# Patient Record
Sex: MALE | Race: WHITE | NOT HISPANIC OR LATINO
[De-identification: names, ages, dates, MRNs, and addresses within clinical notes are randomized per-mention and may not be internally consistent; named-entity substitution may affect disease eponyms.]

---

## 2017-01-26 ENCOUNTER — RX RENEWAL (OUTPATIENT)
Age: 47
End: 2017-01-26

## 2017-02-07 ENCOUNTER — APPOINTMENT (OUTPATIENT)
Dept: NEUROLOGY | Facility: CLINIC | Age: 47
End: 2017-02-07

## 2017-02-07 VITALS
HEART RATE: 85 BPM | WEIGHT: 150 LBS | SYSTOLIC BLOOD PRESSURE: 108 MMHG | BODY MASS INDEX: 26.58 KG/M2 | HEIGHT: 63 IN | DIASTOLIC BLOOD PRESSURE: 70 MMHG

## 2017-02-07 RX ORDER — LORATADINE 10 MG
17 TABLET,DISINTEGRATING ORAL
Refills: 0 | Status: ACTIVE | COMMUNITY

## 2017-02-27 ENCOUNTER — MEDICATION RENEWAL (OUTPATIENT)
Age: 47
End: 2017-02-27

## 2017-03-15 ENCOUNTER — RESULT CHARGE (OUTPATIENT)
Age: 47
End: 2017-03-15

## 2017-03-15 ENCOUNTER — RX RENEWAL (OUTPATIENT)
Age: 47
End: 2017-03-15

## 2017-03-20 ENCOUNTER — APPOINTMENT (OUTPATIENT)
Dept: NEUROLOGY | Facility: CLINIC | Age: 47
End: 2017-03-20

## 2017-03-27 ENCOUNTER — MEDICATION RENEWAL (OUTPATIENT)
Age: 47
End: 2017-03-27

## 2017-04-04 ENCOUNTER — OUTPATIENT (OUTPATIENT)
Dept: OUTPATIENT SERVICES | Facility: HOSPITAL | Age: 47
LOS: 1 days | End: 2017-04-04

## 2017-04-04 DIAGNOSIS — Z96.22 MYRINGOTOMY TUBE(S) STATUS: Chronic | ICD-10-CM

## 2017-04-04 DIAGNOSIS — T85.09XA OTHER MECHANICAL COMPLICATION OF VENTRICULAR INTRACRANIAL (COMMUNICATING) SHUNT, INITIAL ENCOUNTER: Chronic | ICD-10-CM

## 2017-04-17 ENCOUNTER — EMERGENCY (EMERGENCY)
Facility: HOSPITAL | Age: 47
LOS: 1 days | Discharge: HOME | End: 2017-04-17
Attending: EMERGENCY MEDICINE | Admitting: EMERGENCY MEDICINE
Payer: MEDICARE

## 2017-04-17 VITALS
SYSTOLIC BLOOD PRESSURE: 125 MMHG | RESPIRATION RATE: 17 BRPM | DIASTOLIC BLOOD PRESSURE: 73 MMHG | HEART RATE: 88 BPM | OXYGEN SATURATION: 98 %

## 2017-04-17 VITALS — HEART RATE: 85 BPM | TEMPERATURE: 97 F | RESPIRATION RATE: 14 BRPM | OXYGEN SATURATION: 98 %

## 2017-04-17 DIAGNOSIS — T85.09XA OTHER MECHANICAL COMPLICATION OF VENTRICULAR INTRACRANIAL (COMMUNICATING) SHUNT, INITIAL ENCOUNTER: Chronic | ICD-10-CM

## 2017-04-17 DIAGNOSIS — Z96.22 MYRINGOTOMY TUBE(S) STATUS: Chronic | ICD-10-CM

## 2017-04-17 LAB
ALBUMIN SERPL ELPH-MCNC: 4.3 G/DL — SIGNIFICANT CHANGE UP (ref 3.3–5)
ALP SERPL-CCNC: 145 U/L — HIGH (ref 40–120)
ALT FLD-CCNC: 17 U/L — SIGNIFICANT CHANGE UP (ref 4–41)
APPEARANCE UR: CLEAR — SIGNIFICANT CHANGE UP
AST SERPL-CCNC: 26 U/L — SIGNIFICANT CHANGE UP (ref 4–40)
BASE EXCESS BLDV CALC-SCNC: 4.9 MMOL/L — SIGNIFICANT CHANGE UP
BASOPHILS # BLD AUTO: 0.04 K/UL — SIGNIFICANT CHANGE UP (ref 0–0.2)
BASOPHILS NFR BLD AUTO: 0.7 % — SIGNIFICANT CHANGE UP (ref 0–2)
BILIRUB SERPL-MCNC: < 0.2 MG/DL — LOW (ref 0.2–1.2)
BILIRUB UR-MCNC: NEGATIVE — SIGNIFICANT CHANGE UP
BLOOD GAS VENOUS - CREATININE: 1 MG/DL — SIGNIFICANT CHANGE UP (ref 0.5–1.3)
BLOOD UR QL VISUAL: NEGATIVE — SIGNIFICANT CHANGE UP
BUN SERPL-MCNC: 10 MG/DL — SIGNIFICANT CHANGE UP (ref 7–23)
CALCIUM SERPL-MCNC: 8.7 MG/DL — SIGNIFICANT CHANGE UP (ref 8.4–10.5)
CHLORIDE BLDV-SCNC: 109 MMOL/L — HIGH (ref 96–108)
CHLORIDE SERPL-SCNC: 104 MMOL/L — SIGNIFICANT CHANGE UP (ref 98–107)
CO2 SERPL-SCNC: 27 MMOL/L — SIGNIFICANT CHANGE UP (ref 22–31)
COLOR SPEC: YELLOW — SIGNIFICANT CHANGE UP
CREAT SERPL-MCNC: 1.06 MG/DL — SIGNIFICANT CHANGE UP (ref 0.5–1.3)
EOSINOPHIL # BLD AUTO: 0.44 K/UL — SIGNIFICANT CHANGE UP (ref 0–0.5)
EOSINOPHIL NFR BLD AUTO: 7.6 % — HIGH (ref 0–6)
GAS PNL BLDV: 134 MMOL/L — LOW (ref 136–146)
GLUCOSE BLDV-MCNC: 94 — SIGNIFICANT CHANGE UP (ref 70–99)
GLUCOSE SERPL-MCNC: 100 MG/DL — HIGH (ref 70–99)
GLUCOSE UR-MCNC: NEGATIVE — SIGNIFICANT CHANGE UP
HCO3 BLDV-SCNC: 27 MMOL/L — SIGNIFICANT CHANGE UP (ref 20–27)
HCT VFR BLD CALC: 39 % — SIGNIFICANT CHANGE UP (ref 39–50)
HCT VFR BLDV CALC: 37.7 % — LOW (ref 39–51)
HGB BLD-MCNC: 12.1 G/DL — LOW (ref 13–17)
HGB BLDV-MCNC: 12.3 G/DL — LOW (ref 13–17)
IMM GRANULOCYTES NFR BLD AUTO: 0.3 % — SIGNIFICANT CHANGE UP (ref 0–1.5)
KETONES UR-MCNC: NEGATIVE — SIGNIFICANT CHANGE UP
LACTATE BLDV-MCNC: 1.9 MMOL/L — SIGNIFICANT CHANGE UP (ref 0.5–2)
LEUKOCYTE ESTERASE UR-ACNC: NEGATIVE — SIGNIFICANT CHANGE UP
LYMPHOCYTES # BLD AUTO: 1.88 K/UL — SIGNIFICANT CHANGE UP (ref 1–3.3)
LYMPHOCYTES # BLD AUTO: 32.6 % — SIGNIFICANT CHANGE UP (ref 13–44)
MCHC RBC-ENTMCNC: 28.2 PG — SIGNIFICANT CHANGE UP (ref 27–34)
MCHC RBC-ENTMCNC: 31 % — LOW (ref 32–36)
MCV RBC AUTO: 90.9 FL — SIGNIFICANT CHANGE UP (ref 80–100)
MONOCYTES # BLD AUTO: 0.68 K/UL — SIGNIFICANT CHANGE UP (ref 0–0.9)
MONOCYTES NFR BLD AUTO: 11.8 % — SIGNIFICANT CHANGE UP (ref 2–14)
MUCOUS THREADS # UR AUTO: SIGNIFICANT CHANGE UP
NEUTROPHILS # BLD AUTO: 2.71 K/UL — SIGNIFICANT CHANGE UP (ref 1.8–7.4)
NEUTROPHILS NFR BLD AUTO: 47 % — SIGNIFICANT CHANGE UP (ref 43–77)
NITRITE UR-MCNC: NEGATIVE — SIGNIFICANT CHANGE UP
NON-SQ EPI CELLS # UR AUTO: <1 — SIGNIFICANT CHANGE UP
PCO2 BLDV: 55 MMHG — HIGH (ref 41–51)
PH BLDV: 7.36 PH — SIGNIFICANT CHANGE UP (ref 7.32–7.43)
PH UR: 7 — SIGNIFICANT CHANGE UP (ref 4.6–8)
PLATELET # BLD AUTO: 241 K/UL — SIGNIFICANT CHANGE UP (ref 150–400)
PMV BLD: 9.4 FL — SIGNIFICANT CHANGE UP (ref 7–13)
PO2 BLDV: 33 MMHG — LOW (ref 35–40)
POTASSIUM BLDV-SCNC: 4.8 MMOL/L — HIGH (ref 3.4–4.5)
POTASSIUM SERPL-MCNC: 4.6 MMOL/L — SIGNIFICANT CHANGE UP (ref 3.5–5.3)
POTASSIUM SERPL-SCNC: 4.6 MMOL/L — SIGNIFICANT CHANGE UP (ref 3.5–5.3)
PROT SERPL-MCNC: 7.1 G/DL — SIGNIFICANT CHANGE UP (ref 6–8.3)
PROT UR-MCNC: NEGATIVE — SIGNIFICANT CHANGE UP
RBC # BLD: 4.29 M/UL — SIGNIFICANT CHANGE UP (ref 4.2–5.8)
RBC # FLD: 14.6 % — HIGH (ref 10.3–14.5)
RBC CASTS # UR COMP ASSIST: SIGNIFICANT CHANGE UP (ref 0–?)
SAO2 % BLDV: 60.5 % — SIGNIFICANT CHANGE UP (ref 60–85)
SODIUM SERPL-SCNC: 144 MMOL/L — SIGNIFICANT CHANGE UP (ref 135–145)
SP GR SPEC: 1.02 — SIGNIFICANT CHANGE UP (ref 1–1.03)
UROBILINOGEN FLD QL: NORMAL E.U. — SIGNIFICANT CHANGE UP (ref 0.1–0.2)
WBC # BLD: 5.77 K/UL — SIGNIFICANT CHANGE UP (ref 3.8–10.5)
WBC # FLD AUTO: 5.77 K/UL — SIGNIFICANT CHANGE UP (ref 3.8–10.5)
WBC UR QL: SIGNIFICANT CHANGE UP (ref 0–?)

## 2017-04-17 PROCEDURE — 75809 NONVASCULAR SHUNT X-RAY: CPT | Mod: 26

## 2017-04-17 PROCEDURE — 74000: CPT | Mod: 26

## 2017-04-17 PROCEDURE — 70450 CT HEAD/BRAIN W/O DYE: CPT | Mod: 26

## 2017-04-17 PROCEDURE — 99284 EMERGENCY DEPT VISIT MOD MDM: CPT | Mod: GC

## 2017-04-17 PROCEDURE — 49427 INJECTION ABDOMINAL SHUNT: CPT

## 2017-04-17 PROCEDURE — 71010: CPT | Mod: 26

## 2017-04-17 PROCEDURE — 70250 X-RAY EXAM OF SKULL: CPT | Mod: 26

## 2017-04-17 RX ORDER — ZONISAMIDE 100 MG
1 CAPSULE ORAL
Qty: 30 | Refills: 0 | OUTPATIENT
Start: 2017-04-17 | End: 2017-05-02

## 2017-04-17 RX ORDER — ZONISAMIDE 100 MG
100 CAPSULE ORAL ONCE
Qty: 0 | Refills: 0 | Status: COMPLETED | OUTPATIENT
Start: 2017-04-17 | End: 2017-04-17

## 2017-04-17 RX ORDER — ZONISAMIDE 100 MG
1 CAPSULE ORAL
Qty: 15 | Refills: 0 | OUTPATIENT
Start: 2017-04-17 | End: 2017-05-02

## 2017-04-17 RX ORDER — LAMOTRIGINE 25 MG/1
300 TABLET, ORALLY DISINTEGRATING ORAL ONCE
Qty: 0 | Refills: 0 | Status: COMPLETED | OUTPATIENT
Start: 2017-04-17 | End: 2017-04-17

## 2017-04-17 RX ORDER — PRIMIDONE 250 MG/1
375 TABLET ORAL ONCE
Qty: 0 | Refills: 0 | Status: COMPLETED | OUTPATIENT
Start: 2017-04-17 | End: 2017-04-17

## 2017-04-17 RX ADMIN — Medication 100 MILLIGRAM(S): at 21:28

## 2017-04-17 RX ADMIN — PRIMIDONE 375 MILLIGRAM(S): 250 TABLET ORAL at 21:28

## 2017-04-17 RX ADMIN — LAMOTRIGINE 300 MILLIGRAM(S): 25 TABLET, ORALLY DISINTEGRATING ORAL at 21:28

## 2017-04-17 NOTE — ED PROVIDER NOTE - PROGRESS NOTE DETAILS
Phyllis PGY3: Case d/w neurosurgery, recommended consulting neurology if ventricular size is unchanged. Seen by Neurology resident and case discussed with Dr. Jewell. Recommendation is for patient to have dosinamide increased from 50 BID to 100 mg BID and will have close follow up with Dr. Jewell. There have been no further events while in ED.

## 2017-04-17 NOTE — ED ADULT TRIAGE NOTE - CHIEF COMPLAINT QUOTE
pt c/o of having a headache and had a total of five seizures since yesterday pt has hx of  shunt MR and is blind.

## 2017-04-17 NOTE — ED PROVIDER NOTE - OBJECTIVE STATEMENT
45 yo man w/ h/o hydrocephalus (s/p  shunt), seizure d/o (s/p vns), MR, blindness p/w seizures. Aunt (legal guardian and hcp) states pt has had multiple seizures over past several days, including 3 yesterday, which have been severe and similar to those prior to vns placement. Describes as complex seizures followed by post ictal state. Otherwise pt has been at baseline, no fever, vomiting, ams, diarrhea. Takes lamotrigine, Onfi, primidone.  Neuro: Agusto Jewell  Neurosurgery: Beau

## 2017-04-17 NOTE — ED PROVIDER NOTE - ATTENDING CONTRIBUTION TO CARE
AJM: Pt seen with resident and agree with above note. 47 yo man w/ h/o hydrocephalus (s/p  shunt), seizure d/o (s/p vns), MR, blindness p/w seizures. Aunt (legal guardian and hcp) states pt has had multiple seizures over past several days, including 3 yesterday, which have been severe and similar to those prior to vns placement. Describes as complex seizures followed by post ictal state. Now at baseline per guardian. no fevers, sweats, chills, trauma. No signs of infection. Will obtain labs, ct head,  shunt series. Neurosurgery consultation for possible stimulator malfunction.

## 2017-04-17 NOTE — ED ADULT NURSE NOTE - PMH
Aspiration pneumonia    Elevated liver enzymes    Environmental Allergies    Epilepsy    Glaucoma    Hiatal hernia    Hydrocephalus    Impulse control disorder  worsened when pt on Depakote, pt now off  Legally blind    Mild mental retardation    Osteoporosis    Sleep apnea  not on CPAP  Viral pneumonia

## 2017-04-17 NOTE — ED ADULT NURSE NOTE - PSH
Epilepsy  s/p Vagal Nerve Stimulator Implant , 2007  H/O craniotomy  at age 18  Heel cord contracture  s/p surgical correction b/l  History of tonsillectomy    Obstructed  shunt  revision of  shunt 2015  S/p bilateral myringotomy with tube placement    S/P  Shunt  originally had shunt to lung, then revised to peritoneal shunt, has had multiple revisions

## 2017-04-17 NOTE — ED ADULT NURSE NOTE - OBJECTIVE STATEMENT
RN Break Coverage: received pt to room 29 brought in by aunt for evaluation of increased seizures x5 since yesterday with worsening headaches x couple months with history of  shunt and Mild MR. Aunt states pt last had 2 seizures in the middle of the night, as she found patient's bed linens soiled. pt presents awake, alert, appropriately answering questions. c/o intermittent richardson. pt montaño, follows commands. skin warm, dry, appropriate for race. respirations even, unlabored. denies cp or sob. abdomen soft nontender. aunt denies fever or chills. pt denies any further symptoms. ivl placed. bloods drawn and sent. vs as noted. family at bedside. awaiting md campoverde. safety maintained. awaiting md campoverde

## 2017-04-18 ENCOUNTER — RESULT REVIEW (OUTPATIENT)
Age: 47
End: 2017-04-18

## 2017-04-19 LAB — LAMOTRIGINE SERPL-MCNC: 8.1 MCG/ML — SIGNIFICANT CHANGE UP (ref 2.5–15)

## 2017-04-20 LAB
PHENOBARBITAL [MASS/VOLUME] IN BODY FLUID: 18.7 MG/L — SIGNIFICANT CHANGE UP (ref 15–40)
PRIMIDONE FLD-MCNC: 9.9 MG/L — SIGNIFICANT CHANGE UP (ref 5–12)

## 2017-04-26 ENCOUNTER — APPOINTMENT (OUTPATIENT)
Dept: NEUROLOGY | Facility: CLINIC | Age: 47
End: 2017-04-26

## 2017-04-28 ENCOUNTER — APPOINTMENT (OUTPATIENT)
Dept: NEUROLOGY | Facility: CLINIC | Age: 47
End: 2017-04-28

## 2017-04-28 VITALS
WEIGHT: 142 LBS | BODY MASS INDEX: 25.16 KG/M2 | SYSTOLIC BLOOD PRESSURE: 105 MMHG | DIASTOLIC BLOOD PRESSURE: 69 MMHG | HEART RATE: 71 BPM | HEIGHT: 63 IN

## 2017-04-29 ENCOUNTER — RX RENEWAL (OUTPATIENT)
Age: 47
End: 2017-04-29

## 2017-05-12 ENCOUNTER — MEDICATION RENEWAL (OUTPATIENT)
Age: 47
End: 2017-05-12

## 2017-06-06 ENCOUNTER — APPOINTMENT (OUTPATIENT)
Dept: NEUROLOGY | Facility: CLINIC | Age: 47
End: 2017-06-06

## 2017-06-06 VITALS
BODY MASS INDEX: 24.8 KG/M2 | HEART RATE: 70 BPM | WEIGHT: 140 LBS | HEIGHT: 63 IN | SYSTOLIC BLOOD PRESSURE: 103 MMHG | DIASTOLIC BLOOD PRESSURE: 66 MMHG

## 2017-06-12 ENCOUNTER — OUTPATIENT (OUTPATIENT)
Dept: OUTPATIENT SERVICES | Facility: HOSPITAL | Age: 47
LOS: 1 days | End: 2017-06-12

## 2017-06-12 DIAGNOSIS — T85.09XA OTHER MECHANICAL COMPLICATION OF VENTRICULAR INTRACRANIAL (COMMUNICATING) SHUNT, INITIAL ENCOUNTER: Chronic | ICD-10-CM

## 2017-06-12 DIAGNOSIS — Z96.22 MYRINGOTOMY TUBE(S) STATUS: Chronic | ICD-10-CM

## 2017-06-20 ENCOUNTER — MEDICATION RENEWAL (OUTPATIENT)
Age: 47
End: 2017-06-20

## 2017-06-20 ENCOUNTER — RX RENEWAL (OUTPATIENT)
Age: 47
End: 2017-06-20

## 2017-06-21 ENCOUNTER — OUTPATIENT (OUTPATIENT)
Dept: OUTPATIENT SERVICES | Facility: HOSPITAL | Age: 47
LOS: 1 days | End: 2017-06-21
Payer: MEDICARE

## 2017-06-21 ENCOUNTER — APPOINTMENT (OUTPATIENT)
Dept: CT IMAGING | Facility: IMAGING CENTER | Age: 47
End: 2017-06-21

## 2017-06-21 DIAGNOSIS — T85.09XA OTHER MECHANICAL COMPLICATION OF VENTRICULAR INTRACRANIAL (COMMUNICATING) SHUNT, INITIAL ENCOUNTER: Chronic | ICD-10-CM

## 2017-06-21 DIAGNOSIS — G91.9 HYDROCEPHALUS, UNSPECIFIED: ICD-10-CM

## 2017-06-21 DIAGNOSIS — Z96.22 MYRINGOTOMY TUBE(S) STATUS: Chronic | ICD-10-CM

## 2017-06-21 PROCEDURE — 70450 CT HEAD/BRAIN W/O DYE: CPT

## 2017-06-27 ENCOUNTER — APPOINTMENT (OUTPATIENT)
Dept: NEUROLOGY | Facility: CLINIC | Age: 47
End: 2017-06-27

## 2017-06-27 VITALS
SYSTOLIC BLOOD PRESSURE: 100 MMHG | HEIGHT: 63 IN | BODY MASS INDEX: 24.8 KG/M2 | HEART RATE: 69 BPM | DIASTOLIC BLOOD PRESSURE: 64 MMHG | WEIGHT: 140 LBS

## 2017-07-12 ENCOUNTER — EMERGENCY (EMERGENCY)
Facility: HOSPITAL | Age: 47
LOS: 1 days | End: 2017-07-12
Payer: MEDICARE

## 2017-07-12 ENCOUNTER — MOBILE ON CALL (OUTPATIENT)
Age: 47
End: 2017-07-12

## 2017-07-12 DIAGNOSIS — T85.09XA OTHER MECHANICAL COMPLICATION OF VENTRICULAR INTRACRANIAL (COMMUNICATING) SHUNT, INITIAL ENCOUNTER: Chronic | ICD-10-CM

## 2017-07-12 DIAGNOSIS — Z96.22 MYRINGOTOMY TUBE(S) STATUS: Chronic | ICD-10-CM

## 2017-07-12 PROCEDURE — 99284 EMERGENCY DEPT VISIT MOD MDM: CPT

## 2017-07-13 ENCOUNTER — APPOINTMENT (OUTPATIENT)
Dept: NEUROLOGY | Facility: CLINIC | Age: 47
End: 2017-07-13

## 2017-07-13 VITALS
HEART RATE: 66 BPM | WEIGHT: 140 LBS | BODY MASS INDEX: 24.8 KG/M2 | DIASTOLIC BLOOD PRESSURE: 62 MMHG | SYSTOLIC BLOOD PRESSURE: 99 MMHG | HEIGHT: 63 IN

## 2017-07-13 PROCEDURE — 71010: CPT | Mod: 26

## 2017-07-27 ENCOUNTER — OTHER (OUTPATIENT)
Age: 47
End: 2017-07-27

## 2017-07-28 ENCOUNTER — APPOINTMENT (OUTPATIENT)
Dept: NEUROLOGY | Facility: CLINIC | Age: 47
End: 2017-07-28

## 2017-08-03 ENCOUNTER — INPATIENT (INPATIENT)
Facility: HOSPITAL | Age: 47
LOS: 4 days | Discharge: HOME | DRG: 101 | End: 2017-08-08
Attending: PSYCHIATRY & NEUROLOGY | Admitting: PSYCHIATRY & NEUROLOGY
Payer: MEDICARE

## 2017-08-03 VITALS
OXYGEN SATURATION: 100 % | HEART RATE: 87 BPM | TEMPERATURE: 98 F | DIASTOLIC BLOOD PRESSURE: 77 MMHG | WEIGHT: 147.93 LBS | SYSTOLIC BLOOD PRESSURE: 114 MMHG | HEIGHT: 62 IN | RESPIRATION RATE: 18 BRPM

## 2017-08-03 DIAGNOSIS — Z96.22 MYRINGOTOMY TUBE(S) STATUS: Chronic | ICD-10-CM

## 2017-08-03 DIAGNOSIS — T85.09XA OTHER MECHANICAL COMPLICATION OF VENTRICULAR INTRACRANIAL (COMMUNICATING) SHUNT, INITIAL ENCOUNTER: Chronic | ICD-10-CM

## 2017-08-03 DIAGNOSIS — G40.909 EPILEPSY, UNSPECIFIED, NOT INTRACTABLE, WITHOUT STATUS EPILEPTICUS: ICD-10-CM

## 2017-08-03 DIAGNOSIS — R56.9 UNSPECIFIED CONVULSIONS: ICD-10-CM

## 2017-08-03 PROCEDURE — 95819 EEG AWAKE AND ASLEEP: CPT | Mod: 26

## 2017-08-03 PROCEDURE — 95957 EEG DIGITAL ANALYSIS: CPT | Mod: 26

## 2017-08-03 RX ORDER — LAMOTRIGINE 25 MG/1
300 TABLET, ORALLY DISINTEGRATING ORAL
Qty: 0 | Refills: 0 | Status: DISCONTINUED | OUTPATIENT
Start: 2017-08-03 | End: 2017-08-05

## 2017-08-03 RX ORDER — PSYLLIUM SEED (WITH DEXTROSE)
1 POWDER (GRAM) ORAL DAILY
Qty: 0 | Refills: 0 | Status: DISCONTINUED | OUTPATIENT
Start: 2017-08-03 | End: 2017-08-08

## 2017-08-03 RX ORDER — LORATADINE 10 MG/1
10 TABLET ORAL DAILY
Qty: 0 | Refills: 0 | Status: DISCONTINUED | OUTPATIENT
Start: 2017-08-03 | End: 2017-08-08

## 2017-08-03 RX ORDER — LATANOPROST 0.05 MG/ML
1 SOLUTION/ DROPS OPHTHALMIC; TOPICAL AT BEDTIME
Qty: 0 | Refills: 0 | Status: DISCONTINUED | OUTPATIENT
Start: 2017-08-03 | End: 2017-08-08

## 2017-08-03 RX ORDER — PRIMIDONE 250 MG/1
250 TABLET ORAL DAILY
Qty: 0 | Refills: 0 | Status: DISCONTINUED | OUTPATIENT
Start: 2017-08-03 | End: 2017-08-08

## 2017-08-03 RX ORDER — ESCITALOPRAM OXALATE 10 MG/1
10 TABLET, FILM COATED ORAL DAILY
Qty: 0 | Refills: 0 | Status: DISCONTINUED | OUTPATIENT
Start: 2017-08-03 | End: 2017-08-08

## 2017-08-03 RX ORDER — ENOXAPARIN SODIUM 100 MG/ML
40 INJECTION SUBCUTANEOUS EVERY 24 HOURS
Qty: 0 | Refills: 0 | Status: DISCONTINUED | OUTPATIENT
Start: 2017-08-03 | End: 2017-08-08

## 2017-08-03 RX ORDER — FLUTICASONE PROPIONATE 50 MCG
2 SPRAY, SUSPENSION NASAL DAILY
Qty: 0 | Refills: 0 | Status: DISCONTINUED | OUTPATIENT
Start: 2017-08-03 | End: 2017-08-08

## 2017-08-03 RX ORDER — PRIMIDONE 250 MG/1
250 TABLET ORAL AT BEDTIME
Qty: 0 | Refills: 0 | Status: DISCONTINUED | OUTPATIENT
Start: 2017-08-03 | End: 2017-08-08

## 2017-08-03 RX ADMIN — Medication 2 SPRAY(S): at 22:40

## 2017-08-03 RX ADMIN — LATANOPROST 1 DROP(S): 0.05 SOLUTION/ DROPS OPHTHALMIC; TOPICAL at 22:40

## 2017-08-03 RX ADMIN — PRIMIDONE 250 MILLIGRAM(S): 250 TABLET ORAL at 22:37

## 2017-08-03 RX ADMIN — Medication 1 APPLICATION(S): at 22:41

## 2017-08-03 RX ADMIN — LAMOTRIGINE 300 MILLIGRAM(S): 25 TABLET, ORALLY DISINTEGRATING ORAL at 22:37

## 2017-08-03 NOTE — H&P ADULT - ASSESSMENT
46 M PMH Seizure DO, blindness, glaucoma, hydrocephalus, rt parietal encephalomalacia s/p multiple  shunts, s/p VNS(May 2106), s/p craniotomy presents to EMU for seizure characterization. Patient follows outpatient with Dr. Welch. PE is unremarkable and unchanged. Patient admitted for seizure characterization.

## 2017-08-03 NOTE — H&P ADULT - NSHPPHYSICALEXAM_GEN_ALL_CORE
AAO x 3, naming repetition intact, fluent high pitched speech  PERRLA, Extropia, horizontal nystagmus left eye, medial limitation B/L left eye sight to light only. rt eye can read in peripheral vision, color only in central of right eye. V1-V3 intact, no facial asymmetry, tongue/uvula midline, traps intact  5/5 throughout escept rt shoulder abduction and rt hip flexion4+/5, mild atrophy of b/l shins/calves, otherwise nml bulk/tone  intact LT/PP throughout  ambulates with assistance  hyporeflexic throughout

## 2017-08-03 NOTE — H&P ADULT - HISTORY OF PRESENT ILLNESS
46 M PMH Seizure DO, blindness, glaucoma, hydrocephalus, rt parietal encephalomalacia s/p multiple  shunts, s/p VNS(May 2106), s/p craniotomy presents to EMU for seizure characterization.     Seizures began shortly after birth. He had hydrocephalus and now s/p shunt. 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 Seizures described as loss of balance, confusion and picking at his clothes with prior EEGs demonstrating spikes on B/L anterior temporal. Episodes come in clusters, guardian could not give frequency as pt is in a group home. The pt states he is aware of seizures at times. Since May, pt has been exhibiting progressive change in behavior and increased falls. At times acting confused with aggressive behavior, repeating statements. As per guardian behavior is atypical. Psychotropic drugs were added and as per pt's guardian there does not seem to be much of a response to psychotropic medication. 46 M PMH Seizure DO, blindness, glaucoma, hydrocephalus, rt parietal encephalomalacia s/p multiple  shunts, s/p VNS(May 2106), s/p craniotomy presents to EMU for seizure characterization.     Patient follows outpatient with     Seizures began shortly after birth. He had hydrocephalus and now s/p shunt. 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 Seizures described as loss of balance, confusion and picking at his clothes with prior EEGs demonstrating spikes on B/L anterior temporal. Episodes come in clusters, guardian could not give frequency as pt is in a group home. The seizures at a younger age are unclear in semiology. However, for the past several decades his typical seizures have been pulling at his clothes for seconds to minutes with confusion thereafter. He gets confused with these events with behavioral arrest.   the exact frequency is unknown (perhaps several times a month). He does not have convulsions.    In addition to the seizures above, he has been having behavioral outbursts at his home. He gets mad at times or very tired at times. It has been unclear if these are seizures. As per guardian behavior is atypical. Psychotropic drugs were added and as per pt's guardian there does not seem to be much of a response to psychotropic medication. 46 M PMH Seizure DO, blindness, glaucoma, hydrocephalus, rt parietal encephalomalacia s/p multiple  shunts, s/p VNS(May 2106), s/p craniotomy presents to EMU for seizure characterization. Patient follows outpatient with Dr. Welch. Seizures described as loss of balance, confusion and picking at his clothes with prior EEGs demonstrating spikes on B/L anterior temporal. Episodes come in clusters, guardian could not give frequency as pt is in a group home. The seizures at a younger age are unclear in semiology. However, for the past several decades his typical seizures have been pulling at his clothes for seconds to minutes with confusion thereafter. He gets confused with these events with behavioral arrest. the exact frequency is unknown (perhaps several times a month). He does not have convulsions.    In addition to the seizures above, he has been having behavioral outbursts at his home. He gets mad at times or very tired at times. It has been unclear if these are seizures. As per guardian behavior is atypical. Psychotropic drugs were added and as per pt's guardian there does not seem to be much of a response to psychotropic medication. . Seizures began shortly after birth. He had hydrocephalus and now s/p shunt. 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

## 2017-08-04 ENCOUNTER — TRANSCRIPTION ENCOUNTER (OUTPATIENT)
Age: 47
End: 2017-08-04

## 2017-08-04 LAB
ALBUMIN SERPL ELPH-MCNC: 4 G/DL — SIGNIFICANT CHANGE UP (ref 3.3–5)
ALP SERPL-CCNC: 161 U/L — HIGH (ref 40–120)
ALT FLD-CCNC: 19 U/L — SIGNIFICANT CHANGE UP (ref 10–45)
ANION GAP SERPL CALC-SCNC: 13 MMOL/L — SIGNIFICANT CHANGE UP (ref 5–17)
APTT BLD: 36.3 SEC — SIGNIFICANT CHANGE UP (ref 27.5–37.4)
AST SERPL-CCNC: 29 U/L — SIGNIFICANT CHANGE UP (ref 10–40)
BILIRUB DIRECT SERPL-MCNC: 0.1 MG/DL — SIGNIFICANT CHANGE UP (ref 0–0.2)
BILIRUB INDIRECT FLD-MCNC: 0.1 MG/DL — LOW (ref 0.2–1)
BILIRUB SERPL-MCNC: 0.2 MG/DL — SIGNIFICANT CHANGE UP (ref 0.2–1.2)
BUN SERPL-MCNC: 14 MG/DL — SIGNIFICANT CHANGE UP (ref 7–23)
CALCIUM SERPL-MCNC: 8.8 MG/DL — SIGNIFICANT CHANGE UP (ref 8.4–10.5)
CHLORIDE SERPL-SCNC: 103 MMOL/L — SIGNIFICANT CHANGE UP (ref 96–108)
CO2 SERPL-SCNC: 24 MMOL/L — SIGNIFICANT CHANGE UP (ref 22–31)
CREAT SERPL-MCNC: 0.84 MG/DL — SIGNIFICANT CHANGE UP (ref 0.5–1.3)
GLUCOSE SERPL-MCNC: 100 MG/DL — HIGH (ref 70–99)
HCT VFR BLD CALC: 38.7 % — LOW (ref 39–50)
HGB BLD-MCNC: 12.5 G/DL — LOW (ref 13–17)
INR BLD: 1.05 RATIO — SIGNIFICANT CHANGE UP (ref 0.88–1.16)
MCHC RBC-ENTMCNC: 28.7 PG — SIGNIFICANT CHANGE UP (ref 27–34)
MCHC RBC-ENTMCNC: 32.3 GM/DL — SIGNIFICANT CHANGE UP (ref 32–36)
MCV RBC AUTO: 88.8 FL — SIGNIFICANT CHANGE UP (ref 80–100)
PLATELET # BLD AUTO: 250 K/UL — SIGNIFICANT CHANGE UP (ref 150–400)
POTASSIUM SERPL-MCNC: 4.5 MMOL/L — SIGNIFICANT CHANGE UP (ref 3.5–5.3)
POTASSIUM SERPL-SCNC: 4.5 MMOL/L — SIGNIFICANT CHANGE UP (ref 3.5–5.3)
PROT SERPL-MCNC: 7.3 G/DL — SIGNIFICANT CHANGE UP (ref 6–8.3)
PROTHROM AB SERPL-ACNC: 11.4 SEC — SIGNIFICANT CHANGE UP (ref 9.8–12.7)
RBC # BLD: 4.36 M/UL — SIGNIFICANT CHANGE UP (ref 4.2–5.8)
RBC # FLD: 14.8 % — HIGH (ref 10.3–14.5)
SODIUM SERPL-SCNC: 140 MMOL/L — SIGNIFICANT CHANGE UP (ref 135–145)
WBC # BLD: 4.82 K/UL — SIGNIFICANT CHANGE UP (ref 3.8–10.5)
WBC # FLD AUTO: 4.82 K/UL — SIGNIFICANT CHANGE UP (ref 3.8–10.5)

## 2017-08-04 PROCEDURE — 95957 EEG DIGITAL ANALYSIS: CPT | Mod: 26

## 2017-08-04 PROCEDURE — 95951: CPT | Mod: 26

## 2017-08-04 PROCEDURE — 99222 1ST HOSP IP/OBS MODERATE 55: CPT | Mod: GC

## 2017-08-04 RX ADMIN — Medication 1 APPLICATION(S): at 22:04

## 2017-08-04 RX ADMIN — Medication 2 SPRAY(S): at 11:12

## 2017-08-04 RX ADMIN — LAMOTRIGINE 300 MILLIGRAM(S): 25 TABLET, ORALLY DISINTEGRATING ORAL at 17:43

## 2017-08-04 RX ADMIN — ESCITALOPRAM OXALATE 10 MILLIGRAM(S): 10 TABLET, FILM COATED ORAL at 11:12

## 2017-08-04 RX ADMIN — PRIMIDONE 250 MILLIGRAM(S): 250 TABLET ORAL at 09:17

## 2017-08-04 RX ADMIN — Medication 1 PACKET(S): at 11:12

## 2017-08-04 RX ADMIN — LAMOTRIGINE 300 MILLIGRAM(S): 25 TABLET, ORALLY DISINTEGRATING ORAL at 05:22

## 2017-08-04 RX ADMIN — ENOXAPARIN SODIUM 40 MILLIGRAM(S): 100 INJECTION SUBCUTANEOUS at 05:22

## 2017-08-04 RX ADMIN — LATANOPROST 1 DROP(S): 0.05 SOLUTION/ DROPS OPHTHALMIC; TOPICAL at 22:04

## 2017-08-04 RX ADMIN — LORATADINE 10 MILLIGRAM(S): 10 TABLET ORAL at 11:12

## 2017-08-04 RX ADMIN — PRIMIDONE 250 MILLIGRAM(S): 250 TABLET ORAL at 22:04

## 2017-08-04 NOTE — DISCHARGE NOTE ADULT - CARE PROVIDER_API CALL
Agusto Jewell (MD), Clinical Neurophysiology; EEGEpilepsy; Neurology  [address] [address]  Phone: [phone]  Fax: [phone]

## 2017-08-04 NOTE — DISCHARGE NOTE ADULT - MEDICATION SUMMARY - MEDICATIONS TO TAKE
I will START or STAY ON the medications listed below when I get home from the hospital:    Calcium 500+D oral tablet  -- 1 tab(s) by mouth 3 times a day  -- Indication: For Supplement    lanolin-mineral oil topical  --   apply to scalp at bed time  -- Indication: For minieral oil    petroleum jelly  --   apply on skin to right ear at bed time  -- Indication: For jelly    Tylenol Regular Strength 325 mg oral tablet  -- 2 tab(s) by mouth every 6 hours, As needed, Moderate Pain  -- Indication: For pain    Milk of Magnesia  -- 30 milliliter(s) by mouth , As Needed  -- Indication: For antacid    LaMICtal 200 mg oral tablet  -- 2 tab(s) by mouth 2 times a day  -- Indication: For Seizure    primidone 250 mg oral tablet  -- 1 tab(s) by mouth 2 times a day  -- Indication: For Seizure    Onfi 10 mg oral tablet  -- 5 milligram(s) by mouth 2 times a day MDD:10 mg  -- Indication: For Seizure    escitalopram 10 mg oral tablet  -- 1.5 tab(s) by mouth once a day am  -- Indication: For antidepressant    Imodium A-D  -- 4 milligram(s) by mouth , As Needed  -- Indication: For antidiarrhea    cetirizine 10 mg oral tablet  -- 1 tab(s) by mouth once a day in am  -- Indication: For antuhistamine    ziprasidone 80 mg oral capsule  -- 1 cap(s) by mouth 2 times a day  -- Indication: For antipsychotic    Rexulti 1 mg oral tablet  -- 2 tab(s) by mouth once a day  -- Indication: For antipsychotic    Keflex 500 mg oral capsule  -- 500 cap(s) by mouth 4 times a day MDD:4 caps to be taken before dental appts  -- Finish all this medication unless otherwise directed by prescriber.    -- Indication: For prn abx    Vaseline topical ointment  -- Apply on skin to affected area  right ankle twice a day and right ear at bedtime  -- Indication: For topical    ketoconazole 2% topical cream  -- Apply on skin to affected area 2 times a day right cheek and left forehead  -- Indication: For topical    ciclopirox 1% topical shampoo  -- Apply on skin to affected area  3 x weekly to scalp  -- Indication: For topical    Dulcolax Laxative 10 mg rectal suppository  -- 1 suppository(ies) rectally once a day, As Needed  -- Indication: For laxative    Fleet Enema 7 g-19 g rectal enema  -- 133 milliliter(s) rectally once, As Needed  -- Indication: For laxative    Reguloid  -- 1 tbsp in 8oz of fluid by mouth once a day in pm  -- Indication: For laxative    Alphagan P 0.1% ophthalmic solution  -- 1 drop(s) to each affected eye 2 times a day  -- Indication: For opthamologic    Refresh Optive ophthalmic solution  -- 1 drop(s) in each eye 3 times a day  -- Indication: For eye drops    Refresh PM ophthalmic ointment  -- 1 application to each affected eye once a day (at bedtime)  -- Indication: For eye drops    Travatan Z 0.004% ophthalmic solution  -- 1 drop(s) to each affected eye once a day (in the evening)  -- Indication: For eye drops    dorzolamide-timolol 2.23%-0.68% ophthalmic solution  -- 1 drop(s) to each affected eye 2 times a day  -- Indication: For eye drops    latanoprost 0.005% ophthalmic solution  -- 1 drop(s) to each affected eye once a day (at bedtime)  -- Indication: For eye drops    Ear Wax 6.5% otic solution  -- 10 drop(s) to each affected ear 1st week every month 7 days  -- Indication: For ear wax    NexIUM 40 mg oral delayed release capsule  -- 1 cap(s) by mouth 2 times a day  -- Indication: For GERd    Vitamin D3 1000 intl units oral capsule  -- 1 cap(s) by mouth once a day in am  -- Indication: For vitamin I will START or STAY ON the medications listed below when I get home from the hospital:    Calcium 500+D oral tablet  -- 1 tab(s) by mouth 3 times a day  -- Indication: For Supplement    lanolin-mineral oil topical  --   apply to scalp at bed time  -- Indication: For minieral oil    petroleum jelly  --   apply on skin to right ear at bed time  -- Indication: For jelly    Tylenol Regular Strength 325 mg oral tablet  -- 2 tab(s) by mouth every 6 hours, As needed, Moderate Pain  -- Indication: For pain    Milk of Magnesia  -- 30 milliliter(s) by mouth , As Needed  -- Indication: For antacid    LaMICtal 200 mg oral tablet  -- 2 tab(s) by mouth 2 times a day  -- Indication: For Seizure    primidone 250 mg oral tablet  -- 1 tab(s) by mouth 2 times a day  -- Indication: For Seizure    Onfi 10 mg oral tablet  -- 5 milligram(s) by mouth 2 times a day MDD:10 mg  -- Indication: For Seizure    zonisamide 100 mg oral capsule  -- 1 cap(s) by mouth 2 times a day  -- Indication: For Seizure    escitalopram 10 mg oral tablet  -- 1.5 tab(s) by mouth once a day am  -- Indication: For antidepressant    Imodium A-D  -- 4 milligram(s) by mouth , As Needed  -- Indication: For antidiarrhea    cetirizine 10 mg oral tablet  -- 1 tab(s) by mouth once a day in am  -- Indication: For antuhistamine    ziprasidone 80 mg oral capsule  -- 1 cap(s) by mouth 2 times a day  -- Indication: For antipsychotic    Rexulti 1 mg oral tablet  -- 2 tab(s) by mouth once a day  -- Indication: For antipsychotic    Keflex 500 mg oral capsule  -- 500 cap(s) by mouth 4 times a day MDD:4 caps to be taken before dental appts  -- Finish all this medication unless otherwise directed by prescriber.    -- Indication: For prn abx    Vaseline topical ointment  -- Apply on skin to affected area  right ankle twice a day and right ear at bedtime  -- Indication: For topical    ketoconazole 2% topical cream  -- Apply on skin to affected area 2 times a day right cheek and left forehead  -- Indication: For topical    ciclopirox 1% topical shampoo  -- Apply on skin to affected area  3 x weekly to scalp  -- Indication: For topical    Dulcolax Laxative 10 mg rectal suppository  -- 1 suppository(ies) rectally once a day, As Needed  -- Indication: For laxative    Fleet Enema 7 g-19 g rectal enema  -- 133 milliliter(s) rectally once, As Needed  -- Indication: For laxative    Reguloid  -- 1 tbsp in 8oz of fluid by mouth once a day in pm  -- Indication: For laxative    Alphagan P 0.1% ophthalmic solution  -- 1 drop(s) to each affected eye 2 times a day  -- Indication: For opthamologic    Refresh Optive ophthalmic solution  -- 1 drop(s) in each eye 3 times a day  -- Indication: For eye drops    Refresh PM ophthalmic ointment  -- 1 application to each affected eye once a day (at bedtime)  -- Indication: For eye drops    Travatan Z 0.004% ophthalmic solution  -- 1 drop(s) to each affected eye once a day (in the evening)  -- Indication: For eye drops    dorzolamide-timolol 2.23%-0.68% ophthalmic solution  -- 1 drop(s) to each affected eye 2 times a day  -- Indication: For eye drops    latanoprost 0.005% ophthalmic solution  -- 1 drop(s) to each affected eye once a day (at bedtime)  -- Indication: For eye drops    Ear Wax 6.5% otic solution  -- 10 drop(s) to each affected ear 1st week every month 7 days  -- Indication: For ear wax    NexIUM 40 mg oral delayed release capsule  -- 1 cap(s) by mouth 2 times a day  -- Indication: For GERd    Vitamin D3 1000 intl units oral capsule  -- 1 cap(s) by mouth once a day in am  -- Indication: For vitamin I will START or STAY ON the medications listed below when I get home from the hospital:    Calcium 500+D oral tablet  -- 1 tab(s) by mouth 3 times a day  -- Indication: For Supplement    lanolin-mineral oil topical  --   apply to scalp at bed time  -- Indication: For minieral oil    petroleum jelly  --   apply on skin to right ear at bed time  -- Indication: For jelly    Actamin 325 mg oral tablet  -- 2 tab(s) by mouth every 8 hours, As Needed, Mild Pain (1 - 3)  -- Indication: For pain    Milk of Magnesia  -- 30 milliliter(s) by mouth , As Needed  -- Indication: For antacid    LaMICtal 200 mg oral tablet  -- 2 tab(s) by mouth 2 times a day  -- Indication: For Seizure    primidone 250 mg oral tablet  -- 1 tab(s) by mouth 2 times a day  -- Indication: For Seizure    Onfi 10 mg oral tablet  -- 5 milligram(s) by mouth 2 times a day MDD:10 mg  -- Indication: For Seizure    escitalopram 10 mg oral tablet  -- 1.5 tab(s) by mouth once a day am  -- Indication: For antidepressant    Imodium A-D  -- 4 milligram(s) by mouth , As Needed  -- Indication: For antidiarrhea    cetirizine 10 mg oral tablet  -- 1 tab(s) by mouth once a day in am  -- Indication: For antuhistamine    ziprasidone 80 mg oral capsule  -- 1 cap(s) by mouth 2 times a day  -- Indication: For antipsychotic    Rexulti 1 mg oral tablet  -- 2 tab(s) by mouth once a day  -- Indication: For antipsychotic    Keflex 500 mg oral capsule  -- 500 cap(s) by mouth 4 times a day MDD:4 caps to be taken before dental appts  -- Finish all this medication unless otherwise directed by prescriber.    -- Indication: For prn abx    Vaseline topical ointment  -- Apply on skin to affected area  right ankle twice a day and right ear at bedtime  -- Indication: For topical    ketoconazole 2% topical cream  -- Apply on skin to affected area 2 times a day right cheek and left forehead  -- Indication: For topical    ciclopirox 1% topical shampoo  -- Apply on skin to affected area  3 x weekly to scalp  -- Indication: For topical    Dulcolax Laxative 10 mg rectal suppository  -- 1 suppository(ies) rectally once a day, As Needed  -- Indication: For laxative    Fleet Enema 7 g-19 g rectal enema  -- 133 milliliter(s) rectally once, As Needed  -- Indication: For laxative    Reguloid  -- 1 tbsp in 8oz of fluid by mouth once a day in pm  -- Indication: For laxative    Alphagan P 0.1% ophthalmic solution  -- 1 drop(s) to each affected eye 2 times a day  -- Indication: For opthamologic    Refresh Optive ophthalmic solution  -- 1 drop(s) in each eye 3 times a day  -- Indication: For eye drops    Refresh PM ophthalmic ointment  -- 1 application to each affected eye once a day (at bedtime)  -- Indication: For eye drops    Travatan Z 0.004% ophthalmic solution  -- 1 drop(s) to each affected eye once a day (in the evening)  -- Indication: For eye drops    dorzolamide-timolol 2.23%-0.68% ophthalmic solution  -- 1 drop(s) to each affected eye 2 times a day  -- Indication: For eye drops    latanoprost 0.005% ophthalmic solution  -- 1 drop(s) to each affected eye once a day (at bedtime)  -- Indication: For eye drops    Ear Wax 6.5% otic solution  -- 10 drop(s) to each affected ear 1st week every month 7 days  -- Indication: For ear wax    NexIUM 40 mg oral delayed release capsule  -- 1 cap(s) by mouth 2 times a day  -- Indication: For GERd    Vitamin D3 1000 intl units oral capsule  -- 1 cap(s) by mouth once a day in am  -- Indication: For vitamin

## 2017-08-04 NOTE — PROGRESS NOTE ADULT - ASSESSMENT
46 M PMH Seizure DO, blindness, glaucoma, hydrocephalus, rt parietal encephalomalacia s/p multiple  shunts, s/p VNS(May 2106), s/p craniotomy presents to EMU for seizure characterization. Homem meds Onfi BID, Lamictal 300 BID, primidone BID PE unchanged. VEEG showed bilaterally temporal spikes L >R

## 2017-08-04 NOTE — DISCHARGE NOTE ADULT - HOSPITAL COURSE
46 M PMH Seizure DO, blindness, glaucoma, hydrocephalus, rt parietal encephalomalacia s/p multiple  shunts, s/p VNS(May 2106), s/p craniotomy presents to EMU for seizure characterization. Patient follows outpatient with Dr. Welch. Seizures described as loss of balance, confusion and picking at his clothes with prior EEGs demonstrating spikes on B/L anterior temporal. Episodes come in clusters, guardian could not give frequency as pt is in a group home. The seizures at a younger age are unclear in semiology. However, for the past several decades his typical seizures have been pulling at his clothes for seconds to minutes with confusion thereafter. He gets confused with these events with behavioral arrest. the exact frequency is unknown (perhaps several times a month). He does not have convulsions.    In addition to the seizures above, he has been having behavioral outbursts at his home. He gets mad at times or very tired at times. It has been unclear if these are seizures. As per guardian behavior is atypical. Psychotropic drugs were added and as per pt's guardian there does not seem to be much of a response to psychotropic medication. . Seizures began shortly after birth. He had hydrocephalus and now s/p shunt. 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     Patient was monitored via VEEG. 46 M PMH Seizure DO, blindness, glaucoma, hydrocephalus, rt parietal encephalomalacia s/p multiple  shunts, s/p VNS(May 2106), s/p craniotomy presents to EMU for seizure characterization. Patient follows outpatient with Dr. Welch. Seizures described as loss of balance, confusion and picking at his clothes with prior EEGs demonstrating spikes on B/L anterior temporal. Episodes come in clusters, guardian could not give frequency as pt is in a group home. The seizures at a younger age are unclear in semiology. However, for the past several decades his typical seizures have been pulling at his clothes for seconds to minutes with confusion thereafter. He gets confused with these events with behavioral arrest. the exact frequency is unknown (perhaps several times a month). He does not have convulsions.    In addition to the seizures above, he has been having behavioral outbursts at his home. He gets mad at times or very tired at times. It has been unclear if these are seizures. As per guardian behavior is atypical. Psychotropic drugs were added and as per pt's guardian there does not seem to be much of a response to psychotropic medication. . Seizures began shortly after birth. He had hydrocephalus and now s/p shunt. 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     Patient was monitored via VEEG showed right sided slowing, and R >> L temporal spikes t, with arousal, automatisms of face/dystonic wrist posturing. Appear to occur with right sided subtle slowing, increased HR, then poorly lateralized bilateral asynchronous slowing prior to offset.  It was thought pt's behavior changes was 2/2 primidone. Primidone decreased from 250/374 to 250 BID, Lamictal was increased from 300 BID to 400 BID. Pt is stable for discharge and can return to his day care abd continue with his activities there    Please follow up with Dr. Jewell within 1 month. 46 M PMH Seizure DO, blindness, glaucoma, hydrocephalus, rt parietal encephalomalacia s/p multiple  shunts, s/p VNS(May 2106), s/p craniotomy presents to EMU for seizure characterization. Patient follows outpatient with Dr. Welch. Seizures described as loss of balance, confusion and picking at his clothes with prior EEGs demonstrating spikes on B/L anterior temporal. Episodes come in clusters, guardian could not give frequency as pt is in a group home. The seizures at a younger age are unclear in semiology. However, for the past several decades his typical seizures have been pulling at his clothes for seconds to minutes with confusion thereafter. He gets confused with these events with behavioral arrest. the exact frequency is unknown (perhaps several times a month). He does not have convulsions.    In addition to the seizures above, he has been having behavioral outbursts at his home. He gets mad at times or very tired at times. It has been unclear if these are seizures. As per guardian behavior is atypical. Psychotropic drugs were added and as per pt's guardian there does not seem to be much of a response to psychotropic medication. . Seizures began shortly after birth. He had hydrocephalus and now s/p shunt. 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     Patient was monitored via VEEG showed right sided slowing, and R >> L temporal spikes t, with arousal, automatisms of face/dystonic wrist posturing. Appear to occur with right sided subtle slowing, increased HR, then poorly lateralized bilateral asynchronous slowing prior to offset.  It was thought pt's behavior changes was 2/2 primidone. Primidone decreased from 250/374 to 250 BID, Lamictal was increased from 300 BID to 400 BID. Pt is stable for discharge and can return to his day care and resume his activities there.     Please follow up with Dr. Jewell within 1 month. ( August 14th is scheduled)

## 2017-08-04 NOTE — DISCHARGE NOTE ADULT - MEDICATION SUMMARY - MEDICATIONS TO CHANGE
I will SWITCH the dose or number of times a day I take the medications listed below when I get home from the hospital:    primidone 250 mg oral tablet  -- 1.5 tab(s) by mouth once a day (at bedtime)    primidone 250 mg oral tablet  -- 1 tab(s) by mouth once a day in am I will SWITCH the dose or number of times a day I take the medications listed below when I get home from the hospital:    LaMICtal 100 mg oral tablet  -- 3 tab(s) by mouth 2 times a day    primidone 250 mg oral tablet  -- 1.5 tab(s) by mouth once a day (at bedtime)    primidone 250 mg oral tablet  -- 1 tab(s) by mouth once a day in am

## 2017-08-04 NOTE — DISCHARGE NOTE ADULT - NS AS DC STROKE ED MATERIALS
Need for Followup After Discharge/Prescribed Medications/Risk Factors for Stroke/Stroke Warning Signs and Symptoms/Stroke Education Booklet/Call 911 for Stroke

## 2017-08-04 NOTE — DISCHARGE NOTE ADULT - PLAN OF CARE
management and prevention continue with meds as above  follow up with Neurology continue with meds as above  follow up with Neurology (  on August 14th)

## 2017-08-04 NOTE — PROGRESS NOTE ADULT - PROBLEM SELECTOR PLAN 1
VEEG  c/w AEDs  Ativan if seizure more than 3 mins  Seizure precautions VEEG  maintain lamictal 300 BID, primidone 250 BID  Decrease Onfi to 5 daily  Ativan if seizure more than 3 mins  Seizure precautions

## 2017-08-04 NOTE — DISCHARGE NOTE ADULT - CARE PLAN
Principal Discharge DX:	Seizure  Goal:	management and prevention  Instructions for follow-up, activity and diet:	continue with meds as above  follow up with Neurology Principal Discharge DX:	Seizure  Goal:	management and prevention  Instructions for follow-up, activity and diet:	continue with meds as above  follow up with Neurology (  on August 14th)

## 2017-08-04 NOTE — PROGRESS NOTE ADULT - SUBJECTIVE AND OBJECTIVE BOX
HPI: 46 M PMH Seizure DO, blindness, glaucoma, hydrocephalus, rt parietal encephalomalacia s/p multiple  shunts, s/p VNS(May 2106), s/p craniotomy presents to EMU for seizure characterization. Patient follows outpatient with Dr. Welch. Seizures described as loss of balance, confusion and picking at his clothes with prior EEGs demonstrating spikes on B/L anterior temporal. Episodes come in clusters, guardian could not give frequency as pt is in a group home. The seizures at a younger age are unclear in semiology. However, for the past several decades his typical seizures have been pulling at his clothes for seconds to minutes with confusion thereafter. He gets confused with these events with behavioral arrest. the exact frequency is unknown (perhaps several times a month). He does not have convulsions.    In addition to the seizures above, he has been having behavioral outbursts at his home. He gets mad at times or very tired at times. It has been unclear if these are seizures. As per guardian behavior is atypical. Psychotropic drugs were added and as per pt's guardian there does not seem to be much of a response to psychotropic medication. . Seizures began shortly after birth. He had hydrocephalus and now s/p shunt. 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     S: patient seens and examined no events    VEEG showed bilaterally temporal spikes R>L       I&O's Summary    03 Aug 2017 07:01  -  04 Aug 2017 07:00  --------------------------------------------------------  IN: 340 mL / OUT: 0 mL / NET: 340 mL    04 Aug 2017 07:01  -  04 Aug 2017 11:52  --------------------------------------------------------  IN: 420 mL / OUT: 0 mL / NET: 420 mL      Vital Signs Last 24 Hrs  T(C): 36.5 (04 Aug 2017 07:59), Max: 36.9 (04 Aug 2017 05:19)  T(F): 97.7 (04 Aug 2017 07:59), Max: 98.5 (04 Aug 2017 05:19)  HR: 90 (04 Aug 2017 07:59) (75 - 90)  BP: 98/61 (04 Aug 2017 07:59) (98/61 - 114/77)  BP(mean): --  RR: 20 (04 Aug 2017 07:59) (18 - 20)  SpO2: 98% (04 Aug 2017 07:59) (96% - 100%)    AAO x 3, naming repetition intact, fluent high pitched speech follows simple commands  PERRLA, Extropia, horizontal nystagmus left eye, medial limitation B/L left eye sight to light only. rt eye can read in peripheral vision, color only in central of right eye. V1-V3 intact, no facial asymmetry, tongue/uvula midline, traps intact  5/5 throughout escept rt shoulder abduction and rt hip flexion4+/5, mild atrophy of b/l shins/calves, otherwise nml bulk/tone  intact LT/PP throughout  ambulates with assistance  hyporeflexic throughout                        12.5   4.82  )-----------( 250      ( 04 Aug 2017 07:24 )             38.7     08-04    140  |  103  |  14  ----------------------------<  100<H>  4.5   |  24  |  0.84    Ca    8.8      04 Aug 2017 07:21    TPro  7.3  /  Alb  4.0  /  TBili  0.2  /  DBili  0.1  /  AST  29  /  ALT  19  /  AlkPhos  161<H>  08-04    CAPILLARY BLOOD GLUCOSE        PT/INR - ( 04 Aug 2017 05:58 )   PT: 11.4 sec;   INR: 1.05 ratio         PTT - ( 04 Aug 2017 05:58 )  PTT:36.3 sec

## 2017-08-04 NOTE — DISCHARGE NOTE ADULT - PATIENT PORTAL LINK FT
“You can access the [facility] Patient Portal, offered by [facility], by registering with the following website: [email or web address]”

## 2017-08-04 NOTE — DISCHARGE NOTE ADULT - FINDINGS/TREATMENT
right sided slowing, and R >> L temporal spikes t, with arousal, automatisms of face/dystonic wrist posturing

## 2017-08-05 PROCEDURE — 95951: CPT | Mod: 26

## 2017-08-05 PROCEDURE — 95957 EEG DIGITAL ANALYSIS: CPT | Mod: 26

## 2017-08-05 PROCEDURE — 99233 SBSQ HOSP IP/OBS HIGH 50: CPT

## 2017-08-05 RX ORDER — ACETAMINOPHEN 500 MG
650 TABLET ORAL ONCE
Qty: 0 | Refills: 0 | Status: COMPLETED | OUTPATIENT
Start: 2017-08-05 | End: 2017-08-06

## 2017-08-05 RX ORDER — LAMOTRIGINE 25 MG/1
350 TABLET, ORALLY DISINTEGRATING ORAL
Qty: 0 | Refills: 0 | Status: DISCONTINUED | OUTPATIENT
Start: 2017-08-05 | End: 2017-08-07

## 2017-08-05 RX ADMIN — LATANOPROST 1 DROP(S): 0.05 SOLUTION/ DROPS OPHTHALMIC; TOPICAL at 22:03

## 2017-08-05 RX ADMIN — LORATADINE 10 MILLIGRAM(S): 10 TABLET ORAL at 12:30

## 2017-08-05 RX ADMIN — ENOXAPARIN SODIUM 40 MILLIGRAM(S): 100 INJECTION SUBCUTANEOUS at 05:27

## 2017-08-05 RX ADMIN — Medication 1 APPLICATION(S): at 22:04

## 2017-08-05 RX ADMIN — ESCITALOPRAM OXALATE 10 MILLIGRAM(S): 10 TABLET, FILM COATED ORAL at 12:29

## 2017-08-05 RX ADMIN — PRIMIDONE 250 MILLIGRAM(S): 250 TABLET ORAL at 12:30

## 2017-08-05 RX ADMIN — PRIMIDONE 250 MILLIGRAM(S): 250 TABLET ORAL at 22:03

## 2017-08-05 RX ADMIN — Medication 2 SPRAY(S): at 12:33

## 2017-08-05 RX ADMIN — Medication 1 PACKET(S): at 12:29

## 2017-08-05 RX ADMIN — LAMOTRIGINE 350 MILLIGRAM(S): 25 TABLET, ORALLY DISINTEGRATING ORAL at 18:36

## 2017-08-05 RX ADMIN — LAMOTRIGINE 300 MILLIGRAM(S): 25 TABLET, ORALLY DISINTEGRATING ORAL at 05:27

## 2017-08-05 NOTE — PROGRESS NOTE ADULT - SUBJECTIVE AND OBJECTIVE BOX
Neurology Follow up note    Patient is a 46y old  Male who presents with a chief complaint of EMU monitoring (04 Aug 2017 17:11)      Subjective:Interval History - No events overnight    Objective:   Vital Signs Last 24 Hrs  T(C): 36.9 (05 Aug 2017 07:45), Max: 36.9 (05 Aug 2017 07:45)  T(F): 98.4 (05 Aug 2017 07:45), Max: 98.4 (05 Aug 2017 07:45)  HR: 85 (05 Aug 2017 07:45) (75 - 92)  BP: 109/60 (05 Aug 2017 07:45) (95/59 - 119/78)  BP(mean): --  RR: 18 (05 Aug 2017 07:45) (18 - 18)  SpO2: 99% (05 Aug 2017 07:45) (96% - 99%)    General Exam:   General appearance: No acute distress                 Cardiovascular: Pedal dorsalis pulses intact bilaterally    Neurological Exam:  Mental Status: Orientated to self, date and place.  Attention intact.  No dysarthria, aphasia or neglect.  Knowledge intact.  Registration intact.  Short and long term memory grossly intact.      Cranial Nerves:  PERRL, EOMI, VFF, no nystagmus or diplopia.  No APD.    CN V1-3 intact to light touch and pinprick.  No facial asymmetry.  Hearing intact to finger rub bilaterally.  Tongue, uvula and palate midline.  Sternocleidomastoid and Trapezius intact bilaterally.    Motor:   Tone: normal.                  Strength: intact throughout  Pronator drift: none                 Dysmeria: None to finger-nose-finger or heel-shin-heel  No truncal ataxia.    Tremor: No resting, postural or action tremor.  No myoclonus.    Sensation: intact to light touch, pinprick, vibration and proprioception    Deep Tendon Reflexes: 1+ bilateral biceps, triceps, brachioradialis, knee and ankle  Toes flexor bilaterally    Gait: normal and stable.      Other:    08-04    140  |  103  |  14  ----------------------------<  100<H>  4.5   |  24  |  0.84    Ca    8.8      04 Aug 2017 07:21    TPro  7.3  /  Alb  4.0  /  TBili  0.2  /  DBili  0.1  /  AST  29  /  ALT  19  /  AlkPhos  161<H>  08-04 08-04    140  |  103  |  14  ----------------------------<  100<H>  4.5   |  24  |  0.84    Ca    8.8      04 Aug 2017 07:21    TPro  7.3  /  Alb  4.0  /  TBili  0.2  /  DBili  0.1  /  AST  29  /  ALT  19  /  AlkPhos  161<H>  08-04    LIVER FUNCTIONS - ( 04 Aug 2017 07:21 )  Alb: 4.0 g/dL / Pro: 7.3 g/dL / ALK PHOS: 161 U/L / ALT: 19 U/L / AST: 29 U/L / GGT: x                                 12.5   4.82  )-----------( 250      ( 04 Aug 2017 07:24 )             38.7     Radiology      EEG:      MEDICATIONS  (STANDING):  enoxaparin Injectable 40 milliGRAM(s) SubCutaneous every 24 hours  lamoTRIgine 300 milliGRAM(s) Oral two times a day  primidone 250 milliGRAM(s) Oral at bedtime  primidone 250 milliGRAM(s) Oral daily  escitalopram 10 milliGRAM(s) Oral daily  loratadine 10 milliGRAM(s) Oral daily  fluticasone propionate 50 MICROgram(s)/spray Nasal Spray 2 Spray(s) Both Nostrils daily  psyllium Powder 1 Packet(s) Oral daily  petrolatum Ophthalmic Ointment 1 Application(s) Both EYES at bedtime  latanoprost 0.005% Ophthalmic Solution 1 Drop(s) Both EYES at bedtime  Clobazam 5 milliGRAM(s) Oral daily    MEDICATIONS  (PRN):  LORazepam   Injectable 2 milliGRAM(s) IV Push once PRN Seizure activity Neurology Follow up note    Patient is a 46y old  Male who presents with a chief complaint of EMU monitoring (04 Aug 2017 17:11)      Subjective :Interval History - No events overnight.     Objective:   Vital Signs Last 24 Hrs  T(C): 36.9 (05 Aug 2017 07:45), Max: 36.9 (05 Aug 2017 07:45)  T(F): 98.4 (05 Aug 2017 07:45), Max: 98.4 (05 Aug 2017 07:45)  HR: 85 (05 Aug 2017 07:45) (75 - 92)  BP: 109/60 (05 Aug 2017 07:45) (95/59 - 119/78)  BP(mean): --  RR: 18 (05 Aug 2017 07:45) (18 - 18)  SpO2: 99% (05 Aug 2017 07:45) (96% - 99%)    General Exam:   General appearance: No acute distress                     MS:AAO x 3, naming repetition intact, fluent high pitched speech follows simple commands  CN: PERRLA, Extropia, horizontal nystagmus left eye, medial limitation B/L left eye sight to light only. V1-V3 intact, no facial asymmetry, tongue/uvula midline, Shoulder shrug intact  Strength: 5/5 throughout mild atrophy of b/l shins/calves, otherwise nml bulk/tone   Sensory: intact LT/PP throughout  Gait: ambulates with assistance  Reflexes: hyporeflexic throughout    Other:    08-04    140  |  103  |  14  ----------------------------<  100<H>  4.5   |  24  |  0.84    Ca    8.8      04 Aug 2017 07:21    TPro  7.3  /  Alb  4.0  /  TBili  0.2  /  DBili  0.1  /  AST  29  /  ALT  19  /  AlkPhos  161<H>  08-04    08-04    140  |  103  |  14  ----------------------------<  100<H>  4.5   |  24  |  0.84    Ca    8.8      04 Aug 2017 07:21    TPro  7.3  /  Alb  4.0  /  TBili  0.2  /  DBili  0.1  /  AST  29  /  ALT  19  /  AlkPhos  161<H>  08-04    LIVER FUNCTIONS - ( 04 Aug 2017 07:21 )  Alb: 4.0 g/dL / Pro: 7.3 g/dL / ALK PHOS: 161 U/L / ALT: 19 U/L / AST: 29 U/L / GGT: x                                 12.5   4.82  )-----------( 250      ( 04 Aug 2017 07:24 )             38.7     Radiology      EEG:      MEDICATIONS  (STANDING):  enoxaparin Injectable 40 milliGRAM(s) SubCutaneous every 24 hours  lamoTRIgine 300 milliGRAM(s) Oral two times a day  primidone 250 milliGRAM(s) Oral at bedtime  primidone 250 milliGRAM(s) Oral daily  escitalopram 10 milliGRAM(s) Oral daily  loratadine 10 milliGRAM(s) Oral daily  fluticasone propionate 50 MICROgram(s)/spray Nasal Spray 2 Spray(s) Both Nostrils daily  psyllium Powder 1 Packet(s) Oral daily  petrolatum Ophthalmic Ointment 1 Application(s) Both EYES at bedtime  latanoprost 0.005% Ophthalmic Solution 1 Drop(s) Both EYES at bedtime  Clobazam 5 milliGRAM(s) Oral daily    MEDICATIONS  (PRN):  LORazepam   Injectable 2 milliGRAM(s) IV Push once PRN Seizure activity Neurology Follow up note    Patient is a 46y old  Male who presents with a chief complaint of EMU monitoring (04 Aug 2017 17:11)      Subjective :Interval History - No events overnight.     Objective:   Vital Signs Last 24 Hrs  T(C): 36.9 (05 Aug 2017 07:45), Max: 36.9 (05 Aug 2017 07:45)  T(F): 98.4 (05 Aug 2017 07:45), Max: 98.4 (05 Aug 2017 07:45)  HR: 85 (05 Aug 2017 07:45) (75 - 92)  BP: 109/60 (05 Aug 2017 07:45) (95/59 - 119/78)  BP(mean): --  RR: 18 (05 Aug 2017 07:45) (18 - 18)  SpO2: 99% (05 Aug 2017 07:45) (96% - 99%)    General Exam:   General appearance: No acute distress                     MS:AAO x 3, naming repetition intact, fluent high pitched speech follows simple commands  CN: PERRLA, Extropia, horizontal nystagmus left eye, medial limitation B/L left eye sight to light only. V1-V3 intact, no facial asymmetry, tongue/uvula midline, Shoulder shrug intact  Strength: 5/5 throughout mild atrophy of b/l shins/calves, otherwise nml bulk/tone   Sensory: intact LT/PP throughout  Gait: ambulates with assistance  Reflexes: hyporeflexic throughout    Other:    08-04    140  |  103  |  14  ----------------------------<  100<H>  4.5   |  24  |  0.84    Ca    8.8      04 Aug 2017 07:21    TPro  7.3  /  Alb  4.0  /  TBili  0.2  /  DBili  0.1  /  AST  29  /  ALT  19  /  AlkPhos  161<H>  08-04    08-04    140  |  103  |  14  ----------------------------<  100<H>  4.5   |  24  |  0.84    Ca    8.8      04 Aug 2017 07:21    TPro  7.3  /  Alb  4.0  /  TBili  0.2  /  DBili  0.1  /  AST  29  /  ALT  19  /  AlkPhos  161<H>  08-04    LIVER FUNCTIONS - ( 04 Aug 2017 07:21 )  Alb: 4.0 g/dL / Pro: 7.3 g/dL / ALK PHOS: 161 U/L / ALT: 19 U/L / AST: 29 U/L / GGT: x                                 12.5   4.82  )-----------( 250      ( 04 Aug 2017 07:24 )             38.7     Radiology      EEG: Right temporal sharp wave discharges,. right side slowing      MEDICATIONS  (STANDING):  enoxaparin Injectable 40 milliGRAM(s) SubCutaneous every 24 hours  lamoTRIgine 300 milliGRAM(s) Oral two times a day  primidone 250 milliGRAM(s) Oral at bedtime  primidone 250 milliGRAM(s) Oral daily  escitalopram 10 milliGRAM(s) Oral daily  loratadine 10 milliGRAM(s) Oral daily  fluticasone propionate 50 MICROgram(s)/spray Nasal Spray 2 Spray(s) Both Nostrils daily  psyllium Powder 1 Packet(s) Oral daily  petrolatum Ophthalmic Ointment 1 Application(s) Both EYES at bedtime  latanoprost 0.005% Ophthalmic Solution 1 Drop(s) Both EYES at bedtime  Clobazam 5 milliGRAM(s) Oral daily    MEDICATIONS  (PRN):  LORazepam   Injectable 2 milliGRAM(s) IV Push once PRN Seizure activity

## 2017-08-05 NOTE — PROVIDER CONTACT NOTE (OTHER) - ASSESSMENT
Appeared confused/disoriented. was swallowing. pupils dilated with sluggish constriction. Able to reorient post ictal. no medication needed. Provider notified post seizure episode. vital signs WDL. Oxygen applied during episode. suction available at bedside. BP = 108/70, T - 97.3, P=82, O2 = 98% on oxygen, R = 18

## 2017-08-05 NOTE — PROGRESS NOTE ADULT - PROBLEM SELECTOR PLAN 1
VEEG  maintain lamictal 300 BID, primidone 250 BID  Onfi decreased yesterday to 5mg daily.  neuro checks every 4hrs  Ativan if seizure more than 3 mins  Seizure precautions.

## 2017-08-05 NOTE — PROGRESS NOTE ADULT - ASSESSMENT
46 y.o M with PMH of seizure disorder, hydrocephalus, rt parietal encephalomalacia s/p multiple shunt placements and s/p craniotomy and VNS admitted for seizure capture.

## 2017-08-06 PROCEDURE — 95951: CPT | Mod: 26

## 2017-08-06 PROCEDURE — 99233 SBSQ HOSP IP/OBS HIGH 50: CPT

## 2017-08-06 PROCEDURE — 95957 EEG DIGITAL ANALYSIS: CPT | Mod: 26

## 2017-08-06 RX ORDER — ZONISAMIDE 100 MG/1
100 CAPSULE ORAL
Qty: 60 | Refills: 5 | Status: DISCONTINUED | COMMUNITY
Start: 2017-03-15 | End: 2017-08-06

## 2017-08-06 RX ADMIN — ESCITALOPRAM OXALATE 10 MILLIGRAM(S): 10 TABLET, FILM COATED ORAL at 12:26

## 2017-08-06 RX ADMIN — LAMOTRIGINE 350 MILLIGRAM(S): 25 TABLET, ORALLY DISINTEGRATING ORAL at 05:13

## 2017-08-06 RX ADMIN — ENOXAPARIN SODIUM 40 MILLIGRAM(S): 100 INJECTION SUBCUTANEOUS at 05:11

## 2017-08-06 RX ADMIN — Medication 650 MILLIGRAM(S): at 19:35

## 2017-08-06 RX ADMIN — LATANOPROST 1 DROP(S): 0.05 SOLUTION/ DROPS OPHTHALMIC; TOPICAL at 23:01

## 2017-08-06 RX ADMIN — PRIMIDONE 250 MILLIGRAM(S): 250 TABLET ORAL at 23:01

## 2017-08-06 RX ADMIN — PRIMIDONE 250 MILLIGRAM(S): 250 TABLET ORAL at 10:01

## 2017-08-06 RX ADMIN — Medication 1 PACKET(S): at 12:25

## 2017-08-06 RX ADMIN — Medication 1 APPLICATION(S): at 23:01

## 2017-08-06 RX ADMIN — LORATADINE 10 MILLIGRAM(S): 10 TABLET ORAL at 12:26

## 2017-08-06 RX ADMIN — LAMOTRIGINE 350 MILLIGRAM(S): 25 TABLET, ORALLY DISINTEGRATING ORAL at 17:17

## 2017-08-06 RX ADMIN — Medication 2 SPRAY(S): at 12:25

## 2017-08-06 NOTE — PROVIDER CONTACT NOTE (OTHER) - RECOMMENDATIONS
order cream to prevent infection.
Continue to monitor pt and Evaluate medication regiment.
Continue to monitor pt seizures.
continue to monitor

## 2017-08-06 NOTE — PROVIDER CONTACT NOTE (OTHER) - ACTION/TREATMENT ORDERED:
MD to order cream for pt.
Continue to monitor Pt VEEG and contact provider if more seizures occur.
Continue to monitor increased seizure activity. Assess EEG information and start pt back on regular seizure medications.
continue to monitor.

## 2017-08-06 NOTE — PROGRESS NOTE ADULT - SUBJECTIVE AND OBJECTIVE BOX
Neurology Progress Note:    S: Patient was seen and examined at bedside this morning.  Had 5 events overnight- yesterday at 5:14pm and 10:16pm and today at 3:05am, 4:30am, and 5:25am, button pushed for all. Caregiver at bedside describes these as preceded by R head pain and funny feeling in abdomen then yells and pulls at clothes with contortion of face.  Also intermittently c/o smells since he's been here, but he cannot describe what it is he smells. Yesterday night Lamictal increased to 350 BID.    O: Vitals reviewed  General: Lying in bed in NAD  Neuro: MS:AAO x 3, naming repetition intact, fluent high pitched speech follows simple commands  CN: PERRLA, Extropia, horizontal nystagmus left eye, medial limitation B/L left eye sight to light only. V1-V3 intact, no facial asymmetry, tongue/uvula midline, Shoulder shrug intact  Strength: 5/5 throughout mild atrophy of b/l shins/calves, otherwise nml bulk/tone   Sensory: intact LT/PP throughout  Reflexes: hyporeflexic throughout    Labs/Imaging/Medications: Reviewed    VEEG from overnight: right sided slowing, and R >> L temporal spikes . 5 focal seizures overnight, with arousal, automatisms of face/dystonic wrist posturing. Appear to occur with right sided subtle slowing, increased HR, then poorly lateralized bilateral asynchronous slowing prior to offset.

## 2017-08-06 NOTE — PROVIDER CONTACT NOTE (OTHER) - SITUATION
Pt presented with seizure at 2216 lasting 1minute 30 seconds.
PT has a red raised area on neck along jawline on L side
Pt guardian reported pt experiencing a seizure.
Pt presented with a seizure

## 2017-08-06 NOTE — PROGRESS NOTE ADULT - ASSESSMENT
46 y.o M with PMH of seizure disorder, hydrocephalus, rt parietal encephalomalacia s/p multiple shunt placements and s/p craniotomy and VNS admitted to EMU for characterization of seizures vs. behavioral events.    Plan:  Continue with VEEG  Continue with lamictal 350 BID, primidone 250 BID  Increase Onfi to 5mg BID.  neuro checks every 4hrs  Ativan if seizure more than 3 mins  Seizure precautions.   DVT PPx

## 2017-08-06 NOTE — PROVIDER CONTACT NOTE (OTHER) - ASSESSMENT
Pt seizure at 0306. Pt HR increased up to 135 during seizure. Given supplemental oxygen. Immediately post ictal pt confused. Two minutes later pt alert and oriented. No medication given. Able to follow commands. VS: bp = 111/68, P = 83, R = 18, T = 97.7, O2 = 99 on supplemental O2. Will continue to monitor.

## 2017-08-06 NOTE — PROVIDER CONTACT NOTE (OTHER) - ASSESSMENT
Guardian reported pt seizure at 0432. Seizure lasted 30 seconds. Pt alert and oriented throughout. Initial pt heart rate increased to 111. pupils dilated 4cm with sluggish reaction. post ictal pt alert and oriented X 3; vital signs WDL. HR = 70, BP = 96/61, R = 18, O2 = 97% on Room air.

## 2017-08-07 PROCEDURE — 95957 EEG DIGITAL ANALYSIS: CPT | Mod: 26

## 2017-08-07 PROCEDURE — 99232 SBSQ HOSP IP/OBS MODERATE 35: CPT | Mod: GC

## 2017-08-07 PROCEDURE — 95951: CPT | Mod: 26

## 2017-08-07 RX ORDER — LAMOTRIGINE 25 MG/1
400 TABLET, ORALLY DISINTEGRATING ORAL
Qty: 0 | Refills: 0 | Status: DISCONTINUED | OUTPATIENT
Start: 2017-08-07 | End: 2017-08-08

## 2017-08-07 RX ORDER — ACETAMINOPHEN 500 MG
650 TABLET ORAL EVERY 6 HOURS
Qty: 0 | Refills: 0 | Status: DISCONTINUED | OUTPATIENT
Start: 2017-08-07 | End: 2017-08-08

## 2017-08-07 RX ORDER — CLOBAZAM 10 MG/1
5 TABLET ORAL
Qty: 300 | Refills: 2 | OUTPATIENT
Start: 2017-08-07 | End: 2017-11-04

## 2017-08-07 RX ORDER — LATANOPROST 0.05 MG/ML
1 SOLUTION/ DROPS OPHTHALMIC; TOPICAL
Qty: 0 | Refills: 0 | COMMUNITY
Start: 2017-08-07

## 2017-08-07 RX ORDER — LAMOTRIGINE 25 MG/1
2 TABLET, ORALLY DISINTEGRATING ORAL
Qty: 120 | Refills: 0 | OUTPATIENT
Start: 2017-08-07 | End: 2017-09-06

## 2017-08-07 RX ORDER — ZONISAMIDE 100 MG
1 CAPSULE ORAL
Qty: 60 | Refills: 0 | OUTPATIENT
Start: 2017-08-07 | End: 2017-09-06

## 2017-08-07 RX ORDER — ZONISAMIDE 100 MG
100 CAPSULE ORAL
Qty: 0 | Refills: 0 | Status: DISCONTINUED | OUTPATIENT
Start: 2017-08-07 | End: 2017-08-08

## 2017-08-07 RX ORDER — PRIMIDONE 250 MG/1
1 TABLET ORAL
Qty: 60 | Refills: 0 | OUTPATIENT
Start: 2017-08-07 | End: 2017-09-06

## 2017-08-07 RX ADMIN — Medication 1 APPLICATION(S): at 22:15

## 2017-08-07 RX ADMIN — LAMOTRIGINE 350 MILLIGRAM(S): 25 TABLET, ORALLY DISINTEGRATING ORAL at 05:15

## 2017-08-07 RX ADMIN — PRIMIDONE 250 MILLIGRAM(S): 250 TABLET ORAL at 10:29

## 2017-08-07 RX ADMIN — LATANOPROST 1 DROP(S): 0.05 SOLUTION/ DROPS OPHTHALMIC; TOPICAL at 22:19

## 2017-08-07 RX ADMIN — LORATADINE 10 MILLIGRAM(S): 10 TABLET ORAL at 12:02

## 2017-08-07 RX ADMIN — Medication 650 MILLIGRAM(S): at 20:45

## 2017-08-07 RX ADMIN — ESCITALOPRAM OXALATE 10 MILLIGRAM(S): 10 TABLET, FILM COATED ORAL at 12:02

## 2017-08-07 RX ADMIN — ENOXAPARIN SODIUM 40 MILLIGRAM(S): 100 INJECTION SUBCUTANEOUS at 05:14

## 2017-08-07 RX ADMIN — LAMOTRIGINE 400 MILLIGRAM(S): 25 TABLET, ORALLY DISINTEGRATING ORAL at 18:44

## 2017-08-07 RX ADMIN — PRIMIDONE 250 MILLIGRAM(S): 250 TABLET ORAL at 22:19

## 2017-08-07 RX ADMIN — Medication 100 MILLIGRAM(S): at 16:36

## 2017-08-07 RX ADMIN — Medication 2 SPRAY(S): at 12:03

## 2017-08-07 RX ADMIN — Medication 100 MILLIGRAM(S): at 20:15

## 2017-08-07 RX ADMIN — Medication 650 MILLIGRAM(S): at 20:15

## 2017-08-07 NOTE — PROGRESS NOTE ADULT - ASSESSMENT
46 y.o M with PMH of seizure disorder, hydrocephalus, rt parietal encephalomalacia s/p multiple shunt placements and s/p craniotomy and VNS admitted to EMU for characterization of seizures vs. behavioral events.    Plan:  Continue with VEEG  increase lamictal to 400 BID,   maintain decreased primidone 250 BID (home med 250/375)  c/w Onfi 5mg BID.  neuro checks every 4hrs  Ativan if seizure more than 3 mins  Seizure precautions.   DVT PPx

## 2017-08-07 NOTE — PROGRESS NOTE ADULT - SUBJECTIVE AND OBJECTIVE BOX
Neurology Progress Note:    S: Patient was seen and examined at bedside this morning. Pt had multiple events automatisms    O:     Vital Signs Last 24 Hrs  T(C): 36.9 (07 Aug 2017 07:31), Max: 37.4 (06 Aug 2017 12:00)  T(F): 98.4 (07 Aug 2017 07:31), Max: 99.4 (06 Aug 2017 12:00)  HR: 88 (07 Aug 2017 07:31) (79 - 96)  BP: 97/60 (07 Aug 2017 07:31) (93/56 - 115/72)  BP(mean): --  RR: 18 (07 Aug 2017 07:31) (18 - 18)  SpO2: 97% (07 Aug 2017 07:31) (95% - 97%)    General: Lying in bed in NAD  Neuro: MS:AAO x 3, naming repetition intact, fluent high pitched speech follows simple commands  CN: PERRLA, decreased vision bilaterally left eye more than right eye Extropia, horizontal nystagmus left eye, medial limitation B/L left eye sight to light only. V1-V3 intact, no facial asymmetry, tongue/uvula midline, Shoulder shrug intact  Strength: 5/5 throughout mild atrophy of b/l shins/calves, otherwise nml bulk/tone   Sensory: intact LT/PP throughout  Reflexes: hyporeflexic throughout    Labs/Imaging/Medications: Reviewed    VEEG from overnight: right sided slowing, and R >> L temporal spikes . 5 focal seizures overnight, with arousal, automatisms of face/dystonic wrist posturing. Appear to occur with right sided subtle slowing, increased HR, then poorly lateralized bilateral asynchronous slowing prior to offset.

## 2017-08-07 NOTE — PROGRESS NOTE ADULT - ATTENDING COMMENTS
Patient was seen and examined with house staff. For additional details of history and examination, please see house staff note above. I agree with house staff assessment and recommendations except as noted below.    Pt with combination of poor seizure control and poor behavior control. Long term plan is to gradually eliminate sedating meds that impair behavior control - mysoline and onfi - while increasing other AEDs to better control seizures.     1. increase lamotrigine 400 q12  2. continue onfi 5 q12  3. continue reduced dose of mysoline 250 q12  4. antcipate dc Tuesday if stable overnight.
Patient was seen and examined with house staff. For additional details of history and examination, please see house staff note above. I agree with house staff assessment and recommendations except as noted below.    Pt did not have any overnight events.  He has right>left temporal spikes. Aunt is guardian and is frustrated by behvavior problems and lack of seizure control. Pt is on mysoline, onfi, and lamotrigine.     In past, pt had adverse reaction ot depakote - resulting in ED visit to [facility]. I wonder whether this was due to encephalopathy that can result from depakote and phenobarbital interactions.      Onfi and mysoline likely cause some disinhibition of behavior and might be worthwhile to replace with other medications. For now, reduce Onfi by 50%
the patient is sitting up in the chair. He is comfortable.

## 2017-08-08 VITALS
SYSTOLIC BLOOD PRESSURE: 100 MMHG | TEMPERATURE: 98 F | DIASTOLIC BLOOD PRESSURE: 64 MMHG | OXYGEN SATURATION: 97 % | HEART RATE: 96 BPM | RESPIRATION RATE: 18 BRPM

## 2017-08-08 PROCEDURE — 95951: CPT | Mod: 26

## 2017-08-08 PROCEDURE — 99238 HOSP IP/OBS DSCHRG MGMT 30/<: CPT

## 2017-08-08 PROCEDURE — 95957 EEG DIGITAL ANALYSIS: CPT | Mod: 26

## 2017-08-08 RX ORDER — ACETAMINOPHEN 500 MG
2 TABLET ORAL
Qty: 20 | Refills: 0 | OUTPATIENT
Start: 2017-08-08

## 2017-08-08 RX ADMIN — LAMOTRIGINE 400 MILLIGRAM(S): 25 TABLET, ORALLY DISINTEGRATING ORAL at 05:11

## 2017-08-08 RX ADMIN — LORATADINE 10 MILLIGRAM(S): 10 TABLET ORAL at 11:38

## 2017-08-08 RX ADMIN — Medication 100 MILLIGRAM(S): at 05:11

## 2017-08-08 RX ADMIN — PRIMIDONE 250 MILLIGRAM(S): 250 TABLET ORAL at 09:49

## 2017-08-08 RX ADMIN — Medication 2 SPRAY(S): at 09:49

## 2017-08-08 RX ADMIN — ENOXAPARIN SODIUM 40 MILLIGRAM(S): 100 INJECTION SUBCUTANEOUS at 05:11

## 2017-08-08 RX ADMIN — Medication 1 PACKET(S): at 11:38

## 2017-08-08 RX ADMIN — ESCITALOPRAM OXALATE 10 MILLIGRAM(S): 10 TABLET, FILM COATED ORAL at 11:38

## 2017-08-09 ENCOUNTER — OTHER (OUTPATIENT)
Age: 47
End: 2017-08-09

## 2017-08-14 ENCOUNTER — APPOINTMENT (OUTPATIENT)
Dept: NEUROLOGY | Facility: CLINIC | Age: 47
End: 2017-08-14

## 2017-08-14 ENCOUNTER — APPOINTMENT (OUTPATIENT)
Dept: NEUROLOGY | Facility: CLINIC | Age: 47
End: 2017-08-14
Payer: MEDICARE

## 2017-08-14 VITALS
WEIGHT: 140 LBS | HEIGHT: 63 IN | BODY MASS INDEX: 24.8 KG/M2 | HEART RATE: 106 BPM | DIASTOLIC BLOOD PRESSURE: 69 MMHG | SYSTOLIC BLOOD PRESSURE: 114 MMHG

## 2017-08-14 PROCEDURE — 99214 OFFICE O/P EST MOD 30 MIN: CPT | Mod: 25

## 2017-08-14 PROCEDURE — 95974: CPT

## 2017-08-15 ENCOUNTER — OUTPATIENT (OUTPATIENT)
Dept: OUTPATIENT SERVICES | Facility: HOSPITAL | Age: 47
LOS: 1 days | End: 2017-08-15

## 2017-08-15 DIAGNOSIS — Z96.22 MYRINGOTOMY TUBE(S) STATUS: Chronic | ICD-10-CM

## 2017-08-15 DIAGNOSIS — T85.09XA OTHER MECHANICAL COMPLICATION OF VENTRICULAR INTRACRANIAL (COMMUNICATING) SHUNT, INITIAL ENCOUNTER: Chronic | ICD-10-CM

## 2017-08-21 ENCOUNTER — INPATIENT (INPATIENT)
Facility: HOSPITAL | Age: 47
LOS: 2 days | Discharge: HOME | End: 2017-08-24
Payer: MEDICARE

## 2017-08-21 DIAGNOSIS — T85.09XA OTHER MECHANICAL COMPLICATION OF VENTRICULAR INTRACRANIAL (COMMUNICATING) SHUNT, INITIAL ENCOUNTER: Chronic | ICD-10-CM

## 2017-08-21 DIAGNOSIS — Z96.22 MYRINGOTOMY TUBE(S) STATUS: Chronic | ICD-10-CM

## 2017-08-21 PROCEDURE — 70450 CT HEAD/BRAIN W/O DYE: CPT | Mod: 26

## 2017-08-21 PROCEDURE — 74020: CPT | Mod: 26

## 2017-08-21 PROCEDURE — 99285 EMERGENCY DEPT VISIT HI MDM: CPT

## 2017-08-21 PROCEDURE — 70250 X-RAY EXAM OF SKULL: CPT | Mod: 26

## 2017-08-21 PROCEDURE — 71020: CPT | Mod: 26

## 2017-08-29 ENCOUNTER — OTHER (OUTPATIENT)
Age: 47
End: 2017-08-29

## 2017-08-29 ENCOUNTER — APPOINTMENT (OUTPATIENT)
Dept: NEUROLOGY | Facility: CLINIC | Age: 47
End: 2017-08-29
Payer: MEDICARE

## 2017-08-29 VITALS
DIASTOLIC BLOOD PRESSURE: 69 MMHG | BODY MASS INDEX: 24.8 KG/M2 | HEIGHT: 63 IN | WEIGHT: 140 LBS | HEART RATE: 73 BPM | SYSTOLIC BLOOD PRESSURE: 111 MMHG

## 2017-08-29 PROCEDURE — 95970 ALYS NPGT W/O PRGRMG: CPT

## 2017-08-29 PROCEDURE — 99215 OFFICE O/P EST HI 40 MIN: CPT

## 2017-09-22 ENCOUNTER — APPOINTMENT (OUTPATIENT)
Dept: NEUROLOGY | Facility: CLINIC | Age: 47
End: 2017-09-22
Payer: MEDICARE

## 2017-09-22 VITALS
DIASTOLIC BLOOD PRESSURE: 67 MMHG | WEIGHT: 143 LBS | SYSTOLIC BLOOD PRESSURE: 104 MMHG | BODY MASS INDEX: 25.34 KG/M2 | HEART RATE: 68 BPM | HEIGHT: 63 IN

## 2017-09-22 PROCEDURE — 99215 OFFICE O/P EST HI 40 MIN: CPT

## 2017-10-02 ENCOUNTER — MEDICATION RENEWAL (OUTPATIENT)
Age: 47
End: 2017-10-02

## 2017-10-19 PROCEDURE — 80048 BASIC METABOLIC PNL TOTAL CA: CPT

## 2017-10-19 PROCEDURE — 85027 COMPLETE CBC AUTOMATED: CPT

## 2017-10-19 PROCEDURE — 94640 AIRWAY INHALATION TREATMENT: CPT

## 2017-10-19 PROCEDURE — 95957 EEG DIGITAL ANALYSIS: CPT

## 2017-10-19 PROCEDURE — 80076 HEPATIC FUNCTION PANEL: CPT

## 2017-10-19 PROCEDURE — 95819 EEG AWAKE AND ASLEEP: CPT

## 2017-10-19 PROCEDURE — 85610 PROTHROMBIN TIME: CPT

## 2017-10-19 PROCEDURE — 85730 THROMBOPLASTIN TIME PARTIAL: CPT

## 2017-10-19 PROCEDURE — 95951: CPT

## 2017-10-26 ENCOUNTER — FORM ENCOUNTER (OUTPATIENT)
Age: 47
End: 2017-10-26

## 2017-10-27 ENCOUNTER — APPOINTMENT (OUTPATIENT)
Dept: NEUROLOGY | Facility: CLINIC | Age: 47
End: 2017-10-27
Payer: MEDICARE

## 2017-10-27 ENCOUNTER — OUTPATIENT (OUTPATIENT)
Dept: OUTPATIENT SERVICES | Facility: HOSPITAL | Age: 47
LOS: 1 days | End: 2017-10-27
Payer: MEDICARE

## 2017-10-27 ENCOUNTER — APPOINTMENT (OUTPATIENT)
Dept: CT IMAGING | Facility: IMAGING CENTER | Age: 47
End: 2017-10-27
Payer: MEDICARE

## 2017-10-27 VITALS
HEIGHT: 63 IN | WEIGHT: 142 LBS | SYSTOLIC BLOOD PRESSURE: 108 MMHG | BODY MASS INDEX: 25.16 KG/M2 | HEART RATE: 70 BPM | DIASTOLIC BLOOD PRESSURE: 73 MMHG

## 2017-10-27 DIAGNOSIS — Z96.22 MYRINGOTOMY TUBE(S) STATUS: Chronic | ICD-10-CM

## 2017-10-27 DIAGNOSIS — G40.909 EPILEPSY, UNSPECIFIED, NOT INTRACTABLE, WITHOUT STATUS EPILEPTICUS: ICD-10-CM

## 2017-10-27 DIAGNOSIS — T85.09XA OTHER MECHANICAL COMPLICATION OF VENTRICULAR INTRACRANIAL (COMMUNICATING) SHUNT, INITIAL ENCOUNTER: Chronic | ICD-10-CM

## 2017-10-27 PROCEDURE — 70450 CT HEAD/BRAIN W/O DYE: CPT

## 2017-10-27 PROCEDURE — 95970 ALYS NPGT W/O PRGRMG: CPT

## 2017-10-27 PROCEDURE — 99215 OFFICE O/P EST HI 40 MIN: CPT

## 2017-10-27 PROCEDURE — 70450 CT HEAD/BRAIN W/O DYE: CPT | Mod: 26

## 2017-10-31 ENCOUNTER — EMERGENCY (EMERGENCY)
Facility: HOSPITAL | Age: 47
LOS: 1 days | End: 2017-10-31
Payer: MEDICARE

## 2017-10-31 DIAGNOSIS — T85.09XA OTHER MECHANICAL COMPLICATION OF VENTRICULAR INTRACRANIAL (COMMUNICATING) SHUNT, INITIAL ENCOUNTER: Chronic | ICD-10-CM

## 2017-10-31 DIAGNOSIS — Z96.22 MYRINGOTOMY TUBE(S) STATUS: Chronic | ICD-10-CM

## 2017-10-31 PROCEDURE — 99283 EMERGENCY DEPT VISIT LOW MDM: CPT

## 2017-11-02 ENCOUNTER — RX RENEWAL (OUTPATIENT)
Age: 47
End: 2017-11-02

## 2017-11-07 ENCOUNTER — OUTPATIENT (OUTPATIENT)
Dept: OUTPATIENT SERVICES | Facility: HOSPITAL | Age: 47
LOS: 1 days | End: 2017-11-07

## 2017-11-07 DIAGNOSIS — T85.09XA OTHER MECHANICAL COMPLICATION OF VENTRICULAR INTRACRANIAL (COMMUNICATING) SHUNT, INITIAL ENCOUNTER: Chronic | ICD-10-CM

## 2017-11-07 DIAGNOSIS — Z96.22 MYRINGOTOMY TUBE(S) STATUS: Chronic | ICD-10-CM

## 2017-11-14 ENCOUNTER — APPOINTMENT (OUTPATIENT)
Dept: NEUROLOGY | Facility: CLINIC | Age: 47
End: 2017-11-14

## 2017-11-27 ENCOUNTER — MEDICATION RENEWAL (OUTPATIENT)
Age: 47
End: 2017-11-27

## 2017-12-01 ENCOUNTER — APPOINTMENT (OUTPATIENT)
Dept: NEUROLOGY | Facility: CLINIC | Age: 47
End: 2017-12-01
Payer: MEDICARE

## 2017-12-01 VITALS
HEART RATE: 78 BPM | HEIGHT: 63 IN | SYSTOLIC BLOOD PRESSURE: 129 MMHG | WEIGHT: 146.5 LBS | BODY MASS INDEX: 25.96 KG/M2 | DIASTOLIC BLOOD PRESSURE: 84 MMHG

## 2017-12-01 PROCEDURE — 99215 OFFICE O/P EST HI 40 MIN: CPT

## 2017-12-02 ENCOUNTER — INPATIENT (INPATIENT)
Facility: HOSPITAL | Age: 47
LOS: 2 days | Discharge: HOME | End: 2017-12-05
Payer: MEDICARE

## 2017-12-02 DIAGNOSIS — T85.09XA OTHER MECHANICAL COMPLICATION OF VENTRICULAR INTRACRANIAL (COMMUNICATING) SHUNT, INITIAL ENCOUNTER: Chronic | ICD-10-CM

## 2017-12-02 DIAGNOSIS — Z96.22 MYRINGOTOMY TUBE(S) STATUS: Chronic | ICD-10-CM

## 2017-12-02 PROCEDURE — 74177 CT ABD & PELVIS W/CONTRAST: CPT | Mod: 26

## 2017-12-02 PROCEDURE — 99285 EMERGENCY DEPT VISIT HI MDM: CPT

## 2017-12-02 PROCEDURE — 71010: CPT | Mod: 26

## 2017-12-03 ENCOUNTER — OUTPATIENT (OUTPATIENT)
Dept: OUTPATIENT SERVICES | Facility: HOSPITAL | Age: 47
LOS: 1 days | End: 2017-12-03

## 2017-12-03 DIAGNOSIS — T85.09XA OTHER MECHANICAL COMPLICATION OF VENTRICULAR INTRACRANIAL (COMMUNICATING) SHUNT, INITIAL ENCOUNTER: Chronic | ICD-10-CM

## 2017-12-03 DIAGNOSIS — Z96.22 MYRINGOTOMY TUBE(S) STATUS: Chronic | ICD-10-CM

## 2017-12-03 PROCEDURE — 74020: CPT | Mod: 26

## 2017-12-04 ENCOUNTER — OUTPATIENT (OUTPATIENT)
Dept: OUTPATIENT SERVICES | Facility: HOSPITAL | Age: 47
LOS: 1 days | End: 2017-12-04

## 2017-12-04 ENCOUNTER — APPOINTMENT (OUTPATIENT)
Dept: NEUROLOGY | Facility: CLINIC | Age: 47
End: 2017-12-04

## 2017-12-04 DIAGNOSIS — Z96.22 MYRINGOTOMY TUBE(S) STATUS: Chronic | ICD-10-CM

## 2017-12-04 DIAGNOSIS — T85.09XA OTHER MECHANICAL COMPLICATION OF VENTRICULAR INTRACRANIAL (COMMUNICATING) SHUNT, INITIAL ENCOUNTER: Chronic | ICD-10-CM

## 2017-12-29 ENCOUNTER — RX RENEWAL (OUTPATIENT)
Age: 47
End: 2017-12-29

## 2018-01-10 ENCOUNTER — OUTPATIENT (OUTPATIENT)
Dept: OUTPATIENT SERVICES | Facility: HOSPITAL | Age: 48
LOS: 1 days | End: 2018-01-10

## 2018-01-10 DIAGNOSIS — Z96.22 MYRINGOTOMY TUBE(S) STATUS: Chronic | ICD-10-CM

## 2018-01-10 DIAGNOSIS — T85.09XA OTHER MECHANICAL COMPLICATION OF VENTRICULAR INTRACRANIAL (COMMUNICATING) SHUNT, INITIAL ENCOUNTER: Chronic | ICD-10-CM

## 2018-01-12 ENCOUNTER — MEDICATION RENEWAL (OUTPATIENT)
Age: 48
End: 2018-01-12

## 2018-01-12 ENCOUNTER — APPOINTMENT (OUTPATIENT)
Dept: NEUROLOGY | Facility: CLINIC | Age: 48
End: 2018-01-12
Payer: MEDICARE

## 2018-01-12 VITALS
SYSTOLIC BLOOD PRESSURE: 110 MMHG | BODY MASS INDEX: 25.87 KG/M2 | HEIGHT: 63 IN | WEIGHT: 146 LBS | DIASTOLIC BLOOD PRESSURE: 73 MMHG | HEART RATE: 90 BPM

## 2018-01-12 PROCEDURE — 95974: CPT

## 2018-01-12 PROCEDURE — 99214 OFFICE O/P EST MOD 30 MIN: CPT | Mod: 25

## 2018-01-22 ENCOUNTER — OTHER (OUTPATIENT)
Age: 48
End: 2018-01-22

## 2018-02-05 ENCOUNTER — APPOINTMENT (OUTPATIENT)
Dept: NEUROLOGY | Facility: CLINIC | Age: 48
End: 2018-02-05
Payer: MEDICARE

## 2018-02-05 VITALS
DIASTOLIC BLOOD PRESSURE: 70 MMHG | HEART RATE: 83 BPM | WEIGHT: 145 LBS | HEIGHT: 63 IN | SYSTOLIC BLOOD PRESSURE: 105 MMHG | BODY MASS INDEX: 25.69 KG/M2

## 2018-02-05 PROCEDURE — 99215 OFFICE O/P EST HI 40 MIN: CPT | Mod: 25

## 2018-02-05 PROCEDURE — 95974: CPT

## 2018-03-08 ENCOUNTER — RX RENEWAL (OUTPATIENT)
Age: 48
End: 2018-03-08

## 2018-03-23 ENCOUNTER — OTHER (OUTPATIENT)
Age: 48
End: 2018-03-23

## 2018-03-23 ENCOUNTER — RESULT CHARGE (OUTPATIENT)
Age: 48
End: 2018-03-23

## 2018-04-09 ENCOUNTER — OUTPATIENT (OUTPATIENT)
Dept: OUTPATIENT SERVICES | Facility: HOSPITAL | Age: 48
LOS: 1 days | End: 2018-04-09
Payer: MEDICARE

## 2018-04-09 ENCOUNTER — APPOINTMENT (OUTPATIENT)
Dept: CT IMAGING | Facility: IMAGING CENTER | Age: 48
End: 2018-04-09
Payer: MEDICARE

## 2018-04-09 DIAGNOSIS — G91.9 HYDROCEPHALUS, UNSPECIFIED: ICD-10-CM

## 2018-04-09 DIAGNOSIS — Z96.22 MYRINGOTOMY TUBE(S) STATUS: Chronic | ICD-10-CM

## 2018-04-09 DIAGNOSIS — T85.09XA OTHER MECHANICAL COMPLICATION OF VENTRICULAR INTRACRANIAL (COMMUNICATING) SHUNT, INITIAL ENCOUNTER: Chronic | ICD-10-CM

## 2018-04-09 PROCEDURE — 70450 CT HEAD/BRAIN W/O DYE: CPT | Mod: 26

## 2018-04-09 PROCEDURE — 70450 CT HEAD/BRAIN W/O DYE: CPT

## 2018-04-17 ENCOUNTER — APPOINTMENT (OUTPATIENT)
Dept: NEUROLOGY | Facility: CLINIC | Age: 48
End: 2018-04-17
Payer: MEDICARE

## 2018-04-17 VITALS
SYSTOLIC BLOOD PRESSURE: 98 MMHG | WEIGHT: 149 LBS | DIASTOLIC BLOOD PRESSURE: 61 MMHG | BODY MASS INDEX: 26.4 KG/M2 | HEIGHT: 63 IN | HEART RATE: 74 BPM

## 2018-04-17 PROCEDURE — 99214 OFFICE O/P EST MOD 30 MIN: CPT

## 2018-05-15 ENCOUNTER — APPOINTMENT (OUTPATIENT)
Dept: NEUROLOGY | Facility: CLINIC | Age: 48
End: 2018-05-15
Payer: MEDICARE

## 2018-05-15 PROCEDURE — 95819 EEG AWAKE AND ASLEEP: CPT | Mod: 59

## 2018-05-15 PROCEDURE — 95951: CPT

## 2018-05-18 ENCOUNTER — OTHER (OUTPATIENT)
Age: 48
End: 2018-05-18

## 2018-05-21 ENCOUNTER — OTHER (OUTPATIENT)
Age: 48
End: 2018-05-21

## 2018-05-30 ENCOUNTER — OUTPATIENT (OUTPATIENT)
Dept: OUTPATIENT SERVICES | Facility: HOSPITAL | Age: 48
LOS: 1 days | End: 2018-05-30

## 2018-05-30 ENCOUNTER — MEDICATION RENEWAL (OUTPATIENT)
Age: 48
End: 2018-05-30

## 2018-05-30 DIAGNOSIS — T85.09XA OTHER MECHANICAL COMPLICATION OF VENTRICULAR INTRACRANIAL (COMMUNICATING) SHUNT, INITIAL ENCOUNTER: Chronic | ICD-10-CM

## 2018-05-30 DIAGNOSIS — Z96.22 MYRINGOTOMY TUBE(S) STATUS: Chronic | ICD-10-CM

## 2018-06-28 ENCOUNTER — MEDICATION RENEWAL (OUTPATIENT)
Age: 48
End: 2018-06-28

## 2018-07-24 ENCOUNTER — APPOINTMENT (OUTPATIENT)
Dept: NEUROLOGY | Facility: CLINIC | Age: 48
End: 2018-07-24
Payer: MEDICARE

## 2018-07-24 VITALS
SYSTOLIC BLOOD PRESSURE: 99 MMHG | HEART RATE: 85 BPM | WEIGHT: 151 LBS | DIASTOLIC BLOOD PRESSURE: 63 MMHG | HEIGHT: 63 IN | BODY MASS INDEX: 26.75 KG/M2

## 2018-07-24 PROCEDURE — 99215 OFFICE O/P EST HI 40 MIN: CPT

## 2018-07-31 ENCOUNTER — RX RENEWAL (OUTPATIENT)
Age: 48
End: 2018-07-31

## 2018-08-31 ENCOUNTER — APPOINTMENT (OUTPATIENT)
Dept: NEUROLOGY | Facility: CLINIC | Age: 48
End: 2018-08-31

## 2018-09-24 ENCOUNTER — RX RENEWAL (OUTPATIENT)
Age: 48
End: 2018-09-24

## 2018-10-11 ENCOUNTER — OTHER (OUTPATIENT)
Age: 48
End: 2018-10-11

## 2018-10-15 ENCOUNTER — OUTPATIENT (OUTPATIENT)
Dept: OUTPATIENT SERVICES | Facility: HOSPITAL | Age: 48
LOS: 1 days | End: 2018-10-15
Payer: MEDICARE

## 2018-10-15 VITALS
DIASTOLIC BLOOD PRESSURE: 76 MMHG | HEART RATE: 67 BPM | HEIGHT: 63 IN | OXYGEN SATURATION: 98 % | SYSTOLIC BLOOD PRESSURE: 124 MMHG | TEMPERATURE: 98 F | WEIGHT: 154.1 LBS | RESPIRATION RATE: 16 BRPM

## 2018-10-15 DIAGNOSIS — T85.09XA OTHER MECHANICAL COMPLICATION OF VENTRICULAR INTRACRANIAL (COMMUNICATING) SHUNT, INITIAL ENCOUNTER: Chronic | ICD-10-CM

## 2018-10-15 DIAGNOSIS — Z96.22 MYRINGOTOMY TUBE(S) STATUS: Chronic | ICD-10-CM

## 2018-10-15 DIAGNOSIS — R56.9 UNSPECIFIED CONVULSIONS: ICD-10-CM

## 2018-10-15 DIAGNOSIS — G47.30 SLEEP APNEA, UNSPECIFIED: ICD-10-CM

## 2018-10-15 LAB
ALBUMIN SERPL ELPH-MCNC: 4.5 G/DL — SIGNIFICANT CHANGE UP (ref 3.3–5)
ALP SERPL-CCNC: 154 U/L — HIGH (ref 40–120)
ALT FLD-CCNC: 22 U/L — SIGNIFICANT CHANGE UP (ref 4–41)
AST SERPL-CCNC: 18 U/L — SIGNIFICANT CHANGE UP (ref 4–40)
BILIRUB SERPL-MCNC: 0.2 MG/DL — SIGNIFICANT CHANGE UP (ref 0.2–1.2)
BLD GP AB SCN SERPL QL: POSITIVE — SIGNIFICANT CHANGE UP
BUN SERPL-MCNC: 15 MG/DL — SIGNIFICANT CHANGE UP (ref 7–23)
CALCIUM SERPL-MCNC: 9.6 MG/DL — SIGNIFICANT CHANGE UP (ref 8.4–10.5)
CHLORIDE SERPL-SCNC: 101 MMOL/L — SIGNIFICANT CHANGE UP (ref 98–107)
CO2 SERPL-SCNC: 30 MMOL/L — SIGNIFICANT CHANGE UP (ref 22–31)
CREAT SERPL-MCNC: 1.04 MG/DL — SIGNIFICANT CHANGE UP (ref 0.5–1.3)
GLUCOSE SERPL-MCNC: 90 MG/DL — SIGNIFICANT CHANGE UP (ref 70–99)
HCT VFR BLD CALC: 43.7 % — SIGNIFICANT CHANGE UP (ref 39–50)
HGB BLD-MCNC: 14 G/DL — SIGNIFICANT CHANGE UP (ref 13–17)
MCHC RBC-ENTMCNC: 31.5 PG — SIGNIFICANT CHANGE UP (ref 27–34)
MCHC RBC-ENTMCNC: 32 % — SIGNIFICANT CHANGE UP (ref 32–36)
MCV RBC AUTO: 98.2 FL — SIGNIFICANT CHANGE UP (ref 80–100)
NRBC # FLD: 0 — SIGNIFICANT CHANGE UP
PLATELET # BLD AUTO: 252 K/UL — SIGNIFICANT CHANGE UP (ref 150–400)
PMV BLD: 8.7 FL — SIGNIFICANT CHANGE UP (ref 7–13)
POTASSIUM SERPL-MCNC: 5.5 MMOL/L — HIGH (ref 3.5–5.3)
POTASSIUM SERPL-SCNC: 5.5 MMOL/L — HIGH (ref 3.5–5.3)
PROT SERPL-MCNC: 7.6 G/DL — SIGNIFICANT CHANGE UP (ref 6–8.3)
RBC # BLD: 4.45 M/UL — SIGNIFICANT CHANGE UP (ref 4.2–5.8)
RBC # FLD: 13.2 % — SIGNIFICANT CHANGE UP (ref 10.3–14.5)
RH IG SCN BLD-IMP: POSITIVE — SIGNIFICANT CHANGE UP
SODIUM SERPL-SCNC: 142 MMOL/L — SIGNIFICANT CHANGE UP (ref 135–145)
WBC # BLD: 5.47 K/UL — SIGNIFICANT CHANGE UP (ref 3.8–10.5)
WBC # FLD AUTO: 5.47 K/UL — SIGNIFICANT CHANGE UP (ref 3.8–10.5)

## 2018-10-15 PROCEDURE — 93010 ELECTROCARDIOGRAM REPORT: CPT

## 2018-10-15 NOTE — H&P PST ADULT - PROBLEM SELECTOR PLAN 1
Pt is scheduled for removal of vagal nerve stimulator insertion of intracranial pressure monitor for 10/29/18. Pre-op instructions provided to patient and legal guardian. Pt will take own nexium for GI prophylaxis. Chlorhexidine soap provided for skin prep. Instructed patient to take lamotrigine, onfi, and primidone on the morning of procedure. Last neurology note in chart.    Pending medical evaluation. Pt has appt on 10/18 with PMD    vagal nerve stimulator turned off. OR booking notified of vagal nerve stimulator.

## 2018-10-15 NOTE — H&P PST ADULT - PSH
Epilepsy  s/p Vagal Nerve Stimulator Implant , 2007, last one in 2016  H/O craniotomy  at age 18  Heel cord contracture  s/p surgical correction b/l  History of tonsillectomy    Obstructed  shunt  revision of  shunt 2015  S/p bilateral myringotomy with tube placement    S/P  Shunt  originally had shunt to lung, then revised to peritoneal shunt, has had multiple revisions

## 2018-10-15 NOTE — H&P PST ADULT - ABILITY TO HEAR (WITH HEARING AID OR HEARING APPLIANCE IF NORMALLY USED):
mild hearing loss to right ear/Mildly to Moderately Impaired: difficulty hearing in some environments or speaker may need to increase volume or speak distinctly

## 2018-10-15 NOTE — H&P PST ADULT - PMH
Aspiration pneumonia  at age 25  BPH without obstruction/lower urinary tract symptoms    Elevated liver enzymes    Environmental Allergies    Epilepsy    Glaucoma    Hiatal hernia    Hydrocephalus    Impulse control disorder  worsened when pt on Depakote, pt now off  Legally blind    Mild mental retardation    Osteoporosis    Sleep apnea  not on CPAP  Viral pneumonia  h/o

## 2018-10-15 NOTE — H&P PST ADULT - TEACHING/LEARNING FACTORS IMPACT ABILITY TO LEARN
comprehension/learning disabilities/cognitive limitations/hearing problems/physical limitations/visual problems

## 2018-10-15 NOTE — H&P PST ADULT - NS MD HP INPLANTS MED DEV
shunt/Vagal nerve stimulator/Ventriculoperitoneal shunt Vagal nerve stimulator/Ventriculoperitoneal shunt

## 2018-10-15 NOTE — H&P PST ADULT - RS GEN PE MLT RESP DETAILS PC
no rales/clear to auscultation bilaterally/no wheezes/good air movement/respirations non-labored/no rhonchi breath sounds equal/clear to auscultation bilaterally/airway patent/no rales/respirations non-labored/good air movement/no rhonchi/no wheezes

## 2018-10-15 NOTE — H&P PST ADULT - MUSCULOSKELETAL
details… detailed exam no calf tenderness/normal strength normal strength/no joint swelling/no joint erythema/no joint warmth/no calf tenderness/ROM intact

## 2018-10-15 NOTE — H&P PST ADULT - NEGATIVE GENERAL GENITOURINARY SYMPTOMS
normal urinary frequency/no bladder infections/no dysuria/no flank pain L/no flank pain R/no hematuria

## 2018-10-15 NOTE — H&P PST ADULT - HISTORY OF PRESENT ILLNESS
49 y/o male with h/o mild mental retardation, hydrocephalus s/p  shunt x 2  with several  shunt revisioins, most recently July 2015. Hx of epilepsy with Vagal Nerve Stimulator implanted x3, most recently in 2016. Pt.'s legal guardian states pt is complaining of headaches. Pt's vagal nerve stimulator has been turned off. She denies seizures. Last seizure as per neurology note, mild in July 2018    Pt from group home, accompanied by patient's aunt and legal guardian Christi Guy. Who will be present on day of surgery to sign consent. 47 y/o male with h/o mild mental retardation, hydrocephalus s/p  shunt x 2  with several  shunt revisioins, most recently July 2015. Hx of epilepsy with Vagal Nerve Stimulator implanted x3, most recently in 2016. Pt.'s legal guardian states pt is complaining of headaches. Pt's vagal nerve stimulator has been turned off. She denies seizures. Last seizure as per neurology note, mild in July 2018. Pt is scheduled for removal of vagal nerve stimulator insertion of intracranial pressure monitor for 10/29/18.    Pt from group home, accompanied by patient's aunt and legal guardian Christi Guy. Who will be present on day of surgery to sign consent.

## 2018-10-15 NOTE — H&P PST ADULT - MOTOR
equal strength bilateral upper  and lower extremities equal strength bilateral upper and lower extremities

## 2018-10-19 ENCOUNTER — MEDICATION RENEWAL (OUTPATIENT)
Age: 48
End: 2018-10-19

## 2018-11-20 ENCOUNTER — APPOINTMENT (OUTPATIENT)
Dept: NEUROLOGY | Facility: CLINIC | Age: 48
End: 2018-11-20

## 2018-12-03 ENCOUNTER — OUTPATIENT (OUTPATIENT)
Dept: OUTPATIENT SERVICES | Facility: HOSPITAL | Age: 48
LOS: 1 days | End: 2018-12-03

## 2018-12-03 DIAGNOSIS — Z96.22 MYRINGOTOMY TUBE(S) STATUS: Chronic | ICD-10-CM

## 2018-12-03 DIAGNOSIS — T85.09XA OTHER MECHANICAL COMPLICATION OF VENTRICULAR INTRACRANIAL (COMMUNICATING) SHUNT, INITIAL ENCOUNTER: Chronic | ICD-10-CM

## 2018-12-05 ENCOUNTER — MEDICATION RENEWAL (OUTPATIENT)
Age: 48
End: 2018-12-05

## 2018-12-10 ENCOUNTER — RX RENEWAL (OUTPATIENT)
Age: 48
End: 2018-12-10

## 2019-01-12 ENCOUNTER — RX RENEWAL (OUTPATIENT)
Age: 49
End: 2019-01-12

## 2019-02-11 ENCOUNTER — MEDICATION RENEWAL (OUTPATIENT)
Age: 49
End: 2019-02-11

## 2019-02-11 PROBLEM — N40.0 BENIGN PROSTATIC HYPERPLASIA WITHOUT LOWER URINARY TRACT SYMPTOMS: Chronic | Status: ACTIVE | Noted: 2018-10-15

## 2019-02-13 ENCOUNTER — OTHER (OUTPATIENT)
Age: 49
End: 2019-02-13

## 2019-02-19 ENCOUNTER — APPOINTMENT (OUTPATIENT)
Dept: CT IMAGING | Facility: IMAGING CENTER | Age: 49
End: 2019-02-19
Payer: MEDICARE

## 2019-02-19 ENCOUNTER — OUTPATIENT (OUTPATIENT)
Dept: OUTPATIENT SERVICES | Facility: HOSPITAL | Age: 49
LOS: 1 days | End: 2019-02-19
Payer: MEDICARE

## 2019-02-19 VITALS
RESPIRATION RATE: 14 BRPM | HEIGHT: 65 IN | WEIGHT: 164.02 LBS | OXYGEN SATURATION: 98 % | SYSTOLIC BLOOD PRESSURE: 124 MMHG | DIASTOLIC BLOOD PRESSURE: 72 MMHG | HEART RATE: 78 BPM | TEMPERATURE: 98 F

## 2019-02-19 DIAGNOSIS — T85.09XA OTHER MECHANICAL COMPLICATION OF VENTRICULAR INTRACRANIAL (COMMUNICATING) SHUNT, INITIAL ENCOUNTER: Chronic | ICD-10-CM

## 2019-02-19 DIAGNOSIS — R56.9 UNSPECIFIED CONVULSIONS: ICD-10-CM

## 2019-02-19 DIAGNOSIS — Z96.22 MYRINGOTOMY TUBE(S) STATUS: Chronic | ICD-10-CM

## 2019-02-19 DIAGNOSIS — G91.9 HYDROCEPHALUS, UNSPECIFIED: ICD-10-CM

## 2019-02-19 DIAGNOSIS — G47.30 SLEEP APNEA, UNSPECIFIED: ICD-10-CM

## 2019-02-19 LAB
ANION GAP SERPL CALC-SCNC: 11 MMO/L — SIGNIFICANT CHANGE UP (ref 7–14)
ANTIBODY ID 1_1: SIGNIFICANT CHANGE UP
BLD GP AB SCN SERPL QL: POSITIVE — SIGNIFICANT CHANGE UP
BUN SERPL-MCNC: 17 MG/DL — SIGNIFICANT CHANGE UP (ref 7–23)
CALCIUM SERPL-MCNC: 9.7 MG/DL — SIGNIFICANT CHANGE UP (ref 8.4–10.5)
CHLORIDE SERPL-SCNC: 99 MMOL/L — SIGNIFICANT CHANGE UP (ref 98–107)
CO2 SERPL-SCNC: 31 MMOL/L — SIGNIFICANT CHANGE UP (ref 22–31)
CREAT SERPL-MCNC: 1.05 MG/DL — SIGNIFICANT CHANGE UP (ref 0.5–1.3)
DAT POLY-SP REAG RBC QL: NEGATIVE — SIGNIFICANT CHANGE UP
GLUCOSE SERPL-MCNC: 71 MG/DL — SIGNIFICANT CHANGE UP (ref 70–99)
HCT VFR BLD CALC: 45.2 % — SIGNIFICANT CHANGE UP (ref 39–50)
HGB BLD-MCNC: 14.3 G/DL — SIGNIFICANT CHANGE UP (ref 13–17)
MCHC RBC-ENTMCNC: 31.4 PG — SIGNIFICANT CHANGE UP (ref 27–34)
MCHC RBC-ENTMCNC: 31.6 % — LOW (ref 32–36)
MCV RBC AUTO: 99.1 FL — SIGNIFICANT CHANGE UP (ref 80–100)
NRBC # FLD: 0 K/UL — LOW (ref 25–125)
PLATELET # BLD AUTO: 279 K/UL — SIGNIFICANT CHANGE UP (ref 150–400)
PMV BLD: 8.5 FL — SIGNIFICANT CHANGE UP (ref 7–13)
POTASSIUM SERPL-MCNC: 5.6 MMOL/L — HIGH (ref 3.5–5.3)
POTASSIUM SERPL-SCNC: 5.6 MMOL/L — HIGH (ref 3.5–5.3)
RBC # BLD: 4.56 M/UL — SIGNIFICANT CHANGE UP (ref 4.2–5.8)
RBC # FLD: 13.2 % — SIGNIFICANT CHANGE UP (ref 10.3–14.5)
RH IG SCN BLD-IMP: POSITIVE — SIGNIFICANT CHANGE UP
SODIUM SERPL-SCNC: 141 MMOL/L — SIGNIFICANT CHANGE UP (ref 135–145)
WBC # BLD: 5.69 K/UL — SIGNIFICANT CHANGE UP (ref 3.8–10.5)
WBC # FLD AUTO: 5.69 K/UL — SIGNIFICANT CHANGE UP (ref 3.8–10.5)

## 2019-02-19 PROCEDURE — 86077 PHYS BLOOD BANK SERV XMATCH: CPT

## 2019-02-19 PROCEDURE — 70450 CT HEAD/BRAIN W/O DYE: CPT | Mod: 26

## 2019-02-19 PROCEDURE — 93010 ELECTROCARDIOGRAM REPORT: CPT

## 2019-02-19 PROCEDURE — 70450 CT HEAD/BRAIN W/O DYE: CPT

## 2019-02-19 RX ORDER — KETOCONAZOLE 20 MG/G
1 AEROSOL, FOAM TOPICAL
Qty: 0 | Refills: 0 | COMMUNITY

## 2019-02-19 RX ORDER — SODIUM CHLORIDE 9 MG/ML
1000 INJECTION, SOLUTION INTRAVENOUS
Qty: 0 | Refills: 0 | Status: DISCONTINUED | OUTPATIENT
Start: 2019-03-05 | End: 2019-03-05

## 2019-02-19 RX ORDER — LOPERAMIDE HCL 2 MG
4 TABLET ORAL
Qty: 0 | Refills: 0 | COMMUNITY

## 2019-02-19 RX ORDER — CHOLECALCIFEROL (VITAMIN D3) 125 MCG
1 CAPSULE ORAL
Qty: 0 | Refills: 0 | COMMUNITY

## 2019-02-19 RX ORDER — LAMOTRIGINE 25 MG/1
1 TABLET, ORALLY DISINTEGRATING ORAL
Qty: 0 | Refills: 0 | COMMUNITY

## 2019-02-19 RX ORDER — PSYLLIUM SEED (WITH DEXTROSE)
0 POWDER (GRAM) ORAL
Qty: 0 | Refills: 0 | COMMUNITY

## 2019-02-19 RX ORDER — BREXPIPRAZOLE 0.25 MG/1
1 TABLET ORAL
Qty: 0 | Refills: 0 | COMMUNITY

## 2019-02-19 RX ORDER — FLUNISOLIDE 25 MCG
2 AEROSOL, SPRAY (ML) NASAL
Qty: 0 | Refills: 0 | COMMUNITY

## 2019-02-19 RX ORDER — LAMOTRIGINE 25 MG/1
3 TABLET, ORALLY DISINTEGRATING ORAL
Qty: 0 | Refills: 0 | COMMUNITY

## 2019-02-19 RX ORDER — IBUPROFEN 200 MG
1 TABLET ORAL
Qty: 0 | Refills: 0 | COMMUNITY

## 2019-02-19 RX ORDER — POLYETHYLENE GLYCOL 3350 17 G/17G
17 POWDER, FOR SOLUTION ORAL
Qty: 0 | Refills: 0 | COMMUNITY

## 2019-02-19 RX ORDER — ESCITALOPRAM OXALATE 10 MG/1
1 TABLET, FILM COATED ORAL
Qty: 0 | Refills: 0 | COMMUNITY

## 2019-02-19 RX ORDER — TRAVOPROST 0.04 MG/ML
1 SOLUTION/ DROPS OPHTHALMIC
Qty: 0 | Refills: 0 | COMMUNITY

## 2019-02-19 RX ORDER — DIPHENHYDRAMINE HCL 50 MG
2 CAPSULE ORAL
Qty: 0 | Refills: 0 | COMMUNITY

## 2019-02-19 RX ORDER — ACETAMINOPHEN 500 MG
2 TABLET ORAL
Qty: 0 | Refills: 0 | COMMUNITY

## 2019-02-19 NOTE — H&P PST ADULT - PROBLEM SELECTOR PLAN 1
Pt scheduled for stage 1 removal of vagal nerve stimulator insertion of ICP montior on 3/5/2019.  labs done results pending, ekg done.  Pt instructed to obtain medical clearance due to multiple comorbidities, [facility] surgical coordinator notified.  Preop teaching done, pt guardian able to verbalize understanding.

## 2019-02-19 NOTE — H&P PST ADULT - NEGATIVE GENERAL SYMPTOMS
no malaise/no anorexia/no weight loss/no chills/no weight gain/no polyphagia/no sweating/no polydipsia/no polyuria/no fatigue/no fever

## 2019-02-19 NOTE — H&P PST ADULT - HISTORY OF PRESENT ILLNESS
47y/o male scheduled for stage 1 removal of vagal nerve stimulator insertion of intracranial pressure monitor on 3/5/2019.  As per pt legal guardian, h/x mild mental retardation, hydrocephalus s/p  shunt x 2  with several  shunt revisions, most recently July 2015. Hx of epilepsy with Vagal Nerve Stimulator implanted x3, most recently in 2016. Pt.'s legal guardian states pt is complaining of headaches for the past 3 yrs. Pt's vagal nerve stimulator has been turned off.  . Last seizure  July 2018.  Surgery was previously scheduled for surgery 10/2018 due to pneumonia.      Pt from [facility], accompanied by patient's aunt and legal guardian Christi Guy. Who will be present on day of surgery to sign consent.

## 2019-02-19 NOTE — H&P PST ADULT - TEACHING/LEARNING FACTORS IMPACT ABILITY TO LEARN
comprehension/hearing problems/cognitive limitations/learning disabilities/physical limitations/visual problems

## 2019-02-19 NOTE — H&P PST ADULT - ABILITY TO HEAR (WITH HEARING AID OR HEARING APPLIANCE IF NORMALLY USED):
Mildly to Moderately Impaired: difficulty hearing in some environments or speaker may need to increase volume or speak distinctly/mild hearing loss to right ear

## 2019-02-19 NOTE — H&P PST ADULT - NEGATIVE GENERAL GENITOURINARY SYMPTOMS
no hematuria/no flank pain R/normal urinary frequency/no bladder infections/no dysuria/no flank pain L

## 2019-02-19 NOTE — H&P PST ADULT - NEGATIVE CARDIOVASCULAR SYMPTOMS
no paroxysmal nocturnal dyspnea/no chest pain/no palpitations/no dyspnea on exertion/no orthopnea/no claudication/no peripheral edema

## 2019-02-19 NOTE — H&P PST ADULT - GASTROINTESTINAL DETAILS
nontender/bowel sounds normal/no distention/soft nontender/no rigidity/no organomegaly/no distention/bowel sounds normal/no rebound tenderness/no masses palpable/soft/no guarding

## 2019-02-19 NOTE — H&P PST ADULT - MUSCULOSKELETAL
details… detailed exam no joint warmth/no calf tenderness/no joint swelling/no joint erythema/normal strength/ROM intact

## 2019-02-19 NOTE — H&P PST ADULT - RS GEN PE MLT RESP DETAILS PC
no rhonchi/respirations non-labored/breath sounds equal/no rales/airway patent/good air movement/clear to auscultation bilaterally/no wheezes no intercostal retractions/no rales/good air movement/breath sounds equal/no chest wall tenderness/no rhonchi/clear to auscultation bilaterally/no subcutaneous emphysema/no wheezes/respirations non-labored

## 2019-02-19 NOTE — H&P PST ADULT - OPHTHALMOLOGIC COMMENTS
legally blind.  sees mostly shadows, Slight peripheral vision in right eye, bilateral eyes with glaucoma, left stigmatism

## 2019-02-19 NOTE — H&P PST ADULT - MOTOR
equal strength bilateral upper and lower extremities strength in bilateral upper and lower extremities 5/5

## 2019-02-19 NOTE — H&P PST ADULT - PROBLEM SELECTOR PLAN 2
OR booking notified of sleep apnea    Guardianship papers in the chart, pt guardian will be present on the day of procedure

## 2019-02-22 ENCOUNTER — OUTPATIENT (OUTPATIENT)
Dept: OUTPATIENT SERVICES | Facility: HOSPITAL | Age: 49
LOS: 1 days | End: 2019-02-22

## 2019-02-22 DIAGNOSIS — Z96.22 MYRINGOTOMY TUBE(S) STATUS: Chronic | ICD-10-CM

## 2019-02-22 DIAGNOSIS — T85.09XA OTHER MECHANICAL COMPLICATION OF VENTRICULAR INTRACRANIAL (COMMUNICATING) SHUNT, INITIAL ENCOUNTER: Chronic | ICD-10-CM

## 2019-03-04 NOTE — ASU PATIENT PROFILE, ADULT - TEACHING/LEARNING FACTORS IMPACT ABILITY TO LEARN
cognitive limitations/comprehension/hearing problems/learning disabilities/visual problems/physical limitations

## 2019-03-05 ENCOUNTER — INPATIENT (INPATIENT)
Facility: HOSPITAL | Age: 49
LOS: 0 days | Discharge: HOME | End: 2019-03-05
Attending: NEUROLOGICAL SURGERY | Admitting: NEUROLOGICAL SURGERY

## 2019-03-05 VITALS
HEART RATE: 76 BPM | DIASTOLIC BLOOD PRESSURE: 72 MMHG | HEIGHT: 65 IN | TEMPERATURE: 98 F | WEIGHT: 164.02 LBS | RESPIRATION RATE: 15 BRPM | SYSTOLIC BLOOD PRESSURE: 111 MMHG | OXYGEN SATURATION: 96 %

## 2019-03-05 DIAGNOSIS — Z96.22 MYRINGOTOMY TUBE(S) STATUS: Chronic | ICD-10-CM

## 2019-03-05 DIAGNOSIS — T85.09XA OTHER MECHANICAL COMPLICATION OF VENTRICULAR INTRACRANIAL (COMMUNICATING) SHUNT, INITIAL ENCOUNTER: Chronic | ICD-10-CM

## 2019-03-05 DIAGNOSIS — R56.9 UNSPECIFIED CONVULSIONS: ICD-10-CM

## 2019-03-05 LAB — POTASSIUM BLDV-SCNC: 5.3 MMOL/L — HIGH (ref 3.4–4.5)

## 2019-03-05 NOTE — PROGRESS NOTE ADULT - SUBJECTIVE AND OBJECTIVE BOX
Pt in preop area to undergo neurosurgery. On preop labs, pt had hyperkalemia (K 5.6). Upon day of surgery repeat labs, potassium remained elevated at K 5.3 [3.4-4.5 mmol/L]. Pt clinically asymptomatic. Given case is elective, decision to cancel case to have pt potassium further medically optimized. Discussed with surgeon and pt's guardian.

## 2019-03-05 NOTE — ASU PREOP CHECKLIST - COMMENTS
took escitalopram, lamotrigime, nexium, onfi, primadone, and dorzolamitide this am with a sip of water

## 2019-03-06 ENCOUNTER — OTHER (OUTPATIENT)
Age: 49
End: 2019-03-06

## 2019-03-14 ENCOUNTER — RX RENEWAL (OUTPATIENT)
Age: 49
End: 2019-03-14

## 2019-04-08 ENCOUNTER — MEDICATION RENEWAL (OUTPATIENT)
Age: 49
End: 2019-04-08

## 2019-04-22 ENCOUNTER — APPOINTMENT (OUTPATIENT)
Dept: NEUROLOGY | Facility: CLINIC | Age: 49
End: 2019-04-22
Payer: MEDICARE

## 2019-04-22 VITALS
DIASTOLIC BLOOD PRESSURE: 61 MMHG | WEIGHT: 169 LBS | HEART RATE: 86 BPM | BODY MASS INDEX: 29.95 KG/M2 | HEIGHT: 63 IN | SYSTOLIC BLOOD PRESSURE: 94 MMHG

## 2019-04-22 PROCEDURE — 99215 OFFICE O/P EST HI 40 MIN: CPT

## 2019-04-22 NOTE — PHYSICAL EXAM
[General Appearance - Alert] : alert [General Appearance - In No Acute Distress] : in no acute distress [General Appearance - Well Nourished] : well nourished [Person] : oriented to person [Time] : oriented to time [Fluency] : fluency intact [Cranial Nerves Optic (II)] : visual acuity intact bilaterally,  visual fields full to confrontation, pupils equal round and reactive to light [Cranial Nerves Oculomotor (III)] : extraocular motion intact [Cranial Nerves Trigeminal (V)] : facial sensation intact symmetrically [Cranial Nerves Facial (VII)] : face symmetrical [Cranial Nerves Vestibulocochlear (VIII)] : hearing was intact bilaterally [Cranial Nerves Glossopharyngeal (IX)] : tongue and palate midline [Cranial Nerves Accessory (XI - Cranial And Spinal)] : head turning and shoulder shrug symmetric [Cranial Nerves Hypoglossal (XII)] : there was no tongue deviation with protrusion [Involuntary Movements] : no involuntary movements were seen [No Muscle Atrophy] : normal bulk in all four extremities [Sensation Tactile Decrease] : light touch was intact [Sensation Pain / Temperature Decrease] : pain and temperature was intact [Balance] : balance was intact [Past-pointing] : there was no past-pointing [Tremor] : no tremor present [2+] : Ankle jerk left 2+ [FreeTextEntry5] : Only vision to light in right eye, none in left eye [FreeTextEntry6] : Increased tone throughout, left greater than right.  5/5 strength. [Sclera] : the sclera and conjunctiva were normal [Extraocular Movements] : extraocular movements were intact [FreeTextEntry1] : see above [Full Pulse] : the pedal pulses are present [Edema] : there was no peripheral edema [Abnormal Walk] : normal gait [Nail Clubbing] : no clubbing  or cyanosis of the fingernails [Musculoskeletal - Swelling] : no joint swelling seen [Motor Tone] : muscle strength and tone were normal

## 2019-04-22 NOTE — LETTER BODY
[To Whom it May Concern:] : To Whom it May Concern: [FreeTextEntry1] : Patient can return to program today 8/29/2017. [FreeTextEntry3] : Agusto Jewell MD

## 2019-04-22 NOTE — PROCEDURE
[FreeTextEntry1] : VNS device interrogated today successfully without complications Patient handled it well.  We shut off the device at this time per aunt requent.\par \par Initial settings: \par \par  3/20/250/30/1.1\par \par Lowered to 0mA output, including autostim and magnet.\par \par

## 2019-04-22 NOTE — HISTORY OF PRESENT ILLNESS
[FreeTextEntry1] : Interim History: \par No seizures since last visit.  Could not get VNS out since K was 5. \par \par On LTG 300mg bid and Primidone bid.\par \par Mr. Andi Ferrara is a 47 year old man with a history of epilepsy since birth secondary to being born one month prematurely with hydrocephalus and right parietal encephalomalacia.  He comes accompanied by his aunt and uncle (legal guardians) and the .\par \par The seizures started shortly after birth with unclear details.  He was in the ICU for some time and found to have hydrocephalus.  At 16 months of age, his shunt was found to be malfunctioning and a new shunt was placed.  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  In 2013, the shut malfunctioned once again with further visual loss, now with only vision to light in the right eye and no vision in the left eye.\par \par The seizures at a younger age are unclear in semiology.  However, for the past several decades his typical seizures have been pulling at his clothes for seconds to minutes with confusion thereafter.  He gets confused with these events with behavioral arrest. \par the exact frequency is unknown (perhaps several times a month).  He does not have convulsions.\par \par In addition to the seizures above, he has been having behavioral outbursts at his home.  He gets mad at times or very tired at times.  It has been unclear if these are seizures.  Also episodes of head pain as well.  This has been followed by his psychiatrist.\par \par The patient has been tried on several AEDS (PHT, Vimpat, VPA, PHB, CBZ, LEV, Banzel, TPM) and has not been treated with these meds.  He is currently on Primidone 250 bidand Lamictal 400mg bid.  He was tried on Onfi 5mg bid in December 2015  and did well, but this was stopped because he ran out.  This was restarted in February 2016 at 5mg bid.\par \par The patient had a VNS placed several years ago at "maximum output" and has followed with Dr. Yuri Yanez in [facility].  It was stated that would be difficult to do any further surgery on him.\par \par CT scans in the past have shown right parietal encephalomalacia.  EEG studies have shown (AEEG and VEEG) bilateral temporal frontal discharges (R>L) with one seizure caught on the right in the past.\par \par He has been followed by Dr. Phillips and at [place] in the past.  VEEG testing July 2016 showed no seizures and no changes to meds.  Also showed episodes of headache and pulling at shirt with no changes.  Still with headaches at this time with intermittent abdominal pain with no known etiology.\par \par Several seizures with ZNS increased to 100 bid earlier in the year.  Worsening of behavior with ZNS and no improvement.  We stopped ZNS and still with behavioral problems.\par \par VNS was shut off last visit, with no seizures and decreased head pain.  Now more aggressive at his home.\par \par PMHx as above\par FHx No seizures\par Meds/All: see below\par

## 2019-04-22 NOTE — DISCUSSION/SUMMARY
[FreeTextEntry1] : 48 year old man with history developmental delay and epilepsy with shunt placement and VNS placement.\par \par Had long discussion about options.  \par \par The events in the hospital (shirt pulling, headache, etc) were not associated with seizure activity.  His behavioral episodes also unlikely 2/2 seizure activity.  As for headache, would check shunt if not improve. Also they will discuss new medication with psychiatry.\par \par Will continue Onfi at 5mg qhs.  -restarted in the hospital?.  behavior issues unchanged when patient off Onfi\par Shut VNS output in feb 2018 visit per aunt's request since she believes increasing headache and seizure.  Explained that this is not likely the case.  However, did turn it off per her request with full explanation that can increase seizure activity. They will f/u with Dr. Yanez for taking git out.\par -reviewed seizure triggers\par - annual labwork including AED levels (hand written script)\par Also talked about surgery options today (SEEG) if not improving.\par -? Nortriptyline if not improving for headache\par - psych (in [facility]) for behavior\par -Will recheck K, has been given ensure at home and told them not to as can increase K\par \par follow up in 2-3 months\par \par Patient seen 40 min face to face with >50% in counseling and discussion.\par \par  [Medically Refractory (seizure within the last year)] : Medically Refractory (seizure within the last year) [Evaluation for interictal epileptiform activity, subclinical seizures, and risk stratification (typically 2-3 days)] : Evaluation for interictal epileptiform activity, subclinical seizures, and risk stratification (typically 2-3 days)

## 2019-05-14 ENCOUNTER — MEDICATION RENEWAL (OUTPATIENT)
Age: 49
End: 2019-05-14

## 2019-06-10 ENCOUNTER — MEDICATION RENEWAL (OUTPATIENT)
Age: 49
End: 2019-06-10

## 2019-07-09 ENCOUNTER — MEDICATION RENEWAL (OUTPATIENT)
Age: 49
End: 2019-07-09

## 2019-08-06 ENCOUNTER — MEDICATION RENEWAL (OUTPATIENT)
Age: 49
End: 2019-08-06

## 2019-08-14 ENCOUNTER — APPOINTMENT (OUTPATIENT)
Dept: OPHTHALMOLOGY | Facility: CLINIC | Age: 49
End: 2019-08-14
Payer: MEDICARE

## 2019-08-14 ENCOUNTER — NON-APPOINTMENT (OUTPATIENT)
Age: 49
End: 2019-08-14

## 2019-08-14 PROCEDURE — 99203 OFFICE O/P NEW LOW 30 MIN: CPT

## 2019-09-03 ENCOUNTER — MEDICATION RENEWAL (OUTPATIENT)
Age: 49
End: 2019-09-03

## 2019-09-27 ENCOUNTER — MEDICATION RENEWAL (OUTPATIENT)
Age: 49
End: 2019-09-27

## 2019-10-17 ENCOUNTER — APPOINTMENT (OUTPATIENT)
Dept: CT IMAGING | Facility: CLINIC | Age: 49
End: 2019-10-17
Payer: MEDICARE

## 2019-10-17 PROCEDURE — 73700 CT LOWER EXTREMITY W/O DYE: CPT | Mod: RT

## 2019-10-23 ENCOUNTER — APPOINTMENT (OUTPATIENT)
Dept: CT IMAGING | Facility: IMAGING CENTER | Age: 49
End: 2019-10-23
Payer: MEDICARE

## 2019-10-23 ENCOUNTER — OUTPATIENT (OUTPATIENT)
Dept: OUTPATIENT SERVICES | Facility: HOSPITAL | Age: 49
LOS: 1 days | End: 2019-10-23
Payer: MEDICARE

## 2019-10-23 DIAGNOSIS — Z96.22 MYRINGOTOMY TUBE(S) STATUS: Chronic | ICD-10-CM

## 2019-10-23 DIAGNOSIS — Z00.8 ENCOUNTER FOR OTHER GENERAL EXAMINATION: ICD-10-CM

## 2019-10-23 DIAGNOSIS — T85.09XA OTHER MECHANICAL COMPLICATION OF VENTRICULAR INTRACRANIAL (COMMUNICATING) SHUNT, INITIAL ENCOUNTER: Chronic | ICD-10-CM

## 2019-10-23 PROCEDURE — 70450 CT HEAD/BRAIN W/O DYE: CPT | Mod: 26

## 2019-10-23 PROCEDURE — 70450 CT HEAD/BRAIN W/O DYE: CPT

## 2019-10-28 ENCOUNTER — APPOINTMENT (OUTPATIENT)
Dept: NEUROLOGY | Facility: CLINIC | Age: 49
End: 2019-10-28
Payer: MEDICARE

## 2019-10-28 PROCEDURE — 99215 OFFICE O/P EST HI 40 MIN: CPT

## 2019-10-28 NOTE — DISCUSSION/SUMMARY
[FreeTextEntry1] : 49 year old man with history developmental delay and epilepsy with shunt placement and VNS placement.\par \par Had long discussion about options.  \par \par The events in the hospital (shirt pulling, headache, etc) were not associated with seizure activity.  His behavioral episodes also unlikely 2/2 seizure activity.  As for headache, no change in CT and not severe.  Not complaining of pain at this time.\par \par Will continue Onfi at 5mg qhs.  -restarted in the hospital?.  behavior issues unchanged when patient off Onfi\par Shut VNS output in feb 2018 visit per aunt's request since she believes increasing headache and seizure.  Explained that this is not likely the case.  However, did turn it off per her request with full explanation that can increase seizure activity. They will f/u with Dr. Yanez for taking it out.\par -reviewed seizure triggers\par - annual labwork including AED levels (hand written script)\par Also talked about surgery options today (SEEG) if not improving.\par -? Nortriptyline if not improving for headache\par - psych (in [facility]) for behavior\par \par follow up in 6 months\par \par Patient seen 40 min face to face with >50% in counseling and discussion.\par \par  [Medically Refractory (seizure within the last year)] : Medically Refractory (seizure within the last year) [Evaluation for interictal epileptiform activity, subclinical seizures, and risk stratification (typically 2-3 days)] : Evaluation for interictal epileptiform activity, subclinical seizures, and risk stratification (typically 2-3 days)

## 2019-10-28 NOTE — HISTORY OF PRESENT ILLNESS
[FreeTextEntry1] : Interim History: \par Possible small [facility] few [facility]  [facility] head which showed no change.\par \par On LTG 300mg bid and Primidone bid. Onfi 5mg bid.\par \par Seeing psych who started Lexapro 10 daily and Zyprexa\par \par Mr. 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  He comes accompanied by his aunt and uncle (legal guardians) and the .\par \par The seizures started shortly after birth with unclear details.  He was in the ICU for some time and found to have hydrocephalus.  At 16 months of age, his shunt was found to be malfunctioning and a new shunt was placed.  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  In 2013, the shut malfunctioned once again with further visual loss, now with only vision to light in the right eye and no vision in the left eye.\par \par The seizures at a younger age are unclear in semiology.  However, for the past several decades his typical seizures have been pulling at his clothes for seconds to minutes with confusion thereafter.  He gets confused with these events with behavioral arrest. \par the exact frequency is unknown (perhaps several times a month).  He does not have convulsions.\par \par In addition to the seizures above, he has been having behavioral outbursts at his home.  He gets mad at times or very tired at times.  It has been unclear if these are seizures.  Also episodes of head pain as well.  This has been followed by his psychiatrist.\par \par The patient has been tried on several AEDS (PHT, Vimpat, VPA, PHB, CBZ, LEV, Banzel, TPM) and has not been treated with these meds.  He is currently on Primidone 250 bidand Lamictal 400mg bid.  He was tried on Onfi 5mg bid in December 2015  and did well, but this was stopped because he ran out.  This was restarted in February 2016 at 5mg bid.\par \par The patient had a VNS placed several years ago at "maximum output" and [place] [facility]  It was stated that would be difficult to do any further surgery on him.\par \par CT scans in the past have shown right parietal encephalomalacia.  EEG studies have shown (AEEG and VEEG) bilateral temporal frontal discharges (R>L) with one seizure caught on the right in the past.\par \par He has been followed by Dr. Phillips and at [place] in the past.  VEEG testing July 2016 showed no seizures and no changes to meds.  Also showed episodes of headache and pulling at shirt with no changes.  Still with headaches at this time with intermittent abdominal pain with no known etiology.\par \par Several seizures with ZNS increased to 100 bid earlier in the year.  Worsening of behavior with ZNS and no improvement.  We stopped ZNS and still with behavioral problems.\par \par VNS was shut off/\par \par PMHx as above\par FHx No seizures\par Meds/All: see below\par

## 2019-11-27 ENCOUNTER — APPOINTMENT (OUTPATIENT)
Dept: RADIOLOGY | Facility: CLINIC | Age: 49
End: 2019-11-27
Payer: MEDICARE

## 2019-11-27 PROCEDURE — 77080 DXA BONE DENSITY AXIAL: CPT

## 2019-12-06 ENCOUNTER — RESULT REVIEW (OUTPATIENT)
Age: 49
End: 2019-12-06

## 2020-01-09 ENCOUNTER — RX RENEWAL (OUTPATIENT)
Age: 50
End: 2020-01-09

## 2020-02-03 ENCOUNTER — APPOINTMENT (OUTPATIENT)
Dept: NEUROLOGY | Facility: CLINIC | Age: 50
End: 2020-02-03
Payer: MEDICARE

## 2020-02-03 ENCOUNTER — TRANSCRIPTION ENCOUNTER (OUTPATIENT)
Age: 50
End: 2020-02-03

## 2020-02-03 VITALS
BODY MASS INDEX: 29.95 KG/M2 | HEIGHT: 63 IN | WEIGHT: 169 LBS | SYSTOLIC BLOOD PRESSURE: 120 MMHG | HEART RATE: 78 BPM | DIASTOLIC BLOOD PRESSURE: 80 MMHG

## 2020-02-03 PROCEDURE — 99214 OFFICE O/P EST MOD 30 MIN: CPT

## 2020-02-04 NOTE — PHYSICAL EXAM
[General Appearance - Alert] : alert [General Appearance - In No Acute Distress] : in no acute distress [General Appearance - Well Nourished] : well nourished [Person] : oriented to person [Fluency] : fluency intact [Time] : oriented to time [Cranial Nerves Trigeminal (V)] : facial sensation intact symmetrically [Cranial Nerves Oculomotor (III)] : extraocular motion intact [Cranial Nerves Optic (II)] : visual acuity intact bilaterally,  visual fields full to confrontation, pupils equal round and reactive to light [Cranial Nerves Vestibulocochlear (VIII)] : hearing was intact bilaterally [Cranial Nerves Facial (VII)] : face symmetrical [Cranial Nerves Glossopharyngeal (IX)] : tongue and palate midline [Cranial Nerves Accessory (XI - Cranial And Spinal)] : head turning and shoulder shrug symmetric [Cranial Nerves Hypoglossal (XII)] : there was no tongue deviation with protrusion [No Muscle Atrophy] : normal bulk in all four extremities [Involuntary Movements] : no involuntary movements were seen [Sensation Pain / Temperature Decrease] : pain and temperature was intact [Sensation Tactile Decrease] : light touch was intact [Balance] : balance was intact [2+] : Ankle jerk right 2+ [Sclera] : the sclera and conjunctiva were normal [Extraocular Movements] : extraocular movements were intact [Full Pulse] : the pedal pulses are present [Abnormal Walk] : normal gait [Edema] : there was no peripheral edema [Musculoskeletal - Swelling] : no joint swelling seen [Nail Clubbing] : no clubbing  or cyanosis of the fingernails [Motor Tone] : muscle strength and tone were normal [Tremor] : no tremor present [FreeTextEntry5] : Only vision to light in right eye, none in left eye [Past-pointing] : there was no past-pointing [FreeTextEntry6] : Increased tone throughout, left greater than right.  5/5 strength. [FreeTextEntry1] : see above

## 2020-02-04 NOTE — HISTORY OF PRESENT ILLNESS
[FreeTextEntry1] : ***UPDATE:2/3/20***\par Mr Andi Ferrara is accompanied by his legal guardian Ms Melissa Guy\par He has no reported interval seizures . he had a fall on 1/25 but did not hit his head. It was noted that he had a bug bite from end of September on right ankle which is now swollen and red\par \par LTG 300mg BID\par Onfi 5mg BID\par Primidone 250mg BID\par \par Last office visit:\par  \par Possible small seizure a few weeks ago where was a little confused at bowling.  Had CT head which showed no change.\par \par On LTG 300mg bid and Primidone bid. Onfi 5mg bid.\par \par Seeing psych who started Lexapro 10 daily and Zyprexa\par \par Mr. 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  He comes accompanied by his aunt and uncle (legal guardians) and the .\par \par The seizures started shortly after birth with unclear details.  He was in the ICU for some time and found to have hydrocephalus.  At 16 months of age, his shunt was found to be malfunctioning and a new shunt was placed.  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  In 2013, the shut malfunctioned once again with further visual loss, now with only vision to light in the right eye and no vision in the left eye.\par \par The seizures at a younger age are unclear in semiology.  However, for the past several decades his typical seizures have been pulling at his clothes for seconds to minutes with confusion thereafter.  He gets confused with these events with behavioral arrest. \par the exact frequency is unknown (perhaps several times a month).  He does not have convulsions.\par \par In addition to the seizures above, he has been having behavioral outbursts at his home.  He gets mad at times or very tired at times.  It has been unclear if these are seizures.  Also episodes of head pain as well.  This has been followed by his psychiatrist.\par \par The patient has been tried on several AEDS (PHT, Vimpat, VPA, PHB, CBZ, LEV, Banzel, TPM) and has not been treated with these meds.  He is currently on Primidone 250 bidand Lamictal 400mg bid.  He was tried on Onfi 5mg bid in December 2015  and did well, but this was stopped because he ran out.  This was restarted in February 2016 at 5mg bid.\par \par The patient had a VNS placed several years ago at "maximum output" and has followed with Dr. Yuri Yanez in [facility].  It was stated that would be difficult to do any further surgery on him.\par \par CT scans in the past have shown right parietal encephalomalacia.  EEG studies have shown (AEEG and VEEG) bilateral temporal frontal discharges (R>L) with one seizure caught on the right in the past.\par \par He has been followed by Dr. Phillips and at [place] in the past.  VEEG testing July 2016 showed no seizures and no changes to meds.  Also showed episodes of headache and pulling at shirt with no changes.  Still with headaches at this time with intermittent abdominal pain with no known etiology.\par \par Several seizures with ZNS increased to 100 bid earlier in the year.  Worsening of behavior with ZNS and no improvement.  We stopped ZNS and still with behavioral problems.\par \par VNS was shut off/\par \par PMHx as above\par FHx No seizures\par Meds/All: see below\par

## 2020-02-04 NOTE — LETTER BODY
[To Whom it May Concern:] : To Whom it May Concern: [FreeTextEntry3] : Agusto Jewell MD [FreeTextEntry1] : Patient can return to program today 8/29/2017.

## 2020-02-04 NOTE — DISCUSSION/SUMMARY
[Evaluation for interictal epileptiform activity, subclinical seizures, and risk stratification (typically 2-3 days)] : Evaluation for interictal epileptiform activity, subclinical seizures, and risk stratification (typically 2-3 days) [Medically Refractory (seizure within the last year)] : Medically Refractory (seizure within the last year) [FreeTextEntry1] : 49 year old man with history developmental delay and epilepsy with shunt placement and VNS placement.\par \par Had long discussion about options.  \par \par The events in the hospital (shirt pulling, headache, etc) were not associated with seizure activity.  His behavioral episodes also unlikely 2/2 seizure activity.  As for headache, no change in CT and not severe.  Not complaining of pain at this time.\par \par Plan;\par Continue current AED regimen\par -reviewed seizure triggers\par - annual labwork including AED levels (hand written script)\par - advised to got to [facility]are regarding swollen ankle/bug bite to possibly drain and start abx if needed\par - psych (in [facility]) for behavior\par -follow up in 6 months with Dr Jewell\par \par Patient seen 40 min face to face with >50% in counseling and discussion.\par \par

## 2020-03-12 ENCOUNTER — EMERGENCY (EMERGENCY)
Facility: HOSPITAL | Age: 50
LOS: 1 days | End: 2020-03-12
Admitting: EMERGENCY MEDICINE
Payer: MEDICARE

## 2020-03-12 DIAGNOSIS — T85.09XA OTHER MECHANICAL COMPLICATION OF VENTRICULAR INTRACRANIAL (COMMUNICATING) SHUNT, INITIAL ENCOUNTER: Chronic | ICD-10-CM

## 2020-03-12 DIAGNOSIS — Z96.22 MYRINGOTOMY TUBE(S) STATUS: Chronic | ICD-10-CM

## 2020-03-12 PROCEDURE — G0168: CPT

## 2020-03-12 PROCEDURE — 99283 EMERGENCY DEPT VISIT LOW MDM: CPT | Mod: 25

## 2020-04-15 ENCOUNTER — APPOINTMENT (OUTPATIENT)
Dept: CT IMAGING | Facility: CLINIC | Age: 50
End: 2020-04-15

## 2020-04-15 ENCOUNTER — APPOINTMENT (OUTPATIENT)
Dept: ULTRASOUND IMAGING | Facility: CLINIC | Age: 50
End: 2020-04-15

## 2020-06-07 ENCOUNTER — RX RENEWAL (OUTPATIENT)
Age: 50
End: 2020-06-07

## 2020-07-10 ENCOUNTER — EMERGENCY (EMERGENCY)
Facility: HOSPITAL | Age: 50
LOS: 1 days | End: 2020-07-10
Admitting: EMERGENCY MEDICINE
Payer: MEDICARE

## 2020-07-10 DIAGNOSIS — Z96.22 MYRINGOTOMY TUBE(S) STATUS: Chronic | ICD-10-CM

## 2020-07-10 DIAGNOSIS — T85.09XA OTHER MECHANICAL COMPLICATION OF VENTRICULAR INTRACRANIAL (COMMUNICATING) SHUNT, INITIAL ENCOUNTER: Chronic | ICD-10-CM

## 2020-07-10 PROCEDURE — 74019 RADEX ABDOMEN 2 VIEWS: CPT | Mod: 26

## 2020-07-10 PROCEDURE — 70250 X-RAY EXAM OF SKULL: CPT | Mod: 26

## 2020-07-10 PROCEDURE — 99285 EMERGENCY DEPT VISIT HI MDM: CPT

## 2020-07-10 PROCEDURE — 70450 CT HEAD/BRAIN W/O DYE: CPT | Mod: 26

## 2020-07-10 PROCEDURE — 71046 X-RAY EXAM CHEST 2 VIEWS: CPT | Mod: 26

## 2020-07-20 ENCOUNTER — APPOINTMENT (OUTPATIENT)
Dept: NEUROLOGY | Facility: CLINIC | Age: 50
End: 2020-07-20

## 2020-07-20 ENCOUNTER — APPOINTMENT (OUTPATIENT)
Dept: ULTRASOUND IMAGING | Facility: CLINIC | Age: 50
End: 2020-07-20

## 2020-07-20 ENCOUNTER — APPOINTMENT (OUTPATIENT)
Dept: CT IMAGING | Facility: CLINIC | Age: 50
End: 2020-07-20

## 2020-07-31 ENCOUNTER — APPOINTMENT (OUTPATIENT)
Dept: NEUROLOGY | Facility: CLINIC | Age: 50
End: 2020-07-31
Payer: MEDICARE

## 2020-07-31 VITALS
BODY MASS INDEX: 26.58 KG/M2 | WEIGHT: 150 LBS | SYSTOLIC BLOOD PRESSURE: 96 MMHG | HEART RATE: 64 BPM | HEIGHT: 63 IN | DIASTOLIC BLOOD PRESSURE: 67 MMHG

## 2020-07-31 VITALS — TEMPERATURE: 97.5 F

## 2020-07-31 PROCEDURE — 99215 OFFICE O/P EST HI 40 MIN: CPT

## 2020-07-31 NOTE — PHYSICAL EXAM
[General Appearance - Alert] : alert [General Appearance - Well Nourished] : well nourished [General Appearance - In No Acute Distress] : in no acute distress [Person] : oriented to person [Time] : oriented to time [Fluency] : fluency intact [Cranial Nerves Optic (II)] : visual acuity intact bilaterally,  visual fields full to confrontation, pupils equal round and reactive to light [Cranial Nerves Oculomotor (III)] : extraocular motion intact [Cranial Nerves Trigeminal (V)] : facial sensation intact symmetrically [Cranial Nerves Facial (VII)] : face symmetrical [Cranial Nerves Vestibulocochlear (VIII)] : hearing was intact bilaterally [Cranial Nerves Glossopharyngeal (IX)] : tongue and palate midline [Cranial Nerves Accessory (XI - Cranial And Spinal)] : head turning and shoulder shrug symmetric [Cranial Nerves Hypoglossal (XII)] : there was no tongue deviation with protrusion [Involuntary Movements] : no involuntary movements were seen [No Muscle Atrophy] : normal bulk in all four extremities [Sensation Tactile Decrease] : light touch was intact [Sensation Pain / Temperature Decrease] : pain and temperature was intact [Balance] : balance was intact [2+] : Ankle jerk left 2+ [Sclera] : the sclera and conjunctiva were normal [Extraocular Movements] : extraocular movements were intact [Full Pulse] : the pedal pulses are present [Edema] : there was no peripheral edema [Abnormal Walk] : normal gait [Nail Clubbing] : no clubbing  or cyanosis of the fingernails [Musculoskeletal - Swelling] : no joint swelling seen [Motor Tone] : muscle strength and tone were normal [Past-pointing] : there was no past-pointing [Tremor] : no tremor present [FreeTextEntry5] : Only vision to light in right eye, none in left eye [FreeTextEntry6] : Increased tone throughout, left greater than right.  5/5 strength. [FreeTextEntry1] : see above

## 2020-07-31 NOTE — DISCUSSION/SUMMARY
[Medically Refractory (seizure within the last year)] : Medically Refractory (seizure within the last year) [Evaluation for interictal epileptiform activity, subclinical seizures, and risk stratification (typically 2-3 days)] : Evaluation for interictal epileptiform activity, subclinical seizures, and risk stratification (typically 2-3 days) [FreeTextEntry1] : 49 year old man with history developmental delay and epilepsy with shunt placement and VNS placement.\par \par Had long discussion about options.  \par \par The events in the hospital (shirt pulling, headache, etc) were not associated with seizure activity.  His behavioral episodes also unlikely 2/2 seizure activity.  As for headache, no change in CT and not severe.  Not complaining of pain at this time.\par \par Likely generalized decompensation from issue with ankle causing weakness.  However, difficult to get full history and would want to check MRI brain and C-spine for ? cerebellar issues and ? stenosis.  Needs PT at this time.\par \par Plan;\par Continue current AED regimen\par -reviewed seizure triggers\par - annual labwork including AED levels (hand written script)\par - advised to got to [facility]are regarding swollen ankle/bug bite to possibly drain and start abx if needed\par - psych (in [facility]) for behavior\par -follow up in 6 months\par \par Patient seen 40 min face to face with >50% in counseling and discussion.\par \par

## 2020-07-31 NOTE — HISTORY OF PRESENT ILLNESS
[FreeTextEntry1] : /***UPDATE:7/31/20***\par Mr Andi Ferrara is accompanied by his legal guardian Ms Melissa Guy\par He has no reported interval seizures . He is doing well overall.\par \par Patient now with intermittent weakness when walking.  However, had bug bite to right ankle in the fall and has been walking less.  No issues bowel/bladder but mild ataxia.\par \par ***UPDATE:2/3/20***\par Mr Andi Ferrara is accompanied by his legal guardian Ms Melissa Guy\par He has no reported interval seizures . he had a fall on 1/25 but did not hit his head. It was noted that he had a bug bite from end of September on right ankle which is now swollen and red\par \par LTG 300mg BID\par Onfi 5mg BID\par Primidone 250mg BID\par \par Last office visit:\par  \par Possible small seizure a few weeks ago where was a little confused at bowling.  Had CT head which showed no change.\par \par On LTG 300mg bid and Primidone bid. Onfi 5mg bid.\par \par Seeing psych who started Lexapro 10 daily and Zyprexa\par \par Mr. 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  He comes accompanied by his aunt and uncle (legal guardians) and the .\par \par The seizures started shortly after birth with unclear details.  He was in the ICU for some time and found to have hydrocephalus.  At 16 months of age, his shunt was found to be malfunctioning and a new shunt was placed.  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  In 2013, the shut malfunctioned once again with further visual loss, now with only vision to light in the right eye and no vision in the left eye.\par \par The seizures at a younger age are unclear in semiology.  However, for the past several decades his typical seizures have been pulling at his clothes for seconds to minutes with confusion thereafter.  He gets confused with these events with behavioral arrest. \par the exact frequency is unknown (perhaps several times a month).  He does not have convulsions.\par \par In addition to the seizures above, he has been having behavioral outbursts at his home.  He gets mad at times or very tired at times.  It has been unclear if these are seizures.  Also episodes of head pain as well.  This has been followed by his psychiatrist.\par \par The patient has been tried on several AEDS (PHT, Vimpat, VPA, PHB, CBZ, LEV, Banzel, TPM) and has not been treated with these meds.  He is currently on Primidone 250 bidand Lamictal 400mg bid.  He was tried on Onfi 5mg bid in December 2015  and did well, but this was stopped because he ran out.  This was restarted in February 2016 at 5mg bid.\par \par The patient had a VNS placed several years ago at "maximum output" and has followed with Dr. Yuri Yanez in [facility].  It was stated that would be difficult to do any further surgery on him.\par \par CT scans in the past have shown right parietal encephalomalacia.  EEG studies have shown (AEEG and VEEG) bilateral temporal frontal discharges (R>L) with one seizure caught on the right in the past.\par \par He has been followed by Dr. Phillips and at [place] in the past.  VEEG testing July 2016 showed no seizures and no changes to meds.  Also showed episodes of headache and pulling at shirt with no changes.  Still with headaches at this time with intermittent abdominal pain with no known etiology.\par \par Several seizures with ZNS increased to 100 bid earlier in the year.  Worsening of behavior with ZNS and no improvement.  We stopped ZNS and still with behavioral problems.\par \par VNS was shut off/\par \par PMHx as above\par FHx No seizures\par Meds/All: see below\par

## 2020-08-18 ENCOUNTER — OUTPATIENT (OUTPATIENT)
Dept: OUTPATIENT SERVICES | Facility: HOSPITAL | Age: 50
LOS: 1 days | End: 2020-08-18
Payer: MEDICARE

## 2020-08-18 ENCOUNTER — APPOINTMENT (OUTPATIENT)
Dept: MRI IMAGING | Facility: CLINIC | Age: 50
End: 2020-08-18
Payer: MEDICARE

## 2020-08-18 DIAGNOSIS — T85.09XA OTHER MECHANICAL COMPLICATION OF VENTRICULAR INTRACRANIAL (COMMUNICATING) SHUNT, INITIAL ENCOUNTER: Chronic | ICD-10-CM

## 2020-08-18 DIAGNOSIS — Z00.8 ENCOUNTER FOR OTHER GENERAL EXAMINATION: ICD-10-CM

## 2020-08-18 DIAGNOSIS — Z96.22 MYRINGOTOMY TUBE(S) STATUS: Chronic | ICD-10-CM

## 2020-08-18 PROCEDURE — 72156 MRI NECK SPINE W/O & W/DYE: CPT

## 2020-08-18 PROCEDURE — 70553 MRI BRAIN STEM W/O & W/DYE: CPT | Mod: 26

## 2020-08-18 PROCEDURE — A9585: CPT

## 2020-08-18 PROCEDURE — 72156 MRI NECK SPINE W/O & W/DYE: CPT | Mod: 26

## 2020-08-18 PROCEDURE — 70553 MRI BRAIN STEM W/O & W/DYE: CPT

## 2020-09-29 ENCOUNTER — APPOINTMENT (OUTPATIENT)
Dept: SPINE | Facility: CLINIC | Age: 50
End: 2020-09-29
Payer: MEDICARE

## 2020-09-29 VITALS
TEMPERATURE: 98.1 F | HEIGHT: 65 IN | HEART RATE: 66 BPM | DIASTOLIC BLOOD PRESSURE: 73 MMHG | OXYGEN SATURATION: 98 % | WEIGHT: 157 LBS | SYSTOLIC BLOOD PRESSURE: 104 MMHG | BODY MASS INDEX: 26.16 KG/M2

## 2020-09-29 DIAGNOSIS — R20.0 ANESTHESIA OF SKIN: ICD-10-CM

## 2020-09-29 DIAGNOSIS — H54.8 LEGAL BLINDNESS, AS DEFINED IN USA: ICD-10-CM

## 2020-09-29 DIAGNOSIS — Z86.69 PERSONAL HISTORY OF OTHER DISEASES OF THE NERVOUS SYSTEM AND SENSE ORGANS: ICD-10-CM

## 2020-09-29 PROCEDURE — 99203 OFFICE O/P NEW LOW 30 MIN: CPT

## 2020-09-29 RX ORDER — POLYVINYL ALCOHOL, POVIDONE 14; 6 MG/ML; MG/ML
SOLUTION/ DROPS OPHTHALMIC
Refills: 0 | Status: ACTIVE | COMMUNITY

## 2020-09-30 ENCOUNTER — APPOINTMENT (OUTPATIENT)
Dept: SPINE | Facility: CLINIC | Age: 50
End: 2020-09-30

## 2020-09-30 NOTE — REVIEW OF SYSTEMS
[As Noted in HPI] : as noted in HPI [Leg Weakness] : leg weakness [Poor Coordination] : poor coordination [Numbness] : numbness [Tingling] : tingling [Abnormal Sensation] : an abnormal sensation [Difficulty Walking] : difficulty walking [Negative] : Cardiovascular [FreeTextEntry2] : Cranial deformity due to multiple shunt revision surgeries and developmental disability

## 2020-09-30 NOTE — PHYSICAL EXAM
[General Appearance - Alert] : alert [General Appearance - In No Acute Distress] : in no acute distress [Oriented To Time, Place, And Person] : oriented to person, place, and time [Cranial Nerves Optic (II)] : visual acuity intact bilaterally,  pupils equal round and reactive to light [Cranial Nerves Oculomotor (III)] : extraocular motion intact [Cranial Nerves Trigeminal (V)] : facial sensation intact symmetrically [Cranial Nerves Facial (VII)] : face symmetrical [Cranial Nerves Vestibulocochlear (VIII)] : hearing was intact bilaterally [Cranial Nerves Glossopharyngeal (IX)] : tongue and palate midline [Cranial Nerves Accessory (XI - Cranial And Spinal)] : head turning and shoulder shrug symmetric [Cranial Nerves Hypoglossal (XII)] : there was no tongue deviation with protrusion [3+] : Patella left 3+ [No Visual Abnormalities] : no visible abnormalities [Sclera] : the sclera and conjunctiva were normal [Neck Appearance] : the appearance of the neck was normal [] : no respiratory distress [Abdomen Soft] : soft [FreeTextEntry1] : Slow steady gait with one assist [Skin Color & Pigmentation] : normal skin color and pigmentation

## 2020-09-30 NOTE — HISTORY OF PRESENT ILLNESS
[FreeTextEntry1] : gait difficulty and weakness Bilateral upper extremity [de-identified] : This is 50 year old man Lives in Group home for his developmental disability.  He comes to visit with his legal guardian/aunt.  He is coherent and is able to answer questions appropriately.\par He presents with a significant history of congenital hydrocephalus of which he has undergone multiple shunt surgeries and revision surgeries over the past several years\par He has been legally blind since the age of 16 months  from  complications of papilledema and his shunt surgeries..\par \par Today he is here for evaluation of his cervical spine.  His prominent symptoms include weakness in upper extremity and progressive gait difficulty.  He ambulates with 1 person assist .  He maintain his independence within the group home and is able to care for himself until recently.  Is having issues with fine and gross motor coordination and use of his hands.  This prompted comprehensive work-up which shows evidence of cervical spinal cord compression and myelomalacia\par At this point his quality of life is significantly impaired as well as his functional abilities\par \par \par Glaucoma in 30s\par Had Multiple Shunt Surgery but stable\par Scheduled for RIGHT eye Cataract surgery\par \par \par

## 2020-09-30 NOTE — ASSESSMENT
[FreeTextEntry1] : 50-year-old man with developmental disability lives in a group home status post multiple shunt surgeries presenting with cervical spinal cord compression\par \par CT cervical spine for preop planning recommended.\par This to assess bony configuration of spine\par \par Return to office when completed to surgical plan of care\par \par In the meantime patient will proceed with his cataract surgery on 10/7/20 and plans to follow-up after

## 2020-10-14 ENCOUNTER — APPOINTMENT (OUTPATIENT)
Dept: CT IMAGING | Facility: CLINIC | Age: 50
End: 2020-10-14
Payer: MEDICARE

## 2020-10-14 PROCEDURE — 73700 CT LOWER EXTREMITY W/O DYE: CPT | Mod: RT,MH

## 2020-10-20 ENCOUNTER — APPOINTMENT (OUTPATIENT)
Dept: CT IMAGING | Facility: IMAGING CENTER | Age: 50
End: 2020-10-20
Payer: MEDICARE

## 2020-10-20 ENCOUNTER — APPOINTMENT (OUTPATIENT)
Dept: SPINE | Facility: CLINIC | Age: 50
End: 2020-10-20
Payer: MEDICARE

## 2020-10-20 ENCOUNTER — OUTPATIENT (OUTPATIENT)
Dept: OUTPATIENT SERVICES | Facility: HOSPITAL | Age: 50
LOS: 1 days | End: 2020-10-20
Payer: MEDICARE

## 2020-10-20 ENCOUNTER — RESULT REVIEW (OUTPATIENT)
Age: 50
End: 2020-10-20

## 2020-10-20 VITALS
DIASTOLIC BLOOD PRESSURE: 68 MMHG | BODY MASS INDEX: 26.16 KG/M2 | SYSTOLIC BLOOD PRESSURE: 102 MMHG | OXYGEN SATURATION: 95 % | HEIGHT: 65 IN | TEMPERATURE: 97.8 F | WEIGHT: 157 LBS | HEART RATE: 69 BPM

## 2020-10-20 DIAGNOSIS — R26.81 UNSTEADINESS ON FEET: ICD-10-CM

## 2020-10-20 DIAGNOSIS — G95.20 UNSPECIFIED CORD COMPRESSION: ICD-10-CM

## 2020-10-20 DIAGNOSIS — R20.0 ANESTHESIA OF SKIN: ICD-10-CM

## 2020-10-20 DIAGNOSIS — T85.09XA OTHER MECHANICAL COMPLICATION OF VENTRICULAR INTRACRANIAL (COMMUNICATING) SHUNT, INITIAL ENCOUNTER: Chronic | ICD-10-CM

## 2020-10-20 DIAGNOSIS — Z96.22 MYRINGOTOMY TUBE(S) STATUS: Chronic | ICD-10-CM

## 2020-10-20 PROCEDURE — 99212 OFFICE O/P EST SF 10 MIN: CPT

## 2020-10-20 PROCEDURE — 72125 CT NECK SPINE W/O DYE: CPT

## 2020-10-20 PROCEDURE — 72125 CT NECK SPINE W/O DYE: CPT | Mod: 26

## 2020-10-22 NOTE — HISTORY OF PRESENT ILLNESS
[FreeTextEntry1] : gait difficulty and weakness Bilateral upper extremity [de-identified] : This is 50 year old man Lives in Group home for his developmental disability.  He comes to visit with his legal guardian/aunt.  He is coherent and is able to answer questions appropriately.\par He presents with a significant history of congenital hydrocephalus of which he has undergone multiple shunt surgeries and revision surgeries over the past several years\par He has been legally blind since the age of 16 months  from  complications of papilledema and his shunt surgeries..\par \par Today he is here for evaluation of his cervical spine.  His prominent symptoms include weakness in upper extremity and progressive gait difficulty.  He ambulates with 1 person assist .  He maintain his independence within the group home and is able to care for himself until recently.  Is having issues with fine and gross motor coordination and use of his hands.  This prompted comprehensive work-up which shows evidence of cervical spinal cord compression and myelomalacia\par At this point his quality of life is significantly impaired as well as his functional abilities\par \par \par Glaucoma in 30s\par Had Multiple Shunt Surgery but stable\par Scheduled for RIGHT eye Cataract surgery\par \par \par

## 2020-10-22 NOTE — REASON FOR VISIT
[Follow-Up: _____] : a [unfilled] follow-up visit [FreeTextEntry1] : Here today with new CAT scan cervical spine for cervical planning and review\par No change from his baseline neurological assessment

## 2020-10-22 NOTE — REVIEW OF SYSTEMS
[As Noted in HPI] : as noted in HPI [Poor Coordination] : poor coordination [Leg Weakness] : leg weakness [Numbness] : numbness [Tingling] : tingling [Difficulty Walking] : difficulty walking [Abnormal Sensation] : an abnormal sensation [Negative] : ENT [FreeTextEntry2] : Cranial deformity due to multiple shunt revision surgeries and developmental disability

## 2020-10-22 NOTE — PHYSICAL EXAM
[General Appearance - In No Acute Distress] : in no acute distress [General Appearance - Alert] : alert [Oriented To Time, Place, And Person] : oriented to person, place, and time [Cranial Nerves Optic (II)] : visual acuity intact bilaterally,  pupils equal round and reactive to light [Cranial Nerves Oculomotor (III)] : extraocular motion intact [Cranial Nerves Trigeminal (V)] : facial sensation intact symmetrically [Cranial Nerves Facial (VII)] : face symmetrical [Cranial Nerves Vestibulocochlear (VIII)] : hearing was intact bilaterally [Cranial Nerves Accessory (XI - Cranial And Spinal)] : head turning and shoulder shrug symmetric [Cranial Nerves Glossopharyngeal (IX)] : tongue and palate midline [Cranial Nerves Hypoglossal (XII)] : there was no tongue deviation with protrusion [3+] : Patella left 3+ [No Visual Abnormalities] : no visible abnormalities [Sclera] : the sclera and conjunctiva were normal [Neck Appearance] : the appearance of the neck was normal [] : no respiratory distress [Abdomen Soft] : soft [Skin Color & Pigmentation] : normal skin color and pigmentation [FreeTextEntry1] : Slow steady gait with one assist

## 2020-10-22 NOTE — ASSESSMENT
[FreeTextEntry1] : 50-year-old man with developmental disability lives in a group home status post multiple shunt surgeries presenting with cervical spinal cord compression\par \par See Dr. Randi Beard for surgical planning\par After detailed imaging review and patient examination surgical intervention was discussed with the patient to halt progression of symptoms. \par Potential surgical risks and benefits and alternative discussed with time allowed for questions and answers given. \par \par Dr Viramontes discussed in detail that loss of function preop is not guaranteed to return. Surgical intervention is to halt further progression although at times the process still continues despite intervention Risks could include but is not limited to infection, no resolution of symptoms/device failure; Paralysis, death\par .\par Pre-op requirements and postop recovery and expectations discussed regarding Follow up with  NP for wound care assessment and medication management.\par PREOP MEDICATIONS REVIEWED. PT will STOP Blood Thinners 7-10 Days prior to Surgery)\par Verbalized understanding of Pre-op Plan of care\par \par \par \par \par \par

## 2020-10-29 ENCOUNTER — NON-APPOINTMENT (OUTPATIENT)
Age: 50
End: 2020-10-29

## 2020-11-02 ENCOUNTER — OUTPATIENT (OUTPATIENT)
Dept: OUTPATIENT SERVICES | Facility: HOSPITAL | Age: 50
LOS: 1 days | End: 2020-11-02
Payer: MEDICARE

## 2020-11-02 ENCOUNTER — NON-APPOINTMENT (OUTPATIENT)
Age: 50
End: 2020-11-02

## 2020-11-02 VITALS
HEIGHT: 65 IN | WEIGHT: 158.95 LBS | TEMPERATURE: 98 F | OXYGEN SATURATION: 99 % | RESPIRATION RATE: 14 BRPM | HEART RATE: 66 BPM | SYSTOLIC BLOOD PRESSURE: 113 MMHG | DIASTOLIC BLOOD PRESSURE: 78 MMHG

## 2020-11-02 DIAGNOSIS — Z96.22 MYRINGOTOMY TUBE(S) STATUS: Chronic | ICD-10-CM

## 2020-11-02 DIAGNOSIS — T85.09XA OTHER MECHANICAL COMPLICATION OF VENTRICULAR INTRACRANIAL (COMMUNICATING) SHUNT, INITIAL ENCOUNTER: Chronic | ICD-10-CM

## 2020-11-02 DIAGNOSIS — Z01.818 ENCOUNTER FOR OTHER PREPROCEDURAL EXAMINATION: ICD-10-CM

## 2020-11-02 DIAGNOSIS — Z29.9 ENCOUNTER FOR PROPHYLACTIC MEASURES, UNSPECIFIED: ICD-10-CM

## 2020-11-02 DIAGNOSIS — G95.20 UNSPECIFIED CORD COMPRESSION: ICD-10-CM

## 2020-11-02 PROCEDURE — 87640 STAPH A DNA AMP PROBE: CPT

## 2020-11-02 PROCEDURE — G0463: CPT

## 2020-11-02 PROCEDURE — 87641 MR-STAPH DNA AMP PROBE: CPT

## 2020-11-02 RX ORDER — CLOBAZAM 10 MG/1
0.5 TABLET ORAL
Qty: 0 | Refills: 0 | DISCHARGE

## 2020-11-02 RX ORDER — CHLORHEXIDINE GLUCONATE 213 G/1000ML
1 SOLUTION TOPICAL ONCE
Refills: 0 | Status: DISCONTINUED | OUTPATIENT
Start: 2020-11-12 | End: 2020-11-17

## 2020-11-02 RX ORDER — LIDOCAINE HCL 20 MG/ML
0.2 VIAL (ML) INJECTION ONCE
Refills: 0 | Status: DISCONTINUED | OUTPATIENT
Start: 2020-11-12 | End: 2020-11-17

## 2020-11-02 RX ORDER — CEFAZOLIN SODIUM 1 G
2000 VIAL (EA) INJECTION ONCE
Refills: 0 | Status: DISCONTINUED | OUTPATIENT
Start: 2020-11-12 | End: 2020-11-17

## 2020-11-02 RX ORDER — PRIMIDONE 250 MG/1
1 TABLET ORAL
Qty: 0 | Refills: 0 | DISCHARGE

## 2020-11-02 RX ORDER — ESOMEPRAZOLE MAGNESIUM 40 MG/1
1 CAPSULE, DELAYED RELEASE ORAL
Qty: 0 | Refills: 0 | DISCHARGE

## 2020-11-02 NOTE — H&P PST ADULT - HISTORY OF PRESENT ILLNESS
Mr. Ferrara is a 50 year old man who lives in a group home with PMH hydrocephalus at age 2 months s/p  shunt with multiple revisions over the years, craniotomy many years later, mild mental retardation, glaucoma, legally blind, BPH, hiatal hernia, Mild PIERCE not on CPAP and epilepsy who has had c/o of weakness of upper extremities, difficulty with gross motor coordination of his hands and has been having progressive gait difficulty. He has been diagnosed with cervical spinal cord compression and myelomalacia now scheduled for C3-4-5 anterior cervical discectomy and fusion on 11/12/2020    COVID testing scheduled for 11/9/2020

## 2020-11-02 NOTE — H&P PST ADULT - NS PRO AD PATIENT TYPE

## 2020-11-02 NOTE — H&P PST ADULT - NSICDXPROBLEM_GEN_ALL_CORE_FT
PROBLEM DIAGNOSES  Problem: Unspecified cord compression  Assessment and Plan: Scheduled for C3-4-5 anterior cervical discectomy and fusion.  Chlorhexidine to affected area.  Preop instructions reviewed with Aunt/legal guardian, written down for group home, asked them to hydrate with more water day before surgery  MRSA/MSSA swabs pending.  Unable to draw blood at [facility], will order DOS stat, may need to use ultrasound for IV placement/phlebotomy.  Medical evaluation 11/10/2020  COVID is sched on 11/9/2020      Problem: Need for prophylactic measure  Assessment and Plan: The Caprini score indicates this patient is at risk for a VTE event (score 3-5).  Most surgical patients in this group would benefit from pharmacologic prophylaxis.  The surgical team will determine the balance between VTE risk and bleeding risk

## 2020-11-02 NOTE — H&P PST ADULT - NSICDXPASTMEDICALHX_GEN_ALL_CORE_FT
PAST MEDICAL HISTORY:  Aspiration pneumonia at age 25    BPH without obstruction/lower urinary tract symptoms     Elevated liver enzymes     Environmental Allergies     Epilepsy     Glaucoma on eye gtts    Glaucoma     Hiatal hernia     Hydrocephalus age 2 months    Impulse control disorder worsened when pt on Depakote, pt now off    Legally blind     Mild mental retardation     Osteoporosis     Sleep apnea not on CPAP    Viral pneumonia h/o

## 2020-11-02 NOTE — H&P PST ADULT - NSICDXPASTSURGICALHX_GEN_ALL_CORE_FT
PAST SURGICAL HISTORY:  Epilepsy s/p Vagal Nerve Stimulator Implant , 2007, last one in 2016    H/O craniotomy at age 18    Heel cord contracture s/p surgical correction b/l    History of tonsillectomy     Obstructed  shunt revision of  shunt 2015    S/p bilateral myringotomy with tube placement     S/P  Shunt originally had shunt to lung, then revised to peritoneal shunt, has had multiple revisions

## 2020-11-02 NOTE — H&P PST ADULT - ASSESSMENT
CAPRINI SCORE [CLOT]    AGE RELATED RISK FACTORS                                                       MOBILITY RELATED FACTORS  [x ] Age 41-60 years                                            (1 Point)                  [ ] Bed rest                                                        (1 Point)  [ ] Age: 61-74 years                                           (2 Points)                 [ ] Plaster cast                                                   (2 Points)  [ ] Age= 75 years                                              (3 Points)                 [ ] Bed bound for more than 72 hours                 (2 Points)    DISEASE RELATED RISK FACTORS                                               GENDER SPECIFIC FACTORS  [ ] Edema in the lower extremities                       (1 Point)                  [ ] Pregnancy                                                     (1 Point)  [ ] Varicose veins                                               (1 Point)                  [ ] Post-partum < 6 weeks                                   (1 Point)             [ x] BMI > 25 Kg/m2                                            (1 Point)                  [ ] Hormonal therapy  or oral contraception          (1 Point)                 [ ] Sepsis (in the previous month)                        (1 Point)                  [ ] History of pregnancy complications                 (1 point)  [ ] Pneumonia or serious lung disease                                               [ ] Unexplained or recurrent                     (1 Point)           (in the previous month)                               (1 Point)  [ ] Abnormal pulmonary function test                     (1 Point)                 SURGERY RELATED RISK FACTORS  [ ] Acute myocardial infarction                              (1 Point)                 [ ]  Section                                             (1 Point)  [ ] Congestive heart failure (in the previous month)  (1 Point)               [ ] Minor surgery                                                  (1 Point)   [ ] Inflammatory bowel disease                             (1 Point)                 [ ] Arthroscopic surgery                                        (2 Points)  [ ] Central venous access                                      (2 Points)                [x ] General surgery lasting more than 45 minutes   (2 Points)       [ ] Stroke (in the previous month)                          (5 Points)               [ ] Elective arthroplasty                                         (5 Points)                                                                                                                                               HEMATOLOGY RELATED FACTORS                                                 TRAUMA RELATED RISK FACTORS  [ ] Prior episodes of VTE                                     (3 Points)                 [ ] Fracture of the hip, pelvis, or leg                       (5 Points)  [ ] Positive family history for VTE                         (3 Points)                 [ ] Acute spinal cord injury (in the previous month)  (5 Points)  [ ] Prothrombin 06031 A                                     (3 Points)                 [ ] Paralysis  (less than 1 month)                             (5 Points)  [ ] Factor V Leiden                                             (3 Points)                  [ ] Multiple Trauma within 1 month                        (5 Points)  [ ] Lupus anticoagulants                                     (3 Points)                                                           [ ] Anticardiolipin antibodies                               (3 Points)                                                       [ ] High homocysteine in the blood                      (3 Points)                                             [ ] Other congenital or acquired thrombophilia      (3 Points)                                                [ ] Heparin induced thrombocytopenia                  (3 Points)                                          Total Score [   4       ]

## 2020-11-02 NOTE — H&P PST ADULT - NSICDXFAMILYHX_GEN_ALL_CORE_FT
FAMILY HISTORY:  Mother  Still living? Unknown  Family history of stroke, Age at diagnosis: Age Unknown

## 2020-11-02 NOTE — H&P PST ADULT - NEUROLOGICAL DETAILS
responds to pain/sensation intact/responds to verbal commands/alert and oriented x 3/strength decreased

## 2020-11-03 ENCOUNTER — NON-APPOINTMENT (OUTPATIENT)
Age: 50
End: 2020-11-03

## 2020-11-03 LAB
MRSA PCR RESULT.: SIGNIFICANT CHANGE UP
S AUREUS DNA NOSE QL NAA+PROBE: SIGNIFICANT CHANGE UP

## 2020-11-06 PROBLEM — H40.9 UNSPECIFIED GLAUCOMA: Chronic | Status: ACTIVE | Noted: 2020-11-02

## 2020-11-09 ENCOUNTER — OUTPATIENT (OUTPATIENT)
Dept: OUTPATIENT SERVICES | Facility: HOSPITAL | Age: 50
LOS: 1 days | End: 2020-11-09

## 2020-11-09 DIAGNOSIS — Z96.22 MYRINGOTOMY TUBE(S) STATUS: Chronic | ICD-10-CM

## 2020-11-09 DIAGNOSIS — T85.09XA OTHER MECHANICAL COMPLICATION OF VENTRICULAR INTRACRANIAL (COMMUNICATING) SHUNT, INITIAL ENCOUNTER: Chronic | ICD-10-CM

## 2020-11-09 DIAGNOSIS — Z11.59 ENCOUNTER FOR SCREENING FOR OTHER VIRAL DISEASES: ICD-10-CM

## 2020-11-09 LAB — SARS-COV-2 RNA SPEC QL NAA+PROBE: SIGNIFICANT CHANGE UP

## 2020-11-11 ENCOUNTER — APPOINTMENT (OUTPATIENT)
Dept: OTOLARYNGOLOGY | Facility: CLINIC | Age: 50
End: 2020-11-11
Payer: MEDICARE

## 2020-11-11 ENCOUNTER — TRANSCRIPTION ENCOUNTER (OUTPATIENT)
Age: 50
End: 2020-11-11

## 2020-11-11 VITALS
SYSTOLIC BLOOD PRESSURE: 104 MMHG | HEART RATE: 79 BPM | WEIGHT: 169 LBS | TEMPERATURE: 97.5 F | DIASTOLIC BLOOD PRESSURE: 65 MMHG | HEIGHT: 65 IN | BODY MASS INDEX: 28.16 KG/M2

## 2020-11-11 PROCEDURE — 99204 OFFICE O/P NEW MOD 45 MIN: CPT | Mod: 25,PD

## 2020-11-11 PROCEDURE — 31575 DIAGNOSTIC LARYNGOSCOPY: CPT | Mod: PD

## 2020-11-11 NOTE — CONSULT LETTER
[Dear  ___] : Dear  [unfilled], [Consult Letter:] : I had the pleasure of evaluating your patient, [unfilled]. [Please see my note below.] : Please see my note below. [Consult Closing:] : Thank you very much for allowing me to participate in the care of this patient.  If you have any questions, please do not hesitate to contact me. [Sincerely,] : Sincerely, [FreeTextEntry3] : Sherrie Beard MD\par Otolaryngology and Cranial Base Surgery\par Attending Physician - Department of Otolaryngology and Head & Neck Surgery\par [facility]\par  - [employer] [facility]\par Office: [phone]\par Fax: [phone]\par

## 2020-11-11 NOTE — PHYSICAL EXAM
[Midline] : trachea located in midline position [Normal] : no rashes [de-identified] : b/l palpable shunts.

## 2020-11-11 NOTE — PROCEDURE
[de-identified] : Flexible scope #29 used. Passed through nasal passage and nasopharynx/oropharynx/hypopharynx clear. Supraglottis normal. Glottis with fully mobile vocal cords without lesions or masses. Visualized subglottis clear. Postcricoid area without erythema or edema. No pooling of secretions.\par \par

## 2020-11-11 NOTE — ASSESSMENT
[FreeTextEntry1] : Going for ACDF with Dr. Viramontes 11/12/2020:\par - Vocal cords intact b/l \par - nonfunctional shunt on the left per his guardian and per imaging not continuous so will approach from this side\par

## 2020-11-11 NOTE — HISTORY OF PRESENT ILLNESS
ANTICOAGULATION FOLLOW-UP CLINIC VISIT    Patient Name:  Uli Champagne  Date:  11/16/2017  Contact Type:  Face to Face    SUBJECTIVE:     Patient Findings     Positives No Problem Findings           OBJECTIVE    INR Protime   Date Value Ref Range Status   11/16/2017 2.2 (A) 0.86 - 1.14 Final       ASSESSMENT / PLAN  INR assessment THER    Recheck INR In: 6 WEEKS    INR Location Clinic      Anticoagulation Summary as of 11/16/2017     INR goal 2.0-3.0   Today's INR 2.2   Maintenance plan 2.5 mg (5 mg x 0.5) on Tue, Sat; 5 mg (5 mg x 1) all other days   Full instructions 2.5 mg on Tue, Sat; 5 mg all other days   Weekly total 30 mg   No change documented Rui Landers RN   Plan last modified Rui Landers RN (3/31/2016)   Next INR check 12/28/2017   Target end date     Indications   Long-term (current) use of anticoagulants [Z79.01] [Z79.01]  Chronic a-fib (H) [I48.2]         Anticoagulation Episode Summary     INR check location     Preferred lab     Send INR reminders to [facility]    Comments       Anticoagulation Care Providers     Provider Role Specialty Phone number    Jakub Chester MD [facility] [phone]            See the Encounter Report to view Anticoagulation Flowsheet and Dosing Calendar (Go to Encounters tab in chart review, and find the Anticoagulation Therapy Visit)    Dosage adjustment made based on physician directed care plan.    Rui Landers RN                [de-identified] : 51 y/o M with Congenital hydrocephalis and S/p Multiple neck surgeries for shunt procedures.n  Notes has had Shunts b/l (first the left side which was then partially removed and most recently right sided shunt which is functional). Currently Shunt on the right is functional and left is nonfunctional.   Now going for ACDF with Dr. Viramontes.  \par He notes voice is good.  No trouble eating or drinking.

## 2020-11-12 ENCOUNTER — APPOINTMENT (OUTPATIENT)
Dept: SPINE | Facility: HOSPITAL | Age: 50
End: 2020-11-12
Payer: MEDICARE

## 2020-11-12 ENCOUNTER — APPOINTMENT (OUTPATIENT)
Dept: OTOLARYNGOLOGY | Facility: HOSPITAL | Age: 50
End: 2020-11-12

## 2020-11-12 ENCOUNTER — INPATIENT (INPATIENT)
Facility: HOSPITAL | Age: 50
LOS: 4 days | Discharge: HOME | DRG: 472 | End: 2020-11-17
Attending: NEUROLOGICAL SURGERY | Admitting: NEUROLOGICAL SURGERY
Payer: MEDICARE

## 2020-11-12 VITALS
HEART RATE: 85 BPM | RESPIRATION RATE: 18 BRPM | OXYGEN SATURATION: 96 % | DIASTOLIC BLOOD PRESSURE: 75 MMHG | HEIGHT: 65 IN | SYSTOLIC BLOOD PRESSURE: 114 MMHG | WEIGHT: 158.95 LBS | TEMPERATURE: 98 F

## 2020-11-12 DIAGNOSIS — Z96.22 MYRINGOTOMY TUBE(S) STATUS: Chronic | ICD-10-CM

## 2020-11-12 DIAGNOSIS — G95.20 UNSPECIFIED CORD COMPRESSION: ICD-10-CM

## 2020-11-12 DIAGNOSIS — T85.09XA OTHER MECHANICAL COMPLICATION OF VENTRICULAR INTRACRANIAL (COMMUNICATING) SHUNT, INITIAL ENCOUNTER: Chronic | ICD-10-CM

## 2020-11-12 LAB
ANION GAP SERPL CALC-SCNC: 10 MMOL/L — SIGNIFICANT CHANGE UP (ref 5–17)
ANION GAP SERPL CALC-SCNC: 8 MMOL/L — SIGNIFICANT CHANGE UP (ref 5–17)
BASOPHILS # BLD AUTO: 0.03 K/UL — SIGNIFICANT CHANGE UP (ref 0–0.2)
BASOPHILS NFR BLD AUTO: 0.5 % — SIGNIFICANT CHANGE UP (ref 0–2)
BLD GP AB SCN SERPL QL: POSITIVE — SIGNIFICANT CHANGE UP
BUN SERPL-MCNC: 11 MG/DL — SIGNIFICANT CHANGE UP (ref 7–23)
BUN SERPL-MCNC: 12 MG/DL — SIGNIFICANT CHANGE UP (ref 7–23)
CALCIUM SERPL-MCNC: 9.1 MG/DL — SIGNIFICANT CHANGE UP (ref 8.4–10.5)
CALCIUM SERPL-MCNC: 9.3 MG/DL — SIGNIFICANT CHANGE UP (ref 8.4–10.5)
CHLORIDE SERPL-SCNC: 105 MMOL/L — SIGNIFICANT CHANGE UP (ref 96–108)
CHLORIDE SERPL-SCNC: 108 MMOL/L — SIGNIFICANT CHANGE UP (ref 96–108)
CO2 SERPL-SCNC: 25 MMOL/L — SIGNIFICANT CHANGE UP (ref 22–31)
CO2 SERPL-SCNC: 29 MMOL/L — SIGNIFICANT CHANGE UP (ref 22–31)
CREAT SERPL-MCNC: 0.92 MG/DL — SIGNIFICANT CHANGE UP (ref 0.5–1.3)
CREAT SERPL-MCNC: 1.11 MG/DL — SIGNIFICANT CHANGE UP (ref 0.5–1.3)
EOSINOPHIL # BLD AUTO: 0.14 K/UL — SIGNIFICANT CHANGE UP (ref 0–0.5)
EOSINOPHIL NFR BLD AUTO: 2.3 % — SIGNIFICANT CHANGE UP (ref 0–6)
GLUCOSE SERPL-MCNC: 107 MG/DL — HIGH (ref 70–99)
GLUCOSE SERPL-MCNC: 86 MG/DL — SIGNIFICANT CHANGE UP (ref 70–99)
HCT VFR BLD CALC: 42.1 % — SIGNIFICANT CHANGE UP (ref 39–50)
HCT VFR BLD CALC: 44.7 % — SIGNIFICANT CHANGE UP (ref 39–50)
HGB BLD-MCNC: 13.7 G/DL — SIGNIFICANT CHANGE UP (ref 13–17)
HGB BLD-MCNC: 14.8 G/DL — SIGNIFICANT CHANGE UP (ref 13–17)
IMM GRANULOCYTES NFR BLD AUTO: 1 % — SIGNIFICANT CHANGE UP (ref 0–1.5)
LYMPHOCYTES # BLD AUTO: 0.82 K/UL — LOW (ref 1–3.3)
LYMPHOCYTES # BLD AUTO: 13.3 % — SIGNIFICANT CHANGE UP (ref 13–44)
MCHC RBC-ENTMCNC: 32 PG — SIGNIFICANT CHANGE UP (ref 27–34)
MCHC RBC-ENTMCNC: 32.2 PG — SIGNIFICANT CHANGE UP (ref 27–34)
MCHC RBC-ENTMCNC: 32.5 GM/DL — SIGNIFICANT CHANGE UP (ref 32–36)
MCHC RBC-ENTMCNC: 33.1 GM/DL — SIGNIFICANT CHANGE UP (ref 32–36)
MCV RBC AUTO: 96.5 FL — SIGNIFICANT CHANGE UP (ref 80–100)
MCV RBC AUTO: 99.1 FL — SIGNIFICANT CHANGE UP (ref 80–100)
MONOCYTES # BLD AUTO: 0.18 K/UL — SIGNIFICANT CHANGE UP (ref 0–0.9)
MONOCYTES NFR BLD AUTO: 2.9 % — SIGNIFICANT CHANGE UP (ref 2–14)
NEUTROPHILS # BLD AUTO: 4.94 K/UL — SIGNIFICANT CHANGE UP (ref 1.8–7.4)
NEUTROPHILS NFR BLD AUTO: 80 % — HIGH (ref 43–77)
NRBC # BLD: 0 /100 WBCS — SIGNIFICANT CHANGE UP (ref 0–0)
NRBC # BLD: 0 /100 WBCS — SIGNIFICANT CHANGE UP (ref 0–0)
PLATELET # BLD AUTO: 209 K/UL — SIGNIFICANT CHANGE UP (ref 150–400)
PLATELET # BLD AUTO: 226 K/UL — SIGNIFICANT CHANGE UP (ref 150–400)
POTASSIUM SERPL-MCNC: 4.3 MMOL/L — SIGNIFICANT CHANGE UP (ref 3.5–5.3)
POTASSIUM SERPL-MCNC: 4.5 MMOL/L — SIGNIFICANT CHANGE UP (ref 3.5–5.3)
POTASSIUM SERPL-SCNC: 4.3 MMOL/L — SIGNIFICANT CHANGE UP (ref 3.5–5.3)
POTASSIUM SERPL-SCNC: 4.5 MMOL/L — SIGNIFICANT CHANGE UP (ref 3.5–5.3)
RBC # BLD: 4.25 M/UL — SIGNIFICANT CHANGE UP (ref 4.2–5.8)
RBC # BLD: 4.63 M/UL — SIGNIFICANT CHANGE UP (ref 4.2–5.8)
RBC # FLD: 13.2 % — SIGNIFICANT CHANGE UP (ref 10.3–14.5)
RBC # FLD: 13.3 % — SIGNIFICANT CHANGE UP (ref 10.3–14.5)
RH IG SCN BLD-IMP: POSITIVE — SIGNIFICANT CHANGE UP
SODIUM SERPL-SCNC: 142 MMOL/L — SIGNIFICANT CHANGE UP (ref 135–145)
SODIUM SERPL-SCNC: 143 MMOL/L — SIGNIFICANT CHANGE UP (ref 135–145)
WBC # BLD: 5.68 K/UL — SIGNIFICANT CHANGE UP (ref 3.8–10.5)
WBC # BLD: 6.17 K/UL — SIGNIFICANT CHANGE UP (ref 3.8–10.5)
WBC # FLD AUTO: 5.68 K/UL — SIGNIFICANT CHANGE UP (ref 3.8–10.5)
WBC # FLD AUTO: 6.17 K/UL — SIGNIFICANT CHANGE UP (ref 3.8–10.5)

## 2020-11-12 PROCEDURE — 99231 SBSQ HOSP IP/OBS SF/LOW 25: CPT

## 2020-11-12 PROCEDURE — 22552 ARTHRD ANT NTRBD CERVICAL EA: CPT | Mod: 62

## 2020-11-12 PROCEDURE — 22853 INSJ BIOMECHANICAL DEVICE: CPT

## 2020-11-12 PROCEDURE — 86077 PHYS BLOOD BANK SERV XMATCH: CPT

## 2020-11-12 PROCEDURE — 22845 INSERT SPINE FIXATION DEVICE: CPT | Mod: 59

## 2020-11-12 PROCEDURE — 22551 ARTHRD ANT NTRBDY CERVICAL: CPT | Mod: 62

## 2020-11-12 RX ORDER — DEXAMETHASONE 0.5 MG/5ML
4 ELIXIR ORAL EVERY 6 HOURS
Refills: 0 | Status: DISCONTINUED | OUTPATIENT
Start: 2020-11-12 | End: 2020-11-16

## 2020-11-12 RX ORDER — ONDANSETRON 8 MG/1
4 TABLET, FILM COATED ORAL ONCE
Refills: 0 | Status: DISCONTINUED | OUTPATIENT
Start: 2020-11-12 | End: 2020-11-12

## 2020-11-12 RX ORDER — CEFAZOLIN SODIUM 1 G
2000 VIAL (EA) INJECTION EVERY 8 HOURS
Refills: 0 | Status: COMPLETED | OUTPATIENT
Start: 2020-11-12 | End: 2020-11-13

## 2020-11-12 RX ORDER — ACETAMINOPHEN 500 MG
650 TABLET ORAL EVERY 6 HOURS
Refills: 0 | Status: DISCONTINUED | OUTPATIENT
Start: 2020-11-12 | End: 2020-11-17

## 2020-11-12 RX ORDER — DORZOLAMIDE HYDROCHLORIDE TIMOLOL MALEATE 20; 5 MG/ML; MG/ML
1 SOLUTION/ DROPS OPHTHALMIC
Refills: 0 | Status: DISCONTINUED | OUTPATIENT
Start: 2020-11-12 | End: 2020-11-17

## 2020-11-12 RX ORDER — LORATADINE 10 MG/1
10 TABLET ORAL DAILY
Refills: 0 | Status: DISCONTINUED | OUTPATIENT
Start: 2020-11-12 | End: 2020-11-17

## 2020-11-12 RX ORDER — ESCITALOPRAM OXALATE 10 MG/1
10 TABLET, FILM COATED ORAL DAILY
Refills: 0 | Status: DISCONTINUED | OUTPATIENT
Start: 2020-11-12 | End: 2020-11-17

## 2020-11-12 RX ORDER — PSYLLIUM SEED (WITH DEXTROSE)
1 POWDER (GRAM) ORAL DAILY
Refills: 0 | Status: DISCONTINUED | OUTPATIENT
Start: 2020-11-12 | End: 2020-11-17

## 2020-11-12 RX ORDER — DIAZEPAM 5 MG
5 TABLET ORAL EVERY 8 HOURS
Refills: 0 | Status: DISCONTINUED | OUTPATIENT
Start: 2020-11-12 | End: 2020-11-17

## 2020-11-12 RX ORDER — CHOLECALCIFEROL (VITAMIN D3) 125 MCG
1000 CAPSULE ORAL DAILY
Refills: 0 | Status: DISCONTINUED | OUTPATIENT
Start: 2020-11-12 | End: 2020-11-17

## 2020-11-12 RX ORDER — LAMOTRIGINE 25 MG/1
300 TABLET, ORALLY DISINTEGRATING ORAL
Refills: 0 | Status: DISCONTINUED | OUTPATIENT
Start: 2020-11-12 | End: 2020-11-17

## 2020-11-12 RX ORDER — LATANOPROST 0.05 MG/ML
1 SOLUTION/ DROPS OPHTHALMIC; TOPICAL AT BEDTIME
Refills: 0 | Status: DISCONTINUED | OUTPATIENT
Start: 2020-11-12 | End: 2020-11-17

## 2020-11-12 RX ORDER — ENOXAPARIN SODIUM 100 MG/ML
40 INJECTION SUBCUTANEOUS DAILY
Refills: 0 | Status: DISCONTINUED | OUTPATIENT
Start: 2020-11-13 | End: 2020-11-17

## 2020-11-12 RX ORDER — ACETAMINOPHEN 500 MG
1000 TABLET ORAL ONCE
Refills: 0 | Status: DISCONTINUED | OUTPATIENT
Start: 2020-11-12 | End: 2020-11-17

## 2020-11-12 RX ORDER — DEXTROSE MONOHYDRATE, SODIUM CHLORIDE, AND POTASSIUM CHLORIDE 50; .745; 4.5 G/1000ML; G/1000ML; G/1000ML
1000 INJECTION, SOLUTION INTRAVENOUS
Refills: 0 | Status: DISCONTINUED | OUTPATIENT
Start: 2020-11-12 | End: 2020-11-13

## 2020-11-12 RX ORDER — SODIUM CHLORIDE 9 MG/ML
3 INJECTION INTRAMUSCULAR; INTRAVENOUS; SUBCUTANEOUS EVERY 8 HOURS
Refills: 0 | Status: DISCONTINUED | OUTPATIENT
Start: 2020-11-12 | End: 2020-11-12

## 2020-11-12 RX ORDER — HYDROMORPHONE HYDROCHLORIDE 2 MG/ML
0.25 INJECTION INTRAMUSCULAR; INTRAVENOUS; SUBCUTANEOUS
Refills: 0 | Status: DISCONTINUED | OUTPATIENT
Start: 2020-11-12 | End: 2020-11-12

## 2020-11-12 RX ORDER — PANTOPRAZOLE SODIUM 20 MG/1
40 TABLET, DELAYED RELEASE ORAL
Refills: 0 | Status: DISCONTINUED | OUTPATIENT
Start: 2020-11-12 | End: 2020-11-17

## 2020-11-12 RX ORDER — POLYETHYLENE GLYCOL 3350 17 G/17G
17 POWDER, FOR SOLUTION ORAL
Refills: 0 | Status: DISCONTINUED | OUTPATIENT
Start: 2020-11-12 | End: 2020-11-17

## 2020-11-12 RX ORDER — CLOBAZAM 10 MG/1
10 TABLET ORAL
Refills: 0 | Status: DISCONTINUED | OUTPATIENT
Start: 2020-11-12 | End: 2020-11-17

## 2020-11-12 RX ADMIN — Medication 4 MILLIGRAM(S): at 23:57

## 2020-11-12 RX ADMIN — DORZOLAMIDE HYDROCHLORIDE TIMOLOL MALEATE 1 DROP(S): 20; 5 SOLUTION/ DROPS OPHTHALMIC at 17:17

## 2020-11-12 RX ADMIN — POLYETHYLENE GLYCOL 3350 17 GRAM(S): 17 POWDER, FOR SOLUTION ORAL at 17:16

## 2020-11-12 RX ADMIN — LAMOTRIGINE 300 MILLIGRAM(S): 25 TABLET, ORALLY DISINTEGRATING ORAL at 17:16

## 2020-11-12 RX ADMIN — CLOBAZAM 10 MILLIGRAM(S): 10 TABLET ORAL at 17:16

## 2020-11-12 RX ADMIN — Medication 1 TABLET(S): at 17:15

## 2020-11-12 RX ADMIN — Medication 1000 UNIT(S): at 23:52

## 2020-11-12 RX ADMIN — Medication 1 DROP(S): at 23:57

## 2020-11-12 RX ADMIN — Medication 1 DROP(S): at 17:17

## 2020-11-12 RX ADMIN — Medication 650 MILLIGRAM(S): at 23:53

## 2020-11-12 RX ADMIN — Medication 100 MILLIGRAM(S): at 23:52

## 2020-11-12 RX ADMIN — Medication 4 MILLIGRAM(S): at 17:19

## 2020-11-12 RX ADMIN — LATANOPROST 1 DROP(S): 0.05 SOLUTION/ DROPS OPHTHALMIC; TOPICAL at 23:59

## 2020-11-12 RX ADMIN — Medication 1 TABLET(S): at 23:52

## 2020-11-12 NOTE — PROGRESS NOTE ADULT - ASSESSMENT
51 y/o male POD0 s/p C3-4-5 anterior cervical discectomy and fusion, seen in PACU, stable from ENT standpoint

## 2020-11-12 NOTE — PHYSICAL THERAPY INITIAL EVALUATION ADULT - LIVES WITH, PROFILE
Pt resides in group home, aunt/legal guardian at bedside reports patient was ambulating independently with no AD and occasional guidance provided by family/staff at group home. Pt able to perform ADLs independently however, pt with increasing need for assistance over the last 6 months secondary to pain from neck into the BUEs. Patients' Aunt reports there was no visitation at group home due to COVId-19 and she has not been able to be present to assist with organization of belongings.

## 2020-11-12 NOTE — BRIEF OPERATIVE NOTE - NSICDXBRIEFPOSTOP_GEN_ALL_CORE_FT
POST-OP DIAGNOSIS:  Stenosis of cervical spine with myelopathy 12-Nov-2020 08:00:43  Julio C Person

## 2020-11-12 NOTE — PHYSICAL THERAPY INITIAL EVALUATION ADULT - ACTIVE RANGE OF MOTION EXAMINATION, REHAB EVAL
bilateral lower extremity Active ROM was WNL (within normal limits)/B shoulders limited to 110 degrees flex

## 2020-11-12 NOTE — PHYSICAL THERAPY INITIAL EVALUATION ADULT - CRITERIA FOR SKILLED THERAPEUTIC INTERVENTIONS
rehab potential/risk reduction/prevention/predicted duration of therapy intervention/impairments found/functional limitations in following categories/therapy frequency/anticipated discharge recommendation

## 2020-11-12 NOTE — PHYSICAL THERAPY INITIAL EVALUATION ADULT - FOLLOWS COMMANDS/ANSWERS QUESTIONS, REHAB EVAL
75% of the time/able to follow multistep instructions able to follow single-step instructions/100% of the time

## 2020-11-12 NOTE — PROGRESS NOTE ADULT - SUBJECTIVE AND OBJECTIVE BOX
Pt examined at bedside in pacu postop    Exam: AOx2-3 w/ choices, blind bl, eyes dysconjugate, montaño ag strong, no clonus.

## 2020-11-12 NOTE — BRIEF OPERATIVE NOTE - NSICDXBRIEFPREOP_GEN_ALL_CORE_FT
PRE-OP DIAGNOSIS:  Stenosis of cervical spine with myelopathy 12-Nov-2020 08:00:35  Julio C Person

## 2020-11-12 NOTE — PHYSICAL THERAPY INITIAL EVALUATION ADULT - PLANNED THERAPY INTERVENTIONS, PT EVAL
I will SWITCH the dose or number of times a day I take the medications listed below when I get home from the hospital:  None balance training/bed mobility training/gait training/transfer training

## 2020-11-12 NOTE — PROGRESS NOTE ADULT - SUBJECTIVE AND OBJECTIVE BOX
ENT ISSUE/POD: POD0 s/p ACDF    HPI: 50 year old man with multiple medical problems including hydrocephalus, craniotomy many years later, mild mental retardation, glaucoma, legally blind  diagnosed with cervical spinal cord compression and myelomalacia. Patient is now POD0 s/p C3-4-5 anterior cervical discectomy and fusion. Seen in the pacu, tolerating PO intake         PAST MEDICAL & SURGICAL HISTORY:  Glaucoma  on eye gtts    BPH without obstruction/lower urinary tract symptoms    Viral pneumonia  h/o    Aspiration pneumonia  at age 25    Hiatal hernia    Mild mental retardation    Impulse control disorder  worsened when pt on Depakote, pt now off    Environmental Allergies    Elevated liver enzymes    Osteoporosis    Epilepsy    Sleep apnea  not on CPAP    Glaucoma    Legally blind    Hydrocephalus  age 2 months    Obstructed  shunt  revision of  shunt 2015    S/p bilateral myringotomy with tube placement    History of tonsillectomy    Heel cord contracture  s/p surgical correction b/l    H/O craniotomy  at age 18    Epilepsy  s/p Vagal Nerve Stimulator Implant , 2007, last one in 2016    S/P  Shunt  originally had shunt to lung, then revised to peritoneal shunt, has had multiple revisions      Allergies    codeine (Stomach Upset)  Depakote (Other)  seasonal allergies: nasal congestion (Other)    Intolerances      MEDICATIONS  (STANDING):  artificial  tears Solution 1 Drop(s) Both EYES four times a day  calcium carbonate 1250 mG  + Vitamin D (OsCal 500 + D) 1 Tablet(s) Oral three times a day  ceFAZolin   IVPB 2000 milliGRAM(s) IV Intermittent once  ceFAZolin   IVPB 2000 milliGRAM(s) IV Intermittent every 8 hours  chlorhexidine 2% Cloths 1 Application(s) Topical once  cholecalciferol 1000 Unit(s) Oral daily  cloBAZam 10 milliGRAM(s) Oral two times a day  dexAMETHasone  Injectable 4 milliGRAM(s) IV Push every 6 hours  dorzolamide 2%/timolol 0.5% Ophthalmic Solution 1 Drop(s) Both EYES two times a day  escitalopram 10 milliGRAM(s) Oral daily  lamoTRIgine 300 milliGRAM(s) Oral two times a day  latanoprost 0.005% Ophthalmic Solution 1 Drop(s) Both EYES at bedtime  lidocaine 1% Injectable 0.2 milliLiter(s) Local Injection once  loratadine 10 milliGRAM(s) Oral daily  pantoprazole    Tablet 40 milliGRAM(s) Oral before breakfast  polyethylene glycol 3350 17 Gram(s) Oral two times a day  psyllium Powder 1 Packet(s) Oral daily  sodium chloride 0.9% with potassium chloride 20 mEq/L 1000 milliLiter(s) (75 mL/Hr) IV Continuous <Continuous>    MEDICATIONS  (PRN):  acetaminophen   Tablet .. 650 milliGRAM(s) Oral every 6 hours PRN Temp greater or equal to 38C (100.4F), Mild Pain (1 - 3)  acetaminophen  IVPB .. 1000 milliGRAM(s) IV Intermittent once PRN Severe Pain (7 - 10)  diazepam    Tablet 5 milliGRAM(s) Oral every 8 hours PRN spasm  HYDROmorphone  Injectable 0.25 milliGRAM(s) IV Push every 10 minutes PRN Moderate Pain (4 - 6)  ondansetron Injectable 4 milliGRAM(s) IV Push once PRN Nausea and/or Vomiting      Vital Signs Last 24 Hrs  T(C): 36.2 (12 Nov 2020 12:10), Max: 36.4 (12 Nov 2020 05:44)  T(F): 97.2 (12 Nov 2020 12:10), Max: 97.5 (12 Nov 2020 05:44)  HR: 87 (12 Nov 2020 20:00) (85 - 123)  BP: 122/76 (12 Nov 2020 20:00) (114/75 - 153/86)  BP(mean): 94 (12 Nov 2020 20:00) (94 - 113)  RR: 16 (12 Nov 2020 20:00) (16 - 18)  SpO2: 95% (12 Nov 2020 20:00) (94% - 99%)                          14.8   6.17  )-----------( 226      ( 12 Nov 2020 12:54 )             44.7    11-12    143  |  108  |  11  ----------------------------<  107<H>  4.5   |  25  |  0.92    Ca    9.1      12 Nov 2020 12:54         PHYSICAL EXAM:  Gen: NAD  Skin: No rashes, bruises, or lesions  Head: Normocephalic, Atraumatic  Face: no edema, erythema, or fluctuance. Parotid glands soft without mass  Eyes: no scleral injection  Nose: Nares bilaterally patent, no discharge  Mouth: No Stridor / Drooling / Trismus.  Mucosa moist, tongue/uvula midline, oropharynx clear  Neck: Dressing C/D/I, drain in place. No signs of infection/erythema or oozing   Lymphatic: No lymphadenopathy  Resp: breathing easily, no stridor  Neuro: facial nerve intact, no facial droop

## 2020-11-12 NOTE — PROGRESS NOTE ADULT - ASSESSMENT
51YO M s/p C3-5 ACDF w/ ent assistance for approach (Dr. Beard), doing well postop. Exam: AOx2-3 w/ choices, blind bl, eyes dysconjugate, montaño ag strong, no clonus. Incision c/d/i, no e/o hematoma or local neck swelling    - PACU 6h then floor  - HMV x1  - castro  - CT C spine in AM  - q4h neuro checks  - pt/ot  - pain control   - jose to dc before floor

## 2020-11-12 NOTE — PHYSICAL THERAPY INITIAL EVALUATION ADULT - PERTINENT HX OF CURRENT PROBLEM, REHAB EVAL
Pt is a 50 year old man who lives in a group home with PMH hydrocephalus at age 2 months s/p  shunt with multiple revisions over the years, craniotomy many years later, mild mental retardation, glaucoma, legally blind, BPH, hiatal hernia, Mild PIERCE not on CPAP and epilepsy who has had c/o of weakness of upper extremities, difficulty with gross motor coordination of his hands and has been having progressive gait difficulty.

## 2020-11-12 NOTE — PHYSICAL THERAPY INITIAL EVALUATION ADULT - ADDITIONAL COMMENTS
As per pt, PTA pt lived in group home, independent with ADLs/mobility without AD, no stairs to negotiate.

## 2020-11-13 ENCOUNTER — TRANSCRIPTION ENCOUNTER (OUTPATIENT)
Age: 50
End: 2020-11-13

## 2020-11-13 LAB
ANION GAP SERPL CALC-SCNC: 12 MMOL/L — SIGNIFICANT CHANGE UP (ref 5–17)
BASOPHILS # BLD AUTO: 0.02 K/UL — SIGNIFICANT CHANGE UP (ref 0–0.2)
BASOPHILS NFR BLD AUTO: 0.2 % — SIGNIFICANT CHANGE UP (ref 0–2)
BUN SERPL-MCNC: 12 MG/DL — SIGNIFICANT CHANGE UP (ref 7–23)
CALCIUM SERPL-MCNC: 9.3 MG/DL — SIGNIFICANT CHANGE UP (ref 8.4–10.5)
CHLORIDE SERPL-SCNC: 102 MMOL/L — SIGNIFICANT CHANGE UP (ref 96–108)
CO2 SERPL-SCNC: 24 MMOL/L — SIGNIFICANT CHANGE UP (ref 22–31)
CREAT SERPL-MCNC: 0.85 MG/DL — SIGNIFICANT CHANGE UP (ref 0.5–1.3)
EOSINOPHIL # BLD AUTO: 0.01 K/UL — SIGNIFICANT CHANGE UP (ref 0–0.5)
EOSINOPHIL NFR BLD AUTO: 0.1 % — SIGNIFICANT CHANGE UP (ref 0–6)
GLUCOSE SERPL-MCNC: 131 MG/DL — HIGH (ref 70–99)
HCT VFR BLD CALC: 44.5 % — SIGNIFICANT CHANGE UP (ref 39–50)
HGB BLD-MCNC: 14.8 G/DL — SIGNIFICANT CHANGE UP (ref 13–17)
IMM GRANULOCYTES NFR BLD AUTO: 0.7 % — SIGNIFICANT CHANGE UP (ref 0–1.5)
LYMPHOCYTES # BLD AUTO: 1.18 K/UL — SIGNIFICANT CHANGE UP (ref 1–3.3)
LYMPHOCYTES # BLD AUTO: 11.3 % — LOW (ref 13–44)
MCHC RBC-ENTMCNC: 32 PG — SIGNIFICANT CHANGE UP (ref 27–34)
MCHC RBC-ENTMCNC: 33.3 GM/DL — SIGNIFICANT CHANGE UP (ref 32–36)
MCV RBC AUTO: 96.1 FL — SIGNIFICANT CHANGE UP (ref 80–100)
MONOCYTES # BLD AUTO: 0.92 K/UL — HIGH (ref 0–0.9)
MONOCYTES NFR BLD AUTO: 8.8 % — SIGNIFICANT CHANGE UP (ref 2–14)
NEUTROPHILS # BLD AUTO: 8.24 K/UL — HIGH (ref 1.8–7.4)
NEUTROPHILS NFR BLD AUTO: 78.9 % — HIGH (ref 43–77)
NRBC # BLD: 0 /100 WBCS — SIGNIFICANT CHANGE UP (ref 0–0)
PLATELET # BLD AUTO: 263 K/UL — SIGNIFICANT CHANGE UP (ref 150–400)
POTASSIUM SERPL-MCNC: 4.4 MMOL/L — SIGNIFICANT CHANGE UP (ref 3.5–5.3)
POTASSIUM SERPL-SCNC: 4.4 MMOL/L — SIGNIFICANT CHANGE UP (ref 3.5–5.3)
RBC # BLD: 4.63 M/UL — SIGNIFICANT CHANGE UP (ref 4.2–5.8)
RBC # FLD: 13.4 % — SIGNIFICANT CHANGE UP (ref 10.3–14.5)
SODIUM SERPL-SCNC: 138 MMOL/L — SIGNIFICANT CHANGE UP (ref 135–145)
WBC # BLD: 10.44 K/UL — SIGNIFICANT CHANGE UP (ref 3.8–10.5)
WBC # FLD AUTO: 10.44 K/UL — SIGNIFICANT CHANGE UP (ref 3.8–10.5)

## 2020-11-13 PROCEDURE — 93970 EXTREMITY STUDY: CPT | Mod: 26

## 2020-11-13 PROCEDURE — 72125 CT NECK SPINE W/O DYE: CPT | Mod: 26

## 2020-11-13 RX ORDER — OXYCODONE HYDROCHLORIDE 5 MG/1
5 TABLET ORAL ONCE
Refills: 0 | Status: DISCONTINUED | OUTPATIENT
Start: 2020-11-13 | End: 2020-11-14

## 2020-11-13 RX ORDER — ONDANSETRON 8 MG/1
4 TABLET, FILM COATED ORAL ONCE
Refills: 0 | Status: COMPLETED | OUTPATIENT
Start: 2020-11-13 | End: 2020-11-14

## 2020-11-13 RX ORDER — SENNA PLUS 8.6 MG/1
2 TABLET ORAL AT BEDTIME
Refills: 0 | Status: DISCONTINUED | OUTPATIENT
Start: 2020-11-13 | End: 2020-11-17

## 2020-11-13 RX ADMIN — Medication 4 MILLIGRAM(S): at 20:00

## 2020-11-13 RX ADMIN — DORZOLAMIDE HYDROCHLORIDE TIMOLOL MALEATE 1 DROP(S): 20; 5 SOLUTION/ DROPS OPHTHALMIC at 05:26

## 2020-11-13 RX ADMIN — Medication 1 DROP(S): at 14:24

## 2020-11-13 RX ADMIN — Medication 1 DROP(S): at 05:26

## 2020-11-13 RX ADMIN — Medication 1 TABLET(S): at 05:30

## 2020-11-13 RX ADMIN — LORATADINE 10 MILLIGRAM(S): 10 TABLET ORAL at 14:24

## 2020-11-13 RX ADMIN — LATANOPROST 1 DROP(S): 0.05 SOLUTION/ DROPS OPHTHALMIC; TOPICAL at 21:08

## 2020-11-13 RX ADMIN — POLYETHYLENE GLYCOL 3350 17 GRAM(S): 17 POWDER, FOR SOLUTION ORAL at 17:37

## 2020-11-13 RX ADMIN — Medication 1 TABLET(S): at 14:24

## 2020-11-13 RX ADMIN — Medication 4 MILLIGRAM(S): at 05:30

## 2020-11-13 RX ADMIN — Medication 1 DROP(S): at 17:37

## 2020-11-13 RX ADMIN — Medication 1 TABLET(S): at 21:08

## 2020-11-13 RX ADMIN — Medication 1000 UNIT(S): at 14:24

## 2020-11-13 RX ADMIN — SENNA PLUS 2 TABLET(S): 8.6 TABLET ORAL at 21:08

## 2020-11-13 RX ADMIN — Medication 650 MILLIGRAM(S): at 21:40

## 2020-11-13 RX ADMIN — POLYETHYLENE GLYCOL 3350 17 GRAM(S): 17 POWDER, FOR SOLUTION ORAL at 05:27

## 2020-11-13 RX ADMIN — Medication 100 MILLIGRAM(S): at 05:30

## 2020-11-13 RX ADMIN — PANTOPRAZOLE SODIUM 40 MILLIGRAM(S): 20 TABLET, DELAYED RELEASE ORAL at 05:28

## 2020-11-13 RX ADMIN — Medication 4 MILLIGRAM(S): at 14:23

## 2020-11-13 RX ADMIN — CLOBAZAM 10 MILLIGRAM(S): 10 TABLET ORAL at 05:26

## 2020-11-13 RX ADMIN — DORZOLAMIDE HYDROCHLORIDE TIMOLOL MALEATE 1 DROP(S): 20; 5 SOLUTION/ DROPS OPHTHALMIC at 17:37

## 2020-11-13 RX ADMIN — LAMOTRIGINE 300 MILLIGRAM(S): 25 TABLET, ORALLY DISINTEGRATING ORAL at 17:36

## 2020-11-13 RX ADMIN — ESCITALOPRAM OXALATE 10 MILLIGRAM(S): 10 TABLET, FILM COATED ORAL at 14:24

## 2020-11-13 RX ADMIN — ENOXAPARIN SODIUM 40 MILLIGRAM(S): 100 INJECTION SUBCUTANEOUS at 14:23

## 2020-11-13 RX ADMIN — LAMOTRIGINE 300 MILLIGRAM(S): 25 TABLET, ORALLY DISINTEGRATING ORAL at 05:27

## 2020-11-13 RX ADMIN — Medication 650 MILLIGRAM(S): at 00:23

## 2020-11-13 RX ADMIN — Medication 1 PACKET(S): at 14:24

## 2020-11-13 RX ADMIN — CLOBAZAM 10 MILLIGRAM(S): 10 TABLET ORAL at 17:36

## 2020-11-13 RX ADMIN — Medication 650 MILLIGRAM(S): at 21:10

## 2020-11-13 NOTE — PROGRESS NOTE ADULT - SUBJECTIVE AND OBJECTIVE BOX
ENT ISSUE/POD:  POD1 s/p ACDF    HPI: 50 year old man with multiple medical problems including hydrocephalus, craniotomy many years later, mild mental retardation, glaucoma, legally blind  diagnosed with cervical spinal cord compression and myelomalacia. Patient is now POD1 s/p C3-4-5 anterior cervical discectomy and fusion. Seen and examined, tolerating PO intake, no hoarseness.             PAST MEDICAL & SURGICAL HISTORY:  Glaucoma  on eye gtts    BPH without obstruction/lower urinary tract symptoms    Viral pneumonia  h/o    Aspiration pneumonia  at age 25    Hiatal hernia    Mild mental retardation    Impulse control disorder  worsened when pt on Depakote, pt now off    Environmental Allergies    Elevated liver enzymes    Osteoporosis    Epilepsy    Sleep apnea  not on CPAP    Glaucoma    Legally blind    Hydrocephalus  age 2 months    Obstructed  shunt  revision of  shunt 2015    S/p bilateral myringotomy with tube placement    History of tonsillectomy    Heel cord contracture  s/p surgical correction b/l    H/O craniotomy  at age 18    Epilepsy  s/p Vagal Nerve Stimulator Implant , 2007, last one in 2016    S/P  Shunt  originally had shunt to lung, then revised to peritoneal shunt, has had multiple revisions      Allergies    codeine (Stomach Upset)  Depakote (Other)  seasonal allergies: nasal congestion (Other)    Intolerances      MEDICATIONS  (STANDING):  artificial  tears Solution 1 Drop(s) Both EYES four times a day  calcium carbonate 1250 mG  + Vitamin D (OsCal 500 + D) 1 Tablet(s) Oral three times a day  ceFAZolin   IVPB 2000 milliGRAM(s) IV Intermittent once  chlorhexidine 2% Cloths 1 Application(s) Topical once  cholecalciferol 1000 Unit(s) Oral daily  cloBAZam 10 milliGRAM(s) Oral two times a day  dexAMETHasone  Injectable 4 milliGRAM(s) IV Push every 6 hours  dorzolamide 2%/timolol 0.5% Ophthalmic Solution 1 Drop(s) Both EYES two times a day  enoxaparin Injectable 40 milliGRAM(s) SubCutaneous daily  escitalopram 10 milliGRAM(s) Oral daily  lamoTRIgine 300 milliGRAM(s) Oral two times a day  latanoprost 0.005% Ophthalmic Solution 1 Drop(s) Both EYES at bedtime  lidocaine 1% Injectable 0.2 milliLiter(s) Local Injection once  loratadine 10 milliGRAM(s) Oral daily  pantoprazole    Tablet 40 milliGRAM(s) Oral before breakfast  polyethylene glycol 3350 17 Gram(s) Oral two times a day  psyllium Powder 1 Packet(s) Oral daily  sodium chloride 0.9% with potassium chloride 20 mEq/L 1000 milliLiter(s) (75 mL/Hr) IV Continuous <Continuous>    MEDICATIONS  (PRN):  acetaminophen   Tablet .. 650 milliGRAM(s) Oral every 6 hours PRN Temp greater or equal to 38C (100.4F), Mild Pain (1 - 3)  acetaminophen  IVPB .. 1000 milliGRAM(s) IV Intermittent once PRN Severe Pain (7 - 10)  diazepam    Tablet 5 milliGRAM(s) Oral every 8 hours PRN spasm      Social History: see consult    Family history: see consult    ROS:   ENT: all negative except as noted in HPI   Pulm: denies SOB, cough, hemoptysis  Neuro: denies numbness/tingling, loss of sensation  Endo: denies heat/cold intolerance, excessive sweating      Vital Signs Last 24 Hrs  T(C): 36.7 (13 Nov 2020 04:50), Max: 36.7 (13 Nov 2020 04:50)  T(F): 98 (13 Nov 2020 04:50), Max: 98 (13 Nov 2020 04:50)  HR: 90 (13 Nov 2020 04:50) (84 - 123)  BP: 138/81 (13 Nov 2020 04:50) (114/75 - 153/86)  BP(mean): 94 (12 Nov 2020 20:00) (94 - 113)  RR: 18 (13 Nov 2020 04:50) (16 - 19)  SpO2: 94% (13 Nov 2020 04:50) (94% - 99%)                          14.8   6.17  )-----------( 226      ( 12 Nov 2020 12:54 )             44.7    11-12    143  |  108  |  11  ----------------------------<  107<H>  4.5   |  25  |  0.92    Ca    9.1      12 Nov 2020 12:54         PHYSICAL EXAM:  Gen: NAD  Skin: No rashes, bruises, or lesions  Head: Normocephalic, Atraumatic  Face: no edema, erythema, or fluctuance. Parotid glands soft without mass  Eyes: no scleral injection  Nose: Nares bilaterally patent, no discharge  Mouth: No Stridor / Drooling / Trismus.  Mucosa moist, tongue/uvula midline, oropharynx clear  Neck: Dressing C/D/I, drain in place. No signs of infection/erythema or oozing, soft, no hematoma  Lymphatic: No lymphadenopathy  Resp: breathing easily, no stridor  Neuro: facial nerve intact, no facial droop

## 2020-11-13 NOTE — DISCHARGE NOTE PROVIDER - NSDCFUADDINST_GEN_ALL_CORE_FT
Keep Incision Clean and Dry. May shower post op day 5. pat dry after. no creams or lotions on incision. No immersion baths.   NO heavy lifting, strenous activity, twisting, bending, driving, or working until cleared by your physician.  Please return to the emergency department if you develop changes in mental status, seizures, fainting, dizziness, changes in vision, lethargy, nausea, vomiting, chest pain, shortness of breathe or severe pain.  please keep incision clean and dry, do not submerge wound in water for prolonged periods of time, pat dry after showering, and do not use any creams or ointments to incision.

## 2020-11-13 NOTE — DISCHARGE NOTE PROVIDER - NSDCCPCAREPLAN_GEN_ALL_CORE_FT
PRINCIPAL DISCHARGE DIAGNOSIS  Diagnosis: Unspecified cord compression  Assessment and Plan of Treatment: 11/12 s/p C3-5 anterior cervical discectomy and fusion   Please follow up Neurosurgeon in one week. Please call office to make appointment. Please follow up with Primary Care Physician. Please call office to make appointment.

## 2020-11-13 NOTE — CHART NOTE - NSCHARTNOTEFT_GEN_A_CORE
CAPRINI SCORE [CLOT] Score on Admission for     AGE RELATED RISK FACTORS                                                       MOBILITY RELATED FACTORS  [x ] Age 41-60 years                                            (1 Point)                  [ ] Bed rest                                                        (1 Point)  [ ] Age: 61-74 years                                           (2 Points)                 [ ] Plaster cast                                                   (2 Points)  [ ] Age= 75 years                                              (3 Points)                 [ ] Bed bound for more than 72 hours                 (2 Points)    DISEASE RELATED RISK FACTORS                                               GENDER SPECIFIC FACTORS  [ ] Edema in the lower extremities                       (1 Point)                  [ ] Pregnancy                                                     (1 Point)  [ ] Varicose veins                                               (1 Point)                  [ ] Post-partum < 6 weeks                                   (1 Point)             [ x] BMI > 25 Kg/m2                                            (1 Point)                  [ ] Hormonal therapy  or oral contraception          (1 Point)                 [ ] Sepsis (in the previous month)                        (1 Point)                  [ ] History of pregnancy complications                 (1 point)  [ ] Pneumonia or serious lung disease                                               [ ] Unexplained or recurrent                     (1 Point)           (in the previous month)                               (1 Point)  [ ] Abnormal pulmonary function test                     (1 Point)                 SURGERY RELATED RISK FACTORS (include planned surgeries)  [ ] Acute myocardial infarction                              (1 Point)                 [ ]  Section                                             (1 Point)  [ ] Congestive heart failure (in the previous month)  (1 Point)         [ ] Minor surgery                                                  (1 Point)   [ ] Inflammatory bowel disease                             (1 Point)                 [ ] Arthroscopic surgery                                        (2 Points)  [ ] Central venous access                                      (2 Points)                [x ] General surgery lasting more than 45 minutes   (2 Points)       [ ] Stroke (in the previous month)                          (5 Points)               [ ] Elective arthroplasty                                         (5 Points)            [ ] current or past malignancy                              (2 Points)                                                                                                       HEMATOLOGY RELATED FACTORS                                                 TRAUMA RELATED RISK FACTORS  [ ] Prior episodes of VTE                                     (3 Points)                [ ] Fracture of the hip, pelvis, or leg                       (5 Points)  [ ] Positive family history for VTE                         (3 Points)                 [ ] Acute spinal cord injury (in the previous month)  (5 Points)  [ ] Prothrombin 54692 A                                     (3 Points)                 [ ] Paralysis  (less than 1 month)                             (5 Points)  [ ] Factor V Leiden                                             (3 Points)                  [ ] Multiple Trauma within 1 month                        (5 Points)  [ ] Lupus anticoagulants                                     (3 Points)                                                           [ ] Anticardiolipin antibodies                               (3 Points)                                                       [ ] High homocysteine in the blood                      (3 Points)                                             [ ] Other congenital or acquired thrombophilia      (3 Points)                                                [ ] Heparin induced thrombocytopenia                  (3 Points)                                          Total Score [       4   ]    Risk:  Very low 0   Low 1 to 2   Moderate 3 to 4   High =5       VTE Prophylasix Recommednations:  [x ] mechanical pneumatic compression devices                                      [ ] contraindicated: _____________________  [x ] chemo prophylasix                                                                                   [ ] contraindicated _____________________    **** HIGH LIKELIHOOD DVT PRESENT ON ADMISSION  [ ] (please order LE dopplers within 24 hours of admission)

## 2020-11-13 NOTE — OCCUPATIONAL THERAPY INITIAL EVALUATION ADULT - TRANSFER TRAINING, PT EVAL
GOAL: Pt will perform all functional transfers during ADLs Independently, with supervision as needed, in 4 weeks

## 2020-11-13 NOTE — OCCUPATIONAL THERAPY INITIAL EVALUATION ADULT - PERTINENT HX OF CURRENT PROBLEM, REHAB EVAL
50 year old man with multiple medical problems including hydrocephalus, craniotomy many years later, mild mental retardation, glaucoma, legally blind  diagnosed with cervical spinal cord compression and myelomalacia. Patient is now POD1 s/p C3-4-5 anterior cervical discectomy and fusion.

## 2020-11-13 NOTE — PROGRESS NOTE ADULT - ASSESSMENT
HPI:  Mr. Ferrara is a 50 year old man who lives in a group home with PMH hydrocephalus at age 2 months s/p  shunt with multiple revisions over the years, craniotomy many years later, mild mental retardation, glaucoma, legally blind, BPH, hiatal hernia, Mild PIERCE not on CPAP and epilepsy who has had c/o of weakness of upper extremities, difficulty with gross motor coordination of his hands and has been having progressive gait difficulty. He has been diagnosed with cervical spinal cord compression and myelomalacia now scheduled for C3-4-5 anterior cervical discectomy and fusion on 11/12/2020    COVID testing scheduled for 11/9/2020   (02 Nov 2020 17:36)    PROCEDURE: S/p C3-5 Anterior cervical discectomy with fusion (ACDF)  POD# 1    PLAN:  - neuro and vital check Q4hrs  - post op CT cervical spine this am revealed hardware in good position  - pain control with tylenol PRN, valium prn for muscle spasm  - continue monitor drain output  - continue decadron 4mg Q6hrs  - h/o seizure disorder, continue home dose onfi and lamictal   - glaucoma, continue Cosopt and Xalatan eye drops  - tolerating regular diet, senna and miralax for bowel regimens  - activity increase as tolerated  - incentive spirometer  - DVT ppx: b/l SCDs and SQL  Discharge planning: PT/OT- home PT/OT    will discuss above with Dr. Wander jordan [id number] HPI:  Mr. Ferrara is a 50 year old man who lives in a group home with PMH hydrocephalus at age 2 months s/p  shunt with multiple revisions over the years, craniotomy many years later, mild mental retardation, glaucoma, legally blind, BPH, hiatal hernia, Mild PIERCE not on CPAP and epilepsy who has had c/o of weakness of upper extremities, difficulty with gross motor coordination of his hands and has been having progressive gait difficulty. He has been diagnosed with cervical spinal cord compression and myelomalacia now scheduled for C3-4-5 anterior cervical discectomy and fusion on 11/12/2020    COVID testing scheduled for 11/9/2020   (02 Nov 2020 17:36)    PROCEDURE: S/p C3-5 Anterior cervical discectomy with fusion (ACDF)  POD# 1    PLAN:  - neuro and vital check Q4hrs  - post op CT cervical spine this am revealed hardware in good position  - pain control with tylenol PRN, valium prn for muscle spasm  - continue monitor drain output  - continue decadron 4mg Q6hrs  - h/o seizure disorder, continue home dose onfi and lamictal   - glaucoma, continue Cosopt and Xalatan eye drops  - will d/c castro today, f/u trial of void  - tolerating regular diet, IVL today, senna and miralax for bowel regimens  - activity increase as tolerated  - incentive spirometer  - DVT ppx: b/l SCDs and SQL  Discharge planning: PT/OT- home PT/OT    will discuss above with Dr. Wander jordan [id number]

## 2020-11-13 NOTE — OCCUPATIONAL THERAPY INITIAL EVALUATION ADULT - LIVES WITH, PROFILE
· Lives With: Pt resides in group home, aunt/legal guardian at bedside reports patient was ambulating independently with no AD and occasional guidance provided by family/staff at group home. Pt able to perform ADLs independently however, pt with increasing need for assistance over the last 6 months secondary to pain from neck into the BUEs. Patients' Aunt reports there was no visitation at group home due to COVId-19 and she has not been able to be present to assist with organization of belongings. General supervision provided 2/2 blindness as per aunt.

## 2020-11-13 NOTE — DISCHARGE NOTE PROVIDER - NSDCACTIVITY_GEN_ALL_CORE
Walking - Outdoors allowed/Stairs allowed/Walking - Indoors allowed/Do not make important decisions/No heavy lifting/straining/Showering allowed

## 2020-11-13 NOTE — DISCHARGE NOTE PROVIDER - NSDCMRMEDTOKEN_GEN_ALL_CORE_FT
amoxicillin 500 mg oral tablet: 4 tab(s) orally 1 hour before dental procedures  cetirizine 10 mg oral tablet: 1 tab(s) orally once a day in the pm  cloBAZam 10 mg oral tablet: 1 tab(s) orally 2 times a day  Debrox 6.5% otic solution: 10 drop(s) to each ear for the first week of every month  dorzolamide-timolol 2.23%-0.68% ophthalmic solution: 1 drop(s) to both eyes 2 times a day  escitalopram 10 mg oral tablet: 1 tab(s) orally once a day in the am  escitalopram 10 mg oral tablet: 1 tab(s) orally once a day  fluticasone 0.5 mg/2 mL inhalation suspension: 1 spray(s) inhaled once a day  ibuprofen 600 mg oral tablet: 1 tab(s) orally every 6 hours, As Needed  ketoconazole 2% topical cream: Apply topically to affected area 2 times a day. ( scalp , forehead and eyebrows )  lamoTRIgine 100 mg oral tablet, extended release: 3 tab(s) orally 2 times a day  Metamucil 3.4 g/5.2 g oral powder for reconstitution: orally once a day  omeprazole 40 mg oral delayed release capsule: 1 cap(s) orally 2 times a day  Oyster Shell Calcium with Vitamin D 500 mg-200 intl units oral tablet: 1 tab(s) orally 3 times a day  polyethylene glycol 3350 oral powder for reconstitution: orally 2 times a day  Refresh Optive Advanced ophthalmic solution: 1 drop(s) to each eye 4 times a day  Travatan 0.004% ophthalmic solution: 1 drop(s) to each eye once a day (in the evening)  Vitamin D3 1000 intl units oral capsule: 1 cap(s) orally once a day in the am   acetaminophen 325 mg oral tablet: 2 tab(s) orally every 6 hours, As needed, Temp greater or equal to 38C (100.4F), Mild Pain (1 - 3)  cetirizine 10 mg oral tablet: 1 tab(s) orally once a day in the pm  cloBAZam 10 mg oral tablet: 1 tab(s) orally 2 times a day MDD:2  Debrox 6.5% otic solution: 10 drop(s) to each ear for the first week of every month  diazePAM 5 mg oral tablet: 1 tab(s) orally every 8 hours, As needed, spasm MDD:3  dorzolamide-timolol 2.23%-0.68% ophthalmic solution: 1 drop(s) to both eyes 2 times a day  escitalopram 10 mg oral tablet: 1 tab(s) orally once a day  fluticasone 0.5 mg/2 mL inhalation suspension: 1 spray(s) inhaled once a day  ketoconazole 2% topical cream: Apply topically to affected area 2 times a day. ( scalp , forehead and eyebrows )  lamoTRIgine 100 mg oral tablet, extended release: 3 tab(s) orally 2 times a day MDD:2  Metamucil 3.4 g/5.2 g oral powder for reconstitution: orally once a day  omeprazole 40 mg oral delayed release capsule: 1 cap(s) orally 2 times a day  Oyster Shell Calcium with Vitamin D 500 mg-200 intl units oral tablet: 1 tab(s) orally 3 times a day  polyethylene glycol 3350 oral powder for reconstitution: orally 2 times a day  Refresh Optive Advanced ophthalmic solution: 1 drop(s) to each eye 4 times a day  senna oral tablet: 2 tab(s) orally once a day (at bedtime)  Travatan 0.004% ophthalmic solution: 1 drop(s) to each eye once a day (in the evening)  Vitamin D3 1000 intl units oral capsule: 1 cap(s) orally once a day in the am

## 2020-11-13 NOTE — OCCUPATIONAL THERAPY INITIAL EVALUATION ADULT - ANTICIPATED DISCHARGE DISPOSITION, OT EVAL
Return to group home with supervision/assist as needed for ADLs and functional mobility. Home OT recommended for home safety evaluation, compensatory training, DME training, ADLs, balance, strength, ROM and endurance. Pt has shower bench./home w/ OT

## 2020-11-13 NOTE — DISCHARGE NOTE PROVIDER - NSDCFUADDAPPT_GEN_ALL_CORE_FT
Your follow up appointment with Dr. Viramontes on 11/25 at 9 am.  Please follow up with your neurologist after discharge.    Your follow up appointment with Dr. Viramontes on 11/25 at 9 am.  Please follow up with your neurologist after discharge.   Please make an appointment for follow up with your primary care physician after discharge.

## 2020-11-13 NOTE — PROGRESS NOTE ADULT - PROBLEM SELECTOR PLAN 1
Management per neurosurgery   PO as tolerated. Management per neurosurgery   PO as tolerated.  Please call ENT with questions

## 2020-11-13 NOTE — DISCHARGE NOTE PROVIDER - NSDCFUSCHEDAPPT_GEN_ALL_CORE_FT
LINO AREVALO ; 12/21/2020 ; [facility] LINO AREVALO ; 11/25/2020 ; [facility] [address] ; 12/21/2020 ; [facility]

## 2020-11-13 NOTE — DISCHARGE NOTE PROVIDER - HOSPITAL COURSE
Mr. Ferrara is a 50 year old man who lives in a group home with PMH hydrocephalus at age 2 months s/p  shunt with multiple revisions over the years, craniotomy many years later, mild mental retardation, glaucoma, legally blind, BPH, hiatal hernia, Mild PIERCE not on CPAP and epilepsy who has had c/o of weakness of upper extremities, difficulty with gross motor coordination of his hands and has been having progressive gait difficulty. He has been diagnosed with cervical spinal cord compression and myelomalacia now scheduled for C3-4-5 anterior cervical discectomy and fusion on 11/12/2020. He is s/p C3-5 ACDF on 11/12. His post op course was uncomplicated. PT/OT saw the patient and they recommended [facility] howerver patient's Aunt and Health Care Proxy Christi states she prefers to take Andi to her home where he can receive home care rather than going to an inpatient rehab facility for risk of COVID-19 exposure as well as limited visitation. She has concerns that an inpatient rehab facility may not be able to care for him well given his developmental delay and blindness. She would prefer to take him home and assumes full responsibility if he were to fall or injure himself at home since they don't want him to go to rehab.  Gabrielle [phone]) also to speak to Christi regarding possibly having an aid to help out for a safe discharge home. On the day of discharge patient is neurologically and medically cleared to be discharged home with home PT.

## 2020-11-13 NOTE — OCCUPATIONAL THERAPY INITIAL EVALUATION ADULT - ADDITIONAL COMMENTS
CT C-Spine (10/2) Moderate multilevel cervical spondylosis with multilevel facet arthrosis, disc bulging and osteophyte formation resulting in central canal and foraminal narrowing that is better evaluated on the prior MRI. The degree of central canal stenosis appears most advanced at C4-C5. Thoracolumbar scoliosis. There is no ossification of the posterior longitudinal ligament.

## 2020-11-13 NOTE — PROGRESS NOTE ADULT - SUBJECTIVE AND OBJECTIVE BOX
Patient was seen at bedside this am. Patient c/o pain in the incision site. Denied any numbness/tingling, dysphagia, cp, sob, n/v, or abd pain.    OVERNIGHT EVENTS:   - Patient was admitted yesterday after C3-5 ACDF    Vital Signs Last 24 Hrs  T(C): 36.4 (13 Nov 2020 07:55), Max: 36.7 (13 Nov 2020 04:50)  T(F): 97.5 (13 Nov 2020 07:55), Max: 98 (13 Nov 2020 04:50)  HR: 80 (13 Nov 2020 07:55) (80 - 123)  BP: 118/79 (13 Nov 2020 07:55) (118/79 - 153/86)  BP(mean): 94 (12 Nov 2020 20:00) (94 - 113)  RR: 18 (13 Nov 2020 07:55) (16 - 19)  SpO2: 92% (13 Nov 2020 07:55) (92% - 99%)    I&O's Detail    12 Nov 2020 07:01  -  13 Nov 2020 07:00  --------------------------------------------------------  IN:    sodium chloride 0.9% w/ Additives: 1275 mL  Total IN: 1275 mL    OUT:    Accordian (mL): 30 mL    Indwelling Catheter - Urethral (mL): 3045 mL  Total OUT: 3075 mL    Total NET: -1800 mL        I&O's Summary    12 Nov 2020 07:01  -  13 Nov 2020 07:00  --------------------------------------------------------  IN: 1275 mL / OUT: 3075 mL / NET: -1800 mL        PHYSICAL EXAM:  Neurological:  awake, alert, oriented to person, place, and date, legally blind bilaterally, disconjugated eyes, no facial asymmetry noted, following commands, moving all extremities 5/5, sensation intact to light touch  Cardiovascular: +s1, s2  Respiratory: clear to auscultation b/l  Gastrointestinal: soft, non-distended, non-tender  Extremities: warm, dry, no calf tenderness  Incision/Wound: incision site dressing, C/D/I, no palpable hematoma, HMV output for past 24 hours- 30ml    LABS:                        14.8   10.44 )-----------( 263      ( 13 Nov 2020 06:46 )             44.5     11-13    138  |  102  |  12  ----------------------------<  131<H>  4.4   |  24  |  0.85    Ca    9.3      13 Nov 2020 06:46              CAPILLARY BLOOD GLUCOSE          Drug Levels: [] N/A    CSF Analysis: [] N/A      Allergies    codeine (Stomach Upset)  Depakote (Other)  seasonal allergies: nasal congestion (Other)    Intolerances      MEDICATIONS:  Antibiotics:  ceFAZolin   IVPB 2000 milliGRAM(s) IV Intermittent once    Neuro:  acetaminophen   Tablet .. 650 milliGRAM(s) Oral every 6 hours PRN  acetaminophen  IVPB .. 1000 milliGRAM(s) IV Intermittent once PRN  cloBAZam 10 milliGRAM(s) Oral two times a day  diazepam    Tablet 5 milliGRAM(s) Oral every 8 hours PRN  escitalopram 10 milliGRAM(s) Oral daily  lamoTRIgine 300 milliGRAM(s) Oral two times a day    Anticoagulation:  enoxaparin Injectable 40 milliGRAM(s) SubCutaneous daily    OTHER:  artificial  tears Solution 1 Drop(s) Both EYES four times a day  chlorhexidine 2% Cloths 1 Application(s) Topical once  dexAMETHasone  Injectable 4 milliGRAM(s) IV Push every 6 hours  dorzolamide 2%/timolol 0.5% Ophthalmic Solution 1 Drop(s) Both EYES two times a day  latanoprost 0.005% Ophthalmic Solution 1 Drop(s) Both EYES at bedtime  lidocaine 1% Injectable 0.2 milliLiter(s) Local Injection once  loratadine 10 milliGRAM(s) Oral daily  pantoprazole    Tablet 40 milliGRAM(s) Oral before breakfast  polyethylene glycol 3350 17 Gram(s) Oral two times a day  psyllium Powder 1 Packet(s) Oral daily    IVF:  calcium carbonate 1250 mG  + Vitamin D (OsCal 500 + D) 1 Tablet(s) Oral three times a day  cholecalciferol 1000 Unit(s) Oral daily    CULTURES:    RADIOLOGY & ADDITIONAL TESTS:

## 2020-11-13 NOTE — PROGRESS NOTE ADULT - ASSESSMENT
49 y/o male POD1 s/p C3-4-5 anterior cervical discectomy and fusion, seen in PACU, stable from ENT standpoint, no hematoma, no hoarseness.

## 2020-11-14 LAB
ANION GAP SERPL CALC-SCNC: 11 MMOL/L — SIGNIFICANT CHANGE UP (ref 5–17)
BASOPHILS # BLD AUTO: 0.01 K/UL — SIGNIFICANT CHANGE UP (ref 0–0.2)
BASOPHILS NFR BLD AUTO: 0.1 % — SIGNIFICANT CHANGE UP (ref 0–2)
BUN SERPL-MCNC: 15 MG/DL — SIGNIFICANT CHANGE UP (ref 7–23)
CALCIUM SERPL-MCNC: 9.6 MG/DL — SIGNIFICANT CHANGE UP (ref 8.4–10.5)
CHLORIDE SERPL-SCNC: 100 MMOL/L — SIGNIFICANT CHANGE UP (ref 96–108)
CO2 SERPL-SCNC: 26 MMOL/L — SIGNIFICANT CHANGE UP (ref 22–31)
CREAT SERPL-MCNC: 0.83 MG/DL — SIGNIFICANT CHANGE UP (ref 0.5–1.3)
EOSINOPHIL # BLD AUTO: 0.01 K/UL — SIGNIFICANT CHANGE UP (ref 0–0.5)
EOSINOPHIL NFR BLD AUTO: 0.1 % — SIGNIFICANT CHANGE UP (ref 0–6)
GLUCOSE SERPL-MCNC: 127 MG/DL — HIGH (ref 70–99)
HCT VFR BLD CALC: 45.1 % — SIGNIFICANT CHANGE UP (ref 39–50)
HGB BLD-MCNC: 15 G/DL — SIGNIFICANT CHANGE UP (ref 13–17)
IMM GRANULOCYTES NFR BLD AUTO: 0.5 % — SIGNIFICANT CHANGE UP (ref 0–1.5)
LYMPHOCYTES # BLD AUTO: 1.11 K/UL — SIGNIFICANT CHANGE UP (ref 1–3.3)
LYMPHOCYTES # BLD AUTO: 10 % — LOW (ref 13–44)
MCHC RBC-ENTMCNC: 31.9 PG — SIGNIFICANT CHANGE UP (ref 27–34)
MCHC RBC-ENTMCNC: 33.3 GM/DL — SIGNIFICANT CHANGE UP (ref 32–36)
MCV RBC AUTO: 96 FL — SIGNIFICANT CHANGE UP (ref 80–100)
MONOCYTES # BLD AUTO: 1.07 K/UL — HIGH (ref 0–0.9)
MONOCYTES NFR BLD AUTO: 9.7 % — SIGNIFICANT CHANGE UP (ref 2–14)
NEUTROPHILS # BLD AUTO: 8.82 K/UL — HIGH (ref 1.8–7.4)
NEUTROPHILS NFR BLD AUTO: 79.6 % — HIGH (ref 43–77)
NRBC # BLD: 0 /100 WBCS — SIGNIFICANT CHANGE UP (ref 0–0)
PLATELET # BLD AUTO: 269 K/UL — SIGNIFICANT CHANGE UP (ref 150–400)
POTASSIUM SERPL-MCNC: 4.5 MMOL/L — SIGNIFICANT CHANGE UP (ref 3.5–5.3)
POTASSIUM SERPL-SCNC: 4.5 MMOL/L — SIGNIFICANT CHANGE UP (ref 3.5–5.3)
RBC # BLD: 4.7 M/UL — SIGNIFICANT CHANGE UP (ref 4.2–5.8)
RBC # FLD: 13.2 % — SIGNIFICANT CHANGE UP (ref 10.3–14.5)
SODIUM SERPL-SCNC: 137 MMOL/L — SIGNIFICANT CHANGE UP (ref 135–145)
WBC # BLD: 11.07 K/UL — HIGH (ref 3.8–10.5)
WBC # FLD AUTO: 11.07 K/UL — HIGH (ref 3.8–10.5)

## 2020-11-14 RX ADMIN — Medication 4 MILLIGRAM(S): at 01:22

## 2020-11-14 RX ADMIN — Medication 4 MILLIGRAM(S): at 11:54

## 2020-11-14 RX ADMIN — Medication 1 DROP(S): at 00:06

## 2020-11-14 RX ADMIN — Medication 1 PACKET(S): at 11:54

## 2020-11-14 RX ADMIN — Medication 4 MILLIGRAM(S): at 23:21

## 2020-11-14 RX ADMIN — Medication 1 DROP(S): at 17:13

## 2020-11-14 RX ADMIN — LORATADINE 10 MILLIGRAM(S): 10 TABLET ORAL at 11:53

## 2020-11-14 RX ADMIN — POLYETHYLENE GLYCOL 3350 17 GRAM(S): 17 POWDER, FOR SOLUTION ORAL at 05:14

## 2020-11-14 RX ADMIN — ONDANSETRON 4 MILLIGRAM(S): 8 TABLET, FILM COATED ORAL at 00:04

## 2020-11-14 RX ADMIN — LATANOPROST 1 DROP(S): 0.05 SOLUTION/ DROPS OPHTHALMIC; TOPICAL at 23:22

## 2020-11-14 RX ADMIN — ENOXAPARIN SODIUM 40 MILLIGRAM(S): 100 INJECTION SUBCUTANEOUS at 11:53

## 2020-11-14 RX ADMIN — OXYCODONE HYDROCHLORIDE 5 MILLIGRAM(S): 5 TABLET ORAL at 00:34

## 2020-11-14 RX ADMIN — DORZOLAMIDE HYDROCHLORIDE TIMOLOL MALEATE 1 DROP(S): 20; 5 SOLUTION/ DROPS OPHTHALMIC at 05:12

## 2020-11-14 RX ADMIN — POLYETHYLENE GLYCOL 3350 17 GRAM(S): 17 POWDER, FOR SOLUTION ORAL at 17:15

## 2020-11-14 RX ADMIN — PANTOPRAZOLE SODIUM 40 MILLIGRAM(S): 20 TABLET, DELAYED RELEASE ORAL at 05:12

## 2020-11-14 RX ADMIN — ESCITALOPRAM OXALATE 10 MILLIGRAM(S): 10 TABLET, FILM COATED ORAL at 11:53

## 2020-11-14 RX ADMIN — Medication 650 MILLIGRAM(S): at 23:51

## 2020-11-14 RX ADMIN — CLOBAZAM 10 MILLIGRAM(S): 10 TABLET ORAL at 17:13

## 2020-11-14 RX ADMIN — Medication 1 DROP(S): at 05:12

## 2020-11-14 RX ADMIN — Medication 1 TABLET(S): at 05:12

## 2020-11-14 RX ADMIN — DORZOLAMIDE HYDROCHLORIDE TIMOLOL MALEATE 1 DROP(S): 20; 5 SOLUTION/ DROPS OPHTHALMIC at 17:16

## 2020-11-14 RX ADMIN — Medication 1 DROP(S): at 11:53

## 2020-11-14 RX ADMIN — CLOBAZAM 10 MILLIGRAM(S): 10 TABLET ORAL at 05:12

## 2020-11-14 RX ADMIN — Medication 650 MILLIGRAM(S): at 23:21

## 2020-11-14 RX ADMIN — SENNA PLUS 2 TABLET(S): 8.6 TABLET ORAL at 23:21

## 2020-11-14 RX ADMIN — Medication 1 TABLET(S): at 17:15

## 2020-11-14 RX ADMIN — OXYCODONE HYDROCHLORIDE 5 MILLIGRAM(S): 5 TABLET ORAL at 00:04

## 2020-11-14 RX ADMIN — Medication 4 MILLIGRAM(S): at 17:15

## 2020-11-14 RX ADMIN — LAMOTRIGINE 300 MILLIGRAM(S): 25 TABLET, ORALLY DISINTEGRATING ORAL at 17:13

## 2020-11-14 RX ADMIN — Medication 1 DROP(S): at 23:22

## 2020-11-14 RX ADMIN — Medication 1 TABLET(S): at 23:21

## 2020-11-14 RX ADMIN — LAMOTRIGINE 300 MILLIGRAM(S): 25 TABLET, ORALLY DISINTEGRATING ORAL at 05:12

## 2020-11-14 RX ADMIN — Medication 4 MILLIGRAM(S): at 07:52

## 2020-11-14 RX ADMIN — Medication 1000 UNIT(S): at 11:53

## 2020-11-14 NOTE — PROGRESS NOTE ADULT - SUBJECTIVE AND OBJECTIVE BOX
BABY GIRL AMANDA [id number] FEMALE/ REPEAT CSECTION  Daily Height/Length in cm: 52.1 (18 Dec 2018 13:23)    Daily Weight Gm: 3195 (18 Dec 2018 20:30)    Vital Signs Last 24 Hrs  T(C): 36.9 (18 Dec 2018 20:30), Max: 37.2 (18 Dec 2018 14:15)  T(F): 98.4 (18 Dec 2018 20:30), Max: 98.9 (18 Dec 2018 14:15)  HR: 108 (18 Dec 2018 20:30) (108 - 160)  BP: 69/36 (18 Dec 2018 10:56) (69/36 - 73/39)  BP(mean): 42 (18 Dec 2018 10:56) (42 - 52)  RR: 48 (18 Dec 2018 20:30) (48 - 60)  SpO2: 100% (18 Dec 2018 11:55) (99% - 100%)    MEDICATIONS  (STANDING):    MEDICATIONS  (PRN):      AFOF/PFOF  B/L RR  Nare patent  O/P Palate intact  Lung Clear  RRR no murmur  Soft NT/ND no mass cord intact  No rash, No jaundice  Normal  anatomy   Sacrum without dimple   EXT-4 extremity symmetric, Symmetric Reagan  Neuro, strong suck, cry, good tone Patient seen and examined at bedside.    --Anticoagulation--  enoxaparin Injectable 40 milliGRAM(s) SubCutaneous daily    T(C): 36.4 (11-14-20 @ 08:11), Max: 36.6 (11-14-20 @ 00:06)  HR: 74 (11-14-20 @ 08:11) (67 - 92)  BP: 106/68 (11-14-20 @ 08:11) (106/68 - 122/72)  RR: 18 (11-14-20 @ 08:11) (16 - 18)  SpO2: 95% (11-14-20 @ 08:11) (94% - 95%)  Wt(kg): --    Exam:  AOx2-3 w/ choices, blind bl, eyes dysconjugate, montaño ag strong, no clonus, numbness b/l hg improved, Incision c/d/i, no e/o hematoma or local neck swelling    Imaging:   CTC spine 11/13: post recent anterior cervical discectomy with interposition grafts C3-4 and C4-5 with anterior metallic plating. Hardware in good position. Right soft tissue neck postoperative changes.  LED neg 11/13    Labs:                         15.0   11.07 )-----------( 269      ( 14 Nov 2020 06:38 )             45.1   11-14    137  |  100  |  15  ----------------------------<  127<H>  4.5   |  26  |  0.83    Ca    9.6      14 Nov 2020 06:38

## 2020-11-14 NOTE — PROGRESS NOTE ADULT - ASSESSMENT
50 year old man who lives in a group home with PMH hydrocephalus at age 2 months s/p  shunt with multiple revisions over the years, craniotomy many years later, mild mental retardation, glaucoma, legally blind, BPH, hiatal hernia, Mild PIERCE not on CPAP and epilepsy who has had c/o of weakness of upper extremities, difficulty with gross motor coordination of his hands and has been having progressive gait difficulty. Now POD2 from C3-5 ACDF, has HMV, doing well postop.     - HMV 25, will keep in until dispo  - LED neg 11/13  - CT Cspine 11/13 hardware well placed  - Passed TOV  - Pending FRANCIS placement, SW following  - sql , bowel regimen  - pain mgmt  - on home clobazam and lamotrigine for AEDs  - dex 4q6  - q4h neuro checks

## 2020-11-15 LAB
ANION GAP SERPL CALC-SCNC: 11 MMOL/L — SIGNIFICANT CHANGE UP (ref 5–17)
BASOPHILS # BLD AUTO: 0.02 K/UL — SIGNIFICANT CHANGE UP (ref 0–0.2)
BASOPHILS NFR BLD AUTO: 0.2 % — SIGNIFICANT CHANGE UP (ref 0–2)
BUN SERPL-MCNC: 19 MG/DL — SIGNIFICANT CHANGE UP (ref 7–23)
CALCIUM SERPL-MCNC: 9.9 MG/DL — SIGNIFICANT CHANGE UP (ref 8.4–10.5)
CHLORIDE SERPL-SCNC: 100 MMOL/L — SIGNIFICANT CHANGE UP (ref 96–108)
CO2 SERPL-SCNC: 28 MMOL/L — SIGNIFICANT CHANGE UP (ref 22–31)
CREAT SERPL-MCNC: 0.9 MG/DL — SIGNIFICANT CHANGE UP (ref 0.5–1.3)
EOSINOPHIL # BLD AUTO: 0.03 K/UL — SIGNIFICANT CHANGE UP (ref 0–0.5)
EOSINOPHIL NFR BLD AUTO: 0.3 % — SIGNIFICANT CHANGE UP (ref 0–6)
GLUCOSE SERPL-MCNC: 108 MG/DL — HIGH (ref 70–99)
HCT VFR BLD CALC: 44.7 % — SIGNIFICANT CHANGE UP (ref 39–50)
HGB BLD-MCNC: 14.7 G/DL — SIGNIFICANT CHANGE UP (ref 13–17)
IMM GRANULOCYTES NFR BLD AUTO: 0.9 % — SIGNIFICANT CHANGE UP (ref 0–1.5)
LYMPHOCYTES # BLD AUTO: 1.4 K/UL — SIGNIFICANT CHANGE UP (ref 1–3.3)
LYMPHOCYTES # BLD AUTO: 14.7 % — SIGNIFICANT CHANGE UP (ref 13–44)
MCHC RBC-ENTMCNC: 31.7 PG — SIGNIFICANT CHANGE UP (ref 27–34)
MCHC RBC-ENTMCNC: 32.9 GM/DL — SIGNIFICANT CHANGE UP (ref 32–36)
MCV RBC AUTO: 96.3 FL — SIGNIFICANT CHANGE UP (ref 80–100)
MONOCYTES # BLD AUTO: 1.14 K/UL — HIGH (ref 0–0.9)
MONOCYTES NFR BLD AUTO: 12 % — SIGNIFICANT CHANGE UP (ref 2–14)
NEUTROPHILS # BLD AUTO: 6.82 K/UL — SIGNIFICANT CHANGE UP (ref 1.8–7.4)
NEUTROPHILS NFR BLD AUTO: 71.9 % — SIGNIFICANT CHANGE UP (ref 43–77)
NRBC # BLD: 0 /100 WBCS — SIGNIFICANT CHANGE UP (ref 0–0)
PLATELET # BLD AUTO: 277 K/UL — SIGNIFICANT CHANGE UP (ref 150–400)
POTASSIUM SERPL-MCNC: 4.3 MMOL/L — SIGNIFICANT CHANGE UP (ref 3.5–5.3)
POTASSIUM SERPL-SCNC: 4.3 MMOL/L — SIGNIFICANT CHANGE UP (ref 3.5–5.3)
RBC # BLD: 4.64 M/UL — SIGNIFICANT CHANGE UP (ref 4.2–5.8)
RBC # FLD: 13.2 % — SIGNIFICANT CHANGE UP (ref 10.3–14.5)
SODIUM SERPL-SCNC: 139 MMOL/L — SIGNIFICANT CHANGE UP (ref 135–145)
WBC # BLD: 9.5 K/UL — SIGNIFICANT CHANGE UP (ref 3.8–10.5)
WBC # FLD AUTO: 9.5 K/UL — SIGNIFICANT CHANGE UP (ref 3.8–10.5)

## 2020-11-15 RX ADMIN — Medication 1 DROP(S): at 05:35

## 2020-11-15 RX ADMIN — POLYETHYLENE GLYCOL 3350 17 GRAM(S): 17 POWDER, FOR SOLUTION ORAL at 05:35

## 2020-11-15 RX ADMIN — Medication 1 TABLET(S): at 22:25

## 2020-11-15 RX ADMIN — ENOXAPARIN SODIUM 40 MILLIGRAM(S): 100 INJECTION SUBCUTANEOUS at 12:44

## 2020-11-15 RX ADMIN — Medication 4 MILLIGRAM(S): at 17:32

## 2020-11-15 RX ADMIN — LORATADINE 10 MILLIGRAM(S): 10 TABLET ORAL at 12:43

## 2020-11-15 RX ADMIN — DORZOLAMIDE HYDROCHLORIDE TIMOLOL MALEATE 1 DROP(S): 20; 5 SOLUTION/ DROPS OPHTHALMIC at 17:32

## 2020-11-15 RX ADMIN — ESCITALOPRAM OXALATE 10 MILLIGRAM(S): 10 TABLET, FILM COATED ORAL at 12:43

## 2020-11-15 RX ADMIN — Medication 4 MILLIGRAM(S): at 22:25

## 2020-11-15 RX ADMIN — CLOBAZAM 10 MILLIGRAM(S): 10 TABLET ORAL at 17:31

## 2020-11-15 RX ADMIN — Medication 1 TABLET(S): at 05:34

## 2020-11-15 RX ADMIN — PANTOPRAZOLE SODIUM 40 MILLIGRAM(S): 20 TABLET, DELAYED RELEASE ORAL at 05:34

## 2020-11-15 RX ADMIN — Medication 1 TABLET(S): at 12:45

## 2020-11-15 RX ADMIN — Medication 4 MILLIGRAM(S): at 05:35

## 2020-11-15 RX ADMIN — Medication 1000 UNIT(S): at 12:44

## 2020-11-15 RX ADMIN — DORZOLAMIDE HYDROCHLORIDE TIMOLOL MALEATE 1 DROP(S): 20; 5 SOLUTION/ DROPS OPHTHALMIC at 05:35

## 2020-11-15 RX ADMIN — Medication 650 MILLIGRAM(S): at 20:06

## 2020-11-15 RX ADMIN — Medication 1 DROP(S): at 12:44

## 2020-11-15 RX ADMIN — Medication 650 MILLIGRAM(S): at 20:36

## 2020-11-15 RX ADMIN — Medication 4 MILLIGRAM(S): at 12:44

## 2020-11-15 RX ADMIN — Medication 1 DROP(S): at 17:32

## 2020-11-15 RX ADMIN — LAMOTRIGINE 300 MILLIGRAM(S): 25 TABLET, ORALLY DISINTEGRATING ORAL at 05:34

## 2020-11-15 RX ADMIN — CLOBAZAM 10 MILLIGRAM(S): 10 TABLET ORAL at 05:34

## 2020-11-15 RX ADMIN — Medication 1 DROP(S): at 22:26

## 2020-11-15 RX ADMIN — Medication 1 PACKET(S): at 12:43

## 2020-11-15 RX ADMIN — LATANOPROST 1 DROP(S): 0.05 SOLUTION/ DROPS OPHTHALMIC; TOPICAL at 22:26

## 2020-11-15 RX ADMIN — LAMOTRIGINE 300 MILLIGRAM(S): 25 TABLET, ORALLY DISINTEGRATING ORAL at 17:31

## 2020-11-15 RX ADMIN — POLYETHYLENE GLYCOL 3350 17 GRAM(S): 17 POWDER, FOR SOLUTION ORAL at 17:32

## 2020-11-15 NOTE — PROGRESS NOTE ADULT - ASSESSMENT
50 year old man who lives in a group home with PMH hydrocephalus at age 2 months s/p  shunt with multiple revisions over the years, craniotomy many years later, mild mental retardation, glaucoma, legally blind, BPH, hiatal hernia, Mild PIERCE not on CPAP and epilepsy who has had c/o of weakness of upper extremities, difficulty with gross motor coordination of his hands and has been having progressive gait difficulty. Now POD3 from C3-5 ACDF, has HMV, doing well postop.     - HMV, will keep in until dispo  - LED neg 11/13  - CT Cspine 11/13 hardware well placed  - Passed TOV  - Pending FRANCIS placement, SW following  - sql , bowel regimen  - pain mgmt  - on home clobazam and lamotrigine for AEDs  - dex 4q6  - q4h neuro checks

## 2020-11-15 NOTE — PROGRESS NOTE ADULT - SUBJECTIVE AND OBJECTIVE BOX
SUBJECTIVE: no complaints      Vital Signs Last 24 Hrs  T(C): 36.5 (11-15-20 @ 12:00), Max: 36.7 (11-14-20 @ 23:43)  T(F): 97.7 (11-15-20 @ 12:00), Max: 98.1 (11-14-20 @ 23:43)  HR: 99 (11-15-20 @ 12:00) (84 - 101)  BP: 122/86 (11-15-20 @ 12:00) (101/66 - 125/71)  BP(mean): --  RR: 18 (11-15-20 @ 12:00) (16 - 18)  SpO2: 96% (11-15-20 @ 12:00) (94% - 96%)    PHYSICAL EXAM:    Constitutional: No Acute Distress     Exam:  AOx2-3 w/ choices, blind bl, eyes dysconjugate, montaño ag strong, no clonus, numbness b/l hg improved, Incision c/d/i, no e/o hematoma or local neck swelling    Incision: Dressing in place    Drains: HMVx1        Imaging:   [facility] spine 11/13: post recent anterior cervical discectomy with interposition grafts C3-4 and C4-5 with anterior metallic plating. Hardware in good position. Right soft tissue neck postoperative changes.  LED neg 11/13        LABS:                          14.7   9.50  )-----------( 277      ( 15 Nov 2020 06:37 )             44.7    11-15    139  |  100  |  19  ----------------------------<  108<H>  4.3   |  28  |  0.90    Ca    9.9      15 Nov 2020 06:37        11-14 @ 07:01  -  11-15 @ 07:00  --------------------------------------------------------  IN: 480 mL / OUT: 1203 mL / NET: -723 mL        IMAGING:     MEDICATIONS:  Antibiotics:  ceFAZolin   IVPB 2000 milliGRAM(s) IV Intermittent once    Neuro:  acetaminophen   Tablet .. 650 milliGRAM(s) Oral every 6 hours PRN Temp greater or equal to 38C (100.4F), Mild Pain (1 - 3)  acetaminophen  IVPB .. 1000 milliGRAM(s) IV Intermittent once PRN Severe Pain (7 - 10)  cloBAZam 10 milliGRAM(s) Oral two times a day  diazepam    Tablet 5 milliGRAM(s) Oral every 8 hours PRN spasm  escitalopram 10 milliGRAM(s) Oral daily  lamoTRIgine 300 milliGRAM(s) Oral two times a day    Cardiac:    Pulm:  loratadine 10 milliGRAM(s) Oral daily    GI/:  pantoprazole    Tablet 40 milliGRAM(s) Oral before breakfast  polyethylene glycol 3350 17 Gram(s) Oral two times a day  psyllium Powder 1 Packet(s) Oral daily  senna 2 Tablet(s) Oral at bedtime    Other:   artificial  tears Solution 1 Drop(s) Both EYES four times a day  calcium carbonate 1250 mG  + Vitamin D (OsCal 500 + D) 1 Tablet(s) Oral three times a day  chlorhexidine 2% Cloths 1 Application(s) Topical once  cholecalciferol 1000 Unit(s) Oral daily  dexAMETHasone  Injectable 4 milliGRAM(s) IV Push every 6 hours  dorzolamide 2%/timolol 0.5% Ophthalmic Solution 1 Drop(s) Both EYES two times a day  enoxaparin Injectable 40 milliGRAM(s) SubCutaneous daily  latanoprost 0.005% Ophthalmic Solution 1 Drop(s) Both EYES at bedtime  lidocaine 1% Injectable 0.2 milliLiter(s) Local Injection once        DIET:

## 2020-11-16 PROCEDURE — 70450 CT HEAD/BRAIN W/O DYE: CPT | Mod: 26

## 2020-11-16 PROCEDURE — 99222 1ST HOSP IP/OBS MODERATE 55: CPT

## 2020-11-16 RX ADMIN — Medication 1 PACKET(S): at 13:10

## 2020-11-16 RX ADMIN — Medication 1 TABLET(S): at 05:07

## 2020-11-16 RX ADMIN — Medication 1 TABLET(S): at 23:34

## 2020-11-16 RX ADMIN — LORATADINE 10 MILLIGRAM(S): 10 TABLET ORAL at 13:06

## 2020-11-16 RX ADMIN — Medication 650 MILLIGRAM(S): at 05:38

## 2020-11-16 RX ADMIN — SENNA PLUS 2 TABLET(S): 8.6 TABLET ORAL at 23:34

## 2020-11-16 RX ADMIN — Medication 1 TABLET(S): at 13:26

## 2020-11-16 RX ADMIN — DORZOLAMIDE HYDROCHLORIDE TIMOLOL MALEATE 1 DROP(S): 20; 5 SOLUTION/ DROPS OPHTHALMIC at 05:07

## 2020-11-16 RX ADMIN — LATANOPROST 1 DROP(S): 0.05 SOLUTION/ DROPS OPHTHALMIC; TOPICAL at 23:34

## 2020-11-16 RX ADMIN — ESCITALOPRAM OXALATE 10 MILLIGRAM(S): 10 TABLET, FILM COATED ORAL at 13:07

## 2020-11-16 RX ADMIN — Medication 1 DROP(S): at 11:22

## 2020-11-16 RX ADMIN — CLOBAZAM 10 MILLIGRAM(S): 10 TABLET ORAL at 05:07

## 2020-11-16 RX ADMIN — Medication 650 MILLIGRAM(S): at 05:08

## 2020-11-16 RX ADMIN — ENOXAPARIN SODIUM 40 MILLIGRAM(S): 100 INJECTION SUBCUTANEOUS at 13:06

## 2020-11-16 RX ADMIN — Medication 650 MILLIGRAM(S): at 23:35

## 2020-11-16 RX ADMIN — Medication 1 DROP(S): at 05:08

## 2020-11-16 RX ADMIN — DORZOLAMIDE HYDROCHLORIDE TIMOLOL MALEATE 1 DROP(S): 20; 5 SOLUTION/ DROPS OPHTHALMIC at 17:00

## 2020-11-16 RX ADMIN — LAMOTRIGINE 300 MILLIGRAM(S): 25 TABLET, ORALLY DISINTEGRATING ORAL at 17:01

## 2020-11-16 RX ADMIN — Medication 650 MILLIGRAM(S): at 17:01

## 2020-11-16 RX ADMIN — POLYETHYLENE GLYCOL 3350 17 GRAM(S): 17 POWDER, FOR SOLUTION ORAL at 17:01

## 2020-11-16 RX ADMIN — Medication 1 DROP(S): at 16:59

## 2020-11-16 RX ADMIN — Medication 1000 UNIT(S): at 11:23

## 2020-11-16 RX ADMIN — Medication 4 MILLIGRAM(S): at 05:08

## 2020-11-16 RX ADMIN — Medication 1 DROP(S): at 23:34

## 2020-11-16 RX ADMIN — Medication 650 MILLIGRAM(S): at 13:05

## 2020-11-16 RX ADMIN — PANTOPRAZOLE SODIUM 40 MILLIGRAM(S): 20 TABLET, DELAYED RELEASE ORAL at 05:07

## 2020-11-16 RX ADMIN — CLOBAZAM 10 MILLIGRAM(S): 10 TABLET ORAL at 17:01

## 2020-11-16 RX ADMIN — LAMOTRIGINE 300 MILLIGRAM(S): 25 TABLET, ORALLY DISINTEGRATING ORAL at 05:07

## 2020-11-16 RX ADMIN — Medication 4 MILLIGRAM(S): at 11:24

## 2020-11-16 NOTE — CHART NOTE - NSCHARTNOTEFT_GEN_A_CORE
HMV w/ 7 cc output over 24 hrs. HMV taken off suction and removed without resistance or difficulty. Exit site cleansed with chlorhexidine. Dressing placed at exit site. Patient tolerated well.

## 2020-11-16 NOTE — CONSULT NOTE ADULT - SUBJECTIVE AND OBJECTIVE BOX
Patient is a 50y old  Male who presents with a chief complaint of Admitted 11/12 s/p C3-5 ACDF (16 Nov 2020 11:42)    HPI:  Mr. Ferrara is a 50 year old man who lives in a group home with PMH hydrocephalus at age 2 months s/p  shunt with multiple revisions over the years, craniotomy many years later, mild mental retardation, glaucoma, legally blind, BPH, hiatal hernia, Mild PIERCE not on CPAP and epilepsy who has had c/o of weakness of upper extremities, difficulty with gross motor coordination of his hands and has been having progressive gait difficulty. He has been diagnosed with cervical spinal cord compression and myelomalacia now scheduled for C3-4-5 anterior cervical discectomy and fusion on 11/12/2020    Patient was admitted on 11/12, had C3-5 ACDF, no post op complaints or weakness. Postop CT revealed hardware in good position. Patient today with no complaints.     REVIEW OF SYSTEMS  Constitutional - No fever, No weight loss, No fatigue  HEENT - No eye pain, No visual disturbances, No difficulty hearing, No tinnitus, No vertigo, No neck pain  Respiratory - No cough, No wheezing, No shortness of breath  Cardiovascular - No chest pain, No palpitations  Gastrointestinal - No abdominal pain, No nausea, No vomiting, No diarrhea, No constipation  Genitourinary - No dysuria, No frequency, No hematuria, No incontinence  Neurological - No headaches, No memory loss, No loss of strength, No numbness, No tremors  Skin - No itching, No rashes, No lesions   Endocrine - No temperature intolerance  Musculoskeletal - No joint pain, No joint swelling, No muscle pain  Psychiatric - No depression, No anxiety    VITALS  T(C): 36.4 (11-16-20 @ 07:00), Max: 36.8 (11-15-20 @ 19:30)  HR: 87 (11-16-20 @ 07:00) (85 - 98)  BP: 110/70 (11-16-20 @ 07:00) (107/72 - 118/77)  RR: 18 (11-16-20 @ 07:00) (18 - 18)  SpO2: 94% (11-16-20 @ 07:00) (93% - 98%)  Wt(kg): --    PAST MEDICAL & SURGICAL HISTORY  Glaucoma    BPH without obstruction/lower urinary tract symptoms    Viral pneumonia    Aspiration pneumonia    Hiatal hernia    Mild mental retardation    Impulse control disorder    Environmental Allergies    Elevated liver enzymes    Osteoporosis    Epilepsy    Sleep apnea    Glaucoma    Legally blind    Hydrocephalus    Obstructed  shunt    S/p bilateral myringotomy with tube placement    History of tonsillectomy    Heel cord contracture    H/O craniotomy    Epilepsy    S/P  Shunt        SOCIAL HISTORY  Smoking - Denied  EtOH - Denied   Drugs - Denied    FUNCTIONAL HISTORY  Lives in group home   Independent AMB with no AD, needed some assist with ADLs     CURRENT FUNCTIONAL STATUS  11/15 PT  transfers contact guard   gait contact guard x 50 feet, HHA   cues for directions secondary to visual deficits     11/16 OT  bed mobility min assist  transfers min assist  ambulation min to mod assist      FAMILY HISTORY   Family history of stroke (Mother)        RECENT LABS/IMAGING  CBC Full  -  ( 15 Nov 2020 06:37 )  WBC Count : 9.50 K/uL  RBC Count : 4.64 M/uL  Hemoglobin : 14.7 g/dL  Hematocrit : 44.7 %  Platelet Count - Automated : 277 K/uL  Mean Cell Volume : 96.3 fl  Mean Cell Hemoglobin : 31.7 pg  Mean Cell Hemoglobin Concentration : 32.9 gm/dL  Auto Neutrophil # : 6.82 K/uL  Auto Lymphocyte # : 1.40 K/uL  Auto Monocyte # : 1.14 K/uL  Auto Eosinophil # : 0.03 K/uL  Auto Basophil # : 0.02 K/uL  Auto Neutrophil % : 71.9 %  Auto Lymphocyte % : 14.7 %  Auto Monocyte % : 12.0 %  Auto Eosinophil % : 0.3 %  Auto Basophil % : 0.2 %    11-15    139  |  100  |  19  ----------------------------<  108<H>  4.3   |  28  |  0.90    Ca    9.9      15 Nov 2020 06:37    < from: CT Cervical Spine No Cont (11.13.20 @ 09:46) >      IMPRESSION: Post recentanterior cervical discectomy with interposition grafts C3-4 and C4-5 with anterior metallic plating. Hardware in good position. Right soft tissue neck postoperative changes.        < end of copied text >        ALLERGIES  codeine (Stomach Upset)  Depakote (Other)  seasonal allergies: nasal congestion (Other)      MEDICATIONS   acetaminophen   Tablet .. 650 milliGRAM(s) Oral every 6 hours PRN  acetaminophen  IVPB .. 1000 milliGRAM(s) IV Intermittent once PRN  artificial  tears Solution 1 Drop(s) Both EYES four times a day  calcium carbonate 1250 mG  + Vitamin D (OsCal 500 + D) 1 Tablet(s) Oral three times a day  ceFAZolin   IVPB 2000 milliGRAM(s) IV Intermittent once  chlorhexidine 2% Cloths 1 Application(s) Topical once  cholecalciferol 1000 Unit(s) Oral daily  cloBAZam 10 milliGRAM(s) Oral two times a day  diazepam    Tablet 5 milliGRAM(s) Oral every 8 hours PRN  dorzolamide 2%/timolol 0.5% Ophthalmic Solution 1 Drop(s) Both EYES two times a day  enoxaparin Injectable 40 milliGRAM(s) SubCutaneous daily  escitalopram 10 milliGRAM(s) Oral daily  lamoTRIgine 300 milliGRAM(s) Oral two times a day  latanoprost 0.005% Ophthalmic Solution 1 Drop(s) Both EYES at bedtime  lidocaine 1% Injectable 0.2 milliLiter(s) Local Injection once  loratadine 10 milliGRAM(s) Oral daily  pantoprazole    Tablet 40 milliGRAM(s) Oral before breakfast  polyethylene glycol 3350 17 Gram(s) Oral two times a day  psyllium Powder 1 Packet(s) Oral daily  senna 2 Tablet(s) Oral at bedtime      ----------------------------------------------------------------------------------------  PHYSICAL EXAM  Constitutional - NAD, Comfortable, sitting in chair   Neck - decreased ROM, dressing in place   Chest - Breathing comfortably  Cardiovascular - well perfused   Abdomen - Soft   Extremities - No C/C/E, No calf tenderness   Neurologic Exam -              Cognitive - Awake, Alert, AAO to self, place, date, year, situation     Communication - Fluent, No dysarthria     Cranial Nerves - CN 2-12 intact     Motor - strength 5/5      Sensory - Intact to LT     Psychiatric - Mood stable, Affect WNL  ----------------------------------------------------------------------------------------  ASSESSMENT/PLAN  50 year old man with h/o hydrocephalus, s/p  shunt, epilepsy, glaucoma with functional deficits after C3-5 ACDF  onfi/vimpat for epilepsy  lexapro  Pain - Tylenol, valium prn  DVT PPX - SCDs, lovenox   Rehab -   patient is participating with bedside PT/OT  min assist/contact guard with transfers/gait   recommend [facility] for cognitive therapy, safety assessment, reconditioning, ADL and mobility restoration in a supervised setting.

## 2020-11-16 NOTE — PROGRESS NOTE ADULT - SUBJECTIVE AND OBJECTIVE BOX
SUBJECTIVE: Patient seen and examined at bedside No acute overnight events. HMV removed this morning. Denies chest pain, shortness of breath, nausea/vomiting.     Vital Signs Last 24 Hrs  T(C): 36.4 (11-16-20 @ 07:00), Max: 36.8 (11-15-20 @ 19:30)  T(F): 97.6 (11-16-20 @ 07:00), Max: 98.3 (11-15-20 @ 19:30)  HR: 87 (11-16-20 @ 07:00) (85 - 99)  BP: 110/70 (11-16-20 @ 07:00) (107/72 - 122/86)  RR: 18 (11-16-20 @ 07:00) (18 - 18)  SpO2: 94% (11-16-20 @ 07:00) (93% - 98%)    PHYSICAL EXAM:    Constitutional: No Acute Distress, resting comfortably in bed     Neurological: Awake, alert, oriented to person, place and time, developmental delay, speech clear and fluent, face equal, tongue midline, following commands, no drift, moves all extremities with 5/5 strength, sensation intact to light touch throughout, legally blind at baseline, dysconjugate gaze, RT pupil 4mm sluggish, LT pupil 4mm sluggish    Incision: +RT neck steri strips C/D/I, localized kendra-incisional swelling    Drains: HMV removed this morning    Pulmonary: Clear to Auscultation, No rales, No rhonchi, No wheezes     Cardiovascular: S1, S2, Regular rate and rhythm     Gastrointestinal: Soft, Non-tender, Non-distended, +bowel sounds x 4    Extremities: No calf tenderness bilaterally, no cyanosis, clubbing or edema      LABS:                          14.7   9.50  )-----------( 277      ( 15 Nov 2020 06:37 )             44.7    11-15    139  |  100  |  19  ----------------------------<  108<H>  4.3   |  28  |  0.90    Ca    9.9      15 Nov 2020 06:37        11-15 @ 07:01  -  11-16 @ 07:00  --------------------------------------------------------  IN: 720 mL / OUT: 557 mL / NET: 163 mL    11-16 @ 07:01  -  11-16 @ 11:43  --------------------------------------------------------  IN: 480 mL / OUT: 0 mL / NET: 480 mL      IMAGING:     CT Cervical Spine No Cont (11.13.20 @ 09:46)  IMPRESSION: Post recentanterior cervical discectomy with interposition grafts C3-4 and C4-5 with anterior metallic plating. Hardware in good position. Right soft tissue neck postoperative changes.    CELESTINA CARRERA MD, ATTENDING RADIOLOGIST  This document has beenelectronically signed. Nov 13 2020 10:05AM    VA Duplex Lower Ext Vein Scan, Bilat (11.13.20 @ 14:05)  IMPRESSION:  No evidence of deep venous thrombosis in either lower extremity.    ABBI KHAN MD; Resident Radiology  This document has been electronically signed.  LINO HESTER M.D., ATTENDING RADIOLOGIST  This document has been electronically signed. Nov 13 2020  2:52PM      MEDICATIONS:  Antibiotics:  ceFAZolin   IVPB 2000 milliGRAM(s) IV Intermittent once    Neuro:  acetaminophen   Tablet .. 650 milliGRAM(s) Oral every 6 hours PRN Temp greater or equal to 38C (100.4F), Mild Pain (1 - 3)  acetaminophen  IVPB .. 1000 milliGRAM(s) IV Intermittent once PRN Severe Pain (7 - 10)  cloBAZam 10 milliGRAM(s) Oral two times a day  diazepam    Tablet 5 milliGRAM(s) Oral every 8 hours PRN spasm  escitalopram 10 milliGRAM(s) Oral daily  lamoTRIgine 300 milliGRAM(s) Oral two times a day    Cardiac:    Pulm:  loratadine 10 milliGRAM(s) Oral daily    GI/:  pantoprazole    Tablet 40 milliGRAM(s) Oral before breakfast  polyethylene glycol 3350 17 Gram(s) Oral two times a day  psyllium Powder 1 Packet(s) Oral daily  senna 2 Tablet(s) Oral at bedtime    Other:   artificial  tears Solution 1 Drop(s) Both EYES four times a day  calcium carbonate 1250 mG  + Vitamin D (OsCal 500 + D) 1 Tablet(s) Oral three times a day  chlorhexidine 2% Cloths 1 Application(s) Topical once  cholecalciferol 1000 Unit(s) Oral daily  dexAMETHasone  Injectable 4 milliGRAM(s) IV Push every 6 hours  dorzolamide 2%/timolol 0.5% Ophthalmic Solution 1 Drop(s) Both EYES two times a day  enoxaparin Injectable 40 milliGRAM(s) SubCutaneous daily  latanoprost 0.005% Ophthalmic Solution 1 Drop(s) Both EYES at bedtime  lidocaine 1% Injectable 0.2 milliLiter(s) Local Injection once        DIET: regular

## 2020-11-16 NOTE — PROGRESS NOTE ADULT - ASSESSMENT
ASSESSMENT: 50 year old man who lives in a group home with PMH hydrocephalus at age 2 months s/p  shunt with multiple revisions over the years, craniotomy many years later, developmental delay, glaucoma, legally blind, BPH, hiatal hernia, Mild PIERCE not on CPAP and epilepsy who was found with cervical spinal cord compression and myelomalacia and was admitted 11/12 through same day admitting and underwent scheduled C3-4-5 anterior cervical discectomy and fusion. Post operative course uncomplicated. HMV removed on post op day # 4.    NEURO:  - HMV removed today  - Pain control with Tylenol PRN, Valium PRN for muscle spasm  - Continue Onfi 10mg BID and Vimpat 300mg BID for history of epilepsy  - Continue Lexapro 10mg daily   - Neuro checks every 4 hours  - Encouraged mobilization with assistance     PULM:  - Encouraged incentive spirometry  - Continue Claritin     CV:  - Keep normotensive    RENAL:  - IVL  - Voiding in urinal    GI:  - tolerating regular diet  - Continue Protonix for Hiatal hernia and GI prophylaxis while on steroids  - Continue senna, miralax for bowel regimen    ENDO:   - Goal euglycemia (-180)    HEME/ONC:  - VTE prophylaxis: [x] SCDs [x] chemoprophylaxis: SQL 40 daily  - LE dopplers on admission negative for DVT    ID:  - Afrebrile    DISPO:  - PT/OT: [facility]  - PMR pending. Patient is from a group home and social work following to assist with discharge process.   - [employer] # [id number]  - Will discuss with Dr. travis

## 2020-11-16 NOTE — CHART NOTE - NSCHARTNOTEFT_GEN_A_CORE
Patient's Aunt and Health Care Proxy Christi came to bedside this afternoon. She states she prefers to take Andi to her home where he can receive home care rather than going to an inpatient rehab facility for risk of COVID-19 exposure as well as limited visitation. She has concerns that an inpatient rehab facility may not be able to care for him well given his developmental delay and blindness. She would prefer to take him home - plan to be discussed with group home administration by social work team.

## 2020-11-17 ENCOUNTER — TRANSCRIPTION ENCOUNTER (OUTPATIENT)
Age: 50
End: 2020-11-17

## 2020-11-17 VITALS
DIASTOLIC BLOOD PRESSURE: 76 MMHG | RESPIRATION RATE: 18 BRPM | OXYGEN SATURATION: 96 % | SYSTOLIC BLOOD PRESSURE: 117 MMHG | HEART RATE: 88 BPM | TEMPERATURE: 99 F

## 2020-11-17 PROCEDURE — 85027 COMPLETE CBC AUTOMATED: CPT

## 2020-11-17 PROCEDURE — 86905 BLOOD TYPING RBC ANTIGENS: CPT

## 2020-11-17 PROCEDURE — U0003: CPT

## 2020-11-17 PROCEDURE — 97110 THERAPEUTIC EXERCISES: CPT

## 2020-11-17 PROCEDURE — 86880 COOMBS TEST DIRECT: CPT

## 2020-11-17 PROCEDURE — C1889: CPT

## 2020-11-17 PROCEDURE — 97166 OT EVAL MOD COMPLEX 45 MIN: CPT

## 2020-11-17 PROCEDURE — 86850 RBC ANTIBODY SCREEN: CPT

## 2020-11-17 PROCEDURE — 97161 PT EVAL LOW COMPLEX 20 MIN: CPT

## 2020-11-17 PROCEDURE — 86870 RBC ANTIBODY IDENTIFICATION: CPT

## 2020-11-17 PROCEDURE — 97535 SELF CARE MNGMENT TRAINING: CPT

## 2020-11-17 PROCEDURE — 85025 COMPLETE CBC W/AUTO DIFF WBC: CPT

## 2020-11-17 PROCEDURE — 97530 THERAPEUTIC ACTIVITIES: CPT

## 2020-11-17 PROCEDURE — 93970 EXTREMITY STUDY: CPT

## 2020-11-17 PROCEDURE — 86922 COMPATIBILITY TEST ANTIGLOB: CPT

## 2020-11-17 PROCEDURE — C1713: CPT

## 2020-11-17 PROCEDURE — 86901 BLOOD TYPING SEROLOGIC RH(D): CPT

## 2020-11-17 PROCEDURE — 70450 CT HEAD/BRAIN W/O DYE: CPT

## 2020-11-17 PROCEDURE — 97116 GAIT TRAINING THERAPY: CPT

## 2020-11-17 PROCEDURE — 72125 CT NECK SPINE W/O DYE: CPT

## 2020-11-17 PROCEDURE — 80048 BASIC METABOLIC PNL TOTAL CA: CPT

## 2020-11-17 PROCEDURE — 76000 FLUOROSCOPY <1 HR PHYS/QHP: CPT

## 2020-11-17 PROCEDURE — 86900 BLOOD TYPING SEROLOGIC ABO: CPT

## 2020-11-17 RX ORDER — ESCITALOPRAM OXALATE 10 MG/1
1 TABLET, FILM COATED ORAL
Qty: 0 | Refills: 0 | DISCHARGE

## 2020-11-17 RX ORDER — IBUPROFEN 200 MG
1 TABLET ORAL
Qty: 0 | Refills: 0 | DISCHARGE

## 2020-11-17 RX ORDER — ACETAMINOPHEN 500 MG
2 TABLET ORAL
Qty: 0 | Refills: 0 | DISCHARGE
Start: 2020-11-17

## 2020-11-17 RX ORDER — DIAZEPAM 5 MG
1 TABLET ORAL
Qty: 30 | Refills: 0
Start: 2020-11-17

## 2020-11-17 RX ORDER — CLOBAZAM 10 MG/1
1 TABLET ORAL
Qty: 60 | Refills: 0
Start: 2020-11-17

## 2020-11-17 RX ORDER — AMOXICILLIN 250 MG/5ML
4 SUSPENSION, RECONSTITUTED, ORAL (ML) ORAL
Qty: 0 | Refills: 0 | DISCHARGE

## 2020-11-17 RX ORDER — LAMOTRIGINE 25 MG/1
3 TABLET, ORALLY DISINTEGRATING ORAL
Qty: 30 | Refills: 0
Start: 2020-11-17

## 2020-11-17 RX ORDER — SENNA PLUS 8.6 MG/1
2 TABLET ORAL
Qty: 0 | Refills: 0 | DISCHARGE
Start: 2020-11-17

## 2020-11-17 RX ORDER — CLOBAZAM 10 MG/1
0 TABLET ORAL
Qty: 0 | Refills: 0 | DISCHARGE
Start: 2020-11-17

## 2020-11-17 RX ORDER — LAMOTRIGINE 25 MG/1
3 TABLET, ORALLY DISINTEGRATING ORAL
Qty: 0 | Refills: 0 | DISCHARGE

## 2020-11-17 RX ORDER — CLOBAZAM 10 MG/1
1 TABLET ORAL
Qty: 0 | Refills: 0 | DISCHARGE

## 2020-11-17 RX ADMIN — PANTOPRAZOLE SODIUM 40 MILLIGRAM(S): 20 TABLET, DELAYED RELEASE ORAL at 05:01

## 2020-11-17 RX ADMIN — DORZOLAMIDE HYDROCHLORIDE TIMOLOL MALEATE 1 DROP(S): 20; 5 SOLUTION/ DROPS OPHTHALMIC at 05:01

## 2020-11-17 RX ADMIN — Medication 1 TABLET(S): at 05:01

## 2020-11-17 RX ADMIN — Medication 1 DROP(S): at 05:01

## 2020-11-17 RX ADMIN — Medication 650 MILLIGRAM(S): at 00:05

## 2020-11-17 RX ADMIN — LORATADINE 10 MILLIGRAM(S): 10 TABLET ORAL at 11:59

## 2020-11-17 RX ADMIN — CLOBAZAM 10 MILLIGRAM(S): 10 TABLET ORAL at 05:01

## 2020-11-17 RX ADMIN — ESCITALOPRAM OXALATE 10 MILLIGRAM(S): 10 TABLET, FILM COATED ORAL at 11:58

## 2020-11-17 RX ADMIN — Medication 1 DROP(S): at 11:57

## 2020-11-17 RX ADMIN — ENOXAPARIN SODIUM 40 MILLIGRAM(S): 100 INJECTION SUBCUTANEOUS at 12:00

## 2020-11-17 RX ADMIN — Medication 1000 UNIT(S): at 11:59

## 2020-11-17 RX ADMIN — LAMOTRIGINE 300 MILLIGRAM(S): 25 TABLET, ORALLY DISINTEGRATING ORAL at 05:01

## 2020-11-17 NOTE — PROGRESS NOTE ADULT - ASSESSMENT
ASSESSMENT: 50 year old man who lives in a group home with PMH hydrocephalus at age 2 months s/p  shunt with multiple revisions over the years, craniotomy many years later, developmental delay, glaucoma, legally blind, BPH, hiatal hernia, Mild PIERCE not on CPAP and epilepsy who was found with cervical spinal cord compression and myelomalacia and was admitted 11/12 through same day admitting and underwent scheduled C3-4-5 anterior cervical discectomy and fusion. Post operative course uncomplicated. HMV removed on post op day # 4.    NEURO:  - HMV removed today  - Pain control with Tylenol PRN, Valium PRN for muscle spasm  - Continue Onfi 10mg BID and Vimpat 300mg BID for history of epilepsy  - Continue Lexapro 10mg daily   - Neuro checks every 4 hours  - Encouraged mobilization with assistance     PULM:  - Encouraged incentive spirometry  - Continue Claritin     CV:  - Keep normotensive    RENAL:  - IVL  - Voiding in urinal    GI:  - tolerating regular diet  - Continue Protonix for Hiatal hernia and GI prophylaxis while on steroids  - Continue senna, miralax for bowel regimen    ENDO:   - Goal euglycemia (-180)    HEME/ONC:  - VTE prophylaxis: [x] SCDs [x] chemoprophylaxis: SQL 40 daily  - LE dopplers on admission negative for DVT    ID:  - Afrebrile    DISPO:  - PT/OT: [facility]  - PMR pending. Patient is from a group home and social work following to assist with discharge process.   - [employer] # [id number]  - Will discuss with Dr. travis ASSESSMENT: 50 year old man who lives in a group home with PMH hydrocephalus at age 2 months s/p  shunt with multiple revisions over the years, craniotomy many years later, developmental delay, glaucoma, legally blind, BPH, hiatal hernia, Mild PIERCE not on CPAP and epilepsy who was found with cervical spinal cord compression and myelomalacia and was admitted 11/12 through same day admitting and underwent scheduled C3-4-5 anterior cervical discectomy and fusion. Post operative course uncomplicated. HMV removed on post op day # 4.    PLAN:  -Neuro stable   - Pain control with Tylenol PRN, Valium PRN for muscle spasm  - Continue Onfi 10mg BID and Vimpat 300mg BID for history of epilepsy  - Continue Lexapro for depression   - Neuro checks every 4 hours  - Encouraged incentive spirometry  - Continue Claritin   -Regular diet    Continue senna, miralax for bowel regimen  -DVT ppx: venodynes and sql   - PT/OT: [facility]; PT/OT saw the patient and they recommended FRANCIS howerver patient's Aunt and Health Care Proxy Christi states she prefers to take Andi to her home where he can receive home care rather than going to an inpatient rehab facility for risk of COVID-19 exposure as well as limited visitation. She has concerns that an inpatient rehab facility may not be able to care for him well given his developmental delay and blindness. She would prefer to take him home and assumes full responsibility if he were to fall or injure himself at home since they don't want him to go to rehab.  Gabrielle ([phone]) also to speak to Christi regarding possibly having an aid to help out for a safe discharge home.    Will discuss above with Dr. Viramontes  [employer] #[id number]

## 2020-11-17 NOTE — DISCHARGE NOTE NURSING/CASE MANAGEMENT/SOCIAL WORK - NSDCFUADDAPPT_GEN_ALL_CORE_FT
Your follow up appointment with Dr. Viramontes on 11/25 at 9 am.  Please follow up with your neurologist after discharge.   Please make an appointment for follow up with your primary care physician after discharge.     To explore private pay aide:  [facility] [phone]    Please follow up with Ashley Castro ([phone]) [facility] Care Manager regarding potential for move to [facility] in [facility]

## 2020-11-17 NOTE — PROGRESS NOTE ADULT - SUBJECTIVE AND OBJECTIVE BOX
SUBJECTIVE:     OVERNIGHT EVENTS:     Vital Signs Last 24 Hrs  T(C): 36.5 (17 Nov 2020 07:00), Max: 37.1 (16 Nov 2020 23:22)  T(F): 97.7 (17 Nov 2020 07:00), Max: 98.8 (16 Nov 2020 23:22)  HR: 94 (17 Nov 2020 07:00) (90 - 112)  BP: 100/66 (17 Nov 2020 07:00) (100/66 - 129/89)  BP(mean): --  RR: 18 (17 Nov 2020 07:00) (17 - 18)  SpO2: 94% (17 Nov 2020 07:00) (94% - 96%)    PHYSICAL EXAM:    General: No Acute Distress     Neurological: Awake, alert oriented to person, place and time, Following Commands, PERRL, EOMI, Face Symmetrical, Speech Fluent, Moving all extremities, Muscle Strength normal in all four extremities, No Drift, Sensation to Light Touch Intact    Pulmonary: Clear to Auscultation, No Rales, No Rhonchi, No Wheezes     Cardiovascular: S1, S2, Regular Rate and Rhythm     Gastrointestinal: Soft, Nontender, Nondistended     Incision:     LABS:             11-16 @ 07:01  -  11-17 @ 07:00  --------------------------------------------------------  IN: 1560 mL / OUT: 700 mL / NET: 860 mL      DRAINS:     MEDICATIONS:  Antibiotics:  ceFAZolin   IVPB 2000 milliGRAM(s) IV Intermittent once    Neuro:  acetaminophen   Tablet .. 650 milliGRAM(s) Oral every 6 hours PRN Temp greater or equal to 38C (100.4F), Mild Pain (1 - 3)  acetaminophen  IVPB .. 1000 milliGRAM(s) IV Intermittent once PRN Severe Pain (7 - 10)  cloBAZam 10 milliGRAM(s) Oral two times a day  diazepam    Tablet 5 milliGRAM(s) Oral every 8 hours PRN spasm  escitalopram 10 milliGRAM(s) Oral daily  lamoTRIgine 300 milliGRAM(s) Oral two times a day    Cardiac:    Pulm:  loratadine 10 milliGRAM(s) Oral daily    GI/:  pantoprazole    Tablet 40 milliGRAM(s) Oral before breakfast  polyethylene glycol 3350 17 Gram(s) Oral two times a day  psyllium Powder 1 Packet(s) Oral daily  senna 2 Tablet(s) Oral at bedtime    Other:   artificial  tears Solution 1 Drop(s) Both EYES four times a day  calcium carbonate 1250 mG  + Vitamin D (OsCal 500 + D) 1 Tablet(s) Oral three times a day  chlorhexidine 2% Cloths 1 Application(s) Topical once  cholecalciferol 1000 Unit(s) Oral daily  dorzolamide 2%/timolol 0.5% Ophthalmic Solution 1 Drop(s) Both EYES two times a day  enoxaparin Injectable 40 milliGRAM(s) SubCutaneous daily  latanoprost 0.005% Ophthalmic Solution 1 Drop(s) Both EYES at bedtime  lidocaine 1% Injectable 0.2 milliLiter(s) Local Injection once    DIET: [] Regular [] CCD [] Renal [] Puree [] Dysphagia [] Tube Feeds:     IMAGING:    SUBJECTIVE: Patient seen and examined at bedside. Denies any complaints at this time.     OVERNIGHT EVENTS: none     Vital Signs Last 24 Hrs  T(C): 36.5 (17 Nov 2020 07:00), Max: 37.1 (16 Nov 2020 23:22)  T(F): 97.7 (17 Nov 2020 07:00), Max: 98.8 (16 Nov 2020 23:22)  HR: 94 (17 Nov 2020 07:00) (90 - 112)  BP: 100/66 (17 Nov 2020 07:00) (100/66 - 129/89)  BP(mean): --  RR: 18 (17 Nov 2020 07:00) (17 - 18)  SpO2: 94% (17 Nov 2020 07:00) (94% - 96%)    PHYSICAL EXAM:    General: No Acute Distress     Neurological:  Awake, alert, oriented to person, place and time, developmental delay, following commands, no drift, moves all extremities with 5/5 strength, sensation intact to light touch throughout, legally blind at baseline    Pulmonary: Clear to Auscultation, No Rales, No Rhonchi, No Wheezes     Cardiovascular: S1, S2, Regular Rate and Rhythm     Gastrointestinal: Soft, Nontender, Nondistended     Incision: c/d/i     LABS: no new labs      MEDICATIONS  (STANDING):  artificial  tears Solution 1 Drop(s) Both EYES four times a day  calcium carbonate 1250 mG  + Vitamin D (OsCal 500 + D) 1 Tablet(s) Oral three times a day  ceFAZolin   IVPB 2000 milliGRAM(s) IV Intermittent once  chlorhexidine 2% Cloths 1 Application(s) Topical once  cholecalciferol 1000 Unit(s) Oral daily  cloBAZam 10 milliGRAM(s) Oral two times a day  dorzolamide 2%/timolol 0.5% Ophthalmic Solution 1 Drop(s) Both EYES two times a day  enoxaparin Injectable 40 milliGRAM(s) SubCutaneous daily  escitalopram 10 milliGRAM(s) Oral daily  lamoTRIgine 300 milliGRAM(s) Oral two times a day  latanoprost 0.005% Ophthalmic Solution 1 Drop(s) Both EYES at bedtime  lidocaine 1% Injectable 0.2 milliLiter(s) Local Injection once  loratadine 10 milliGRAM(s) Oral daily  pantoprazole    Tablet 40 milliGRAM(s) Oral before breakfast  polyethylene glycol 3350 17 Gram(s) Oral two times a day  psyllium Powder 1 Packet(s) Oral daily  senna 2 Tablet(s) Oral at bedtime    MEDICATIONS  (PRN):  acetaminophen   Tablet .. 650 milliGRAM(s) Oral every 6 hours PRN Temp greater or equal to 38C (100.4F), Mild Pain (1 - 3)  acetaminophen  IVPB .. 1000 milliGRAM(s) IV Intermittent once PRN Severe Pain (7 - 10)  diazepam    Tablet 5 milliGRAM(s) Oral every 8 hours PRN spasm    DIET: regular diet     IMAGING: < from: CT Head No Cont (11.16.20 @ 19:07) >    INTERPRETATION:  INDICATIONS:  check shunt, headaches  .    TECHNIQUE:  Serial axial images were obtained from the skull base to the vertex without intravenous contrast. Coronal and sagittal reformatted images were obtained.    COMPARISON EXAMINATION: 7/10/2020 CT    FINDINGS:  VENTRICLES AND SULCI:  Unchanged in appearance. Multiple intraventricular catheters are in place arising from a right frontal and right parietal approach. A retained catheter is seen within the left occipital horn.  INTRA-AXIAL:  Mild encephalomalacia/gliosis is seen within the right occipital lobe, stable. No mass effect or hemorrhage is seen.  EXTRA-AXIAL:  No mass or collection is seen.  VISUALIZED SINUSES:  Mild mucosal thickening  VISUALIZED MASTOIDS:  Clear.  CALVARIUM:  Unchanged. Multiple colin holes. Biparietal craniotomy/osteotomies  MISCELLANEOUS:  None.    IMPRESSION:  No significant change from the prior exam.    No masseffect or hemorrhage.    < end of copied text >    < from: [facility] Duplex Lower Ext Vein Scan, Bilat (11.13.20 @ 14:05) >  IMPRESSION:  No evidence of deep venous thrombosis in either lower extremity.    < end of copied text >

## 2020-11-17 NOTE — DISCHARGE NOTE NURSING/CASE MANAGEMENT/SOCIAL WORK - PATIENT PORTAL LINK FT
You can access the FollowMyHealth Patient Portal offered by [facility] by registering at the following website: [email or web address]. By joining [facility]’s FollowMyHealth portal, you will also be able to view your health information using other applications (apps) compatible with our system.

## 2020-11-17 NOTE — DISCHARGE NOTE NURSING/CASE MANAGEMENT/SOCIAL WORK - NSDCPEPTSTRK_GEN_ALL_CORE
Thank you very much for your kind referral of Chucky Evans for preconception consultation at [facility] on 2019  Yuriy Altamirano is a 56-year-old white female who presents for the indications of advanced maternal age and secondary infertility  Yuriy Altamirano had an initial gestation with a diagnosis of a partial molar pregnancy resulting in spontaneous pregnancy loss and management by a D & E procedure  In , she had an unremarkable pregnancy resulting in delivery by  section at term for the indication of a breech presentation  Yuriy Altamirano had a subsequent uncomplicated pregnancy in , delivered by repeat  section at term  Each of her babies had normal birth weights and each is currently healthy  Yuriy Altamirano had a spontaneous first-trimester pregnancy loss in   She had an elective first-trimester  in , and a spontaneous early first-trimester pregnancy loss earlier in   She and her  have had unprotected intercourse for the past 4 to 5 months with inability to conceive again  Yuriy Altamirano has a history of anxiety disorder, currently treated with Wellbutrin and Celexa  Her only other daily medication is a prenatal vitamin  Her past medical history is otherwise unremarkable  Her past surgical history is significant for a bunionectomy otherwise  She has a history of breast cysts, with dense breast tissue, though she has not required breast biopsy or other surgical management  She denies tobacco, alcohol, or illicit drug use  The family genetic history is negative with respect to genetic abnormalities, birth defects, or mental retardation  Her mom has diabetes and hypertension  There is no other first-degree family member with a diagnosis of diabetes, hypertension, venous thromboembolism, or preeclampsia  Her gyn history is unremarkable  She is currently not using contraception  She has a history of regular menses  She has no history of cervical or ovarian surgeries      I had a long discussion with Abeba Us and her  in my office  We discussed increased risks associated with advanced maternal age during pregnancy  At age 38, the risk for a live-born baby with Down syndrome is one in 27, with a risk for a live-born baby with any chromosomal abnormality of one in 21  We briefly discussed options for prenatal diagnosis, including chorionic villus sampling and genetic amniocentesis  Non invasive prenatal testing is not diagnostic, but has a sensitivity for identification of Down syndrome of 99%  Genetic counseling will be important in any subsequent pregnancy during the first trimester to further discuss age-related genetic risks  Genetic counseling is also recommended to discuss options for expanded carrier screening  Her age with subsequent pregnancy will be associated with an increased risk for medical complications, including preeclampsia  Daily low-dose aspirin therapy will significantly reduce her risk  Her age will be associated with an increased risk for fetal growth abnormalities  Serial interval growth scans will be recommended during the second half of any future pregnancy  Her age will also be associated with an increased risk for stillbirth  Nonstress testing will be recommended for late third trimester fetal surveillance  We also discussed potential complications related to her history of two prior  section, including abnormal placentation  I recommended  that Abeba Us continue taking her prenatal vitamin every day, as the folic acid component is important to reduce her risk to have a baby with birth defects, especially an open neural tube defect  Bupropion (Wellbutrin) has not been associated with an increase in congenital defects in experimental animal studies  Human studies have been inconsistent with respect to heart defects  Citalopram (Celexa) is a serotonin reuptake inhibitor used as an antidepressant   Based on experimental animal studies and human reports, therapeutic use of citalopram is not expected to increase the risk of congenital anomalies  Use of serotonin reuptake inhibitors late in pregnancy can be associated with a mild transient  syndrome of central nervous system, motor, respiratory, and gastrointestinal signs  Zara Orr is quite anxious regarding her inability to conceive another pregnancy, with particular concerns related to her age and possible decreased infertility  I recommended referral to Dr Alan Mata [facility] for further evaluation in this respect  It was a pleasure to meet with Zara Orr and her  in my office today  Please do not hesitate to notify me if I can help in any other way in her future care  The face-to-face time, all of which was spent during counseling and coordination of care, was 30 minutes  Need for follow up after discharge/Risk factors for stroke/Stroke warning signs and symptoms/Signs and symptoms of stroke/Call 911 for stroke/Prescribed medications/Stroke education booklet/Stroke support groups for patients, families, and friends

## 2020-11-24 ENCOUNTER — APPOINTMENT (OUTPATIENT)
Dept: NEUROLOGY | Facility: CLINIC | Age: 50
End: 2020-11-24
Payer: MEDICARE

## 2020-11-24 VITALS
HEART RATE: 111 BPM | SYSTOLIC BLOOD PRESSURE: 123 MMHG | BODY MASS INDEX: 28.32 KG/M2 | HEIGHT: 65 IN | DIASTOLIC BLOOD PRESSURE: 81 MMHG | WEIGHT: 170 LBS

## 2020-11-24 VITALS — TEMPERATURE: 97.3 F

## 2020-11-24 PROCEDURE — 99215 OFFICE O/P EST HI 40 MIN: CPT

## 2020-11-24 NOTE — HISTORY OF PRESENT ILLNESS
[FreeTextEntry1] : //***UPDATE:11/24/20***\par Mr Andi Ferrara is accompanied by his legal guardian Ms Melissa Guy\par He is doing well overall.\par Had surgery for his cervical stenosis and much improved.  Mild seizures shortly after surgery, but doing well.\par \par ***UPDATE:7/31/20***\par Mr Andi Ferrara is accompanied by his legal guardian Ms Melissa Guy\par He has no reported interval seizures . He is doing well overall.\par \par Patient now with intermittent weakness when walking.  However, had bug bite to right ankle in the fall and has been walking less.  No issues bowel/bladder but mild ataxia.\par \par ***UPDATE:2/3/20***\par Mr Andi Ferrara is accompanied by his legal guardian Ms Melissa Guy\par He has no reported interval seizures . he had a fall on 1/25 but did not hit his head. It was noted that he had a bug bite from end of September on right ankle which is now swollen and red\par \par LTG 300mg BID\par Onfi 5mg BID\par Primidone 250mg BID\par \par Last office visit:\par  \par Possible small seizure a few weeks ago where was a little confused at bowling.  Had CT head which showed no change.\par \par On LTG 300mg bid and Primidone bid. Onfi 5mg bid.\par \par Seeing psych who started Lexapro 10 daily and Zyprexa\par \par Mr. 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  He comes accompanied by his aunt and uncle (legal guardians) and the .\par \par The seizures started shortly after birth with unclear details.  He was in the ICU for some time and found to have hydrocephalus.  At 16 months of age, his shunt was found to be malfunctioning and a new shunt was placed.  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  In 2013, the shut malfunctioned once again with further visual loss, now with only vision to light in the right eye and no vision in the left eye.\par \par The seizures at a younger age are unclear in semiology.  However, for the past several decades his typical seizures have been pulling at his clothes for seconds to minutes with confusion thereafter.  He gets confused with these events with behavioral arrest. \par the exact frequency is unknown (perhaps several times a month).  He does not have convulsions.\par \par In addition to the seizures above, he has been having behavioral outbursts at his home.  He gets mad at times or very tired at times.  It has been unclear if these are seizures.  Also episodes of head pain as well.  This has been followed by his psychiatrist.\par \par The patient has been tried on several AEDS (PHT, Vimpat, VPA, PHB, CBZ, LEV, Banzel, TPM) and has not been treated with these meds.  He is currently on Primidone 250 bidand Lamictal 400mg bid.  He was tried on Onfi 5mg bid in December 2015  and did well, but this was stopped because he ran out.  This was restarted in February 2016 at 5mg bid.\par \par The patient had a VNS placed several years ago at "maximum output" and [place] [facility]  It was stated that would be difficult to do any further surgery on him.\par \par CT scans in the past have shown right parietal encephalomalacia.  EEG studies have shown (AEEG and VEEG) bilateral temporal frontal discharges (R>L) with one seizure caught on the right in the past.\par \par He has been followed by Dr. Phillips and at [facility] in the past.  VEEG testing July 2016 showed no seizures and no changes to meds.  Also showed episodes of headache and pulling at shirt with no changes.  Still with headaches at this time with intermittent abdominal pain with no known etiology.\par \par Several seizures with ZNS increased to 100 bid earlier in the year.  Worsening of behavior with ZNS and no improvement.  We stopped ZNS and still with behavioral problems.\par \par VNS was shut off/\par \par PMHx as above\par FHx No seizures\par Meds/All: see below\par

## 2020-11-24 NOTE — DISCUSSION/SUMMARY
[FreeTextEntry1] : 50 year old man with history developmental delay and epilepsy with shunt placement and VNS placement.\par \par Had long discussion about options.  \par \par The events in the hospital (shirt pulling, headache, etc) were not associated with seizure activity.  His behavioral episodes also unlikely 2/2 seizure activity.  As for headache, no change in CT and not severe.  Not complaining of pain at this time.\par \par Likely generalized decompensation from issue with ankle causing weakness.  However, difficult to get full history and would want to check MRI brain and C-spine for ? cerebellar issues and ? stenosis.  Needs PT at this time.\par \par Plan;\par Continue current AED regimen\par -reviewed seizure triggers\par - annual labwork including AED levels\par - psych (in [facility]) for behavior\par -follow up in 4 months\par -For seizures, likely secondary to physical stress after surgery.  Doing well now and would not make any changes at this time.\par \par Patient seen 40 min face to face with >50% in counseling and discussion.\par \par  [Medically Refractory (seizure within the last year)] : Medically Refractory (seizure within the last year) [Evaluation for interictal epileptiform activity, subclinical seizures, and risk stratification (typically 2-3 days)] : Evaluation for interictal epileptiform activity, subclinical seizures, and risk stratification (typically 2-3 days)

## 2020-11-25 ENCOUNTER — APPOINTMENT (OUTPATIENT)
Dept: SPINE | Facility: CLINIC | Age: 50
End: 2020-11-25

## 2020-11-27 ENCOUNTER — APPOINTMENT (OUTPATIENT)
Dept: SPINE | Facility: CLINIC | Age: 50
End: 2020-11-27
Payer: MEDICARE

## 2020-11-27 VITALS
WEIGHT: 170 LBS | TEMPERATURE: 97.6 F | DIASTOLIC BLOOD PRESSURE: 72 MMHG | BODY MASS INDEX: 28.32 KG/M2 | HEART RATE: 105 BPM | SYSTOLIC BLOOD PRESSURE: 104 MMHG | HEIGHT: 65 IN

## 2020-11-27 DIAGNOSIS — Z82.3 FAMILY HISTORY OF STROKE: ICD-10-CM

## 2020-11-27 PROCEDURE — 99024 POSTOP FOLLOW-UP VISIT: CPT

## 2020-11-27 NOTE — ASSESSMENT
[FreeTextEntry1] : 15 days post ACDF C 3 to C 5 and increased strength of UE.  Gait improved. Incision site is clean and dry with steri strips intact.  he will increase his walking.  Continue Home Care PT .  Follow up with Dr Beard.  In three weeks he will return with a cervical AP and lateral xray.  No lifting over 10 pounds.  keep incision clean and dry reporting any s/s of infection.

## 2020-11-27 NOTE — REASON FOR VISIT
[de-identified] : 11/12/2020 [de-identified] : 15 [de-identified] : 3 [de-identified] : C 3 C 4 , C 4 C 5 anterior cervical discectomy and insertion of intervertebral cage with anterior instrumentation and plating.   [Family Member] : family member

## 2020-11-27 NOTE — HISTORY OF PRESENT ILLNESS
[FreeTextEntry1] : This is a 50 year old male mentally challenged and legally blind who carries a past history of congenital hydrocephalus.   He had a VPS placed in an OSH and came to the office with recently with UE weakness and gait difficulties.   He is now 15 days post op following a ACDF of C 3  C 4,  C4  C 5.  His incision is clean and dry with some steri strips in place.  he denies any swallowing issues.  His gait has improved and his UE are stronger.  He is now able to walk through his neighborhood [place] and perform daily self care and dressing since his UE have become stronger.  He takes Tylenol for occasional pain in his neck.  Home care PT is in place.

## 2020-11-27 NOTE — PHYSICAL EXAM
[General Appearance - Alert] : alert [General Appearance - In No Acute Distress] : in no acute distress [General Appearance - Well Nourished] : well nourished [General Appearance - Well Developed] : well developed [Longitudinal] : longitudinal [Clean] : clean [Dry] : dry [Healing Well] : healing well [Intact] : intact [No Drainage] : without drainage [Normal Skin] : normal [Erythema] : not erythematous [Warm] : not warm [Normal Skin Turgor] : skin turgor was normal [Person] : oriented to person [Place] : oriented to place [Time] : oriented to time [Cranial Nerves Facial (VII)] : face symmetrical [Cranial Nerves Accessory (XI - Cranial And Spinal)] : head turning and shoulder shrug symmetric [Sensation Tactile Decrease] : light touch was intact [FreeTextEntry6] : general weakness of all four extremities [Sclera] : the sclera and conjunctiva were normal [FreeTextEntry1] : Strabismus bilaterally  [Outer Ear] : the ears and nose were normal in appearance [] : no respiratory distress [Skin Color & Pigmentation] : normal skin color and pigmentation

## 2020-12-02 ENCOUNTER — APPOINTMENT (OUTPATIENT)
Dept: OTOLARYNGOLOGY | Facility: CLINIC | Age: 50
End: 2020-12-02
Payer: MEDICARE

## 2020-12-02 VITALS
TEMPERATURE: 97.6 F | BODY MASS INDEX: 28.32 KG/M2 | SYSTOLIC BLOOD PRESSURE: 120 MMHG | WEIGHT: 170 LBS | HEART RATE: 112 BPM | DIASTOLIC BLOOD PRESSURE: 71 MMHG | HEIGHT: 65 IN

## 2020-12-02 PROCEDURE — 31575 DIAGNOSTIC LARYNGOSCOPY: CPT | Mod: 58

## 2020-12-02 PROCEDURE — 99024 POSTOP FOLLOW-UP VISIT: CPT

## 2020-12-02 NOTE — REASON FOR VISIT
[Post-Operative Visit] : a post-operative visit [FreeTextEntry2] : S/p  ACDF with Dr. Viramontes 11/12/20

## 2020-12-02 NOTE — PROCEDURE
[de-identified] : Flexible scope #27 used. Passed through nasal passage and nasopharynx/oropharynx/hypopharynx clear. Supraglottis normal. Glottis with fully mobile vocal cords without lesions or masses. Visualized subglottis clear. Postcricoid area without erythema or edema. No pooling of secretions.\par \par

## 2020-12-02 NOTE — CONSULT LETTER
[Dear  ___] : Dear  [unfilled], [Consult Letter:] : I had the pleasure of evaluating your patient, [unfilled]. [Please see my note below.] : Please see my note below. [Consult Closing:] : Thank you very much for allowing me to participate in the care of this patient.  If you have any questions, please do not hesitate to contact me. [Sincerely,] : Sincerely, [FreeTextEntry3] : Sherrie Beard MD\par Otolaryngology and Cranial Base Surgery\par Attending Physician - Department of Otolaryngology and Head & Neck Surgery\par [facility]\par  - [employer] [facility]\par Office: [phone]\par Fax: [phone]\par

## 2020-12-02 NOTE — HISTORY OF PRESENT ILLNESS
[de-identified] : 51 y/o M with Congenital hydrocephalis and S/p Multiple neck surgeries for shunt procedures.  Notes has had Shunts b/l (first the left side which was then partially removed and most recently right sided shunt which is functional). Currently Shunt on the right is functional and left is nonfunctional.   \par Now s/p  ACDF with Dr. Viramontes 11/12/20\par He notes voice is good.  No trouble eating or drinking.  No pain.

## 2020-12-02 NOTE — ASSESSMENT
[FreeTextEntry1] : S/p ACDF with Dr. Viramontes 11/12/2020:\par - Vocal cords intact b/l \par - Healing well. \par - F/U PRN

## 2020-12-02 NOTE — PHYSICAL EXAM
[Normal] : temporomandibular joint is normal [de-identified] : neck incision well healed, steri strips all removed

## 2020-12-03 ENCOUNTER — NON-APPOINTMENT (OUTPATIENT)
Age: 50
End: 2020-12-03

## 2020-12-05 NOTE — DISCHARGE NOTE NURSING/CASE MANAGEMENT/SOCIAL WORK - NSDPACMPNYOTHER_GEN_ALL_CORE
aunt 41F no PMH p/w several complaints. 10d ago pt developed a few small red spots on her b/l LE. 6d ago pt developed fevers which has since resolved. Also w/ R temporal HA, x1w, intermittent. Also w/ occasional NBNB. Also intermittent abd pain, none currently. Also very minimal sore throat, resolved. Minimal diarrhea.   NP Ma [phone]: 4d ago pt had p/w fevers and ulcer. Had labs drawn was started doxycycline. Labs resulted today and WBC 2.59, platelets 120. NP called pt and pt was still c/o ulcer and HA so referred to ED. Vitals wnl, exam as above. Very well appearing.  ddx: Possible mild cellulitis at area of rash). clinically benign HA. Unclear etiology of minimal macular rash. Possible viral.  Labs, IVF/symptom control, COVID, reassess.

## 2020-12-15 ENCOUNTER — APPOINTMENT (OUTPATIENT)
Dept: SPINE | Facility: CLINIC | Age: 50
End: 2020-12-15
Payer: MEDICARE

## 2020-12-15 VITALS
TEMPERATURE: 97.7 F | BODY MASS INDEX: 28.32 KG/M2 | SYSTOLIC BLOOD PRESSURE: 114 MMHG | HEIGHT: 65 IN | WEIGHT: 170 LBS | OXYGEN SATURATION: 93 % | DIASTOLIC BLOOD PRESSURE: 78 MMHG | HEART RATE: 97 BPM

## 2020-12-15 PROCEDURE — 99024 POSTOP FOLLOW-UP VISIT: CPT

## 2020-12-15 NOTE — PHYSICAL EXAM
[General Appearance - Alert] : alert [General Appearance - In No Acute Distress] : in no acute distress [Well-Healed] : well-healed [Normal Skin] : normal [Oriented To Time, Place, And Person] : oriented to person, place, and time [Impaired Insight] : insight and judgment were intact [Limited Balance] : the patient's balance was impaired [FreeTextEntry5] : Impaired Vision [Neck Appearance] : the appearance of the neck was normal [] : no respiratory distress [Abdomen Soft] : soft [FreeTextEntry1] : Ambulate with one person Assist Spoke with Urology ZULEYKA Markham will evaluate patient in ED. Urology ZULEYKA miller/debo PT and Pt would prefer to try to go home. Pt has appt with Dr. Mcdonald on friday at 10:30a.  Pt to be sent home with toradol, percocet, abx and strainer.

## 2020-12-15 NOTE — REASON FOR VISIT
[de-identified] : S/P  C3-4, C4-5 anterior cervical discectomy.\par  C3-4, C4-5 insertion of intervertebral cage for purpose of stabilization arthrodesis.\par  C3C5 anterior instrumentation with plating.\par  [de-identified] : 11/12/20 [de-identified] : Today he is doing well\par Anterior incision well-healed\par His guardian reports improved walking and balance\par Patient reports improved pain syndrome

## 2020-12-15 NOTE — REVIEW OF SYSTEMS
[Poor Coordination] : poor coordination [Difficulty Walking] : difficulty walking [Arthralgias] : arthralgias [Negative] : Respiratory

## 2020-12-15 NOTE — ASSESSMENT
[FreeTextEntry1] : 50-year-old man with congenital hydrocephalus and mental/visual  impairment\par Post anterior cervical fusion\par \par Doing well\par Cervical x-ray with hardware and construct intact\par \par Dr. Viramontes saw and evaluated pt during this visit.\par Diagnostic images reviewed and results, including plan of care discussed\par \par \par Follow-up in 3 months with new x-ray

## 2021-01-21 ENCOUNTER — APPOINTMENT (OUTPATIENT)
Dept: NEUROLOGY | Facility: CLINIC | Age: 51
End: 2021-01-21
Payer: MEDICARE

## 2021-01-21 VITALS
SYSTOLIC BLOOD PRESSURE: 114 MMHG | HEIGHT: 65 IN | HEART RATE: 97 BPM | BODY MASS INDEX: 28.32 KG/M2 | DIASTOLIC BLOOD PRESSURE: 77 MMHG | WEIGHT: 170 LBS

## 2021-01-21 VITALS — TEMPERATURE: 97.2 F

## 2021-01-21 DIAGNOSIS — G91.9 HYDROCEPHALUS, UNSPECIFIED: ICD-10-CM

## 2021-01-21 DIAGNOSIS — G47.33 OBSTRUCTIVE SLEEP APNEA (ADULT) (PEDIATRIC): ICD-10-CM

## 2021-01-21 DIAGNOSIS — Z87.39 PERSONAL HISTORY OF OTHER DISEASES OF THE MUSCULOSKELETAL SYSTEM AND CONNECTIVE TISSUE: ICD-10-CM

## 2021-01-21 DIAGNOSIS — R25.1 TREMOR, UNSPECIFIED: ICD-10-CM

## 2021-01-21 DIAGNOSIS — R41.3 OTHER AMNESIA: ICD-10-CM

## 2021-01-21 DIAGNOSIS — Q04.8 OTHER SPECIFIED CONGENITAL MALFORMATIONS OF BRAIN: ICD-10-CM

## 2021-01-21 DIAGNOSIS — R45.1 RESTLESSNESS AND AGITATION: ICD-10-CM

## 2021-01-21 DIAGNOSIS — F41.9 ANXIETY DISORDER, UNSPECIFIED: ICD-10-CM

## 2021-01-21 DIAGNOSIS — R48.2 APRAXIA: ICD-10-CM

## 2021-01-21 PROCEDURE — 96116 NUBHVL XM PHYS/QHP 1ST HR: CPT | Mod: 59

## 2021-01-21 PROCEDURE — 99215 OFFICE O/P EST HI 40 MIN: CPT | Mod: 25

## 2021-01-21 PROCEDURE — G2212 PROLONG OUTPT/OFFICE VIS: CPT

## 2021-01-21 RX ORDER — DIPHENHYDRAMINE HCL 25 MG/1
25 CAPSULE ORAL
Refills: 0 | Status: COMPLETED | COMMUNITY
End: 2021-01-21

## 2021-01-21 RX ORDER — ACETAMINOPHEN 325 MG/1
325 TABLET ORAL
Refills: 0 | Status: COMPLETED | COMMUNITY
End: 2021-01-21

## 2021-01-21 RX ORDER — LAMOTRIGINE 100 MG/1
100 TABLET ORAL
Qty: 180 | Refills: 5 | Status: COMPLETED | COMMUNITY
Start: 2018-01-22 | End: 2021-01-21

## 2021-01-27 NOTE — OCCUPATIONAL THERAPY INITIAL EVALUATION ADULT - MD ORDER
OT Evaluate and Treat  Activity- OOB to Chair We are not the pcp. We are seeing him acutely due to illness.  Will refer to pcp

## 2021-02-04 ENCOUNTER — NON-APPOINTMENT (OUTPATIENT)
Age: 51
End: 2021-02-04

## 2021-02-08 ENCOUNTER — APPOINTMENT (OUTPATIENT)
Dept: NEUROLOGY | Facility: CLINIC | Age: 51
End: 2021-02-08
Payer: MEDICARE

## 2021-02-08 VITALS
DIASTOLIC BLOOD PRESSURE: 77 MMHG | SYSTOLIC BLOOD PRESSURE: 119 MMHG | HEART RATE: 100 BPM | WEIGHT: 170 LBS | HEIGHT: 65 IN | BODY MASS INDEX: 28.32 KG/M2

## 2021-02-08 PROCEDURE — 99215 OFFICE O/P EST HI 40 MIN: CPT

## 2021-02-08 NOTE — PHYSICAL EXAM
[General Appearance - Alert] : alert [General Appearance - In No Acute Distress] : in no acute distress [General Appearance - Well Nourished] : well nourished [Person] : oriented to person [Time] : oriented to time [Fluency] : fluency intact [Cranial Nerves Optic (II)] : visual acuity intact bilaterally,  visual fields full to confrontation, pupils equal round and reactive to light [Cranial Nerves Oculomotor (III)] : extraocular motion intact [Cranial Nerves Facial (VII)] : face symmetrical [Cranial Nerves Trigeminal (V)] : facial sensation intact symmetrically [Cranial Nerves Vestibulocochlear (VIII)] : hearing was intact bilaterally [Cranial Nerves Glossopharyngeal (IX)] : tongue and palate midline [Cranial Nerves Accessory (XI - Cranial And Spinal)] : head turning and shoulder shrug symmetric [Cranial Nerves Hypoglossal (XII)] : there was no tongue deviation with protrusion [Involuntary Movements] : no involuntary movements were seen [No Muscle Atrophy] : normal bulk in all four extremities [Sensation Tactile Decrease] : light touch was intact [Sensation Pain / Temperature Decrease] : pain and temperature was intact [Balance] : balance was intact [Past-pointing] : there was no past-pointing [Tremor] : no tremor present [2+] : Ankle jerk left 2+ [FreeTextEntry5] : Only vision to light in right eye, none in left eye [FreeTextEntry6] : Increased tone throughout, left greater than right.  5/5 strength. [Sclera] : the sclera and conjunctiva were normal [Extraocular Movements] : extraocular movements were intact [FreeTextEntry1] : see above [Full Pulse] : the pedal pulses are present [Edema] : there was no peripheral edema [Abnormal Walk] : normal gait [Nail Clubbing] : no clubbing  or cyanosis of the fingernails [Musculoskeletal - Swelling] : no joint swelling seen [Motor Tone] : muscle strength and tone were normal

## 2021-02-08 NOTE — DISCUSSION/SUMMARY
[FreeTextEntry1] : 50 year old man with history developmental delay and epilepsy with shunt placement and VNS placement.\par \par Had long discussion about options.  \par \par The events in the hospital (shirt pulling, headache, etc) were not associated with seizure activity.  His behavioral episodes also unlikely 2/2 seizure activity.  As for headache, no change in CT and not severe.  Not complaining of pain at this time.\par \par Now with no seizures and improved s/p surgery.  Being worked up for dementia.\par \par Plan;\par Continue current AED regimen\par -reviewed seizure triggers\par - annual labwork including AED levels\par - psych (in [facility]) for behavior\par -follow up in 4 months\par -For seizures, likely secondary to physical stress after surgery.  Doing well now and would not make any changes at this time.\par \par Patient seen 40 min face to face with >50% in counseling and discussion.\par \par  [Medically Refractory (seizure within the last year)] : Medically Refractory (seizure within the last year) [Evaluation for interictal epileptiform activity, subclinical seizures, and risk stratification (typically 2-3 days)] : Evaluation for interictal epileptiform activity, subclinical seizures, and risk stratification (typically 2-3 days)

## 2021-02-08 NOTE — HISTORY OF PRESENT ILLNESS
[FreeTextEntry1] : ***UPDATE:2/8/21***\par Mr Andi Ferrara is accompanied by his legal guardian Ms Melissa Guy\par He is doing well overall.\par Had surgery for his cervical stenosis and much improved.  Mild seizures shortly after surgery, but doing well.\par Being evaluated for possible dementia with PET pending.\par \par //***UPDATE:11/24/20***\par Mr Andi Ferrara is accompanied by his legal guardian Ms Melissa Guy\par He is doing well overall.\par Had surgery for his cervical stenosis and much improved.  Mild seizures shortly after surgery, but doing well.\par \par ***UPDATE:7/31/20***\par Mr Andi Ferrara is accompanied by his legal guardian Ms Melissa Guy\par He has no reported interval seizures . He is doing well overall.\par \par Patient now with intermittent weakness when walking.  However, had bug bite to right ankle in the fall and has been walking less.  No issues bowel/bladder but mild ataxia.\par \par ***UPDATE:2/3/20***\par Mr Andi Ferrara is accompanied by his legal guardian Ms Melissa Guy\par He has no reported interval seizures . he had a fall on 1/25 but did not hit his head. It was noted that he had a bug bite from end of September on right ankle which is now swollen and red\par \par LTG 300mg BID\par Onfi 5mg BID\par Primidone 250mg BID\par \par Last office visit:\par  \par Possible small seizure a few weeks ago where was a little confused at bowling.  Had CT head which showed no change.\par \par On LTG 300mg bid and Primidone bid. Onfi 5mg bid.\par \par Seeing psych who started Lexapro 10 daily and Zyprexa\par \par Mr. 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  He comes accompanied by his aunt and uncle (legal guardians) and the .\par \par The seizures started shortly after birth with unclear details.  He was in the ICU for some time and found to have hydrocephalus.  At 16 months of age, his shunt was found to be malfunctioning and a new shunt was placed.  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  In 2013, the shut malfunctioned once again with further visual loss, now with only vision to light in the right eye and no vision in the left eye.\par \par The seizures at a younger age are unclear in semiology.  However, for the past several decades his typical seizures have been pulling at his clothes for seconds to minutes with confusion thereafter.  He gets confused with these events with behavioral arrest. \par the exact frequency is unknown (perhaps several times a month).  He does not have convulsions.\par \par In addition to the seizures above, he has been having behavioral outbursts at his home.  He gets mad at times or very tired at times.  It has been unclear if these are seizures.  Also episodes of head pain as well.  This has been followed by his psychiatrist.\par \par The patient has been tried on several AEDS (PHT, Vimpat, VPA, PHB, CBZ, LEV, Banzel, TPM) and has not been treated with these meds.  He is currently on Primidone 250 bidand Lamictal 400mg bid.  He was tried on Onfi 5mg bid in December 2015  and did well, but this was stopped because he ran out.  This was restarted in February 2016 at 5mg bid.\par \par The patient had a VNS placed several years ago at "maximum output" and [place] [facility] [place]  It was stated that would be difficult to do any further surgery on him.\par \par CT scans in the past have shown right parietal encephalomalacia.  EEG studies have shown (AEEG and VEEG) bilateral temporal frontal discharges (R>L) with one seizure caught on the right in the past.\par \par He has been followed by Dr. Phillips and at [place] in the past.  VEEG testing July 2016 showed no seizures and no changes to meds.  Also showed episodes of headache and pulling at shirt with no changes.  Still with headaches at this time with intermittent abdominal pain with no known etiology.\par \par Several seizures with ZNS increased to 100 bid earlier in the year.  Worsening of behavior with ZNS and no improvement.  We stopped ZNS and still with behavioral problems.\par \par VNS was shut off/\par \par PMHx as above\par FHx No seizures\par Meds/All: see below\par

## 2021-02-11 ENCOUNTER — OUTPATIENT (OUTPATIENT)
Dept: OUTPATIENT SERVICES | Facility: HOSPITAL | Age: 51
LOS: 1 days | End: 2021-02-11

## 2021-02-11 ENCOUNTER — RESULT REVIEW (OUTPATIENT)
Age: 51
End: 2021-02-11

## 2021-02-11 ENCOUNTER — APPOINTMENT (OUTPATIENT)
Dept: NUCLEAR MEDICINE | Facility: CLINIC | Age: 51
End: 2021-02-11
Payer: MEDICARE

## 2021-02-11 DIAGNOSIS — G40.909 EPILEPSY, UNSPECIFIED, NOT INTRACTABLE, WITHOUT STATUS EPILEPTICUS: ICD-10-CM

## 2021-02-11 DIAGNOSIS — Z96.22 MYRINGOTOMY TUBE(S) STATUS: Chronic | ICD-10-CM

## 2021-02-11 DIAGNOSIS — T85.09XA OTHER MECHANICAL COMPLICATION OF VENTRICULAR INTRACRANIAL (COMMUNICATING) SHUNT, INITIAL ENCOUNTER: Chronic | ICD-10-CM

## 2021-02-11 PROCEDURE — 78608 BRAIN IMAGING (PET): CPT | Mod: 26

## 2021-02-23 ENCOUNTER — NON-APPOINTMENT (OUTPATIENT)
Age: 51
End: 2021-02-23

## 2021-03-01 ENCOUNTER — NON-APPOINTMENT (OUTPATIENT)
Age: 51
End: 2021-03-01

## 2021-03-09 ENCOUNTER — APPOINTMENT (OUTPATIENT)
Dept: SPINE | Facility: CLINIC | Age: 51
End: 2021-03-09
Payer: MEDICARE

## 2021-03-09 VITALS
WEIGHT: 175 LBS | DIASTOLIC BLOOD PRESSURE: 84 MMHG | SYSTOLIC BLOOD PRESSURE: 120 MMHG | BODY MASS INDEX: 29.16 KG/M2 | HEIGHT: 65 IN | OXYGEN SATURATION: 94 % | HEART RATE: 99 BPM | TEMPERATURE: 98.1 F

## 2021-03-09 DIAGNOSIS — G95.20 UNSPECIFIED CORD COMPRESSION: ICD-10-CM

## 2021-03-09 DIAGNOSIS — Z98.1 ARTHRODESIS STATUS: ICD-10-CM

## 2021-03-09 DIAGNOSIS — M48.02 SPINAL STENOSIS, CERVICAL REGION: ICD-10-CM

## 2021-03-09 PROCEDURE — 99213 OFFICE O/P EST LOW 20 MIN: CPT

## 2021-03-09 RX ORDER — CETIRIZINE HYDROCHLORIDE 10 MG/1
10 TABLET, COATED ORAL
Refills: 0 | Status: DISCONTINUED | COMMUNITY
End: 2021-03-09

## 2021-03-09 NOTE — REASON FOR VISIT
[Follow-Up: _____] : a [unfilled] follow-up visit [FreeTextEntry1] : Mr Ferrara is 4 months  post ACDF\par He has left shoulder pain \par No neck pain and significantly improved since surgery\par No falling since surgery and walks independently with no assistance \par he does have trouble moving his head to the left \par Right foot dragging noticed recently

## 2021-03-09 NOTE — ASSESSMENT
[FreeTextEntry1] : 4 month ACDF C 3  to C 5 \par Significantly improved and  left shoulder pain\par Cervical xrays show good placement of hardware and  lordotic curve\par Difficulty moving neck to the left side \par Prescribed PT for six weeks \par Follow up in three months and updated cervical xray

## 2021-03-30 ENCOUNTER — OUTPATIENT (OUTPATIENT)
Dept: OUTPATIENT SERVICES | Facility: HOSPITAL | Age: 51
LOS: 1 days | End: 2021-03-30

## 2021-03-30 DIAGNOSIS — T85.09XA OTHER MECHANICAL COMPLICATION OF VENTRICULAR INTRACRANIAL (COMMUNICATING) SHUNT, INITIAL ENCOUNTER: Chronic | ICD-10-CM

## 2021-03-30 DIAGNOSIS — Z96.22 MYRINGOTOMY TUBE(S) STATUS: Chronic | ICD-10-CM

## 2021-04-02 ENCOUNTER — NON-APPOINTMENT (OUTPATIENT)
Age: 51
End: 2021-04-02

## 2021-04-28 ENCOUNTER — NON-APPOINTMENT (OUTPATIENT)
Age: 51
End: 2021-04-28

## 2021-05-12 ENCOUNTER — NON-APPOINTMENT (OUTPATIENT)
Age: 51
End: 2021-05-12

## 2021-05-14 ENCOUNTER — NON-APPOINTMENT (OUTPATIENT)
Age: 51
End: 2021-05-14

## 2021-06-08 ENCOUNTER — APPOINTMENT (OUTPATIENT)
Dept: SPINE | Facility: CLINIC | Age: 51
End: 2021-06-08
Payer: MEDICARE

## 2021-06-08 VITALS
HEART RATE: 98 BPM | WEIGHT: 10.94 LBS | DIASTOLIC BLOOD PRESSURE: 88 MMHG | OXYGEN SATURATION: 96 % | SYSTOLIC BLOOD PRESSURE: 120 MMHG | TEMPERATURE: 98.1 F | BODY MASS INDEX: 1.82 KG/M2 | HEIGHT: 65 IN

## 2021-06-08 PROCEDURE — 99211 OFF/OP EST MAY X REQ PHY/QHP: CPT

## 2021-06-08 RX ORDER — OLANZAPINE 5 MG/1
5 TABLET, FILM COATED ORAL TWICE DAILY
Refills: 0 | Status: DISCONTINUED | COMMUNITY
End: 2021-06-08

## 2021-06-08 NOTE — REASON FOR VISIT
[Follow-Up: _____] : a [unfilled] follow-up visit [FreeTextEntry1] : Today he is here for follow-up evaluation of his overall status and new x-rays for review\par Doing well

## 2021-06-08 NOTE — ASSESSMENT
[FreeTextEntry1] : 50-year-old man status post cervical fusion\par Developmental  disability status post multiple  shunt surgeries for treatment of congenital hydrocephalus

## 2021-06-08 NOTE — END OF VISIT
[FreeTextEntry3] : I, Dr. William Viramontes, evaluated the patient with the nurse practitioner Naeem Santana and established the plan of care. I personally discuss this patient with the nurse practitioner at the time of the visit. I agree with the assessment and plan as written, unless noted below.\par \par

## 2021-06-10 ENCOUNTER — NON-APPOINTMENT (OUTPATIENT)
Age: 51
End: 2021-06-10

## 2021-06-17 ENCOUNTER — NON-APPOINTMENT (OUTPATIENT)
Age: 51
End: 2021-06-17

## 2021-06-18 ENCOUNTER — NON-APPOINTMENT (OUTPATIENT)
Age: 51
End: 2021-06-18

## 2021-06-21 ENCOUNTER — APPOINTMENT (OUTPATIENT)
Dept: NEUROLOGY | Facility: CLINIC | Age: 51
End: 2021-06-21
Payer: MEDICARE

## 2021-06-21 PROCEDURE — 99215 OFFICE O/P EST HI 40 MIN: CPT

## 2021-06-21 NOTE — HISTORY OF PRESENT ILLNESS
[FreeTextEntry1] : ***UPDATE:6/21/21***\par Mr Andi Ferrara is accompanied by his legal guardian Ms Melissa Guy\par Patient has been having LUQ pain for past few months.  Awaiting GI consultation at this point.\par Aunt notes has been having episode a few times per week with staring upwards for several minutes - no other seizure activity noted.\par Also, worsening of behavior overall.\par Had surgery for his cervical stenosis and much improved.  Mild seizures shortly after surgery, but doing well.\par \par ***UPDATE:11/24/20***\par Mr Andi Ferrara is accompanied by his legal guardian Ms Melissa Guy\par He is doing well overall.\par Had surgery for his cervical stenosis and much improved.  Mild seizures shortly after surgery, but doing well.\par \par ***UPDATE:7/31/20***\par Mr Andi Ferrara is accompanied by his legal guardian Ms Melissa Guy\par He has no reported interval seizures . He is doing well overall.\par \par Patient now with intermittent weakness when walking.  However, had bug bite to right ankle in the fall and has been walking less.  No issues bowel/bladder but mild ataxia.\par \par ***UPDATE:2/3/20***\par Mr Andi Ferrara is accompanied by his legal guardian Ms Melissa Guy\par He has no reported interval seizures . he had a fall on 1/25 but did not hit his head. It was noted that he had a bug bite from end of September on right ankle which is now swollen and red\par \par LTG 300mg BID\par Onfi 5mg BID\par Primidone 250mg BID\par \par Last office visit:\par  \par Possible small seizure a few weeks ago where was a little confused at bowling.  Had CT head which showed no change.\par \par On LTG 300mg bid and Primidone bid. Onfi 5mg bid.\par \par Seeing psych who started Lexapro 10 daily and Zyprexa\par \par Mr. 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  He comes accompanied by his aunt and uncle (legal guardians) and the .\par \par The seizures started shortly after birth with unclear details.  He was in the ICU for some time and found to have hydrocephalus.  At 16 months of age, his shunt was found to be malfunctioning and a new shunt was placed.  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  In 2013, the shut malfunctioned once again with further visual loss, now with only vision to light in the right eye and no vision in the left eye.\par \par The seizures at a younger age are unclear in semiology.  However, for the past several decades his typical seizures have been pulling at his clothes for seconds to minutes with confusion thereafter.  He gets confused with these events with behavioral arrest. \par the exact frequency is unknown (perhaps several times a month).  He does not have convulsions.\par \par In addition to the seizures above, he has been having behavioral outbursts at his home.  He gets mad at times or very tired at times.  It has been unclear if these are seizures.  Also episodes of head pain as well.  This has been followed by his psychiatrist.\par \par The patient has been tried on several AEDS (PHT, Vimpat, VPA, PHB, CBZ, LEV, Banzel, TPM) and has not been treated with these meds.  He is currently on Primidone 250 bidand Lamictal 400mg bid.  He was tried on Onfi 5mg bid in December 2015  and did well, but this was stopped because he ran out.  This was restarted in February 2016 at 5mg bid.\par \par The patient had a VNS placed several years ago at "maximum output" and has followed with Dr. Yuri Yanez in [facility] [place].  It was stated that would be difficult to do any further surgery on him.\par \par CT scans in the past have shown right parietal encephalomalacia.  EEG studies have shown (AEEG and VEEG) bilateral temporal frontal discharges (R>L) with one seizure caught on the right in the past.\par \par He has been followed by Dr. Phillips and at [place] in the past.  VEEG testing July 2016 showed no seizures and no changes to meds.  Also showed episodes of headache and pulling at shirt with no changes.  Still with headaches at this time with intermittent abdominal pain with no known etiology.\par \par Several seizures with ZNS increased to 100 bid earlier in the year.  Worsening of behavior with ZNS and no improvement.  We stopped ZNS and still with behavioral problems.\par \par VNS was shut off/\par \par PMHx as above\par FHx No seizures\par Meds/All: see below\par

## 2021-06-21 NOTE — DISCUSSION/SUMMARY
[FreeTextEntry1] : 50 year old man with history developmental delay and epilepsy with shunt placement and VNS placement.\par \par Had long discussion about options.  \par \par The events in the hospital (shirt pulling, headache, etc) were not associated with seizure activity.  His behavioral episodes also unlikely 2/2 seizure activity.  As for headache, no change in CT and not severe.  Not complaining of pain at this time.\par \par Episodes of starting upward could be behavioral vs. seizure and need to be evaluated.  Also, needs to see GI for current GI issues.\par \par Plan;\par Continue current AED regimen\par -reviewed seizure triggers\par - annual labwork including AED levels\par - psych (in [facility]) for behavior\par -EMU admission for episode of staring upwards - DO NOT LOWER OFF AEDS DURING ADMISSION; DO NOT WANT TO CHANGE AEDS AND JUST WANT TO SEE HIS RECENT EVENTS OF STARING AND DETERMINE IF THEY ARE SEIZURES.  ALSO, IF POSSIBLE TO GET GI CONSULT DURING ADMISSION FOR HIS LUQ PAIN IF HAS NOT SEEN GI PRIOR TO ADMISSION.\par \par \par Total time 40 min.\par Patient seen 40 min face to face with >50% in counseling and discussion.\par \par  [Medically Refractory (seizure within the last year)] : Medically Refractory (seizure within the last year) [Inpatient video EEG study ordered with length of stay anticipated in days: _____] : Inpatient video EEG study ordered with length of stay anticipated in days: [unfilled] [Event capture (for localization, differential diagnosis) (typically 3-5 days)] : Event capture (for localization, differential diagnosis) (typically 3-5 days)

## 2021-06-29 ENCOUNTER — INPATIENT (INPATIENT)
Facility: HOSPITAL | Age: 51
LOS: 8 days | Discharge: NURSING, REHAB OR LONG-TERM CARE | DRG: 101 | End: 2021-07-08
Attending: PSYCHIATRY & NEUROLOGY | Admitting: PSYCHIATRY & NEUROLOGY
Payer: MEDICARE

## 2021-06-29 VITALS
RESPIRATION RATE: 18 BRPM | DIASTOLIC BLOOD PRESSURE: 84 MMHG | HEART RATE: 107 BPM | SYSTOLIC BLOOD PRESSURE: 119 MMHG | OXYGEN SATURATION: 96 % | TEMPERATURE: 98 F

## 2021-06-29 DIAGNOSIS — G40.909 EPILEPSY, UNSPECIFIED, NOT INTRACTABLE, WITHOUT STATUS EPILEPTICUS: ICD-10-CM

## 2021-06-29 DIAGNOSIS — Z96.22 MYRINGOTOMY TUBE(S) STATUS: Chronic | ICD-10-CM

## 2021-06-29 DIAGNOSIS — T85.09XA OTHER MECHANICAL COMPLICATION OF VENTRICULAR INTRACRANIAL (COMMUNICATING) SHUNT, INITIAL ENCOUNTER: Chronic | ICD-10-CM

## 2021-06-29 LAB
ALBUMIN SERPL ELPH-MCNC: 4.5 G/DL — SIGNIFICANT CHANGE UP (ref 3.3–5)
ALP SERPL-CCNC: 193 U/L — HIGH (ref 40–120)
ALT FLD-CCNC: 25 U/L — SIGNIFICANT CHANGE UP (ref 10–45)
ANION GAP SERPL CALC-SCNC: 15 MMOL/L — SIGNIFICANT CHANGE UP (ref 5–17)
AST SERPL-CCNC: 18 U/L — SIGNIFICANT CHANGE UP (ref 10–40)
BASOPHILS # BLD AUTO: 0.06 K/UL — SIGNIFICANT CHANGE UP (ref 0–0.2)
BASOPHILS NFR BLD AUTO: 1 % — SIGNIFICANT CHANGE UP (ref 0–2)
BILIRUB SERPL-MCNC: 0.2 MG/DL — SIGNIFICANT CHANGE UP (ref 0.2–1.2)
BUN SERPL-MCNC: 12 MG/DL — SIGNIFICANT CHANGE UP (ref 7–23)
CALCIUM SERPL-MCNC: 9.3 MG/DL — SIGNIFICANT CHANGE UP (ref 8.4–10.5)
CHLORIDE SERPL-SCNC: 102 MMOL/L — SIGNIFICANT CHANGE UP (ref 96–108)
CO2 SERPL-SCNC: 22 MMOL/L — SIGNIFICANT CHANGE UP (ref 22–31)
CREAT SERPL-MCNC: 1.04 MG/DL — SIGNIFICANT CHANGE UP (ref 0.5–1.3)
EOSINOPHIL # BLD AUTO: 0.58 K/UL — HIGH (ref 0–0.5)
EOSINOPHIL NFR BLD AUTO: 9.9 % — HIGH (ref 0–6)
GLUCOSE SERPL-MCNC: 104 MG/DL — HIGH (ref 70–99)
HCT VFR BLD CALC: 44.7 % — SIGNIFICANT CHANGE UP (ref 39–50)
HGB BLD-MCNC: 14.4 G/DL — SIGNIFICANT CHANGE UP (ref 13–17)
IMM GRANULOCYTES NFR BLD AUTO: 0.5 % — SIGNIFICANT CHANGE UP (ref 0–1.5)
LYMPHOCYTES # BLD AUTO: 1.33 K/UL — SIGNIFICANT CHANGE UP (ref 1–3.3)
LYMPHOCYTES # BLD AUTO: 22.7 % — SIGNIFICANT CHANGE UP (ref 13–44)
MCHC RBC-ENTMCNC: 30.5 PG — SIGNIFICANT CHANGE UP (ref 27–34)
MCHC RBC-ENTMCNC: 32.2 GM/DL — SIGNIFICANT CHANGE UP (ref 32–36)
MCV RBC AUTO: 94.7 FL — SIGNIFICANT CHANGE UP (ref 80–100)
MONOCYTES # BLD AUTO: 0.86 K/UL — SIGNIFICANT CHANGE UP (ref 0–0.9)
MONOCYTES NFR BLD AUTO: 14.7 % — HIGH (ref 2–14)
NEUTROPHILS # BLD AUTO: 2.99 K/UL — SIGNIFICANT CHANGE UP (ref 1.8–7.4)
NEUTROPHILS NFR BLD AUTO: 51.2 % — SIGNIFICANT CHANGE UP (ref 43–77)
NRBC # BLD: 0 /100 WBCS — SIGNIFICANT CHANGE UP (ref 0–0)
PLATELET # BLD AUTO: 324 K/UL — SIGNIFICANT CHANGE UP (ref 150–400)
POTASSIUM SERPL-MCNC: 4.1 MMOL/L — SIGNIFICANT CHANGE UP (ref 3.5–5.3)
POTASSIUM SERPL-SCNC: 4.1 MMOL/L — SIGNIFICANT CHANGE UP (ref 3.5–5.3)
PROT SERPL-MCNC: 7.6 G/DL — SIGNIFICANT CHANGE UP (ref 6–8.3)
RBC # BLD: 4.72 M/UL — SIGNIFICANT CHANGE UP (ref 4.2–5.8)
RBC # FLD: 13.5 % — SIGNIFICANT CHANGE UP (ref 10.3–14.5)
SODIUM SERPL-SCNC: 139 MMOL/L — SIGNIFICANT CHANGE UP (ref 135–145)
WBC # BLD: 5.85 K/UL — SIGNIFICANT CHANGE UP (ref 3.8–10.5)
WBC # FLD AUTO: 5.85 K/UL — SIGNIFICANT CHANGE UP (ref 3.8–10.5)

## 2021-06-29 PROCEDURE — 71045 X-RAY EXAM CHEST 1 VIEW: CPT | Mod: 26

## 2021-06-29 PROCEDURE — 99223 1ST HOSP IP/OBS HIGH 75: CPT

## 2021-06-29 PROCEDURE — 95720 EEG PHY/QHP EA INCR W/VEEG: CPT

## 2021-06-29 RX ORDER — ESCITALOPRAM OXALATE 10 MG/1
1 TABLET, FILM COATED ORAL
Qty: 0 | Refills: 0 | DISCHARGE

## 2021-06-29 RX ORDER — ESCITALOPRAM OXALATE 10 MG/1
10 TABLET, FILM COATED ORAL DAILY
Refills: 0 | Status: DISCONTINUED | OUTPATIENT
Start: 2021-06-29 | End: 2021-06-30

## 2021-06-29 RX ORDER — NYSTATIN CREAM 100000 [USP'U]/G
1 CREAM TOPICAL
Refills: 0 | Status: DISCONTINUED | OUTPATIENT
Start: 2021-06-29 | End: 2021-07-06

## 2021-06-29 RX ORDER — FLUTICASONE PROPIONATE 50 MCG
1 SPRAY, SUSPENSION NASAL
Refills: 0 | Status: DISCONTINUED | OUTPATIENT
Start: 2021-06-29 | End: 2021-07-08

## 2021-06-29 RX ORDER — CLOBAZAM 10 MG/1
10 TABLET ORAL
Refills: 0 | Status: DISCONTINUED | OUTPATIENT
Start: 2021-06-29 | End: 2021-07-08

## 2021-06-29 RX ORDER — LORATADINE 10 MG/1
10 TABLET ORAL DAILY
Refills: 0 | Status: DISCONTINUED | OUTPATIENT
Start: 2021-06-29 | End: 2021-07-08

## 2021-06-29 RX ORDER — LATANOPROST 0.05 MG/ML
1 SOLUTION/ DROPS OPHTHALMIC; TOPICAL AT BEDTIME
Refills: 0 | Status: DISCONTINUED | OUTPATIENT
Start: 2021-06-29 | End: 2021-07-08

## 2021-06-29 RX ORDER — LAMOTRIGINE 25 MG/1
100 TABLET, ORALLY DISINTEGRATING ORAL
Refills: 0 | Status: DISCONTINUED | OUTPATIENT
Start: 2021-06-29 | End: 2021-07-01

## 2021-06-29 RX ORDER — PRIMIDONE 250 MG/1
250 TABLET ORAL
Refills: 0 | Status: DISCONTINUED | OUTPATIENT
Start: 2021-06-29 | End: 2021-07-08

## 2021-06-29 RX ORDER — OLANZAPINE 15 MG/1
5 TABLET, FILM COATED ORAL DAILY
Refills: 0 | Status: DISCONTINUED | OUTPATIENT
Start: 2021-06-29 | End: 2021-06-30

## 2021-06-29 RX ORDER — ENOXAPARIN SODIUM 100 MG/ML
40 INJECTION SUBCUTANEOUS DAILY
Refills: 0 | Status: DISCONTINUED | OUTPATIENT
Start: 2021-06-29 | End: 2021-07-06

## 2021-06-29 RX ORDER — PSYLLIUM SEED (WITH DEXTROSE)
0 POWDER (GRAM) ORAL
Qty: 0 | Refills: 0 | DISCHARGE

## 2021-06-29 RX ORDER — PANTOPRAZOLE SODIUM 20 MG/1
40 TABLET, DELAYED RELEASE ORAL
Refills: 0 | Status: DISCONTINUED | OUTPATIENT
Start: 2021-06-29 | End: 2021-07-01

## 2021-06-29 RX ORDER — SENNA PLUS 8.6 MG/1
2 TABLET ORAL AT BEDTIME
Refills: 0 | Status: DISCONTINUED | OUTPATIENT
Start: 2021-06-29 | End: 2021-07-08

## 2021-06-29 RX ORDER — DIAZEPAM 5 MG
5 TABLET ORAL EVERY 8 HOURS
Refills: 0 | Status: DISCONTINUED | OUTPATIENT
Start: 2021-06-29 | End: 2021-07-06

## 2021-06-29 RX ORDER — DORZOLAMIDE HYDROCHLORIDE TIMOLOL MALEATE 20; 5 MG/ML; MG/ML
1 SOLUTION/ DROPS OPHTHALMIC
Refills: 0 | Status: DISCONTINUED | OUTPATIENT
Start: 2021-06-29 | End: 2021-07-08

## 2021-06-29 RX ORDER — POLYETHYLENE GLYCOL 3350 17 G/17G
0 POWDER, FOR SOLUTION ORAL
Qty: 0 | Refills: 0 | DISCHARGE

## 2021-06-29 RX ORDER — SERTRALINE 25 MG/1
25 TABLET, FILM COATED ORAL DAILY
Refills: 0 | Status: DISCONTINUED | OUTPATIENT
Start: 2021-06-29 | End: 2021-06-30

## 2021-06-29 RX ORDER — LACOSAMIDE 50 MG/1
200 TABLET ORAL ONCE
Refills: 0 | Status: DISCONTINUED | OUTPATIENT
Start: 2021-06-29 | End: 2021-07-08

## 2021-06-29 RX ORDER — TRAVOPROST 0.04 MG/ML
1 SOLUTION/ DROPS OPHTHALMIC
Qty: 0 | Refills: 0 | DISCHARGE

## 2021-06-29 RX ORDER — CHOLECALCIFEROL (VITAMIN D3) 125 MCG
1000 CAPSULE ORAL DAILY
Refills: 0 | Status: DISCONTINUED | OUTPATIENT
Start: 2021-06-29 | End: 2021-07-08

## 2021-06-29 RX ORDER — CARBAMIDE PEROXIDE 81.86 MG/ML
10 SOLUTION/ DROPS AURICULAR (OTIC) ONCE
Refills: 0 | Status: COMPLETED | OUTPATIENT
Start: 2021-07-01 | End: 2021-07-01

## 2021-06-29 RX ADMIN — Medication 1 TABLET(S): at 21:46

## 2021-06-29 RX ADMIN — ENOXAPARIN SODIUM 40 MILLIGRAM(S): 100 INJECTION SUBCUTANEOUS at 22:36

## 2021-06-29 RX ADMIN — SENNA PLUS 2 TABLET(S): 8.6 TABLET ORAL at 21:46

## 2021-06-29 RX ADMIN — LAMOTRIGINE 100 MILLIGRAM(S): 25 TABLET, ORALLY DISINTEGRATING ORAL at 22:37

## 2021-06-29 RX ADMIN — CLOBAZAM 10 MILLIGRAM(S): 10 TABLET ORAL at 22:37

## 2021-06-29 RX ADMIN — LATANOPROST 1 DROP(S): 0.05 SOLUTION/ DROPS OPHTHALMIC; TOPICAL at 21:45

## 2021-06-29 NOTE — H&P ADULT - ASSESSMENT
Semiology:   Past several decades his typical seizures have been pulling at his clothes for seconds to minutes with confusion thereafter. He gets confused with these events with behavioral arrest. The exact frequency is unknown (perhaps several times a month). He does not have convulsions.    In addition to the seizures above, he has been having behavioral outbursts at his home. He gets mad at times or very tired at times. It has been unclear if these are seizures. Also episodes of head pain as well. This has been followed by his psychiatrist.        Plan:   Admit to EMU   24hr vEEG   Continue with current home AED medications  Assessment: 51 yo handedness unknown male with a PMH of complex seizures since age 9, hydrocephalus status post  shunt on R side, chronic R sided headaches status post expansion craniotomy, mild Mental Retardation, b/l hearing loss and b/l blindness presenting with onset of staring spells and increased behavioral issues at home over the past 4 months. The patient currently lives with his aunt (legal guardian) who noticed 4 months ago that the patient began having "staring spells." She described these as occurring daily, where the patient would suddenly stare up at the ceiling while "smiling widely" or "licking his lips" and he would be completely unresponsive during these spells. She stated the staring spells would last several hours and afterwards he seemed to be dazed and confused.     Semiology:   Past several decades his typical seizures have been pulling at his clothes for seconds to minutes with confusion thereafter. He gets confused with these events with behavioral arrest. The exact frequency is unknown (perhaps several times a month). He does not have convulsions.  In addition to the seizures above, he has been having behavioral outbursts at his home. He gets mad at times or very tired at times. It has been unclear if these are seizures. Also episodes of head pain as well. This has been followed by his psychiatrist.    Plan:   Admit to EMU   24hr vEEG   Continue with current home AED medications  51 yo handedness unknown male with a PMH of complex seizures since age 9, hydrocephalus s/p  shunt on R side, chronic R sided headaches s/p expansion craniotomy, mild Mental Retardation, b/l hearing loss and b/l blindness presenting with onset of staring spells and increased behavioral issues at home over the past 4 months. Staring spells described as "smiling widely" or "licking his lips" and he would be completely unresponsive during these spells. Concern for possible absence seizures vs behavioral issues.     Plan:   [] Admit to EMU   [] 24hr vEEG   [] continue home Onfi 10mg BID, Lamotrigine 100mg BID, Primidone 250mg BID -> via NGT for tonight  [] continue Zyprexa, Zoloft and Lexapro  [] will need NGT as aspirating  [] formal speech and swallow eval in AM     RESCUE: Ativan 1mg, Vimpat 200mg IV

## 2021-06-29 NOTE — PATIENT PROFILE ADULT - DEAF OR HARD OF HEARING?
yes Xelelz Pregnancy And Lactation Text: This medication is Pregnancy Category D and is not considered safe during pregnancy.  The risk during breast feeding is also uncertain. no

## 2021-06-29 NOTE — H&P ADULT - NSHPREVIEWOFSYSTEMS_GEN_ALL_CORE
REVIEW OF SYSTEMS  General:	  Skin/Breast:	  Ophthalmologic:  ENMT:	  Respiratory and Thorax:	  Cardiovascular:	  Gastrointestinal:	  Genitourinary:	  Musculoskeletal:	  Neurological:	  Psychiatric:	  Hematology/Lymphatics:	  Endocrine:	  Allergic/Immunologic:	    ROS: Pertinent positives in HPI, all other ROS were reviewed and are negative. ROS: Pertinent positives in HPI, all other ROS were reviewed and are negative.

## 2021-06-29 NOTE — H&P ADULT - HISTORY OF PRESENT ILLNESS
MRN-[id number]    HPI:                  Semiology:   Past several decades his typical seizures have been pulling at his clothes for seconds to minutes with confusion thereafter. He gets confused with these events with behavioral arrest. The exact frequency is unknown (perhaps several times a month). He does not have convulsions.    In addition to the seizures above, he has been having behavioral outbursts at his home. He gets mad at times or very tired at times. It has been unclear if these are seizures. Also episodes of head pain as well. This has been followed by his psychiatrist.             MRN-[id number]    HPI: 49 yo handedness unknown male with a PMH of complex seizures since age 9, hydrocephalus status post  shunt on R side, chronic R sided headaches status post expansion craniotomy, mild Mental Retardation, b/l hearing loss and b/l blindness presenting with onset of staring spells and increased behavioral issues at home over the past 4 months. The patient currently lives with his aunt (legal guardian) who noticed 4 months ago that the patient began having "staring spells." She described these as occurring daily, where the patient would suddenly stare up at the ceiling while "smiling widely" or "licking his lips" and he would be completely unresponsive during these spells. She stated the staring spells would last several hours and afterwards he seemed to be dazed and confused. The patient was unable to clarify if he recalled these episodes occurring. The aunt is concerned that he "may be seeing hallucinations" when he is fixated on the ceiling but did not recall any psychiatric history in the patient or family aside from the patient's mild mental retardation. The patient was diagnosed with epilepsy as a child, however the aunt is unaware of his history before the age of 9 and did not recall if he had febrile seizures. The aunt stated that when she began caring for the patient at age 9, he would frequently have complex seizures involving picking at his clothes with a state of confusion afterwards. On one occasion he experienced an aura of "rising sensation in his stomach." He had no convulsive episodes. She stated he was originally treated with phenobarbital for his complex seizures but discontinued it because "it rotted his teeth" and was then treated with Lamictal which he is currently taking. The aunt has also noticed increased behavioral problems at home over the past 4 months, including aggressive outbursts due to frustration. The patient's primary concern is his episodic head pain occurring daily but he did not desire any pain medication for it.                   Semiology:   Past several decades his typical seizures have been pulling at his clothes for seconds to minutes with confusion thereafter. He gets confused with these events with behavioral arrest. The exact frequency is unknown (perhaps several times a month). He does not have convulsions.    In addition to the seizures above, he has been having behavioral outbursts at his home. He gets mad at times or very tired at times. It has been unclear if these are seizures. Also episodes of head pain as well. This has been followed by his psychiatrist.             [id number]    HPI: 51 yo handedness unknown male with a PMH of complex seizures since age 9, hydrocephalus status post  shunt on R side, chronic R sided headaches status post expansion craniotomy, mild Mental Retardation, b/l hearing loss and b/l blindness presenting with onset of staring spells and increased behavioral issues at home over the past 4 months. The patient currently lives with his aunt (legal guardian) who noticed 4 months ago that the patient began having "staring spells." She described these as occurring daily, where the patient would suddenly stare up at the ceiling while "smiling widely" or "licking his lips" and he would be completely unresponsive during these spells. She stated the staring spells would last several hours and afterwards he seemed to be dazed and confused. The patient was unable to clarify if he recalled these episodes occurring. The aunt is concerned that he "may be seeing hallucinations" when he is fixated on the ceiling but did not recall any psychiatric history in the patient or family aside from the patient's mild mental retardation. The patient was diagnosed with epilepsy as a child, however the aunt is unaware of his history before the age of 9 and did not recall if he had febrile seizures. The aunt stated that when she began caring for the patient at age 9, he would frequently have complex seizures involving picking at his clothes with a state of confusion afterwards. On one occasion he experienced an aura of "rising sensation in his stomach." He had no convulsive episodes. She stated he was originally treated with phenobarbital for his complex seizures but discontinued it because "it rotted his teeth" and was then treated with Lamictal which he is currently taking. The aunt has also noticed increased behavioral problems at home over the past 4 months, including aggressive outbursts due to frustration. The patient's primary concern is his episodic head pain occurring daily but he did not desire any pain medication for it.     Prior CT scans showed R parietal encephalomalacia, EEGs with b/l frontotemporal discharges (R>L) with one seizure caught on the R in the past    Semiology:   Past several decades his typical seizures have been pulling at his clothes for seconds to minutes with confusion thereafter. He gets confused with these events with behavioral arrest. The exact frequency is unknown (perhaps several times a month). He does not have convulsions.  Now with staring spells     In addition to the seizures above, he has been having behavioral outbursts at his home. He gets mad at times or very tired at times. It has been unclear if these are seizures. Also episodes of head pain as well. This has been followed by his psychiatrist.

## 2021-06-29 NOTE — H&P ADULT - NSHPPHYSICALEXAM_GEN_ALL_CORE
Vital Signs Last 24 Hrs  T(C): 36.4 (29 Jun 2021 16:20), Max: 36.4 (29 Jun 2021 16:20)  T(F): 97.5 (29 Jun 2021 16:20), Max: 97.5 (29 Jun 2021 16:20)  HR: 107 (29 Jun 2021 16:20) (107 - 107)  BP: 119/84 (29 Jun 2021 16:20) (119/84 - 119/84)  BP(mean): --  RR: 18 (29 Jun 2021 16:20) (18 - 18)  SpO2: 96% (29 Jun 2021 16:20) (96% - 96%)    GENERAL EXAM:  Constitutional: awake and alert. NAD  HEENT: PERRL, EOMI    NEUROLOGICAL EXAM:  MS: AAOX3, fluent, attends b/l; recent and remote memory intact; normal attention, language and fund of knowledge.   CN: VFF, EOMI, PERRL, no EDE, no APD,  V1-3 intact, no facial asymmetry, t/p midline, SCM/trap intact.  Eyes-Fundi: no papilledema.  Motor: Strength: 5/5 4x. Tone: normal. Bulk: normal. DTR 2+ symm.  Plantar flex b/l. Sensation: intact to LT/PP/Vibration/Position/Temperature 4x.   Coordination: intact 4x.   Gait:  Romberg negative, pull test negative; walks with narrow base, pivots in 2 steps. Vital Signs Last 24 Hrs  T(C): 36.4 (29 Jun 2021 16:20), Max: 36.4 (29 Jun 2021 16:20)  T(F): 97.5 (29 Jun 2021 16:20), Max: 97.5 (29 Jun 2021 16:20)  HR: 107 (29 Jun 2021 16:20) (107 - 107)  BP: 119/84 (29 Jun 2021 16:20) (119/84 - 119/84)  BP(mean): --  RR: 18 (29 Jun 2021 16:20) (18 - 18)  SpO2: 96% (29 Jun 2021 16:20) (96% - 96%)    GENERAL EXAM:  Constitutional: awake and alert. NAD  HEENT: R pupil is reactive, L pupil is dilated at baseline and not reactive. EOM unable to assess due to patient's b/l vision loss and difficulty understanding commands to look in any direction.     NEUROLOGICAL EXAM:  MS: AAOX3 to person, place, age, month. Speech is fluent but slightly dysarthric, attends b/l; recent memory impaired (1/3 object recall); normal attention, language and fund of knowledge.   CN: B/l vision loss at baseline worse in L eye, L eye APD, b/l horizontal nystagmus on primary gaze, unable to assess EOM, V1-3 intact, no facial asymmetry, t/p midline.   Motor: Strength: 4/5 in RUE and LUE. RLE and LLE were antigravity. Tone: normal. Bulk: normal. Plantar flex b/l.   Sensation: intact to LT 4x. No cortical extinction.  Coordination: unable to assess FNF due to b/l vision loss, unable to assess heel to shin due to difficult understanding command.   Gait:  unable to assess Vital Signs Last 24 Hrs  T(C): 36.4 (29 Jun 2021 16:20), Max: 36.4 (29 Jun 2021 16:20)  T(F): 97.5 (29 Jun 2021 16:20), Max: 97.5 (29 Jun 2021 16:20)  HR: 107 (29 Jun 2021 16:20) (107 - 107)  BP: 119/84 (29 Jun 2021 16:20) (119/84 - 119/84)  BP(mean): --  RR: 18 (29 Jun 2021 16:20) (18 - 18)  SpO2: 96% (29 Jun 2021 16:20) (96% - 96%)    GENERAL EXAM:  Constitutional: awake and alert. NAD  HEENT: R pupil is reactive, L pupil is dilated at baseline and not reactive. EOM unable to assess due to patient's b/l vision loss and difficulty understanding commands to look in any direction.     NEUROLOGICAL EXAM:  MS: AAOX3 to person, place, age, month. Speech is fluent but slightly dysarthric, follows crossed commands; recent memory impaired (1/3 object recall); normal attention, language and fund of knowledge.   CN: B/l vision loss at baseline worse in L eye, L eye APD, b/l horizontal nystagmus on primary gaze, unable to assess EOM, V1-3 intact, no facial asymmetry, t/p midline.   Motor: Strength: 4/5 in RUE and LUE. RLE and LLE were antigravity. Tone: normal. Bulk: normal. Plantar flex b/l.   Sensation: intact to LT 4x. No cortical extinction.  Coordination: unable to assess FNF due to b/l vision loss, unable to assess heel to shin due to difficult understanding command.   Gait:  unable to assess Vital Signs Last 24 Hrs  T(C): 36.4 (29 Jun 2021 16:20), Max: 36.4 (29 Jun 2021 16:20)  T(F): 97.5 (29 Jun 2021 16:20), Max: 97.5 (29 Jun 2021 16:20)  HR: 107 (29 Jun 2021 16:20) (107 - 107)  BP: 119/84 (29 Jun 2021 16:20) (119/84 - 119/84)  BP(mean): --  RR: 18 (29 Jun 2021 16:20) (18 - 18)  SpO2: 96% (29 Jun 2021 16:20) (96% - 96%)    GENERAL EXAM:  Constitutional: awake and alert. NAD    NEUROLOGICAL EXAM:  MS: AAOX3 to person, place, age, month. Speech is fluent but slightly dysarthric, follows crossed commands; recent memory impaired (1/3 object recall); normal attention, language and fund of knowledge.   CN: B/l vision loss at baseline worse in L eye, R pupil 3mm and reactive, L pupil dilated and nonreactive, sees some shadows out of his R eye, b/l horizontal nystagmus on primary gaze, unable to assess EOM, V1-3 intact, no facial asymmetry, t/p midline.   Motor: Strength: 4/5 in RUE and LUE. RLE and LLE were antigravity. Tone: normal. Bulk: normal. Plantar flex b/l.   Sensation: intact to LT 4x.   Coordination: unable to assess FNF due to b/l vision loss, unable to assess heel to shin due to difficult understanding command.   Gait:  unable to assess

## 2021-06-29 NOTE — H&P ADULT - NSHPSOCIALHISTORY_GEN_ALL_CORE
Patient lives on [address] with his aunt, who is his legal guardian and primary caretaker at this time. He was previously living in "group home facilities" but his aunt stated that the was unable to stay there due to his behavioral issues.   Patient does not smoke, drink, or report use of any recreational drugs.

## 2021-06-30 DIAGNOSIS — F79 UNSPECIFIED INTELLECTUAL DISABILITIES: ICD-10-CM

## 2021-06-30 LAB
ALBUMIN SERPL ELPH-MCNC: 4.1 G/DL — SIGNIFICANT CHANGE UP (ref 3.3–5)
ALP SERPL-CCNC: 179 U/L — HIGH (ref 40–120)
ALT FLD-CCNC: 23 U/L — SIGNIFICANT CHANGE UP (ref 10–45)
ANION GAP SERPL CALC-SCNC: 14 MMOL/L — SIGNIFICANT CHANGE UP (ref 5–17)
AST SERPL-CCNC: 16 U/L — SIGNIFICANT CHANGE UP (ref 10–40)
BILIRUB SERPL-MCNC: 0.3 MG/DL — SIGNIFICANT CHANGE UP (ref 0.2–1.2)
BUN SERPL-MCNC: 12 MG/DL — SIGNIFICANT CHANGE UP (ref 7–23)
CALCIUM SERPL-MCNC: 9.4 MG/DL — SIGNIFICANT CHANGE UP (ref 8.4–10.5)
CHLORIDE SERPL-SCNC: 101 MMOL/L — SIGNIFICANT CHANGE UP (ref 96–108)
CO2 SERPL-SCNC: 22 MMOL/L — SIGNIFICANT CHANGE UP (ref 22–31)
COVID-19 SPIKE DOMAIN AB INTERP: POSITIVE
COVID-19 SPIKE DOMAIN ANTIBODY RESULT: >250 U/ML — HIGH
CREAT SERPL-MCNC: 0.95 MG/DL — SIGNIFICANT CHANGE UP (ref 0.5–1.3)
GLUCOSE BLDC GLUCOMTR-MCNC: 92 MG/DL — SIGNIFICANT CHANGE UP (ref 70–99)
GLUCOSE BLDC GLUCOMTR-MCNC: 99 MG/DL — SIGNIFICANT CHANGE UP (ref 70–99)
GLUCOSE SERPL-MCNC: 90 MG/DL — SIGNIFICANT CHANGE UP (ref 70–99)
HCT VFR BLD CALC: 42.3 % — SIGNIFICANT CHANGE UP (ref 39–50)
HGB BLD-MCNC: 14.3 G/DL — SIGNIFICANT CHANGE UP (ref 13–17)
MCHC RBC-ENTMCNC: 32 PG — SIGNIFICANT CHANGE UP (ref 27–34)
MCHC RBC-ENTMCNC: 33.8 GM/DL — SIGNIFICANT CHANGE UP (ref 32–36)
MCV RBC AUTO: 94.6 FL — SIGNIFICANT CHANGE UP (ref 80–100)
NRBC # BLD: 0 /100 WBCS — SIGNIFICANT CHANGE UP (ref 0–0)
PLATELET # BLD AUTO: 291 K/UL — SIGNIFICANT CHANGE UP (ref 150–400)
POTASSIUM SERPL-MCNC: 4 MMOL/L — SIGNIFICANT CHANGE UP (ref 3.5–5.3)
POTASSIUM SERPL-SCNC: 4 MMOL/L — SIGNIFICANT CHANGE UP (ref 3.5–5.3)
PROT SERPL-MCNC: 7.2 G/DL — SIGNIFICANT CHANGE UP (ref 6–8.3)
RBC # BLD: 4.47 M/UL — SIGNIFICANT CHANGE UP (ref 4.2–5.8)
RBC # FLD: 13.5 % — SIGNIFICANT CHANGE UP (ref 10.3–14.5)
SARS-COV-2 IGG+IGM SERPL QL IA: >250 U/ML — HIGH
SARS-COV-2 IGG+IGM SERPL QL IA: POSITIVE
SODIUM SERPL-SCNC: 137 MMOL/L — SIGNIFICANT CHANGE UP (ref 135–145)
WBC # BLD: 5.71 K/UL — SIGNIFICANT CHANGE UP (ref 3.8–10.5)
WBC # FLD AUTO: 5.71 K/UL — SIGNIFICANT CHANGE UP (ref 3.8–10.5)

## 2021-06-30 PROCEDURE — 99222 1ST HOSP IP/OBS MODERATE 55: CPT

## 2021-06-30 PROCEDURE — 74177 CT ABD & PELVIS W/CONTRAST: CPT | Mod: 26

## 2021-06-30 PROCEDURE — 99238 HOSP IP/OBS DSCHRG MGMT 30/<: CPT

## 2021-06-30 PROCEDURE — 95720 EEG PHY/QHP EA INCR W/VEEG: CPT

## 2021-06-30 RX ADMIN — PRIMIDONE 250 MILLIGRAM(S): 250 TABLET ORAL at 17:55

## 2021-06-30 RX ADMIN — NYSTATIN CREAM 1 APPLICATION(S): 100000 CREAM TOPICAL at 17:55

## 2021-06-30 RX ADMIN — LORATADINE 10 MILLIGRAM(S): 10 TABLET ORAL at 12:25

## 2021-06-30 RX ADMIN — DORZOLAMIDE HYDROCHLORIDE TIMOLOL MALEATE 1 DROP(S): 20; 5 SOLUTION/ DROPS OPHTHALMIC at 18:09

## 2021-06-30 RX ADMIN — LAMOTRIGINE 100 MILLIGRAM(S): 25 TABLET, ORALLY DISINTEGRATING ORAL at 05:08

## 2021-06-30 RX ADMIN — Medication 1 SPRAY(S): at 12:25

## 2021-06-30 RX ADMIN — ENOXAPARIN SODIUM 40 MILLIGRAM(S): 100 INJECTION SUBCUTANEOUS at 12:24

## 2021-06-30 RX ADMIN — SENNA PLUS 2 TABLET(S): 8.6 TABLET ORAL at 21:33

## 2021-06-30 RX ADMIN — Medication 1 TABLET(S): at 16:24

## 2021-06-30 RX ADMIN — CLOBAZAM 10 MILLIGRAM(S): 10 TABLET ORAL at 05:08

## 2021-06-30 RX ADMIN — LAMOTRIGINE 100 MILLIGRAM(S): 25 TABLET, ORALLY DISINTEGRATING ORAL at 17:56

## 2021-06-30 RX ADMIN — LATANOPROST 1 DROP(S): 0.05 SOLUTION/ DROPS OPHTHALMIC; TOPICAL at 21:32

## 2021-06-30 RX ADMIN — Medication 1 TABLET(S): at 21:33

## 2021-06-30 RX ADMIN — PANTOPRAZOLE SODIUM 40 MILLIGRAM(S): 20 TABLET, DELAYED RELEASE ORAL at 05:07

## 2021-06-30 RX ADMIN — PRIMIDONE 250 MILLIGRAM(S): 250 TABLET ORAL at 05:08

## 2021-06-30 RX ADMIN — DORZOLAMIDE HYDROCHLORIDE TIMOLOL MALEATE 1 DROP(S): 20; 5 SOLUTION/ DROPS OPHTHALMIC at 05:08

## 2021-06-30 RX ADMIN — Medication 1000 UNIT(S): at 12:24

## 2021-06-30 RX ADMIN — CLOBAZAM 10 MILLIGRAM(S): 10 TABLET ORAL at 17:55

## 2021-06-30 RX ADMIN — NYSTATIN CREAM 1 APPLICATION(S): 100000 CREAM TOPICAL at 05:09

## 2021-06-30 RX ADMIN — Medication 1 TABLET(S): at 05:08

## 2021-06-30 NOTE — BH CONSULTATION LIAISON ASSESSMENT NOTE - NSBHCHARTREVIEWVS_PSY_A_CORE FT
Vital Signs Last 24 Hrs  T(C): 36.8 (30 Jun 2021 11:09), Max: 36.8 (30 Jun 2021 11:09)  T(F): 98.2 (30 Jun 2021 11:09), Max: 98.2 (30 Jun 2021 11:09)  HR: 93 (30 Jun 2021 11:09) (65 - 107)  BP: 129/90 (30 Jun 2021 11:09) (114/81 - 143/72)  BP(mean): --  RR: 18 (30 Jun 2021 11:09) (18 - 18)  SpO2: 94% (30 Jun 2021 11:09) (93% - 97%)

## 2021-06-30 NOTE — BH CONSULTATION LIAISON ASSESSMENT NOTE - CASE SUMMARY
49y/o single disabled male, lives with aunt and uncle in a private residence in [place], with PPHx of intellectual disability, h/o 1 inpt psychiatric hospitalization in 2008 for agitation, no h/o SA/SIB, no h/o substance or alcohol abuse, with a PMHx of complex seizures since age 9, hydrocephalus status post  shunt on R side, chronic R sided headaches status post expansion craniotomy, b/l hearing loss and b/l blindness presenting with onset of staring spells and increased behavioral issues at home over the past 4 months. The patient currently lives with his aunt (legal guardian) who noticed 4 months ago that the patient began having "staring spells." She described these as occurring daily, where the patient would suddenly stare up at the ceiling while "smiling widely" or "licking his lips" and he would be completely unresponsive during these spells. She stated the staring spells would last several hours and afterwards he seemed to be dazed and confused.  The aunt is concerned that he "may be seeing hallucinations" when he is fixated on the ceiling but did not recall any psychiatric history in the patient or family aside from the patient's mild mental retardation.  Psychiatry consulted to assess for management of behavioral issues, agitation.      Patient seen and evaluated, awake and alert, oriented to self, able to state the current year, poor historian, lethargic. collateral obtained from guardian. rec seroquel prn, zyprexa prn agitation.

## 2021-06-30 NOTE — BH CONSULTATION LIAISON ASSESSMENT NOTE - HPI (INCLUDE ILLNESS QUALITY, SEVERITY, DURATION, TIMING, CONTEXT, MODIFYING FACTORS, ASSOCIATED SIGNS AND SYMPTOMS)
51y/o single disabled male, lives with aunt and uncle in a private residence in [place], with PPHx of intellectual disability, h/o 1 inpt pscyahitric hospitalization in 2008 for agitation, no h/o SA/SIB, no h/o substance or alcohol abuse, with a PMHx of complex seizures since age 9, hydrocephalus status post  shunt on R side, chronic R sided headaches status post expansion craniotomy, b/l hearing loss and b/l blindness presenting with onset of staring spells and increased behavioral issues at home over the past 4 months. The patient currently lives with his aunt (legal guardian) who noticed 4 months ago that the patient began having "staring spells." She described these as occurring daily, where the patient would suddenly stare up at the ceiling while "smiling widely" or "licking his lips" and he would be completely unresponsive during these spells. She stated the staring spells would last several hours and afterwards he seemed to be dazed and confused.  The aunt is concerned that he "may be seeing hallucinations" when he is fixated on the ceiling but did not recall any psychiatric history in the patient or family aside from the patient's mild mental retardation.  Psychiatry consulted to assess for management of behavioral issues, agitation.      Patient seen and evaluated, awake and alert, oriented to self, able to state the current year, able to state where is currently is.  States had a fall at home and was brought to the hospital.  Patient answers some questions, at times does not respond.  He reports has pain on the right side of his head.  Talks about music that he enjoys listening to, movies - wizard of Oz.  Denies feeling anxious or agitated.      Spoke with patient's aunt/guardian who is at bedside.  Reports that patient has h/o seizure d/o, states as a child hydrocephalus, had shunts placed, complications occurred.  Reports patient's parents had patient institutionalized until he was 9 years old, states that patient then lived with her and his family until he was 25y/o and then went to live in a group home until Nov 2020, when he had to undergo cervical spine surgery.  She reports since then patient has been living with her.  She reports patient has been more confused since he has moved back, states doesn't remember where the rooms are in the house.  She reports that patient in the last several weeks has been having staring spells, looking up not responding.  She reports patient has been more agitated at home, easily frustrated, throwing things, pushing over furniture.  She reports patient had been on Zoloft to "help with anxiety" however family concerned and stopped it a month ago due to weight gain.  She also reports patient had been on 2 other psychotropics prior to that which she could not recall the names of it, g 49y/o single disabled male, lives with aunt and uncle in a private residence in [place], with PPHx of intellectual disability, h/o 1 inpt psychiatric hospitalization in 2008 for agitation, no h/o SA/SIB, no h/o substance or alcohol abuse, with a PMHx of complex seizures since age 9, hydrocephalus status post  shunt on R side, chronic R sided headaches status post expansion craniotomy, b/l hearing loss and b/l blindness presenting with onset of staring spells and increased behavioral issues at home over the past 4 months. The patient currently lives with his aunt (legal guardian) who noticed 4 months ago that the patient began having "staring spells." She described these as occurring daily, where the patient would suddenly stare up at the ceiling while "smiling widely" or "licking his lips" and he would be completely unresponsive during these spells. She stated the staring spells would last several hours and afterwards he seemed to be dazed and confused.  The aunt is concerned that he "may be seeing hallucinations" when he is fixated on the ceiling but did not recall any psychiatric history in the patient or family aside from the patient's mild mental retardation.  Psychiatry consulted to assess for management of behavioral issues, agitation.      Patient seen and evaluated, awake and alert, oriented to self, able to state the current year, able to state where is currently is.  States had a fall at home and was brought to the hospital.  Patient answers some questions, at times does not respond.  He reports has pain on the right side of his head.  Talks about music that he enjoys listening to, movies - wizard of Oz.  Denies feeling anxious or agitated.      Spoke with patient's aunt/guardian who is at bedside.  Reports that patient has h/o seizure d/o, states as a child hydrocephalus, had shunts placed, complications occurred.  Reports patient's parents had patient institutionalized until he was 9 years old, states that patient then lived with her and his family until he was 25y/o and then went to live in a group home until Nov 2020, when he had to undergo cervical spine surgery.  She reports since then patient has been living with her.  She reports patient has been more confused since he has moved back, states doesn't remember where the rooms are in the house.  States that she suspects patient has some level of dementia.  She reports that patient in the last several weeks has been having staring spells, looking up not responding.  She reports patient has been more agitated at home, easily frustrated, throwing things, pushing over furniture.  She reports patient had been on Zoloft to "help with anxiety" however family concerned and stopped it a month ago due to weight gain.  She also reports patient had been on 2 other psychotropics prior to that which she could not recall the names of it, but states also stopped it due to weight gain.  She denies patient has been suicidal or has any h/o SA, she denies h/o substance or alcohol use.

## 2021-06-30 NOTE — BH CONSULTATION LIAISON ASSESSMENT NOTE - CURRENT MEDICATION
MEDICATIONS  (STANDING):  calcium carbonate 1250 mG  + Vitamin D (OsCal 500 + D) 1 Tablet(s) Oral three times a day  cholecalciferol 1000 Unit(s) Oral daily  cloBAZam 10 milliGRAM(s) Oral two times a day  dorzolamide 2%/timolol 0.5% Ophthalmic Solution 1 Drop(s) Both EYES two times a day  enoxaparin Injectable 40 milliGRAM(s) SubCutaneous daily  fluticasone propionate 50 MICROgram(s)/spray Nasal Spray 1 Spray(s) Both Nostrils <User Schedule>  lamoTRIgine 100 milliGRAM(s) Oral two times a day  latanoprost 0.005% Ophthalmic Solution 1 Drop(s) Both EYES at bedtime  loratadine 10 milliGRAM(s) Oral daily  nystatin Ointment 1 Application(s) Topical two times a day  pantoprazole    Tablet 40 milliGRAM(s) Oral before breakfast  primidone 250 milliGRAM(s) Oral two times a day  senna 2 Tablet(s) Oral at bedtime    MEDICATIONS  (PRN):  diazepam    Tablet 5 milliGRAM(s) Oral every 8 hours PRN spasm  lacosamide Injectable 200 milliGRAM(s) IV Push once PRN seizure refractory to ativan  LORazepam   Injectable 1 milliGRAM(s) IV Push once PRN seizure

## 2021-06-30 NOTE — CONSULT NOTE ADULT - ASSESSMENT
50 year old man w MR presenting for r/o seizure, GI consulted for dyspepsia    1 Abdominal pain/dyspepsia: likely functional/related to eating habits however given family history of pancreatic Ca aunt is concerned to rule this out.  -trial of PPI  -eat slowly  -obtain CT abdomen    2. Elevated alp, likely due to AEDs    3. MR    4. R/o seizures per neurology.      Advanced care planning forms were discussed. Code status including forceful chest compressions, defibrillation and intubation were discussed. The risks benefits and alternatives to pertinent gastrointestinal procedures and interventions were discussed in detail and all questions were answered. Duration: 15 Minutes.      Norbert Burden M.D.   Gastroenterology and Hepatology  [address] [place], [place]  Office: [phone]  Cell: [phone]

## 2021-06-30 NOTE — CONSULT NOTE ADULT - SUBJECTIVE AND OBJECTIVE BOX
nonspecific abd pain  had recent EGD  ct ordered Chief Complaint:  Patient is a 50y old  Male who presents with a chief complaint of Staring spells and aggressive behavioral changes (30 Jun 2021 22:28)      Date of service: 07-01-21 @ 07:15    HPI:    The patient is a 49 yo handedness unknown male with a PMH of complex seizures since age 9, hydrocephalus status post  shunt on R side, chronic R sided headaches status post expansion craniotomy, mild Mental Retardation, b/l hearing loss and b/l blindness presenting with onset of staring spells and increased behavioral issues at home over the past 4 months. GI is consulted for several weeks of predominantly post prandial abdominal pain. The patient is a limited historian but indicates pain on the left and right upper quandrants. Per his aunt he does eat quickly and complain of fullness but he does not vomit. He had an EGD 6 months ago which was unremarkable he has never had a colonoscopy.     The patient denies dysphagia, nausea and vomiting, diarrhea, unintentional weight loss, change in bowel habits or NSAID use.      Allergies:  codeine (Stomach Upset)  Depakote (Other)  seasonal allergies: nasal congestion (Other)      Home Medications:    Hospital Medications:  calcium carbonate 1250 mG  + Vitamin D (OsCal 500 + D) 1 Tablet(s) Oral three times a day  carbamide peroxide Otic Solution 10 Drop(s) Both Ears once  cholecalciferol 1000 Unit(s) Oral daily  cloBAZam 10 milliGRAM(s) Oral two times a day  diazepam    Tablet 5 milliGRAM(s) Oral every 8 hours PRN  dorzolamide 2%/timolol 0.5% Ophthalmic Solution 1 Drop(s) Both EYES two times a day  enoxaparin Injectable 40 milliGRAM(s) SubCutaneous daily  fluticasone propionate 50 MICROgram(s)/spray Nasal Spray 1 Spray(s) Both Nostrils <User Schedule>  lacosamide Injectable 200 milliGRAM(s) IV Push once PRN  lamoTRIgine 100 milliGRAM(s) Oral two times a day  latanoprost 0.005% Ophthalmic Solution 1 Drop(s) Both EYES at bedtime  loratadine 10 milliGRAM(s) Oral daily  LORazepam   Injectable 1 milliGRAM(s) IV Push once PRN  nystatin Ointment 1 Application(s) Topical two times a day  pantoprazole  Injectable 40 milliGRAM(s) IV Push daily  primidone 250 milliGRAM(s) Oral two times a day  senna 2 Tablet(s) Oral at bedtime      PMHX/PSHX:  Hydrocephalus    Legally blind    Glaucoma    Sleep apnea    Epilepsy    Osteoporosis    Elevated liver enzymes    Environmental Allergies    Impulse control disorder    Mild mental retardation    Hiatal hernia    Aspiration pneumonia    Viral pneumonia    BPH without obstruction/lower urinary tract symptoms    Glaucoma    S/P  Shunt    Epilepsy    H/O craniotomy    Heel cord contracture    History of tonsillectomy    S/p bilateral myringotomy with tube placement    Obstructed  shunt        Family history:  Family history of stroke (Mother)        Social History:   Denies ethanol use.  Denies illicit drug use.    ROS:     General:  No wt loss, fevers, chills, night sweats, fatigue,   Eyes:  Good vision, no reported pain  ENT:  No sore throat, pain, runny nose, dysphagia  CV:  No pain, palpitations, hypo/hypertension  Resp:  No dyspnea, cough, tachypnea, wheezing  GI:  See HPI  :  No pain, bleeding, incontinence, nocturia  Muscle:  No pain, weakness  Neuro:  No weakness, tingling, memory problems  Psych:  No fatigue, insomnia, mood problems, depression  Endocrine:  No polyuria, polydipsia, cold/heat intolerance  Heme:  No petechiae, ecchymosis, easy bruisability  Integumentary:  No rash, edema      PHYSICAL EXAM:     GENERAL:  Appears stated age, well-groomed, well-nourished, no distress  HEENT:  NC/AT,  conjunctivae anicteric, clear and pink,   NECK: supple, trachea midline  CHEST:  Full & symmetric excursion, no increased effort, breath sounds clear  HEART:  Regular rhythm, no JVD  ABDOMEN:  Soft, non-tender, non-distended, normoactive bowel sounds,  no masses , no hepatosplenomegaly  EXTREMITIES:  no cyanosis,clubbing or edema  SKIN:  No rash, erythema, or, ecchymoses, no jaundice  NEURO:  Alert, non-focal, no asterixis  PSYCH: Appropriate affect, oriented to place and time  RECTAL: Deferred      Vital Signs:  Vital Signs Last 24 Hrs  T(C): 36.8 (01 Jul 2021 04:12), Max: 37.1 (30 Jun 2021 23:27)  T(F): 98.2 (01 Jul 2021 04:12), Max: 98.8 (30 Jun 2021 23:27)  HR: 99 (01 Jul 2021 04:12) (92 - 100)  BP: 111/79 (01 Jul 2021 04:12) (111/79 - 129/90)  BP(mean): --  RR: 18 (01 Jul 2021 04:12) (18 - 18)  SpO2: 97% (01 Jul 2021 04:12) (94% - 97%)  Daily     Daily     LABS: Labs personally reviewed by me:                        15.3   6.96  )-----------( 322      ( 01 Jul 2021 06:00 )             46.6     07-01    136  |  98  |  13  ----------------------------<  87  4.1   |  22  |  0.94    Ca    9.8      01 Jul 2021 06:00    TPro  7.8  /  Alb  4.5  /  TBili  0.5  /  DBili  x   /  AST  14  /  ALT  24  /  AlkPhos  199<H>  07-01    LIVER FUNCTIONS - ( 01 Jul 2021 06:00 )  Alb: 4.5 g/dL / Pro: 7.8 g/dL / ALK PHOS: 199 U/L / ALT: 24 U/L / AST: 14 U/L / GGT: x                   Imaging personally reviewed by me:

## 2021-06-30 NOTE — EEG REPORT - NS EEG TEXT BOX
Start Date: 6/29/2021 – Day 1                                Start Time – 16:42       Duration – 15hr 18m    Daily EEG Visual Analysis  Findings: The background was continuous, spontaneously variable and reactive. During wakefulness, the posterior dominant rhythm consisted of asymmetric (higher voltage in left posterior quadrant), well-modulated 8.5 Hz activity, with amplitude to 30 uV, that attenuated to eye opening.       Background Slowing:  No generalized background slowing was present.    Focal Slowing:   None were present.    Sleep Background:  Drowsiness was characterized by fragmentation, attenuation, and slowing of the background activity.    Sleep was characterized by the presence of vertex waves, symmetric sleep spindles and K-complexes.    Other Non-Epileptiform Findings:  -Asymmetry, attenuation of voltage over the right hemisphere    Interictal Epileptiform Activity:   None were present.    Events:  No events or seizures recorded.    Artifacts:  Intermittent myogenic and movement artifacts were noted.    ECG:  The heart rate on single channel ECG was predominantly between 60-80 BPM.    EEG Summary:  Abnormal EEG in the awake, drowsy and asleep states.  - Asymmetry, attenuation of voltage over the right hemisphere    Impression/Clinical Correlate:  There is an asymmetry with an attenuation of activity over the right hemisphere.  This may be due to either cortical dysfunction in the right hemisphere, or a skull defect on the left.  No epileptiform abnormalities or seizures were recorded.    ________________________________________    Mekhi Tipton MD  [facility], [facility]

## 2021-06-30 NOTE — BH CONSULTATION LIAISON ASSESSMENT NOTE - NSBHCHARTREVIEWLAB_PSY_A_CORE FT
14.3   5.71  )-----------( 291      ( 30 Jun 2021 06:37 )             42.3     06-30    137  |  101  |  12  ----------------------------<  90  4.0   |  22  |  0.95    Ca    9.4      30 Jun 2021 06:37    TPro  7.2  /  Alb  4.1  /  TBili  0.3  /  DBili  x   /  AST  16  /  ALT  23  /  AlkPhos  179<H>  06-30

## 2021-06-30 NOTE — BH CONSULTATION LIAISON ASSESSMENT NOTE - SUMMARY
49y/o single disabled male, lives with aunt and uncle in a private residence in [place], with PPHx of intellectual disability, h/o 1 inpt psychiatric hospitalization in 2008 for agitation, no h/o SA/SIB, no h/o substance or alcohol abuse, with a PMHx of complex seizures since age 9, hydrocephalus status post  shunt on R side, chronic R sided headaches status post expansion craniotomy, b/l hearing loss and b/l blindness presenting with onset of staring spells and increased behavioral issues at home over the past 4 months. The patient currently lives with his aunt (legal guardian) who noticed 4 months ago that the patient began having "staring spells." She described these as occurring daily, where the patient would suddenly stare up at the ceiling while "smiling widely" or "licking his lips" and he would be completely unresponsive during these spells. She stated the staring spells would last several hours and afterwards he seemed to be dazed and confused.  The aunt is concerned that he "may be seeing hallucinations" when he is fixated on the ceiling but did not recall any psychiatric history in the patient or family aside from the patient's mild mental retardation.  Psychiatry consulted to assess for management of behavioral issues, agitation.    Seen and evaluated, awake and alert, oriented, states fell at home.  Reports has right side temporal pains.  Per aunt, patient has bene more confused, having staring spells, possibly hallucinating, states has not been on psychotropic medications for the last month, states family become concerned about weight gain and discontinued his medications.  Aunt reports patient been more agitated at home with poor frustration tolerance.  At this time, presentation likely delirium 2/2 recent seizure events.  Would consider using Seroquel PRN for agitation and Zyprexa PRN for SEVERE agitation-- please ensure that qtc <500ms on ekg.

## 2021-06-30 NOTE — BH CONSULTATION LIAISON ASSESSMENT NOTE - RISK ASSESSMENT
Risk factors: intellectual disability, recent agitation, acute/chronic medical issues, acute/chronic cognitive impairments, h/o psych admissions    Protective factors: no h/o SA/SIB, no access to weapons, no active substance abuse, domiciled, social supports    Overall, pt is a low risk of harm to self/others and does not require psychiatric admission for safety and stabilization.

## 2021-07-01 LAB
ALBUMIN SERPL ELPH-MCNC: 4.5 G/DL — SIGNIFICANT CHANGE UP (ref 3.3–5)
ALP SERPL-CCNC: 199 U/L — HIGH (ref 40–120)
ALT FLD-CCNC: 24 U/L — SIGNIFICANT CHANGE UP (ref 10–45)
ANION GAP SERPL CALC-SCNC: 16 MMOL/L — SIGNIFICANT CHANGE UP (ref 5–17)
AST SERPL-CCNC: 14 U/L — SIGNIFICANT CHANGE UP (ref 10–40)
BILIRUB SERPL-MCNC: 0.5 MG/DL — SIGNIFICANT CHANGE UP (ref 0.2–1.2)
BUN SERPL-MCNC: 13 MG/DL — SIGNIFICANT CHANGE UP (ref 7–23)
CALCIUM SERPL-MCNC: 9.8 MG/DL — SIGNIFICANT CHANGE UP (ref 8.4–10.5)
CHLORIDE SERPL-SCNC: 98 MMOL/L — SIGNIFICANT CHANGE UP (ref 96–108)
CO2 SERPL-SCNC: 22 MMOL/L — SIGNIFICANT CHANGE UP (ref 22–31)
CREAT SERPL-MCNC: 0.94 MG/DL — SIGNIFICANT CHANGE UP (ref 0.5–1.3)
GLUCOSE BLDC GLUCOMTR-MCNC: 92 MG/DL — SIGNIFICANT CHANGE UP (ref 70–99)
GLUCOSE BLDC GLUCOMTR-MCNC: 95 MG/DL — SIGNIFICANT CHANGE UP (ref 70–99)
GLUCOSE SERPL-MCNC: 87 MG/DL — SIGNIFICANT CHANGE UP (ref 70–99)
HCT VFR BLD CALC: 46.6 % — SIGNIFICANT CHANGE UP (ref 39–50)
HGB BLD-MCNC: 15.3 G/DL — SIGNIFICANT CHANGE UP (ref 13–17)
MCHC RBC-ENTMCNC: 30.4 PG — SIGNIFICANT CHANGE UP (ref 27–34)
MCHC RBC-ENTMCNC: 32.8 GM/DL — SIGNIFICANT CHANGE UP (ref 32–36)
MCV RBC AUTO: 92.6 FL — SIGNIFICANT CHANGE UP (ref 80–100)
NRBC # BLD: 0 /100 WBCS — SIGNIFICANT CHANGE UP (ref 0–0)
PLATELET # BLD AUTO: 322 K/UL — SIGNIFICANT CHANGE UP (ref 150–400)
POTASSIUM SERPL-MCNC: 4.1 MMOL/L — SIGNIFICANT CHANGE UP (ref 3.5–5.3)
POTASSIUM SERPL-SCNC: 4.1 MMOL/L — SIGNIFICANT CHANGE UP (ref 3.5–5.3)
PROT SERPL-MCNC: 7.8 G/DL — SIGNIFICANT CHANGE UP (ref 6–8.3)
RBC # BLD: 5.03 M/UL — SIGNIFICANT CHANGE UP (ref 4.2–5.8)
RBC # FLD: 13.3 % — SIGNIFICANT CHANGE UP (ref 10.3–14.5)
SODIUM SERPL-SCNC: 136 MMOL/L — SIGNIFICANT CHANGE UP (ref 135–145)
WBC # BLD: 6.96 K/UL — SIGNIFICANT CHANGE UP (ref 3.8–10.5)
WBC # FLD AUTO: 6.96 K/UL — SIGNIFICANT CHANGE UP (ref 3.8–10.5)

## 2021-07-01 PROCEDURE — 99232 SBSQ HOSP IP/OBS MODERATE 35: CPT

## 2021-07-01 RX ORDER — OLANZAPINE 15 MG/1
2.5 TABLET, FILM COATED ORAL EVERY 6 HOURS
Refills: 0 | Status: DISCONTINUED | OUTPATIENT
Start: 2021-07-01 | End: 2021-07-02

## 2021-07-01 RX ORDER — PANTOPRAZOLE SODIUM 20 MG/1
40 TABLET, DELAYED RELEASE ORAL DAILY
Refills: 0 | Status: DISCONTINUED | OUTPATIENT
Start: 2021-07-01 | End: 2021-07-06

## 2021-07-01 RX ORDER — ACETAMINOPHEN 500 MG
650 TABLET ORAL EVERY 6 HOURS
Refills: 0 | Status: DISCONTINUED | OUTPATIENT
Start: 2021-07-01 | End: 2021-07-08

## 2021-07-01 RX ORDER — LAMOTRIGINE 25 MG/1
300 TABLET, ORALLY DISINTEGRATING ORAL
Refills: 0 | Status: DISCONTINUED | OUTPATIENT
Start: 2021-07-01 | End: 2021-07-08

## 2021-07-01 RX ORDER — QUETIAPINE FUMARATE 200 MG/1
25 TABLET, FILM COATED ORAL EVERY 6 HOURS
Refills: 0 | Status: DISCONTINUED | OUTPATIENT
Start: 2021-07-01 | End: 2021-07-02

## 2021-07-01 RX ADMIN — PANTOPRAZOLE SODIUM 40 MILLIGRAM(S): 20 TABLET, DELAYED RELEASE ORAL at 13:04

## 2021-07-01 RX ADMIN — Medication 1000 UNIT(S): at 11:30

## 2021-07-01 RX ADMIN — QUETIAPINE FUMARATE 25 MILLIGRAM(S): 200 TABLET, FILM COATED ORAL at 23:08

## 2021-07-01 RX ADMIN — NYSTATIN CREAM 1 APPLICATION(S): 100000 CREAM TOPICAL at 05:12

## 2021-07-01 RX ADMIN — OLANZAPINE 2.5 MILLIGRAM(S): 15 TABLET, FILM COATED ORAL at 23:08

## 2021-07-01 RX ADMIN — CLOBAZAM 10 MILLIGRAM(S): 10 TABLET ORAL at 16:55

## 2021-07-01 RX ADMIN — Medication 1 TABLET(S): at 13:28

## 2021-07-01 RX ADMIN — PRIMIDONE 250 MILLIGRAM(S): 250 TABLET ORAL at 17:01

## 2021-07-01 RX ADMIN — CARBAMIDE PEROXIDE 10 DROP(S): 81.86 SOLUTION/ DROPS AURICULAR (OTIC) at 13:29

## 2021-07-01 RX ADMIN — LATANOPROST 1 DROP(S): 0.05 SOLUTION/ DROPS OPHTHALMIC; TOPICAL at 23:09

## 2021-07-01 RX ADMIN — Medication 1 TABLET(S): at 05:12

## 2021-07-01 RX ADMIN — LORATADINE 10 MILLIGRAM(S): 10 TABLET ORAL at 11:29

## 2021-07-01 RX ADMIN — LAMOTRIGINE 100 MILLIGRAM(S): 25 TABLET, ORALLY DISINTEGRATING ORAL at 05:12

## 2021-07-01 RX ADMIN — QUETIAPINE FUMARATE 25 MILLIGRAM(S): 200 TABLET, FILM COATED ORAL at 17:27

## 2021-07-01 RX ADMIN — PRIMIDONE 250 MILLIGRAM(S): 250 TABLET ORAL at 05:12

## 2021-07-01 RX ADMIN — Medication 1 TABLET(S): at 23:09

## 2021-07-01 RX ADMIN — SENNA PLUS 2 TABLET(S): 8.6 TABLET ORAL at 23:09

## 2021-07-01 RX ADMIN — DORZOLAMIDE HYDROCHLORIDE TIMOLOL MALEATE 1 DROP(S): 20; 5 SOLUTION/ DROPS OPHTHALMIC at 05:11

## 2021-07-01 RX ADMIN — ENOXAPARIN SODIUM 40 MILLIGRAM(S): 100 INJECTION SUBCUTANEOUS at 11:30

## 2021-07-01 RX ADMIN — DORZOLAMIDE HYDROCHLORIDE TIMOLOL MALEATE 1 DROP(S): 20; 5 SOLUTION/ DROPS OPHTHALMIC at 16:59

## 2021-07-01 RX ADMIN — NYSTATIN CREAM 1 APPLICATION(S): 100000 CREAM TOPICAL at 16:59

## 2021-07-01 RX ADMIN — OLANZAPINE 2.5 MILLIGRAM(S): 15 TABLET, FILM COATED ORAL at 17:26

## 2021-07-01 RX ADMIN — CLOBAZAM 10 MILLIGRAM(S): 10 TABLET ORAL at 05:11

## 2021-07-01 RX ADMIN — LAMOTRIGINE 300 MILLIGRAM(S): 25 TABLET, ORALLY DISINTEGRATING ORAL at 16:56

## 2021-07-01 RX ADMIN — Medication 1 SPRAY(S): at 11:29

## 2021-07-01 NOTE — PROGRESS NOTE ADULT - ASSESSMENT
1 Abdominal pain/dyspepsia: likely functional/related to eating habits. CT negative. Recent EGD negative. Continue PPI.    2. Elevated alp, likely due to AEDs    3. MR    4. R/o seizures per neurology.

## 2021-07-01 NOTE — SWALLOW BEDSIDE ASSESSMENT ADULT - ADDITIONAL RECOMMENDATIONS
Monitor for s/s aspiration/laryngeal penetration. If noted:  D/C p.o. intake, provide non-oral nutrition/hydration/meds, and contact this service @ [phone]  Maintain good oral hygiene

## 2021-07-01 NOTE — SWALLOW BEDSIDE ASSESSMENT ADULT - SLP PRECAUTIONS/LIMITATIONS: VISION
Severely impaired: cannot locate objects without hearing or touching them or patient nonresponsive./impaired

## 2021-07-01 NOTE — PHYSICAL THERAPY INITIAL EVALUATION ADULT - GAIT DEVIATIONS NOTED, PT EVAL
decreased maria dolores/increased time in double stance/decreased step length/decreased stride length

## 2021-07-01 NOTE — SWALLOW BEDSIDE ASSESSMENT ADULT - SLP GENERAL OBSERVATIONS
Pt encountered bedside, +NGT. AA&Ox3. +Severe visual impairment as per chart, with pt opening/closing eyes throughout evaluation with poor ability to maintain eye contact. Pt able to follow simple 1-step directives for purpose of exam, however, inconsistent responses to simple biographical yes/no questions.  Pt responsive to tactile cues, such as opening oral cavity appropriately upon presentation of spoon upon labial surface. Pt reportedly drinks coffee at home and unable to provide further textures. Pt does endorse "occasional coughing on and off".

## 2021-07-01 NOTE — SWALLOW BEDSIDE ASSESSMENT ADULT - COMMENTS
Per H&P: NEUROLOGICAL EXAM:  MS: AAOX3 to person, place, age, month. Speech is fluent but slightly dysarthric, follows crossed commands; recent memory impaired (1/3 object recall); normal attention, language and fund of knowledge.   CN: B/l vision loss at baseline worse in L eye, R pupil 3mm and reactive, L pupil dilated and nonreactive, sees some shadows out of his R eye, b/l horizontal nystagmus on primary gaze, unable to assess EOM, V1-3 intact, no facial asymmetry, t/p midline.    GI following: "Abdominal pain/dyspepsia: likely functional/related to eating habits however given family history of pancreatic Ca aunt is concerned to rule this out. Trial of PPI. Eat slowly. Obtain CT abdomen." He had an EGD 6 months ago which was unremarkable he has never had a colonoscopy.    Passed Dysphagia screen on 6/29

## 2021-07-01 NOTE — SWALLOW BEDSIDE ASSESSMENT ADULT - PHARYNGEAL PHASE
+open mouth posture during swallow initiation Cough post oral intake/Multiple swallows +open mouth posture during swallow initiation/Delayed throat clear post oral intake

## 2021-07-01 NOTE — PHYSICAL THERAPY INITIAL EVALUATION ADULT - PERTINENT HX OF CURRENT PROBLEM, REHAB EVAL
51 yo male with a PMHx of complex seizures (since age 9), hydrocephalus (s/p  shunt on R side), chronic R sided headaches (s/p expansion craniotomy), mild intellectual disability, b/l hearing loss, and b/l blindness presenting with onset of staring spells and increased behavioral issues at home over the past 4 months.

## 2021-07-01 NOTE — SWALLOW BEDSIDE ASSESSMENT ADULT - ORAL PHASE
uncoordinated AP transport Uncoordinated AP transport +diffuse oral stasis/Decreased anterior-posterior movement of the bolus

## 2021-07-01 NOTE — PHYSICAL THERAPY INITIAL EVALUATION ADULT - ADDITIONAL COMMENTS
Per pt and aunt at bedside, pt has been living with aunt/uncle for the past few months since prior hospitalization (was in group home before last hospitalization). Has 2 steps to enter and has been staying on main floor. Amb without AD. Per aunt, pt was functionally independent with verbal cues/assist 2/2 visual impairments, however recently, pt has been requiring physical assist for mobility/ADLs. Per pt and aunt at bedside, pt has been living with aunt/uncle for the past few months, since prior hospitalization (was in group home before last hospitalization). Has 2 steps to enter and has been staying on main floor. Amb without AD. Per aunt, pt was functionally independent with verbal cues/assist 2/2 visual impairments, however recently, pt has been requiring physical assist for mobility/ADLs.

## 2021-07-01 NOTE — PHYSICAL THERAPY INITIAL EVALUATION ADULT - LIVES WITH, PROFILE
has been staying with aunt/uncle for past few months, however was in group home prior to last hospitalization.

## 2021-07-01 NOTE — PROGRESS NOTE ADULT - SUBJECTIVE AND OBJECTIVE BOX
Chief Complaint:  Patient is a 50y old  Male who presents with a chief complaint of Staring spells and aggressive behavioral changes (01 Jul 2021 09:23)      Date of service 07-01-21 @ 21:33      Interval Events: endorses dyspepsia    Hospital Medications:  acetaminophen   Tablet .. 650 milliGRAM(s) Oral every 6 hours PRN  calcium carbonate 1250 mG  + Vitamin D (OsCal 500 + D) 1 Tablet(s) Oral three times a day  cholecalciferol 1000 Unit(s) Oral daily  cloBAZam 10 milliGRAM(s) Oral two times a day  diazepam    Tablet 5 milliGRAM(s) Oral every 8 hours PRN  dorzolamide 2%/timolol 0.5% Ophthalmic Solution 1 Drop(s) Both EYES two times a day  enoxaparin Injectable 40 milliGRAM(s) SubCutaneous daily  fluticasone propionate 50 MICROgram(s)/spray Nasal Spray 1 Spray(s) Both Nostrils <User Schedule>  lacosamide Injectable 200 milliGRAM(s) IV Push once PRN  lamoTRIgine 300 milliGRAM(s) Oral two times a day  latanoprost 0.005% Ophthalmic Solution 1 Drop(s) Both EYES at bedtime  loratadine 10 milliGRAM(s) Oral daily  LORazepam   Injectable 1 milliGRAM(s) IV Push once PRN  nystatin Ointment 1 Application(s) Topical two times a day  OLANZapine 2.5 milliGRAM(s) Oral every 6 hours  pantoprazole  Injectable 40 milliGRAM(s) IV Push daily  primidone 250 milliGRAM(s) Oral two times a day  QUEtiapine 25 milliGRAM(s) Oral every 6 hours  senna 2 Tablet(s) Oral at bedtime        Review of Systems:  General:  No wt loss, fevers, chills, night sweats, fatigue,   Eyes:  Good vision, no reported pain  ENT:  No sore throat, pain, runny nose, dysphagia  CV:  No pain, palpitations, hypo/hypertension  Resp:  No dyspnea, cough, tachypnea, wheezing  GI:  See HPI  :  No pain, bleeding, incontinence, nocturia  Muscle:  No pain, weakness  Neuro:  No weakness, tingling, memory problems  Psych:  No fatigue, insomnia, mood problems, depression  Endocrine:  No polyuria, polydipsia, cold/heat intolerance  Heme:  No petechiae, ecchymosis, easy bruisability  Integumentary:  No rash, edema    PHYSICAL EXAM:   Vital Signs:  Vital Signs Last 24 Hrs  T(C): 36.9 (01 Jul 2021 19:17), Max: 37.1 (30 Jun 2021 23:27)  T(F): 98.4 (01 Jul 2021 19:17), Max: 98.8 (30 Jun 2021 23:27)  HR: 110 (01 Jul 2021 19:17) (98 - 111)  BP: 111/79 (01 Jul 2021 19:17) (105/77 - 127/86)  BP(mean): --  RR: 18 (01 Jul 2021 19:17) (18 - 18)  SpO2: 95% (01 Jul 2021 19:17) (94% - 97%)  Daily     Daily       PHYSICAL EXAM:     GENERAL:  Appears stated age, well-groomed, well-nourished, no distress  HEENT:  NC/AT,  conjunctivae anicteric, clear and pink,   NECK: supple, trachea midline  CHEST:  Full & symmetric excursion, no increased effort, breath sounds clear  HEART:  Regular rhythm, no JVD  ABDOMEN:  Soft, non-tender, non-distended, normoactive bowel sounds,  no masses , no hepatosplenomegaly  EXTREMITIES:  no cyanosis,clubbing or edema  SKIN:  No rash, erythema, or, ecchymoses, no jaundice  NEURO:  Alert, non-focal, no asterixis  PSYCH: Appropriate affect, oriented to place and time  RECTAL: Deferred      LABS Personally reviewed by me:                        15.3   6.96  )-----------( 322      ( 01 Jul 2021 06:00 )             46.6     Mean Cell Volume: 92.6 fl (07-01-21 @ 06:00)    07-01    136  |  98  |  13  ----------------------------<  87  4.1   |  22  |  0.94    Ca    9.8      01 Jul 2021 06:00    TPro  7.8  /  Alb  4.5  /  TBili  0.5  /  DBili  x   /  AST  14  /  ALT  24  /  AlkPhos  199<H>  07-01    LIVER FUNCTIONS - ( 01 Jul 2021 06:00 )  Alb: 4.5 g/dL / Pro: 7.8 g/dL / ALK PHOS: 199 U/L / ALT: 24 U/L / AST: 14 U/L / GGT: x                                       15.3   6.96  )-----------( 322      ( 01 Jul 2021 06:00 )             46.6                         14.3   5.71  )-----------( 291      ( 30 Jun 2021 06:37 )             42.3                         14.4   5.85  )-----------( 324      ( 29 Jun 2021 22:21 )             44.7       Imaging personally reviewed by me:

## 2021-07-01 NOTE — PHYSICAL THERAPY INITIAL EVALUATION ADULT - PRECAUTIONS/LIMITATIONS, REHAB EVAL
Staring spells described as "smiling widely" or "licking his lips" and he would be completely unresponsive during these spells. Concern for possible absence seizures vs behavioral issues. Staring spells described as "smiling widely" or "licking his lips" and he would be completely unresponsive during these spells. Concern for possible absence seizures vs behavioral issues./fall precautions/vision precautions

## 2021-07-01 NOTE — EEG REPORT - NS EEG TEXT BOX
Start Date: 6/30/2021 – Day 2                                Start Time – 08:00       Duration – 24hr 00m    Daily EEG Visual Analysis  Findings: The background was continuous, spontaneously variable and reactive. During wakefulness, the posterior dominant rhythm consisted of asymmetric (higher voltage in left posterior quadrant), well-modulated 8.5 Hz activity, with amplitude to 30 uV, that attenuated to eye opening.       Background Slowing:  No generalized background slowing was present.    Focal Slowing:   None were present.    Sleep Background:  Drowsiness was characterized by fragmentation, attenuation, and slowing of the background activity.    Sleep was characterized by the presence of vertex waves, symmetric sleep spindles and K-complexes.    Other Non-Epileptiform Findings:  -Asymmetry, attenuation of voltage over the right hemisphere    Interictal Epileptiform Activity:   None were present.    Events:  No events or seizures recorded.    Artifacts:  Intermittent myogenic and movement artifacts were noted.    ECG:  The heart rate on single channel ECG was predominantly between 60-80 BPM.    EEG Summary:  Abnormal EEG in the awake, drowsy and asleep states.  - Asymmetry, attenuation of voltage over the right hemisphere    Impression/Clinical Correlate:  There is an asymmetry with an attenuation of activity over the right hemisphere.  This may be due to either cortical dysfunction in the right hemisphere, or a skull defect on the left.  No epileptiform abnormalities or seizures were recorded.    ________________________________________    Mekhi Tipton MD  [facility], [facility]

## 2021-07-01 NOTE — SWALLOW BEDSIDE ASSESSMENT ADULT - SWALLOW EVAL: RECOMMENDED FEEDING/EATING TECHNIQUES
allow for swallow between intakes/check mouth frequently for oral residue/pocketing/crush medication (when feasible)/maintain upright posture during/after eating for 30 mins/no straws/oral hygiene/position upright (90 degrees)/small sips/bites

## 2021-07-01 NOTE — OCCUPATIONAL THERAPY INITIAL EVALUATION ADULT - PERTINENT HX OF CURRENT PROBLEM, REHAB EVAL
51 yo handedness unknown male with a PMH of complex seizures since age 9, hydrocephalus s/p  shunt on R side, chronic R sided headaches s/p expansion craniotomy, mild Mental Retardation, b/l hearing loss and b/l blindness presenting with onset of staring spells and increased behavioral issues at home over the past 4 months.

## 2021-07-01 NOTE — PROGRESS NOTE ADULT - SUBJECTIVE AND OBJECTIVE BOX
[id number]    Subjective:     PAST MEDICAL & SURGICAL HISTORY:  Hydrocephalus  age 2 months    Legally blind    Glaucoma    Sleep apnea  not on CPAP    Epilepsy    Osteoporosis    Elevated liver enzymes    Environmental Allergies    Impulse control disorder  worsened when pt on Depakote, pt now off    Mild mental retardation    Hiatal hernia    Aspiration pneumonia  at age 25    Viral pneumonia  h/o    BPH without obstruction/lower urinary tract symptoms    Glaucoma  on eye gtts    S/P  Shunt  originally had shunt to lung, then revised to peritoneal shunt, has had multiple revisions    Epilepsy  s/p Vagal Nerve Stimulator Implant , 2007, last one in 2016    H/O craniotomy  at age 18    Heel cord contracture  s/p surgical correction b/l    History of tonsillectomy    S/p bilateral myringotomy with tube placement    Obstructed  shunt  revision of  shunt 2015    FAMILY HISTORY:  Family history of stroke (Mother)    Social Hx:  Nonsmoker, no drug or alcohol use    Home Medications:  cetirizine 10 mg oral tablet: 1 tab(s) orally once a day in the pm (12 Nov 2020 05:57)  Debrox 6.5% otic solution: 10 drop(s) to each ear for the first week of every month (12 Nov 2020 05:57)  dorzolamide-timolol 2.23%-0.68% ophthalmic solution: 1 drop(s) to both eyes 2 times a day (12 Nov 2020 05:57)  escitalopram 5 mg oral tablet: 2 tab(s) orally once a day (29 Jun 2021 18:13)  fluticasone 0.5 mg/2 mL inhalation suspension: 1 spray(s) inhaled once a day (12 Nov 2020 05:57)  ketoconazole 2% topical cream: Apply topically to affected area 2 times a day. ( scalp , forehead and eyebrows ) (12 Nov 2020 05:57)  latanoprost 0.005% ophthalmic solution: 1 drop(s) to each affected eye once a day (in the evening) (29 Jun 2021 18:13)  OLANZapine 5 mg oral tablet: 1 tab(s) orally once a day (29 Jun 2021 18:13)  omeprazole 40 mg oral delayed release capsule: 1 cap(s) orally 2 times a day (12 Nov 2020 05:57)  Oyster Shell Calcium with Vitamin D 500 mg-200 intl units oral tablet: 1 tab(s) orally 3 times a day (12 Nov 2020 05:57)  primidone 250 mg oral tablet: 1 tab(s) orally 2 times a day (29 Jun 2021 18:50)  senna oral tablet: 2 tab(s) orally once a day (at bedtime) (17 Nov 2020 10:46)  sertraline 25 mg oral tablet: 1 tab(s) orally once a day (29 Jun 2021 18:12)  Vitamin D3 1000 intl units oral capsule: 1 cap(s) orally once a day in the am (12 Nov 2020 05:57)    MEDICATIONS  (STANDING):  calcium carbonate 1250 mG  + Vitamin D (OsCal 500 + D) 1 Tablet(s) Oral three times a day  carbamide peroxide Otic Solution 10 Drop(s) Both Ears once  cholecalciferol 1000 Unit(s) Oral daily  cloBAZam 10 milliGRAM(s) Oral two times a day  dorzolamide 2%/timolol 0.5% Ophthalmic Solution 1 Drop(s) Both EYES two times a day  enoxaparin Injectable 40 milliGRAM(s) SubCutaneous daily  fluticasone propionate 50 MICROgram(s)/spray Nasal Spray 1 Spray(s) Both Nostrils <User Schedule>  lamoTRIgine 300 milliGRAM(s) Oral two times a day  latanoprost 0.005% Ophthalmic Solution 1 Drop(s) Both EYES at bedtime  loratadine 10 milliGRAM(s) Oral daily  nystatin Ointment 1 Application(s) Topical two times a day  pantoprazole  Injectable 40 milliGRAM(s) IV Push daily  primidone 250 milliGRAM(s) Oral two times a day  senna 2 Tablet(s) Oral at bedtime    MEDICATIONS  (PRN):  acetaminophen   Tablet .. 650 milliGRAM(s) Oral every 6 hours PRN Temp greater or equal to 38C (100.4F), Mild Pain (1 - 3), Moderate Pain (4 - 6)  diazepam    Tablet 5 milliGRAM(s) Oral every 8 hours PRN spasm  lacosamide Injectable 200 milliGRAM(s) IV Push once PRN seizure refractory to ativan  LORazepam   Injectable 1 milliGRAM(s) IV Push once PRN seizure    Allergies  codeine (Stomach Upset)  Depakote (Other)  seasonal allergies: nasal congestion (Other)    ROS: Pertinent positives above, all other ROS were reviewed and are negative.      Vital Signs Last 24 Hrs  T(C): 36.7 (01 Jul 2021 08:42), Max: 37.1 (30 Jun 2021 23:27)  T(F): 98 (01 Jul 2021 08:42), Max: 98.8 (30 Jun 2021 23:27)  HR: 99 (01 Jul 2021 08:42) (93 - 100)  BP: 118/83 (01 Jul 2021 08:42) (111/79 - 129/90)  RR: 18 (01 Jul 2021 08:42) (18 - 18)  SpO2: 94% (01 Jul 2021 08:42) (94% - 97%)    GENERAL EXAM:  Constitutional:  HEENT:    NEUROLOGICAL EXAM:  MS:  CN:  Motor:  Sensation:   Coordination/Gait: Not assessed.    Labs:   cbc                      15.3   6.96  )-----------( 322      ( 01 Jul 2021 06:00 )             46.6     [id number]    136  |  98  |  13  ----------------------------<  87  4.1   |  22  |  0.94    Ca    9.8      01 Jul 2021 06:00    TPro  7.8  /  Alb  4.5  /  TBili  0.5  /  DBili  x   /  AST  14  /  ALT  24  /  AlkPhos  199<H>  07-01    LIVER FUNCTIONS - ( 01 Jul 2021 06:00 )  Alb: 4.5 g/dL / Pro: 7.8 g/dL / ALK PHOS: 199 U/L / ALT: 24 U/L / AST: 14 U/L / GGT: x           Radiology/EEG:  -06/30 EEG Summary:  Abnormal EEG in the awake, drowsy and asleep states.  - Asymmetry, attenuation of voltage over the right hemisphere  Impression/Clinical Correlate:  There is an asymmetry with an attenuation of activity over the right hemisphere.  This may be due to either cortical dysfunction in the right hemisphere, or a skull defect on the left.  No epileptiform abnormalities or seizures were recorded.    -07/01 EEG Summary:  Abnormal EEG in the awake, drowsy and asleep states.  - Asymmetry, attenuation of voltage over the right hemisphere  Impression/Clinical Correlate:  2.	There is an asymmetry with an attenuation of activity over the right hemisphere.  This may be due to either cortical dysfunction in the right hemisphere, or a skull defect on the left.  No epileptiform abnormalities or seizures were recorded. [id number]    Subjective: 51 yo male seen and examined at bedside. Passed S&S. No complaints.    PAST MEDICAL & SURGICAL HISTORY:  Hydrocephalus  age 2 months    Legally blind    Glaucoma    Sleep apnea  not on CPAP    Epilepsy    Osteoporosis    Elevated liver enzymes    Environmental Allergies    Impulse control disorder  worsened when pt on Depakote, pt now off    Mild mental retardation    Hiatal hernia    Aspiration pneumonia  at age 25    Viral pneumonia  h/o    BPH without obstruction/lower urinary tract symptoms    Glaucoma  on eye gtts    S/P  Shunt  originally had shunt to lung, then revised to peritoneal shunt, has had multiple revisions    Epilepsy  s/p Vagal Nerve Stimulator Implant , 2007, last one in 2016    H/O craniotomy  at age 18    Heel cord contracture  s/p surgical correction b/l    History of tonsillectomy    S/p bilateral myringotomy with tube placement    Obstructed  shunt  revision of  shunt 2015    FAMILY HISTORY:  Family history of stroke (Mother)    Social Hx:  Nonsmoker, no drug or alcohol use    Home Medications:  cetirizine 10 mg oral tablet: 1 tab(s) orally once a day in the pm (12 Nov 2020 05:57)  Debrox 6.5% otic solution: 10 drop(s) to each ear for the first week of every month (12 Nov 2020 05:57)  dorzolamide-timolol 2.23%-0.68% ophthalmic solution: 1 drop(s) to both eyes 2 times a day (12 Nov 2020 05:57)  escitalopram 5 mg oral tablet: 2 tab(s) orally once a day (29 Jun 2021 18:13)  fluticasone 0.5 mg/2 mL inhalation suspension: 1 spray(s) inhaled once a day (12 Nov 2020 05:57)  ketoconazole 2% topical cream: Apply topically to affected area 2 times a day. ( scalp , forehead and eyebrows ) (12 Nov 2020 05:57)  latanoprost 0.005% ophthalmic solution: 1 drop(s) to each affected eye once a day (in the evening) (29 Jun 2021 18:13)  OLANZapine 5 mg oral tablet: 1 tab(s) orally once a day (29 Jun 2021 18:13)  omeprazole 40 mg oral delayed release capsule: 1 cap(s) orally 2 times a day (12 Nov 2020 05:57)  Oyster Shell Calcium with Vitamin D 500 mg-200 intl units oral tablet: 1 tab(s) orally 3 times a day (12 Nov 2020 05:57)  primidone 250 mg oral tablet: 1 tab(s) orally 2 times a day (29 Jun 2021 18:50)  senna oral tablet: 2 tab(s) orally once a day (at bedtime) (17 Nov 2020 10:46)  sertraline 25 mg oral tablet: 1 tab(s) orally once a day (29 Jun 2021 18:12)  Vitamin D3 1000 intl units oral capsule: 1 cap(s) orally once a day in the am (12 Nov 2020 05:57)    MEDICATIONS  (STANDING):  calcium carbonate 1250 mG  + Vitamin D (OsCal 500 + D) 1 Tablet(s) Oral three times a day  carbamide peroxide Otic Solution 10 Drop(s) Both Ears once  cholecalciferol 1000 Unit(s) Oral daily  cloBAZam 10 milliGRAM(s) Oral two times a day  dorzolamide 2%/timolol 0.5% Ophthalmic Solution 1 Drop(s) Both EYES two times a day  enoxaparin Injectable 40 milliGRAM(s) SubCutaneous daily  fluticasone propionate 50 MICROgram(s)/spray Nasal Spray 1 Spray(s) Both Nostrils <User Schedule>  lamoTRIgine 300 milliGRAM(s) Oral two times a day  latanoprost 0.005% Ophthalmic Solution 1 Drop(s) Both EYES at bedtime  loratadine 10 milliGRAM(s) Oral daily  nystatin Ointment 1 Application(s) Topical two times a day  pantoprazole  Injectable 40 milliGRAM(s) IV Push daily  primidone 250 milliGRAM(s) Oral two times a day  senna 2 Tablet(s) Oral at bedtime    MEDICATIONS  (PRN):  acetaminophen   Tablet .. 650 milliGRAM(s) Oral every 6 hours PRN Temp greater or equal to 38C (100.4F), Mild Pain (1 - 3), Moderate Pain (4 - 6)  diazepam    Tablet 5 milliGRAM(s) Oral every 8 hours PRN spasm  lacosamide Injectable 200 milliGRAM(s) IV Push once PRN seizure refractory to ativan  LORazepam   Injectable 1 milliGRAM(s) IV Push once PRN seizure    Allergies  codeine (Stomach Upset)  Depakote (Other)  seasonal allergies: nasal congestion (Other)    ROS: Pertinent positives above, all other ROS were reviewed and are negative.      Vital Signs Last 24 Hrs  T(C): 36.7 (01 Jul 2021 08:42), Max: 37.1 (30 Jun 2021 23:27)  T(F): 98 (01 Jul 2021 08:42), Max: 98.8 (30 Jun 2021 23:27)  HR: 99 (01 Jul 2021 08:42) (93 - 100)  BP: 118/83 (01 Jul 2021 08:42) (111/79 - 129/90)  RR: 18 (01 Jul 2021 08:42) (18 - 18)  SpO2: 94% (01 Jul 2021 08:42) (94% - 97%)    GENERAL EXAM:  Constitutional: Awake and alert. NAD.    NEUROLOGICAL EXAM:  MS: Alert, oriented to self, location, situation. Speech is fluent but slightly dysarthric, follows commands.  CN: Bilateral vision loss at baseline worse in L eye, sees some shadows out of his R eye, b/l horizontal nystagmus on primary gaze, unable to assess EOM, no facial asymmetry.   Motor: SORIANO antigravity.  Coordination/Gait: Unable to assess.    Labs:   cbc                      15.3   6.96  )-----------( 322      ( 01 Jul 2021 06:00 )             46.6     [id number]    136  |  98  |  13  ----------------------------<  87  4.1   |  22  |  0.94    Ca    9.8      01 Jul 2021 06:00    TPro  7.8  /  Alb  4.5  /  TBili  0.5  /  DBili  x   /  AST  14  /  ALT  24  /  AlkPhos  199<H>  07-01    LIVER FUNCTIONS - ( 01 Jul 2021 06:00 )  Alb: 4.5 g/dL / Pro: 7.8 g/dL / ALK PHOS: 199 U/L / ALT: 24 U/L / AST: 14 U/L / GGT: x           Radiology/EEG:  -06/30 EEG Summary:  Abnormal EEG in the awake, drowsy and asleep states.  - Asymmetry, attenuation of voltage over the right hemisphere  Impression/Clinical Correlate:  There is an asymmetry with an attenuation of activity over the right hemisphere.  This may be due to either cortical dysfunction in the right hemisphere, or a skull defect on the left.  No epileptiform abnormalities or seizures were recorded.    -07/01 EEG Summary:  Abnormal EEG in the awake, drowsy and asleep states.  - Asymmetry, attenuation of voltage over the right hemisphere  Impression/Clinical Correlate:  2.	There is an asymmetry with an attenuation of activity over the right hemisphere.  This may be due to either cortical dysfunction in the right hemisphere, or a skull defect on the left.  No epileptiform abnormalities or seizures were recorded.

## 2021-07-01 NOTE — SWALLOW BEDSIDE ASSESSMENT ADULT - ORAL PREPARATORY PHASE
+reduced labial seal around cup Reduced oral grading/Decreased mastication ability Reduced oral grading Reduced oral grading/Anterior loss of bolus

## 2021-07-01 NOTE — SWALLOW BEDSIDE ASSESSMENT ADULT - SWALLOW EVAL: DIAGNOSIS
51 yo handedness unknown male with a PMH of complex seizures since age 9, hydrocephalus s/p  shunt on R side, chronic R sided headaches s/p expansion craniotomy, mild Mental Retardation, b/l hearing loss and b/l blindness presenting with onset of staring spells and increased behavioral issues at home over the past 4 months. Concern for possible absence seizures vs behavioral issues. Pt now presents with clinical signs of an oropharyngeal dysphagia. Swallow sequence marked by reduced stripping of bolus from spoon with irregular bolus manipulation and formation. Nearly absent mastication of mechanical soft solids prior to attempts to transport. Once swallow initiated, pt remains with open mouth posture. Diffuse oral residue post mechanical soft solids. Immediate cough post mechanical soft solids and delayed cough post thin liquids which may be suggestive of penetration/aspiration. No overt signs or symptoms of penetration or aspiration on purees and nectar or honey thickened liquids.

## 2021-07-01 NOTE — SWALLOW BEDSIDE ASSESSMENT ADULT - SLP PERTINENT HISTORY OF CURRENT PROBLEM
51 yo handedness unknown male with a PMH of complex seizures since age 9, hydrocephalus s/p  shunt on R side, chronic R sided headaches s/p expansion craniotomy, mild Mental Retardation, b/l hearing loss and b/l blindness presenting with onset of staring spells and increased behavioral issues at home over the past 4 months. Staring spells described as "smiling widely" or "licking his lips" and he would be completely unresponsive during these spells. Concern for possible absence seizures vs behavioral issues.

## 2021-07-01 NOTE — PROGRESS NOTE ADULT - ASSESSMENT
49 yo male with a PMHx of complex seizures (since age 9), hydrocephalus (s/p  shunt on R side), chronic R sided headaches (s/p expansion craniotomy), mild intellectual disability, b/l hearing loss, and b/l blindness presenting with onset of staring spells and increased behavioral issues at home over the past 4 months. Staring spells described as "smiling widely" or "licking his lips" and he would be completely unresponsive during these spells. Concern for possible absence seizures vs behavioral issues.     Plan:   [] c/w vEEG   [] c/w home Onfi 10MG PO BID, Lamotrigine 300MG PO BID, Primidone 250MG PO BID.  [] Zyprexa, Zoloft, and Lexapro discontinued as aunt states she has not given these meds for the past month due to weight gain.  [x] Psychiatry consulted, recommend Seroquel and Zyprexa PRN for agitation.  [x] Formal speech and swallow eval:  [] Seizure/Fall/Aspiration precautions.  [x] CT Abdomen/Pelvis unremarkable.  [] GI consult:  [] PT/OT.  [] Diet:  [] DVT PPX: Lovenox 40 SC.  [] Seizure Rescue: Ativan 1MG IVP x1 for convulsions/GTC lasting > 3 minutes, Vimpat 200MG IV x1 for convulsions/GTC refractory to Ativan.    Case discussed with epilepsy attending Dr. Lay. 49 yo male with a PMHx of complex seizures (since age 9), hydrocephalus (s/p  shunt on R side), chronic R sided headaches (s/p expansion craniotomy), mild intellectual disability, b/l hearing loss, and b/l blindness presenting with onset of staring spells and increased behavioral issues at home over the past 4 months. Staring spells described as "smiling widely" or "licking his lips" and he would be completely unresponsive during these spells. Concern for possible absence seizures vs behavioral issues.     Plan:   [] c/w vEEG   [] c/w home Onfi 10MG PO BID, Lamotrigine 300MG PO BID, Primidone 250MG PO BID.  [] Zyprexa, Zoloft, and Lexapro discontinued as aunt states she has not given these meds for the past month due to weight gain.  [x] Psychiatry consulted, recommend Seroquel and Zyprexa PRN for agitation.  [x] Formal speech and swallow eval:  [] Seizure/Fall/Aspiration precautions.  [] GI consult: CT Abdomen/Pelvis unremarkable. On Protonix 40MG IVP daily. Believe pain likely functional/related to eating habits.  [] PT/OT.  [] Diet:  [] DVT PPX: Lovenox 40 SC.  [] Seizure Rescue: Ativan 1MG IVP x1 for convulsions/GTC lasting > 3 minutes, Vimpat 200MG IV x1 for convulsions/GTC refractory to Ativan.    Case discussed with epilepsy attending Dr. Lay. 49 yo male with a PMHx of complex seizures (since age 9), hydrocephalus (s/p  shunt on R side), chronic R sided headaches (s/p expansion craniotomy), mild intellectual disability, b/l hearing loss, and b/l blindness presenting with onset of staring spells and increased behavioral issues at home over the past 4 months. Staring spells described as "smiling widely" or "licking his lips" and he would be completely unresponsive during these spells. Concern for possible absence seizures vs behavioral issues.     Plan:   [] c/w vEEG   [] c/w home Onfi 10MG PO BID, Lamotrigine 300MG PO BID, Primidone 250MG PO BID.  [] Zyprexa, Zoloft, and Lexapro discontinued as aunt states she has not given these meds for the past month due to weight gain.  [] Psychiatry consulted, recommend Seroquel and Zyprexa PRN for agitation. Will provide these medications as standing doses.  [x] Formal speech and swallow eval: Pureed with nectar thick liquids.  [] Seizure/Fall/Aspiration precautions.  [] GI consult: CT Abdomen/Pelvis unremarkable. On Protonix 40MG IVP daily. Believe pain likely functional/related to eating habits.  [] PT/OT.  [] Diet: Pureed with nectar thick.  [] DVT PPX: Lovenox 40 SC.  [] Seizure Rescue: Ativan 1MG IVP x1 for convulsions/GTC lasting > 3 minutes, Vimpat 200MG IV x1 for convulsions/GTC refractory to Ativan.    Case discussed with epilepsy attending Dr. Lay.

## 2021-07-01 NOTE — OCCUPATIONAL THERAPY INITIAL EVALUATION ADULT - LIVES WITH, PROFILE
History provided from aunt; pt was living in group home however has since last d/c 11/2020 has been living with her and her  in . Pt has been requiring more assistance to navigate around house as well as for ADLs. Pt has shower chair.

## 2021-07-01 NOTE — SWALLOW BEDSIDE ASSESSMENT ADULT - SWALLOW EVAL: RECOMMENDED DIET
1) Dysphagia 1 with nectar thickened liquids 2) FULL ASSIST FOR SMALL BOLUS SIZE/SLOW RATE 3) Crush medications and place in applesauce when feasible or provide via alternate route 4) Must be awake/alert

## 2021-07-01 NOTE — OCCUPATIONAL THERAPY INITIAL EVALUATION ADULT - BALANCE TRAINING, PT EVAL
GOAL: Pt will demonstrate improved static/dynamic standing balance to GOOD without AD as appropriate in order to increase safety and independence in ADLs within 4 weeks

## 2021-07-01 NOTE — OCCUPATIONAL THERAPY INITIAL EVALUATION ADULT - PRECAUTIONS/LIMITATIONS, REHAB EVAL
Staring spells described as "smiling widely" or "licking his lips" and he would be completely unresponsive during these spells. Concern for possible absence seizures vs behavioral issues. Staring spells described as "smiling widely" or "licking his lips" and he would be completely unresponsive during these spells. Concern for possible absence seizures vs behavioral issues./seizure precautions

## 2021-07-01 NOTE — PHYSICAL THERAPY INITIAL EVALUATION ADULT - ACTIVE RANGE OF MOTION EXAMINATION, REHAB EVAL
Nursing/Dr. Dunbar, ED RN and pts sister/Patient/Family/Physician
bilateral upper extremity Active ROM was WFL (within functional limits)/bilateral  lower extremity Active ROM was WFL (within functional limits)

## 2021-07-02 LAB
ANION GAP SERPL CALC-SCNC: 11 MMOL/L — SIGNIFICANT CHANGE UP (ref 5–17)
BUN SERPL-MCNC: 14 MG/DL — SIGNIFICANT CHANGE UP (ref 7–23)
CALCIUM SERPL-MCNC: 9.6 MG/DL — SIGNIFICANT CHANGE UP (ref 8.4–10.5)
CHLORIDE SERPL-SCNC: 100 MMOL/L — SIGNIFICANT CHANGE UP (ref 96–108)
CO2 SERPL-SCNC: 25 MMOL/L — SIGNIFICANT CHANGE UP (ref 22–31)
CREAT SERPL-MCNC: 0.94 MG/DL — SIGNIFICANT CHANGE UP (ref 0.5–1.3)
FOLATE SERPL-MCNC: 14.3 NG/ML — SIGNIFICANT CHANGE UP
GLUCOSE SERPL-MCNC: 96 MG/DL — SIGNIFICANT CHANGE UP (ref 70–99)
HCT VFR BLD CALC: 48.5 % — SIGNIFICANT CHANGE UP (ref 39–50)
HGB BLD-MCNC: 15.8 G/DL — SIGNIFICANT CHANGE UP (ref 13–17)
MCHC RBC-ENTMCNC: 30.7 PG — SIGNIFICANT CHANGE UP (ref 27–34)
MCHC RBC-ENTMCNC: 32.6 GM/DL — SIGNIFICANT CHANGE UP (ref 32–36)
MCV RBC AUTO: 94.2 FL — SIGNIFICANT CHANGE UP (ref 80–100)
NRBC # BLD: 0 /100 WBCS — SIGNIFICANT CHANGE UP (ref 0–0)
PLATELET # BLD AUTO: 298 K/UL — SIGNIFICANT CHANGE UP (ref 150–400)
POTASSIUM SERPL-MCNC: 4.1 MMOL/L — SIGNIFICANT CHANGE UP (ref 3.5–5.3)
POTASSIUM SERPL-SCNC: 4.1 MMOL/L — SIGNIFICANT CHANGE UP (ref 3.5–5.3)
RBC # BLD: 5.15 M/UL — SIGNIFICANT CHANGE UP (ref 4.2–5.8)
RBC # FLD: 13.2 % — SIGNIFICANT CHANGE UP (ref 10.3–14.5)
SODIUM SERPL-SCNC: 136 MMOL/L — SIGNIFICANT CHANGE UP (ref 135–145)
TSH SERPL-MCNC: 0.64 UIU/ML — SIGNIFICANT CHANGE UP (ref 0.27–4.2)
VIT B12 SERPL-MCNC: 966 PG/ML — SIGNIFICANT CHANGE UP (ref 232–1245)
WBC # BLD: 6.17 K/UL — SIGNIFICANT CHANGE UP (ref 3.8–10.5)
WBC # FLD AUTO: 6.17 K/UL — SIGNIFICANT CHANGE UP (ref 3.8–10.5)

## 2021-07-02 PROCEDURE — 99232 SBSQ HOSP IP/OBS MODERATE 35: CPT

## 2021-07-02 RX ORDER — OLANZAPINE 15 MG/1
5 TABLET, FILM COATED ORAL AT BEDTIME
Refills: 0 | Status: DISCONTINUED | OUTPATIENT
Start: 2021-07-02 | End: 2021-07-08

## 2021-07-02 RX ORDER — QUETIAPINE FUMARATE 200 MG/1
25 TABLET, FILM COATED ORAL AT BEDTIME
Refills: 0 | Status: DISCONTINUED | OUTPATIENT
Start: 2021-07-02 | End: 2021-07-08

## 2021-07-02 RX ORDER — SODIUM CHLORIDE 9 MG/ML
250 INJECTION INTRAMUSCULAR; INTRAVENOUS; SUBCUTANEOUS ONCE
Refills: 0 | Status: COMPLETED | OUTPATIENT
Start: 2021-07-02 | End: 2021-07-02

## 2021-07-02 RX ADMIN — LORATADINE 10 MILLIGRAM(S): 10 TABLET ORAL at 12:20

## 2021-07-02 RX ADMIN — NYSTATIN CREAM 1 APPLICATION(S): 100000 CREAM TOPICAL at 05:18

## 2021-07-02 RX ADMIN — Medication 1 SPRAY(S): at 12:19

## 2021-07-02 RX ADMIN — LAMOTRIGINE 300 MILLIGRAM(S): 25 TABLET, ORALLY DISINTEGRATING ORAL at 05:20

## 2021-07-02 RX ADMIN — DORZOLAMIDE HYDROCHLORIDE TIMOLOL MALEATE 1 DROP(S): 20; 5 SOLUTION/ DROPS OPHTHALMIC at 18:31

## 2021-07-02 RX ADMIN — CLOBAZAM 10 MILLIGRAM(S): 10 TABLET ORAL at 18:30

## 2021-07-02 RX ADMIN — ENOXAPARIN SODIUM 40 MILLIGRAM(S): 100 INJECTION SUBCUTANEOUS at 12:20

## 2021-07-02 RX ADMIN — SODIUM CHLORIDE 250 MILLILITER(S): 9 INJECTION INTRAMUSCULAR; INTRAVENOUS; SUBCUTANEOUS at 01:08

## 2021-07-02 RX ADMIN — DORZOLAMIDE HYDROCHLORIDE TIMOLOL MALEATE 1 DROP(S): 20; 5 SOLUTION/ DROPS OPHTHALMIC at 05:17

## 2021-07-02 RX ADMIN — LAMOTRIGINE 300 MILLIGRAM(S): 25 TABLET, ORALLY DISINTEGRATING ORAL at 18:30

## 2021-07-02 RX ADMIN — OLANZAPINE 5 MILLIGRAM(S): 15 TABLET, FILM COATED ORAL at 22:02

## 2021-07-02 RX ADMIN — PANTOPRAZOLE SODIUM 40 MILLIGRAM(S): 20 TABLET, DELAYED RELEASE ORAL at 12:20

## 2021-07-02 RX ADMIN — PRIMIDONE 250 MILLIGRAM(S): 250 TABLET ORAL at 18:30

## 2021-07-02 RX ADMIN — CLOBAZAM 10 MILLIGRAM(S): 10 TABLET ORAL at 05:18

## 2021-07-02 RX ADMIN — Medication 1000 UNIT(S): at 12:20

## 2021-07-02 RX ADMIN — Medication 1 TABLET(S): at 12:21

## 2021-07-02 RX ADMIN — Medication 1 TABLET(S): at 22:02

## 2021-07-02 RX ADMIN — QUETIAPINE FUMARATE 25 MILLIGRAM(S): 200 TABLET, FILM COATED ORAL at 22:02

## 2021-07-02 RX ADMIN — OLANZAPINE 2.5 MILLIGRAM(S): 15 TABLET, FILM COATED ORAL at 05:19

## 2021-07-02 RX ADMIN — PRIMIDONE 250 MILLIGRAM(S): 250 TABLET ORAL at 05:19

## 2021-07-02 RX ADMIN — LATANOPROST 1 DROP(S): 0.05 SOLUTION/ DROPS OPHTHALMIC; TOPICAL at 22:02

## 2021-07-02 RX ADMIN — QUETIAPINE FUMARATE 25 MILLIGRAM(S): 200 TABLET, FILM COATED ORAL at 05:19

## 2021-07-02 RX ADMIN — Medication 1 TABLET(S): at 05:18

## 2021-07-02 RX ADMIN — NYSTATIN CREAM 1 APPLICATION(S): 100000 CREAM TOPICAL at 18:29

## 2021-07-02 NOTE — PROGRESS NOTE ADULT - SUBJECTIVE AND OBJECTIVE BOX
[id number]    Subjective:    PAST MEDICAL & SURGICAL HISTORY:  Hydrocephalus  age 2 months    Legally blind    Glaucoma    Sleep apnea  not on CPAP    Epilepsy    Osteoporosis    Elevated liver enzymes    Environmental Allergies    Impulse control disorder  worsened when pt on Depakote, pt now off    Mild mental retardation    Hiatal hernia    Aspiration pneumonia  at age 25    Viral pneumonia  h/o    BPH without obstruction/lower urinary tract symptoms    Glaucoma  on eye gtts    S/P  Shunt  originally had shunt to lung, then revised to peritoneal shunt, has had multiple revisions    Epilepsy  s/p Vagal Nerve Stimulator Implant , 2007, last one in 2016    H/O craniotomy  at age 18    Heel cord contracture  s/p surgical correction b/l    History of tonsillectomy    S/p bilateral myringotomy with tube placement    Obstructed  shunt  revision of  shunt 2015    FAMILY HISTORY:  Family history of stroke (Mother)    Social Hx:  Nonsmoker, no drug or alcohol use    Home Medications:  cetirizine 10 mg oral tablet: 1 tab(s) orally once a day in the pm (12 Nov 2020 05:57)  Debrox 6.5% otic solution: 10 drop(s) to each ear for the first week of every month (12 Nov 2020 05:57)  dorzolamide-timolol 2.23%-0.68% ophthalmic solution: 1 drop(s) to both eyes 2 times a day (12 Nov 2020 05:57)  escitalopram 5 mg oral tablet: 2 tab(s) orally once a day (29 Jun 2021 18:13)  fluticasone 0.5 mg/2 mL inhalation suspension: 1 spray(s) inhaled once a day (12 Nov 2020 05:57)  ketoconazole 2% topical cream: Apply topically to affected area 2 times a day. ( scalp , forehead and eyebrows ) (12 Nov 2020 05:57)  latanoprost 0.005% ophthalmic solution: 1 drop(s) to each affected eye once a day (in the evening) (29 Jun 2021 18:13)  OLANZapine 5 mg oral tablet: 1 tab(s) orally once a day (29 Jun 2021 18:13)  omeprazole 40 mg oral delayed release capsule: 1 cap(s) orally 2 times a day (12 Nov 2020 05:57)  Oyster Shell Calcium with Vitamin D 500 mg-200 intl units oral tablet: 1 tab(s) orally 3 times a day (12 Nov 2020 05:57)  primidone 250 mg oral tablet: 1 tab(s) orally 2 times a day (29 Jun 2021 18:50)  senna oral tablet: 2 tab(s) orally once a day (at bedtime) (17 Nov 2020 10:46)  sertraline 25 mg oral tablet: 1 tab(s) orally once a day (29 Jun 2021 18:12)  Vitamin D3 1000 intl units oral capsule: 1 cap(s) orally once a day in the am (12 Nov 2020 05:57)    MEDICATIONS  (STANDING):  calcium carbonate 1250 mG  + Vitamin D (OsCal 500 + D) 1 Tablet(s) Oral three times a day  cholecalciferol 1000 Unit(s) Oral daily  cloBAZam 10 milliGRAM(s) Oral two times a day  dorzolamide 2%/timolol 0.5% Ophthalmic Solution 1 Drop(s) Both EYES two times a day  enoxaparin Injectable 40 milliGRAM(s) SubCutaneous daily  fluticasone propionate 50 MICROgram(s)/spray Nasal Spray 1 Spray(s) Both Nostrils <User Schedule>  lamoTRIgine 300 milliGRAM(s) Oral two times a day  latanoprost 0.005% Ophthalmic Solution 1 Drop(s) Both EYES at bedtime  loratadine 10 milliGRAM(s) Oral daily  nystatin Ointment 1 Application(s) Topical two times a day  OLANZapine 2.5 milliGRAM(s) Oral every 6 hours  pantoprazole  Injectable 40 milliGRAM(s) IV Push daily  primidone 250 milliGRAM(s) Oral two times a day  QUEtiapine 25 milliGRAM(s) Oral every 6 hours  senna 2 Tablet(s) Oral at bedtime    MEDICATIONS  (PRN):  acetaminophen   Tablet .. 650 milliGRAM(s) Oral every 6 hours PRN Temp greater or equal to 38C (100.4F), Mild Pain (1 - 3), Moderate Pain (4 - 6)  diazepam    Tablet 5 milliGRAM(s) Oral every 8 hours PRN spasm  lacosamide Injectable 200 milliGRAM(s) IV Push once PRN seizure refractory to ativan  LORazepam   Injectable 1 milliGRAM(s) IV Push once PRN seizure    Allergies  codeine (Stomach Upset)  Depakote (Other)  seasonal allergies: nasal congestion (Other)    ROS: Pertinent positives above, all other ROS were reviewed and are negative.      Vital Signs Last 24 Hrs  T(C): 36.8 (02 Jul 2021 08:37), Max: 36.9 (01 Jul 2021 19:17)  T(F): 98.2 (02 Jul 2021 08:37), Max: 98.4 (01 Jul 2021 19:17)  HR: 92 (02 Jul 2021 08:37) (89 - 111)  BP: 108/70 (02 Jul 2021 08:37) (103/73 - 120/87)  RR: 18 (02 Jul 2021 08:37) (18 - 18)  SpO2: 96% (02 Jul 2021 08:37) (94% - 97%)    GENERAL EXAM:  Constitutional: Awake and alert. NAD.    NEUROLOGICAL EXAM:  MS: Alert, oriented to self, location, situation. Speech is fluent but slightly dysarthric, follows commands.  CN: Bilateral vision loss at baseline worse in L eye, sees some shadows out of his R eye, b/l horizontal nystagmus on primary gaze, unable to assess EOM, no facial asymmetry.   Motor: SORIANO antigravity.  Coordination/Gait: Unable to assess.    Labs:   cbc                      15.8   6.17  )-----------( 298      ( 02 Jul 2021 05:41 )             48.5     [id number]    136  |  100  |  14  ----------------------------<  96  4.1   |  25  |  0.94    Ca    9.6      02 Jul 2021 05:41    TPro  7.8  /  Alb  4.5  /  TBili  0.5  /  DBili  x   /  AST  14  /  ALT  24  /  AlkPhos  199<H>  07-01    LIVER FUNCTIONS - ( 01 Jul 2021 06:00 )  Alb: 4.5 g/dL / Pro: 7.8 g/dL / ALK PHOS: 199 U/L / ALT: 24 U/L / AST: 14 U/L / GGT: x           Radiology/EEG:  -06/30 EEG Summary:  Abnormal EEG in the awake, drowsy and asleep states.  - Asymmetry, attenuation of voltage over the right hemisphere  Impression/Clinical Correlate:  There is an asymmetry with an attenuation of activity over the right hemisphere.  This may be due to either cortical dysfunction in the right hemisphere, or a skull defect on the left.  No epileptiform abnormalities or seizures were recorded.    -07/01 EEG Summary:  Abnormal EEG in the awake, drowsy and asleep states.  - Asymmetry, attenuation of voltage over the right hemisphere  Impression/Clinical Correlate:  2.	There is an asymmetry with an attenuation of activity over the right hemisphere.  This may be due to either cortical dysfunction in the right hemisphere, or a skull defect on the left.  No epileptiform abnormalities or seizures were recorded.   MRN-[id number]    Subjective: 51 yo male seen and examined at bedside.    PAST MEDICAL & SURGICAL HISTORY:  Hydrocephalus  age 2 months    Legally blind    Glaucoma    Sleep apnea  not on CPAP    Epilepsy    Osteoporosis    Elevated liver enzymes    Environmental Allergies    Impulse control disorder  worsened when pt on Depakote, pt now off    Mild mental retardation    Hiatal hernia    Aspiration pneumonia  at age 25    Viral pneumonia  h/o    BPH without obstruction/lower urinary tract symptoms    Glaucoma  on eye gtts    S/P  Shunt  originally had shunt to lung, then revised to peritoneal shunt, has had multiple revisions    Epilepsy  s/p Vagal Nerve Stimulator Implant , 2007, last one in 2016    H/O craniotomy  at age 18    Heel cord contracture  s/p surgical correction b/l    History of tonsillectomy    S/p bilateral myringotomy with tube placement    Obstructed  shunt  revision of  shunt 2015    FAMILY HISTORY:  Family history of stroke (Mother)    Social Hx:  Nonsmoker, no drug or alcohol use    Home Medications:  cetirizine 10 mg oral tablet: 1 tab(s) orally once a day in the pm (12 Nov 2020 05:57)  Debrox 6.5% otic solution: 10 drop(s) to each ear for the first week of every month (12 Nov 2020 05:57)  dorzolamide-timolol 2.23%-0.68% ophthalmic solution: 1 drop(s) to both eyes 2 times a day (12 Nov 2020 05:57)  escitalopram 5 mg oral tablet: 2 tab(s) orally once a day (29 Jun 2021 18:13)  fluticasone 0.5 mg/2 mL inhalation suspension: 1 spray(s) inhaled once a day (12 Nov 2020 05:57)  ketoconazole 2% topical cream: Apply topically to affected area 2 times a day. ( scalp , forehead and eyebrows ) (12 Nov 2020 05:57)  latanoprost 0.005% ophthalmic solution: 1 drop(s) to each affected eye once a day (in the evening) (29 Jun 2021 18:13)  OLANZapine 5 mg oral tablet: 1 tab(s) orally once a day (29 Jun 2021 18:13)  omeprazole 40 mg oral delayed release capsule: 1 cap(s) orally 2 times a day (12 Nov 2020 05:57)  Oyster Shell Calcium with Vitamin D 500 mg-200 intl units oral tablet: 1 tab(s) orally 3 times a day (12 Nov 2020 05:57)  primidone 250 mg oral tablet: 1 tab(s) orally 2 times a day (29 Jun 2021 18:50)  senna oral tablet: 2 tab(s) orally once a day (at bedtime) (17 Nov 2020 10:46)  sertraline 25 mg oral tablet: 1 tab(s) orally once a day (29 Jun 2021 18:12)  Vitamin D3 1000 intl units oral capsule: 1 cap(s) orally once a day in the am (12 Nov 2020 05:57)    MEDICATIONS  (STANDING):  calcium carbonate 1250 mG  + Vitamin D (OsCal 500 + D) 1 Tablet(s) Oral three times a day  cholecalciferol 1000 Unit(s) Oral daily  cloBAZam 10 milliGRAM(s) Oral two times a day  dorzolamide 2%/timolol 0.5% Ophthalmic Solution 1 Drop(s) Both EYES two times a day  enoxaparin Injectable 40 milliGRAM(s) SubCutaneous daily  fluticasone propionate 50 MICROgram(s)/spray Nasal Spray 1 Spray(s) Both Nostrils <User Schedule>  lamoTRIgine 300 milliGRAM(s) Oral two times a day  latanoprost 0.005% Ophthalmic Solution 1 Drop(s) Both EYES at bedtime  loratadine 10 milliGRAM(s) Oral daily  nystatin Ointment 1 Application(s) Topical two times a day  OLANZapine 2.5 milliGRAM(s) Oral every 6 hours  pantoprazole  Injectable 40 milliGRAM(s) IV Push daily  primidone 250 milliGRAM(s) Oral two times a day  QUEtiapine 25 milliGRAM(s) Oral every 6 hours  senna 2 Tablet(s) Oral at bedtime    MEDICATIONS  (PRN):  acetaminophen   Tablet .. 650 milliGRAM(s) Oral every 6 hours PRN Temp greater or equal to 38C (100.4F), Mild Pain (1 - 3), Moderate Pain (4 - 6)  diazepam    Tablet 5 milliGRAM(s) Oral every 8 hours PRN spasm  lacosamide Injectable 200 milliGRAM(s) IV Push once PRN seizure refractory to ativan  LORazepam   Injectable 1 milliGRAM(s) IV Push once PRN seizure    Allergies  codeine (Stomach Upset)  Depakote (Other)  seasonal allergies: nasal congestion (Other)    ROS: Pertinent positives above, all other ROS were reviewed and are negative.      Vital Signs Last 24 Hrs  T(C): 36.8 (02 Jul 2021 08:37), Max: 36.9 (01 Jul 2021 19:17)  T(F): 98.2 (02 Jul 2021 08:37), Max: 98.4 (01 Jul 2021 19:17)  HR: 92 (02 Jul 2021 08:37) (89 - 111)  BP: 108/70 (02 Jul 2021 08:37) (103/73 - 120/87)  RR: 18 (02 Jul 2021 08:37) (18 - 18)  SpO2: 96% (02 Jul 2021 08:37) (94% - 97%)    GENERAL EXAM:  Constitutional: Awake and alert. NAD.    NEUROLOGICAL EXAM:  MS: Alert, oriented to self, location, situation. Speech is fluent but slightly dysarthric, follows commands.  CN: Bilateral vision loss at baseline worse in L eye, sees some shadows out of his R eye, b/l horizontal nystagmus on primary gaze, unable to assess EOM, no facial asymmetry.   Motor: SORIANO antigravity.  Coordination/Gait: Unable to assess.    Labs:   cbc                      15.8   6.17  )-----------( 298      ( 02 Jul 2021 05:41 )             48.5     [id number]    136  |  100  |  14  ----------------------------<  96  4.1   |  25  |  0.94    Ca    9.6      02 Jul 2021 05:41    TPro  7.8  /  Alb  4.5  /  TBili  0.5  /  DBili  x   /  AST  14  /  ALT  24  /  AlkPhos  199<H>  07-01    LIVER FUNCTIONS - ( 01 Jul 2021 06:00 )  Alb: 4.5 g/dL / Pro: 7.8 g/dL / ALK PHOS: 199 U/L / ALT: 24 U/L / AST: 14 U/L / GGT: x           Radiology/EEG:  -06/30 EEG Summary:  Abnormal EEG in the awake, drowsy and asleep states.  - Asymmetry, attenuation of voltage over the right hemisphere  Impression/Clinical Correlate:  There is an asymmetry with an attenuation of activity over the right hemisphere.  This may be due to either cortical dysfunction in the right hemisphere, or a skull defect on the left.  No epileptiform abnormalities or seizures were recorded.    -07/01 EEG Summary:  Abnormal EEG in the awake, drowsy and asleep states.  - Asymmetry, attenuation of voltage over the right hemisphere  Impression/Clinical Correlate:  2.	There is an asymmetry with an attenuation of activity over the right hemisphere.  This may be due to either cortical dysfunction in the right hemisphere, or a skull defect on the left.  No epileptiform abnormalities or seizures were recorded.

## 2021-07-02 NOTE — CHART NOTE - NSCHARTNOTEFT_GEN_A_CORE
49 yo handedness unknown male with a PMH of complex seizures since age 9, hydrocephalus s/p  shunt on R side, chronic R sided headaches s/p expansion craniotomy, mild Mental Retardation, b/l hearing loss and b/l blindness presenting with onset of staring spells and increased behavioral issues at home over the past 4 months. Concern for possible absence seizures vs behavioral issues.    Pt seen for initial bedside swallow evaluation on 7/1. "Pt now presents with clinical signs of an oropharyngeal dysphagia. Swallow sequence marked by reduced stripping of bolus from spoon with irregular bolus manipulation and formation. Nearly absent mastication of mechanical soft solids prior to attempts to transport. Once swallow initiated, pt remains with open mouth posture. Diffuse oral residue post mechanical soft solids. Immediate cough post mechanical soft solids and delayed cough post thin liquids which may be suggestive of penetration/aspiration. No overt signs or symptoms of penetration or aspiration on purees and nectar or honey thickened liquids."    Pt seen today for re-evaluation of swallow sequence.    Pt encountered bedside, AA&Ox2. +NGT in place. Pt's legal guardian, Christi, present at bedside. Christi reported that at home, pt will cough during and after meals of regular solids and thin liquids. TERA Ordonez reported good tolerance of current diet, dysphagia 1 with nectar thickened liquids. Pt observed tolerating purees and nectar thickened liquids without overt signs or symptoms of penetration or aspiration. Pt noted with slightly wet vocal quality post cup sips of thin liquids which may be suggestive of penetration or aspiration. Purposeful proactive rounding reinforced and 5Ps addressed. Pt left NAD.    Impression: Pt continues with clinical signs of an oropharyngeal dysphagia. Pt is tolerating dysphagia 1 with nectar thickened liquids.    Recommendations:  1) Dysphagia 1 with nectar thickened liquids   2) FULL ASSIST FOR SMALL BOLUS SIZE/SLOW RATE  3) Crush medications and place in applesauce when feasible or provide via alternate route   4) Must be awake/alert    This service to f/u for diet monitoring  Discussed findings with TERA Ordonez and Legal guardian, Christi Amador M.A. CCC-SLP  Speech-Language Pathologist  Pgr # [phone] 51 yo handedness unknown male with a PMH of complex seizures since age 9, hydrocephalus s/p  shunt on R side, chronic R sided headaches s/p expansion craniotomy, mild Mental Retardation, b/l hearing loss and b/l blindness presenting with onset of staring spells and increased behavioral issues at home over the past 4 months. Concern for possible absence seizures vs behavioral issues.    Pt seen for initial bedside swallow evaluation on 7/1. "Pt now presents with clinical signs of an oropharyngeal dysphagia. Swallow sequence marked by reduced stripping of bolus from spoon with irregular bolus manipulation and formation. Nearly absent mastication of mechanical soft solids prior to attempts to transport. Once swallow initiated, pt remains with open mouth posture. Diffuse oral residue post mechanical soft solids. Immediate cough post mechanical soft solids and delayed cough post thin liquids which may be suggestive of penetration/aspiration. No overt signs or symptoms of penetration or aspiration on purees and nectar or honey thickened liquids."    Pt seen today for re-evaluation of swallow sequence.    Pt encountered bedside, AA&Ox2. +NGT in place. Pt's legal guardian, Christi, present at bedside. Christi reported that at home, pt will cough during and after meals of regular solids and thin liquids. TERA Ordonez reported good tolerance of current diet, dysphagia 1 with nectar thickened liquids. Pt observed tolerating purees and nectar thickened liquids without overt signs or symptoms of penetration or aspiration. Pt noted with slightly wet vocal quality post cup sips of thin liquids which may be suggestive of penetration or aspiration. Purposeful proactive rounding reinforced and 5Ps addressed. Pt left NAD.    Impression: Pt continues with clinical signs of an oropharyngeal dysphagia. Pt is tolerating dysphagia 1 with nectar thickened liquids.    Recommendations:  1) Dysphagia 1 with nectar thickened liquids   2) FULL ASSIST FOR SMALL BOLUS SIZE/SLOW RATE  3) Crush medications and place in applesauce when feasible or provide via alternate route   4) Must be awake/alert    This service to f/u for diet monitoring  Discussed findings with TERA Ordonez; Legal guardian, Christi; Dr. Igor Amador M.A. CCC-SLP  Speech-Language Pathologist  Pgr # [phone]

## 2021-07-02 NOTE — PROGRESS NOTE ADULT - ASSESSMENT
Assessment: 51 yo male with a PMHx of complex seizures (since age 9), hydrocephalus (s/p  shunt on R side), chronic R sided headaches (s/p expansion craniotomy), mild intellectual disability, b/l hearing loss, and b/l blindness presenting with onset of staring spells and increased behavioral issues at home over the past 4 months. Staring spells described as "smiling widely" or "licking his lips" and he would be completely unresponsive during these spells. Concern for possible absence seizures vs behavioral issues.     Plan:   [] c/w vEEG   [] c/w home Onfi 10MG PO BID, Lamotrigine 300MG PO BID, Primidone 250MG PO BID.  [] Zyprexa, Zoloft, and Lexapro discontinued as aunt states she has not given these meds for the past month due to weight gain.  [x] Psychiatry consulted, recommend Seroquel and Zyprexa PRN for agitation. Will provide these medications as standing doses.  [x] Formal speech and swallow eval: Pureed with nectar thick liquids.  [] Seizure/Fall/Aspiration precautions.  [x] GI consult: CT Abdomen/Pelvis unremarkable. On Protonix 40MG IVP daily. Believe pain likely functional/related to eating habits.  [x] PT/OT: FRANCIS.  [] Diet: Pureed with nectar thick.  [] DVT PPX: Lovenox 40 SC.  [] Seizure Rescue: Ativan 1MG IVP x1 for convulsions/GTC lasting > 3 minutes, Vimpat 200MG IV x1 for convulsions/GTC refractory to Ativan.    Case discussed with epilepsy attending Dr. Lay.

## 2021-07-03 LAB
ANION GAP SERPL CALC-SCNC: 16 MMOL/L — SIGNIFICANT CHANGE UP (ref 5–17)
ANION GAP SERPL CALC-SCNC: 16 MMOL/L — SIGNIFICANT CHANGE UP (ref 5–17)
APPEARANCE UR: CLEAR — SIGNIFICANT CHANGE UP
BACTERIA # UR AUTO: NEGATIVE — SIGNIFICANT CHANGE UP
BASOPHILS # BLD AUTO: 0.08 K/UL — SIGNIFICANT CHANGE UP (ref 0–0.2)
BASOPHILS NFR BLD AUTO: 0.7 % — SIGNIFICANT CHANGE UP (ref 0–2)
BILIRUB UR-MCNC: NEGATIVE — SIGNIFICANT CHANGE UP
BUN SERPL-MCNC: 12 MG/DL — SIGNIFICANT CHANGE UP (ref 7–23)
BUN SERPL-MCNC: 13 MG/DL — SIGNIFICANT CHANGE UP (ref 7–23)
CALCIUM SERPL-MCNC: 9.3 MG/DL — SIGNIFICANT CHANGE UP (ref 8.4–10.5)
CALCIUM SERPL-MCNC: 9.7 MG/DL — SIGNIFICANT CHANGE UP (ref 8.4–10.5)
CHLORIDE SERPL-SCNC: 100 MMOL/L — SIGNIFICANT CHANGE UP (ref 96–108)
CHLORIDE SERPL-SCNC: 97 MMOL/L — SIGNIFICANT CHANGE UP (ref 96–108)
CO2 SERPL-SCNC: 22 MMOL/L — SIGNIFICANT CHANGE UP (ref 22–31)
CO2 SERPL-SCNC: 22 MMOL/L — SIGNIFICANT CHANGE UP (ref 22–31)
COLOR SPEC: SIGNIFICANT CHANGE UP
CREAT SERPL-MCNC: 0.94 MG/DL — SIGNIFICANT CHANGE UP (ref 0.5–1.3)
CREAT SERPL-MCNC: 1.05 MG/DL — SIGNIFICANT CHANGE UP (ref 0.5–1.3)
DIFF PNL FLD: NEGATIVE — SIGNIFICANT CHANGE UP
EOSINOPHIL # BLD AUTO: 0.57 K/UL — HIGH (ref 0–0.5)
EOSINOPHIL NFR BLD AUTO: 5.3 % — SIGNIFICANT CHANGE UP (ref 0–6)
EPI CELLS # UR: 0 /HPF — SIGNIFICANT CHANGE UP
GLUCOSE SERPL-MCNC: 115 MG/DL — HIGH (ref 70–99)
GLUCOSE SERPL-MCNC: 117 MG/DL — HIGH (ref 70–99)
GLUCOSE UR QL: NEGATIVE — SIGNIFICANT CHANGE UP
HCT VFR BLD CALC: 45.5 % — SIGNIFICANT CHANGE UP (ref 39–50)
HCT VFR BLD CALC: 49.1 % — SIGNIFICANT CHANGE UP (ref 39–50)
HGB BLD-MCNC: 15 G/DL — SIGNIFICANT CHANGE UP (ref 13–17)
HGB BLD-MCNC: 16.2 G/DL — SIGNIFICANT CHANGE UP (ref 13–17)
HYALINE CASTS # UR AUTO: 0 /LPF — SIGNIFICANT CHANGE UP (ref 0–2)
IMM GRANULOCYTES NFR BLD AUTO: 0.5 % — SIGNIFICANT CHANGE UP (ref 0–1.5)
KETONES UR-MCNC: NEGATIVE — SIGNIFICANT CHANGE UP
LEUKOCYTE ESTERASE UR-ACNC: NEGATIVE — SIGNIFICANT CHANGE UP
LYMPHOCYTES # BLD AUTO: 1.66 K/UL — SIGNIFICANT CHANGE UP (ref 1–3.3)
LYMPHOCYTES # BLD AUTO: 15.5 % — SIGNIFICANT CHANGE UP (ref 13–44)
MCHC RBC-ENTMCNC: 31 PG — SIGNIFICANT CHANGE UP (ref 27–34)
MCHC RBC-ENTMCNC: 31.3 PG — SIGNIFICANT CHANGE UP (ref 27–34)
MCHC RBC-ENTMCNC: 33 GM/DL — SIGNIFICANT CHANGE UP (ref 32–36)
MCHC RBC-ENTMCNC: 33 GM/DL — SIGNIFICANT CHANGE UP (ref 32–36)
MCV RBC AUTO: 94 FL — SIGNIFICANT CHANGE UP (ref 80–100)
MCV RBC AUTO: 95 FL — SIGNIFICANT CHANGE UP (ref 80–100)
MONOCYTES # BLD AUTO: 1.43 K/UL — HIGH (ref 0–0.9)
MONOCYTES NFR BLD AUTO: 13.3 % — SIGNIFICANT CHANGE UP (ref 2–14)
NEUTROPHILS # BLD AUTO: 6.94 K/UL — SIGNIFICANT CHANGE UP (ref 1.8–7.4)
NEUTROPHILS NFR BLD AUTO: 64.7 % — SIGNIFICANT CHANGE UP (ref 43–77)
NITRITE UR-MCNC: NEGATIVE — SIGNIFICANT CHANGE UP
NRBC # BLD: 0 /100 WBCS — SIGNIFICANT CHANGE UP (ref 0–0)
NRBC # BLD: 0 /100 WBCS — SIGNIFICANT CHANGE UP (ref 0–0)
PH UR: 6.5 — SIGNIFICANT CHANGE UP (ref 5–8)
PLATELET # BLD AUTO: 302 K/UL — SIGNIFICANT CHANGE UP (ref 150–400)
PLATELET # BLD AUTO: 338 K/UL — SIGNIFICANT CHANGE UP (ref 150–400)
POTASSIUM SERPL-MCNC: 3.8 MMOL/L — SIGNIFICANT CHANGE UP (ref 3.5–5.3)
POTASSIUM SERPL-MCNC: 4.7 MMOL/L — SIGNIFICANT CHANGE UP (ref 3.5–5.3)
POTASSIUM SERPL-SCNC: 3.8 MMOL/L — SIGNIFICANT CHANGE UP (ref 3.5–5.3)
POTASSIUM SERPL-SCNC: 4.7 MMOL/L — SIGNIFICANT CHANGE UP (ref 3.5–5.3)
PROT UR-MCNC: SIGNIFICANT CHANGE UP
RBC # BLD: 4.84 M/UL — SIGNIFICANT CHANGE UP (ref 4.2–5.8)
RBC # BLD: 5.17 M/UL — SIGNIFICANT CHANGE UP (ref 4.2–5.8)
RBC # FLD: 13.1 % — SIGNIFICANT CHANGE UP (ref 10.3–14.5)
RBC # FLD: 13.1 % — SIGNIFICANT CHANGE UP (ref 10.3–14.5)
RBC CASTS # UR COMP ASSIST: 1 /HPF — SIGNIFICANT CHANGE UP (ref 0–4)
SODIUM SERPL-SCNC: 135 MMOL/L — SIGNIFICANT CHANGE UP (ref 135–145)
SODIUM SERPL-SCNC: 138 MMOL/L — SIGNIFICANT CHANGE UP (ref 135–145)
SP GR SPEC: 1.02 — SIGNIFICANT CHANGE UP (ref 1.01–1.02)
UROBILINOGEN FLD QL: NEGATIVE — SIGNIFICANT CHANGE UP
WBC # BLD: 10.73 K/UL — HIGH (ref 3.8–10.5)
WBC # BLD: 9.24 K/UL — SIGNIFICANT CHANGE UP (ref 3.8–10.5)
WBC # FLD AUTO: 10.73 K/UL — HIGH (ref 3.8–10.5)
WBC # FLD AUTO: 9.24 K/UL — SIGNIFICANT CHANGE UP (ref 3.8–10.5)
WBC UR QL: 0 /HPF — SIGNIFICANT CHANGE UP (ref 0–5)

## 2021-07-03 PROCEDURE — 71045 X-RAY EXAM CHEST 1 VIEW: CPT | Mod: 26

## 2021-07-03 PROCEDURE — 99232 SBSQ HOSP IP/OBS MODERATE 35: CPT

## 2021-07-03 RX ORDER — PIPERACILLIN AND TAZOBACTAM 4; .5 G/20ML; G/20ML
3.38 INJECTION, POWDER, LYOPHILIZED, FOR SOLUTION INTRAVENOUS ONCE
Refills: 0 | Status: COMPLETED | OUTPATIENT
Start: 2021-07-03 | End: 2021-07-03

## 2021-07-03 RX ORDER — ACETAMINOPHEN 500 MG
1000 TABLET ORAL ONCE
Refills: 0 | Status: COMPLETED | OUTPATIENT
Start: 2021-07-03 | End: 2021-07-03

## 2021-07-03 RX ORDER — IBUPROFEN 200 MG
400 TABLET ORAL ONCE
Refills: 0 | Status: COMPLETED | OUTPATIENT
Start: 2021-07-03 | End: 2021-07-03

## 2021-07-03 RX ORDER — PIPERACILLIN AND TAZOBACTAM 4; .5 G/20ML; G/20ML
3.38 INJECTION, POWDER, LYOPHILIZED, FOR SOLUTION INTRAVENOUS EVERY 8 HOURS
Refills: 0 | Status: DISCONTINUED | OUTPATIENT
Start: 2021-07-03 | End: 2021-07-08

## 2021-07-03 RX ORDER — SODIUM CHLORIDE 9 MG/ML
1000 INJECTION INTRAMUSCULAR; INTRAVENOUS; SUBCUTANEOUS
Refills: 0 | Status: DISCONTINUED | OUTPATIENT
Start: 2021-07-03 | End: 2021-07-08

## 2021-07-03 RX ADMIN — CLOBAZAM 10 MILLIGRAM(S): 10 TABLET ORAL at 05:15

## 2021-07-03 RX ADMIN — ENOXAPARIN SODIUM 40 MILLIGRAM(S): 100 INJECTION SUBCUTANEOUS at 12:04

## 2021-07-03 RX ADMIN — SODIUM CHLORIDE 75 MILLILITER(S): 9 INJECTION INTRAMUSCULAR; INTRAVENOUS; SUBCUTANEOUS at 01:55

## 2021-07-03 RX ADMIN — Medication 1000 MILLIGRAM(S): at 02:41

## 2021-07-03 RX ADMIN — LAMOTRIGINE 300 MILLIGRAM(S): 25 TABLET, ORALLY DISINTEGRATING ORAL at 05:16

## 2021-07-03 RX ADMIN — SENNA PLUS 2 TABLET(S): 8.6 TABLET ORAL at 22:40

## 2021-07-03 RX ADMIN — QUETIAPINE FUMARATE 25 MILLIGRAM(S): 200 TABLET, FILM COATED ORAL at 22:40

## 2021-07-03 RX ADMIN — Medication 650 MILLIGRAM(S): at 01:30

## 2021-07-03 RX ADMIN — Medication 1000 UNIT(S): at 12:04

## 2021-07-03 RX ADMIN — Medication 1 TABLET(S): at 22:40

## 2021-07-03 RX ADMIN — PIPERACILLIN AND TAZOBACTAM 25 GRAM(S): 4; .5 INJECTION, POWDER, LYOPHILIZED, FOR SOLUTION INTRAVENOUS at 18:39

## 2021-07-03 RX ADMIN — PRIMIDONE 250 MILLIGRAM(S): 250 TABLET ORAL at 17:41

## 2021-07-03 RX ADMIN — LATANOPROST 1 DROP(S): 0.05 SOLUTION/ DROPS OPHTHALMIC; TOPICAL at 22:40

## 2021-07-03 RX ADMIN — SODIUM CHLORIDE 75 MILLILITER(S): 9 INJECTION INTRAMUSCULAR; INTRAVENOUS; SUBCUTANEOUS at 17:43

## 2021-07-03 RX ADMIN — Medication 1 TABLET(S): at 17:41

## 2021-07-03 RX ADMIN — Medication 400 MILLIGRAM(S): at 03:19

## 2021-07-03 RX ADMIN — PIPERACILLIN AND TAZOBACTAM 200 GRAM(S): 4; .5 INJECTION, POWDER, LYOPHILIZED, FOR SOLUTION INTRAVENOUS at 06:38

## 2021-07-03 RX ADMIN — NYSTATIN CREAM 1 APPLICATION(S): 100000 CREAM TOPICAL at 05:15

## 2021-07-03 RX ADMIN — LORATADINE 10 MILLIGRAM(S): 10 TABLET ORAL at 12:04

## 2021-07-03 RX ADMIN — DORZOLAMIDE HYDROCHLORIDE TIMOLOL MALEATE 1 DROP(S): 20; 5 SOLUTION/ DROPS OPHTHALMIC at 05:15

## 2021-07-03 RX ADMIN — Medication 650 MILLIGRAM(S): at 01:19

## 2021-07-03 RX ADMIN — PRIMIDONE 250 MILLIGRAM(S): 250 TABLET ORAL at 05:16

## 2021-07-03 RX ADMIN — Medication 1 SPRAY(S): at 12:04

## 2021-07-03 RX ADMIN — Medication 400 MILLIGRAM(S): at 04:30

## 2021-07-03 RX ADMIN — CLOBAZAM 10 MILLIGRAM(S): 10 TABLET ORAL at 17:41

## 2021-07-03 RX ADMIN — OLANZAPINE 5 MILLIGRAM(S): 15 TABLET, FILM COATED ORAL at 22:40

## 2021-07-03 RX ADMIN — PIPERACILLIN AND TAZOBACTAM 25 GRAM(S): 4; .5 INJECTION, POWDER, LYOPHILIZED, FOR SOLUTION INTRAVENOUS at 12:14

## 2021-07-03 RX ADMIN — DORZOLAMIDE HYDROCHLORIDE TIMOLOL MALEATE 1 DROP(S): 20; 5 SOLUTION/ DROPS OPHTHALMIC at 17:42

## 2021-07-03 RX ADMIN — Medication 400 MILLIGRAM(S): at 01:55

## 2021-07-03 RX ADMIN — PANTOPRAZOLE SODIUM 40 MILLIGRAM(S): 20 TABLET, DELAYED RELEASE ORAL at 12:04

## 2021-07-03 RX ADMIN — LAMOTRIGINE 300 MILLIGRAM(S): 25 TABLET, ORALLY DISINTEGRATING ORAL at 17:42

## 2021-07-03 RX ADMIN — Medication 1 TABLET(S): at 05:15

## 2021-07-03 RX ADMIN — NYSTATIN CREAM 1 APPLICATION(S): 100000 CREAM TOPICAL at 17:42

## 2021-07-03 NOTE — PROGRESS NOTE ADULT - SUBJECTIVE AND OBJECTIVE BOX
MRN-[id number]    Subjective: Patient interviewed and examined at the bedside on the morning of 7/3/21. Patient febrile overnight to 102.2. Infectious work up pending    PAST MEDICAL & SURGICAL HISTORY:  Hydrocephalus  age 2 months    Legally blind    Glaucoma    Sleep apnea  not on CPAP    Epilepsy    Osteoporosis    Elevated liver enzymes    Environmental Allergies    Impulse control disorder  worsened when pt on Depakote, pt now off    Mild mental retardation    Hiatal hernia    Aspiration pneumonia  at age 25    Viral pneumonia  h/o    BPH without obstruction/lower urinary tract symptoms    Glaucoma  on eye gtts    S/P  Shunt  originally had shunt to lung, then revised to peritoneal shunt, has had multiple revisions    Epilepsy  s/p Vagal Nerve Stimulator Implant , 2007, last one in 2016    H/O craniotomy  at age 18    Heel cord contracture  s/p surgical correction b/l    History of tonsillectomy    S/p bilateral myringotomy with tube placement    Obstructed  shunt  revision of  shunt 2015    FAMILY HISTORY:  Family history of stroke (Mother)    Social Hx:  Nonsmoker, no drug or alcohol use    Home Medications:  cetirizine 10 mg oral tablet: 1 tab(s) orally once a day in the pm (12 Nov 2020 05:57)  Debrox 6.5% otic solution: 10 drop(s) to each ear for the first week of every month (12 Nov 2020 05:57)  dorzolamide-timolol 2.23%-0.68% ophthalmic solution: 1 drop(s) to both eyes 2 times a day (12 Nov 2020 05:57)  escitalopram 5 mg oral tablet: 2 tab(s) orally once a day (29 Jun 2021 18:13)  fluticasone 0.5 mg/2 mL inhalation suspension: 1 spray(s) inhaled once a day (12 Nov 2020 05:57)  ketoconazole 2% topical cream: Apply topically to affected area 2 times a day. ( scalp , forehead and eyebrows ) (12 Nov 2020 05:57)  latanoprost 0.005% ophthalmic solution: 1 drop(s) to each affected eye once a day (in the evening) (29 Jun 2021 18:13)  OLANZapine 5 mg oral tablet: 1 tab(s) orally once a day (29 Jun 2021 18:13)  omeprazole 40 mg oral delayed release capsule: 1 cap(s) orally 2 times a day (12 Nov 2020 05:57)  Oyster Shell Calcium with Vitamin D 500 mg-200 intl units oral tablet: 1 tab(s) orally 3 times a day (12 Nov 2020 05:57)  primidone 250 mg oral tablet: 1 tab(s) orally 2 times a day (29 Jun 2021 18:50)  senna oral tablet: 2 tab(s) orally once a day (at bedtime) (17 Nov 2020 10:46)  sertraline 25 mg oral tablet: 1 tab(s) orally once a day (29 Jun 2021 18:12)  Vitamin D3 1000 intl units oral capsule: 1 cap(s) orally once a day in the am (12 Nov 2020 05:57)    MEDICATIONS  (STANDING):  calcium carbonate 1250 mG  + Vitamin D (OsCal 500 + D) 1 Tablet(s) Oral three times a day  cholecalciferol 1000 Unit(s) Oral daily  cloBAZam 10 milliGRAM(s) Oral two times a day  dorzolamide 2%/timolol 0.5% Ophthalmic Solution 1 Drop(s) Both EYES two times a day  enoxaparin Injectable 40 milliGRAM(s) SubCutaneous daily  fluticasone propionate 50 MICROgram(s)/spray Nasal Spray 1 Spray(s) Both Nostrils <User Schedule>  lamoTRIgine 300 milliGRAM(s) Oral two times a day  latanoprost 0.005% Ophthalmic Solution 1 Drop(s) Both EYES at bedtime  loratadine 10 milliGRAM(s) Oral daily  nystatin Ointment 1 Application(s) Topical two times a day  OLANZapine 5 milliGRAM(s) Oral at bedtime  pantoprazole  Injectable 40 milliGRAM(s) IV Push daily  piperacillin/tazobactam IVPB.. 3.375 Gram(s) IV Intermittent every 8 hours  primidone 250 milliGRAM(s) Oral two times a day  QUEtiapine 25 milliGRAM(s) Oral at bedtime  senna 2 Tablet(s) Oral at bedtime  sodium chloride 0.9%. 1000 milliLiter(s) (75 mL/Hr) IV Continuous <Continuous>    MEDICATIONS  (PRN):  acetaminophen   Tablet .. 650 milliGRAM(s) Oral every 6 hours PRN Temp greater or equal to 38C (100.4F), Mild Pain (1 - 3), Moderate Pain (4 - 6)  diazepam    Tablet 5 milliGRAM(s) Oral every 8 hours PRN spasm  lacosamide Injectable 200 milliGRAM(s) IV Push once PRN seizure refractory to ativan  LORazepam   Injectable 1 milliGRAM(s) IV Push once PRN seizure      Allergies  codeine (Stomach Upset)  Depakote (Other)  seasonal allergies: nasal congestion (Other)    ROS: Pertinent positives above, all other ROS were reviewed and are negative.      Vital Signs Last 24 Hrs  T(C): 36.8 (03 Jul 2021 08:13), Max: 39 (03 Jul 2021 00:49)  T(F): 98.2 (03 Jul 2021 08:13), Max: 102.2 (03 Jul 2021 00:49)  HR: 99 (03 Jul 2021 08:13) (99 - 129)  BP: 102/70 (03 Jul 2021 08:13) (102/70 - 133/81)  BP(mean): --  RR: 18 (03 Jul 2021 08:13) (16 - 18)  SpO2: 94% (03 Jul 2021 08:13) (93% - 98%)    GENERAL EXAM:  Constitutional: Awake and alert. NAD.    NEUROLOGICAL EXAM:  MS: Alert, oriented to self, location, situation. Speech is fluent but slightly dysarthric, follows commands.  CN: Bilateral vision loss at baseline worse in L eye, sees some shadows out of his R eye, b/l horizontal nystagmus on primary gaze, unable to assess EOM, no facial asymmetry.   Motor: SORIANO antigravity.  Coordination/Gait: Unable to assess.    Labs:   cbc                      15.8   6.17  )-----------( 298      ( 02 Jul 2021 05:41 )             48.5     [id number]    136  |  100  |  14  ----------------------------<  96  4.1   |  25  |  0.94    Ca    9.6      02 Jul 2021 05:41    TPro  7.8  /  Alb  4.5  /  TBili  0.5  /  DBili  x   /  AST  14  /  ALT  24  /  AlkPhos  199<H>  07-01    LIVER FUNCTIONS - ( 01 Jul 2021 06:00 )  Alb: 4.5 g/dL / Pro: 7.8 g/dL / ALK PHOS: 199 U/L / ALT: 24 U/L / AST: 14 U/L / GGT: x           Radiology/EEG:  -06/30 EEG Summary:  Abnormal EEG in the awake, drowsy and asleep states.  - Asymmetry, attenuation of voltage over the right hemisphere  Impression/Clinical Correlate:  There is an asymmetry with an attenuation of activity over the right hemisphere.  This may be due to either cortical dysfunction in the right hemisphere, or a skull defect on the left.  No epileptiform abnormalities or seizures were recorded.    -07/01 EEG Summary:  Abnormal EEG in the awake, drowsy and asleep states.  - Asymmetry, attenuation of voltage over the right hemisphere  Impression/Clinical Correlate:  2.	There is an asymmetry with an attenuation of activity over the right hemisphere.  This may be due to either cortical dysfunction in the right hemisphere, or a skull defect on the left.  No epileptiform abnormalities or seizures were recorded.

## 2021-07-03 NOTE — PROVIDER CONTACT NOTE (OTHER) - BACKGROUND
Elective EMU admission, [facility] [place] b/l blindness, hydrocephalus
Pt here for Seizure capture and Med adjustment
Elective EMU admission

## 2021-07-03 NOTE — PROGRESS NOTE ADULT - ASSESSMENT
Assessment: 49 yo male with a PMHx of complex seizures (since age 9), hydrocephalus (s/p  shunt on R side), chronic R sided headaches (s/p expansion craniotomy), mild intellectual disability, b/l hearing loss, and b/l blindness presenting with onset of staring spells and increased behavioral issues at home over the past 4 months. Staring spells described as "smiling widely" or "licking his lips" and he would be completely unresponsive during these spells. Concern for possible absence seizures vs behavioral issues.     Plan:   [] c/w vEEG   [] c/w home Onfi 10MG PO BID, Lamotrigine 300MG PO BID, Primidone 250MG PO BID.  [] Zyprexa, Zoloft, and Lexapro discontinued as aunt states she has not given these meds for the past month due to weight gain.  [] Zosyn 3.375g IV q8h began empirically on 7/3/21 in light of fevers,  [x] Psychiatry consulted, recommend Seroquel and Zyprexa PRN for agitation. Will provide these medications as standing doses.  [x] Formal speech and swallow eval: Pureed with nectar thick liquids.  [] Seizure/Fall/Aspiration precautions.  [x] GI consult: CT Abdomen/Pelvis unremarkable. On Protonix 40MG IVP daily. Believe pain likely functional/related to eating habits.  [x] PT/OT: FRANCIS.  [] Diet: Pureed with nectar thick.  [] DVT PPX: Lovenox 40 SC.  [] Seizure Rescue: Ativan 1MG IVP x1 for convulsions/GTC lasting > 3 minutes, Vimpat 200MG IV x1 for convulsions/GTC refractory to Ativan.    Case discussed with epilepsy attending Dr. Lay.

## 2021-07-03 NOTE — PROVIDER CONTACT NOTE (OTHER) - ACTION/TREATMENT ORDERED:
Keep patient NPO until speech and swallow consult
provider notified, IVPB acetaminophen ordered, BCx2, UAM, BMP, CBC, chest x ray, will re check pt temperature
provider notified, no interventions at this time

## 2021-07-03 NOTE — PROVIDER CONTACT NOTE (OTHER) - ASSESSMENT
Pt neurologically unchanged, VS remain stable
Patient oxygen saturation remained stable at 95-98%. Patient instructed to cough and pt voice returned to normal after.
pt tachy 129, rectal temperature of 102.2, pt on 2L NC, pt remains neurologically unchanged

## 2021-07-03 NOTE — PROVIDER CONTACT NOTE (OTHER) - SITUATION
Patient passed speech and swallow eval at bedside. Patient finishing eating and after meal patient coughed and voice was hoarse. Pt instructed to cough and after voice returned to normal
pt tachy 117, pt asymptomatic, denies any pain, no temperature, BP is stable, pt neurologically unchanged
pt tachy 129, rectal temperature of 102.2, pt on 2L NC, pt remains neurologically unchanged

## 2021-07-04 PROCEDURE — 99232 SBSQ HOSP IP/OBS MODERATE 35: CPT

## 2021-07-04 RX ADMIN — Medication 1000 UNIT(S): at 11:26

## 2021-07-04 RX ADMIN — PIPERACILLIN AND TAZOBACTAM 25 GRAM(S): 4; .5 INJECTION, POWDER, LYOPHILIZED, FOR SOLUTION INTRAVENOUS at 10:52

## 2021-07-04 RX ADMIN — LATANOPROST 1 DROP(S): 0.05 SOLUTION/ DROPS OPHTHALMIC; TOPICAL at 22:36

## 2021-07-04 RX ADMIN — OLANZAPINE 5 MILLIGRAM(S): 15 TABLET, FILM COATED ORAL at 23:51

## 2021-07-04 RX ADMIN — LAMOTRIGINE 300 MILLIGRAM(S): 25 TABLET, ORALLY DISINTEGRATING ORAL at 05:22

## 2021-07-04 RX ADMIN — CLOBAZAM 10 MILLIGRAM(S): 10 TABLET ORAL at 05:22

## 2021-07-04 RX ADMIN — DORZOLAMIDE HYDROCHLORIDE TIMOLOL MALEATE 1 DROP(S): 20; 5 SOLUTION/ DROPS OPHTHALMIC at 05:23

## 2021-07-04 RX ADMIN — DORZOLAMIDE HYDROCHLORIDE TIMOLOL MALEATE 1 DROP(S): 20; 5 SOLUTION/ DROPS OPHTHALMIC at 17:16

## 2021-07-04 RX ADMIN — PRIMIDONE 250 MILLIGRAM(S): 250 TABLET ORAL at 17:15

## 2021-07-04 RX ADMIN — PRIMIDONE 250 MILLIGRAM(S): 250 TABLET ORAL at 05:22

## 2021-07-04 RX ADMIN — LORATADINE 10 MILLIGRAM(S): 10 TABLET ORAL at 11:24

## 2021-07-04 RX ADMIN — Medication 1 TABLET(S): at 13:36

## 2021-07-04 RX ADMIN — Medication 1 TABLET(S): at 05:22

## 2021-07-04 RX ADMIN — Medication 1 TABLET(S): at 22:35

## 2021-07-04 RX ADMIN — PANTOPRAZOLE SODIUM 40 MILLIGRAM(S): 20 TABLET, DELAYED RELEASE ORAL at 11:25

## 2021-07-04 RX ADMIN — NYSTATIN CREAM 1 APPLICATION(S): 100000 CREAM TOPICAL at 17:14

## 2021-07-04 RX ADMIN — SENNA PLUS 2 TABLET(S): 8.6 TABLET ORAL at 22:36

## 2021-07-04 RX ADMIN — PIPERACILLIN AND TAZOBACTAM 25 GRAM(S): 4; .5 INJECTION, POWDER, LYOPHILIZED, FOR SOLUTION INTRAVENOUS at 02:29

## 2021-07-04 RX ADMIN — PIPERACILLIN AND TAZOBACTAM 25 GRAM(S): 4; .5 INJECTION, POWDER, LYOPHILIZED, FOR SOLUTION INTRAVENOUS at 19:55

## 2021-07-04 RX ADMIN — SODIUM CHLORIDE 75 MILLILITER(S): 9 INJECTION INTRAMUSCULAR; INTRAVENOUS; SUBCUTANEOUS at 02:29

## 2021-07-04 RX ADMIN — ENOXAPARIN SODIUM 40 MILLIGRAM(S): 100 INJECTION SUBCUTANEOUS at 11:24

## 2021-07-04 RX ADMIN — CLOBAZAM 10 MILLIGRAM(S): 10 TABLET ORAL at 17:13

## 2021-07-04 RX ADMIN — NYSTATIN CREAM 1 APPLICATION(S): 100000 CREAM TOPICAL at 05:31

## 2021-07-04 RX ADMIN — Medication 1 SPRAY(S): at 10:52

## 2021-07-04 RX ADMIN — LAMOTRIGINE 300 MILLIGRAM(S): 25 TABLET, ORALLY DISINTEGRATING ORAL at 17:14

## 2021-07-04 NOTE — PROGRESS NOTE ADULT - SUBJECTIVE AND OBJECTIVE BOX
SUBJECTIVE: patient seen and examined at bedside.     MEDICATIONS (HOME):  Home Medications:  cetirizine 10 mg oral tablet: 1 tab(s) orally once a day in the pm (2020 05:57)  Debrox 6.5% otic solution: 10 drop(s) to each ear for the first week of every month (2020 05:57)  dorzolamide-timolol 2.23%-0.68% ophthalmic solution: 1 drop(s) to both eyes 2 times a day (2020 05:57)  escitalopram 5 mg oral tablet: 2 tab(s) orally once a day (2021 18:13)  fluticasone 0.5 mg/2 mL inhalation suspension: 1 spray(s) inhaled once a day (2020 05:57)  ketoconazole 2% topical cream: Apply topically to affected area 2 times a day. ( scalp , forehead and eyebrows ) (2020 05:57)  latanoprost 0.005% ophthalmic solution: 1 drop(s) to each affected eye once a day (in the evening) (2021 18:13)  OLANZapine 5 mg oral tablet: 1 tab(s) orally once a day (2021 18:13)  omeprazole 40 mg oral delayed release capsule: 1 cap(s) orally 2 times a day (2020 05:57)  Oyster Shell Calcium with Vitamin D 500 mg-200 intl units oral tablet: 1 tab(s) orally 3 times a day (2020 05:57)  primidone 250 mg oral tablet: 1 tab(s) orally 2 times a day (2021 18:50)  senna oral tablet: 2 tab(s) orally once a day (at bedtime) (2020 10:46)  sertraline 25 mg oral tablet: 1 tab(s) orally once a day (2021 18:12)  Vitamin D3 1000 intl units oral capsule: 1 cap(s) orally once a day in the am (2020 05:57)    MEDICATIONS  (STANDING):  calcium carbonate 1250 mG  + Vitamin D (OsCal 500 + D) 1 Tablet(s) Oral three times a day  cholecalciferol 1000 Unit(s) Oral daily  cloBAZam 10 milliGRAM(s) Oral two times a day  dorzolamide 2%/timolol 0.5% Ophthalmic Solution 1 Drop(s) Both EYES two times a day  enoxaparin Injectable 40 milliGRAM(s) SubCutaneous daily  fluticasone propionate 50 MICROgram(s)/spray Nasal Spray 1 Spray(s) Both Nostrils <User Schedule>  lamoTRIgine 300 milliGRAM(s) Oral two times a day  latanoprost 0.005% Ophthalmic Solution 1 Drop(s) Both EYES at bedtime  loratadine 10 milliGRAM(s) Oral daily  nystatin Ointment 1 Application(s) Topical two times a day  OLANZapine 5 milliGRAM(s) Oral at bedtime  pantoprazole  Injectable 40 milliGRAM(s) IV Push daily  piperacillin/tazobactam IVPB.. 3.375 Gram(s) IV Intermittent every 8 hours  primidone 250 milliGRAM(s) Oral two times a day  QUEtiapine 25 milliGRAM(s) Oral at bedtime  senna 2 Tablet(s) Oral at bedtime  sodium chloride 0.9%. 1000 milliLiter(s) (75 mL/Hr) IV Continuous <Continuous>    MEDICATIONS  (PRN):  acetaminophen   Tablet .. 650 milliGRAM(s) Oral every 6 hours PRN Temp greater or equal to 38C (100.4F), Mild Pain (1 - 3), Moderate Pain (4 - 6)  diazepam    Tablet 5 milliGRAM(s) Oral every 8 hours PRN spasm  lacosamide Injectable 200 milliGRAM(s) IV Push once PRN seizure refractory to ativan  LORazepam   Injectable 1 milliGRAM(s) IV Push once PRN seizure    ALLERGIES/INTOLERANCES:  Allergies  codeine (Stomach Upset)  Depakote (Other)  seasonal allergies: nasal congestion (Other)    VITALS & EXAMINATION:  Vital Signs Last 24 Hrs  T(C): 36.4 (2021 07:20), Max: 36.8 (2021 08:13)  T(F): 97.5 (2021 07:20), Max: 98.2 (2021 08:13)  HR: 98 (2021 07:20) (98 - 116)  BP: 90/65 (2021 07:20) (90/65 - 125/81)  BP(mean): --  RR: 20 (2021 07:20) (18 - 20)  SpO2: 93% (2021 07:20) (93% - 97%)    Neurological (>12):  MS: Alert, oriented to self, location, situation. Speech is fluent but slightly dysarthric, follows commands.  CN: Bilateral vision loss at baseline worse in L eye, sees some shadows out of his R eye, b/l horizontal nystagmus on primary gaze, unable to assess EOM, no facial asymmetry.   Motor: SORIANO antigravity.  Coordination/Gait: Unable to assess.    LABORATORY:  CBC                       15.0   9.24  )-----------( 302      ( 2021 06:32 )             45.5     Chem 07-03    138  |  100  |  13  ----------------------------<  115<H>  3.8   |  22  |  1.05    Ca    9.3      2021 06:32        U/A Urinalysis Basic - ( 2021 03:52 )    Color: Light Yellow / Appearance: Clear / S.020 / pH: x  Gluc: x / Ketone: Negative  / Bili: Negative / Urobili: Negative   Blood: x / Protein: Trace / Nitrite: Negative   Leuk Esterase: Negative / RBC: 1 /hpf / WBC 0 /HPF   Sq Epi: x / Non Sq Epi: 0 /hpf / Bacteria: Negative      STUDIES & IMAGING:  Studies (EKG, EEG, EMG, etc):     Radiology (XR, CT, MR, U/S, TTE/CLEMENT):   EEG  EEG Summary:  Abnormal EEG in the awake, drowsy and asleep states.  - Asymmetry, attenuation of voltage over the right hemisphere    Impression/Clinical Correlate:  There is an asymmetry with an attenuation of activity over the right hemisphere.  This may be due to either cortical dysfunction in the right hemisphere, or a skull defect on the left.  No epileptiform abnormalities or seizures were recorded. SUBJECTIVE: patient seen and examined at bedside. HCP guardian Melissa at bedside.     MEDICATIONS (HOME):  Home Medications:  cetirizine 10 mg oral tablet: 1 tab(s) orally once a day in the pm (2020 05:57)  Debrox 6.5% otic solution: 10 drop(s) to each ear for the first week of every month (2020 05:57)  dorzolamide-timolol 2.23%-0.68% ophthalmic solution: 1 drop(s) to both eyes 2 times a day (2020 05:57)  escitalopram 5 mg oral tablet: 2 tab(s) orally once a day (2021 18:13)  fluticasone 0.5 mg/2 mL inhalation suspension: 1 spray(s) inhaled once a day (2020 05:57)  ketoconazole 2% topical cream: Apply topically to affected area 2 times a day. ( scalp , forehead and eyebrows ) (2020 05:57)  latanoprost 0.005% ophthalmic solution: 1 drop(s) to each affected eye once a day (in the evening) (2021 18:13)  OLANZapine 5 mg oral tablet: 1 tab(s) orally once a day (2021 18:13)  omeprazole 40 mg oral delayed release capsule: 1 cap(s) orally 2 times a day (2020 05:57)  Oyster Shell Calcium with Vitamin D 500 mg-200 intl units oral tablet: 1 tab(s) orally 3 times a day (2020 05:57)  primidone 250 mg oral tablet: 1 tab(s) orally 2 times a day (2021 18:50)  senna oral tablet: 2 tab(s) orally once a day (at bedtime) (2020 10:46)  sertraline 25 mg oral tablet: 1 tab(s) orally once a day (2021 18:12)  Vitamin D3 1000 intl units oral capsule: 1 cap(s) orally once a day in the am (2020 05:57)    MEDICATIONS  (STANDING):  calcium carbonate 1250 mG  + Vitamin D (OsCal 500 + D) 1 Tablet(s) Oral three times a day  cholecalciferol 1000 Unit(s) Oral daily  cloBAZam 10 milliGRAM(s) Oral two times a day  dorzolamide 2%/timolol 0.5% Ophthalmic Solution 1 Drop(s) Both EYES two times a day  enoxaparin Injectable 40 milliGRAM(s) SubCutaneous daily  fluticasone propionate 50 MICROgram(s)/spray Nasal Spray 1 Spray(s) Both Nostrils <User Schedule>  lamoTRIgine 300 milliGRAM(s) Oral two times a day  latanoprost 0.005% Ophthalmic Solution 1 Drop(s) Both EYES at bedtime  loratadine 10 milliGRAM(s) Oral daily  nystatin Ointment 1 Application(s) Topical two times a day  OLANZapine 5 milliGRAM(s) Oral at bedtime  pantoprazole  Injectable 40 milliGRAM(s) IV Push daily  piperacillin/tazobactam IVPB.. 3.375 Gram(s) IV Intermittent every 8 hours  primidone 250 milliGRAM(s) Oral two times a day  QUEtiapine 25 milliGRAM(s) Oral at bedtime  senna 2 Tablet(s) Oral at bedtime  sodium chloride 0.9%. 1000 milliLiter(s) (75 mL/Hr) IV Continuous <Continuous>    MEDICATIONS  (PRN):  acetaminophen   Tablet .. 650 milliGRAM(s) Oral every 6 hours PRN Temp greater or equal to 38C (100.4F), Mild Pain (1 - 3), Moderate Pain (4 - 6)  diazepam    Tablet 5 milliGRAM(s) Oral every 8 hours PRN spasm  lacosamide Injectable 200 milliGRAM(s) IV Push once PRN seizure refractory to ativan  LORazepam   Injectable 1 milliGRAM(s) IV Push once PRN seizure    ALLERGIES/INTOLERANCES:  Allergies  codeine (Stomach Upset)  Depakote (Other)  seasonal allergies: nasal congestion (Other)    VITALS & EXAMINATION:  Vital Signs Last 24 Hrs  T(C): 36.4 (2021 07:20), Max: 36.8 (2021 08:13)  T(F): 97.5 (2021 07:20), Max: 98.2 (2021 08:13)  HR: 98 (2021 07:20) (98 - 116)  BP: 90/65 (2021 07:20) (90/65 - 125/81)  BP(mean): --  RR: 20 (2021 07:20) (18 - 20)  SpO2: 93% (2021 07:20) (93% - 97%)    Neurological (>12):  MS: Alert, oriented to self, location, situation. Speech is fluent but slightly dysarthric, follows commands.  CN: Bilateral vision loss at baseline worse in L eye, sees some shadows out of his R eye, b/l horizontal nystagmus on primary gaze, unable to assess EOM, no facial asymmetry.   Motor: SORIANO antigravity.  Coordination/Gait: Unable to assess.    LABORATORY:  CBC                       15.0   9.24  )-----------( 302      ( 2021 06:32 )             45.5     Chem 07-    138  |  100  |  13  ----------------------------<  115<H>  3.8   |  22  |  1.05    Ca    9.3      2021 06:32        U/A Urinalysis Basic - ( 2021 03:52 )    Color: Light Yellow / Appearance: Clear / S.020 / pH: x  Gluc: x / Ketone: Negative  / Bili: Negative / Urobili: Negative   Blood: x / Protein: Trace / Nitrite: Negative   Leuk Esterase: Negative / RBC: 1 /hpf / WBC 0 /HPF   Sq Epi: x / Non Sq Epi: 0 /hpf / Bacteria: Negative      STUDIES & IMAGING:  Studies (EKG, EEG, EMG, etc):     Radiology (XR, CT, MR, U/S, TTE/CLEMENT):   EEG  EEG Summary:  Abnormal EEG in the awake, drowsy and asleep states.  - Asymmetry, attenuation of voltage over the right hemisphere    Impression/Clinical Correlate:  There is an asymmetry with an attenuation of activity over the right hemisphere.  This may be due to either cortical dysfunction in the right hemisphere, or a skull defect on the left.  No epileptiform abnormalities or seizures were recorded.

## 2021-07-04 NOTE — PROGRESS NOTE ADULT - ASSESSMENT
Assessment: 49 yo male with a PMHx of complex seizures (since age 9), hydrocephalus (s/p  shunt on R side), chronic R sided headaches (s/p expansion craniotomy), mild intellectual disability, b/l hearing loss, and b/l blindness presenting with onset of staring spells and increased behavioral issues at home over the past 4 months. Staring spells described as "smiling widely" or "licking his lips" and he would be completely unresponsive during these spells. Concern for possible absence seizures vs behavioral issues.     Plan:   [] c/w vEEG   [] c/w home Onfi 10MG PO BID, Lamotrigine 300MG PO BID, Primidone 250MG PO BID.  [] Zyprexa, Zoloft, and Lexapro discontinued as aunt states she has not given these meds for the past month due to weight gain.  [] Zosyn 3.375g IV q8h began empirically on 7/3/21 in light of fevers,  [x] Psychiatry consulted, recommend Seroquel and Zyprexa PRN for agitation. Will provide these medications as standing doses.  [x] Formal speech and swallow eval: Pureed with nectar thick liquids.  [] Seizure/Fall/Aspiration precautions.  [x] GI consult: CT Abdomen/Pelvis unremarkable. On Protonix 40MG IVP daily. Believe pain likely functional/related to eating habits.  [x] PT/OT: FRANCIS.  [] Diet: Pureed with nectar thick.  [] DVT PPX: Lovenox 40 SC.  [] Seizure Rescue: Ativan 1MG IVP x1 for convulsions/GTC lasting > 3 minutes, Vimpat 200MG IV x1 for convulsions/GTC refractory to Ativan.    Case to be discussed with epilepsy attending Dr. Lay. Assessment: 49 yo male with a PMHx of complex seizures (since age 9), hydrocephalus (s/p  shunt on R side), chronic R sided headaches (s/p expansion craniotomy), mild intellectual disability, b/l hearing loss, and b/l blindness presenting with onset of staring spells and increased behavioral issues at home over the past 4 months. Staring spells described as "smiling widely" or "licking his lips" and he would be completely unresponsive during these spells. Concern for possible absence seizures vs behavioral issues.     Plan:   [] c/w vEEG   [] c/w home Onfi 10MG PO BID, Lamotrigine 300MG PO BID, Primidone 250MG PO BID.  [] Zyprexa, Zoloft, and Lexapro discontinued as aunt states she has not given these meds for the past month due to weight gain.  [] Zosyn 3.375g IV q8h began empirically on 7/3/21 in light of fevers - will continue for now  [x] Psychiatry consulted, recommend Seroquel and Zyprexa PRN for agitation. Will provide these medications as standing doses.  [x] Formal speech and swallow eval: Pureed with nectar thick liquids.  [] Seizure/Fall/Aspiration precautions.  [x] GI consult: CT Abdomen/Pelvis unremarkable. On Protonix 40MG IVP daily. Believe pain likely functional/related to eating habits.  [x] PT/OT: FRANCIS - pending callback from  for updates  [] Diet: Pureed with nectar thick.  [] DVT PPX: Lovenox 40 SC.  [] Seizure Rescue: Ativan 1MG IVP x1 for convulsions/GTC lasting > 3 minutes, Vimpat 200MG IV x1 for convulsions/GTC refractory to Ativan.    Case discussed with epilepsy attending Dr. Lay, HCP guardian at bedside

## 2021-07-05 LAB
ANION GAP SERPL CALC-SCNC: 13 MMOL/L — SIGNIFICANT CHANGE UP (ref 5–17)
BUN SERPL-MCNC: 10 MG/DL — SIGNIFICANT CHANGE UP (ref 7–23)
CALCIUM SERPL-MCNC: 8.7 MG/DL — SIGNIFICANT CHANGE UP (ref 8.4–10.5)
CHLORIDE SERPL-SCNC: 105 MMOL/L — SIGNIFICANT CHANGE UP (ref 96–108)
CO2 SERPL-SCNC: 23 MMOL/L — SIGNIFICANT CHANGE UP (ref 22–31)
CREAT SERPL-MCNC: 0.87 MG/DL — SIGNIFICANT CHANGE UP (ref 0.5–1.3)
GLUCOSE SERPL-MCNC: 90 MG/DL — SIGNIFICANT CHANGE UP (ref 70–99)
MAGNESIUM SERPL-MCNC: 1.9 MG/DL — SIGNIFICANT CHANGE UP (ref 1.6–2.6)
PHOSPHATE SERPL-MCNC: 3.1 MG/DL — SIGNIFICANT CHANGE UP (ref 2.5–4.5)
POTASSIUM SERPL-MCNC: 4.2 MMOL/L — SIGNIFICANT CHANGE UP (ref 3.5–5.3)
POTASSIUM SERPL-SCNC: 4.2 MMOL/L — SIGNIFICANT CHANGE UP (ref 3.5–5.3)
SODIUM SERPL-SCNC: 141 MMOL/L — SIGNIFICANT CHANGE UP (ref 135–145)

## 2021-07-05 PROCEDURE — 99232 SBSQ HOSP IP/OBS MODERATE 35: CPT

## 2021-07-05 RX ORDER — GUAIFENESIN/DEXTROMETHORPHAN 600MG-30MG
5 TABLET, EXTENDED RELEASE 12 HR ORAL EVERY 6 HOURS
Refills: 0 | Status: DISCONTINUED | OUTPATIENT
Start: 2021-07-05 | End: 2021-07-08

## 2021-07-05 RX ADMIN — SODIUM CHLORIDE 75 MILLILITER(S): 9 INJECTION INTRAMUSCULAR; INTRAVENOUS; SUBCUTANEOUS at 11:38

## 2021-07-05 RX ADMIN — Medication 1 SPRAY(S): at 10:27

## 2021-07-05 RX ADMIN — SODIUM CHLORIDE 75 MILLILITER(S): 9 INJECTION INTRAMUSCULAR; INTRAVENOUS; SUBCUTANEOUS at 03:17

## 2021-07-05 RX ADMIN — DORZOLAMIDE HYDROCHLORIDE TIMOLOL MALEATE 1 DROP(S): 20; 5 SOLUTION/ DROPS OPHTHALMIC at 17:20

## 2021-07-05 RX ADMIN — CLOBAZAM 10 MILLIGRAM(S): 10 TABLET ORAL at 17:18

## 2021-07-05 RX ADMIN — PRIMIDONE 250 MILLIGRAM(S): 250 TABLET ORAL at 05:40

## 2021-07-05 RX ADMIN — PIPERACILLIN AND TAZOBACTAM 25 GRAM(S): 4; .5 INJECTION, POWDER, LYOPHILIZED, FOR SOLUTION INTRAVENOUS at 03:17

## 2021-07-05 RX ADMIN — LAMOTRIGINE 300 MILLIGRAM(S): 25 TABLET, ORALLY DISINTEGRATING ORAL at 05:40

## 2021-07-05 RX ADMIN — CLOBAZAM 10 MILLIGRAM(S): 10 TABLET ORAL at 05:40

## 2021-07-05 RX ADMIN — LORATADINE 10 MILLIGRAM(S): 10 TABLET ORAL at 11:40

## 2021-07-05 RX ADMIN — PANTOPRAZOLE SODIUM 40 MILLIGRAM(S): 20 TABLET, DELAYED RELEASE ORAL at 11:39

## 2021-07-05 RX ADMIN — LAMOTRIGINE 300 MILLIGRAM(S): 25 TABLET, ORALLY DISINTEGRATING ORAL at 17:18

## 2021-07-05 RX ADMIN — QUETIAPINE FUMARATE 25 MILLIGRAM(S): 200 TABLET, FILM COATED ORAL at 21:40

## 2021-07-05 RX ADMIN — PRIMIDONE 250 MILLIGRAM(S): 250 TABLET ORAL at 17:18

## 2021-07-05 RX ADMIN — NYSTATIN CREAM 1 APPLICATION(S): 100000 CREAM TOPICAL at 05:41

## 2021-07-05 RX ADMIN — Medication 1 TABLET(S): at 21:39

## 2021-07-05 RX ADMIN — PIPERACILLIN AND TAZOBACTAM 25 GRAM(S): 4; .5 INJECTION, POWDER, LYOPHILIZED, FOR SOLUTION INTRAVENOUS at 18:52

## 2021-07-05 RX ADMIN — NYSTATIN CREAM 1 APPLICATION(S): 100000 CREAM TOPICAL at 17:19

## 2021-07-05 RX ADMIN — Medication 1 TABLET(S): at 14:22

## 2021-07-05 RX ADMIN — Medication 1000 UNIT(S): at 11:39

## 2021-07-05 RX ADMIN — PIPERACILLIN AND TAZOBACTAM 25 GRAM(S): 4; .5 INJECTION, POWDER, LYOPHILIZED, FOR SOLUTION INTRAVENOUS at 10:24

## 2021-07-05 RX ADMIN — SENNA PLUS 2 TABLET(S): 8.6 TABLET ORAL at 21:40

## 2021-07-05 RX ADMIN — Medication 1 TABLET(S): at 05:40

## 2021-07-05 RX ADMIN — ENOXAPARIN SODIUM 40 MILLIGRAM(S): 100 INJECTION SUBCUTANEOUS at 11:40

## 2021-07-05 RX ADMIN — LATANOPROST 1 DROP(S): 0.05 SOLUTION/ DROPS OPHTHALMIC; TOPICAL at 21:40

## 2021-07-05 RX ADMIN — DORZOLAMIDE HYDROCHLORIDE TIMOLOL MALEATE 1 DROP(S): 20; 5 SOLUTION/ DROPS OPHTHALMIC at 06:36

## 2021-07-05 RX ADMIN — OLANZAPINE 5 MILLIGRAM(S): 15 TABLET, FILM COATED ORAL at 21:39

## 2021-07-05 NOTE — PROGRESS NOTE ADULT - SUBJECTIVE AND OBJECTIVE BOX
SUBJECTIVE:     Vital Signs Last 24 Hrs  T(C): 36.5 (05 Jul 2021 04:29), Max: 37 (04 Jul 2021 15:23)  T(F): 97.7 (05 Jul 2021 04:29), Max: 98.6 (04 Jul 2021 15:23)  HR: 98 (05 Jul 2021 04:29) (95 - 111)  BP: 122/82 (05 Jul 2021 04:29) (105/73 - 129/86)  BP(mean): --  RR: 18 (05 Jul 2021 04:29) (18 - 18)  SpO2: 93% (05 Jul 2021 04:29) (93% - 95%)      Neurological (>12):  MS: Alert, oriented to self, location, situation. Speech is fluent but slightly dysarthric, follows commands.  CN: Bilateral vision loss at baseline worse in L eye, sees some shadows out of his R eye, b/l horizontal nystagmus on primary gaze, unable to assess EOM, no facial asymmetry.   Motor: SORIANO antigravity.  Coordination/Gait: Unable to assess.    LABORATORY:  CBC   Chem 07-05    141  |  105  |  10  ----------------------------<  90  4.2   |  23  |  0.87    Ca    8.7      05 Jul 2021 06:30  Phos  3.1     07-05  Mg     1.9     07-05        MEDICATIONS  (STANDING):  calcium carbonate 1250 mG  + Vitamin D (OsCal 500 + D) 1 Tablet(s) Oral three times a day  cholecalciferol 1000 Unit(s) Oral daily  cloBAZam 10 milliGRAM(s) Oral two times a day  dorzolamide 2%/timolol 0.5% Ophthalmic Solution 1 Drop(s) Both EYES two times a day  enoxaparin Injectable 40 milliGRAM(s) SubCutaneous daily  fluticasone propionate 50 MICROgram(s)/spray Nasal Spray 1 Spray(s) Both Nostrils <User Schedule>  lamoTRIgine 300 milliGRAM(s) Oral two times a day  latanoprost 0.005% Ophthalmic Solution 1 Drop(s) Both EYES at bedtime  loratadine 10 milliGRAM(s) Oral daily  nystatin Ointment 1 Application(s) Topical two times a day  OLANZapine 5 milliGRAM(s) Oral at bedtime  pantoprazole  Injectable 40 milliGRAM(s) IV Push daily  piperacillin/tazobactam IVPB.. 3.375 Gram(s) IV Intermittent every 8 hours  primidone 250 milliGRAM(s) Oral two times a day  QUEtiapine 25 milliGRAM(s) Oral at bedtime  senna 2 Tablet(s) Oral at bedtime  sodium chloride 0.9%. 1000 milliLiter(s) (75 mL/Hr) IV Continuous <Continuous>    MEDICATIONS  (PRN):  acetaminophen   Tablet .. 650 milliGRAM(s) Oral every 6 hours PRN Temp greater or equal to 38C (100.4F), Mild Pain (1 - 3), Moderate Pain (4 - 6)  diazepam    Tablet 5 milliGRAM(s) Oral every 8 hours PRN spasm  lacosamide Injectable 200 milliGRAM(s) IV Push once PRN seizure refractory to ativan  LORazepam   Injectable 1 milliGRAM(s) IV Push once PRN seizure       SUBJECTIVE: No overnight events    Vital Signs Last 24 Hrs  T(C): 36.5 (05 Jul 2021 04:29), Max: 37 (04 Jul 2021 15:23)  T(F): 97.7 (05 Jul 2021 04:29), Max: 98.6 (04 Jul 2021 15:23)  HR: 98 (05 Jul 2021 04:29) (95 - 111)  BP: 122/82 (05 Jul 2021 04:29) (105/73 - 129/86)  BP(mean): --  RR: 18 (05 Jul 2021 04:29) (18 - 18)  SpO2: 93% (05 Jul 2021 04:29) (93% - 95%)      Neurological (>12):  MS: Alert, oriented to self, location, situation. Speech is fluent but slightly dysarthric, follows commands.  CN: Bilateral vision loss at baseline worse in L eye, sees some shadows out of his R eye, b/l horizontal nystagmus on primary gaze, unable to assess EOM, no facial asymmetry.   Motor: SORIANO antigravity.  Coordination/Gait: Unable to assess.    LABORATORY:  CBC   Chem 07-05    141  |  105  |  10  ----------------------------<  90  4.2   |  23  |  0.87    Ca    8.7      05 Jul 2021 06:30  Phos  3.1     07-05  Mg     1.9     07-05        MEDICATIONS  (STANDING):  calcium carbonate 1250 mG  + Vitamin D (OsCal 500 + D) 1 Tablet(s) Oral three times a day  cholecalciferol 1000 Unit(s) Oral daily  cloBAZam 10 milliGRAM(s) Oral two times a day  dorzolamide 2%/timolol 0.5% Ophthalmic Solution 1 Drop(s) Both EYES two times a day  enoxaparin Injectable 40 milliGRAM(s) SubCutaneous daily  fluticasone propionate 50 MICROgram(s)/spray Nasal Spray 1 Spray(s) Both Nostrils <User Schedule>  lamoTRIgine 300 milliGRAM(s) Oral two times a day  latanoprost 0.005% Ophthalmic Solution 1 Drop(s) Both EYES at bedtime  loratadine 10 milliGRAM(s) Oral daily  nystatin Ointment 1 Application(s) Topical two times a day  OLANZapine 5 milliGRAM(s) Oral at bedtime  pantoprazole  Injectable 40 milliGRAM(s) IV Push daily  piperacillin/tazobactam IVPB.. 3.375 Gram(s) IV Intermittent every 8 hours  primidone 250 milliGRAM(s) Oral two times a day  QUEtiapine 25 milliGRAM(s) Oral at bedtime  senna 2 Tablet(s) Oral at bedtime  sodium chloride 0.9%. 1000 milliLiter(s) (75 mL/Hr) IV Continuous <Continuous>    MEDICATIONS  (PRN):  acetaminophen   Tablet .. 650 milliGRAM(s) Oral every 6 hours PRN Temp greater or equal to 38C (100.4F), Mild Pain (1 - 3), Moderate Pain (4 - 6)  diazepam    Tablet 5 milliGRAM(s) Oral every 8 hours PRN spasm  lacosamide Injectable 200 milliGRAM(s) IV Push once PRN seizure refractory to ativan  LORazepam   Injectable 1 milliGRAM(s) IV Push once PRN seizure

## 2021-07-05 NOTE — PROGRESS NOTE ADULT - ASSESSMENT
49 yo male with a PMHx of complex seizures (since age 9), hydrocephalus (s/p  shunt on R side), chronic R sided headaches (s/p expansion craniotomy), mild intellectual disability, b/l hearing loss, and b/l blindness presenting with onset of staring spells and increased behavioral issues at home over the past 4 months. Staring spells described as "smiling widely" or "licking his lips" and he would be completely unresponsive during these spells. Concern for possible absence seizures vs behavioral issues, no events on EEG. Started Zosyn 7/3 for concerns of aspiration pneumonia, remains afebrile.     Plan:   [] off EEG - no events  [] c/w home Onfi 10MG PO BID, Lamotrigine 300MG PO BID, Primidone 250MG PO BID.  [] Zyprexa, Zoloft, and Lexapro discontinued as aunt states she has not given these meds for the past month due to weight gain.  [] Zosyn 3.375g IV q8h began empirically on 7/3/21 in light of fevers - will continue for now  [x] Psychiatry consulted, recommend Seroquel and Zyprexa PRN for agitation. Will provide these medications as standing doses.  [x] Formal speech and swallow eval: Pureed with nectar thick liquids.  [] Seizure/Fall/Aspiration precautions.  [x] GI consult: CT Abdomen/Pelvis unremarkable. On Protonix 40MG IVP daily. Believe pain likely functional/related to eating habits.  [x] PT/OT: FRANCIS - pending callback from  for updates  [] Diet: Pureed with nectar thick.  [] DVT PPX: Lovenox 40 SC.  [] Seizure Rescue: Ativan 1MG IVP x1 for convulsions/GTC lasting > 3 minutes, Vimpat 200MG IV x1 for convulsions/GTC refractory to Ativan.

## 2021-07-06 PROCEDURE — 99232 SBSQ HOSP IP/OBS MODERATE 35: CPT

## 2021-07-06 RX ORDER — ENOXAPARIN SODIUM 100 MG/ML
40 INJECTION SUBCUTANEOUS DAILY
Refills: 0 | Status: DISCONTINUED | OUTPATIENT
Start: 2021-07-06 | End: 2021-07-08

## 2021-07-06 RX ORDER — PANTOPRAZOLE SODIUM 20 MG/1
40 TABLET, DELAYED RELEASE ORAL DAILY
Refills: 0 | Status: DISCONTINUED | OUTPATIENT
Start: 2021-07-06 | End: 2021-07-08

## 2021-07-06 RX ORDER — DIAZEPAM 5 MG
5 TABLET ORAL EVERY 8 HOURS
Refills: 0 | Status: DISCONTINUED | OUTPATIENT
Start: 2021-07-06 | End: 2021-07-08

## 2021-07-06 RX ADMIN — PIPERACILLIN AND TAZOBACTAM 25 GRAM(S): 4; .5 INJECTION, POWDER, LYOPHILIZED, FOR SOLUTION INTRAVENOUS at 02:21

## 2021-07-06 RX ADMIN — Medication 5 MILLILITER(S): at 01:16

## 2021-07-06 RX ADMIN — OLANZAPINE 5 MILLIGRAM(S): 15 TABLET, FILM COATED ORAL at 21:30

## 2021-07-06 RX ADMIN — SODIUM CHLORIDE 75 MILLILITER(S): 9 INJECTION INTRAMUSCULAR; INTRAVENOUS; SUBCUTANEOUS at 21:29

## 2021-07-06 RX ADMIN — SODIUM CHLORIDE 75 MILLILITER(S): 9 INJECTION INTRAMUSCULAR; INTRAVENOUS; SUBCUTANEOUS at 17:22

## 2021-07-06 RX ADMIN — NYSTATIN CREAM 1 APPLICATION(S): 100000 CREAM TOPICAL at 05:42

## 2021-07-06 RX ADMIN — SENNA PLUS 2 TABLET(S): 8.6 TABLET ORAL at 21:30

## 2021-07-06 RX ADMIN — SODIUM CHLORIDE 75 MILLILITER(S): 9 INJECTION INTRAMUSCULAR; INTRAVENOUS; SUBCUTANEOUS at 11:08

## 2021-07-06 RX ADMIN — Medication 1 TABLET(S): at 21:31

## 2021-07-06 RX ADMIN — LAMOTRIGINE 300 MILLIGRAM(S): 25 TABLET, ORALLY DISINTEGRATING ORAL at 17:22

## 2021-07-06 RX ADMIN — PRIMIDONE 250 MILLIGRAM(S): 250 TABLET ORAL at 17:21

## 2021-07-06 RX ADMIN — Medication 1 SPRAY(S): at 11:08

## 2021-07-06 RX ADMIN — CLOBAZAM 10 MILLIGRAM(S): 10 TABLET ORAL at 05:41

## 2021-07-06 RX ADMIN — LORATADINE 10 MILLIGRAM(S): 10 TABLET ORAL at 11:09

## 2021-07-06 RX ADMIN — DORZOLAMIDE HYDROCHLORIDE TIMOLOL MALEATE 1 DROP(S): 20; 5 SOLUTION/ DROPS OPHTHALMIC at 06:13

## 2021-07-06 RX ADMIN — ENOXAPARIN SODIUM 40 MILLIGRAM(S): 100 INJECTION SUBCUTANEOUS at 11:09

## 2021-07-06 RX ADMIN — Medication 1 TABLET(S): at 14:38

## 2021-07-06 RX ADMIN — PANTOPRAZOLE SODIUM 40 MILLIGRAM(S): 20 TABLET, DELAYED RELEASE ORAL at 11:13

## 2021-07-06 RX ADMIN — LATANOPROST 1 DROP(S): 0.05 SOLUTION/ DROPS OPHTHALMIC; TOPICAL at 21:30

## 2021-07-06 RX ADMIN — Medication 1000 UNIT(S): at 11:10

## 2021-07-06 RX ADMIN — PIPERACILLIN AND TAZOBACTAM 25 GRAM(S): 4; .5 INJECTION, POWDER, LYOPHILIZED, FOR SOLUTION INTRAVENOUS at 11:07

## 2021-07-06 RX ADMIN — LAMOTRIGINE 300 MILLIGRAM(S): 25 TABLET, ORALLY DISINTEGRATING ORAL at 05:41

## 2021-07-06 RX ADMIN — Medication 1 TABLET(S): at 05:41

## 2021-07-06 RX ADMIN — PRIMIDONE 250 MILLIGRAM(S): 250 TABLET ORAL at 05:40

## 2021-07-06 RX ADMIN — QUETIAPINE FUMARATE 25 MILLIGRAM(S): 200 TABLET, FILM COATED ORAL at 21:30

## 2021-07-06 RX ADMIN — PIPERACILLIN AND TAZOBACTAM 25 GRAM(S): 4; .5 INJECTION, POWDER, LYOPHILIZED, FOR SOLUTION INTRAVENOUS at 18:37

## 2021-07-06 RX ADMIN — CLOBAZAM 10 MILLIGRAM(S): 10 TABLET ORAL at 17:26

## 2021-07-06 RX ADMIN — DORZOLAMIDE HYDROCHLORIDE TIMOLOL MALEATE 1 DROP(S): 20; 5 SOLUTION/ DROPS OPHTHALMIC at 17:51

## 2021-07-06 NOTE — DIETITIAN INITIAL EVALUATION ADULT. - ORAL INTAKE PTA/DIET HISTORY
Unable to obtain much subjective information as pt difficult to re-direct at times. Pt states he eats well normally, likes "[employer] soda, french fries and ice cream." Per H&P pt on vitamin D3 PTA. NKFA per chart. Pt states he is a "good chewer and swallower" however pt s/p SLP evaluation recommended for dysphagia 1 with nectar consistency liquids.

## 2021-07-06 NOTE — DIETITIAN INITIAL EVALUATION ADULT. - OTHER INFO
Note no dosing height or weight in chart at this time, pt unable to provide height (states he knows he is "tall") and unable to provide weight or weight history - asked RN for scaled weight. No weight history noted per Janie PEPE, pt OOB to chair and RD unable to obtain scaled weight at this time.    Pt denies nausea, vomiting, diarrhea, or constipation. Last BM per chart today. Pt unable to detail PO intake, per RN pt with good appetite consuming >75% of meal trays. Note no dosing height or weight in chart at this time, pt unable to provide height (states he knows he is "tall," RD estimates height ~67-68 inches) and unable to provide weight or weight history - asked RN for scaled weight. No weight history noted per Janie PEPE, pt OOB to chair and RD unable to obtain scaled weight at this time.    Pt denies nausea, vomiting, diarrhea, or constipation. Last BM per chart today. Pt unable to detail PO intake, per RN pt with good appetite consuming >75% of meal trays.

## 2021-07-06 NOTE — PROGRESS NOTE ADULT - ASSESSMENT
Assessment: 49 yo male with a PMHx of complex seizures (since age 9), hydrocephalus (s/p  shunt on R side), chronic R sided headaches (s/p expansion craniotomy), mild intellectual disability, b/l hearing loss, and b/l blindness presenting with onset of staring spells and increased behavioral issues at home over the past 4 months. Staring spells described as "smiling widely" or "licking his lips" and he would be completely unresponsive during these spells. Concern for possible absence seizures vs behavioral issues.     Plan:   [x] off EEG - no events  [] c/w home Onfi 10MG PO BID, Lamotrigine 300MG PO BID, Primidone 250MG PO BID.  [] Zyprexa, Zoloft, and Lexapro discontinued as aunt states she has not given these meds for the past month due to weight gain.  [] Zosyn 3.375g IV q8h began empirically on 7/3/21 in light of fevers - will continue for now.  [x] Psychiatry consulted, recommend Seroquel and Zyprexa PRN for agitation. Will provide these medications as standing doses.  [x] Formal speech and swallow eval: Pureed with nectar thick liquids.  [] Seizure/Fall/Aspiration precautions.  [x] GI consult: CT Abdomen/Pelvis unremarkable. On Protonix 40MG PO daily. Believe pain likely functional/related to eating habits.  [x] PT/OT: FRANCIS - pending callback from  for updates.  [] Diet: Pureed with nectar thick.  [] DVT PPX: Lovenox 40 SC.  [] Seizure Rescue: Ativan 1MG IVP x1 for convulsions/GTC lasting > 3 minutes, Vimpat 200MG IV x1 for convulsions/GTC refractory to Ativan.    Case discussed with epilepsy attending Dr. Lay.

## 2021-07-06 NOTE — DIETITIAN INITIAL EVALUATION ADULT. - PHYSCIAL ASSESSMENT
No noted pressure injuries as per documentation.  Nutrition focused physical exam deferred at this time. well nourished

## 2021-07-06 NOTE — PROGRESS NOTE ADULT - SUBJECTIVE AND OBJECTIVE BOX
[id number]    Subjective:    PAST MEDICAL & SURGICAL HISTORY:  Hydrocephalus  age 2 months    Legally blind    Glaucoma    Sleep apnea  not on CPAP    Epilepsy    Osteoporosis    Elevated liver enzymes    Environmental Allergies    Impulse control disorder  worsened when pt on Depakote, pt now off    Mild mental retardation    Hiatal hernia    Aspiration pneumonia  at age 25    Viral pneumonia  h/o    BPH without obstruction/lower urinary tract symptoms    Glaucoma  on eye gtts    S/P  Shunt  originally had shunt to lung, then revised to peritoneal shunt, has had multiple revisions    Epilepsy  s/p Vagal Nerve Stimulator Implant , 2007, last one in 2016    H/O craniotomy  at age 18    Heel cord contracture  s/p surgical correction b/l    History of tonsillectomy    S/p bilateral myringotomy with tube placement    Obstructed  shunt  revision of  shunt 2015    FAMILY HISTORY:  Family history of stroke (Mother)    Social Hx:  Nonsmoker, no drug or alcohol use    Home Medications:  cetirizine 10 mg oral tablet: 1 tab(s) orally once a day in the pm (12 Nov 2020 05:57)  Debrox 6.5% otic solution: 10 drop(s) to each ear for the first week of every month (12 Nov 2020 05:57)  dorzolamide-timolol 2.23%-0.68% ophthalmic solution: 1 drop(s) to both eyes 2 times a day (12 Nov 2020 05:57)  escitalopram 5 mg oral tablet: 2 tab(s) orally once a day (29 Jun 2021 18:13)  fluticasone 0.5 mg/2 mL inhalation suspension: 1 spray(s) inhaled once a day (12 Nov 2020 05:57)  ketoconazole 2% topical cream: Apply topically to affected area 2 times a day. ( scalp , forehead and eyebrows ) (12 Nov 2020 05:57)  latanoprost 0.005% ophthalmic solution: 1 drop(s) to each affected eye once a day (in the evening) (29 Jun 2021 18:13)  OLANZapine 5 mg oral tablet: 1 tab(s) orally once a day (29 Jun 2021 18:13)  omeprazole 40 mg oral delayed release capsule: 1 cap(s) orally 2 times a day (12 Nov 2020 05:57)  Oyster Shell Calcium with Vitamin D 500 mg-200 intl units oral tablet: 1 tab(s) orally 3 times a day (12 Nov 2020 05:57)  primidone 250 mg oral tablet: 1 tab(s) orally 2 times a day (29 Jun 2021 18:50)  senna oral tablet: 2 tab(s) orally once a day (at bedtime) (17 Nov 2020 10:46)  sertraline 25 mg oral tablet: 1 tab(s) orally once a day (29 Jun 2021 18:12)  Vitamin D3 1000 intl units oral capsule: 1 cap(s) orally once a day in the am (12 Nov 2020 05:57)    MEDICATIONS  (STANDING):  calcium carbonate 1250 mG  + Vitamin D (OsCal 500 + D) 1 Tablet(s) Oral three times a day  cholecalciferol 1000 Unit(s) Oral daily  cloBAZam 10 milliGRAM(s) Oral two times a day  dorzolamide 2%/timolol 0.5% Ophthalmic Solution 1 Drop(s) Both EYES two times a day  enoxaparin Injectable 40 milliGRAM(s) SubCutaneous daily  fluticasone propionate 50 MICROgram(s)/spray Nasal Spray 1 Spray(s) Both Nostrils <User Schedule>  lamoTRIgine 300 milliGRAM(s) Oral two times a day  latanoprost 0.005% Ophthalmic Solution 1 Drop(s) Both EYES at bedtime  loratadine 10 milliGRAM(s) Oral daily  nystatin Ointment 1 Application(s) Topical two times a day  OLANZapine 5 milliGRAM(s) Oral at bedtime  pantoprazole  Injectable 40 milliGRAM(s) IV Push daily  piperacillin/tazobactam IVPB.. 3.375 Gram(s) IV Intermittent every 8 hours  primidone 250 milliGRAM(s) Oral two times a day  QUEtiapine 25 milliGRAM(s) Oral at bedtime  senna 2 Tablet(s) Oral at bedtime  sodium chloride 0.9%. 1000 milliLiter(s) (75 mL/Hr) IV Continuous <Continuous>    MEDICATIONS  (PRN):  acetaminophen   Tablet .. 650 milliGRAM(s) Oral every 6 hours PRN Temp greater or equal to 38C (100.4F), Mild Pain (1 - 3), Moderate Pain (4 - 6)  diazepam    Tablet 5 milliGRAM(s) Oral every 8 hours PRN spasm  guaifenesin/dextromethorphan Oral Liquid 5 milliLiter(s) Oral every 6 hours PRN Cough  lacosamide Injectable 200 milliGRAM(s) IV Push once PRN seizure refractory to ativan  LORazepam   Injectable 1 milliGRAM(s) IV Push once PRN seizure    Allergies  codeine (Stomach Upset)  Depakote (Other)  seasonal allergies: nasal congestion (Other)	    ROS: Pertinent positives above, all other ROS were reviewed and are negative.      Vital Signs Last 24 Hrs  T(C): 36.4 (06 Jul 2021 08:12), Max: 37.1 (05 Jul 2021 23:24)  T(F): 97.5 (06 Jul 2021 08:12), Max: 98.8 (05 Jul 2021 23:24)  HR: 98 (06 Jul 2021 08:12) (98 - 106)  BP: 111/75 (06 Jul 2021 08:12) (110/79 - 139/99)  RR: 18 (06 Jul 2021 08:12) (18 - 20)  SpO2: 96% (06 Jul 2021 08:12) (95% - 97%)    GENERAL EXAM:  Constitutional:  HEENT:    NEUROLOGICAL EXAM:  MS:   CN:  Motor:  Sensation:  Coordination/Gait: Unable to assess.    Labs:   cbc  [id number]    141  |  105  |  10  ----------------------------<  90  4.2   |  23  |  0.87    Ca    8.7      05 Jul 2021 06:30  Phos  3.1     07-05  Mg     1.9     07-05      Coags  Lipids  A1C  Cardiac Markers      UA  CSF  Immunological Labs    Radiology: [id number]    Subjective:    PAST MEDICAL & SURGICAL HISTORY:  Hydrocephalus  age 2 months    Legally blind    Glaucoma    Sleep apnea  not on CPAP    Epilepsy    Osteoporosis    Elevated liver enzymes    Environmental Allergies    Impulse control disorder  worsened when pt on Depakote, pt now off    Mild mental retardation    Hiatal hernia    Aspiration pneumonia  at age 25    Viral pneumonia  h/o    BPH without obstruction/lower urinary tract symptoms    Glaucoma  on eye gtts    S/P  Shunt  originally had shunt to lung, then revised to peritoneal shunt, has had multiple revisions    Epilepsy  s/p Vagal Nerve Stimulator Implant , 2007, last one in 2016    H/O craniotomy  at age 18    Heel cord contracture  s/p surgical correction b/l    History of tonsillectomy    S/p bilateral myringotomy with tube placement    Obstructed  shunt  revision of  shunt 2015    FAMILY HISTORY:  Family history of stroke (Mother)    Social Hx:  Nonsmoker, no drug or alcohol use    Home Medications:  cetirizine 10 mg oral tablet: 1 tab(s) orally once a day in the pm (12 Nov 2020 05:57)  Debrox 6.5% otic solution: 10 drop(s) to each ear for the first week of every month (12 Nov 2020 05:57)  dorzolamide-timolol 2.23%-0.68% ophthalmic solution: 1 drop(s) to both eyes 2 times a day (12 Nov 2020 05:57)  escitalopram 5 mg oral tablet: 2 tab(s) orally once a day (29 Jun 2021 18:13)  fluticasone 0.5 mg/2 mL inhalation suspension: 1 spray(s) inhaled once a day (12 Nov 2020 05:57)  ketoconazole 2% topical cream: Apply topically to affected area 2 times a day. ( scalp , forehead and eyebrows ) (12 Nov 2020 05:57)  latanoprost 0.005% ophthalmic solution: 1 drop(s) to each affected eye once a day (in the evening) (29 Jun 2021 18:13)  OLANZapine 5 mg oral tablet: 1 tab(s) orally once a day (29 Jun 2021 18:13)  omeprazole 40 mg oral delayed release capsule: 1 cap(s) orally 2 times a day (12 Nov 2020 05:57)  Oyster Shell Calcium with Vitamin D 500 mg-200 intl units oral tablet: 1 tab(s) orally 3 times a day (12 Nov 2020 05:57)  primidone 250 mg oral tablet: 1 tab(s) orally 2 times a day (29 Jun 2021 18:50)  senna oral tablet: 2 tab(s) orally once a day (at bedtime) (17 Nov 2020 10:46)  sertraline 25 mg oral tablet: 1 tab(s) orally once a day (29 Jun 2021 18:12)  Vitamin D3 1000 intl units oral capsule: 1 cap(s) orally once a day in the am (12 Nov 2020 05:57)    MEDICATIONS  (STANDING):  calcium carbonate 1250 mG  + Vitamin D (OsCal 500 + D) 1 Tablet(s) Oral three times a day  cholecalciferol 1000 Unit(s) Oral daily  cloBAZam 10 milliGRAM(s) Oral two times a day  dorzolamide 2%/timolol 0.5% Ophthalmic Solution 1 Drop(s) Both EYES two times a day  enoxaparin Injectable 40 milliGRAM(s) SubCutaneous daily  fluticasone propionate 50 MICROgram(s)/spray Nasal Spray 1 Spray(s) Both Nostrils <User Schedule>  lamoTRIgine 300 milliGRAM(s) Oral two times a day  latanoprost 0.005% Ophthalmic Solution 1 Drop(s) Both EYES at bedtime  loratadine 10 milliGRAM(s) Oral daily  nystatin Ointment 1 Application(s) Topical two times a day  OLANZapine 5 milliGRAM(s) Oral at bedtime  pantoprazole  Injectable 40 milliGRAM(s) IV Push daily  piperacillin/tazobactam IVPB.. 3.375 Gram(s) IV Intermittent every 8 hours  primidone 250 milliGRAM(s) Oral two times a day  QUEtiapine 25 milliGRAM(s) Oral at bedtime  senna 2 Tablet(s) Oral at bedtime  sodium chloride 0.9%. 1000 milliLiter(s) (75 mL/Hr) IV Continuous <Continuous>    MEDICATIONS  (PRN):  acetaminophen   Tablet .. 650 milliGRAM(s) Oral every 6 hours PRN Temp greater or equal to 38C (100.4F), Mild Pain (1 - 3), Moderate Pain (4 - 6)  diazepam    Tablet 5 milliGRAM(s) Oral every 8 hours PRN spasm  guaifenesin/dextromethorphan Oral Liquid 5 milliLiter(s) Oral every 6 hours PRN Cough  lacosamide Injectable 200 milliGRAM(s) IV Push once PRN seizure refractory to ativan  LORazepam   Injectable 1 milliGRAM(s) IV Push once PRN seizure    Allergies  codeine (Stomach Upset)  Depakote (Other)  seasonal allergies: nasal congestion (Other)	    ROS: Pertinent positives above, all other ROS were reviewed and are negative.      Vital Signs Last 24 Hrs  T(C): 36.4 (06 Jul 2021 08:12), Max: 37.1 (05 Jul 2021 23:24)  T(F): 97.5 (06 Jul 2021 08:12), Max: 98.8 (05 Jul 2021 23:24)  HR: 98 (06 Jul 2021 08:12) (98 - 106)  BP: 111/75 (06 Jul 2021 08:12) (110/79 - 139/99)  RR: 18 (06 Jul 2021 08:12) (18 - 20)  SpO2: 96% (06 Jul 2021 08:12) (95% - 97%)    GENERAL EXAM:  Constitutional:  HEENT:    NEUROLOGICAL EXAM:  MS:   CN:  Motor:  Sensation:  Coordination/Gait: Unable to assess.    Labs:   cbc  [id number]    141  |  105  |  10  ----------------------------<  90  4.2   |  23  |  0.87    Ca    8.7      05 Jul 2021 06:30  Phos  3.1     07-05  Mg     1.9     07-05    Radiology/EEGs:  -06/30 EEG Summary:  Abnormal EEG in the awake, drowsy and asleep states.  - Asymmetry, attenuation of voltage over the right hemisphere  Impression/Clinical Correlate:  There is an asymmetry with an attenuation of activity over the right hemisphere.  This may be due to either cortical dysfunction in the right hemisphere, or a skull defect on the left.  No epileptiform abnormalities or seizures were recorded.    -07/01 EEG Summary:  Abnormal EEG in the awake, drowsy and asleep states.  - Asymmetry, attenuation of voltage over the right hemisphere  Impression/Clinical Correlate:  2.	There is an asymmetry with an attenuation of activity over the right hemisphere.  This may be due to either cortical dysfunction in the right hemisphere, or a skull defect on the left.  No epileptiform abnormalities or seizures were recorded. [id number]    Subjective: 51 yo male seen and examined at bedside. No events overnight, no complaints.    PAST MEDICAL & SURGICAL HISTORY:  Hydrocephalus  age 2 months    Legally blind    Glaucoma    Sleep apnea  not on CPAP    Epilepsy    Osteoporosis    Elevated liver enzymes    Environmental Allergies    Impulse control disorder  worsened when pt on Depakote, pt now off    Mild mental retardation    Hiatal hernia    Aspiration pneumonia  at age 25    Viral pneumonia  h/o    BPH without obstruction/lower urinary tract symptoms    Glaucoma  on eye gtts    S/P  Shunt  originally had shunt to lung, then revised to peritoneal shunt, has had multiple revisions    Epilepsy  s/p Vagal Nerve Stimulator Implant , 2007, last one in 2016    H/O craniotomy  at age 18    Heel cord contracture  s/p surgical correction b/l    History of tonsillectomy    S/p bilateral myringotomy with tube placement    Obstructed  shunt  revision of  shunt 2015    FAMILY HISTORY:  Family history of stroke (Mother)    Social Hx:  Nonsmoker, no drug or alcohol use    Home Medications:  cetirizine 10 mg oral tablet: 1 tab(s) orally once a day in the pm (12 Nov 2020 05:57)  Debrox 6.5% otic solution: 10 drop(s) to each ear for the first week of every month (12 Nov 2020 05:57)  dorzolamide-timolol 2.23%-0.68% ophthalmic solution: 1 drop(s) to both eyes 2 times a day (12 Nov 2020 05:57)  escitalopram 5 mg oral tablet: 2 tab(s) orally once a day (29 Jun 2021 18:13)  fluticasone 0.5 mg/2 mL inhalation suspension: 1 spray(s) inhaled once a day (12 Nov 2020 05:57)  ketoconazole 2% topical cream: Apply topically to affected area 2 times a day. ( scalp , forehead and eyebrows ) (12 Nov 2020 05:57)  latanoprost 0.005% ophthalmic solution: 1 drop(s) to each affected eye once a day (in the evening) (29 Jun 2021 18:13)  OLANZapine 5 mg oral tablet: 1 tab(s) orally once a day (29 Jun 2021 18:13)  omeprazole 40 mg oral delayed release capsule: 1 cap(s) orally 2 times a day (12 Nov 2020 05:57)  Oyster Shell Calcium with Vitamin D 500 mg-200 intl units oral tablet: 1 tab(s) orally 3 times a day (12 Nov 2020 05:57)  primidone 250 mg oral tablet: 1 tab(s) orally 2 times a day (29 Jun 2021 18:50)  senna oral tablet: 2 tab(s) orally once a day (at bedtime) (17 Nov 2020 10:46)  sertraline 25 mg oral tablet: 1 tab(s) orally once a day (29 Jun 2021 18:12)  Vitamin D3 1000 intl units oral capsule: 1 cap(s) orally once a day in the am (12 Nov 2020 05:57)    MEDICATIONS  (STANDING):  calcium carbonate 1250 mG  + Vitamin D (OsCal 500 + D) 1 Tablet(s) Oral three times a day  cholecalciferol 1000 Unit(s) Oral daily  cloBAZam 10 milliGRAM(s) Oral two times a day  dorzolamide 2%/timolol 0.5% Ophthalmic Solution 1 Drop(s) Both EYES two times a day  enoxaparin Injectable 40 milliGRAM(s) SubCutaneous daily  fluticasone propionate 50 MICROgram(s)/spray Nasal Spray 1 Spray(s) Both Nostrils <User Schedule>  lamoTRIgine 300 milliGRAM(s) Oral two times a day  latanoprost 0.005% Ophthalmic Solution 1 Drop(s) Both EYES at bedtime  loratadine 10 milliGRAM(s) Oral daily  nystatin Ointment 1 Application(s) Topical two times a day  OLANZapine 5 milliGRAM(s) Oral at bedtime  pantoprazole  Injectable 40 milliGRAM(s) IV Push daily  piperacillin/tazobactam IVPB.. 3.375 Gram(s) IV Intermittent every 8 hours  primidone 250 milliGRAM(s) Oral two times a day  QUEtiapine 25 milliGRAM(s) Oral at bedtime  senna 2 Tablet(s) Oral at bedtime  sodium chloride 0.9%. 1000 milliLiter(s) (75 mL/Hr) IV Continuous <Continuous>    MEDICATIONS  (PRN):  acetaminophen   Tablet .. 650 milliGRAM(s) Oral every 6 hours PRN Temp greater or equal to 38C (100.4F), Mild Pain (1 - 3), Moderate Pain (4 - 6)  diazepam    Tablet 5 milliGRAM(s) Oral every 8 hours PRN spasm  guaifenesin/dextromethorphan Oral Liquid 5 milliLiter(s) Oral every 6 hours PRN Cough  lacosamide Injectable 200 milliGRAM(s) IV Push once PRN seizure refractory to ativan  LORazepam   Injectable 1 milliGRAM(s) IV Push once PRN seizure    Allergies  codeine (Stomach Upset)  Depakote (Other)  seasonal allergies: nasal congestion (Other)	    ROS: Pertinent positives above, all other ROS were reviewed and are negative.      Vital Signs Last 24 Hrs  T(C): 36.4 (06 Jul 2021 08:12), Max: 37.1 (05 Jul 2021 23:24)  T(F): 97.5 (06 Jul 2021 08:12), Max: 98.8 (05 Jul 2021 23:24)  HR: 98 (06 Jul 2021 08:12) (98 - 106)  BP: 111/75 (06 Jul 2021 08:12) (110/79 - 139/99)  RR: 18 (06 Jul 2021 08:12) (18 - 20)  SpO2: 96% (06 Jul 2021 08:12) (95% - 97%)    GENERAL EXAM:  Constitutional: Sitting in chair at bedside. NAD.  HEENT: Atraumatic.    NEUROLOGICAL EXAM:  MS: Alert, oriented to self and location. Speech is fluent, not slurred. Follows simple commands.  CN: Bilateral vision loss at baseline worse in L eye, sees some shadows out of his R eye. Eyes disconjugate at primary gaze. B/l horizontal nystagmus on primary gaze, unable to assess EOM. No facial asymmetry.   Motor: SORIANO antigravity.  Coordination/Gait: Unable to assess.    Labs:   cbc  [id number]    141  |  105  |  10  ----------------------------<  90  4.2   |  23  |  0.87    Ca    8.7      05 Jul 2021 06:30  Phos  3.1     07-05  Mg     1.9     07-05    Radiology/EEGs:  -06/30 EEG Summary:  Abnormal EEG in the awake, drowsy and asleep states.  - Asymmetry, attenuation of voltage over the right hemisphere  Impression/Clinical Correlate:  There is an asymmetry with an attenuation of activity over the right hemisphere.  This may be due to either cortical dysfunction in the right hemisphere, or a skull defect on the left.  No epileptiform abnormalities or seizures were recorded.    -07/01 EEG Summary:  Abnormal EEG in the awake, drowsy and asleep states.  - Asymmetry, attenuation of voltage over the right hemisphere  Impression/Clinical Correlate:  2.	There is an asymmetry with an attenuation of activity over the right hemisphere.  This may be due to either cortical dysfunction in the right hemisphere, or a skull defect on the left.  No epileptiform abnormalities or seizures were recorded.

## 2021-07-06 NOTE — DIETITIAN INITIAL EVALUATION ADULT. - ADD RECOMMEND
Continue diet free of therapeutic restrictions, defer textures/consistencies to SLP/team. RD to obtain further subjective/objective information as feasible. RD to add Magic Cup x 2 to promote intake. Provide encouragement with PO intake, menu selections, and assistance with meals as needed.

## 2021-07-06 NOTE — DIETITIAN INITIAL EVALUATION ADULT. - CHIEF COMPLAINT
Pt is a 51 yo handedness unknown male with a PMH of complex seizures since age 9, hydrocephalus status post  shunt on R side, chronic R sided headaches status post expansion craniotomy, mild Mental Retardation, b/l hearing loss and b/l blindness presenting with onset of staring spells and increased behavioral issues at home over the past 4 months.

## 2021-07-07 ENCOUNTER — TRANSCRIPTION ENCOUNTER (OUTPATIENT)
Age: 51
End: 2021-07-07

## 2021-07-07 LAB
ALBUMIN SERPL ELPH-MCNC: 3.8 G/DL — SIGNIFICANT CHANGE UP (ref 3.3–5)
ALP SERPL-CCNC: 132 U/L — HIGH (ref 40–120)
ALT FLD-CCNC: 26 U/L — SIGNIFICANT CHANGE UP (ref 10–45)
ANION GAP SERPL CALC-SCNC: 13 MMOL/L — SIGNIFICANT CHANGE UP (ref 5–17)
AST SERPL-CCNC: 17 U/L — SIGNIFICANT CHANGE UP (ref 10–40)
BILIRUB SERPL-MCNC: 0.2 MG/DL — SIGNIFICANT CHANGE UP (ref 0.2–1.2)
BUN SERPL-MCNC: 11 MG/DL — SIGNIFICANT CHANGE UP (ref 7–23)
CALCIUM SERPL-MCNC: 9.2 MG/DL — SIGNIFICANT CHANGE UP (ref 8.4–10.5)
CHLORIDE SERPL-SCNC: 102 MMOL/L — SIGNIFICANT CHANGE UP (ref 96–108)
CO2 SERPL-SCNC: 23 MMOL/L — SIGNIFICANT CHANGE UP (ref 22–31)
CREAT SERPL-MCNC: 0.99 MG/DL — SIGNIFICANT CHANGE UP (ref 0.5–1.3)
GLUCOSE SERPL-MCNC: 87 MG/DL — SIGNIFICANT CHANGE UP (ref 70–99)
HCT VFR BLD CALC: 39.9 % — SIGNIFICANT CHANGE UP (ref 39–50)
HGB BLD-MCNC: 13 G/DL — SIGNIFICANT CHANGE UP (ref 13–17)
MAGNESIUM SERPL-MCNC: 2 MG/DL — SIGNIFICANT CHANGE UP (ref 1.6–2.6)
MCHC RBC-ENTMCNC: 31 PG — SIGNIFICANT CHANGE UP (ref 27–34)
MCHC RBC-ENTMCNC: 32.6 GM/DL — SIGNIFICANT CHANGE UP (ref 32–36)
MCV RBC AUTO: 95.2 FL — SIGNIFICANT CHANGE UP (ref 80–100)
NRBC # BLD: 0 /100 WBCS — SIGNIFICANT CHANGE UP (ref 0–0)
PHOSPHATE SERPL-MCNC: 3.9 MG/DL — SIGNIFICANT CHANGE UP (ref 2.5–4.5)
PLATELET # BLD AUTO: 314 K/UL — SIGNIFICANT CHANGE UP (ref 150–400)
POTASSIUM SERPL-MCNC: 4.2 MMOL/L — SIGNIFICANT CHANGE UP (ref 3.5–5.3)
POTASSIUM SERPL-SCNC: 4.2 MMOL/L — SIGNIFICANT CHANGE UP (ref 3.5–5.3)
PROT SERPL-MCNC: 7.1 G/DL — SIGNIFICANT CHANGE UP (ref 6–8.3)
RBC # BLD: 4.19 M/UL — LOW (ref 4.2–5.8)
RBC # FLD: 13.3 % — SIGNIFICANT CHANGE UP (ref 10.3–14.5)
SODIUM SERPL-SCNC: 138 MMOL/L — SIGNIFICANT CHANGE UP (ref 135–145)
WBC # BLD: 5.17 K/UL — SIGNIFICANT CHANGE UP (ref 3.8–10.5)
WBC # FLD AUTO: 5.17 K/UL — SIGNIFICANT CHANGE UP (ref 3.8–10.5)

## 2021-07-07 PROCEDURE — 99232 SBSQ HOSP IP/OBS MODERATE 35: CPT

## 2021-07-07 RX ADMIN — Medication 1000 UNIT(S): at 11:32

## 2021-07-07 RX ADMIN — PANTOPRAZOLE SODIUM 40 MILLIGRAM(S): 20 TABLET, DELAYED RELEASE ORAL at 11:32

## 2021-07-07 RX ADMIN — LAMOTRIGINE 300 MILLIGRAM(S): 25 TABLET, ORALLY DISINTEGRATING ORAL at 05:27

## 2021-07-07 RX ADMIN — OLANZAPINE 5 MILLIGRAM(S): 15 TABLET, FILM COATED ORAL at 21:36

## 2021-07-07 RX ADMIN — PIPERACILLIN AND TAZOBACTAM 25 GRAM(S): 4; .5 INJECTION, POWDER, LYOPHILIZED, FOR SOLUTION INTRAVENOUS at 11:27

## 2021-07-07 RX ADMIN — PIPERACILLIN AND TAZOBACTAM 25 GRAM(S): 4; .5 INJECTION, POWDER, LYOPHILIZED, FOR SOLUTION INTRAVENOUS at 18:43

## 2021-07-07 RX ADMIN — QUETIAPINE FUMARATE 25 MILLIGRAM(S): 200 TABLET, FILM COATED ORAL at 21:37

## 2021-07-07 RX ADMIN — Medication 1 TABLET(S): at 21:36

## 2021-07-07 RX ADMIN — ENOXAPARIN SODIUM 40 MILLIGRAM(S): 100 INJECTION SUBCUTANEOUS at 11:31

## 2021-07-07 RX ADMIN — LATANOPROST 1 DROP(S): 0.05 SOLUTION/ DROPS OPHTHALMIC; TOPICAL at 21:38

## 2021-07-07 RX ADMIN — PIPERACILLIN AND TAZOBACTAM 25 GRAM(S): 4; .5 INJECTION, POWDER, LYOPHILIZED, FOR SOLUTION INTRAVENOUS at 02:00

## 2021-07-07 RX ADMIN — PRIMIDONE 250 MILLIGRAM(S): 250 TABLET ORAL at 17:28

## 2021-07-07 RX ADMIN — Medication 1 SPRAY(S): at 11:31

## 2021-07-07 RX ADMIN — LORATADINE 10 MILLIGRAM(S): 10 TABLET ORAL at 11:32

## 2021-07-07 RX ADMIN — PRIMIDONE 250 MILLIGRAM(S): 250 TABLET ORAL at 05:27

## 2021-07-07 RX ADMIN — DORZOLAMIDE HYDROCHLORIDE TIMOLOL MALEATE 1 DROP(S): 20; 5 SOLUTION/ DROPS OPHTHALMIC at 05:26

## 2021-07-07 RX ADMIN — DORZOLAMIDE HYDROCHLORIDE TIMOLOL MALEATE 1 DROP(S): 20; 5 SOLUTION/ DROPS OPHTHALMIC at 17:27

## 2021-07-07 RX ADMIN — LAMOTRIGINE 300 MILLIGRAM(S): 25 TABLET, ORALLY DISINTEGRATING ORAL at 17:27

## 2021-07-07 RX ADMIN — CLOBAZAM 10 MILLIGRAM(S): 10 TABLET ORAL at 17:27

## 2021-07-07 RX ADMIN — SENNA PLUS 2 TABLET(S): 8.6 TABLET ORAL at 21:36

## 2021-07-07 RX ADMIN — Medication 1 TABLET(S): at 15:06

## 2021-07-07 RX ADMIN — CLOBAZAM 10 MILLIGRAM(S): 10 TABLET ORAL at 05:30

## 2021-07-07 RX ADMIN — Medication 1 TABLET(S): at 05:26

## 2021-07-07 NOTE — PROGRESS NOTE ADULT - SUBJECTIVE AND OBJECTIVE BOX
[id number]    Subjective:    PAST MEDICAL & SURGICAL HISTORY:  Hydrocephalus  age 2 months    Legally blind    Glaucoma    Sleep apnea  not on CPAP    Epilepsy    Osteoporosis    Elevated liver enzymes    Environmental Allergies    Impulse control disorder  worsened when pt on Depakote, pt now off    Mild mental retardation    Hiatal hernia    Aspiration pneumonia  at age 25    Viral pneumonia  h/o    BPH without obstruction/lower urinary tract symptoms    Glaucoma  on eye gtts    S/P  Shunt  originally had shunt to lung, then revised to peritoneal shunt, has had multiple revisions    Epilepsy  s/p Vagal Nerve Stimulator Implant , 2007, last one in 2016    H/O craniotomy  at age 18    Heel cord contracture  s/p surgical correction b/l    History of tonsillectomy    S/p bilateral myringotomy with tube placement    Obstructed  shunt  revision of  shunt 2015    FAMILY HISTORY:  Family history of stroke (Mother)    Social Hx:  Nonsmoker, no drug or alcohol use    Home Medications:  cetirizine 10 mg oral tablet: 1 tab(s) orally once a day in the pm (12 Nov 2020 05:57)  Debrox 6.5% otic solution: 10 drop(s) to each ear for the first week of every month (12 Nov 2020 05:57)  dorzolamide-timolol 2.23%-0.68% ophthalmic solution: 1 drop(s) to both eyes 2 times a day (12 Nov 2020 05:57)  escitalopram 5 mg oral tablet: 2 tab(s) orally once a day (29 Jun 2021 18:13)  fluticasone 0.5 mg/2 mL inhalation suspension: 1 spray(s) inhaled once a day (12 Nov 2020 05:57)  ketoconazole 2% topical cream: Apply topically to affected area 2 times a day. ( scalp , forehead and eyebrows ) (12 Nov 2020 05:57)  latanoprost 0.005% ophthalmic solution: 1 drop(s) to each affected eye once a day (in the evening) (29 Jun 2021 18:13)  OLANZapine 5 mg oral tablet: 1 tab(s) orally once a day (29 Jun 2021 18:13)  omeprazole 40 mg oral delayed release capsule: 1 cap(s) orally 2 times a day (12 Nov 2020 05:57)  Oyster Shell Calcium with Vitamin D 500 mg-200 intl units oral tablet: 1 tab(s) orally 3 times a day (12 Nov 2020 05:57)  primidone 250 mg oral tablet: 1 tab(s) orally 2 times a day (29 Jun 2021 18:50)  senna oral tablet: 2 tab(s) orally once a day (at bedtime) (17 Nov 2020 10:46)  sertraline 25 mg oral tablet: 1 tab(s) orally once a day (29 Jun 2021 18:12)  Vitamin D3 1000 intl units oral capsule: 1 cap(s) orally once a day in the am (12 Nov 2020 05:57)    MEDICATIONS  (STANDING):  calcium carbonate 1250 mG  + Vitamin D (OsCal 500 + D) 1 Tablet(s) Oral three times a day  cholecalciferol 1000 Unit(s) Oral daily  cloBAZam 10 milliGRAM(s) Oral two times a day  dorzolamide 2%/timolol 0.5% Ophthalmic Solution 1 Drop(s) Both EYES two times a day  enoxaparin Injectable 40 milliGRAM(s) SubCutaneous daily  fluticasone propionate 50 MICROgram(s)/spray Nasal Spray 1 Spray(s) Both Nostrils <User Schedule>  lamoTRIgine 300 milliGRAM(s) Oral two times a day  latanoprost 0.005% Ophthalmic Solution 1 Drop(s) Both EYES at bedtime  loratadine 10 milliGRAM(s) Oral daily  OLANZapine 5 milliGRAM(s) Oral at bedtime  pantoprazole   Suspension 40 milliGRAM(s) Oral daily  piperacillin/tazobactam IVPB.. 3.375 Gram(s) IV Intermittent every 8 hours  primidone 250 milliGRAM(s) Oral two times a day  QUEtiapine 25 milliGRAM(s) Oral at bedtime  senna 2 Tablet(s) Oral at bedtime  sodium chloride 0.9%. 1000 milliLiter(s) (75 mL/Hr) IV Continuous <Continuous>    MEDICATIONS  (PRN):  acetaminophen   Tablet .. 650 milliGRAM(s) Oral every 6 hours PRN Temp greater or equal to 38C (100.4F), Mild Pain (1 - 3), Moderate Pain (4 - 6)  diazepam    Tablet 5 milliGRAM(s) Oral every 8 hours PRN spasm  guaifenesin/dextromethorphan Oral Liquid 5 milliLiter(s) Oral every 6 hours PRN Cough  lacosamide Injectable 200 milliGRAM(s) IV Push once PRN seizure refractory to ativan  LORazepam   Injectable 1 milliGRAM(s) IV Push once PRN seizure    Allergies  codeine (Stomach Upset)  Depakote (Other)  seasonal allergies: nasal congestion (Other)    ROS: Pertinent positives above, all other ROS were reviewed and are negative.      Vital Signs Last 24 Hrs  T(C): 36.4 (07 Jul 2021 04:21), Max: 37.1 (06 Jul 2021 12:23)  T(F): 97.5 (07 Jul 2021 04:21), Max: 98.7 (06 Jul 2021 12:23)  HR: 101 (07 Jul 2021 04:21) (98 - 109)  BP: 106/69 (07 Jul 2021 04:21) (106/69 - 132/86)  RR: 18 (07 Jul 2021 04:21) (18 - 18)  SpO2: 94% (07 Jul 2021 04:21) (93% - 96%)    GENERAL EXAM:  Constitutional:  HEENT:    NEUROLOGICAL EXAM:  MS:   CN:  Motor:  Sensation:  Coordination/Gait: Unable to assess.    Labs:   cbc                      13.0   5.17  )-----------( 314      ( 07 Jul 2021 06:21 )             39.9     [id number]    138  |  102  |  11  ----------------------------<  87  4.2   |  23  |  0.99    Ca    9.2      07 Jul 2021 06:21  Phos  3.9     07-07  Mg     2.0     07-07    TPro  7.1  /  Alb  3.8  /  TBili  0.2  /  DBili  x   /  AST  17  /  ALT  26  /  AlkPhos  132<H>  07-07    LIVER FUNCTIONS - ( 07 Jul 2021 06:21 )  Alb: 3.8 g/dL / Pro: 7.1 g/dL / ALK PHOS: 132 U/L / ALT: 26 U/L / AST: 17 U/L / GGT: x           Radiology/EEGs:  -06/30 EEG Summary:  Abnormal EEG in the awake, drowsy and asleep states.  - Asymmetry, attenuation of voltage over the right hemisphere  Impression/Clinical Correlate:  There is an asymmetry with an attenuation of activity over the right hemisphere.  This may be due to either cortical dysfunction in the right hemisphere, or a skull defect on the left.  No epileptiform abnormalities or seizures were recorded.    -07/01 EEG Summary:  Abnormal EEG in the awake, drowsy and asleep states.  - Asymmetry, attenuation of voltage over the right hemisphere  Impression/Clinical Correlate:  2.	There is an asymmetry with an attenuation of activity over the right hemisphere.  This may be due to either cortical dysfunction in the right hemisphere, or a skull defect on the left.  No epileptiform abnormalities or seizures were recorded. [id number]    Subjective: 49 yo male seen and examined at bedside.    PAST MEDICAL & SURGICAL HISTORY:  Hydrocephalus  age 2 months    Legally blind    Glaucoma    Sleep apnea  not on CPAP    Epilepsy    Osteoporosis    Elevated liver enzymes    Environmental Allergies    Impulse control disorder  worsened when pt on Depakote, pt now off    Mild mental retardation    Hiatal hernia    Aspiration pneumonia  at age 25    Viral pneumonia  h/o    BPH without obstruction/lower urinary tract symptoms    Glaucoma  on eye gtts    S/P  Shunt  originally had shunt to lung, then revised to peritoneal shunt, has had multiple revisions    Epilepsy  s/p Vagal Nerve Stimulator Implant , 2007, last one in 2016    H/O craniotomy  at age 18    Heel cord contracture  s/p surgical correction b/l    History of tonsillectomy    S/p bilateral myringotomy with tube placement    Obstructed  shunt  revision of  shunt 2015    FAMILY HISTORY:  Family history of stroke (Mother)    Social Hx:  Nonsmoker, no drug or alcohol use    Home Medications:  cetirizine 10 mg oral tablet: 1 tab(s) orally once a day in the pm (12 Nov 2020 05:57)  Debrox 6.5% otic solution: 10 drop(s) to each ear for the first week of every month (12 Nov 2020 05:57)  dorzolamide-timolol 2.23%-0.68% ophthalmic solution: 1 drop(s) to both eyes 2 times a day (12 Nov 2020 05:57)  escitalopram 5 mg oral tablet: 2 tab(s) orally once a day (29 Jun 2021 18:13)  fluticasone 0.5 mg/2 mL inhalation suspension: 1 spray(s) inhaled once a day (12 Nov 2020 05:57)  ketoconazole 2% topical cream: Apply topically to affected area 2 times a day. ( scalp , forehead and eyebrows ) (12 Nov 2020 05:57)  latanoprost 0.005% ophthalmic solution: 1 drop(s) to each affected eye once a day (in the evening) (29 Jun 2021 18:13)  OLANZapine 5 mg oral tablet: 1 tab(s) orally once a day (29 Jun 2021 18:13)  omeprazole 40 mg oral delayed release capsule: 1 cap(s) orally 2 times a day (12 Nov 2020 05:57)  Oyster Shell Calcium with Vitamin D 500 mg-200 intl units oral tablet: 1 tab(s) orally 3 times a day (12 Nov 2020 05:57)  primidone 250 mg oral tablet: 1 tab(s) orally 2 times a day (29 Jun 2021 18:50)  senna oral tablet: 2 tab(s) orally once a day (at bedtime) (17 Nov 2020 10:46)  sertraline 25 mg oral tablet: 1 tab(s) orally once a day (29 Jun 2021 18:12)  Vitamin D3 1000 intl units oral capsule: 1 cap(s) orally once a day in the am (12 Nov 2020 05:57)    MEDICATIONS  (STANDING):  calcium carbonate 1250 mG  + Vitamin D (OsCal 500 + D) 1 Tablet(s) Oral three times a day  cholecalciferol 1000 Unit(s) Oral daily  cloBAZam 10 milliGRAM(s) Oral two times a day  dorzolamide 2%/timolol 0.5% Ophthalmic Solution 1 Drop(s) Both EYES two times a day  enoxaparin Injectable 40 milliGRAM(s) SubCutaneous daily  fluticasone propionate 50 MICROgram(s)/spray Nasal Spray 1 Spray(s) Both Nostrils <User Schedule>  lamoTRIgine 300 milliGRAM(s) Oral two times a day  latanoprost 0.005% Ophthalmic Solution 1 Drop(s) Both EYES at bedtime  loratadine 10 milliGRAM(s) Oral daily  OLANZapine 5 milliGRAM(s) Oral at bedtime  pantoprazole   Suspension 40 milliGRAM(s) Oral daily  piperacillin/tazobactam IVPB.. 3.375 Gram(s) IV Intermittent every 8 hours  primidone 250 milliGRAM(s) Oral two times a day  QUEtiapine 25 milliGRAM(s) Oral at bedtime  senna 2 Tablet(s) Oral at bedtime  sodium chloride 0.9%. 1000 milliLiter(s) (75 mL/Hr) IV Continuous <Continuous>    MEDICATIONS  (PRN):  acetaminophen   Tablet .. 650 milliGRAM(s) Oral every 6 hours PRN Temp greater or equal to 38C (100.4F), Mild Pain (1 - 3), Moderate Pain (4 - 6)  diazepam    Tablet 5 milliGRAM(s) Oral every 8 hours PRN spasm  guaifenesin/dextromethorphan Oral Liquid 5 milliLiter(s) Oral every 6 hours PRN Cough  lacosamide Injectable 200 milliGRAM(s) IV Push once PRN seizure refractory to ativan  LORazepam   Injectable 1 milliGRAM(s) IV Push once PRN seizure    Allergies  codeine (Stomach Upset)  Depakote (Other)  seasonal allergies: nasal congestion (Other)    ROS: Pertinent positives above, all other ROS were reviewed and are negative.      Vital Signs Last 24 Hrs  T(C): 36.4 (07 Jul 2021 04:21), Max: 37.1 (06 Jul 2021 12:23)  T(F): 97.5 (07 Jul 2021 04:21), Max: 98.7 (06 Jul 2021 12:23)  HR: 101 (07 Jul 2021 04:21) (98 - 109)  BP: 106/69 (07 Jul 2021 04:21) (106/69 - 132/86)  RR: 18 (07 Jul 2021 04:21) (18 - 18)  SpO2: 94% (07 Jul 2021 04:21) (93% - 96%)    GENERAL EXAM:  Constitutional: Sitting in chair at bedside. NAD.  HEENT: Atraumatic.    NEUROLOGICAL EXAM:  MS: Alert, oriented to self and location. Speech is fluent, not slurred. Follows simple commands.  CN: Bilateral vision loss at baseline worse in L eye, sees some shadows out of his R eye. Eyes disconjugate at primary gaze. B/l horizontal nystagmus on primary gaze, unable to assess EOM. No facial asymmetry.   Motor: SORIANO antigravity.  Coordination/Gait: Unable to assess.    Labs:   cbc                      13.0   5.17  )-----------( 314      ( 07 Jul 2021 06:21 )             39.9     [id number]    138  |  102  |  11  ----------------------------<  87  4.2   |  23  |  0.99    Ca    9.2      07 Jul 2021 06:21  Phos  3.9     07-07  Mg     2.0     07-07    TPro  7.1  /  Alb  3.8  /  TBili  0.2  /  DBili  x   /  AST  17  /  ALT  26  /  AlkPhos  132<H>  07-07    LIVER FUNCTIONS - ( 07 Jul 2021 06:21 )  Alb: 3.8 g/dL / Pro: 7.1 g/dL / ALK PHOS: 132 U/L / ALT: 26 U/L / AST: 17 U/L / GGT: x           Radiology/EEGs:  -06/30 EEG Summary:  Abnormal EEG in the awake, drowsy and asleep states.  - Asymmetry, attenuation of voltage over the right hemisphere  Impression/Clinical Correlate:  There is an asymmetry with an attenuation of activity over the right hemisphere.  This may be due to either cortical dysfunction in the right hemisphere, or a skull defect on the left.  No epileptiform abnormalities or seizures were recorded.    -07/01 EEG Summary:  Abnormal EEG in the awake, drowsy and asleep states.  - Asymmetry, attenuation of voltage over the right hemisphere  Impression/Clinical Correlate:  2.	There is an asymmetry with an attenuation of activity over the right hemisphere.  This may be due to either cortical dysfunction in the right hemisphere, or a skull defect on the left.  No epileptiform abnormalities or seizures were recorded.

## 2021-07-07 NOTE — DISCHARGE NOTE PROVIDER - HOSPITAL COURSE
49 yo handedness unknown male with a PMH of complex seizures since age 9, hydrocephalus status post  shunt on R side, chronic R sided headaches status post expansion craniotomy, mild Mental Retardation, b/l hearing loss and b/l blindness presenting with onset of staring spells and increased behavioral issues at home over the past 4 months. The patient currently lives with his aunt (legal guardian) who noticed 4 months ago that the patient began having "staring spells." She described these as occurring daily, where the patient would suddenly stare up at the ceiling while "smiling widely" or "licking his lips" and he would be completely unresponsive during these spells. She stated the staring spells would last several hours and afterwards he seemed to be dazed and confused. The patient was unable to clarify if he recalled these episodes occurring. The aunt is concerned that he "may be seeing hallucinations" when he is fixated on the ceiling but did not recall any psychiatric history in the patient or family aside from the patient's mild mental retardation. The patient was diagnosed with epilepsy as a child, however the aunt is unaware of his history before the age of 9 and did not recall if he had febrile seizures. The aunt stated that when she began caring for the patient at age 9, he would frequently have complex seizures involving picking at his clothes with a state of confusion afterwards. On one occasion he experienced an aura of "rising sensation in his stomach." He had no convulsive episodes. She stated he was originally treated with phenobarbital for his complex seizures but discontinued it because "it rotted his teeth" and was then treated with Lamictal which he is currently taking. The aunt has also noticed increased behavioral problems at home over the past 4 months, including aggressive outbursts due to frustration. The patient's primary concern is his episodic head pain occurring daily but he did not desire any pain medication for it.     Prior CT scans showed R parietal encephalomalacia, EEGs with b/l frontotemporal discharges (R>L) with one seizure caught on the R in the past      Radiology/EEG:  -06/30 EEG Summary:  Abnormal EEG in the awake, drowsy and asleep states.  - Asymmetry, attenuation of voltage over the right hemisphere  Impression/Clinical Correlate:  There is an asymmetry with an attenuation of activity over the right hemisphere.  This may be due to either cortical dysfunction in the right hemisphere, or a skull defect on the left.  No epileptiform abnormalities or seizures were recorded.    -07/01 EEG Summary:  Abnormal EEG in the awake, drowsy and asleep states.  - Asymmetry, attenuation of voltage over the right hemisphere  Impression/Clinical Correlate:  There is an asymmetry with an attenuation of activity over the right hemisphere.  This may be due to either cortical dysfunction in the right hemisphere, or a skull defect on the left.  No epileptiform abnormalities or seizures were recorded.    GI consulted for dyspepsia and recommended Protonix 40mg daily and CT of A/P demonstrated no acute pathology. Dyspepsia likely due to functional/related to eating habits. Psychology consulted and recommended Seroquel and Zyprexa for agitation. IV Zosyn was started empirically on 7/3 in light of fevers. Fevers now__.      Pt seen and examined by Neurology team and deemed stable for discharge. Please continue to take all medications as directed.       51 yo handedness unknown male with a PMH of complex seizures since age 9, hydrocephalus status post  shunt on R side, chronic R sided headaches status post expansion craniotomy, mild Mental Retardation, b/l hearing loss and b/l blindness presenting with onset of staring spells and increased behavioral issues at home over the past 4 months. The patient currently lives with his aunt (legal guardian) who noticed 4 months ago that the patient began having "staring spells." She described these as occurring daily, where the patient would suddenly stare up at the ceiling while "smiling widely" or "licking his lips" and he would be completely unresponsive during these spells. She stated the staring spells would last several hours and afterwards he seemed to be dazed and confused. The patient was unable to clarify if he recalled these episodes occurring. The aunt is concerned that he "may be seeing hallucinations" when he is fixated on the ceiling but did not recall any psychiatric history in the patient or family aside from the patient's mild mental retardation. The patient was diagnosed with epilepsy as a child, however the aunt is unaware of his history before the age of 9 and did not recall if he had febrile seizures. The aunt stated that when she began caring for the patient at age 9, he would frequently have complex seizures involving picking at his clothes with a state of confusion afterwards. On one occasion he experienced an aura of "rising sensation in his stomach." He had no convulsive episodes. She stated he was originally treated with phenobarbital for his complex seizures but discontinued it because "it rotted his teeth" and was then treated with Lamictal which he is currently taking. The aunt has also noticed increased behavioral problems at home over the past 4 months, including aggressive outbursts due to frustration. The patient's primary concern is his episodic head pain occurring daily but he did not desire any pain medication for it.     Prior CT scans showed R parietal encephalomalacia, EEGs with b/l frontotemporal discharges (R>L) with one seizure caught on the R in the past.    Radiology/EEG:  -06/30 EEG Summary:  Abnormal EEG in the awake, drowsy and asleep states.  - Asymmetry, attenuation of voltage over the right hemisphere  Impression/Clinical Correlate:  There is an asymmetry with an attenuation of activity over the right hemisphere.  This may be due to either cortical dysfunction in the right hemisphere, or a skull defect on the left.  No epileptiform abnormalities or seizures were recorded.    -07/01 EEG Summary:  Abnormal EEG in the awake, drowsy and asleep states.  - Asymmetry, attenuation of voltage over the right hemisphere  Impression/Clinical Correlate:  There is an asymmetry with an attenuation of activity over the right hemisphere.  This may be due to either cortical dysfunction in the right hemisphere, or a skull defect on the left.  No epileptiform abnormalities or seizures were recorded.    GI consulted for dyspepsia and recommended Protonix 40mg daily and CT of A/P demonstrated no acute pathology. Dyspepsia likely due to functional/related to eating habits. Psychology consulted and recommended Seroquel and Zyprexa for agitation. Course of IV Zosyn was started empirically on 7/3 in light of fevers of unknown origin. Fevers now resolved.    Pt seen and examined by Neurology team and deemed stable for discharge to subacute rehab.

## 2021-07-07 NOTE — DISCHARGE NOTE PROVIDER - CARE PROVIDER_API CALL
Pilar Lay)  EEGEpilepsy; Neurology  [address], [address]  [place] [address]  Phone: [phone]  Fax: ()-  Follow Up Time: 1 week

## 2021-07-07 NOTE — PROGRESS NOTE ADULT - ASSESSMENT
Assessment: 51 yo male with a PMHx of complex seizures (since age 9), hydrocephalus (s/p  shunt on R side), chronic R sided headaches (s/p expansion craniotomy), mild intellectual disability, b/l hearing loss, and b/l blindness presenting with onset of staring spells and increased behavioral issues at home over the past 4 months. Staring spells described as "smiling widely" or "licking his lips" and he would be completely unresponsive during these spells. Concern for possible absence seizures vs behavioral issues.     Plan:   [x] off EEG - no events  [] c/w home Onfi 10MG PO BID, Lamotrigine 300MG PO BID, Primidone 250MG PO BID.  [] Zyprexa, Zoloft, and Lexapro discontinued as aunt states she has not given these meds for the past month due to weight gain.  [] Zosyn 3.375g IV q8h began empirically on 7/3/21 in light of fevers - will continue for now.  [x] Psychiatry consulted, recommend Seroquel and Zyprexa PRN for agitation. Will provide these medications as standing doses.  [x] Formal speech and swallow eval: Pureed with nectar thick liquids.  [] Seizure/Fall/Aspiration precautions.  [x] GI consult: CT Abdomen/Pelvis unremarkable. On Protonix 40MG PO daily. Believe pain likely functional/related to eating habits.  [x] PT/OT: FRANCIS - pending callback from  for updates.  [] Diet: Pureed with nectar thick.  [] DVT PPX: Lovenox 40 SC.  [] Seizure Rescue: Ativan 1MG IVP x1 for convulsions/GTC lasting > 3 minutes, Vimpat 200MG IV x1 for convulsions/GTC refractory to Ativan.    Case discussed with epilepsy attending Dr. Lay.

## 2021-07-07 NOTE — DISCHARGE NOTE PROVIDER - NSDCCPCAREPLAN_GEN_ALL_CORE_FT
PRINCIPAL DISCHARGE DIAGNOSIS  Diagnosis: Seizure  Assessment and Plan of Treatment: Follow up with your Primary Care Physician within the next 2-3 days   Follow up with your Neurologist within the next 2 weeks  Bring a copy of your test results with you to your appointment  Continue your current medication regimen  Contact your Neurologist, Primary Care Provider or report to the Emergency Room if you experience new or worsening symptoms       PRINCIPAL DISCHARGE DIAGNOSIS  Diagnosis: At risk for seizures  Assessment and Plan of Treatment: Follow up with your Primary Care Physician within the next 1-2 weeks.   Follow up with your Neurologist within the next 1-2 weeks.  Bring a copy of your test results/discharge documents with you to your appointments.  Continue your medications as prescribed.  Monitor your blood pressure. Reduce fat, cholesterol, and salt in your diet. Increase intake of fruits and vegetables. Limit alcohol to a minimum and do not smoke. You may be at risk for falling, make changes to your home to help you walk easier. Keep up to date on vaccinations.  Contact your Neurologist, Primary Care Provider, or report to the Emergency Room if you experience new or worsening symptoms including sudden severe headache, new numbness/tingling, new weakness, and difficulty speaking.

## 2021-07-07 NOTE — DISCHARGE NOTE PROVIDER - NSDCMRMEDTOKEN_GEN_ALL_CORE_FT
cetirizine 10 mg oral tablet: 1 tab(s) orally once a day in the pm  cloBAZam 10 mg oral tablet: 1 tab(s) orally 2 times a day MDD:2  Debrox 6.5% otic solution: 10 drop(s) to each ear for the first week of every month  diazePAM 5 mg oral tablet: 1 tab(s) orally every 8 hours, As needed, spasm MDD:3  dorzolamide-timolol 2.23%-0.68% ophthalmic solution: 1 drop(s) to both eyes 2 times a day  escitalopram 5 mg oral tablet: 2 tab(s) orally once a day  fluticasone 0.5 mg/2 mL inhalation suspension: 1 spray(s) inhaled once a day  ketoconazole 2% topical cream: Apply topically to affected area 2 times a day. ( scalp , forehead and eyebrows )  lamoTRIgine 100 mg oral tablet, extended release: 3 tab(s) orally 2 times a day MDD:2  latanoprost 0.005% ophthalmic solution: 1 drop(s) to each affected eye once a day (in the evening)  OLANZapine 5 mg oral tablet: 1 tab(s) orally once a day  omeprazole 40 mg oral delayed release capsule: 1 cap(s) orally 2 times a day  Oyster Shell Calcium with Vitamin D 500 mg-200 intl units oral tablet: 1 tab(s) orally 3 times a day  primidone 250 mg oral tablet: 1 tab(s) orally 2 times a day  senna oral tablet: 2 tab(s) orally once a day (at bedtime)  sertraline 25 mg oral tablet: 1 tab(s) orally once a day  Vitamin D3 1000 intl units oral capsule: 1 cap(s) orally once a day in the am   cetirizine 10 mg oral tablet: 1 tab(s) orally once a day in the pm  cloBAZam 10 mg oral tablet: 1 tab(s) orally 2 times a day MDD:2  Debrox 6.5% otic solution: 10 drop(s) to each ear for the first week of every month  diazePAM 5 mg oral tablet: 1 tab(s) orally every 8 hours, As needed, spasm MDD:3  dorzolamide-timolol 2.23%-0.68% ophthalmic solution: 1 drop(s) to both eyes 2 times a day  Flonase 50 mcg/inh nasal spray: 1 spray(s) nasal   fluticasone 0.5 mg/2 mL inhalation suspension: 1 spray(s) inhaled once a day  ketoconazole 2% topical cream: Apply topically to affected area 2 times a day. ( scalp , forehead and eyebrows )  lamoTRIgine 100 mg oral tablet, extended release: 3 tab(s) orally 2 times a day MDD:2  latanoprost 0.005% ophthalmic solution: 1 drop(s) to each affected eye once a day (in the evening)  OLANZapine 5 mg oral tablet: 1 tab(s) orally once a day (at bedtime)  omeprazole 40 mg oral delayed release capsule: 1 cap(s) orally 2 times a day  Oyster Shell Calcium with Vitamin D 500 mg-200 intl units oral tablet: 1 tab(s) orally 3 times a day  primidone 250 mg oral tablet: 1 tab(s) orally 2 times a day  senna oral tablet: 2 tab(s) orally once a day (at bedtime)  SEROquel 25 mg oral tablet: 1 tab(s) orally once a day (at bedtime)  Vitamin D3 1000 intl units oral capsule: 1 cap(s) orally once a day in the am

## 2021-07-08 ENCOUNTER — TRANSCRIPTION ENCOUNTER (OUTPATIENT)
Age: 51
End: 2021-07-08

## 2021-07-08 VITALS
OXYGEN SATURATION: 95 % | TEMPERATURE: 97 F | DIASTOLIC BLOOD PRESSURE: 68 MMHG | RESPIRATION RATE: 19 BRPM | HEART RATE: 114 BPM | SYSTOLIC BLOOD PRESSURE: 112 MMHG

## 2021-07-08 LAB
CULTURE RESULTS: SIGNIFICANT CHANGE UP
CULTURE RESULTS: SIGNIFICANT CHANGE UP
SPECIMEN SOURCE: SIGNIFICANT CHANGE UP
SPECIMEN SOURCE: SIGNIFICANT CHANGE UP

## 2021-07-08 PROCEDURE — 83735 ASSAY OF MAGNESIUM: CPT

## 2021-07-08 PROCEDURE — 81001 URINALYSIS AUTO W/SCOPE: CPT

## 2021-07-08 PROCEDURE — 82607 VITAMIN B-12: CPT

## 2021-07-08 PROCEDURE — 82746 ASSAY OF FOLIC ACID SERUM: CPT

## 2021-07-08 PROCEDURE — 85027 COMPLETE CBC AUTOMATED: CPT

## 2021-07-08 PROCEDURE — 80053 COMPREHEN METABOLIC PANEL: CPT

## 2021-07-08 PROCEDURE — 82962 GLUCOSE BLOOD TEST: CPT

## 2021-07-08 PROCEDURE — 84100 ASSAY OF PHOSPHORUS: CPT

## 2021-07-08 PROCEDURE — 86769 SARS-COV-2 COVID-19 ANTIBODY: CPT

## 2021-07-08 PROCEDURE — 80048 BASIC METABOLIC PNL TOTAL CA: CPT

## 2021-07-08 PROCEDURE — 92526 ORAL FUNCTION THERAPY: CPT

## 2021-07-08 PROCEDURE — 97535 SELF CARE MNGMENT TRAINING: CPT

## 2021-07-08 PROCEDURE — 95715 VEEG EA 12-26HR INTMT MNTR: CPT

## 2021-07-08 PROCEDURE — 87040 BLOOD CULTURE FOR BACTERIA: CPT

## 2021-07-08 PROCEDURE — 95700 EEG CONT REC W/VID EEG TECH: CPT

## 2021-07-08 PROCEDURE — 97116 GAIT TRAINING THERAPY: CPT

## 2021-07-08 PROCEDURE — 74177 CT ABD & PELVIS W/CONTRAST: CPT

## 2021-07-08 PROCEDURE — 84443 ASSAY THYROID STIM HORMONE: CPT

## 2021-07-08 PROCEDURE — 97110 THERAPEUTIC EXERCISES: CPT

## 2021-07-08 PROCEDURE — 97165 OT EVAL LOW COMPLEX 30 MIN: CPT

## 2021-07-08 PROCEDURE — 71045 X-RAY EXAM CHEST 1 VIEW: CPT

## 2021-07-08 PROCEDURE — 94640 AIRWAY INHALATION TREATMENT: CPT

## 2021-07-08 PROCEDURE — 97162 PT EVAL MOD COMPLEX 30 MIN: CPT

## 2021-07-08 PROCEDURE — 85025 COMPLETE CBC W/AUTO DIFF WBC: CPT

## 2021-07-08 PROCEDURE — 92610 EVALUATE SWALLOWING FUNCTION: CPT

## 2021-07-08 RX ORDER — ESCITALOPRAM OXALATE 10 MG/1
2 TABLET, FILM COATED ORAL
Qty: 0 | Refills: 0 | DISCHARGE

## 2021-07-08 RX ORDER — QUETIAPINE FUMARATE 200 MG/1
1 TABLET, FILM COATED ORAL
Qty: 0 | Refills: 0 | DISCHARGE
Start: 2021-07-08

## 2021-07-08 RX ORDER — FLUTICASONE PROPIONATE 50 MCG
1 SPRAY, SUSPENSION NASAL
Qty: 0 | Refills: 0 | DISCHARGE
Start: 2021-07-08

## 2021-07-08 RX ORDER — OLANZAPINE 15 MG/1
1 TABLET, FILM COATED ORAL
Qty: 0 | Refills: 0 | DISCHARGE

## 2021-07-08 RX ORDER — OLANZAPINE 15 MG/1
1 TABLET, FILM COATED ORAL
Qty: 0 | Refills: 0 | DISCHARGE
Start: 2021-07-08

## 2021-07-08 RX ORDER — SERTRALINE 25 MG/1
1 TABLET, FILM COATED ORAL
Qty: 0 | Refills: 0 | DISCHARGE

## 2021-07-08 RX ADMIN — Medication 1 TABLET(S): at 12:38

## 2021-07-08 RX ADMIN — LAMOTRIGINE 300 MILLIGRAM(S): 25 TABLET, ORALLY DISINTEGRATING ORAL at 05:02

## 2021-07-08 RX ADMIN — PIPERACILLIN AND TAZOBACTAM 25 GRAM(S): 4; .5 INJECTION, POWDER, LYOPHILIZED, FOR SOLUTION INTRAVENOUS at 12:37

## 2021-07-08 RX ADMIN — Medication 1 SPRAY(S): at 12:37

## 2021-07-08 RX ADMIN — ENOXAPARIN SODIUM 40 MILLIGRAM(S): 100 INJECTION SUBCUTANEOUS at 12:38

## 2021-07-08 RX ADMIN — Medication 1000 UNIT(S): at 12:38

## 2021-07-08 RX ADMIN — PRIMIDONE 250 MILLIGRAM(S): 250 TABLET ORAL at 05:03

## 2021-07-08 RX ADMIN — Medication 1 TABLET(S): at 05:03

## 2021-07-08 RX ADMIN — LORATADINE 10 MILLIGRAM(S): 10 TABLET ORAL at 12:38

## 2021-07-08 RX ADMIN — CLOBAZAM 10 MILLIGRAM(S): 10 TABLET ORAL at 05:03

## 2021-07-08 RX ADMIN — PIPERACILLIN AND TAZOBACTAM 25 GRAM(S): 4; .5 INJECTION, POWDER, LYOPHILIZED, FOR SOLUTION INTRAVENOUS at 02:37

## 2021-07-08 RX ADMIN — PANTOPRAZOLE SODIUM 40 MILLIGRAM(S): 20 TABLET, DELAYED RELEASE ORAL at 12:39

## 2021-07-08 RX ADMIN — DORZOLAMIDE HYDROCHLORIDE TIMOLOL MALEATE 1 DROP(S): 20; 5 SOLUTION/ DROPS OPHTHALMIC at 05:03

## 2021-07-08 NOTE — PROGRESS NOTE ADULT - ASSESSMENT
Assessment: 49 yo male with a PMHx of complex seizures (since age 9), hydrocephalus (s/p  shunt on R side), chronic R sided headaches (s/p expansion craniotomy), mild intellectual disability, b/l hearing loss, and b/l blindness presenting with onset of staring spells and increased behavioral issues at home over the past 4 months. Staring spells described as "smiling widely" or "licking his lips" and he would be completely unresponsive during these spells. Concern for possible absence seizures vs behavioral issues.     Plan:   [x] off EEG - no events  [] c/w home Onfi 10MG PO BID, Lamotrigine 300MG PO BID, Primidone 250MG PO BID.  [] Zyprexa, Zoloft, and Lexapro discontinued as aunt states she has not given these meds for the past month due to weight gain.  [] Zosyn 3.375g IV q8h began empirically on 7/3/21 in light of fevers - will continue for now.  [x] Psychiatry consulted, recommend Seroquel and Zyprexa PRN for agitation. Will provide these medications as standing doses.  [x] Formal speech and swallow eval: Pureed with nectar thick liquids.  [] Seizure/Fall/Aspiration precautions.  [x] GI consult: CT Abdomen/Pelvis unremarkable. On Protonix 40MG PO daily. Believe pain likely functional/related to eating habits.  [x] PT/OT: [facility]  [] Diet: Pureed with nectar thick.  [] DVT PPX: Lovenox 40 SC.  [] Dispo: to [facility] on Friday 07/09.  [] Seizure Rescue: Ativan 1MG IVP x1 for convulsions/GTC lasting > 3 minutes, Vimpat 200MG IV x1 for convulsions/GTC refractory to Ativan.    Case discussed with epilepsy attending Dr. Lay. Assessment: 51 yo male with a PMHx of complex seizures (since age 9), hydrocephalus (s/p  shunt on R side), chronic R sided headaches (s/p expansion craniotomy), mild intellectual disability, b/l hearing loss, and b/l blindness presenting with onset of staring spells and increased behavioral issues at home over the past 4 months. Staring spells described as "smiling widely" or "licking his lips" and he would be completely unresponsive during these spells. Concern for possible absence seizures vs behavioral issues.     Plan:   [x] off EEG - no events  [] c/w home Onfi 10MG PO BID, Lamotrigine 300MG PO BID, Primidone 250MG PO BID.  [] Home Zyprexa, Zoloft, and Lexapro initially discontinued as aunt states she has not given these meds for the past month due to weight gain.  [] Zosyn 3.375g IV q8h began empirically on 7/3/21 in light of fevers - will continue for now.  [x] Psychiatry consulted, recommend Seroquel and Zyprexa PRN for agitation. Will provide these medications as standing doses.  [x] Formal speech and swallow eval: Pureed with nectar thick liquids.  [] Seizure/Fall/Aspiration precautions.  [x] GI consult: CT Abdomen/Pelvis unremarkable. On Protonix 40MG PO daily. Believe pain likely functional/related to eating habits.  [x] PT/OT: [facility]  [] Diet: Pureed with nectar thick.  [] DVT PPX: Lovenox 40 SC.  [] Dispo: to [facility] today.  [] Seizure Rescue: Ativan 1MG IVP x1 for convulsions/GTC lasting > 3 minutes, Vimpat 200MG IV x1 for convulsions/GTC refractory to Ativan.    Case discussed with epilepsy attending Dr. Lay.

## 2021-07-08 NOTE — PROGRESS NOTE ADULT - NSICDXPILOT_GEN_ALL_CORE
[place]

## 2021-07-08 NOTE — PROGRESS NOTE ADULT - SUBJECTIVE AND OBJECTIVE BOX
[id number]    Subjective:    PAST MEDICAL & SURGICAL HISTORY:  Hydrocephalus  age 2 months    Legally blind    Glaucoma    Sleep apnea  not on CPAP    Epilepsy    Osteoporosis    Elevated liver enzymes    Environmental Allergies    Impulse control disorder  worsened when pt on Depakote, pt now off    Mild mental retardation    Hiatal hernia    Aspiration pneumonia  at age 25    Viral pneumonia  h/o    BPH without obstruction/lower urinary tract symptoms    Glaucoma  on eye gtts    S/P  Shunt  originally had shunt to lung, then revised to peritoneal shunt, has had multiple revisions    Epilepsy  s/p Vagal Nerve Stimulator Implant , 2007, last one in 2016    H/O craniotomy  at age 18    Heel cord contracture  s/p surgical correction b/l    History of tonsillectomy    S/p bilateral myringotomy with tube placement    Obstructed  shunt  revision of  shunt 2015    FAMILY HISTORY:  Family history of stroke (Mother)    Social Hx:  Nonsmoker, no drug or alcohol use    Home Medications:  cetirizine 10 mg oral tablet: 1 tab(s) orally once a day in the pm (12 Nov 2020 05:57)  Debrox 6.5% otic solution: 10 drop(s) to each ear for the first week of every month (12 Nov 2020 05:57)  dorzolamide-timolol 2.23%-0.68% ophthalmic solution: 1 drop(s) to both eyes 2 times a day (12 Nov 2020 05:57)  escitalopram 5 mg oral tablet: 2 tab(s) orally once a day (29 Jun 2021 18:13)  fluticasone 0.5 mg/2 mL inhalation suspension: 1 spray(s) inhaled once a day (12 Nov 2020 05:57)  ketoconazole 2% topical cream: Apply topically to affected area 2 times a day. ( scalp , forehead and eyebrows ) (12 Nov 2020 05:57)  latanoprost 0.005% ophthalmic solution: 1 drop(s) to each affected eye once a day (in the evening) (29 Jun 2021 18:13)  OLANZapine 5 mg oral tablet: 1 tab(s) orally once a day (29 Jun 2021 18:13)  omeprazole 40 mg oral delayed release capsule: 1 cap(s) orally 2 times a day (12 Nov 2020 05:57)  Oyster Shell Calcium with Vitamin D 500 mg-200 intl units oral tablet: 1 tab(s) orally 3 times a day (12 Nov 2020 05:57)  primidone 250 mg oral tablet: 1 tab(s) orally 2 times a day (29 Jun 2021 18:50)  senna oral tablet: 2 tab(s) orally once a day (at bedtime) (17 Nov 2020 10:46)  sertraline 25 mg oral tablet: 1 tab(s) orally once a day (29 Jun 2021 18:12)  Vitamin D3 1000 intl units oral capsule: 1 cap(s) orally once a day in the am (12 Nov 2020 05:57)    MEDICATIONS  (STANDING):  calcium carbonate 1250 mG  + Vitamin D (OsCal 500 + D) 1 Tablet(s) Oral three times a day  cholecalciferol 1000 Unit(s) Oral daily  cloBAZam 10 milliGRAM(s) Oral two times a day  dorzolamide 2%/timolol 0.5% Ophthalmic Solution 1 Drop(s) Both EYES two times a day  enoxaparin Injectable 40 milliGRAM(s) SubCutaneous daily  fluticasone propionate 50 MICROgram(s)/spray Nasal Spray 1 Spray(s) Both Nostrils <User Schedule>  lamoTRIgine 300 milliGRAM(s) Oral two times a day  latanoprost 0.005% Ophthalmic Solution 1 Drop(s) Both EYES at bedtime  loratadine 10 milliGRAM(s) Oral daily  OLANZapine 5 milliGRAM(s) Oral at bedtime  pantoprazole   Suspension 40 milliGRAM(s) Oral daily  piperacillin/tazobactam IVPB.. 3.375 Gram(s) IV Intermittent every 8 hours  primidone 250 milliGRAM(s) Oral two times a day  QUEtiapine 25 milliGRAM(s) Oral at bedtime  senna 2 Tablet(s) Oral at bedtime  sodium chloride 0.9%. 1000 milliLiter(s) (75 mL/Hr) IV Continuous <Continuous>    MEDICATIONS  (PRN):  acetaminophen   Tablet .. 650 milliGRAM(s) Oral every 6 hours PRN Temp greater or equal to 38C (100.4F), Mild Pain (1 - 3), Moderate Pain (4 - 6)  diazepam    Tablet 5 milliGRAM(s) Oral every 8 hours PRN spasm  guaifenesin/dextromethorphan Oral Liquid 5 milliLiter(s) Oral every 6 hours PRN Cough  lacosamide Injectable 200 milliGRAM(s) IV Push once PRN seizure refractory to ativan  LORazepam   Injectable 1 milliGRAM(s) IV Push once PRN seizure    Allergies  codeine (Stomach Upset)  Depakote (Other)  seasonal allergies: nasal congestion (Other)	    ROS: Pertinent positives above, all other ROS were reviewed and are negative.      Vital Signs Last 24 Hrs  T(C): 36.9 (08 Jul 2021 08:45), Max: 37 (07 Jul 2021 23:21)  T(F): 98.4 (08 Jul 2021 08:45), Max: 98.6 (07 Jul 2021 23:21)  HR: 103 (08 Jul 2021 08:45) (103 - 115)  BP: 115/77 (08 Jul 2021 08:45) (108/75 - 128/85)  BP(mean): 20 (08 Jul 2021 08:45) (20 - 20)  RR: 20 (08 Jul 2021 08:45) (18 - 20)  SpO2: 93% (08 Jul 2021 08:45) (93% - 95%)    GENERAL EXAM:  Constitutional: Sitting in chair at bedside. NAD.  HEENT: Atraumatic.    NEUROLOGICAL EXAM:  MS: Alert, oriented to self and location. Speech is fluent, not slurred. Follows simple commands.  CN: Bilateral vision loss at baseline worse in L eye, sees some shadows out of his R eye. Eyes disconjugate at primary gaze. B/l horizontal nystagmus on primary gaze, unable to assess EOM. No facial asymmetry.   Motor: SORIANO antigravity.  Coordination/Gait: Unable to assess.    Labs:   cbc                      13.0   5.17  )-----------( 314      ( 07 Jul 2021 06:21 )             39.9     [id number]    138  |  102  |  11  ----------------------------<  87  4.2   |  23  |  0.99    Ca    9.2      07 Jul 2021 06:21  Phos  3.9     07-07  Mg     2.0     07-07    TPro  7.1  /  Alb  3.8  /  TBili  0.2  /  DBili  x   /  AST  17  /  ALT  26  /  AlkPhos  132<H>  07-07    LIVER FUNCTIONS - ( 07 Jul 2021 06:21 )  Alb: 3.8 g/dL / Pro: 7.1 g/dL / ALK PHOS: 132 U/L / ALT: 26 U/L / AST: 17 U/L / GGT: x           Radiology/EEGs:  -06/30 EEG Summary:  Abnormal EEG in the awake, drowsy and asleep states.  - Asymmetry, attenuation of voltage over the right hemisphere  Impression/Clinical Correlate:  There is an asymmetry with an attenuation of activity over the right hemisphere.  This may be due to either cortical dysfunction in the right hemisphere, or a skull defect on the left.  No epileptiform abnormalities or seizures were recorded.    -07/01 EEG Summary:  Abnormal EEG in the awake, drowsy and asleep states.  - Asymmetry, attenuation of voltage over the right hemisphere  Impression/Clinical Correlate:  2.	There is an asymmetry with an attenuation of activity over the right hemisphere.  This may be due to either cortical dysfunction in the right hemisphere, or a skull defect on the left.  No epileptiform abnormalities or seizures were recorded. [id number]    Subjective: 49 yo male seen and examined at bedside. No events overnight, no complaints. To be discharged to subacute rehab today.    PAST MEDICAL & SURGICAL HISTORY:  Hydrocephalus  age 2 months    Legally blind    Glaucoma    Sleep apnea  not on CPAP    Epilepsy    Osteoporosis    Elevated liver enzymes    Environmental Allergies    Impulse control disorder  worsened when pt on Depakote, pt now off    Mild mental retardation    Hiatal hernia    Aspiration pneumonia  at age 25    Viral pneumonia  h/o    BPH without obstruction/lower urinary tract symptoms    Glaucoma  on eye gtts    S/P  Shunt  originally had shunt to lung, then revised to peritoneal shunt, has had multiple revisions    Epilepsy  s/p Vagal Nerve Stimulator Implant , 2007, last one in 2016    H/O craniotomy  at age 18    Heel cord contracture  s/p surgical correction b/l    History of tonsillectomy    S/p bilateral myringotomy with tube placement    Obstructed  shunt  revision of  shunt 2015    FAMILY HISTORY:  Family history of stroke (Mother)    Social Hx:  Nonsmoker, no drug or alcohol use    Home Medications:  cetirizine 10 mg oral tablet: 1 tab(s) orally once a day in the pm (12 Nov 2020 05:57)  Debrox 6.5% otic solution: 10 drop(s) to each ear for the first week of every month (12 Nov 2020 05:57)  dorzolamide-timolol 2.23%-0.68% ophthalmic solution: 1 drop(s) to both eyes 2 times a day (12 Nov 2020 05:57)  escitalopram 5 mg oral tablet: 2 tab(s) orally once a day (29 Jun 2021 18:13)  fluticasone 0.5 mg/2 mL inhalation suspension: 1 spray(s) inhaled once a day (12 Nov 2020 05:57)  ketoconazole 2% topical cream: Apply topically to affected area 2 times a day. ( scalp , forehead and eyebrows ) (12 Nov 2020 05:57)  latanoprost 0.005% ophthalmic solution: 1 drop(s) to each affected eye once a day (in the evening) (29 Jun 2021 18:13)  OLANZapine 5 mg oral tablet: 1 tab(s) orally once a day (29 Jun 2021 18:13)  omeprazole 40 mg oral delayed release capsule: 1 cap(s) orally 2 times a day (12 Nov 2020 05:57)  Oyster Shell Calcium with Vitamin D 500 mg-200 intl units oral tablet: 1 tab(s) orally 3 times a day (12 Nov 2020 05:57)  primidone 250 mg oral tablet: 1 tab(s) orally 2 times a day (29 Jun 2021 18:50)  senna oral tablet: 2 tab(s) orally once a day (at bedtime) (17 Nov 2020 10:46)  sertraline 25 mg oral tablet: 1 tab(s) orally once a day (29 Jun 2021 18:12)  Vitamin D3 1000 intl units oral capsule: 1 cap(s) orally once a day in the am (12 Nov 2020 05:57)    MEDICATIONS  (STANDING):  calcium carbonate 1250 mG  + Vitamin D (OsCal 500 + D) 1 Tablet(s) Oral three times a day  cholecalciferol 1000 Unit(s) Oral daily  cloBAZam 10 milliGRAM(s) Oral two times a day  dorzolamide 2%/timolol 0.5% Ophthalmic Solution 1 Drop(s) Both EYES two times a day  enoxaparin Injectable 40 milliGRAM(s) SubCutaneous daily  fluticasone propionate 50 MICROgram(s)/spray Nasal Spray 1 Spray(s) Both Nostrils <User Schedule>  lamoTRIgine 300 milliGRAM(s) Oral two times a day  latanoprost 0.005% Ophthalmic Solution 1 Drop(s) Both EYES at bedtime  loratadine 10 milliGRAM(s) Oral daily  OLANZapine 5 milliGRAM(s) Oral at bedtime  pantoprazole   Suspension 40 milliGRAM(s) Oral daily  piperacillin/tazobactam IVPB.. 3.375 Gram(s) IV Intermittent every 8 hours  primidone 250 milliGRAM(s) Oral two times a day  QUEtiapine 25 milliGRAM(s) Oral at bedtime  senna 2 Tablet(s) Oral at bedtime  sodium chloride 0.9%. 1000 milliLiter(s) (75 mL/Hr) IV Continuous <Continuous>    MEDICATIONS  (PRN):  acetaminophen   Tablet .. 650 milliGRAM(s) Oral every 6 hours PRN Temp greater or equal to 38C (100.4F), Mild Pain (1 - 3), Moderate Pain (4 - 6)  diazepam    Tablet 5 milliGRAM(s) Oral every 8 hours PRN spasm  guaifenesin/dextromethorphan Oral Liquid 5 milliLiter(s) Oral every 6 hours PRN Cough  lacosamide Injectable 200 milliGRAM(s) IV Push once PRN seizure refractory to ativan  LORazepam   Injectable 1 milliGRAM(s) IV Push once PRN seizure    Allergies  codeine (Stomach Upset)  Depakote (Other)  seasonal allergies: nasal congestion (Other)	    ROS: Pertinent positives above, all other ROS were reviewed and are negative.      Vital Signs Last 24 Hrs  T(C): 36.9 (08 Jul 2021 08:45), Max: 37 (07 Jul 2021 23:21)  T(F): 98.4 (08 Jul 2021 08:45), Max: 98.6 (07 Jul 2021 23:21)  HR: 103 (08 Jul 2021 08:45) (103 - 115)  BP: 115/77 (08 Jul 2021 08:45) (108/75 - 128/85)  BP(mean): 20 (08 Jul 2021 08:45) (20 - 20)  RR: 20 (08 Jul 2021 08:45) (18 - 20)  SpO2: 93% (08 Jul 2021 08:45) (93% - 95%)    GENERAL EXAM:  Constitutional: Lying in bed. NAD.  HEENT: Atraumatic.    NEUROLOGICAL EXAM:  MS: Alert, oriented to self and location. Speech is fluent, not slurred. Follows simple commands.  CN: Bilateral vision loss at baseline worse in L eye, sees some shadows out of his R eye. Eyes disconjugate at primary gaze. B/l horizontal nystagmus on primary gaze, unable to assess EOM. No facial asymmetry.   Motor: SORIANO antigravity.  Coordination/Gait: Not assessed.    Labs:   cbc                      13.0   5.17  )-----------( 314      ( 07 Jul 2021 06:21 )             39.9     [id number]    138  |  102  |  11  ----------------------------<  87  4.2   |  23  |  0.99    Ca    9.2      07 Jul 2021 06:21  Phos  3.9     07-07  Mg     2.0     07-07    TPro  7.1  /  Alb  3.8  /  TBili  0.2  /  DBili  x   /  AST  17  /  ALT  26  /  AlkPhos  132<H>  07-07    LIVER FUNCTIONS - ( 07 Jul 2021 06:21 )  Alb: 3.8 g/dL / Pro: 7.1 g/dL / ALK PHOS: 132 U/L / ALT: 26 U/L / AST: 17 U/L / GGT: x           Radiology/EEGs:  -06/30 EEG Summary:  Abnormal EEG in the awake, drowsy and asleep states.  - Asymmetry, attenuation of voltage over the right hemisphere  Impression/Clinical Correlate:  There is an asymmetry with an attenuation of activity over the right hemisphere.  This may be due to either cortical dysfunction in the right hemisphere, or a skull defect on the left.  No epileptiform abnormalities or seizures were recorded.    -07/01 EEG Summary:  Abnormal EEG in the awake, drowsy and asleep states.  - Asymmetry, attenuation of voltage over the right hemisphere  Impression/Clinical Correlate:  2.	There is an asymmetry with an attenuation of activity over the right hemisphere.  This may be due to either cortical dysfunction in the right hemisphere, or a skull defect on the left.  No epileptiform abnormalities or seizures were recorded.

## 2021-07-08 NOTE — DISCHARGE NOTE NURSING/CASE MANAGEMENT/SOCIAL WORK - PATIENT PORTAL LINK FT
You can access the FollowMyHealth Patient Portal offered by [facility] by registering at the following website: [email or web address]. By joining [facility]’s FollowMyHealth portal, you will also be able to view your health information using other applications (apps) compatible with our system.

## 2021-07-08 NOTE — PROGRESS NOTE ADULT - REASON FOR ADMISSION
Staring spells and aggressive behavioral changes

## 2021-07-08 NOTE — PROGRESS NOTE ADULT - PROVIDER SPECIALTY LIST ADULT
Neurology
Gastroenterology
Gastroenterology
Neurology
Gastroenterology
Neurology

## 2021-07-08 NOTE — PROGRESS NOTE ADULT - SUBJECTIVE AND OBJECTIVE BOX
Chief Complaint:  Patient is a 50y old  Male who presents with a chief complaint of Staring spells and aggressive behavioral changes (01 Jul 2021 09:23)      Date of service 07-08-21 @ 21:33      Interval Events: no abd pain    Hospital Medications:  acetaminophen   Tablet .. 650 milliGRAM(s) Oral every 6 hours PRN  calcium carbonate 1250 mG  + Vitamin D (OsCal 500 + D) 1 Tablet(s) Oral three times a day  cholecalciferol 1000 Unit(s) Oral daily  cloBAZam 10 milliGRAM(s) Oral two times a day  diazepam    Tablet 5 milliGRAM(s) Oral every 8 hours PRN  dorzolamide 2%/timolol 0.5% Ophthalmic Solution 1 Drop(s) Both EYES two times a day  enoxaparin Injectable 40 milliGRAM(s) SubCutaneous daily  fluticasone propionate 50 MICROgram(s)/spray Nasal Spray 1 Spray(s) Both Nostrils <User Schedule>  lacosamide Injectable 200 milliGRAM(s) IV Push once PRN  lamoTRIgine 300 milliGRAM(s) Oral two times a day  latanoprost 0.005% Ophthalmic Solution 1 Drop(s) Both EYES at bedtime  loratadine 10 milliGRAM(s) Oral daily  LORazepam   Injectable 1 milliGRAM(s) IV Push once PRN  nystatin Ointment 1 Application(s) Topical two times a day  OLANZapine 2.5 milliGRAM(s) Oral every 6 hours  pantoprazole  Injectable 40 milliGRAM(s) IV Push daily  primidone 250 milliGRAM(s) Oral two times a day  QUEtiapine 25 milliGRAM(s) Oral every 6 hours  senna 2 Tablet(s) Oral at bedtime        Review of Systems:  General:  No wt loss, fevers, chills, night sweats, fatigue,   Eyes:  Good vision, no reported pain  ENT:  No sore throat, pain, runny nose, dysphagia  CV:  No pain, palpitations, hypo/hypertension  Resp:  No dyspnea, cough, tachypnea, wheezing  GI:  See HPI  :  No pain, bleeding, incontinence, nocturia  Muscle:  No pain, weakness  Neuro:  No weakness, tingling, memory problems  Psych:  No fatigue, insomnia, mood problems, depression  Endocrine:  No polyuria, polydipsia, cold/heat intolerance  Heme:  No petechiae, ecchymosis, easy bruisability  Integumentary:  No rash, edema    PHYSICAL EXAM:   Vital Signs:  Vital Signs Last 24 Hrs  T(C): 36.9 (01 Jul 2021 19:17), Max: 37.1 (30 Jun 2021 23:27)  T(F): 98.4 (01 Jul 2021 19:17), Max: 98.8 (30 Jun 2021 23:27)  HR: 110 (01 Jul 2021 19:17) (98 - 111)  BP: 111/79 (01 Jul 2021 19:17) (105/77 - 127/86)  BP(mean): --  RR: 18 (01 Jul 2021 19:17) (18 - 18)  SpO2: 95% (01 Jul 2021 19:17) (94% - 97%)  Daily     Daily       PHYSICAL EXAM:     GENERAL:  Appears stated age, well-groomed, well-nourished, no distress  HEENT:  NC/AT,  conjunctivae anicteric, clear and pink,   NECK: supple, trachea midline  CHEST:  Full & symmetric excursion, no increased effort, breath sounds clear  HEART:  Regular rhythm, no JVD  ABDOMEN:  Soft, non-tender, non-distended, normoactive bowel sounds,  no masses , no hepatosplenomegaly  EXTREMITIES:  no cyanosis,clubbing or edema  SKIN:  No rash, erythema, or, ecchymoses, no jaundice  NEURO:  Alert, non-focal, no asterixis  PSYCH: Appropriate affect, oriented to place and time  RECTAL: Deferred      LABS Personally reviewed by me:                        15.3   6.96  )-----------( 322      ( 01 Jul 2021 06:00 )             46.6     Mean Cell Volume: 92.6 fl (07-01-21 @ 06:00)    07-01    136  |  98  |  13  ----------------------------<  87  4.1   |  22  |  0.94    Ca    9.8      01 Jul 2021 06:00    TPro  7.8  /  Alb  4.5  /  TBili  0.5  /  DBili  x   /  AST  14  /  ALT  24  /  AlkPhos  199<H>  07-01    LIVER FUNCTIONS - ( 01 Jul 2021 06:00 )  Alb: 4.5 g/dL / Pro: 7.8 g/dL / ALK PHOS: 199 U/L / ALT: 24 U/L / AST: 14 U/L / GGT: x                                       15.3   6.96  )-----------( 322      ( 01 Jul 2021 06:00 )             46.6                         14.3   5.71  )-----------( 291      ( 30 Jun 2021 06:37 )             42.3                         14.4   5.85  )-----------( 324      ( 29 Jun 2021 22:21 )             44.7       Imaging personally reviewed by me:

## 2021-07-16 ENCOUNTER — NON-APPOINTMENT (OUTPATIENT)
Age: 51
End: 2021-07-16

## 2021-08-03 ENCOUNTER — APPOINTMENT (OUTPATIENT)
Dept: NEUROLOGY | Facility: CLINIC | Age: 51
End: 2021-08-03

## 2021-08-04 ENCOUNTER — OUTPATIENT (OUTPATIENT)
Dept: OUTPATIENT SERVICES | Facility: HOSPITAL | Age: 51
LOS: 1 days | End: 2021-08-04
Payer: MEDICARE

## 2021-08-04 ENCOUNTER — APPOINTMENT (OUTPATIENT)
Dept: CT IMAGING | Facility: CLINIC | Age: 51
End: 2021-08-04
Payer: MEDICARE

## 2021-08-04 DIAGNOSIS — T85.09XA OTHER MECHANICAL COMPLICATION OF VENTRICULAR INTRACRANIAL (COMMUNICATING) SHUNT, INITIAL ENCOUNTER: Chronic | ICD-10-CM

## 2021-08-04 DIAGNOSIS — Z96.22 MYRINGOTOMY TUBE(S) STATUS: Chronic | ICD-10-CM

## 2021-08-04 DIAGNOSIS — Z00.8 ENCOUNTER FOR OTHER GENERAL EXAMINATION: ICD-10-CM

## 2021-08-04 PROCEDURE — G1004: CPT

## 2021-08-04 PROCEDURE — 70470 CT HEAD/BRAIN W/O & W/DYE: CPT | Mod: 26,ME

## 2021-08-04 PROCEDURE — 70470 CT HEAD/BRAIN W/O & W/DYE: CPT | Mod: ME

## 2021-08-20 ENCOUNTER — APPOINTMENT (OUTPATIENT)
Dept: NEUROLOGY | Facility: CLINIC | Age: 51
End: 2021-08-20

## 2021-09-24 ENCOUNTER — APPOINTMENT (OUTPATIENT)
Dept: NEUROLOGY | Facility: CLINIC | Age: 51
End: 2021-09-24
Payer: MEDICARE

## 2021-09-24 VITALS — HEART RATE: 118 BPM | DIASTOLIC BLOOD PRESSURE: 86 MMHG | HEIGHT: 65 IN | SYSTOLIC BLOOD PRESSURE: 121 MMHG

## 2021-09-24 PROCEDURE — 99215 OFFICE O/P EST HI 40 MIN: CPT

## 2021-09-24 NOTE — HISTORY OF PRESENT ILLNESS
[FreeTextEntry1] : ***UPDATE:9/24/21***\par Mr Andi Ferrara is accompanied by his home care worker - Aunt not here today.\par EMU with no seizures - GI was consulted.\par Had surgery for his cervical stenosis and much improved.  Mild seizures shortly after surgery, but doing well.\par \par \par ***UPDATE:6/21/21***\par Mr Andi Ferrara is accompanied by his legal guardian Ms Melissa Morleyabdirashid\par Patient has been having LUQ pain for past few months.  Awaiting GI consultation at this point.\par Aunt notes has been having episode a few times per week with staring upwards for several minutes - no other seizure activity noted.\par Also, worsening of behavior overall.\par Had surgery for his cervical stenosis and much improved.  Mild seizures shortly after surgery, but doing well.\par \par ***UPDATE:11/24/20***\par Mr Andi Ferrara is accompanied by his legal guardian Ms Melissa Morleyabdirashid\par He is doing well overall.\par Had surgery for his cervical stenosis and much improved.  Mild seizures shortly after surgery, but doing well.\par \par ***UPDATE:7/31/20***\par Mr Andi Ferrara is accompanied by his legal guardian Ms Melissa Morleyabdirashid\par He has no reported interval seizures . He is doing well overall.\par \par Patient now with intermittent weakness when walking.  However, had bug bite to right ankle in the fall and has been walking less.  No issues bowel/bladder but mild ataxia.\par \par ***UPDATE:2/3/20***\par Mr Andi Ferrara is accompanied by his legal guardian Ms Melissa Morleyabdirashid\par He has no reported interval seizures . he had a fall on 1/25 but did not hit his head. It was noted that he had a bug bite from end of September on right ankle which is now swollen and red\par \par LTG 300mg BID\par Onfi 5mg BID\par Primidone 250mg BID\par \par Last office visit:\par  \par Possible small seizure a few weeks ago where was a little confused at bowling.  Had CT head which showed no change.\par \par On LTG 300mg bid and Primidone bid. Onfi 5mg bid.\par \par Seeing psych who started Lexapro 10 daily and Zyprexa\par \par Mr. 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  He comes accompanied by his aunt and uncle (legal guardians) and the .\par \par The seizures started shortly after birth with unclear details.  He was in the ICU for some time and found to have hydrocephalus.  At 16 months of age, his shunt was found to be malfunctioning and a new shunt was placed.  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  In 2013, the shut malfunctioned once again with further visual loss, now with only vision to light in the right eye and no vision in the left eye.\par \par The seizures at a younger age are unclear in semiology.  However, for the past several decades his typical seizures have been pulling at his clothes for seconds to minutes with confusion thereafter.  He gets confused with these events with behavioral arrest. \par the exact frequency is unknown (perhaps several times a month).  He does not have convulsions.\par \par In addition to the seizures above, he has been having behavioral outbursts at his home.  He gets mad at times or very tired at times.  It has been unclear if these are seizures.  Also episodes of head pain as well.  This has been followed by his psychiatrist.\par \par The patient has been tried on several AEDS (PHT, Vimpat, VPA, PHB, CBZ, LEV, Banzel, TPM) and has not been treated with these meds.  He is currently on Primidone 250 bidand Lamictal 400mg bid.  He was tried on Onfi 5mg bid in December 2015  and did well, but this was stopped because he ran out.  This was restarted in February 2016 at 5mg bid.\par \par The patient had a VNS placed several years ago at "maximum output" and [place] [facility] [place]  It was stated that would be difficult to do any further surgery on him.\par \par CT scans in the past have shown right parietal encephalomalacia.  EEG studies have shown (AEEG and VEEG) bilateral temporal frontal discharges (R>L) with one seizure caught on the right in the past.\par \par He has been followed by Dr. Phillips and at [place] in the past.  VEEG testing July 2016 showed no seizures and no changes to meds.  Also showed episodes of headache and pulling at shirt with no changes.  Still with headaches at this time with intermittent abdominal pain with no known etiology.\par \par Several seizures with ZNS increased to 100 bid earlier in the year.  Worsening of behavior with ZNS and no improvement.  We stopped ZNS and still with behavioral problems.\par \par VNS was shut off/\par \par PMHx as above\par FHx No seizures\par Meds/All: see below\par

## 2021-09-24 NOTE — DISCUSSION/SUMMARY
[FreeTextEntry1] : 51 year old man with history developmental delay and epilepsy with shunt placement and VNS placement.\par \par Had long discussion about options.  \par \par The events in the hospital (shirt pulling, headache, etc) were not associated with seizure activity.  His behavioral episodes also unlikely 2/2 seizure activity.  As for headache, no change in CT and not severe.  Not complaining of pain at this time.\par \par Episodes of starting upward could be behavioral with negative EMU stay. \par \par Plan;\par Continue current AED regimen\par -reviewed seizure triggers\par - annual labwork including AED levels next visit.\par - psych (in [facility]) for behavior\par \par \par Total time 40 min.\par Patient seen 40 min face to face with >50% in counseling and discussion.\par \par  [Medically Refractory (seizure within the last year)] : Medically Refractory (seizure within the last year) [Inpatient video EEG study ordered with length of stay anticipated in days: _____] : Inpatient video EEG study ordered with length of stay anticipated in days: [unfilled] [Event capture (for localization, differential diagnosis) (typically 3-5 days)] : Event capture (for localization, differential diagnosis) (typically 3-5 days)

## 2021-09-24 NOTE — PHYSICAL EXAM
[General Appearance - Alert] : alert [General Appearance - In No Acute Distress] : in no acute distress [General Appearance - Well Nourished] : well nourished [Person] : oriented to person [Time] : oriented to time [Fluency] : fluency intact [Cranial Nerves Optic (II)] : visual acuity intact bilaterally,  visual fields full to confrontation, pupils equal round and reactive to light [Cranial Nerves Oculomotor (III)] : extraocular motion intact [Cranial Nerves Trigeminal (V)] : facial sensation intact symmetrically [Cranial Nerves Facial (VII)] : face symmetrical [Cranial Nerves Glossopharyngeal (IX)] : tongue and palate midline [Cranial Nerves Vestibulocochlear (VIII)] : hearing was intact bilaterally [Cranial Nerves Accessory (XI - Cranial And Spinal)] : head turning and shoulder shrug symmetric [Cranial Nerves Hypoglossal (XII)] : there was no tongue deviation with protrusion [Involuntary Movements] : no involuntary movements were seen [No Muscle Atrophy] : normal bulk in all four extremities [Sensation Tactile Decrease] : light touch was intact [Sensation Pain / Temperature Decrease] : pain and temperature was intact [Balance] : balance was intact [Past-pointing] : there was no past-pointing [Tremor] : no tremor present [2+] : Ankle jerk left 2+ [FreeTextEntry5] : Only vision to light in right eye, none in left eye [FreeTextEntry6] : Increased tone throughout, left greater than right.  5/5 strength. [Sclera] : the sclera and conjunctiva were normal [Extraocular Movements] : extraocular movements were intact [FreeTextEntry1] : see above [Full Pulse] : the pedal pulses are present [Edema] : there was no peripheral edema [Abnormal Walk] : normal gait [Nail Clubbing] : no clubbing  or cyanosis of the fingernails [Musculoskeletal - Swelling] : no joint swelling seen [Motor Tone] : muscle strength and tone were normal

## 2021-09-27 ENCOUNTER — APPOINTMENT (OUTPATIENT)
Dept: CT IMAGING | Facility: IMAGING CENTER | Age: 51
End: 2021-09-27
Payer: MEDICARE

## 2021-09-27 ENCOUNTER — OUTPATIENT (OUTPATIENT)
Dept: OUTPATIENT SERVICES | Facility: HOSPITAL | Age: 51
LOS: 1 days | End: 2021-09-27
Payer: MEDICARE

## 2021-09-27 DIAGNOSIS — Z96.22 MYRINGOTOMY TUBE(S) STATUS: Chronic | ICD-10-CM

## 2021-09-27 DIAGNOSIS — Z00.8 ENCOUNTER FOR OTHER GENERAL EXAMINATION: ICD-10-CM

## 2021-09-27 DIAGNOSIS — T85.09XA OTHER MECHANICAL COMPLICATION OF VENTRICULAR INTRACRANIAL (COMMUNICATING) SHUNT, INITIAL ENCOUNTER: Chronic | ICD-10-CM

## 2021-09-27 PROCEDURE — 72125 CT NECK SPINE W/O DYE: CPT | Mod: MH

## 2021-09-27 PROCEDURE — 72125 CT NECK SPINE W/O DYE: CPT | Mod: 26,MH

## 2021-10-05 ENCOUNTER — APPOINTMENT (OUTPATIENT)
Dept: OTOLARYNGOLOGY | Facility: CLINIC | Age: 51
End: 2021-10-05
Payer: MEDICARE

## 2021-10-05 VITALS
WEIGHT: 185 LBS | DIASTOLIC BLOOD PRESSURE: 83 MMHG | HEIGHT: 66 IN | TEMPERATURE: 97.8 F | SYSTOLIC BLOOD PRESSURE: 117 MMHG | HEART RATE: 111 BPM | BODY MASS INDEX: 29.73 KG/M2

## 2021-10-05 DIAGNOSIS — H61.23 IMPACTED CERUMEN, BILATERAL: ICD-10-CM

## 2021-10-05 DIAGNOSIS — H90.3 SENSORINEURAL HEARING LOSS, BILATERAL: ICD-10-CM

## 2021-10-05 PROCEDURE — 99214 OFFICE O/P EST MOD 30 MIN: CPT | Mod: 25

## 2021-10-05 PROCEDURE — 69210 REMOVE IMPACTED EAR WAX UNI: CPT

## 2021-10-05 NOTE — HISTORY OF PRESENT ILLNESS
[de-identified] : Patient is here today with some ear clogging bilaterally and feels the need for an ear cleaning. he does not have any issues with ringing in the eras or dizziness. He does not complain about out pain in the ears

## 2021-10-05 NOTE — ASSESSMENT
[FreeTextEntry1] : Significant amount of cerumen impaction curetted out bilaterally revealing normal tympanic membranes no further acute interventions indicated at this time ENT wise.

## 2021-12-03 ENCOUNTER — NON-APPOINTMENT (OUTPATIENT)
Age: 51
End: 2021-12-03

## 2021-12-27 ENCOUNTER — APPOINTMENT (OUTPATIENT)
Dept: RADIOLOGY | Facility: CLINIC | Age: 51
End: 2021-12-27
Payer: MEDICARE

## 2021-12-27 PROCEDURE — 77080 DXA BONE DENSITY AXIAL: CPT

## 2022-01-01 ENCOUNTER — APPOINTMENT (OUTPATIENT)
Dept: ENDOCRINOLOGY | Facility: CLINIC | Age: 52
End: 2022-01-01

## 2022-01-01 ENCOUNTER — RX RENEWAL (OUTPATIENT)
Age: 52
End: 2022-01-01

## 2022-01-01 ENCOUNTER — APPOINTMENT (OUTPATIENT)
Dept: THORACIC SURGERY | Facility: CLINIC | Age: 52
End: 2022-01-01

## 2022-01-01 ENCOUNTER — APPOINTMENT (OUTPATIENT)
Dept: NEUROLOGY | Facility: CLINIC | Age: 52
End: 2022-01-01

## 2022-01-01 ENCOUNTER — NON-APPOINTMENT (OUTPATIENT)
Age: 52
End: 2022-01-01

## 2022-01-01 ENCOUNTER — APPOINTMENT (OUTPATIENT)
Dept: ORTHOPEDIC SURGERY | Facility: CLINIC | Age: 52
End: 2022-01-01

## 2022-01-01 VITALS
TEMPERATURE: 97.6 F | WEIGHT: 195.4 LBS | DIASTOLIC BLOOD PRESSURE: 62 MMHG | HEIGHT: 66 IN | SYSTOLIC BLOOD PRESSURE: 100 MMHG | HEART RATE: 86 BPM | OXYGEN SATURATION: 93 % | BODY MASS INDEX: 31.4 KG/M2

## 2022-01-01 VITALS
RESPIRATION RATE: 16 BRPM | DIASTOLIC BLOOD PRESSURE: 74 MMHG | HEART RATE: 78 BPM | SYSTOLIC BLOOD PRESSURE: 122 MMHG | OXYGEN SATURATION: 98 %

## 2022-01-01 VITALS — BODY MASS INDEX: 28.93 KG/M2 | HEIGHT: 66 IN | WEIGHT: 180 LBS

## 2022-01-01 DIAGNOSIS — M85.80 OTHER SPECIFIED DISORDERS OF BONE DENSITY AND STRUCTURE, UNSPECIFIED SITE: ICD-10-CM

## 2022-01-01 DIAGNOSIS — M54.9 DORSALGIA, UNSPECIFIED: ICD-10-CM

## 2022-01-01 DIAGNOSIS — G40.909 EPILEPSY, UNSPECIFIED, NOT INTRACTABLE, W/OUT STATUS EPILEPTICUS: ICD-10-CM

## 2022-01-01 DIAGNOSIS — R62.50 UNSPECIFIED LACK OF EXPECTED NORMAL PHYSIOLOGICAL DEVELOPMENT IN CHILDHOOD: ICD-10-CM

## 2022-01-01 DIAGNOSIS — M41.9 SCOLIOSIS, UNSPECIFIED: ICD-10-CM

## 2022-01-01 DIAGNOSIS — K21.9 GASTRO-ESOPHAGEAL REFLUX DISEASE W/OUT ESOPHAGITIS: ICD-10-CM

## 2022-01-01 PROCEDURE — 96372 THER/PROPH/DIAG INJ SC/IM: CPT

## 2022-01-01 PROCEDURE — 99215 OFFICE O/P EST HI 40 MIN: CPT

## 2022-01-01 PROCEDURE — 99203 OFFICE O/P NEW LOW 30 MIN: CPT

## 2022-01-01 PROCEDURE — 99204 OFFICE O/P NEW MOD 45 MIN: CPT

## 2022-01-01 PROCEDURE — 72080 X-RAY EXAM THORACOLMB 2/> VW: CPT

## 2022-01-01 PROCEDURE — 99213 OFFICE O/P EST LOW 20 MIN: CPT | Mod: 25

## 2022-01-01 RX ORDER — DILTIAZEM HYDROCHLORIDE 120 MG/1
120 CAPSULE, EXTENDED RELEASE ORAL
Qty: 90 | Refills: 2 | Status: DISCONTINUED | COMMUNITY
Start: 2022-01-01 | End: 2022-01-01

## 2022-01-17 ENCOUNTER — NON-APPOINTMENT (OUTPATIENT)
Age: 52
End: 2022-01-17

## 2022-01-17 ENCOUNTER — APPOINTMENT (OUTPATIENT)
Dept: CARDIOLOGY | Facility: CLINIC | Age: 52
End: 2022-01-17
Payer: MEDICARE

## 2022-01-17 VITALS
WEIGHT: 185 LBS | OXYGEN SATURATION: 97 % | DIASTOLIC BLOOD PRESSURE: 80 MMHG | BODY MASS INDEX: 29.73 KG/M2 | HEIGHT: 66 IN | SYSTOLIC BLOOD PRESSURE: 110 MMHG | HEART RATE: 109 BPM

## 2022-01-17 PROCEDURE — 93000 ELECTROCARDIOGRAM COMPLETE: CPT

## 2022-01-17 PROCEDURE — 99204 OFFICE O/P NEW MOD 45 MIN: CPT

## 2022-01-17 RX ORDER — CHROMIUM 200 MCG
25 MCG TABLET ORAL DAILY
Refills: 0 | Status: ACTIVE | COMMUNITY

## 2022-01-17 RX ORDER — TRAVOPROST (BENZALKONIUM) 0.004 %
0 DROPS OPHTHALMIC (EYE)
Refills: 0 | Status: DISCONTINUED | COMMUNITY
End: 2022-01-17

## 2022-01-17 RX ORDER — DILTIAZEM HYDROCHLORIDE 60 MG/1
60 TABLET ORAL
Refills: 0 | Status: DISCONTINUED | COMMUNITY
End: 2022-01-17

## 2022-01-17 RX ORDER — DOCUSATE SODIUM 100 MG/1
100 CAPSULE ORAL TWICE DAILY
Refills: 0 | Status: ACTIVE | COMMUNITY

## 2022-01-17 RX ORDER — PANTOPRAZOLE 40 MG/1
40 TABLET, DELAYED RELEASE ORAL
Qty: 60 | Refills: 5 | Status: ACTIVE | COMMUNITY

## 2022-01-17 RX ORDER — CALCIUM CARBONATE 500(1250)
500 TABLET ORAL DAILY
Refills: 0 | Status: ACTIVE | COMMUNITY

## 2022-01-17 RX ORDER — PSYLLIUM SEED
PACKET (EA) ORAL
Refills: 0 | Status: DISCONTINUED | COMMUNITY
End: 2022-01-17

## 2022-01-17 RX ORDER — THIAMINE HCL 50 MG
500 TABLET ORAL DAILY
Refills: 0 | Status: ACTIVE | COMMUNITY

## 2022-01-17 NOTE — PHYSICAL EXAM
[Normal] : soft, non-tender, no masses/organomegaly, normal bowel sounds [Moves all extremities] : moves all extremities [Alert and Oriented] : alert and oriented [Normal S1, S2] : normal S1, S2 [de-identified] : Tachy with regular rhythm, no m/r/g, no JVD/S3 [de-identified] : trace edema

## 2022-01-17 NOTE — HISTORY OF PRESENT ILLNESS
[FreeTextEntry1] : Patient is a 50yo M with developmental delay (lives in group home), legally blind, HTN, PIERCE, spinal stenosis s/p surgery,   shunt,  family history CAD,  seizure disorder here for cardiac evaluation. Here with his aide and aunt (legal guardian). Was in hospital few times last year, most recently 11/2021 for fall. Had episodes SVT when in hospital aborted by adenosine. Put on cardizem. Walks with walker now, here in wheelchair today. Problems with walking started this past year. No CP/SOB. No PND/orthopnea/palps/syncope. AT times he is seen feeling his heart and palpating his chest. \par \par ROS: GI and  negative

## 2022-01-17 NOTE — DISCUSSION/SUMMARY
[FreeTextEntry1] : Patient is a 50yo M with developmental delay,  HTN, PIERCE, seizure disorder here for cardiac evaluation. 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 Change cardizem to long acting 120mg daily,continue metoprolol\par 2. Increase physical activity as tolerated \par 3. Recommend aggressive diet and lifestyle modifications \par 4. Follow up 6 months\par 5. Contact me if any change in symptoms\par 6. REgular PMD follow up

## 2022-01-20 RX ORDER — DILTIAZEM HYDROCHLORIDE 120 MG/1
120 TABLET, EXTENDED RELEASE ORAL DAILY
Qty: 30 | Refills: 5 | Status: DISCONTINUED | COMMUNITY
Start: 2022-01-17 | End: 2022-01-20

## 2022-01-24 ENCOUNTER — APPOINTMENT (OUTPATIENT)
Dept: NEUROLOGY | Facility: CLINIC | Age: 52
End: 2022-01-24
Payer: MEDICARE

## 2022-01-24 VITALS
TEMPERATURE: 97.9 F | BODY MASS INDEX: 29.73 KG/M2 | SYSTOLIC BLOOD PRESSURE: 117 MMHG | HEART RATE: 102 BPM | OXYGEN SATURATION: 98 % | HEIGHT: 66 IN | WEIGHT: 185 LBS | DIASTOLIC BLOOD PRESSURE: 82 MMHG

## 2022-01-24 PROCEDURE — 99215 OFFICE O/P EST HI 40 MIN: CPT

## 2022-01-24 NOTE — DISCUSSION/SUMMARY
[FreeTextEntry1] : 51 year old man with history developmental delay and epilepsy with shunt placement and VNS placement.\par \par Had long discussion about options.  \par \par The events in the hospital (shirt pulling, headache, etc) were not associated with seizure activity.  His behavioral episodes also unlikely 2/2 seizure activity.  As for headache, no change in CT and not severe.  Not complaining of pain at this time.\par \par Episodes of starting upward could be behavioral with negative EMU stay. \par \par Plan;\par Continue current AED regimen\par -reviewed seizure triggers\par - annual labwork including AED levels.\par - psych (in [facility]) for behavior\par - DEXA scan next visit (holding off because of COVID)\par - following with cardiology\par - memory evaluated by Dr. Allen with no change per caregiver\par \par \par Total time 40 min.\par Patient seen 40 min face to face with >50% in counseling and discussion.\par \par  [Medically Refractory (seizure within the last year)] : Medically Refractory (seizure within the last year) [Inpatient video EEG study ordered with length of stay anticipated in days: _____] : Inpatient video EEG study ordered with length of stay anticipated in days: [unfilled] [Event capture (for localization, differential diagnosis) (typically 3-5 days)] : Event capture (for localization, differential diagnosis) (typically 3-5 days)

## 2022-01-24 NOTE — HISTORY OF PRESENT ILLNESS
[FreeTextEntry1] : ***UPDATE:9/24/21***\par Mr Andi Ferrara is accompanied by his home care worker - Aunt not here today. Tried to reach out to her with no answer (? wrong number).\par EMU with no seizures - GI was consulted.\par Had surgery for his cervical stenosis and much improved.  Mild seizures shortly after surgery, but doing well.\par Per message from Aunt memory worse with weight gain.\par \par ***UPDATE:9/24/21***\par Mr Andi Ferrara is accompanied by his home care worker - Aunt not here today.\par EMU with no seizures - GI was consulted.\par Had surgery for his cervical stenosis and much improved.  Mild seizures shortly after surgery, but doing well.\par \par \par ***UPDATE:6/21/21***\par Mr Andi Ferrara is accompanied by his legal guardian Ms Melissa Guy\par Patient has been having LUQ pain for past few months.  Awaiting GI consultation at this point.\par Aunt notes has been having episode a few times per week with staring upwards for several minutes - no other seizure activity noted.\par Also, worsening of behavior overall.\par Had surgery for his cervical stenosis and much improved.  Mild seizures shortly after surgery, but doing well.\par \par ***UPDATE:11/24/20***\par Mr Andi Ferrara is accompanied by his legal guardian Ms Melissa Guy\par He is doing well overall.\par Had surgery for his cervical stenosis and much improved.  Mild seizures shortly after surgery, but doing well.\par \par ***UPDATE:7/31/20***\par Mr Andi Ferrara is accompanied by his legal guardian Ms Melissa Guy\par He has no reported interval seizures . He is doing well overall.\par \par Patient now with intermittent weakness when walking.  However, had bug bite to right ankle in the fall and has been walking less.  No issues bowel/bladder but mild ataxia.\par \par ***UPDATE:2/3/20***\par Mr Andi Ferrara is accompanied by his legal guardian Ms Melissa Guy\par He has no reported interval seizures . he had a fall on 1/25 but did not hit his head. It was noted that he had a bug bite from end of September on right ankle which is now swollen and red\par \par LTG 300mg BID\par Onfi 5mg BID\par Primidone 250mg BID\par \par Last office visit:\par  \par Possible small seizure a few weeks ago where was a little confused at bowling.  Had CT head which showed no change.\par \par On LTG 300mg bid and Primidone bid. Onfi 5mg bid.\par \par Seeing psych who started Lexapro 10 daily and Zyprexa\par \par Mr. 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  He comes accompanied by his aunt and uncle (legal guardians) and the .\par \par The seizures started shortly after birth with unclear details.  He was in the ICU for some time and found to have hydrocephalus.  At 16 months of age, his shunt was found to be malfunctioning and a new shunt was placed.  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  In 2013, the shut malfunctioned once again with further visual loss, now with only vision to light in the right eye and no vision in the left eye.\par \par The seizures at a younger age are unclear in semiology.  However, for the past several decades his typical seizures have been pulling at his clothes for seconds to minutes with confusion thereafter.  He gets confused with these events with behavioral arrest. \par the exact frequency is unknown (perhaps several times a month).  He does not have convulsions.\par \par In addition to the seizures above, he has been having behavioral outbursts at his home.  He gets mad at times or very tired at times.  It has been unclear if these are seizures.  Also episodes of head pain as well.  This has been followed by his psychiatrist.\par \par The patient has been tried on several AEDS (PHT, Vimpat, VPA, PHB, CBZ, LEV, Banzel, TPM) and has not been treated with these meds.  He is currently on Primidone 250 bidand Lamictal 400mg bid.  He was tried on Onfi 5mg bid in December 2015  and did well, but this was stopped because he ran out.  This was restarted in February 2016 at 5mg bid.\par \par The patient had a VNS placed several years ago at "maximum output" and has followed with [place]. Yuri Yanez in [facility] [place].  It was stated that would be difficult to do any further surgery on him.\par \par CT scans in the past have shown right parietal encephalomalacia.  EEG studies have shown (AEEG and VEEG) bilateral temporal frontal discharges (R>L) with one seizure caught on the right in the past.\par \par He has been followed by Dr. Phillips and at [place] in the past.  VEEG testing July 2016 showed no seizures and no changes to meds.  Also showed episodes of headache and pulling at shirt with no changes.  Still with headaches at this time with intermittent abdominal pain with no known etiology.\par \par Several seizures with ZNS increased to 100 bid earlier in the year.  Worsening of behavior with ZNS and no improvement.  We stopped ZNS and still with behavioral problems.\par \par VNS was shut off/\par \par PMHx as above\par FHx No seizures\par Meds/All: see below\par

## 2022-02-02 NOTE — PROGRESS NOTE ADULT - SUBJECTIVE AND OBJECTIVE BOX
Chief Complaint:  Patient is a 50y old  Male who presents with a chief complaint of Staring spells and aggressive behavioral changes (01 Jul 2021 09:23)      Date of service 07-06-21 @ 21:33      Interval Events: no abd pain    Hospital Medications:  acetaminophen   Tablet .. 650 milliGRAM(s) Oral every 6 hours PRN  calcium carbonate 1250 mG  + Vitamin D (OsCal 500 + D) 1 Tablet(s) Oral three times a day  cholecalciferol 1000 Unit(s) Oral daily  cloBAZam 10 milliGRAM(s) Oral two times a day  diazepam    Tablet 5 milliGRAM(s) Oral every 8 hours PRN  dorzolamide 2%/timolol 0.5% Ophthalmic Solution 1 Drop(s) Both EYES two times a day  enoxaparin Injectable 40 milliGRAM(s) SubCutaneous daily  fluticasone propionate 50 MICROgram(s)/spray Nasal Spray 1 Spray(s) Both Nostrils <User Schedule>  lacosamide Injectable 200 milliGRAM(s) IV Push once PRN  lamoTRIgine 300 milliGRAM(s) Oral two times a day  latanoprost 0.005% Ophthalmic Solution 1 Drop(s) Both EYES at bedtime  loratadine 10 milliGRAM(s) Oral daily  LORazepam   Injectable 1 milliGRAM(s) IV Push once PRN  nystatin Ointment 1 Application(s) Topical two times a day  OLANZapine 2.5 milliGRAM(s) Oral every 6 hours  pantoprazole  Injectable 40 milliGRAM(s) IV Push daily  primidone 250 milliGRAM(s) Oral two times a day  QUEtiapine 25 milliGRAM(s) Oral every 6 hours  senna 2 Tablet(s) Oral at bedtime        Review of Systems:  General:  No wt loss, fevers, chills, night sweats, fatigue,   Eyes:  Good vision, no reported pain  ENT:  No sore throat, pain, runny nose, dysphagia  CV:  No pain, palpitations, hypo/hypertension  Resp:  No dyspnea, cough, tachypnea, wheezing  GI:  See HPI  :  No pain, bleeding, incontinence, nocturia  Muscle:  No pain, weakness  Neuro:  No weakness, tingling, memory problems  Psych:  No fatigue, insomnia, mood problems, depression  Endocrine:  No polyuria, polydipsia, cold/heat intolerance  Heme:  No petechiae, ecchymosis, easy bruisability  Integumentary:  No rash, edema    PHYSICAL EXAM:   Vital Signs:  Vital Signs Last 24 Hrs  T(C): 36.9 (01 Jul 2021 19:17), Max: 37.1 (30 Jun 2021 23:27)  T(F): 98.4 (01 Jul 2021 19:17), Max: 98.8 (30 Jun 2021 23:27)  HR: 110 (01 Jul 2021 19:17) (98 - 111)  BP: 111/79 (01 Jul 2021 19:17) (105/77 - 127/86)  BP(mean): --  RR: 18 (01 Jul 2021 19:17) (18 - 18)  SpO2: 95% (01 Jul 2021 19:17) (94% - 97%)  Daily     Daily       PHYSICAL EXAM:     GENERAL:  Appears stated age, well-groomed, well-nourished, no distress  HEENT:  NC/AT,  conjunctivae anicteric, clear and pink,   NECK: supple, trachea midline  CHEST:  Full & symmetric excursion, no increased effort, breath sounds clear  HEART:  Regular rhythm, no JVD  ABDOMEN:  Soft, non-tender, non-distended, normoactive bowel sounds,  no masses , no hepatosplenomegaly  EXTREMITIES:  no cyanosis,clubbing or edema  SKIN:  No rash, erythema, or, ecchymoses, no jaundice  NEURO:  Alert, non-focal, no asterixis  PSYCH: Appropriate affect, oriented to place and time  RECTAL: Deferred      LABS Personally reviewed by me:                        15.3   6.96  )-----------( 322      ( 01 Jul 2021 06:00 )             46.6     Mean Cell Volume: 92.6 fl (07-01-21 @ 06:00)    07-01    136  |  98  |  13  ----------------------------<  87  4.1   |  22  |  0.94    Ca    9.8      01 Jul 2021 06:00    TPro  7.8  /  Alb  4.5  /  TBili  0.5  /  DBili  x   /  AST  14  /  ALT  24  /  AlkPhos  199<H>  07-01    LIVER FUNCTIONS - ( 01 Jul 2021 06:00 )  Alb: 4.5 g/dL / Pro: 7.8 g/dL / ALK PHOS: 199 U/L / ALT: 24 U/L / AST: 14 U/L / GGT: x                                       15.3   6.96  )-----------( 322      ( 01 Jul 2021 06:00 )             46.6                         14.3   5.71  )-----------( 291      ( 30 Jun 2021 06:37 )             42.3                         14.4   5.85  )-----------( 324      ( 29 Jun 2021 22:21 )             44.7       Imaging personally reviewed by me:           Encounter Date: 2/2/2022       History     Chief Complaint   Patient presents with    Knee Pain     PT reports left knee pain and stiffness     70 year old female with complaint of left knee pain X 1 week.  NO fall. No trauma. Moderate pain. Worse with movement and walking.  No fever or chills.          Review of patient's allergies indicates:  No Known Allergies  Past Medical History:   Diagnosis Date    Anticoagulant long-term use     Plavix    Arthritis     Bloody discharge from left nipple 6/1/2017    Cerebral artery occlusion with cerebral infarction 9/5/2013 2:35:44 PM    [facility] Historical - Cardiovascular: CVA (Cerebrovascular accident)-No Additional Notes    Complications affecting other specified body systems, hypertension 9/5/2013 2:32:20 PM    [facility] Historical - Cardiovascular: Hypertension-No Additional Notes    CVA (cerebral vascular accident) 2008    R hemiparesis, R hand contracture    HTN (hypertension)     Hyperlipidemia     Other and unspecified hyperlipidemia 9/5/2013 2:32:23 PM    [facility] Historical - Endocrine/Metabolic/Immune: Hyperlipidemia-No Additional Notes    Severe obesity (BMI 35.0-39.9) with comorbidity     Special screening for malignant neoplasms, colon 4/16/2019     Past Surgical History:   Procedure Laterality Date    BREAST BIOPSY Left 2017    COLONOSCOPY N/A 4/16/2019    Procedure: COLONOSCOPY;  Surgeon: Ulisses Jacobsen MD;  Location: [facility];  Service: Endoscopy;  Laterality: N/A;    HYSTERECTOMY      partial     TONSILLECTOMY       Family History   Problem Relation Age of Onset    Hypertension Mother     Hypertension Sister     Cancer Neg Hx     Diabetes Neg Hx     Heart disease Neg Hx     Kidney disease Neg Hx     Hyperlipidemia Neg Hx      Social History     Tobacco Use    Smoking status: Never Smoker    Smokeless tobacco: Never Used   Substance Use Topics    Alcohol use: Yes     Alcohol/week: 0.0 standard drinks     Comment: once  every 3-4 months    Drug use: No     Review of Systems   Constitutional: Negative for fever.   HENT: Negative for sore throat.    Respiratory: Negative for shortness of breath.    Cardiovascular: Negative for chest pain.   Gastrointestinal: Negative for nausea.   Genitourinary: Negative for dysuria.   Musculoskeletal: Negative for back pain.        Left knee pain    Skin: Negative for rash.   Neurological: Negative for weakness.   Hematological: Does not bruise/bleed easily.       Physical Exam     Initial Vitals [02/02/22 1022]   BP Pulse Resp Temp SpO2   (!) 182/80 94 20 98.6 °F (37 °C) 97 %      MAP       --         Physical Exam    Nursing note and vitals reviewed.  Constitutional: She appears well-developed and well-nourished.   HENT:   Head: Normocephalic and atraumatic.   Eyes: Conjunctivae and EOM are normal. Pupils are equal, round, and reactive to light.   Neck: Neck supple.   Normal range of motion.  Cardiovascular: Normal rate, regular rhythm, normal heart sounds and intact distal pulses.   Pulmonary/Chest: Breath sounds normal.   Abdominal: Abdomen is soft. There is no abdominal tenderness. There is no rebound and no guarding.   Musculoskeletal:         General: Normal range of motion.      Cervical back: Normal range of motion and neck supple.      Comments: No knee tenderness, pain with ROM of left knee, no knee laxity     Neurological: She is alert and oriented to person, place, and time. She has normal strength and normal reflexes.   Skin: Skin is warm and dry.   Psychiatric: She has a normal mood and affect. Her behavior is normal. Thought content normal.         ED Course   Procedures  Labs Reviewed - No data to display       Imaging Results    None          Medications - No data to display                       Clinical Impression:   Final diagnoses:  [M25.562] Acute pain of left knee (Primary)          ED Disposition Condition    Discharge Stable        ED Prescriptions     Medication Sig  Dispense Start Date End Date Auth. Provider    traMADoL (ULTRAM) 50 mg tablet Take 1 tablet (50 mg total) by mouth every 6 (six) hours as needed for Pain. 12 tablet 2/2/2022  Karan Appiah NP    diclofenac (VOLTAREN) 50 MG EC tablet Take 1 tablet (50 mg total) by mouth 2 (two) times daily as needed (pain). 14 tablet 2/2/2022  Karan Appiah NP        Follow-up Information     Follow up With Specialties Details Why Contact Info    [facility]  Schedule an appointment as soon as possible for a visit in 2 days  [phone]           Karan Appiah NP  02/02/22 [id number]

## 2022-02-10 ENCOUNTER — EMERGENCY (EMERGENCY)
Facility: HOSPITAL | Age: 52
LOS: 1 days | Discharge: HOME | End: 2022-02-10
Attending: EMERGENCY MEDICINE | Admitting: EMERGENCY MEDICINE
Payer: MEDICARE

## 2022-02-10 VITALS
SYSTOLIC BLOOD PRESSURE: 113 MMHG | DIASTOLIC BLOOD PRESSURE: 78 MMHG | HEIGHT: 68 IN | OXYGEN SATURATION: 95 % | WEIGHT: 164.91 LBS | RESPIRATION RATE: 15 BRPM | TEMPERATURE: 97 F | HEART RATE: 83 BPM

## 2022-02-10 DIAGNOSIS — Z96.22 MYRINGOTOMY TUBE(S) STATUS: Chronic | ICD-10-CM

## 2022-02-10 DIAGNOSIS — T85.09XA OTHER MECHANICAL COMPLICATION OF VENTRICULAR INTRACRANIAL (COMMUNICATING) SHUNT, INITIAL ENCOUNTER: Chronic | ICD-10-CM

## 2022-02-10 LAB
ALBUMIN SERPL ELPH-MCNC: 3.2 G/DL — LOW (ref 3.3–5)
ALP SERPL-CCNC: 186 U/L — HIGH (ref 40–120)
ALT FLD-CCNC: 47 U/L — SIGNIFICANT CHANGE UP (ref 12–78)
ANION GAP SERPL CALC-SCNC: 6 MMOL/L — SIGNIFICANT CHANGE UP (ref 5–17)
AST SERPL-CCNC: 22 U/L — SIGNIFICANT CHANGE UP (ref 15–37)
BASOPHILS # BLD AUTO: 0.06 K/UL — SIGNIFICANT CHANGE UP (ref 0–0.2)
BASOPHILS NFR BLD AUTO: 0.5 % — SIGNIFICANT CHANGE UP (ref 0–2)
BILIRUB SERPL-MCNC: 0.2 MG/DL — SIGNIFICANT CHANGE UP (ref 0.2–1.2)
BUN SERPL-MCNC: 11 MG/DL — SIGNIFICANT CHANGE UP (ref 7–23)
CALCIUM SERPL-MCNC: 8.7 MG/DL — SIGNIFICANT CHANGE UP (ref 8.5–10.1)
CHLORIDE SERPL-SCNC: 107 MMOL/L — SIGNIFICANT CHANGE UP (ref 96–108)
CO2 SERPL-SCNC: 27 MMOL/L — SIGNIFICANT CHANGE UP (ref 22–31)
CREAT SERPL-MCNC: 0.91 MG/DL — SIGNIFICANT CHANGE UP (ref 0.5–1.3)
EOSINOPHIL # BLD AUTO: 0.36 K/UL — SIGNIFICANT CHANGE UP (ref 0–0.5)
EOSINOPHIL NFR BLD AUTO: 3 % — SIGNIFICANT CHANGE UP (ref 0–6)
GLUCOSE SERPL-MCNC: 104 MG/DL — HIGH (ref 70–99)
HCT VFR BLD CALC: 41.4 % — SIGNIFICANT CHANGE UP (ref 39–50)
HGB BLD-MCNC: 13.5 G/DL — SIGNIFICANT CHANGE UP (ref 13–17)
IMM GRANULOCYTES NFR BLD AUTO: 0.7 % — SIGNIFICANT CHANGE UP (ref 0–1.5)
LACTATE SERPL-SCNC: 0.9 MMOL/L — SIGNIFICANT CHANGE UP (ref 0.7–2)
LIDOCAIN IGE QN: 89 U/L — SIGNIFICANT CHANGE UP (ref 73–393)
LYMPHOCYTES # BLD AUTO: 1.1 K/UL — SIGNIFICANT CHANGE UP (ref 1–3.3)
LYMPHOCYTES # BLD AUTO: 9.3 % — LOW (ref 13–44)
MCHC RBC-ENTMCNC: 28.2 PG — SIGNIFICANT CHANGE UP (ref 27–34)
MCHC RBC-ENTMCNC: 32.6 GM/DL — SIGNIFICANT CHANGE UP (ref 32–36)
MCV RBC AUTO: 86.4 FL — SIGNIFICANT CHANGE UP (ref 80–100)
MONOCYTES # BLD AUTO: 0.9 K/UL — SIGNIFICANT CHANGE UP (ref 0–0.9)
MONOCYTES NFR BLD AUTO: 7.6 % — SIGNIFICANT CHANGE UP (ref 2–14)
NEUTROPHILS # BLD AUTO: 9.32 K/UL — HIGH (ref 1.8–7.4)
NEUTROPHILS NFR BLD AUTO: 78.9 % — HIGH (ref 43–77)
NRBC # BLD: 0 /100 WBCS — SIGNIFICANT CHANGE UP (ref 0–0)
PLATELET # BLD AUTO: 423 K/UL — HIGH (ref 150–400)
POTASSIUM SERPL-MCNC: 4.3 MMOL/L — SIGNIFICANT CHANGE UP (ref 3.5–5.3)
POTASSIUM SERPL-SCNC: 4.3 MMOL/L — SIGNIFICANT CHANGE UP (ref 3.5–5.3)
PROT SERPL-MCNC: 7.2 G/DL — SIGNIFICANT CHANGE UP (ref 6–8.3)
RBC # BLD: 4.79 M/UL — SIGNIFICANT CHANGE UP (ref 4.2–5.8)
RBC # FLD: 16.7 % — HIGH (ref 10.3–14.5)
SODIUM SERPL-SCNC: 140 MMOL/L — SIGNIFICANT CHANGE UP (ref 135–145)
WBC # BLD: 11.82 K/UL — HIGH (ref 3.8–10.5)
WBC # FLD AUTO: 11.82 K/UL — HIGH (ref 3.8–10.5)

## 2022-02-10 PROCEDURE — 99285 EMERGENCY DEPT VISIT HI MDM: CPT

## 2022-02-10 PROCEDURE — 71045 X-RAY EXAM CHEST 1 VIEW: CPT | Mod: 26

## 2022-02-10 RX ORDER — SODIUM CHLORIDE 9 MG/ML
1000 INJECTION INTRAMUSCULAR; INTRAVENOUS; SUBCUTANEOUS ONCE
Refills: 0 | Status: COMPLETED | OUTPATIENT
Start: 2022-02-10 | End: 2022-02-10

## 2022-02-10 RX ADMIN — SODIUM CHLORIDE 1000 MILLILITER(S): 9 INJECTION INTRAMUSCULAR; INTRAVENOUS; SUBCUTANEOUS at 21:10

## 2022-02-10 RX ADMIN — SODIUM CHLORIDE 1000 MILLILITER(S): 9 INJECTION INTRAMUSCULAR; INTRAVENOUS; SUBCUTANEOUS at 22:10

## 2022-02-10 NOTE — PHYSICAL THERAPY INITIAL EVALUATION ADULT - PRECAUTIONS/LIMITATIONS, REHAB EVAL
He has been diagnosed with cervical spinal cord compression and myelomalacia now s/p sx as noted above./seizure precautions/spinal precautions/fall precautions/vision precautions Wartpeel Counseling:  I discussed with the patient the risks of Wartpeel including but not limited to erythema, scaling, itching, weeping, crusting, and pain.

## 2022-02-10 NOTE — ED ADULT NURSE NOTE - BRAND OF COVID-19 VACCINATION
Continuity of Care Form    Patient Name: Jona Lau   :  1966  MRN:  [id number]    Shawanda Long MD  PCP: Felicia Hunter MD    Discharging Nurse: [facility] [place] Unit/Room#: [id number]/1092-62  Discharging Unit Phone Number: [phone]    Emergency Contact:   Extended Emergency Contact Information  Primary Emergency Contact: [address] [place] [address] Phone: [phone]  Relation: Child    Past Surgical History:  Past Surgical History:   Procedure Laterality Date    ABLATION OF DYSRHYTHMIC FOCUS     [address] Right 09/15/2016, 10/19/16    BRONCHOSCOPY  2016    CARDIAC CATHETERIZATION      CATARACT REMOVAL WITH IMPLANT Bilateral     [place]     SECTION      CHOLECYSTECTOMY, LAPAROSCOPIC      COLONOSCOPY  3/4/2015    polypectomyx5; repeat 3 years    EYE SURGERY      cataracts removed both eyes    EYE SURGERY Right 2017    LITHOTRIPSY      TRABECULECTOMY Bilateral     [place]    UPPER GASTROINTESTINAL ENDOSCOPY  3/4/2015       Immunization History:   Immunization History   Administered Date(s) Administered    Influenza Virus Vaccine 2010, 10/16/2012, 2014, 2015    Influenza, Intradermal, Preservative free 10/23/2013    Influenza, Intradermal, Quadrivalent, Preservative Free 10/27/2016    Influenza, MDCK Quadv, IM, PF (Flucelvax 4 yrs and older) 10/24/2017    Influenza, Bethena Doris, Recombinant, IM PF (Flublok 18 yrs and older) 10/12/2018    Moderna dose 1, 2, and 3 Review Planned Expiration Resolved Resolved By    None active    Resolved    INFLUENZA 20 Rapid influenza A/B antigens   20     MRSA  17 Jacky Sacks, RN   01/10/20 Jacky Sacks, RN    3/22/[address] needs for personal care    - signature: {Esignature:[id number]}    

## 2022-02-10 NOTE — ED PROVIDER NOTE - NSFOLLOWUPINSTRUCTIONS_ED_ALL_ED_FT
Esophagitis       Esophagitis is inflammation of the esophagus. The esophagus is the tube that carries food from the mouth to the stomach. Esophagitis can cause soreness or pain in the esophagus. This condition can make it difficult and painful to swallow.      What are the causes?    Most causes of esophagitis are not serious. Common causes of this condition include:  •Gastroesophageal reflux disease (GERD). This is when stomach contents move back up into the esophagus (reflux).      •Repeated vomiting.      •An allergic reaction, especially caused by food allergies (eosinophilic esophagitis).      •Injury to the esophagus by swallowing large pills with or without water, or swallowing certain types of medicines.      •Swallowing harmful chemicals, such as household cleaning products.      •Drinking a lot of alcohol.      •An infection of the esophagus. This most often occurs in people who have a weakened immune system.      •Radiation or chemotherapy treatment for cancer.      •Certain diseases such as sarcoidosis, Crohn's disease, and scleroderma.        What are the signs or symptoms?    Symptoms of this condition include:  •Difficult or painful swallowing.      •Pain with swallowing acidic liquids, such as citrus juices. You may also have pain when you burp.      •Chest pain and difficulty breathing.      •Nausea and vomiting.      •Pain in the abdomen.      •Weight loss.      •Ulcers in the mouth and white patches in the mouth (candidiasis).      •Fever.      •Coughing up blood or vomiting blood.      •Stool that is black, tarry, or bright red.        How is this diagnosed?    This condition may be diagnosed based on your medical history and a physical exam. You may also have other tests, including:  •A test to examine your esophagus and stomach with a small flexible tube with a camera (endoscopy).      •A test that measures the acidity level in your esophagus.      •A test that measures how much pressure is on your esophagus.      •A barium swallow or modified barium swallow to show the shape, size, and functioning of your esophagus.      •Allergy tests.        How is this treated?    Treatment for this condition depends on the cause of your esophagitis. In some cases, steroids or other medicines may be given to help relieve your symptoms or to treat the underlying cause of your condition. You may have to make some lifestyle changes, such as:  •Avoiding alcohol.      •Quitting any products that contain nicotine or tobacco. These products include cigarettes, chewing tobacco, and vaping devices, such as e-cigarettes. If you need help quitting, ask your health care provider.      •Changing your diet.      •Exercising.      •Changing your sleep habits and your sleep environment.        Follow these instructions at home:    Medicines     •Take over-the-counter and prescription medicines only as told by your health care provider.       • Do not take aspirin, ibuprofen, or other NSAIDs unless your health care provider told you to do so.    •If you have trouble taking pills:  •Use a pill splitter to decrease the size of the pill. This will decrease the chance of the pill getting stuck or injuring your esophagus.       •Drink water after you take a pill.          Eating and drinking      •Avoid foods and drinks that seem to make your symptoms worse.    •Follow a diet as recommended by your health care provider. This may involve avoiding foods and drinks such as:  •Coffee and tea, with or without caffeine.      •Drinks that contain alcohol.      •Energy drinks and sports drinks.      •Carbonated drinks or sodas.      •Chocolate and cocoa.      •Peppermint and mint flavorings.      •Garlic and onions.      •Horseradish.      •Spicy and acidic foods, including peppers, chili powder, davis powder, vinegar, hot sauces, and barbecue sauce.      •Citrus fruit juices and citrus fruits, such as oranges, lemuel, and limes.      •Tomato-based foods, such as red sauce, chili, salsa, and pizza with red sauce.      •Fried and fatty foods, such as donuts, french fries, potato chips, and high-fat dressings.      •High-fat meats, such as hot dogs and fatty cuts of red and white meats, such as rib eye steak, sausage, ham, and funes.      •High-fat dairy items, such as whole milk, butter, and cream cheese.        Lifestyle     •Eat small, frequent meals instead of large meals.      •Avoid drinking large amounts of liquid with your meals.      •Avoid eating meals during the 2–3 hours before bedtime.      •Avoid lying down right after you eat.      • Do not exercise right after you eat.      • Do not use any products that contain nicotine or tobacco. These products include cigarettes, chewing tobacco, and vaping devices, such as e-cigarettes. If you need help quitting, ask your health care provider.        General instructions      •Pay attention to any changes in your symptoms. Let your health care provider know about them.      •Wear loose-fitting clothing. Do not wear anything tight around your waist that causes pressure on your abdomen.      •Raise (elevate) the head of your bed about 6 inches (15 cm). You may need to use a wedge to do this.      •Try relaxation strategies such as yoga, deep breathing, or meditation to manage stress. If you need help reducing stress, ask your health care provider.      •If you are overweight, reduce your weight to an amount that is healthy for you. Ask your health care provider for guidance about a safe weight loss goal.      •Keep all follow-up visits. This is important.        Contact a health care provider if:    •You have new symptoms.      •You have unexplained weight loss.      •You have difficulty swallowing, or it hurts to swallow.      •You have wheezing or a cough that does not go away.      •Your symptoms do not improve with treatment.      •You have frequent heartburn for more than two weeks.        Get help right away if:    •You have sudden severe pain in your arms, neck, jaw, teeth, or back.      •You suddenly feel sweaty, dizzy, or light-headed.      •You have chest pain or shortness of breath.      •You vomit and the vomit is green, yellow, or black, or it looks like blood or coffee grounds.      •Your stool is red, bloody, or black.      •You have a fever.      •You cannot swallow, drink, or eat.      These symptoms may represent a serious problem that is an emergency. Do not wait to see if the symptoms will go away. Get medical help right away. Call your local emergency services (911 in the [place] Do not drive yourself to the hospital.       Summary    •Esophagitis is inflammation of the esophagus.      •Most causes of esophagitis are not serious.      •Follow your health care provider's instructions about eating and drinking.      •Contact a health care provider if you have new symptoms, have weight loss, or coughing that does not stop.      •Get help right away if you have severe pain in the arms, neck, jaw, teeth, or back, or if you have chest pain, shortness of breath, or fever.      This information is not intended to replace advice given to you by your health care provider. Make sure you discuss any questions you have with your health care provider.      Document Revised: 06/28/2021 Document Reviewed: 06/28/2021

## 2022-02-10 NOTE — ED PROVIDER NOTE - PATIENT PORTAL LINK FT
You can access the FollowMyHealth Patient Portal offered by [facility] by registering at the following website: [email or web address]. By joining [employer]s FollowMyHealth portal, you will also be able to view your health information using other applications (apps) compatible with our system.

## 2022-02-10 NOTE — ED ADULT NURSE NOTE - OBJECTIVE STATEMENT
Pt BIBA from group home c/o abd pain and vomiting that started today. As per pt he vomited 4 times. Denies diarrhea. Aid at bedside.

## 2022-02-10 NOTE — ED ADULT NURSE NOTE - NSIMPLEMENTINTERV_GEN_ALL_ED
Implemented All Fall Risk Interventions:  [place] to call system. Call bell, personal items and telephone within reach. Instruct patient to call for assistance. Room bathroom lighting operational. Non-slip footwear when patient is off stretcher. Physically safe environment: no spills, clutter or unnecessary equipment. Stretcher in lowest position, wheels locked, appropriate side rails in place. Provide visual cue, wrist band, yellow gown, etc. Monitor gait and stability. Monitor for mental status changes and reorient to person, place, and time. Review medications for side effects contributing to fall risk. Reinforce activity limits and safety measures with patient and family.

## 2022-02-10 NOTE — ED PROVIDER NOTE - OBJECTIVE STATEMENT
50 yo M w/ hx MR p/w from group home with vomiting today, pt co some abd pain. no fever. no diarrhea. no known trauma. pt vaccinated for covid, no known exposures. no other acute co or changes.

## 2022-02-11 VITALS
SYSTOLIC BLOOD PRESSURE: 107 MMHG | RESPIRATION RATE: 16 BRPM | HEART RATE: 96 BPM | OXYGEN SATURATION: 96 % | TEMPERATURE: 99 F | DIASTOLIC BLOOD PRESSURE: 76 MMHG

## 2022-02-11 LAB
APPEARANCE UR: ABNORMAL
BILIRUB UR-MCNC: NEGATIVE — SIGNIFICANT CHANGE UP
COLOR SPEC: YELLOW — SIGNIFICANT CHANGE UP
COMMENT - URINE: SIGNIFICANT CHANGE UP
DIFF PNL FLD: NEGATIVE — SIGNIFICANT CHANGE UP
EPI CELLS # UR: SIGNIFICANT CHANGE UP
FLUAV AG NPH QL: SIGNIFICANT CHANGE UP
FLUBV AG NPH QL: SIGNIFICANT CHANGE UP
GLUCOSE UR QL: NEGATIVE — SIGNIFICANT CHANGE UP
KETONES UR-MCNC: NEGATIVE — SIGNIFICANT CHANGE UP
LEUKOCYTE ESTERASE UR-ACNC: NEGATIVE — SIGNIFICANT CHANGE UP
NITRITE UR-MCNC: NEGATIVE — SIGNIFICANT CHANGE UP
PH UR: 7 — SIGNIFICANT CHANGE UP (ref 5–8)
PROT UR-MCNC: 15
RBC CASTS # UR COMP ASSIST: SIGNIFICANT CHANGE UP /HPF (ref 0–4)
RSV RNA NPH QL NAA+NON-PROBE: SIGNIFICANT CHANGE UP
SARS-COV-2 RNA SPEC QL NAA+PROBE: SIGNIFICANT CHANGE UP
SP GR SPEC: 1.01 — SIGNIFICANT CHANGE UP (ref 1.01–1.02)
UROBILINOGEN FLD QL: NEGATIVE — SIGNIFICANT CHANGE UP
WBC UR QL: SIGNIFICANT CHANGE UP

## 2022-02-11 PROCEDURE — 70450 CT HEAD/BRAIN W/O DYE: CPT | Mod: 26,MA

## 2022-02-11 PROCEDURE — 96361 HYDRATE IV INFUSION ADD-ON: CPT

## 2022-02-11 PROCEDURE — 80053 COMPREHEN METABOLIC PANEL: CPT

## 2022-02-11 PROCEDURE — 74177 CT ABD & PELVIS W/CONTRAST: CPT | Mod: MA

## 2022-02-11 PROCEDURE — 87637 SARSCOV2&INF A&B&RSV AMP PRB: CPT

## 2022-02-11 PROCEDURE — 81001 URINALYSIS AUTO W/SCOPE: CPT

## 2022-02-11 PROCEDURE — 36415 COLL VENOUS BLD VENIPUNCTURE: CPT

## 2022-02-11 PROCEDURE — 87040 BLOOD CULTURE FOR BACTERIA: CPT

## 2022-02-11 PROCEDURE — 71045 X-RAY EXAM CHEST 1 VIEW: CPT

## 2022-02-11 PROCEDURE — 93005 ELECTROCARDIOGRAM TRACING: CPT

## 2022-02-11 PROCEDURE — 83690 ASSAY OF LIPASE: CPT

## 2022-02-11 PROCEDURE — 70450 CT HEAD/BRAIN W/O DYE: CPT | Mod: MA

## 2022-02-11 PROCEDURE — 85025 COMPLETE CBC W/AUTO DIFF WBC: CPT

## 2022-02-11 PROCEDURE — 83605 ASSAY OF LACTIC ACID: CPT

## 2022-02-11 PROCEDURE — 87186 SC STD MICRODIL/AGAR DIL: CPT

## 2022-02-11 PROCEDURE — 96374 THER/PROPH/DIAG INJ IV PUSH: CPT | Mod: XU

## 2022-02-11 PROCEDURE — 99285 EMERGENCY DEPT VISIT HI MDM: CPT | Mod: 25

## 2022-02-11 PROCEDURE — 87086 URINE CULTURE/COLONY COUNT: CPT

## 2022-02-11 PROCEDURE — 93010 ELECTROCARDIOGRAM REPORT: CPT

## 2022-02-11 PROCEDURE — 74177 CT ABD & PELVIS W/CONTRAST: CPT | Mod: 26,MA

## 2022-02-11 RX ORDER — FAMOTIDINE 10 MG/ML
20 INJECTION INTRAVENOUS ONCE
Refills: 0 | Status: COMPLETED | OUTPATIENT
Start: 2022-02-11 | End: 2022-02-11

## 2022-02-11 RX ADMIN — FAMOTIDINE 20 MILLIGRAM(S): 10 INJECTION INTRAVENOUS at 02:55

## 2022-02-11 NOTE — ED ADULT NURSE REASSESSMENT NOTE - NS ED NURSE REASSESS COMMENT FT1
0725- Received pt from previous RN. Pt resting comfortably in stretcher breathing equal and unlabored. Chest rise symmetrical. Pt awaiting transport back to group home. Safety maintained. Call bell within reach. Will continue to monitor. CS, RN

## 2022-02-12 NOTE — CHART NOTE - NSCHARTNOTEFT_GEN_A_CORE
I spoke to head nurse Radha,from patient house  I sent augmentin for 10 days  ct chest ordered  pulmonary consult ordered  repeat speech swallow  patient at home,,vss stable  ER called me,,,fu cxr abnormal,per radiologist,,post ER visit 2/10

## 2022-02-14 LAB
-  AMPICILLIN/SULBACTAM: SIGNIFICANT CHANGE UP
-  CEFAZOLIN: SIGNIFICANT CHANGE UP
-  DAPTOMYCIN: SIGNIFICANT CHANGE UP
-  GENTAMICIN: SIGNIFICANT CHANGE UP
-  LINEZOLID: SIGNIFICANT CHANGE UP
-  OXACILLIN: SIGNIFICANT CHANGE UP
-  PENICILLIN: SIGNIFICANT CHANGE UP
-  RIFAMPIN: SIGNIFICANT CHANGE UP
-  TETRACYCLINE: SIGNIFICANT CHANGE UP
-  TRIMETHOPRIM/SULFAMETHOXAZOLE: SIGNIFICANT CHANGE UP
-  VANCOMYCIN: SIGNIFICANT CHANGE UP
CULTURE RESULTS: SIGNIFICANT CHANGE UP
METHOD TYPE: SIGNIFICANT CHANGE UP
ORGANISM # SPEC MICROSCOPIC CNT: SIGNIFICANT CHANGE UP
ORGANISM # SPEC MICROSCOPIC CNT: SIGNIFICANT CHANGE UP
SPECIMEN SOURCE: SIGNIFICANT CHANGE UP

## 2022-02-14 NOTE — PROGRESS NOTE ADULT - ASSESSMENT
Patient Education     When Your Child Has a Cold or Flu  Updated for the 1571-4970 flu season   Colds and influenza (flu) infect the upper respiratory tract. This includes the mouth, nose, nasal passages, and throat. Both illnesses are caused by germs called viruses, and both share some of the same symptoms. But colds and flu differ in a few key ways. Knowing more about these infections may make it easier to prevent them. And if your child does get sick, you can help keep symptoms from becoming worse.     What is a cold?  · Symptoms include runny nose, cough, sneezing, and sore throat. Cold symptoms tend to be milder than flu symptoms.  · Cold symptoms come on slowly.  · Children with a cold can still do most of their usual activities.    What is the flu?  · Influenza is a respiratory infection. (It’s not the same as the stomach flu.)  · Symptoms include fever, headache, tiredness, cough, sore throat, runny nose, and muscle aches. Children may also have an upset stomach and vomiting.  · Flu symptoms tend to come on quickly.  · Children with the flu may feel too worn out to do their normal activities.    How do colds and flu spread?  The viruses that cause colds and flu spread in droplets when someone who is sick coughs or sneezes. Children can breathe in the germs directly. But they can also  the virus by touching a surface where droplets have landed. Germs then enter a child’s body when she touches her eyes, nose, or mouth.   Why do children get colds and flu?  Children get more colds and flu than adults do. Here are some reasons why:  · Less resistance. A child’s immune system is not as strong as an adult’s when it comes to fighting cold and flu germs.  · Winter season. Most respiratory illnesses occur in fall and winter when children are indoors and exposed to more germs.  · School or . Colds and flu spread easily when children are in close contact.  · Hand-to-mouth contact. Children are likely to  touch their eyes, nose, or mouth without washing their hands. This is the most common way germs spread.  How are colds and flu diagnosed?  Most often, healthcare providers diagnose a cold or the flu based on the child’s symptoms and a physical exam. Children may also have throat or nasal swabs to check for bacteria and viruses. Your child’s provider may do other tests, depending on your child’s symptoms and overall health. These tests may include:   · Complete blood count (CBC). This blood test looks for signs of infection.  · Chest X-ray. This is done to make sure your child does not have pneumonia.  How are colds and flu treated?  Most children recover from colds and flu on their own. Antibiotics don't work against cold viruses, so they are not prescribed for them. Antiviral medicines can help shorten the symptoms of the flu. This is particularly important if the child is at risk for or is developing complications of the flu. These medicines work best if they are started soon after your child shows symptoms.   For both illnesses, other treatments can help ease your child’s symptoms until your child recovers. To help your child feel better:   · Give your child lots of fluids, such as water, electrolyte solutions, apple juice, and warm soup, to prevent fluid loss (dehydration).  · Make sure your child gets plenty of rest.  · Have older children gargle with warm saltwater if they have a sore throat.  · To ease nasal congestion, try saline nasal sprays. You can buy them without a prescription, and they’re safe for children. These are not the same as nasal decongestant sprays. Those sprays may make symptoms worse if overused.  · Use children’s-strength medicine for symptoms. Discuss all over-the-counter (OTC) products with your child’s provider before using them. Note: Don’t give OTC cough and cold medicines to a child younger than 6 years old unless the provider tells you to do so.  · Never give aspirin to a child  1 Abdominal pain/dyspepsia: likely functional/related to eating habits. CT negative. Recent EGD negative. Continue PPI.    2. Elevated alp, likely due to AEDs    3. MR    4. R/o seizures per neurology.   under age 18 who has a cold or flu. It could cause a rare but serious condition called Reye syndrome.  · Never give ibuprofen to an infant age 6 months or younger.  · Keep your child home until he or she has been fever-free for 24 hours.  Preventing colds and flu  To help children stay healthy:   · Get a flu vaccine for your child. A flu vaccine is recommended for all children ages 6 months and older with few exceptions. The vaccine is often given as a shot. A nasal spray made of live but weakened flu virus may also be given. This is for healthy children 2 years and older who don't get the flu shot. Because of the COVID-19 pandemic, experts strongly advise getting a flu vaccine during 8965-6935 to protect yourself, your family, and others. Flu viruses and the COVID-19 virus are both likely to spread during flu season. A flu vaccine will help save medical resources to care for people with COVID-19. People at high risk for complications from the flu are also likely to be at high risk for serious problems from COVID-19, so it's important to get a flu vaccine.    · Teach children to wash their hands often--before eating and after using the bathroom, playing with animals, or coughing or sneezing. Carry an alcohol-based hand gel (containing at least 60% alcohol) for times when soap and water aren’t available.  · Remind children not to touch their eyes, nose, or mouth.  Tips for proper handwashing  Use clean, running water and plenty of soap. Work up a good lather.   · Clean the whole hand, under the nails, between the fingers, and up the wrists.  · Wash for at least 15 to 20 seconds (as long as it takes to say the alphabet or sing the Happy Birthday song). Don’t just wipe--scrub well.  · Rinse well. Let the water run down the fingers, not up the wrists.  · In a public restroom, use a paper towel to turn off the faucet and open the door.  When to call your child’s healthcare provider  Call your child’s provider if your  child doesn’t get better or has:  · Shortness of breath or fast breathing  · Thick yellow or green mucus that comes up with coughing  · Worsening symptoms, especially after a period of improvement  · Fever (see Fever and children, below)  · Severe or continued vomiting  · Signs of dehydration. These include a dry mouth, dark or strong-smelling urine or no urine output in 6 to 8 hours, and refusal to drink fluids.  · Trouble waking up  · Ear pain (in toddlers or teens)  · Sinus pain or pressure  Fever and children  Use a digital thermometer to check your child’s temperature. Don’t use a mercury thermometer. There are different kinds and uses of digital thermometers. They include:   · Rectal. For children younger than 3 years, a rectal temperature is the most accurate.  · Forehead (temporal). This works for children age 3 months and older. If a child under 3 months old has signs of illness, this can be used for a first pass. The provider may want to confirm with a rectal temperature.  · Ear (tympanic). Ear temperatures are accurate after 6 months of age, but not before.  · Armpit (axillary). This is the least reliable but may be used for a first pass to check a child of any age with signs of illness. The provider may want to confirm with a rectal temperature.  · Mouth (oral). Don’t use a thermometer in your child’s mouth until he or she is at least 4 years old.  Use the rectal thermometer with care. Follow the product maker’s directions for correct use. Insert it gently. Label it and make sure it’s not used in the mouth. It may pass on germs from the stool. If you don’t feel OK using a rectal thermometer, ask the healthcare provider what type to use instead. When you talk with any healthcare provider about your child’s fever, tell him or her which type you used.   Below are guidelines to know if your young child has a fever. Your child’s healthcare provider may give you different numbers for your child. Follow your  provider’s specific instructions.   Fever readings for a baby under 3 months old:   · First, ask your child’s healthcare provider how you should take the temperature.  · Rectal or forehead: 100.4°F (38°C) or higher  · Armpit: 99°F (37.2°C) or higher  Fever readings for a child age 3 months to 36 months (3 years):   · Rectal, forehead, or ear: 102°F (38.9°C) or higher  · Armpit: 101°F (38.3°C) or higher  Call the healthcare provider in these cases:   · Repeated temperature of 104°F (40°C) or higher in a child of any age  · Fever of 100.4° (38°C) or higher in baby younger than 3 months  · Fever that lasts more than 24 hours in a child under age 2  · Fever that lasts for 3 days in a child age 2 or older  [place] last reviewed this educational content on 5/1/2020  © 7778-6067 [employer]. All rights reserved. This information is not intended as a substitute for professional medical care. Always follow your healthcare professional's instructions.

## 2022-02-14 NOTE — PROVIDER CONTACT NOTE (CRITICAL VALUE NOTIFICATION) - TEST AND RESULT REPORTED:
urine culture collected on 2/11/2022- final result 10,000 -49,000 MRSA, less than 10,000 normal urogenital nestor present

## 2022-02-15 ENCOUNTER — EMERGENCY (EMERGENCY)
Facility: HOSPITAL | Age: 52
LOS: 1 days | Discharge: HOME | End: 2022-02-15
Attending: EMERGENCY MEDICINE | Admitting: EMERGENCY MEDICINE
Payer: MEDICARE

## 2022-02-15 VITALS
WEIGHT: 169.98 LBS | OXYGEN SATURATION: 98 % | SYSTOLIC BLOOD PRESSURE: 115 MMHG | HEIGHT: 68 IN | RESPIRATION RATE: 16 BRPM | DIASTOLIC BLOOD PRESSURE: 73 MMHG | TEMPERATURE: 99 F | HEART RATE: 105 BPM

## 2022-02-15 DIAGNOSIS — T85.09XA OTHER MECHANICAL COMPLICATION OF VENTRICULAR INTRACRANIAL (COMMUNICATING) SHUNT, INITIAL ENCOUNTER: Chronic | ICD-10-CM

## 2022-02-15 DIAGNOSIS — Z98.2 PRESENCE OF CEREBROSPINAL FLUID DRAINAGE DEVICE: Chronic | ICD-10-CM

## 2022-02-15 DIAGNOSIS — Z96.22 MYRINGOTOMY TUBE(S) STATUS: Chronic | ICD-10-CM

## 2022-02-15 LAB
ALBUMIN SERPL ELPH-MCNC: 3.3 G/DL — SIGNIFICANT CHANGE UP (ref 3.3–5)
ALP SERPL-CCNC: 180 U/L — HIGH (ref 40–120)
ALT FLD-CCNC: 75 U/L — SIGNIFICANT CHANGE UP (ref 12–78)
ANION GAP SERPL CALC-SCNC: 10 MMOL/L — SIGNIFICANT CHANGE UP (ref 5–17)
APPEARANCE UR: ABNORMAL
APTT BLD: 30.8 SEC — SIGNIFICANT CHANGE UP (ref 27.5–35.5)
AST SERPL-CCNC: 30 U/L — SIGNIFICANT CHANGE UP (ref 15–37)
BASOPHILS # BLD AUTO: 0.04 K/UL — SIGNIFICANT CHANGE UP (ref 0–0.2)
BASOPHILS NFR BLD AUTO: 0.3 % — SIGNIFICANT CHANGE UP (ref 0–2)
BILIRUB SERPL-MCNC: 0.4 MG/DL — SIGNIFICANT CHANGE UP (ref 0.2–1.2)
BILIRUB UR-MCNC: NEGATIVE — SIGNIFICANT CHANGE UP
BUN SERPL-MCNC: 10 MG/DL — SIGNIFICANT CHANGE UP (ref 7–23)
CALCIUM SERPL-MCNC: 8.4 MG/DL — LOW (ref 8.5–10.1)
CHLORIDE SERPL-SCNC: 104 MMOL/L — SIGNIFICANT CHANGE UP (ref 96–108)
CO2 SERPL-SCNC: 22 MMOL/L — SIGNIFICANT CHANGE UP (ref 22–31)
COLOR SPEC: YELLOW — SIGNIFICANT CHANGE UP
CREAT SERPL-MCNC: 0.75 MG/DL — SIGNIFICANT CHANGE UP (ref 0.5–1.3)
DIFF PNL FLD: ABNORMAL
EOSINOPHIL # BLD AUTO: 0.12 K/UL — SIGNIFICANT CHANGE UP (ref 0–0.5)
EOSINOPHIL NFR BLD AUTO: 1 % — SIGNIFICANT CHANGE UP (ref 0–6)
GLUCOSE SERPL-MCNC: 113 MG/DL — HIGH (ref 70–99)
GLUCOSE UR QL: NEGATIVE — SIGNIFICANT CHANGE UP
HCT VFR BLD CALC: 40.1 % — SIGNIFICANT CHANGE UP (ref 39–50)
HGB BLD-MCNC: 13.1 G/DL — SIGNIFICANT CHANGE UP (ref 13–17)
IMM GRANULOCYTES NFR BLD AUTO: 0.8 % — SIGNIFICANT CHANGE UP (ref 0–1.5)
INR BLD: 1.26 RATIO — HIGH (ref 0.88–1.16)
KETONES UR-MCNC: ABNORMAL
LACTATE SERPL-SCNC: 0.4 MMOL/L — LOW (ref 0.7–2)
LEUKOCYTE ESTERASE UR-ACNC: NEGATIVE — SIGNIFICANT CHANGE UP
LYMPHOCYTES # BLD AUTO: 0.85 K/UL — LOW (ref 1–3.3)
LYMPHOCYTES # BLD AUTO: 7.3 % — LOW (ref 13–44)
MCHC RBC-ENTMCNC: 28.7 PG — SIGNIFICANT CHANGE UP (ref 27–34)
MCHC RBC-ENTMCNC: 32.7 GM/DL — SIGNIFICANT CHANGE UP (ref 32–36)
MCV RBC AUTO: 87.9 FL — SIGNIFICANT CHANGE UP (ref 80–100)
MONOCYTES # BLD AUTO: 1.28 K/UL — HIGH (ref 0–0.9)
MONOCYTES NFR BLD AUTO: 10.9 % — SIGNIFICANT CHANGE UP (ref 2–14)
NEUTROPHILS # BLD AUTO: 9.32 K/UL — HIGH (ref 1.8–7.4)
NEUTROPHILS NFR BLD AUTO: 79.7 % — HIGH (ref 43–77)
NITRITE UR-MCNC: NEGATIVE — SIGNIFICANT CHANGE UP
NRBC # BLD: 0 /100 WBCS — SIGNIFICANT CHANGE UP (ref 0–0)
PH UR: 6 — SIGNIFICANT CHANGE UP (ref 5–8)
PLATELET # BLD AUTO: 353 K/UL — SIGNIFICANT CHANGE UP (ref 150–400)
POTASSIUM SERPL-MCNC: 3.8 MMOL/L — SIGNIFICANT CHANGE UP (ref 3.5–5.3)
POTASSIUM SERPL-SCNC: 3.8 MMOL/L — SIGNIFICANT CHANGE UP (ref 3.5–5.3)
PROT SERPL-MCNC: 7.7 G/DL — SIGNIFICANT CHANGE UP (ref 6–8.3)
PROT UR-MCNC: 30 MG/DL
PROTHROM AB SERPL-ACNC: 14.6 SEC — HIGH (ref 10.6–13.6)
RAPID RVP RESULT: SIGNIFICANT CHANGE UP
RBC # BLD: 4.56 M/UL — SIGNIFICANT CHANGE UP (ref 4.2–5.8)
RBC # FLD: 16.5 % — HIGH (ref 10.3–14.5)
SARS-COV-2 RNA SPEC QL NAA+PROBE: SIGNIFICANT CHANGE UP
SODIUM SERPL-SCNC: 136 MMOL/L — SIGNIFICANT CHANGE UP (ref 135–145)
SP GR SPEC: 1.02 — SIGNIFICANT CHANGE UP (ref 1.01–1.02)
UROBILINOGEN FLD QL: 1
WBC # BLD: 11.7 K/UL — HIGH (ref 3.8–10.5)
WBC # FLD AUTO: 11.7 K/UL — HIGH (ref 3.8–10.5)

## 2022-02-15 PROCEDURE — 74176 CT ABD & PELVIS W/O CONTRAST: CPT | Mod: MA

## 2022-02-15 PROCEDURE — 93005 ELECTROCARDIOGRAM TRACING: CPT

## 2022-02-15 PROCEDURE — 71250 CT THORAX DX C-: CPT | Mod: MA

## 2022-02-15 PROCEDURE — 87086 URINE CULTURE/COLONY COUNT: CPT

## 2022-02-15 PROCEDURE — 99285 EMERGENCY DEPT VISIT HI MDM: CPT | Mod: 25

## 2022-02-15 PROCEDURE — 85025 COMPLETE CBC W/AUTO DIFF WBC: CPT

## 2022-02-15 PROCEDURE — 87040 BLOOD CULTURE FOR BACTERIA: CPT

## 2022-02-15 PROCEDURE — 0225U NFCT DS DNA&RNA 21 SARSCOV2: CPT

## 2022-02-15 PROCEDURE — 71045 X-RAY EXAM CHEST 1 VIEW: CPT

## 2022-02-15 PROCEDURE — 81001 URINALYSIS AUTO W/SCOPE: CPT

## 2022-02-15 PROCEDURE — 71250 CT THORAX DX C-: CPT | Mod: 26,MA

## 2022-02-15 PROCEDURE — 96374 THER/PROPH/DIAG INJ IV PUSH: CPT

## 2022-02-15 PROCEDURE — 99285 EMERGENCY DEPT VISIT HI MDM: CPT

## 2022-02-15 PROCEDURE — 71045 X-RAY EXAM CHEST 1 VIEW: CPT | Mod: 26

## 2022-02-15 PROCEDURE — 80053 COMPREHEN METABOLIC PANEL: CPT

## 2022-02-15 PROCEDURE — 93010 ELECTROCARDIOGRAM REPORT: CPT

## 2022-02-15 PROCEDURE — 36415 COLL VENOUS BLD VENIPUNCTURE: CPT

## 2022-02-15 PROCEDURE — 93010 ELECTROCARDIOGRAM REPORT: CPT | Mod: 77

## 2022-02-15 PROCEDURE — 85610 PROTHROMBIN TIME: CPT

## 2022-02-15 PROCEDURE — 96375 TX/PRO/DX INJ NEW DRUG ADDON: CPT

## 2022-02-15 PROCEDURE — 83605 ASSAY OF LACTIC ACID: CPT

## 2022-02-15 PROCEDURE — 85730 THROMBOPLASTIN TIME PARTIAL: CPT

## 2022-02-15 PROCEDURE — 74176 CT ABD & PELVIS W/O CONTRAST: CPT | Mod: 26,MA

## 2022-02-15 RX ORDER — SODIUM CHLORIDE 9 MG/ML
1000 INJECTION INTRAMUSCULAR; INTRAVENOUS; SUBCUTANEOUS ONCE
Refills: 0 | Status: COMPLETED | OUTPATIENT
Start: 2022-02-15 | End: 2022-02-15

## 2022-02-15 RX ORDER — VANCOMYCIN HCL 1 G
1000 VIAL (EA) INTRAVENOUS ONCE
Refills: 0 | Status: COMPLETED | OUTPATIENT
Start: 2022-02-15 | End: 2022-02-15

## 2022-02-15 RX ORDER — PIPERACILLIN AND TAZOBACTAM 4; .5 G/20ML; G/20ML
3.38 INJECTION, POWDER, LYOPHILIZED, FOR SOLUTION INTRAVENOUS ONCE
Refills: 0 | Status: COMPLETED | OUTPATIENT
Start: 2022-02-15 | End: 2022-02-15

## 2022-02-15 RX ADMIN — SODIUM CHLORIDE 1000 MILLILITER(S): 9 INJECTION INTRAMUSCULAR; INTRAVENOUS; SUBCUTANEOUS at 22:05

## 2022-02-15 RX ADMIN — PIPERACILLIN AND TAZOBACTAM 200 GRAM(S): 4; .5 INJECTION, POWDER, LYOPHILIZED, FOR SOLUTION INTRAVENOUS at 23:01

## 2022-02-15 RX ADMIN — Medication 250 MILLIGRAM(S): at 23:03

## 2022-02-15 NOTE — ED PROVIDER NOTE - NSFOLLOWUPINSTRUCTIONS_ED_ALL_ED_FT
continue bactrim.  return for difficulty breathing, vomiting, fever for more than 48 hours, drink lots of fluids.  call dr clara rubio in am for follow up.

## 2022-02-15 NOTE — ED PROVIDER NOTE - OBJECTIVE STATEMENT
50 y/o M with hx of MR, cerebral shunt, seizures, mood d/o, GERD BIB staff from [facility] for evaluation of vomiting and lethargy today, sent to r/o aspiration pneumonia. As per staff member at bedside, pt had 2 episodes of vomiting today and has been lethargic since. Was told that pt also with +MRSA in urine and started on bactrim today. No fever, diarrhea or difficulty breathing as per staff.  pcp: Dr. Goldman

## 2022-02-15 NOTE — ED PROVIDER NOTE - CLINICAL SUMMARY MEDICAL DECISION MAKING FREE TEXT BOX
52 y/o M with hx of MR, cerebral shunt, seizures BIB staff from [facility] for evaluation of vomiting and lethargy today, sent to r/o aspiration pneumonia. As per staff member at bedside, pt had 2 episodes of vomiting today and has been lethargic since. Was told that pt also with +MRSA in urine and started on bactrim today. No fever, diarrhea or difficulty breathing as per staff. PE; as above A/P: temp 100F rectal, will r/o uti r/o pna will do sepsis workup, IVF, abx, tylenol if temp>100.4F, ct chest/abdomen/pelvis, possible admission

## 2022-02-15 NOTE — ED PROVIDER NOTE - ATTENDING CONTRIBUTION TO CARE
pt is a 50 yo mrcp, gerd,  shunt on abx for mrsa uti.  As per staff member at bedside, pt had 2 episodes of vomiting today and has been lethargic since. Was told that pt also with +MRSA in urine and started on bactrim today. pt in ed lethargic , non verbal, mm merline, lungs cta, abd soft, nt, cor: rrr no mgr, labs unremarkable, ct chest and abd no obvious  infecction, d/w dr clara rubio, ivf, iv abx, d/c home on oarl abx and she will see pt tomorrow

## 2022-02-15 NOTE — ED PROVIDER NOTE - CARE PROVIDER_API CALL
Yolanda Pavon (DO)  Medicine  [address]  [place] [address]  Phone: [phone]  Fax: [phone]  Follow Up Time: 1-3 Days

## 2022-02-15 NOTE — ED PROVIDER NOTE - PATIENT PORTAL LINK FT
You can access the FollowMyHealth Patient Portal offered by [facility] by registering at the following website: [email or web address]. By joining [employer]’s FollowMyHealth portal, you will also be able to view your health information using other applications (apps) compatible with our system.

## 2022-02-16 VITALS
OXYGEN SATURATION: 95 % | DIASTOLIC BLOOD PRESSURE: 76 MMHG | TEMPERATURE: 98 F | SYSTOLIC BLOOD PRESSURE: 117 MMHG | RESPIRATION RATE: 17 BRPM | HEART RATE: 95 BPM

## 2022-02-16 RX ORDER — VANCOMYCIN HCL 1 G
1000 VIAL (EA) INTRAVENOUS ONCE
Refills: 0 | Status: DISCONTINUED | OUTPATIENT
Start: 2022-02-16 | End: 2022-02-16

## 2022-02-16 RX ORDER — SODIUM CHLORIDE 9 MG/ML
1000 INJECTION INTRAMUSCULAR; INTRAVENOUS; SUBCUTANEOUS ONCE
Refills: 0 | Status: DISCONTINUED | OUTPATIENT
Start: 2022-02-16 | End: 2022-02-19

## 2022-02-17 ENCOUNTER — APPOINTMENT (OUTPATIENT)
Dept: NEUROLOGY | Facility: CLINIC | Age: 52
End: 2022-02-17
Payer: MEDICARE

## 2022-02-17 ENCOUNTER — EMERGENCY (EMERGENCY)
Facility: HOSPITAL | Age: 52
LOS: 1 days | Discharge: HOME | End: 2022-02-17
Attending: EMERGENCY MEDICINE | Admitting: EMERGENCY MEDICINE
Payer: MEDICARE

## 2022-02-17 VITALS
TEMPERATURE: 98 F | RESPIRATION RATE: 19 BRPM | HEIGHT: 65 IN | SYSTOLIC BLOOD PRESSURE: 117 MMHG | OXYGEN SATURATION: 94 % | HEART RATE: 90 BPM | DIASTOLIC BLOOD PRESSURE: 78 MMHG | WEIGHT: 179.9 LBS

## 2022-02-17 VITALS
HEIGHT: 66 IN | WEIGHT: 180 LBS | SYSTOLIC BLOOD PRESSURE: 118 MMHG | BODY MASS INDEX: 28.93 KG/M2 | HEART RATE: 90 BPM | DIASTOLIC BLOOD PRESSURE: 76 MMHG

## 2022-02-17 DIAGNOSIS — Z98.2 PRESENCE OF CEREBROSPINAL FLUID DRAINAGE DEVICE: Chronic | ICD-10-CM

## 2022-02-17 DIAGNOSIS — T85.09XA OTHER MECHANICAL COMPLICATION OF VENTRICULAR INTRACRANIAL (COMMUNICATING) SHUNT, INITIAL ENCOUNTER: Chronic | ICD-10-CM

## 2022-02-17 DIAGNOSIS — Z96.22 MYRINGOTOMY TUBE(S) STATUS: Chronic | ICD-10-CM

## 2022-02-17 LAB
ALBUMIN SERPL ELPH-MCNC: 4.1 G/DL — SIGNIFICANT CHANGE UP (ref 3.3–5)
ALP SERPL-CCNC: 184 U/L — HIGH (ref 40–120)
ALT FLD-CCNC: 43 U/L — SIGNIFICANT CHANGE UP (ref 10–45)
ANION GAP SERPL CALC-SCNC: 18 MMOL/L — HIGH (ref 5–17)
APPEARANCE UR: ABNORMAL
APTT BLD: 28.6 SEC — SIGNIFICANT CHANGE UP (ref 27.5–35.5)
AST SERPL-CCNC: 51 U/L — HIGH (ref 10–40)
BACTERIA # UR AUTO: NEGATIVE — SIGNIFICANT CHANGE UP
BASOPHILS # BLD AUTO: 0.05 K/UL — SIGNIFICANT CHANGE UP (ref 0–0.2)
BASOPHILS NFR BLD AUTO: 0.6 % — SIGNIFICANT CHANGE UP (ref 0–2)
BILIRUB SERPL-MCNC: 0.2 MG/DL — SIGNIFICANT CHANGE UP (ref 0.2–1.2)
BILIRUB UR-MCNC: NEGATIVE — SIGNIFICANT CHANGE UP
BUN SERPL-MCNC: 10 MG/DL — SIGNIFICANT CHANGE UP (ref 7–23)
CALCIUM SERPL-MCNC: 9.7 MG/DL — SIGNIFICANT CHANGE UP (ref 8.4–10.5)
CHLORIDE SERPL-SCNC: 100 MMOL/L — SIGNIFICANT CHANGE UP (ref 96–108)
CO2 SERPL-SCNC: 18 MMOL/L — LOW (ref 22–31)
COLOR SPEC: YELLOW — SIGNIFICANT CHANGE UP
CREAT SERPL-MCNC: 0.66 MG/DL — SIGNIFICANT CHANGE UP (ref 0.5–1.3)
CULTURE RESULTS: NO GROWTH — SIGNIFICANT CHANGE UP
DIFF PNL FLD: NEGATIVE — SIGNIFICANT CHANGE UP
EOSINOPHIL # BLD AUTO: 0.34 K/UL — SIGNIFICANT CHANGE UP (ref 0–0.5)
EOSINOPHIL NFR BLD AUTO: 4 % — SIGNIFICANT CHANGE UP (ref 0–6)
EPI CELLS # UR: 1 /HPF — SIGNIFICANT CHANGE UP
GLUCOSE SERPL-MCNC: 93 MG/DL — SIGNIFICANT CHANGE UP (ref 70–99)
GLUCOSE UR QL: NEGATIVE — SIGNIFICANT CHANGE UP
HCT VFR BLD CALC: 41.1 % — SIGNIFICANT CHANGE UP (ref 39–50)
HGB BLD-MCNC: 13 G/DL — SIGNIFICANT CHANGE UP (ref 13–17)
HYALINE CASTS # UR AUTO: 1 /LPF — SIGNIFICANT CHANGE UP (ref 0–2)
IMM GRANULOCYTES NFR BLD AUTO: 0.6 % — SIGNIFICANT CHANGE UP (ref 0–1.5)
INR BLD: 1.2 RATIO — HIGH (ref 0.88–1.16)
KETONES UR-MCNC: ABNORMAL
LEUKOCYTE ESTERASE UR-ACNC: NEGATIVE — SIGNIFICANT CHANGE UP
LYMPHOCYTES # BLD AUTO: 0.95 K/UL — LOW (ref 1–3.3)
LYMPHOCYTES # BLD AUTO: 11.2 % — LOW (ref 13–44)
MCHC RBC-ENTMCNC: 28.1 PG — SIGNIFICANT CHANGE UP (ref 27–34)
MCHC RBC-ENTMCNC: 31.6 GM/DL — LOW (ref 32–36)
MCV RBC AUTO: 88.8 FL — SIGNIFICANT CHANGE UP (ref 80–100)
MONOCYTES # BLD AUTO: 0.86 K/UL — SIGNIFICANT CHANGE UP (ref 0–0.9)
MONOCYTES NFR BLD AUTO: 10.1 % — SIGNIFICANT CHANGE UP (ref 2–14)
NEUTROPHILS # BLD AUTO: 6.27 K/UL — SIGNIFICANT CHANGE UP (ref 1.8–7.4)
NEUTROPHILS NFR BLD AUTO: 73.5 % — SIGNIFICANT CHANGE UP (ref 43–77)
NITRITE UR-MCNC: NEGATIVE — SIGNIFICANT CHANGE UP
NRBC # BLD: 0 /100 WBCS — SIGNIFICANT CHANGE UP (ref 0–0)
PH UR: 7 — SIGNIFICANT CHANGE UP (ref 5–8)
PLATELET # BLD AUTO: 396 K/UL — SIGNIFICANT CHANGE UP (ref 150–400)
POTASSIUM SERPL-MCNC: 6.6 MMOL/L — CRITICAL HIGH (ref 3.5–5.3)
POTASSIUM SERPL-SCNC: 6.6 MMOL/L — CRITICAL HIGH (ref 3.5–5.3)
PROT SERPL-MCNC: 8.3 G/DL — SIGNIFICANT CHANGE UP (ref 6–8.3)
PROT UR-MCNC: ABNORMAL
PROTHROM AB SERPL-ACNC: 14.3 SEC — HIGH (ref 10.6–13.6)
RBC # BLD: 4.63 M/UL — SIGNIFICANT CHANGE UP (ref 4.2–5.8)
RBC # FLD: 16.4 % — HIGH (ref 10.3–14.5)
RBC CASTS # UR COMP ASSIST: 2 /HPF — SIGNIFICANT CHANGE UP (ref 0–4)
SARS-COV-2 RNA SPEC QL NAA+PROBE: SIGNIFICANT CHANGE UP
SODIUM SERPL-SCNC: 136 MMOL/L — SIGNIFICANT CHANGE UP (ref 135–145)
SP GR SPEC: 1.03 — HIGH (ref 1.01–1.02)
SPECIMEN SOURCE: SIGNIFICANT CHANGE UP
TROPONIN T, HIGH SENSITIVITY RESULT: 10 NG/L — SIGNIFICANT CHANGE UP (ref 0–51)
UROBILINOGEN FLD QL: ABNORMAL
WBC # BLD: 8.52 K/UL — SIGNIFICANT CHANGE UP (ref 3.8–10.5)
WBC # FLD AUTO: 8.52 K/UL — SIGNIFICANT CHANGE UP (ref 3.8–10.5)
WBC UR QL: 1 /HPF — SIGNIFICANT CHANGE UP (ref 0–5)

## 2022-02-17 PROCEDURE — 99284 EMERGENCY DEPT VISIT MOD MDM: CPT

## 2022-02-17 PROCEDURE — 74018 RADEX ABDOMEN 1 VIEW: CPT | Mod: 26

## 2022-02-17 PROCEDURE — 71045 X-RAY EXAM CHEST 1 VIEW: CPT | Mod: 26

## 2022-02-17 PROCEDURE — 70250 X-RAY EXAM OF SKULL: CPT | Mod: 26

## 2022-02-17 PROCEDURE — 70450 CT HEAD/BRAIN W/O DYE: CPT | Mod: 26,MA

## 2022-02-17 PROCEDURE — 74177 CT ABD & PELVIS W/CONTRAST: CPT | Mod: 26,MA

## 2022-02-17 PROCEDURE — 99213 OFFICE O/P EST LOW 20 MIN: CPT

## 2022-02-17 RX ORDER — ONDANSETRON 8 MG/1
4 TABLET, FILM COATED ORAL ONCE
Refills: 0 | Status: COMPLETED | OUTPATIENT
Start: 2022-02-17 | End: 2022-02-17

## 2022-02-17 RX ORDER — SODIUM CHLORIDE 9 MG/ML
1000 INJECTION INTRAMUSCULAR; INTRAVENOUS; SUBCUTANEOUS ONCE
Refills: 0 | Status: COMPLETED | OUTPATIENT
Start: 2022-02-17 | End: 2022-02-17

## 2022-02-17 RX ADMIN — SODIUM CHLORIDE 1000 MILLILITER(S): 9 INJECTION INTRAMUSCULAR; INTRAVENOUS; SUBCUTANEOUS at 19:38

## 2022-02-17 RX ADMIN — ONDANSETRON 4 MILLIGRAM(S): 8 TABLET, FILM COATED ORAL at 19:38

## 2022-02-17 NOTE — ED PROVIDER NOTE - NS ED ROS FT
REVIEW OF SYSTEMS:    CONSTITUTIONAL: No weakness, fevers or chills  EYES/ENT: No visual changes;  No vertigo or throat pain   NECK: No pain or stiffness  RESPIRATORY: No cough, wheezing, hemoptysis; No shortness of breath  CARDIOVASCULAR: No chest pain or palpitations  GASTROINTESTINAL: +vomiting, +abd pain.   GENITOURINARY: +recently diagnosed UTI. No dysuria, frequency or hematuria  NEUROLOGICAL: +headaches. No numbness or weakness  SKIN: No itching, rashes

## 2022-02-17 NOTE — PHYSICAL EXAM
[General Appearance - Alert] : alert [General Appearance - In No Acute Distress] : in no acute distress [General Appearance - Well Nourished] : well nourished [Person] : oriented to person [Time] : oriented to time [Fluency] : fluency intact [Cranial Nerves Optic (II)] : visual acuity intact bilaterally,  visual fields full to confrontation, pupils equal round and reactive to light [Cranial Nerves Oculomotor (III)] : extraocular motion intact [Cranial Nerves Trigeminal (V)] : facial sensation intact symmetrically [Cranial Nerves Facial (VII)] : face symmetrical [Cranial Nerves Vestibulocochlear (VIII)] : hearing was intact bilaterally [Cranial Nerves Glossopharyngeal (IX)] : tongue and palate midline [Cranial Nerves Accessory (XI - Cranial And Spinal)] : head turning and shoulder shrug symmetric [Cranial Nerves Hypoglossal (XII)] : there was no tongue deviation with protrusion [Involuntary Movements] : no involuntary movements were seen [No Muscle Atrophy] : normal bulk in all four extremities [Sensation Tactile Decrease] : light touch was intact [Sensation Pain / Temperature Decrease] : pain and temperature was intact [Balance] : balance was intact [2+] : Ankle jerk left 2+ [Sclera] : the sclera and conjunctiva were normal [Extraocular Movements] : extraocular movements were intact [Full Pulse] : the pedal pulses are present [Edema] : there was no peripheral edema [Abnormal Walk] : normal gait [Nail Clubbing] : no clubbing  or cyanosis of the fingernails [Musculoskeletal - Swelling] : no joint swelling seen [Motor Tone] : muscle strength and tone were normal [Past-pointing] : there was no past-pointing [Tremor] : no tremor present [FreeTextEntry5] : Only vision to light in right eye, none in left eye [FreeTextEntry6] : Increased tone throughout, left greater than right.  5/5 strength. [FreeTextEntry1] : see above

## 2022-02-17 NOTE — ED ADULT NURSE REASSESSMENT NOTE - NS ED NURSE REASSESS COMMENT FT1
Report received from TERA Pritchett. Pt a & o x 4 , able to follow commands. Breathing spontaneous & nonlabored. IV site patent, no signs of phlebitis, flushing without difficulty. Call bell within reach, bed in lowest position.

## 2022-02-17 NOTE — ED PROVIDER NOTE - NSFOLLOWUPINSTRUCTIONS_ED_ALL_ED_FT
Please follow up with your primary care physician within the next 1-3 days.    Acute Nausea and Vomiting    WHAT YOU NEED TO KNOW:    Acute nausea and vomiting start suddenly, worsen quickly, and last a short time.    DISCHARGE INSTRUCTIONS:    Return to the emergency department if:  You see blood in your vomit or your bowel movements.  You have sudden, severe pain in your chest and upper abdomen after hard vomiting or retching.  You have swelling in your neck and chest.  You are dizzy, cold, and thirsty and your eyes and mouth are dry.  You are urinating very little or not at all.  You have muscle weakness, leg cramps, and trouble breathing.  Your heart is beating much faster than normal.  You continue to vomit for more than 48 hours.  Contact your healthcare provider if:  You have frequent dry heaves (vomiting but nothing comes out).  Your nausea and vomiting does not get better or go away after you use medicine.  You have questions or concerns about your condition or treatment.  Medicines: You may need any of the following:  Medicines may be given to calm your stomach and stop your vomiting. You may also need medicines to help you feel more relaxed or to stop nausea and vomiting caused by motion sickness.  Gastrointestinal stimulants are used to help empty your stomach and bowels. This may help decrease nausea and vomiting.  Take your medicine as directed. Contact your healthcare provider if you think your medicine is not helping or if you have side effects. Tell him or her if you are allergic to any medicine. Keep a list of the medicines, vitamins, and herbs you take. Include the amounts, and when and why you take them. Bring the list or the pill bottles to follow-up visits. Carry your medicine list with you in case of an emergency.  Prevent or manage acute nausea and vomiting:  Do not drink alcohol. Alcohol may upset or irritate your stomach. Too much alcohol can also cause acute nausea and vomiting.  Control stress. Headaches due to stress may cause nausea and vomiting. Find ways to relax and manage your stress. Get more rest and sleep.  Drink more liquids as directed. Vomiting can lead to dehydration. It is important to drink more liquids to help replace lost body fluids. Ask your healthcare provider how much liquid to drink each day and which liquids are best for you. Your provider may recommend that you drink an oral rehydration solution (ORS). ORS contains water, salts, and sugar that are needed to replace the lost body fluids. Ask what kind of ORS to use, how much to drink, and where to get it.  Eat smaller meals, more often. Eat small amounts of food every 2 to 3 hours, even if you are not hungry. Food in your stomach may decrease your nausea.  Talk to your healthcare provider before you take over-the-counter (OTC) medicines. These medicines can cause serious problems if you use certain other medicines, or you have a medical condition. You may have problems if you use too much or use them for longer than the label says. Follow directions on the label carefully.  Follow up with your healthcare provider as directed: Write down your questions so you remember to ask them during your follow-up visits.

## 2022-02-17 NOTE — ED PROVIDER NOTE - ATTENDING CONTRIBUTION TO CARE
I performed a history and physical exam of the patient and discussed their management with the resident and /or advanced care provider. I reviewed the resident and /or ACP's note and agree with the documented findings and plan of care. My medical decision making and observations are found above.  Lungs clear abd soft, non tender to palpation. I performed a history and physical exam of the patient and discussed their management with the resident and /or advanced care provider. I reviewed the resident and /or ACP's note and agree with the documented findings and plan of care. My medical decision making and observations are found above.  Lungs clear abd soft, non tender to palpation.    Swetha: MÓNICA Posada MD have reviewed and discussed the medical student's documentation and findings with the student. After personally examining the patient and getting an independent history, my findings have been added to this documentation.

## 2022-02-17 NOTE — ED ADULT NURSE REASSESSMENT NOTE - NS ED NURSE REASSESS COMMENT FT1
Pt unable to provide urine specimen. Pt bladder scanned and seen to be retaining > 500 cc of urine. Straight cath ordered verbally by MD. Procedure explained to pt. Straight cath done with RN Nelda to ensure sterile technique. Pt tolerated procedure well, 600 cc of urine drained.

## 2022-02-17 NOTE — ED ADULT NURSE NOTE - NSIMPLEMENTINTERV_GEN_ALL_ED
Implemented All Fall Risk Interventions:  [place] to call system. Call bell, personal items and telephone within reach. Instruct patient to call for assistance. Room bathroom lighting operational. Non-slip footwear when patient is off stretcher. Physically safe environment: no spills, clutter or unnecessary equipment. Stretcher in lowest position, wheels locked, appropriate side rails in place. Provide visual cue, wrist band, yellow gown, etc. Monitor gait and stability. Monitor for mental status changes and reorient to person, place, and time. Review medications for side effects contributing to fall risk. Reinforce activity limits and safety measures with patient and family.

## 2022-02-17 NOTE — ED PROVIDER NOTE - PROGRESS NOTE DETAILS
Patient feels better and can now tolerate PO. Spoke with Carol, nurse at [facility], about CT findings. She understands patient needs to follow up with PCP within 1-3 days. DO [place] (PGY-1)

## 2022-02-17 NOTE — ED PROVIDER NOTE - PHYSICAL EXAMINATION
T(C): 36.7 (02-17-22 @ 19:09), Max: 36.8 (02-17-22 @ 18:34)  HR: 84 (02-17-22 @ 19:09) (84 - 90)  BP: 127/84 (02-17-22 @ 19:09) (117/78 - 127/84)  RR: 16 (02-17-22 @ 19:09) (16 - 19)  SpO2: 94% (02-17-22 @ 19:09) (94% - 94%)    CONSTITUTIONAL: +patient on ride side, speech difficult to understand. No apparent distress    EYES: PERRLA and symmetric, EOMI, No conjunctival or scleral injection, non-icteric    ENMT: Oral mucosa with moist membranes. No external nasal lesions; nasal mucosa not inflamed; poor dentition.    RESPIRATORY: No respiratory distress, no use of accessory muscles; CTA b/l, no wheezes, rales or rhonchi,     CARDIOVASCULAR: RRRR, +S1S2, no murmurs, no rubs, no gallops; no JVD; no peripheral edema    GASTROINTESTINAL: +diffuse tenderness. Soft, No rebound, no guarding.    MUSCULOSKELETAL: noncooperative with MSK exam.    SKIN: No rashes or ulcers noted; no subcutaneous nodules or induration palpable    NEUROLOGIC: +gaze deviation, +R sided preference.    PSYCHIATRIC: Minimally responsive. AO3

## 2022-02-17 NOTE — ED ADULT NURSE NOTE - OBJECTIVE STATEMENT
52 y/o male presents to ED via EMS from group home facility c/o vomiting. Per EMS, pt was vomiting only today but pt endorses several days of vomiting. PMH of hydrocephalus with  shunt. Denies fever, chest pain, chills, urinary symptoms. A&Ox4, R gaze palsy and R leaning w/ body which is baseline per pt and EMS. Abd soft but diffusely tender per pt. Skin warm dry and intact.

## 2022-02-17 NOTE — ED ADULT NURSE REASSESSMENT NOTE - NS ED NURSE REASSESS COMMENT FT1
Pt had episode of emesis followed by dry heaving. Pt repositioned & suctioned. MD aware & zofran ordered. Awaiting CTr

## 2022-02-17 NOTE — ED PROVIDER NOTE - CHIEF COMPLAINT
The patient is a 51y Male complaining of  The patient is a 51y Male complaining of vomitting The patient is a 51y Male complaining of vomiting

## 2022-02-17 NOTE — HISTORY OF PRESENT ILLNESS
[FreeTextEntry1] : **UPDATE:2/17/2022***\par Mr Andi Ferrara is here today for a scheduled follow up office visit and is accompanied by his mother and caregiver\par Esophagalitis\par \par ***UPDATE:9/24/21***\par Mr Andi Ferrara is accompanied by his home care worker - Aunt not here today. Tried to reach out to her with no answer (? wrong number).\par EMU with no seizures - GI was consulted.\par Had surgery for his cervical stenosis and much improved.  Mild seizures shortly after surgery, but doing well.\par Per message from Aunt memory worse with weight gain.\par \par ***UPDATE:9/24/21***\par Mr Andi Ferrara is accompanied by his home care worker - Aunt not here today.\par EMU with no seizures - GI was consulted.\par Had surgery for his cervical stenosis and much improved.  Mild seizures shortly after surgery, but doing well.\par \par \par ***UPDATE:6/21/21***\par Mr Andi Ferrara is accompanied by his legal guardian Ms Melissa Guy\par Patient has been having LUQ pain for past few months.  Awaiting GI consultation at this point.\par Aunt notes has been having episode a few times per week with staring upwards for several minutes - no other seizure activity noted.\par Also, worsening of behavior overall.\par Had surgery for his cervical stenosis and much improved.  Mild seizures shortly after surgery, but doing well.\par \par ***UPDATE:11/24/20***\par Mr Andi Ferrara is accompanied by his legal guardian Ms Melissa Morleyin\par He is doing well overall.\par Had surgery for his cervical stenosis and much improved.  Mild seizures shortly after surgery, but doing well.\par \par ***UPDATE:7/31/20***\par Mr Andi Ferrara is accompanied by his legal guardian Ms Melissa Morleyin\par He has no reported interval seizures . He is doing well overall.\par \par Patient now with intermittent weakness when walking.  However, had bug bite to right ankle in the fall and has been walking less.  No issues bowel/bladder but mild ataxia.\par \par ***UPDATE:2/3/20***\par Mr Andi Ferrara is accompanied by his legal guardian Ms Melissa Guy\par He has no reported interval seizures . he had a fall on 1/25 but did not hit his head. It was noted that he had a bug bite from end of September on right ankle which is now swollen and red\par \par LTG 300mg BID\par Onfi 5mg BID\par Primidone 250mg BID\par \par Last office visit:\par  \par Possible small seizure a few weeks ago where was a little confused at bowling.  Had CT head which showed no change.\par \par On LTG 300mg bid and Primidone bid. Onfi 5mg bid.\par \par Seeing psych who started Lexapro 10 daily and Zyprexa\par \par Mr. 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  He comes accompanied by his aunt and uncle (legal guardians) and the .\par \par The seizures started shortly after birth with unclear details.  He was in the ICU for some time and found to have hydrocephalus.  At 16 months of age, his shunt was found to be malfunctioning and a new shunt was placed.  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  In 2013, the shut malfunctioned once again with further visual loss, now with only vision to light in the right eye and no vision in the left eye.\par \par The seizures at a younger age are unclear in semiology.  However, for the past several decades his typical seizures have been pulling at his clothes for seconds to minutes with confusion thereafter.  He gets confused with these events with behavioral arrest. \par the exact frequency is unknown (perhaps several times a month).  He does not have convulsions.\par \par In addition to the seizures above, he has been having behavioral outbursts at his home.  He gets mad at times or very tired at times.  It has been unclear if these are seizures.  Also episodes of head pain as well.  This has been followed by his psychiatrist.\par \par The patient has been tried on several AEDS (PHT, Vimpat, VPA, PHB, CBZ, LEV, Banzel, TPM) and has not been treated with these meds.  He is currently on Primidone 250 bidand Lamictal 400mg bid.  He was tried on Onfi 5mg bid in December 2015  and did well, but this was stopped because he ran out.  This was restarted in February 2016 at 5mg bid.\par \par The patient had a VNS placed several years ago at "maximum output" and has followed with Dr. Yuri Yanez in [facility].  It was stated that would be difficult to do any further surgery on him.\par \par CT scans in the past have shown right parietal encephalomalacia.  EEG studies have shown (AEEG and VEEG) bilateral temporal frontal discharges (R>L) with one seizure caught on the right in the past.\par \par He has been followed by Dr. Phillips and at [facility] in the past.  VEEG testing July 2016 showed no seizures and no changes to meds.  Also showed episodes of headache and pulling at shirt with no changes.  Still with headaches at this time with intermittent abdominal pain with no known etiology.\par \par Several seizures with ZNS increased to 100 bid earlier in the year.  Worsening of behavior with ZNS and no improvement.  We stopped ZNS and still with behavioral problems.\par \par VNS was shut off/\par \par PMHx as above\par FHx No seizures\par Meds/All: see below\par

## 2022-02-17 NOTE — DISCUSSION/SUMMARY
[Medically Refractory (seizure within the last year)] : Medically Refractory (seizure within the last year) [Inpatient video EEG study ordered with length of stay anticipated in days: _____] : Inpatient video EEG study ordered with length of stay anticipated in days: [unfilled] [Event capture (for localization, differential diagnosis) (typically 3-5 days)] : Event capture (for localization, differential diagnosis) (typically 3-5 days)  [FreeTextEntry1] : 51 year old man with history developmental delay and epilepsy with shunt placement and VNS placement.\par \par Had long discussion about options.  \par \par The events in the hospital (shirt pulling, headache, etc) were not associated with seizure activity.  His behavioral episodes also unlikely 2/2 seizure activity.  As for headache, no change in CT and not severe.  Not complaining of pain at this time.\par \par Episodes of starting upward could be behavioral with negative EMU stay. \par \par Plan;\par Continue current AED regimen\par -reviewed seizure triggers\par - annual labwork including AED levels.\par - psych (in [facility]) for behavior\par - DEXA scan next visit (holding off because of COVID)\par - following with cardiology\par - memory evaluated by Dr. Allen with no change per caregiver\par \par \par Total time 40 min.\par Patient seen 40 min face to face with >50% in counseling and discussion.\par \par

## 2022-02-17 NOTE — ED PROVIDER NOTE - PATIENT PORTAL LINK FT
You can access the FollowMyHealth Patient Portal offered by [facility] by registering at the following website: [email or web address]. By joining [facility]’s FollowMyHealth portal, you will also be able to view your health information using other applications (apps) compatible with our system.

## 2022-02-17 NOTE — ED PROVIDER NOTE - CLINICAL SUMMARY MEDICAL DECISION MAKING FREE TEXT BOX
Swetha: 50yo with vomiting, headache and eye pain as well as adb pain. Known to have hydrocephaly and shunt. will eval shunt and treat sx. no fever

## 2022-02-17 NOTE — ED ADULT NURSE REASSESSMENT NOTE - NS ED NURSE REASSESS COMMENT FT1
pt O2 90%on room air. Nasal cannula placed on 2L. Sat is at 95%. Pt provided blankets. Aide is at the bedside.

## 2022-02-17 NOTE — ED PROVIDER NOTE - OBJECTIVE STATEMENT
51M PMH hydrocephalus s/p  shunt p/w vomitting. Per patient, the vomitting is associated with abdominal pain but not diarrhea. Patient denies chest pain, difficulty breathing. Patient provides limited history.    Collateral provided by Christi Guy ([phone]) and [facility] nurse ([phone]):  Patient has been vomitting 2-4 times/day for the past week. During this time he has been aggrevated and complaining of headaches. Early in the week there was some blood in the vomit but that has resolved. He has been sent to OSH ED 2x for the same condition. Per AK, his  shunt was checked and he was sent home in the absence of known cause. 51M PMH hydrocephalus s/p  shunt p/w vomitting. Per patient, the vomitting is associated with abdominal pain but not diarrhea. Patient denies chest pain, difficulty breathing. Patient provides limited history.    Collateral provided by Christi Guy ([phone]) and detention nurse ([phone]):  Patient has been vomitting 2-4 times/day for the past week. During this time he has been aggrevated and complaining of headaches. Early in the week there was some blood in the vomit but that has resolved. Nobody else at the group home has similar symptoms. At the home his vomiting was treated with a bland diet and his headaches were treated with tylenol and ice packs. He has been sent to OSH ED 2x for the same condition. Per AK, his  shunt was checked and he was sent home. The patient has an appointment with his neurologist Dr. Jewell at [facility] for an MRI March 1 to further evaluate. Additionally, the patient had a MRSA UTI diagnosed Monday. 51M PMH hydrocephalus s/p  shunt p/w vomiting. Per patient, the vomiting is associated with abdominal pain but not diarrhea. Patient denies chest pain, difficulty breathing. Patient provides limited history.    Collateral provided by Christi Guy ([phone]) and correction nurse ([phone]):  Patient has been vomitting 2-4 times/day for the past week. During this time he has been aggrevated and complaining of headaches. Early in the week there was some blood in the vomit but that has resolved. Nobody else at the group home has similar symptoms. At the home his vomiting was treated with a bland diet and his headaches were treated with tylenol and ice packs. He has been sent to OSH ED 2x for the same condition. Per AK, his  shunt was checked and he was sent home. The patient has an appointment with his neurologist Dr. Jewell at [facility] for an MRI March 1 to further evaluate. Additionally, the patient had a MRSA UTI diagnosed Monday.

## 2022-02-18 VITALS
RESPIRATION RATE: 18 BRPM | OXYGEN SATURATION: 95 % | DIASTOLIC BLOOD PRESSURE: 87 MMHG | HEART RATE: 100 BPM | SYSTOLIC BLOOD PRESSURE: 114 MMHG | TEMPERATURE: 98 F

## 2022-02-18 LAB
ALBUMIN SERPL ELPH-MCNC: 3.8 G/DL — SIGNIFICANT CHANGE UP (ref 3.3–5)
ALP SERPL-CCNC: 175 U/L — HIGH (ref 40–120)
ALT FLD-CCNC: 36 U/L — SIGNIFICANT CHANGE UP (ref 10–45)
ANION GAP SERPL CALC-SCNC: 15 MMOL/L — SIGNIFICANT CHANGE UP (ref 5–17)
AST SERPL-CCNC: 15 U/L — SIGNIFICANT CHANGE UP (ref 10–40)
BILIRUB SERPL-MCNC: 0.2 MG/DL — SIGNIFICANT CHANGE UP (ref 0.2–1.2)
BUN SERPL-MCNC: 8 MG/DL — SIGNIFICANT CHANGE UP (ref 7–23)
CALCIUM SERPL-MCNC: 9.2 MG/DL — SIGNIFICANT CHANGE UP (ref 8.4–10.5)
CHLORIDE SERPL-SCNC: 103 MMOL/L — SIGNIFICANT CHANGE UP (ref 96–108)
CO2 SERPL-SCNC: 20 MMOL/L — LOW (ref 22–31)
CREAT SERPL-MCNC: 0.72 MG/DL — SIGNIFICANT CHANGE UP (ref 0.5–1.3)
GLUCOSE SERPL-MCNC: 104 MG/DL — HIGH (ref 70–99)
POTASSIUM SERPL-MCNC: 4 MMOL/L — SIGNIFICANT CHANGE UP (ref 3.5–5.3)
POTASSIUM SERPL-SCNC: 4 MMOL/L — SIGNIFICANT CHANGE UP (ref 3.5–5.3)
PROT SERPL-MCNC: 7.4 G/DL — SIGNIFICANT CHANGE UP (ref 6–8.3)
SODIUM SERPL-SCNC: 138 MMOL/L — SIGNIFICANT CHANGE UP (ref 135–145)

## 2022-02-18 PROCEDURE — 70450 CT HEAD/BRAIN W/O DYE: CPT | Mod: MA

## 2022-02-18 PROCEDURE — 87086 URINE CULTURE/COLONY COUNT: CPT

## 2022-02-18 PROCEDURE — 82947 ASSAY GLUCOSE BLOOD QUANT: CPT

## 2022-02-18 PROCEDURE — 85018 HEMOGLOBIN: CPT

## 2022-02-18 PROCEDURE — 74177 CT ABD & PELVIS W/CONTRAST: CPT | Mod: MA

## 2022-02-18 PROCEDURE — 84295 ASSAY OF SERUM SODIUM: CPT

## 2022-02-18 PROCEDURE — 85730 THROMBOPLASTIN TIME PARTIAL: CPT

## 2022-02-18 PROCEDURE — 74018 RADEX ABDOMEN 1 VIEW: CPT

## 2022-02-18 PROCEDURE — 96376 TX/PRO/DX INJ SAME DRUG ADON: CPT

## 2022-02-18 PROCEDURE — 82435 ASSAY OF BLOOD CHLORIDE: CPT

## 2022-02-18 PROCEDURE — 85610 PROTHROMBIN TIME: CPT

## 2022-02-18 PROCEDURE — U0003: CPT

## 2022-02-18 PROCEDURE — U0005: CPT

## 2022-02-18 PROCEDURE — 80053 COMPREHEN METABOLIC PANEL: CPT

## 2022-02-18 PROCEDURE — 93005 ELECTROCARDIOGRAM TRACING: CPT

## 2022-02-18 PROCEDURE — 85025 COMPLETE CBC W/AUTO DIFF WBC: CPT

## 2022-02-18 PROCEDURE — 84132 ASSAY OF SERUM POTASSIUM: CPT

## 2022-02-18 PROCEDURE — 71045 X-RAY EXAM CHEST 1 VIEW: CPT

## 2022-02-18 PROCEDURE — 82803 BLOOD GASES ANY COMBINATION: CPT

## 2022-02-18 PROCEDURE — 82330 ASSAY OF CALCIUM: CPT

## 2022-02-18 PROCEDURE — 84484 ASSAY OF TROPONIN QUANT: CPT

## 2022-02-18 PROCEDURE — 70250 X-RAY EXAM OF SKULL: CPT

## 2022-02-18 PROCEDURE — 99285 EMERGENCY DEPT VISIT HI MDM: CPT | Mod: 25

## 2022-02-18 PROCEDURE — 85014 HEMATOCRIT: CPT

## 2022-02-18 PROCEDURE — 83605 ASSAY OF LACTIC ACID: CPT

## 2022-02-18 PROCEDURE — 96374 THER/PROPH/DIAG INJ IV PUSH: CPT | Mod: XU

## 2022-02-18 PROCEDURE — 36415 COLL VENOUS BLD VENIPUNCTURE: CPT

## 2022-02-18 PROCEDURE — 81001 URINALYSIS AUTO W/SCOPE: CPT

## 2022-02-18 RX ADMIN — ONDANSETRON 4 MILLIGRAM(S): 8 TABLET, FILM COATED ORAL at 00:03

## 2022-02-18 NOTE — ED ADULT NURSE REASSESSMENT NOTE - GENERAL PATIENT STATE
comfortable appearance/cooperative
comfortable appearance/improvement verbalized/smiling/interactive

## 2022-02-18 NOTE — ED ADULT NURSE REASSESSMENT NOTE - NS ED NURSE REASSESS COMMENT FT1
Report received from Eliza HALEY. Pt A&Ox3 with hx of MR and hydrocephalus presenting to ED c/o vomiting. ED workup unremarkable. Pt discharged back to group home. Eliza HALEY provide report to group home. Discharge paper work and copy of results provided and reviewed with [facility] and group home staff. DUYENS

## 2022-02-19 LAB
CULTURE RESULTS: NO GROWTH — SIGNIFICANT CHANGE UP
SPECIMEN SOURCE: SIGNIFICANT CHANGE UP

## 2022-03-01 ENCOUNTER — APPOINTMENT (OUTPATIENT)
Dept: NEUROLOGY | Facility: CLINIC | Age: 52
End: 2022-03-01

## 2022-03-03 ENCOUNTER — INPATIENT (INPATIENT)
Facility: HOSPITAL | Age: 52
LOS: 6 days | Discharge: NURSING, REHAB OR LONG-TERM CARE | DRG: 392 | End: 2022-03-10
Attending: INTERNAL MEDICINE | Admitting: STUDENT IN AN ORGANIZED HEALTH CARE EDUCATION/TRAINING PROGRAM
Payer: MEDICARE

## 2022-03-03 ENCOUNTER — OUTPATIENT (OUTPATIENT)
Dept: OUTPATIENT SERVICES | Facility: HOSPITAL | Age: 52
LOS: 1 days | End: 2022-03-03
Payer: MEDICARE

## 2022-03-03 VITALS
HEART RATE: 106 BPM | TEMPERATURE: 98 F | DIASTOLIC BLOOD PRESSURE: 75 MMHG | SYSTOLIC BLOOD PRESSURE: 129 MMHG | RESPIRATION RATE: 20 BRPM | OXYGEN SATURATION: 97 % | HEIGHT: 65 IN | WEIGHT: 179.9 LBS

## 2022-03-03 DIAGNOSIS — T85.09XA OTHER MECHANICAL COMPLICATION OF VENTRICULAR INTRACRANIAL (COMMUNICATING) SHUNT, INITIAL ENCOUNTER: Chronic | ICD-10-CM

## 2022-03-03 DIAGNOSIS — Z91.89 OTHER SPECIFIED PERSONAL RISK FACTORS, NOT ELSEWHERE CLASSIFIED: ICD-10-CM

## 2022-03-03 DIAGNOSIS — Z98.2 PRESENCE OF CEREBROSPINAL FLUID DRAINAGE DEVICE: Chronic | ICD-10-CM

## 2022-03-03 DIAGNOSIS — Z96.22 MYRINGOTOMY TUBE(S) STATUS: Chronic | ICD-10-CM

## 2022-03-03 LAB
BASOPHILS # BLD AUTO: 0.07 K/UL — SIGNIFICANT CHANGE UP (ref 0–0.2)
BASOPHILS NFR BLD AUTO: 0.7 % — SIGNIFICANT CHANGE UP (ref 0–2)
EOSINOPHIL # BLD AUTO: 0.35 K/UL — SIGNIFICANT CHANGE UP (ref 0–0.5)
EOSINOPHIL NFR BLD AUTO: 3.5 % — SIGNIFICANT CHANGE UP (ref 0–6)
HCT VFR BLD CALC: 41 % — SIGNIFICANT CHANGE UP (ref 39–50)
HGB BLD-MCNC: 13 G/DL — SIGNIFICANT CHANGE UP (ref 13–17)
IMM GRANULOCYTES NFR BLD AUTO: 0.9 % — SIGNIFICANT CHANGE UP (ref 0–1.5)
LYMPHOCYTES # BLD AUTO: 0.82 K/UL — LOW (ref 1–3.3)
LYMPHOCYTES # BLD AUTO: 8.1 % — LOW (ref 13–44)
MCHC RBC-ENTMCNC: 28.4 PG — SIGNIFICANT CHANGE UP (ref 27–34)
MCHC RBC-ENTMCNC: 31.7 GM/DL — LOW (ref 32–36)
MCV RBC AUTO: 89.5 FL — SIGNIFICANT CHANGE UP (ref 80–100)
MONOCYTES # BLD AUTO: 0.85 K/UL — SIGNIFICANT CHANGE UP (ref 0–0.9)
MONOCYTES NFR BLD AUTO: 8.4 % — SIGNIFICANT CHANGE UP (ref 2–14)
NEUTROPHILS # BLD AUTO: 7.91 K/UL — HIGH (ref 1.8–7.4)
NEUTROPHILS NFR BLD AUTO: 78.4 % — HIGH (ref 43–77)
NRBC # BLD: 0 /100 WBCS — SIGNIFICANT CHANGE UP (ref 0–0)
PLATELET # BLD AUTO: 447 K/UL — HIGH (ref 150–400)
RBC # BLD: 4.58 M/UL — SIGNIFICANT CHANGE UP (ref 4.2–5.8)
RBC # FLD: 15.9 % — HIGH (ref 10.3–14.5)
WBC # BLD: 10.09 K/UL — SIGNIFICANT CHANGE UP (ref 3.8–10.5)
WBC # FLD AUTO: 10.09 K/UL — SIGNIFICANT CHANGE UP (ref 3.8–10.5)

## 2022-03-03 PROCEDURE — 73610 X-RAY EXAM OF ANKLE: CPT | Mod: 26,RT

## 2022-03-03 PROCEDURE — 74018 RADEX ABDOMEN 1 VIEW: CPT | Mod: 26

## 2022-03-03 PROCEDURE — 74246 X-RAY XM UPR GI TRC 2CNTRST: CPT

## 2022-03-03 PROCEDURE — 93010 ELECTROCARDIOGRAM REPORT: CPT

## 2022-03-03 PROCEDURE — 99285 EMERGENCY DEPT VISIT HI MDM: CPT | Mod: GC

## 2022-03-03 PROCEDURE — 70450 CT HEAD/BRAIN W/O DYE: CPT | Mod: 26,MA

## 2022-03-03 PROCEDURE — 70250 X-RAY EXAM OF SKULL: CPT | Mod: 26

## 2022-03-03 PROCEDURE — 71045 X-RAY EXAM CHEST 1 VIEW: CPT | Mod: 26

## 2022-03-03 PROCEDURE — 73620 X-RAY EXAM OF FOOT: CPT | Mod: 26,RT

## 2022-03-03 PROCEDURE — 74246 X-RAY XM UPR GI TRC 2CNTRST: CPT | Mod: 26

## 2022-03-03 RX ORDER — SODIUM CHLORIDE 9 MG/ML
1000 INJECTION, SOLUTION INTRAVENOUS ONCE
Refills: 0 | Status: COMPLETED | OUTPATIENT
Start: 2022-03-03 | End: 2022-03-03

## 2022-03-03 RX ADMIN — SODIUM CHLORIDE 1000 MILLILITER(S): 9 INJECTION, SOLUTION INTRAVENOUS at 22:55

## 2022-03-03 NOTE — ED ADULT NURSE REASSESSMENT NOTE - NS ED NURSE REASSESS COMMENT FT1
unable to achieve IV access, MD aware, MD at bedside with ultrasound to achieve access. Will continue to monitor and assess while offering support and reassurance.

## 2022-03-03 NOTE — ED PROVIDER NOTE - OBJECTIVE STATEMENT
52 yo M with hx of hydrocephalus s/p  shunt presenting with vomiting. Patient denies nausea at this time. Complaining now of dorsal foot pain. Aide at bedside states that he was noted to be vomiting earlier this evening. No reported history of fevers, chills, diarrhea, abdominal pain.     Collateral information per nurse at group home, patient had several episodes of vomiting of undigested food this evening with associated headache. Patient has been on a bland diet for the last few weeks following a similar episode for which he was evaluated for in the ER had shunt evaluation which was normal. Today he had a meal of fried foods for the first time. Also noted darkish penile discharge today, currently on Metronidazole and Levaquin (started 2 days ago for UTI). Patient had neurology appointment with Dr. Agusto Jewell which was rescheduled for March 9. 52 yo M with hx of hydrocephalus s/p  shunt presenting with vomiting. Patient denies nausea at this time. Complaining now of dorsal foot pain. Aide at bedside states that he was noted to be vomiting earlier this evening. No reported history of fevers, chills, diarrhea, abdominal pain.     Collateral information per nurse at [facility] ([phone]), patient had several episodes of vomiting of undigested food this evening with associated headache. Patient has been on a bland diet for the last few weeks following a similar episode for which he was evaluated for in the ER had shunt evaluation which was normal. Today he had a meal of fried foods for the first time. Also noted darkish penile discharge today, currently on Metronidazole and Levaquin (started 2 days ago for UTI). Patient had neurology appointment with Dr. Agusto Jewell which was rescheduled for March 9.

## 2022-03-03 NOTE — ED PROVIDER NOTE - CLINICAL SUMMARY MEDICAL DECISION MAKING FREE TEXT BOX
50 yo M with hx of hydrocephalus s/p  shunt presenting with vomiting. Recent presentation for similar, unremarkable shunt workup, diet advanced from bland foods today. Denies nausea currently, complaining only of R foot pain. Recent diagnosis of UTI. Well appearing, abdomen distended, nontender, R foot appears normal, nontender. Will eval shunt function, urine, labs, and reassess.

## 2022-03-03 NOTE — ED ADULT NURSE NOTE - OBJECTIVE STATEMENT
51 year old male presented to ED via [facility] from [facility] with c/o of nausea/vomiting starting today. hx MR, obstructed  shunt, craniotomy, BPH, aspiration pneumonia. pt shows no sign of CP, SOB, no current nausea/vomiting, numbness/tingling, fever, cough, chills, dizziness, headache, blurred vision, neuro intact. pt a&ox2 baseline, lung sounds clear, heart rate regular, abdomen soft nontender nondistended to palp. skin intact. pt currently resting in bed comfortably with RN and MD at bedside. Will continue to monitor and assess while offering support and reassurance.

## 2022-03-03 NOTE — ED PROVIDER NOTE - ATTENDING CONTRIBUTION TO CARE
Attending MD Lopez:  I personally have seen and examined this patient. I have performed a substantive portion of the visit including all aspects of the medical decision making.  Resident note reviewed and agree on plan of care and except where noted.  See HPI, PE, and MDM for details.      55M with ho  shunt, intellectual delay presenting with vomiting after dinner tonight, patient also endorses right foot pain. Abdomen distended but no focal ttp. right foot without focal bony ttp or skin changes. Appears at neurologic baseline. Plan for screening labs, CT head and shuntogram to r/o  shunt malfunction, CT a/p, XR R foot.               *The above represents an initial assessment/impression. Please refer to progress notes for potential changes in patient clinical course*

## 2022-03-03 NOTE — ED PROVIDER NOTE - PROGRESS NOTE DETAILS
Sharad, PGY2: spoke with radiology re ctap - states that due to recent barium contrast study (noted on , ctap would be unreliable. spoke with nurse again, states that he had an upper gi series yesterday. they have not gotten results yet. per rads, would need repeat xr in 8 hours to assess for progression of barium contrast prior to CT. will admit for further workup.

## 2022-03-03 NOTE — ED ADULT NURSE NOTE - NSIMPLEMENTINTERV_GEN_ALL_ED
Implemented All Fall with Harm Risk Interventions:  [place] to call system. Call bell, personal items and telephone within reach. Instruct patient to call for assistance. Room bathroom lighting operational. Non-slip footwear when patient is off stretcher. Physically safe environment: no spills, clutter or unnecessary equipment. Stretcher in lowest position, wheels locked, appropriate side rails in place. Provide visual cue, wrist band, yellow gown, etc. Monitor gait and stability. Monitor for mental status changes and reorient to person, place, and time. Review medications for side effects contributing to fall risk. Reinforce activity limits and safety measures with patient and family. Provide visual clues: red socks.

## 2022-03-03 NOTE — ED PROVIDER NOTE - PHYSICAL EXAMINATION
Attending MD Lopez:    Gen: awake and alert, follows commands   Neck: supple, no meningimus  CV: heart with reg rhythm, no obvious murmur appreciated. radial pulses 2+ bilaterally, upper and lower extremities warm to the touch   Resp: clear to auscultation anteriorly bilaterally, breathing comfortably  Abd: soft, NT, ND  Extremities: ankles warm to the touch, no appreciable ankle edema bilaterally  Pysch: appropriate affect    Neuro: moves all extremities spontaneously

## 2022-03-04 DIAGNOSIS — J69.0 PNEUMONITIS DUE TO INHALATION OF FOOD AND VOMIT: ICD-10-CM

## 2022-03-04 LAB
ALBUMIN SERPL ELPH-MCNC: 4.5 G/DL — SIGNIFICANT CHANGE UP (ref 3.3–5)
ALP SERPL-CCNC: 176 U/L — HIGH (ref 40–120)
ALT FLD-CCNC: 73 U/L — HIGH (ref 10–45)
ANION GAP SERPL CALC-SCNC: 14 MMOL/L — SIGNIFICANT CHANGE UP (ref 5–17)
APPEARANCE UR: ABNORMAL
AST SERPL-CCNC: 30 U/L — SIGNIFICANT CHANGE UP (ref 10–40)
BACTERIA # UR AUTO: NEGATIVE — SIGNIFICANT CHANGE UP
BILIRUB SERPL-MCNC: 0.3 MG/DL — SIGNIFICANT CHANGE UP (ref 0.2–1.2)
BILIRUB UR-MCNC: NEGATIVE — SIGNIFICANT CHANGE UP
BUN SERPL-MCNC: 10 MG/DL — SIGNIFICANT CHANGE UP (ref 7–23)
CALCIUM SERPL-MCNC: 10.1 MG/DL — SIGNIFICANT CHANGE UP (ref 8.4–10.5)
CHLORIDE SERPL-SCNC: 98 MMOL/L — SIGNIFICANT CHANGE UP (ref 96–108)
CO2 SERPL-SCNC: 28 MMOL/L — SIGNIFICANT CHANGE UP (ref 22–31)
COLOR SPEC: YELLOW — SIGNIFICANT CHANGE UP
CREAT SERPL-MCNC: 0.87 MG/DL — SIGNIFICANT CHANGE UP (ref 0.5–1.3)
DIFF PNL FLD: NEGATIVE — SIGNIFICANT CHANGE UP
EGFR: 104 ML/MIN/1.73M2 — SIGNIFICANT CHANGE UP
EPI CELLS # UR: 0 /HPF — SIGNIFICANT CHANGE UP
GLUCOSE SERPL-MCNC: 118 MG/DL — HIGH (ref 70–99)
GLUCOSE UR QL: NEGATIVE — SIGNIFICANT CHANGE UP
HYALINE CASTS # UR AUTO: 0 /LPF — SIGNIFICANT CHANGE UP (ref 0–2)
KETONES UR-MCNC: NEGATIVE — SIGNIFICANT CHANGE UP
LEUKOCYTE ESTERASE UR-ACNC: NEGATIVE — SIGNIFICANT CHANGE UP
LIDOCAIN IGE QN: 22 U/L — SIGNIFICANT CHANGE UP (ref 7–60)
MAGNESIUM SERPL-MCNC: 2.1 MG/DL — SIGNIFICANT CHANGE UP (ref 1.6–2.6)
NITRITE UR-MCNC: NEGATIVE — SIGNIFICANT CHANGE UP
PH UR: 7.5 — SIGNIFICANT CHANGE UP (ref 5–8)
PHOSPHATE SERPL-MCNC: 3.5 MG/DL — SIGNIFICANT CHANGE UP (ref 2.5–4.5)
POTASSIUM SERPL-MCNC: 4 MMOL/L — SIGNIFICANT CHANGE UP (ref 3.5–5.3)
POTASSIUM SERPL-SCNC: 4 MMOL/L — SIGNIFICANT CHANGE UP (ref 3.5–5.3)
PROT SERPL-MCNC: 8.2 G/DL — SIGNIFICANT CHANGE UP (ref 6–8.3)
PROT UR-MCNC: ABNORMAL
RBC CASTS # UR COMP ASSIST: 0 /HPF — SIGNIFICANT CHANGE UP (ref 0–4)
SARS-COV-2 RNA SPEC QL NAA+PROBE: SIGNIFICANT CHANGE UP
SODIUM SERPL-SCNC: 140 MMOL/L — SIGNIFICANT CHANGE UP (ref 135–145)
SP GR SPEC: 1.02 — SIGNIFICANT CHANGE UP (ref 1.01–1.02)
UROBILINOGEN FLD QL: NEGATIVE — SIGNIFICANT CHANGE UP
WBC UR QL: 0 /HPF — SIGNIFICANT CHANGE UP (ref 0–5)

## 2022-03-04 PROCEDURE — 99222 1ST HOSP IP/OBS MODERATE 55: CPT

## 2022-03-04 PROCEDURE — 71045 X-RAY EXAM CHEST 1 VIEW: CPT | Mod: 26

## 2022-03-04 PROCEDURE — 74177 CT ABD & PELVIS W/CONTRAST: CPT | Mod: 26,MA

## 2022-03-04 PROCEDURE — 71250 CT THORAX DX C-: CPT | Mod: 26

## 2022-03-04 RX ORDER — AZITHROMYCIN 500 MG/1
500 TABLET, FILM COATED ORAL ONCE
Refills: 0 | Status: COMPLETED | OUTPATIENT
Start: 2022-03-04 | End: 2022-03-04

## 2022-03-04 RX ORDER — LAMOTRIGINE 25 MG/1
300 TABLET, ORALLY DISINTEGRATING ORAL EVERY 12 HOURS
Refills: 0 | Status: DISCONTINUED | OUTPATIENT
Start: 2022-03-04 | End: 2022-03-10

## 2022-03-04 RX ORDER — PANTOPRAZOLE SODIUM 20 MG/1
40 TABLET, DELAYED RELEASE ORAL
Refills: 0 | Status: DISCONTINUED | OUTPATIENT
Start: 2022-03-04 | End: 2022-03-10

## 2022-03-04 RX ORDER — FLUTICASONE PROPIONATE 50 MCG
1 SPRAY, SUSPENSION NASAL ONCE
Refills: 0 | Status: COMPLETED | OUTPATIENT
Start: 2022-03-04 | End: 2022-03-06

## 2022-03-04 RX ORDER — LATANOPROST 0.05 MG/ML
1 SOLUTION/ DROPS OPHTHALMIC; TOPICAL AT BEDTIME
Refills: 0 | Status: DISCONTINUED | OUTPATIENT
Start: 2022-03-04 | End: 2022-03-10

## 2022-03-04 RX ORDER — QUETIAPINE FUMARATE 200 MG/1
25 TABLET, FILM COATED ORAL AT BEDTIME
Refills: 0 | Status: DISCONTINUED | OUTPATIENT
Start: 2022-03-04 | End: 2022-03-07

## 2022-03-04 RX ORDER — OLANZAPINE 15 MG/1
5 TABLET, FILM COATED ORAL AT BEDTIME
Refills: 0 | Status: DISCONTINUED | OUTPATIENT
Start: 2022-03-04 | End: 2022-03-07

## 2022-03-04 RX ORDER — CEFTRIAXONE 500 MG/1
1000 INJECTION, POWDER, FOR SOLUTION INTRAMUSCULAR; INTRAVENOUS ONCE
Refills: 0 | Status: COMPLETED | OUTPATIENT
Start: 2022-03-04 | End: 2022-03-04

## 2022-03-04 RX ORDER — DIAZEPAM 5 MG
5 TABLET ORAL EVERY 8 HOURS
Refills: 0 | Status: DISCONTINUED | OUTPATIENT
Start: 2022-03-04 | End: 2022-03-10

## 2022-03-04 RX ORDER — PRIMIDONE 250 MG/1
250 TABLET ORAL
Refills: 0 | Status: DISCONTINUED | OUTPATIENT
Start: 2022-03-04 | End: 2022-03-10

## 2022-03-04 RX ORDER — DORZOLAMIDE HYDROCHLORIDE TIMOLOL MALEATE 20; 5 MG/ML; MG/ML
1 SOLUTION/ DROPS OPHTHALMIC
Refills: 0 | Status: DISCONTINUED | OUTPATIENT
Start: 2022-03-04 | End: 2022-03-10

## 2022-03-04 RX ORDER — CLOBAZAM 10 MG/1
10 TABLET ORAL
Refills: 0 | Status: DISCONTINUED | OUTPATIENT
Start: 2022-03-04 | End: 2022-03-07

## 2022-03-04 RX ADMIN — PRIMIDONE 250 MILLIGRAM(S): 250 TABLET ORAL at 21:38

## 2022-03-04 RX ADMIN — OLANZAPINE 5 MILLIGRAM(S): 15 TABLET, FILM COATED ORAL at 21:38

## 2022-03-04 RX ADMIN — LAMOTRIGINE 300 MILLIGRAM(S): 25 TABLET, ORALLY DISINTEGRATING ORAL at 19:42

## 2022-03-04 RX ADMIN — AZITHROMYCIN 255 MILLIGRAM(S): 500 TABLET, FILM COATED ORAL at 05:19

## 2022-03-04 RX ADMIN — CLOBAZAM 10 MILLIGRAM(S): 10 TABLET ORAL at 19:39

## 2022-03-04 RX ADMIN — QUETIAPINE FUMARATE 25 MILLIGRAM(S): 200 TABLET, FILM COATED ORAL at 21:38

## 2022-03-04 RX ADMIN — CEFTRIAXONE 100 MILLIGRAM(S): 500 INJECTION, POWDER, FOR SOLUTION INTRAMUSCULAR; INTRAVENOUS at 04:26

## 2022-03-04 RX ADMIN — DORZOLAMIDE HYDROCHLORIDE TIMOLOL MALEATE 1 DROP(S): 20; 5 SOLUTION/ DROPS OPHTHALMIC at 21:38

## 2022-03-04 NOTE — PATIENT PROFILE ADULT - FALL HARM RISK - HARM RISK INTERVENTIONS
Assistance with ambulation/Assistance OOB with selected safe patient handling equipment/Communicate Risk of Fall with Harm to all staff/Discuss with provider need for PT consult/Monitor gait and stability/Reinforce activity limits and safety measures with patient and family/Tailored Fall Risk Interventions/Visual Cue: Yellow wristband and red socks/Bed in lowest position, wheels locked, appropriate side rails in place/Call bell, personal items and telephone in reach/Instruct patient to call for assistance before getting out of bed or chair/Non-slip footwear when patient is out of bed/[place] to call system/Physically safe environment - no spills, clutter or unnecessary equipment/Purposeful Proactive Rounding/Room/bathroom lighting operational, light cord in reach

## 2022-03-04 NOTE — H&P ADULT - HISTORY OF PRESENT ILLNESS
50 yo M with hx of hydrocephalus s/p  shunt presenting with vomiting. Patient denies nausea at this time. Complaining now of dorsal foot pain. Aide at bedside states that he was noted to be vomiting earlier this evening. No reported history of fevers, chills, diarrhea, abdominal pain.     	Collateral information per nurse at [facility] ([phone]), patient had several episodes of vomiting of undigested food this evening with associated headache. Patient has been on a bland diet for the last few weeks following a similar episode for which he was evaluated for in the ER had shunt evaluation which was normal. Today he had a meal of fried foods for the first time. Also noted darkish penile discharge today, currently on Metronidazole and Levaquin (started 2 days ago for UTI). Patient had neurology appointment with Dr. Agusto Jewell which was rescheduled for March 9.    pt at present denies nausea, vomiting,abd pain , constipation/ dysuria  polyuria, says he ambulates at group [place]

## 2022-03-04 NOTE — H&P ADULT - ASSESSMENT
pt with above history p/w nausea / vomiting  < from: CT Head No Cont (03.03.22 @ 23:22) >  No significant interval change in the right transparietal ventriculostomy   catheter terminating in the atrium of the right lateral ventricle.   Multiple abandoned left-sided catheter again noted. No ventricular size   without hydrocephalus. No acute intracranial bleeding.      < end of copied text >    pt had ct abd on feb 18, 2022 with esophagitis- will start regular diet / ppi    - will obtain Neuro/ GI eval    Penile discharge - UA with no uti , ID eval     Seizure disorder - cont home seizure meds       Transaminitis- f/u [facility] given anticonvulsants    thrombocytosis - monitor closely      cont psych meds - will confirm from group home staff

## 2022-03-04 NOTE — CONSULT NOTE ADULT - SUBJECTIVE AND OBJECTIVE BOX
Neurology Consult    Reason for Consult: Patient is a 51y old  Male who presents with a chief complaint of     HPI:  50 yo M with hx of hydrocephalus s/p  shunt presenting with vomiting. Patient denies nausea at this time. Complaining now of dorsal foot pain. Aide at bedside states that he was noted to be vomiting earlier this evening. No reported history of fevers, chills, diarrhea, abdominal pain.     	Coll[facility] ([phone]), patient had several episodes of vomiting of undigested food this evening with associated headache. Patient has been on a bland diet for the last few weeks following a similar episode for which he was evaluated for in the ER had shunt evaluation which was normal. Today he had a meal of fried foods for the first time. Also noted darkish penile discharge today, currently on Metronidazole and Levaquin (started 2 days ago for UTI). Patient had neurology appointment with Dr. Agusto Jewell which was rescheduled for .    pt at present denies nausea, vomiting,abd pain , constipation/ dysuria  polyuria, says he ambulates at group home (04 Mar 2022 12:22)       PAST MEDICAL & SURGICAL HISTORY:  Hydrocephalus  age 2 months    Legally blind    Glaucoma    Sleep apnea  not on CPAP    Epilepsy    Osteoporosis    Elevated liver enzymes    Environmental Allergies    Impulse control disorder  worsened when pt on Depakote, pt now off    Mild mental retardation    Hiatal hernia    Aspiration pneumonia  at age 25    Viral pneumonia  h/o    BPH without obstruction/lower urinary tract symptoms    Glaucoma  on eye gtts    S/P  Shunt  originally had shunt to lung, then revised to peritoneal shunt, has had multiple revisions    Epilepsy  s/p Vagal Nerve Stimulator Implant , , last one in 2016    H/O craniotomy  at age 18    Heel cord contracture  s/p surgical correction b/l    History of tonsillectomy    S/p bilateral myringotomy with tube placement    Obstructed  shunt  revision of  shunt         Allergies: Allergies    codeine (Stomach Upset)  Depakote (Other)  seasonal allergies: nasal congestion (Other)    Intolerances        Social History: Denies toxic habits including tobacco, ETOH or illicit drugs.    Family History: FAMILY HISTORY:  Family history of stroke (Mother)    . No family history of strokes    Medications: MEDICATIONS  (STANDING):  cloBAZam 10 milliGRAM(s) Oral two times a day  dorzolamide 2%/timolol 0.5% Ophthalmic Solution 1 Drop(s) Both EYES two times a day  fluticasone propionate 50 MICROgram(s)/spray Nasal Spray 1 Spray(s) Both Nostrils once  lamoTRIgine 300 milliGRAM(s) Oral every 12 hours  latanoprost 0.005% Ophthalmic Solution 1 Drop(s) Both EYES at bedtime  OLANZapine 5 milliGRAM(s) Oral at bedtime  pantoprazole    Tablet 40 milliGRAM(s) Oral before breakfast  primidone 250 milliGRAM(s) Oral two times a day  QUEtiapine 25 milliGRAM(s) Oral at bedtime    MEDICATIONS  (PRN):  diazepam    Tablet 5 milliGRAM(s) Oral every 8 hours PRN spasm      Review of Systems:  CONSTITUTIONAL:  No weight loss, fever, chills, weakness or fatigue.  HEENT:  Eyes:  No visual loss, blurred vision, double vision or yellow sclera. Ears, Nose, Throat:  No hearing loss, sneezing, congestion, runny nose or sore throat.  SKIN:  No rash or itching.  CARDIOVASCULAR:  No chest pain, chest pressure or chest discomfort. No palpitations or edema.  RESPIRATORY:  No shortness of breath, cough or sputum.  GASTROINTESTINAL:  No anorexia, nausea, vomiting or diarrhea. No abdominal pain or blood.  GENITOURINARY:  No burning on urination or incontinence   NEUROLOGICAL:  No headache, dizziness, syncope, paralysis, ataxia, numbness or tingling in the extremities. No change in bowel or bladder control. no limb weakness. no vision changes.   MUSCULOSKELETAL:  No muscle, back pain, joint pain or stiffness.  HEMATOLOGIC:  No anemia, bleeding or bruising.  LYMPHATICS:  No enlarged nodes. No history of splenectomy.  PSYCHIATRIC:  No history of depression or anxiety.  ENDOCRINOLOGIC:  No reports of sweating, cold or heat intolerance. No polyuria or polydipsia.      Vitals:  Vital Signs Last 24 Hrs  T(C): 36.7 (04 Mar 2022 08:50), Max: 36.9 (04 Mar 2022 00:21)  T(F): 98.1 (04 Mar 2022 08:50), Max: 98.4 (04 Mar 2022 00:21)  HR: 102 (04 Mar 2022 08:50) (94 - 114)  BP: 120/80 (04 Mar 2022 08:50) (106/72 - 129/75)  BP(mean): 93 (04 Mar 2022 08:50) (91 - 93)  RR: 18 (04 Mar 2022 08:50) (16 - 20)  SpO2: 95% (04 Mar 2022 08:50) (95% - 99%)    General Exam:   General Appearance: Appropriately dressed and in no acute distress       Head: Normocephalic, atraumatic and no dysmorphic features  Ear, Nose, and Throat: Moist mucous membranes  CVS: S1S2+  Resp: No SOB, no wheeze or rhonchi  GI: soft NT/ND  Extremities: No edema or cyanosis  Skin: No bruises or rashes     Neurological Exam:  Mental Status: Awake, alert and oriented x 3.  Able to follow simple and complex verbal commands. Able to name and repeat. fluent speech. No obvious aphasia or dysarthria noted.   Cranial Nerves: PERRL, EOMI, VFFC, sensation V1-V3 intact,  no obvious facial asymmetry, equal elevation of palate, scm/trap 5/5, tongue is midline on protrusion. no obvious papilledema on fundoscopic exam. hearing is grossly intact.   Motor: Normal bulk, tone and strength throughout. Fine finger movements were intact and symmetric. no tremors or drift noted.    Sensation: Intact to light touch and pinprick throughout. no right/left confusion. no extinction to tactile on DSS. Romberg was negative.   Reflexes: 1+ throughout at biceps, brachioradialis, triceps, patellars and ankles bilaterally and equal. No clonus. R toe and L toe were both downgoing.  Coordination: No dysmetria on FNF or HKS  Gait: Narrow based and steady. Able to tandem. no limitations in gait.     Data/Labs/Imaging which I personally reviewed.     Labs:     CBC Full  -  ( 03 Mar 2022 23:27 )  WBC Count : 10.09 K/uL  RBC Count : 4.58 M/uL  Hemoglobin : 13.0 g/dL  Hematocrit : 41.0 %  Platelet Count - Automated : 447 K/uL  Mean Cell Volume : 89.5 fl  Mean Cell Hemoglobin : 28.4 pg  Mean Cell Hemoglobin Concentration : 31.7 gm/dL  Auto Neutrophil # : 7.91 K/uL  Auto Lymphocyte # : 0.82 K/uL  Auto Monocyte # : 0.85 K/uL  Auto Eosinophil # : 0.35 K/uL  Auto Basophil # : 0.07 K/uL  Auto Neutrophil % : 78.4 %  Auto Lymphocyte % : 8.1 %  Auto Monocyte % : 8.4 %  Auto Eosinophil % : 3.5 %  Auto Basophil % : 0.7 %    -    140  |  98  |  10  ----------------------------<  118<H>  4.0   |  28  |  0.87    Ca    10.1      03 Mar 2022 23:27  Phos  3.5     -  Mg     2.1     -    TPro  8.2  /  Alb  4.5  /  TBili  0.3  /  DBili  x   /  AST  30  /  ALT  73<H>  /  AlkPhos  176<H>  -    LIVER FUNCTIONS - ( 03 Mar 2022 23:27 )  Alb: 4.5 g/dL / Pro: 8.2 g/dL / ALK PHOS: 176 U/L / ALT: 73 U/L / AST: 30 U/L / GGT: x             Urinalysis Basic - ( 04 Mar 2022 06:25 )    Color: Yellow / Appearance: Slightly Turbid / S.020 / pH: x  Gluc: x / Ketone: Negative  / Bili: Negative / Urobili: Negative   Blood: x / Protein: Trace / Nitrite: Negative   Leuk Esterase: Negative / RBC: 0 /hpf / WBC 0 /HPF   Sq Epi: x / Non Sq Epi: 0 /hpf / Bacteria: Negative           Neurology Consult    Reason for Consult: Patient is a 51y old  Male who presents with a chief complaint of N/V h/o Hydro     HPI:  52 yo M with hx of hydrocephalus s/p  shunt presenting with vomiting. Patient denies nausea at this time. Complaining now of dorsal foot pain. Aide at bedside states that he was noted to be vomiting earlier this evening. No reported history of fevers, chills, diarrhea, abdominal pain.     	Collateral information per nurse at assisted ([phone]), patient had several episodes of vomiting of undigested food this evening with associated headache. Patient has been on a bland diet for the last few weeks following a similar episode for which he was evaluated for in the ER had shunt evaluation which was normal. Today he had a meal of fried foods for the first time. Also noted darkish penile discharge today, currently on Metronidazole and Levaquin (started 2 days ago for UTI). Patient had neurology appointment with Dr. Agusto Jewell which was rescheduled for .    pt at present denies nausea, vomiting,abd pain , constipation/ dysuria  polyuria, says he ambulates at group home (04 Mar 2022 12:22)       PAST MEDICAL & SURGICAL HISTORY:  Hydrocephalus  age 2 months    Legally blind    Glaucoma    Sleep apnea  not on CPAP    Epilepsy    Osteoporosis    Elevated liver enzymes    Environmental Allergies    Impulse control disorder  worsened when pt on Depakote, pt now off    Mild mental retardation    Hiatal hernia    Aspiration pneumonia  at age 25    Viral pneumonia  h/o    BPH without obstruction/lower urinary tract symptoms    Glaucoma  on eye gtts    S/P  Shunt  originally had shunt to lung, then revised to peritoneal shunt, has had multiple revisions    Epilepsy  s/p Vagal Nerve Stimulator Implant , , last one in 2016    H/O craniotomy  at age 18    Heel cord contracture  s/p surgical correction b/l    History of tonsillectomy    S/p bilateral myringotomy with tube placement    Obstructed  shunt  revision of  shunt         Allergies: Allergies    codeine (Stomach Upset)  Depakote (Other)  seasonal allergies: nasal congestion (Other)    Intolerances        Social History: Denies toxic habits including tobacco, ETOH or illicit drugs.    Family History: FAMILY HISTORY:  Family history of stroke (Mother)    . No family history of strokes    Medications: MEDICATIONS  (STANDING):  cloBAZam 10 milliGRAM(s) Oral two times a day  dorzolamide 2%/timolol 0.5% Ophthalmic Solution 1 Drop(s) Both EYES two times a day  fluticasone propionate 50 MICROgram(s)/spray Nasal Spray 1 Spray(s) Both Nostrils once  lamoTRIgine 300 milliGRAM(s) Oral every 12 hours  latanoprost 0.005% Ophthalmic Solution 1 Drop(s) Both EYES at bedtime  OLANZapine 5 milliGRAM(s) Oral at bedtime  pantoprazole    Tablet 40 milliGRAM(s) Oral before breakfast  primidone 250 milliGRAM(s) Oral two times a day  QUEtiapine 25 milliGRAM(s) Oral at bedtime    MEDICATIONS  (PRN):  diazepam    Tablet 5 milliGRAM(s) Oral every 8 hours PRN spasm      Review of Systems:  CONSTITUTIONAL:  No weight loss, fever, chills, weakness or fatigue.  HEENT:  Eyes:  No visual loss, blurred vision, double vision or yellow sclera. Ears, Nose, Throat:  No hearing loss, sneezing, congestion, runny nose or sore throat.  SKIN:  No rash or itching.  CARDIOVASCULAR:  No chest pain, chest pressure or chest discomfort. No palpitations or edema.  RESPIRATORY:  No shortness of breath, cough or sputum.  GASTROINTESTINAL:  No anorexia, nausea, +vomiting or diarrhea. No abdominal pain or blood.  GENITOURINARY:  No burning on urination or incontinence   NEUROLOGICAL:  No headache, dizziness, syncope, paralysis, ataxia, numbness or tingling in the extremities. No change in bowel or bladder control. no limb weakness. no vision changes.   MUSCULOSKELETAL:  No muscle, back pain, joint pain or stiffness.  HEMATOLOGIC:  No anemia, bleeding or bruising.  LYMPHATICS:  No enlarged nodes. No history of splenectomy.  PSYCHIATRIC:  No history of depression or anxiety.  ENDOCRINOLOGIC:  No reports of sweating, cold or heat intolerance. No polyuria or polydipsia.      Vitals:  Vital Signs Last 24 Hrs  T(C): 36.7 (04 Mar 2022 08:50), Max: 36.9 (04 Mar 2022 00:21)  T(F): 98.1 (04 Mar 2022 08:50), Max: 98.4 (04 Mar 2022 00:21)  HR: 102 (04 Mar 2022 08:50) (94 - 114)  BP: 120/80 (04 Mar 2022 08:50) (106/72 - 129/75)  BP(mean): 93 (04 Mar 2022 08:50) (91 - 93)  RR: 18 (04 Mar 2022 08:50) (16 - 20)  SpO2: 95% (04 Mar 2022 08:50) (95% - 99%)    General Exam:   General Appearance: Appropriately dressed and in no acute distress       Head: Normocephalic, atraumatic and no dysmorphic features  Ear, Nose, and Throat: Moist mucous membranes  CVS: S1S2+  Resp: No SOB, no wheeze or rhonchi  GI: soft NT/ND  Extremities: No edema or cyanosis  Skin: No bruises or rashes     Neurological Exam:  Mental Status: Awake, alert and oriented x 2-3.  Able to follow simple  verbal commands. Able to name and repeat. fluent speech. No obvious aphasia mild dysarthria noted.   Cranial Nerves: PERRL, EOMI, VFFC, sensation V1-V3 intact,  no obvious facial asymmetry, equal elevation of palate, scm/trap 5/5, tongue is midline on protrusion. no obvious papilledema on fundoscopic exam. hearing is grossly intact.   Motor: SORIANO at least 4+/5   Sensation: Intact to light touch and pinprick throughout. no right/left confusion. no extinction to tactile on DSS.    Reflexes: 1+ throughout at biceps, brachioradialis, triceps, patellars and ankles bilaterally and equal. No clonus. R toe and L toe were both downgoing.  Coordination: No dysmetria on FNF   Gait:deferred     Data/Labs/Imaging which I personally reviewed.     Labs:     CBC Full  -  ( 03 Mar 2022 23:27 )  WBC Count : 10.09 K/uL  RBC Count : 4.58 M/uL  Hemoglobin : 13.0 g/dL  Hematocrit : 41.0 %  Platelet Count - Automated : 447 K/uL  Mean Cell Volume : 89.5 fl  Mean Cell Hemoglobin : 28.4 pg  Mean Cell Hemoglobin Concentration : 31.7 gm/dL  Auto Neutrophil # : 7.91 K/uL  Auto Lymphocyte # : 0.82 K/uL  Auto Monocyte # : 0.85 K/uL  Auto Eosinophil # : 0.35 K/uL  Auto Basophil # : 0.07 K/uL  Auto Neutrophil % : 78.4 %  Auto Lymphocyte % : 8.1 %  Auto Monocyte % : 8.4 %  Auto Eosinophil % : 3.5 %  Auto Basophil % : 0.7 %        140  |  98  |  10  ----------------------------<  118<H>  4.0   |  28  |  0.87    Ca    10.1      03 Mar 2022 23:27  Phos  3.5       Mg     2.1         TPro  8.2  /  Alb  4.5  /  TBili  0.3  /  DBili  x   /  AST  30  /  ALT  73<H>  /  AlkPhos  176<H>      LIVER FUNCTIONS - ( 03 Mar 2022 23:27 )  Alb: 4.5 g/dL / Pro: 8.2 g/dL / ALK PHOS: 176 U/L / ALT: 73 U/L / AST: 30 U/L / GGT: x             Urinalysis Basic - ( 04 Mar 2022 06:25 )    Color: Yellow / Appearance: Slightly Turbid / S.020 / pH: x  Gluc: x / Ketone: Negative  / Bili: Negative / Urobili: Negative   Blood: x / Protein: Trace / Nitrite: Negative   Leuk Esterase: Negative / RBC: 0 /hpf / WBC 0 /HPF   Sq Epi: x / Non Sq Epi: 0 /hpf / Bacteria: Negative    < from: CT Head No Cont (22 @ 23:22) >    ACC: [id number] EXAM:  CT BRAIN                          PROCEDURE DATE:  2022          INTERPRETATION:  CLINICAL INFORMATION: Headache and vomiting.    TECHNIQUE: Axial noncontrast CT images from the skull base to the vertex   were obtained and submitted for interpretation. Coronal and sagittal   reformatted images were performed. Bone and soft tissue windows were   evaluated.    COMPARISON: CT head 2022.    FINDINGS:    Again noted is a right parietal approach shunt catheter with tipin the   posterior horn of the right lateral ventricle. Multiple additional   retained shunt catheters are unchanged in position since the prior   examination. No interval change in the size of the ventricular system.    Encephalomalacia and gliosis of the right frontotemporal regions along   prior ventriculostomy catheter tract and right occipital region,   unchanged.    There is no acute intracranial mass-effect, hemorrhage, midline shift, or   abnormal extra-axial fluid collection. Basal cisterns are patent.    Mucosal thickening of the paranasal sinuses. Mastoid air cells are clear.   Biparietal craniotomy noted.    IMPRESSION:    No significant interval change in the right transparietal ventriculostomy   catheter terminating in the atrium of the right lateral ventricle.   Multiple abandoned left-sided catheter again noted. No ventricular size   without hydrocephalus. No acute intracranial bleeding.    --- End of Report ---          CLIFF ANTHONY MD; Resident Radiologist  This document has been electronically signed.  RAVEN ROSAS MD; Attending Radiologist  This document has been electronically signed. Mar  4 2022  1:33AM    < end of copied text >

## 2022-03-04 NOTE — ED ADULT NURSE REASSESSMENT NOTE - NS ED NURSE REASSESS COMMENT FT1
received report from edy rn, pt resting comfortably, denies complaints, awaiting bed placement, VSS. change in bowel habits

## 2022-03-04 NOTE — PATIENT PROFILE ADULT - NSPROGENSOURCEINFO_GEN_A_NUR
Patient's wife called and said that he had an appointment on 9/16/21 for a Be Well visit. She said he had the blood work done and the information for his Be Well was supposed to be submitted electronically but it is not in [address] family

## 2022-03-04 NOTE — CONSULT NOTE ADULT - SUBJECTIVE AND OBJECTIVE BOX
HPI:  52 yo M with hx of hydrocephalus s/p  shunt presenting with vomiting. Patient denies nausea at this time. Complaining now of dorsal foot pain. Aide at bedside states that he was noted to be vomiting earlier this evening. No reported history of fevers, chills, diarrhea, abdominal pain. Collateral information per nurse at [facility] ([phone]), patient had several episodes of vomiting of undigested food this evening with associated headache. Patient has been on a bland diet for the last few weeks following a similar episode for which he was evaluated for in the ER had shunt evaluation which was normal. Today he had a meal of fried foods for the first time. Also noted darkish penile discharge today, currently on Metronidazole and Levaquin (started 2 days ago for UTI). Patient had neurology appointment with Dr. Agusto Jewell which was rescheduled for .    pt at present denies nausea, vomiting,abd pain , constipation/ dysuria  polyuria, says he ambulates at group home.    CT A/P performed awaiting official read.  CXR with patchy opacity in the right mid- lower lung.  CT head with normal ventricular size, without hydrocephlaus.  Right ankle xr unremarkable.  Shunt XR unremarkable.  UA with WBC 0 in urine.  WBC WNL.  No fevers.    PAST MEDICAL & SURGICAL HISTORY:  Hydrocephalus  age 2 months    Legally blind    Glaucoma    Sleep apnea  not on CPAP    Epilepsy    Osteoporosis    Elevated liver enzymes    Environmental Allergies    Impulse control disorder  worsened when pt on Depakote, pt now off    Mild mental retardation    Hiatal hernia    Aspiration pneumonia  at age 25    Viral pneumonia  h/o    BPH without obstruction/lower urinary tract symptoms    Glaucoma  on eye gtts    S/P  Shunt  originally had shunt to lung, then revised to peritoneal shunt, has had multiple revisions    Epilepsy  s/p Vagal Nerve Stimulator Implant , , last one in 2016    H/O craniotomy  at age 18    Heel cord contracture  s/p surgical correction b/l    History of tonsillectomy    S/p bilateral myringotomy with tube placement    Obstructed  shunt  revision of  shunt     Allergies    codeine (Stomach Upset)  Depakote (Other)  seasonal allergies: nasal congestion (Other)    Intolerances    ANTIMICROBIALS:      OTHER MEDS:  cloBAZam 10 milliGRAM(s) Oral two times a day  diazepam    Tablet 5 milliGRAM(s) Oral every 8 hours PRN  dorzolamide 2%/timolol 0.5% Ophthalmic Solution 1 Drop(s) Both EYES two times a day  fluticasone propionate 50 MICROgram(s)/spray Nasal Spray 1 Spray(s) Both Nostrils once  lamoTRIgine 300 milliGRAM(s) Oral every 12 hours  latanoprost 0.005% Ophthalmic Solution 1 Drop(s) Both EYES at bedtime  OLANZapine 5 milliGRAM(s) Oral at bedtime  pantoprazole    Tablet 40 milliGRAM(s) Oral before breakfast  primidone 250 milliGRAM(s) Oral two times a day  QUEtiapine 25 milliGRAM(s) Oral at bedtime    SOCIAL HISTORY: Denies smoking, alcohol, drug use.    FAMILY HISTORY:  Family history of stroke (Mother)    Drug Dosing Weight  Height (cm): 165.1 (03 Mar 2022 21:47)  Weight (kg): 81.6 (03 Mar 2022 21:47)  BMI (kg/m2): 29.9 (03 Mar 2022 21:47)  BSA (m2): 1.89 (03 Mar 2022 21:47)    PE:    Vital Signs Last 24 Hrs  T(C): 36.7 (04 Mar 2022 08:50), Max: 36.9 (04 Mar 2022 00:21)  T(F): 98.1 (04 Mar 2022 08:50), Max: 98.4 (04 Mar 2022 00:21)  HR: 102 (04 Mar 2022 08:50) (94 - 114)  BP: 120/80 (04 Mar 2022 08:50) (106/72 - 129/75)  BP(mean): 93 (04 Mar 2022 08:50) (91 - 93)  RR: 18 (04 Mar 2022 08:50) (16 - 20)  SpO2: 95% (04 Mar 2022 08:50) (95% - 99%)    Gen: AOx3, NAD  CV: S1+S2 normal, no murmurs  Resp: Clear bilat, no resp distress  Abd: Soft, nontender, +BS  Ext: No LE edema, no wounds  : No Mullins, uncircumcised, no purulent discharge  IV/Skin: No thrombophlebitis  Msk: No low back pain, no arthralgias, no joint swelling  Neuro: No sensory deficits, no motor deficits    LABS:                          13.0   10.09 )-----------( 447      ( 03 Mar 2022 23:27 )             41.0       03-03    140  |  98  |  10  ----------------------------<  118<H>  4.0   |  28  |  0.87    Ca    10.1      03 Mar 2022 23:27  Phos  3.5     -  Mg     2.1         TPro  8.2  /  Alb  4.5  /  TBili  0.3  /  DBili  x   /  AST  30  /  ALT  73<H>  /  AlkPhos  176<H>        Urinalysis Basic - ( 04 Mar 2022 06:25 )    Color: Yellow / Appearance: Slightly Turbid / S.020 / pH: x  Gluc: x / Ketone: Negative  / Bili: Negative / Urobili: Negative   Blood: x / Protein: Trace / Nitrite: Negative   Leuk Esterase: Negative / RBC: 0 /hpf / WBC 0 /HPF   Sq Epi: x / Non Sq Epi: 0 /hpf / Bacteria: Negative    MICROBIOLOGY:  v  Clean Catch Clean Catch (Midstream)  22   No growth  --  --    RADIOLOGY:    < from: Xray Chest 1 View- PORTABLE-Urgent (Xray Chest 1 View- PORTABLE-Urgent .) (22 @ 01:58) >  IMPRESSION:    Patchy opacity in the right mid/lower lung.    Unchanged findings of right pleural calcification with associated right   lung volume loss.    < end of copied text >    < from: CT Head No Cont (22 @ 23:22) >  IMPRESSION:    No significant interval change in the right transparietal ventriculostomy   catheter terminating in the atrium of the right lateral ventricle.   Multiple abandoned left-sided catheter again noted. No ventricular size   without hydrocephalus. No acute intracranial bleeding.    < end of copied text >    < from: Xray Ankle Complete 3 Views, Right (22 @ 23:11) >  IMPRESSION:    No acute fracture or dislocation. Degenerative changes, as above.    < end of copied text >    < from: Xray Foot AP + Lateral, Right (22 @ 23:11) >  IMPRESSION:    No acute fracture or dislocation. Degenerative changes, as above.      < end of copied text >  < from: Xray Shunt Series (22 @ 23:11) >  IMPRESSION:     shunt identified originating from the right lateral ventricle and   ending in the right hemipelvis without kinks or discontinuity.    < end of copied text >

## 2022-03-04 NOTE — ED ADULT NURSE REASSESSMENT NOTE - NS ED NURSE REASSESS COMMENT FT1
Pt straight cathed under sterile technique with 2 RNs at bedside. 200ccs of dark, yellow urine drained. UA and UC collected and sent to lab.

## 2022-03-05 LAB
CULTURE RESULTS: NO GROWTH — SIGNIFICANT CHANGE UP
SPECIMEN SOURCE: SIGNIFICANT CHANGE UP

## 2022-03-05 PROCEDURE — 99232 SBSQ HOSP IP/OBS MODERATE 35: CPT

## 2022-03-05 RX ADMIN — LATANOPROST 1 DROP(S): 0.05 SOLUTION/ DROPS OPHTHALMIC; TOPICAL at 22:28

## 2022-03-05 RX ADMIN — DORZOLAMIDE HYDROCHLORIDE TIMOLOL MALEATE 1 DROP(S): 20; 5 SOLUTION/ DROPS OPHTHALMIC at 18:00

## 2022-03-05 RX ADMIN — DORZOLAMIDE HYDROCHLORIDE TIMOLOL MALEATE 1 DROP(S): 20; 5 SOLUTION/ DROPS OPHTHALMIC at 06:04

## 2022-03-05 RX ADMIN — LAMOTRIGINE 300 MILLIGRAM(S): 25 TABLET, ORALLY DISINTEGRATING ORAL at 06:03

## 2022-03-05 RX ADMIN — QUETIAPINE FUMARATE 25 MILLIGRAM(S): 200 TABLET, FILM COATED ORAL at 22:31

## 2022-03-05 RX ADMIN — PRIMIDONE 250 MILLIGRAM(S): 250 TABLET ORAL at 18:00

## 2022-03-05 RX ADMIN — CLOBAZAM 10 MILLIGRAM(S): 10 TABLET ORAL at 06:03

## 2022-03-05 RX ADMIN — LAMOTRIGINE 300 MILLIGRAM(S): 25 TABLET, ORALLY DISINTEGRATING ORAL at 17:59

## 2022-03-05 RX ADMIN — OLANZAPINE 5 MILLIGRAM(S): 15 TABLET, FILM COATED ORAL at 22:31

## 2022-03-05 RX ADMIN — PANTOPRAZOLE SODIUM 40 MILLIGRAM(S): 20 TABLET, DELAYED RELEASE ORAL at 06:03

## 2022-03-05 RX ADMIN — PRIMIDONE 250 MILLIGRAM(S): 250 TABLET ORAL at 06:03

## 2022-03-05 RX ADMIN — CLOBAZAM 10 MILLIGRAM(S): 10 TABLET ORAL at 17:59

## 2022-03-05 NOTE — PROGRESS NOTE ADULT - ASSESSMENT
pt with above history p/w nausea / vomiting  < from: CT Head No Cont (03.03.22 @ 23:22) >  No significant interval change in the right transparietal ventriculostomy   catheter terminating in the atrium of the right lateral ventricle.   Multiple abandoned left-sided catheter again noted. No ventricular size   without hydrocephalus. No acute intracranial bleeding.      < end of copied text >    pt had ct abd on feb 18, 2022 with esophagitis- will start regular diet / ppi    - will obtain Neuro/ GI eval    Penile discharge - UA with no uti ,  -as per ID, CT chest with opacity - likely aspiration from vomiting - no fever or wbc, hold off abx for now   -Monitor off abx given no fevers, normal WBC.     Seizure disorder - cont home seizure meds       Transaminitis- f/u [facility] given anticonvulsants    thrombocytosis - monitor closely      cont psych meds - will confirm from group home staff

## 2022-03-05 NOTE — PROGRESS NOTE ADULT - ASSESSMENT
50 yo M with epilepsy, OP, mood d/o, mild MR BPH, glaucoma,  hx of hydrocephalus s/p  shunt, also has VNS, had VPS revisions presenting with vomiting after having fried feed. also + penile disharge has UTI from 2 days ago.  follows with epilepsy clinic with Dr. Jewell.   CTH no significant interval change in R ventric cath. no change in ventric size no hydro.  Xray shunt series: unremarkable      Impression : Seizure d/o. vomitting unlikely related to h/o hydro     - c/w home onfi 10mg BID and lamotrigine 300mg BID; also gets diazepam PRN spasm  - olanzapine and seroquel for mood stabilization  - GI eval vomiting  - monitor LFTs and platelets   - r/o infection; UA unremarkable; CT chest possible PNA; f/u ID recs   - no need for EEG at this time  - remaining per primary team  - telemetry  - PT/OT/SS/SLP, OOBC  - check FS, glucose control <180  - GI/DVT ppx  - Thank you for allowing me to participate in the care of this patient. Call with questions.   - outpatient f/u wtih Dr maria alejandra jewell upon d/c   Samuel Gauthier MD  Vascular Neurology  Office: [phone]

## 2022-03-05 NOTE — PROGRESS NOTE ADULT - SUBJECTIVE AND OBJECTIVE BOX
SUBJECTIVE / OVERNIGHT EVENTS:  22 @ 21:25    MEDICATIONS  (STANDING):  cloBAZam 10 milliGRAM(s) Oral two times a day  dorzolamide 2%/timolol 0.5% Ophthalmic Solution 1 Drop(s) Both EYES two times a day  fluticasone propionate 50 MICROgram(s)/spray Nasal Spray 1 Spray(s) Both Nostrils once  lamoTRIgine 300 milliGRAM(s) Oral every 12 hours  latanoprost 0.005% Ophthalmic Solution 1 Drop(s) Both EYES at bedtime  OLANZapine 5 milliGRAM(s) Oral at bedtime  pantoprazole    Tablet 40 milliGRAM(s) Oral before breakfast  primidone 250 milliGRAM(s) Oral two times a day  QUEtiapine 25 milliGRAM(s) Oral at bedtime    MEDICATIONS  (PRN):  diazepam    Tablet 5 milliGRAM(s) Oral every 8 hours PRN spasm    T(C): 36.4 (22 @ 12:23), Max: 37.3 (22 @ 04:03)  HR: 99 (22 @ 12:23) (99 - 99)  BP: 105/64 (22 @ 12:23) (102/69 - 105/64)  RR: 18 (22 @ 12:23) (18 - 18)  SpO2: 94% (22 @ 12:23) (94% - 94%)    CAPILLARY BLOOD GLUCOSE        I&O's Summary    04 Mar 2022 07:01  -  05 Mar 2022 07:00  --------------------------------------------------------  IN: 0 mL / OUT: 550 mL / NET: -550 mL    05 Mar 2022 07:  -  05 Mar 2022 21:25  --------------------------------------------------------  IN: 480 mL / OUT: 0 mL / NET: 480 mL        Constitutional: No fever, fatigue  Skin: No rash.  Eyes: No recent vision problems or eye pain.  ENT: No congestion, ear pain, or sore throat.  Cardiovascular: No chest pain or palpation.  Respiratory: No cough, shortness of breath, congestion, or wheezing.  Gastrointestinal: No abdominal pain, nausea, vomiting, or diarrhea.  Genitourinary: No dysuria.  Musculoskeletal: No joint swelling.  Neurologic: No headache.    PHYSICAL EXAM:  GENERAL: NAD  EYES: EOMI, PERRLA  NECK: Supple, No JVD  CHEST/LUNG: dec breath sounds at bases  HEART:  S1 , S2 +  ABDOMEN: soft , bs+  EXTREMITIES:  edema+  NEUROLOGY:alert awake      LABS:                        13.0   10.09 )-----------( 447      ( 03 Mar 2022 23:27 )             41.0     -    140  |  98  |  10  ----------------------------<  118<H>  4.0   |  28  |  0.87    Ca    10.1      03 Mar 2022 23:27  Phos  3.5     03-03  Mg     2.1     -03    TPro  8.2  /  Alb  4.5  /  TBili  0.3  /  DBili  x   /  AST  30  /  ALT  73<H>  /  AlkPhos  176<H>  03-03          Urinalysis Basic - ( 04 Mar 2022 06:25 )    Color: Yellow / Appearance: Slightly Turbid / S.020 / pH: x  Gluc: x / Ketone: Negative  / Bili: Negative / Urobili: Negative   Blood: x / Protein: Trace / Nitrite: Negative   Leuk Esterase: Negative / RBC: 0 /hpf / WBC 0 /HPF   Sq Epi: x / Non Sq Epi: 0 /hpf / Bacteria: Negative        RADIOLOGY & ADDITIONAL TESTS:    Imaging Personally Reviewed:    Consultant(s) Notes Reviewed:      Care Discussed with Consultants/Other Providers:

## 2022-03-05 NOTE — PROGRESS NOTE ADULT - SUBJECTIVE AND OBJECTIVE BOX
Neurology Progress Note    S: Patient seen and examined. No new events overnight. patient denied CP, SOB, HA or pain. no neuro change     Medication:  cloBAZam 10 milliGRAM(s) Oral two times a day  diazepam    Tablet 5 milliGRAM(s) Oral every 8 hours PRN  dorzolamide 2%/timolol 0.5% Ophthalmic Solution 1 Drop(s) Both EYES two times a day  fluticasone propionate 50 MICROgram(s)/spray Nasal Spray 1 Spray(s) Both Nostrils once  lamoTRIgine 300 milliGRAM(s) Oral every 12 hours  latanoprost 0.005% Ophthalmic Solution 1 Drop(s) Both EYES at bedtime  OLANZapine 5 milliGRAM(s) Oral at bedtime  pantoprazole    Tablet 40 milliGRAM(s) Oral before breakfast  primidone 250 milliGRAM(s) Oral two times a day  QUEtiapine 25 milliGRAM(s) Oral at bedtime      Vitals:  Vital Signs Last 24 Hrs  T(C): 36.4 (05 Mar 2022 12:23), Max: 37.3 (05 Mar 2022 04:03)  T(F): 97.5 (05 Mar 2022 12:23), Max: 99.2 (05 Mar 2022 04:03)  HR: 99 (05 Mar 2022 12:23) (98 - 100)  BP: 105/64 (05 Mar 2022 12:23) (102/69 - 106/82)  BP(mean): 81 (04 Mar 2022 17:53) (81 - 81)  RR: 18 (05 Mar 2022 12:23) (18 - 18)  SpO2: 94% (05 Mar 2022 12:23) (94% - 97%)      General Exam:   General Appearance: Appropriately dressed and in no acute distress       Head: Normocephalic, atraumatic and no dysmorphic features  Ear, Nose, and Throat: Moist mucous membranes  CVS: S1S2+  Resp: No SOB, no wheeze or rhonchi  GI: soft NT/ND  Extremities: No edema or cyanosis  Skin: No bruises or rashes     Neurological Exam:  Mental Status: Awake, alert and oriented x 2-3.  Able to follow simple  verbal commands. Able to name and repeat. fluent speech. No obvious aphasia mild dysarthria noted.   Cranial Nerves: PERRL, EOMI, VFFC, sensation V1-V3 intact,  no obvious facial asymmetry, equal elevation of palate, scm/trap 5/5, tongue is midline on protrusion. no obvious papilledema on fundoscopic exam. hearing is grossly intact.   Motor: SORIANO at least 4+/5   Sensation: Intact to light touch and pinprick throughout. no right/left confusion. no extinction to tactile on DSS.    Reflexes: 1+ throughout at biceps, brachioradialis, triceps, patellars and ankles bilaterally and equal. No clonus. R toe and L toe were both downgoing.  Coordination: No dysmetria on FNF   Gait:deferred       I personally reviewed the below data/images/labs:      CBC Full  -  ( 03 Mar 2022 23:27 )  WBC Count : 10.09 K/uL  RBC Count : 4.58 M/uL  Hemoglobin : 13.0 g/dL  Hematocrit : 41.0 %  Platelet Count - Automated : 447 K/uL  Mean Cell Volume : 89.5 fl  Mean Cell Hemoglobin : 28.4 pg  Mean Cell Hemoglobin Concentration : 31.7 gm/dL  Auto Neutrophil # : 7.91 K/uL  Auto Lymphocyte # : 0.82 K/uL  Auto Monocyte # : 0.85 K/uL  Auto Eosinophil # : 0.35 K/uL  Auto Basophil # : 0.07 K/uL  Auto Neutrophil % : 78.4 %  Auto Lymphocyte % : 8.1 %  Auto Monocyte % : 8.4 %  Auto Eosinophil % : 3.5 %  Auto Basophil % : 0.7 %        140  |  98  |  10  ----------------------------<  118<H>  4.0   |  28  |  0.87    Ca    10.1      03 Mar 2022 23:27  Phos  3.5     -03  Mg     2.1     -03    TPro  8.2  /  Alb  4.5  /  TBili  0.3  /  DBili  x   /  AST  30  /  ALT  73<H>  /  AlkPhos  176<H>  -03    LIVER FUNCTIONS - ( 03 Mar 2022 23:27 )  Alb: 4.5 g/dL / Pro: 8.2 g/dL / ALK PHOS: 176 U/L / ALT: 73 U/L / AST: 30 U/L / GGT: x             Urinalysis Basic - ( 04 Mar 2022 06:25 )    Color: Yellow / Appearance: Slightly Turbid / S.020 / pH: x  Gluc: x / Ketone: Negative  / Bili: Negative / Urobili: Negative   Blood: x / Protein: Trace / Nitrite: Negative   Leuk Esterase: Negative / RBC: 0 /hpf / WBC 0 /HPF   Sq Epi: x / Non Sq Epi: 0 /hpf / Bacteria: Negative        ACC: [id number] EXAM:  CT BRAIN                          PROCEDURE DATE:  2022          INTERPRETATION:  CLINICAL INFORMATION: Headache and vomiting.    TECHNIQUE: Axial noncontrast CT images from the skull base to the vertex   were obtained and submitted for interpretation. Coronal and sagittal   reformatted images were performed. Bone and soft tissue windows were   evaluated.    COMPARISON: CT head 2022.    FINDINGS:    Again noted is a right parietal approach shunt catheter with tipin the   posterior horn of the right lateral ventricle. Multiple additional   retained shunt catheters are unchanged in position since the prior   examination. No interval change in the size of the ventricular system.    Encephalomalacia and gliosis of the right frontotemporal regions along   prior ventriculostomy catheter tract and right occipital region,   unchanged.    There is no acute intracranial mass-effect, hemorrhage, midline shift, or   abnormal extra-axial fluid collection. Basal cisterns are patent.    Mucosal thickening of the paranasal sinuses. Mastoid air cells are clear.   Biparietal craniotomy noted.    IMPRESSION:    No significant interval change in the right transparietal ventriculostomy   catheter terminating in the atrium of the right lateral ventricle.   Multiple abandoned left-sided catheter again noted. No ventricular size   without hydrocephalus. No acute intracranial bleeding.    --- End of Report ---          CLIFF ANTHONY MD; Resident Radiologist  This document has been electronically signed.  RAVEN ROSAS MD; Attending Radiologist  This document has been electronically signed. Mar  4 2022  1:33AM    < end of copied text >

## 2022-03-05 NOTE — PROGRESS NOTE ADULT - ASSESSMENT
51 year old male with hydrocephalus, VPS presenting with vomiting.     Patient no longer with nausea and vomiting. No diarrhea.  CT A/P performed.  No fevers, normal WBC.  UA with WBC 0 low suspicion for UTI.  VPS shunt study unremarkable.  CT head with normal ventricular size without hydrocephalus.  CXR with right patchy opacity - no cough.    Recommend:  #Nausea and vomiting  -?Viral gastroenteritis  -F/U CT A/P read  -Continue supportive care  -No fevers, normal WBC - monitor off abx  -UA not suggestive of UTI - continue to monitor for worsening urinary symptoms  -CT head with normal ventricular study - Shunt XR unremarkable    #CXR with R chest opacity  -Check CT chest to further characterize  -Monitor off abx given no fevers, normal WBC.    Chapito Reeder MD  Available through MS Teams  If no response, or after 5pm/weekends, call [phone]    Discussed plan with primary team.   51 year old male with hydrocephalus, VPS presenting with vomiting.     Patient no longer with nausea and vomiting. No diarrhea.  CT A/P performed.  No fevers, normal WBC.  UA with WBC 0 low suspicion for UTI.  VPS shunt study unremarkable.  CT head with normal ventricular size without hydrocephalus.  CXR with right patchy opacity - no cough.    Recommend:  #Nausea and vomiting  -?Viral gastroenteritis  -F/U CT A/P read  -Continue supportive care  -No fevers, normal WBC - monitor off abx  -UA not suggestive of UTI - continue to monitor for worsening urinary symptoms  -CT head with normal ventricular study - Shunt XR unremarkable    #CXR with R chest opacity  -CT chest with opacity - likely aspiration from vomiting - no fever or wbc, hold off abx for now   -Monitor off abx given no fevers, normal WBC.    Hakeem Oliva  Attending Physician   Division of Infectious Disease  Office #[phone]  Available on Microsoft Teams also  After 5pm/weekend or no response, call #[phone]

## 2022-03-05 NOTE — PROGRESS NOTE ADULT - SUBJECTIVE AND OBJECTIVE BOX
LINO AREVALO 51y [id number]    Patient is a 51y old  Male who presents with a chief complaint of     Follow Up/CC:  ID following for    Interval History/ROS:    Allergies    codeine (Stomach Upset)  Depakote (Other)  seasonal allergies: nasal congestion (Other)    Intolerances        ANTIMICROBIALS:      MEDICATIONS  (STANDING):  cloBAZam 10 milliGRAM(s) Oral two times a day  dorzolamide 2%/timolol 0.5% Ophthalmic Solution 1 Drop(s) Both EYES two times a day  fluticasone propionate 50 MICROgram(s)/spray Nasal Spray 1 Spray(s) Both Nostrils once  lamoTRIgine 300 milliGRAM(s) Oral every 12 hours  latanoprost 0.005% Ophthalmic Solution 1 Drop(s) Both EYES at bedtime  OLANZapine 5 milliGRAM(s) Oral at bedtime  pantoprazole    Tablet 40 milliGRAM(s) Oral before breakfast  primidone 250 milliGRAM(s) Oral two times a day  QUEtiapine 25 milliGRAM(s) Oral at bedtime    MEDICATIONS  (PRN):  diazepam    Tablet 5 milliGRAM(s) Oral every 8 hours PRN spasm        Vital Signs Last 24 Hrs  T(C): 37.3 (05 Mar 2022 04:03), Max: 37.3 (05 Mar 2022 04:03)  T(F): 99.2 (05 Mar 2022 04:03), Max: 99.2 (05 Mar 2022 04:03)  HR: 99 (05 Mar 2022 04:03) (98 - 100)  BP: 102/69 (05 Mar 2022 04:03) (102/69 - 106/82)  BP(mean): 81 (04 Mar 2022 17:53) (81 - 81)  RR: 18 (05 Mar 2022 04:03) (18 - 18)  SpO2: 94% (05 Mar 2022 04:03) (94% - 97%)    CBC Full  -  ( 03 Mar 2022 23:27 )  WBC Count : 10.09 K/uL  RBC Count : 4.58 M/uL  Hemoglobin : 13.0 g/dL  Hematocrit : 41.0 %  Platelet Count - Automated : 447 K/uL  Mean Cell Volume : 89.5 fl  Mean Cell Hemoglobin : 28.4 pg  Mean Cell Hemoglobin Concentration : 31.7 gm/dL  Auto Neutrophil # : 7.91 K/uL  Auto Lymphocyte # : 0.82 K/uL  Auto Monocyte # : 0.85 K/uL  Auto Eosinophil # : 0.35 K/uL  Auto Basophil # : 0.07 K/uL  Auto Neutrophil % : 78.4 %  Auto Lymphocyte % : 8.1 %  Auto Monocyte % : 8.4 %  Auto Eosinophil % : 3.5 %  Auto Basophil % : 0.7 %        140  |  98  |  10  ----------------------------<  118<H>  4.0   |  28  |  0.87    Ca    10.1      03 Mar 2022 23:27  Phos  3.5     -  Mg     2.1     -    TPro  8.2  /  Alb  4.5  /  TBili  0.3  /  DBili  x   /  AST  30  /  ALT  73<H>  /  AlkPhos  176<H>      LIVER FUNCTIONS - ( 03 Mar 2022 23:27 )  Alb: 4.5 g/dL / Pro: 8.2 g/dL / ALK PHOS: 176 U/L / ALT: 73 U/L / AST: 30 U/L / GGT: x           Urinalysis Basic - ( 04 Mar 2022 06:25 )    Color: Yellow / Appearance: Slightly Turbid / S.020 / pH: x  Gluc: x / Ketone: Negative  / Bili: Negative / Urobili: Negative   Blood: x / Protein: Trace / Nitrite: Negative   Leuk Esterase: Negative / RBC: 0 /hpf / WBC 0 /HPF   Sq Epi: x / Non Sq Epi: 0 /hpf / Bacteria: Negative        MICROBIOLOGY:  Clean Catch Clean Catch (Midstream)  22   No growth  --  --              v            RADIOLOGY     LINO AREVALO 51y [id number]    Patient is a 51y old  Male who presents with a chief complaint of     Follow Up/CC:  ID following for     Interval History/ROS:    Allergies    codeine (Stomach Upset)  Depakote (Other)  seasonal allergies: nasal congestion (Other)    Intolerances        ANTIMICROBIALS:      MEDICATIONS  (STANDING):  cloBAZam 10 milliGRAM(s) Oral two times a day  dorzolamide 2%/timolol 0.5% Ophthalmic Solution 1 Drop(s) Both EYES two times a day  fluticasone propionate 50 MICROgram(s)/spray Nasal Spray 1 Spray(s) Both Nostrils once  lamoTRIgine 300 milliGRAM(s) Oral every 12 hours  latanoprost 0.005% Ophthalmic Solution 1 Drop(s) Both EYES at bedtime  OLANZapine 5 milliGRAM(s) Oral at bedtime  pantoprazole    Tablet 40 milliGRAM(s) Oral before breakfast  primidone 250 milliGRAM(s) Oral two times a day  QUEtiapine 25 milliGRAM(s) Oral at bedtime    MEDICATIONS  (PRN):  diazepam    Tablet 5 milliGRAM(s) Oral every 8 hours PRN spasm        Vital Signs Last 24 Hrs  T(C): 37.3 (05 Mar 2022 04:03), Max: 37.3 (05 Mar 2022 04:03)  T(F): 99.2 (05 Mar 2022 04:03), Max: 99.2 (05 Mar 2022 04:03)  HR: 99 (05 Mar 2022 04:03) (98 - 100)  BP: 102/69 (05 Mar 2022 04:03) (102/69 - 106/82)  BP(mean): 81 (04 Mar 2022 17:53) (81 - 81)  RR: 18 (05 Mar 2022 04:03) (18 - 18)  SpO2: 94% (05 Mar 2022 04:03) (94% - 97%)    CBC Full  -  ( 03 Mar 2022 23:27 )  WBC Count : 10.09 K/uL  RBC Count : 4.58 M/uL  Hemoglobin : 13.0 g/dL  Hematocrit : 41.0 %  Platelet Count - Automated : 447 K/uL  Mean Cell Volume : 89.5 fl  Mean Cell Hemoglobin : 28.4 pg  Mean Cell Hemoglobin Concentration : 31.7 gm/dL  Auto Neutrophil # : 7.91 K/uL  Auto Lymphocyte # : 0.82 K/uL  Auto Monocyte # : 0.85 K/uL  Auto Eosinophil # : 0.35 K/uL  Auto Basophil # : 0.07 K/uL  Auto Neutrophil % : 78.4 %  Auto Lymphocyte % : 8.1 %  Auto Monocyte % : 8.4 %  Auto Eosinophil % : 3.5 %  Auto Basophil % : 0.7 %    -    140  |  98  |  10  ----------------------------<  118<H>  4.0   |  28  |  0.87    Ca    10.1      03 Mar 2022 23:27  Phos  3.5     -  Mg     2.1     -    TPro  8.2  /  Alb  4.5  /  TBili  0.3  /  DBili  x   /  AST  30  /  ALT  73<H>  /  AlkPhos  176<H>      LIVER FUNCTIONS - ( 03 Mar 2022 23:27 )  Alb: 4.5 g/dL / Pro: 8.2 g/dL / ALK PHOS: 176 U/L / ALT: 73 U/L / AST: 30 U/L / GGT: x           Urinalysis Basic - ( 04 Mar 2022 06:25 )    Color: Yellow / Appearance: Slightly Turbid / S.020 / pH: x  Gluc: x / Ketone: Negative  / Bili: Negative / Urobili: Negative   Blood: x / Protein: Trace / Nitrite: Negative   Leuk Esterase: Negative / RBC: 0 /hpf / WBC 0 /HPF   Sq Epi: x / Non Sq Epi: 0 /hpf / Bacteria: Negative        MICROBIOLOGY:  Clean Catch Clean Catch (Midstream)  22   No growth  --  --        RADIOLOGY    < from: CT Chest No Cont (22 @ 17:40) >  INTERPRETATION:  New patchy ground glass opacitiy medially within the   right upper lobe (2,27).  Additional stable findings. Evidence of volume   loss on the right side. Extensive pleural calcification involving the   right lower lobe and right lower lobe consolidation. Stable residual   prior right pleural shunt.  Ventriculoperitoneal shunt is partially imaged. f/u official report in     < end of copied text >

## 2022-03-06 LAB
ALBUMIN SERPL ELPH-MCNC: 3.9 G/DL — SIGNIFICANT CHANGE UP (ref 3.3–5)
ALP SERPL-CCNC: 152 U/L — HIGH (ref 40–120)
ALT FLD-CCNC: 80 U/L — HIGH (ref 10–45)
ANION GAP SERPL CALC-SCNC: 10 MMOL/L — SIGNIFICANT CHANGE UP (ref 5–17)
AST SERPL-CCNC: 40 U/L — SIGNIFICANT CHANGE UP (ref 10–40)
BILIRUB SERPL-MCNC: 0.2 MG/DL — SIGNIFICANT CHANGE UP (ref 0.2–1.2)
BUN SERPL-MCNC: 11 MG/DL — SIGNIFICANT CHANGE UP (ref 7–23)
CALCIUM SERPL-MCNC: 9.2 MG/DL — SIGNIFICANT CHANGE UP (ref 8.4–10.5)
CHLORIDE SERPL-SCNC: 100 MMOL/L — SIGNIFICANT CHANGE UP (ref 96–108)
CO2 SERPL-SCNC: 30 MMOL/L — SIGNIFICANT CHANGE UP (ref 22–31)
CREAT SERPL-MCNC: 0.96 MG/DL — SIGNIFICANT CHANGE UP (ref 0.5–1.3)
EGFR: 95 ML/MIN/1.73M2 — SIGNIFICANT CHANGE UP
GLUCOSE SERPL-MCNC: 86 MG/DL — SIGNIFICANT CHANGE UP (ref 70–99)
POTASSIUM SERPL-MCNC: 4.3 MMOL/L — SIGNIFICANT CHANGE UP (ref 3.5–5.3)
POTASSIUM SERPL-SCNC: 4.3 MMOL/L — SIGNIFICANT CHANGE UP (ref 3.5–5.3)
PROT SERPL-MCNC: 7.1 G/DL — SIGNIFICANT CHANGE UP (ref 6–8.3)
SODIUM SERPL-SCNC: 140 MMOL/L — SIGNIFICANT CHANGE UP (ref 135–145)

## 2022-03-06 PROCEDURE — 93970 EXTREMITY STUDY: CPT | Mod: 26

## 2022-03-06 RX ADMIN — Medication 1 SPRAY(S): at 18:18

## 2022-03-06 RX ADMIN — LAMOTRIGINE 300 MILLIGRAM(S): 25 TABLET, ORALLY DISINTEGRATING ORAL at 05:27

## 2022-03-06 RX ADMIN — LAMOTRIGINE 300 MILLIGRAM(S): 25 TABLET, ORALLY DISINTEGRATING ORAL at 18:19

## 2022-03-06 RX ADMIN — PANTOPRAZOLE SODIUM 40 MILLIGRAM(S): 20 TABLET, DELAYED RELEASE ORAL at 05:28

## 2022-03-06 RX ADMIN — CLOBAZAM 10 MILLIGRAM(S): 10 TABLET ORAL at 18:18

## 2022-03-06 RX ADMIN — PRIMIDONE 250 MILLIGRAM(S): 250 TABLET ORAL at 18:19

## 2022-03-06 RX ADMIN — QUETIAPINE FUMARATE 25 MILLIGRAM(S): 200 TABLET, FILM COATED ORAL at 21:29

## 2022-03-06 RX ADMIN — PRIMIDONE 250 MILLIGRAM(S): 250 TABLET ORAL at 05:27

## 2022-03-06 RX ADMIN — OLANZAPINE 5 MILLIGRAM(S): 15 TABLET, FILM COATED ORAL at 21:29

## 2022-03-06 RX ADMIN — DORZOLAMIDE HYDROCHLORIDE TIMOLOL MALEATE 1 DROP(S): 20; 5 SOLUTION/ DROPS OPHTHALMIC at 05:29

## 2022-03-06 RX ADMIN — LATANOPROST 1 DROP(S): 0.05 SOLUTION/ DROPS OPHTHALMIC; TOPICAL at 21:28

## 2022-03-06 RX ADMIN — DORZOLAMIDE HYDROCHLORIDE TIMOLOL MALEATE 1 DROP(S): 20; 5 SOLUTION/ DROPS OPHTHALMIC at 18:17

## 2022-03-06 RX ADMIN — CLOBAZAM 10 MILLIGRAM(S): 10 TABLET ORAL at 05:27

## 2022-03-06 NOTE — PROGRESS NOTE ADULT - SUBJECTIVE AND OBJECTIVE BOX
Neurology Progress Note    S: Patient seen and examined. No new events overnight. patient denied CP, SOB, HA or pain. no neuro change , c/o mild "head pain"     Medication:    MEDICATIONS  (STANDING):  cloBAZam 10 milliGRAM(s) Oral two times a day  dorzolamide 2%/timolol 0.5% Ophthalmic Solution 1 Drop(s) Both EYES two times a day  fluticasone propionate 50 MICROgram(s)/spray Nasal Spray 1 Spray(s) Both Nostrils once  lamoTRIgine 300 milliGRAM(s) Oral every 12 hours  latanoprost 0.005% Ophthalmic Solution 1 Drop(s) Both EYES at bedtime  OLANZapine 5 milliGRAM(s) Oral at bedtime  pantoprazole    Tablet 40 milliGRAM(s) Oral before breakfast  primidone 250 milliGRAM(s) Oral two times a day  QUEtiapine 25 milliGRAM(s) Oral at bedtime    MEDICATIONS  (PRN):  diazepam    Tablet 5 milliGRAM(s) Oral every 8 hours PRN spasm      Vitals:    Vital Signs Last 24 Hrs  T(C): 37.2 (22 @ 11:03), Max: 37.2 (22 @ 11:03)  T(F): 98.9 (22 @ 11:03), Max: 98.9 (22 @ 11:03)  HR: 93 (22 @ 11:03) (89 - 99)  BP: 123/82 (22 @ 11:03) (97/63 - 123/82)  BP(mean): --  RR: 18 (22 @ 11:03) (16 - 18)  SpO2: 96% (22 @ 11:03) (94% - 96%)        General Exam:   General Appearance: Appropriately dressed and in no acute distress       Head: Normocephalic, atraumatic and no dysmorphic features  Ear, Nose, and Throat: Moist mucous membranes  CVS: S1S2+  Resp: No SOB, no wheeze or rhonchi  GI: soft NT/ND  Extremities: No edema or cyanosis  Skin: No bruises or rashes     Neurological Exam:  Mental Status: Awake, alert and oriented x 2-3.  Able to follow simple  verbal commands. Able to name and repeat. fluent speech. No obvious aphasia mild dysarthria noted.   Cranial Nerves: PERRL, EOMI, VFFC, sensation V1-V3 intact,  no obvious facial asymmetry, equal elevation of palate, scm/trap 5/5, tongue is midline on protrusion. no obvious papilledema on fundoscopic exam. hearing is grossly intact.   Motor: SORIANO at least 4+/5   Sensation: Intact to light touch and pinprick throughout. no right/left confusion. no extinction to tactile on DSS.    Reflexes: 1+ throughout at biceps, brachioradialis, triceps, patellars and ankles bilaterally and equal. No clonus. R toe and L toe were both downgoing.  Coordination: No dysmetria on FNF   Gait:deferred       I personally reviewed the below data/images/labs:      140  |  100  |  11  ----------------------------<  86  4.3   |  30  |  0.96    Ca    9.2      06 Mar 2022 07:15    TPro  7.1  /  Alb  3.9  /  TBili  0.2  /  DBili  x   /  AST  40  /  ALT  80<H>  /  AlkPhos  152<H>          Urinalysis Basic - ( 04 Mar 2022 06:25 )    Color: Yellow / Appearance: Slightly Turbid / S.020 / pH: x  Gluc: x / Ketone: Negative  / Bili: Negative / Urobili: Negative   Blood: x / Protein: Trace / Nitrite: Negative   Leuk Esterase: Negative / RBC: 0 /hpf / WBC 0 /HPF   Sq Epi: x / Non Sq Epi: 0 /hpf / Bacteria: Negative        ACC: [id number] EXAM:  CT BRAIN                          PROCEDURE DATE:  2022          INTERPRETATION:  CLINICAL INFORMATION: Headache and vomiting.    TECHNIQUE: Axial noncontrast CT images from the skull base to the vertex   were obtained and submitted for interpretation. Coronal and sagittal   reformatted images were performed. Bone and soft tissue windows were   evaluated.    COMPARISON: CT head 2022.    FINDINGS:    Again noted is a right parietal approach shunt catheter with tipin the   posterior horn of the right lateral ventricle. Multiple additional   retained shunt catheters are unchanged in position since the prior   examination. No interval change in the size of the ventricular system.    Encephalomalacia and gliosis of the right frontotemporal regions along   prior ventriculostomy catheter tract and right occipital region,   unchanged.    There is no acute intracranial mass-effect, hemorrhage, midline shift, or   abnormal extra-axial fluid collection. Basal cisterns are patent.    Mucosal thickening of the paranasal sinuses. Mastoid air cells are clear.   Biparietal craniotomy noted.    IMPRESSION:    No significant interval change in the right transparietal ventriculostomy   catheter terminating in the atrium of the right lateral ventricle.   Multiple abandoned left-sided catheter again noted. No ventricular size   without hydrocephalus. No acute intracranial bleeding.    --- End of Report ---          CLIFF ANTHONY MD; Resident Radiologist  This document has been electronically signed.  RAVEN ROSAS MD; Attending Radiologist  This document has been electronically signed. Mar  4 2022  1:33AM    < end of copied text >

## 2022-03-06 NOTE — PROGRESS NOTE ADULT - SUBJECTIVE AND OBJECTIVE BOX
SUBJECTIVE / OVERNIGHT EVENTS:  22    MEDICATIONS  (STANDING):  cloBAZam 10 milliGRAM(s) Oral two times a day  dorzolamide 2%/timolol 0.5% Ophthalmic Solution 1 Drop(s) Both EYES two times a day  fluticasone propionate 50 MICROgram(s)/spray Nasal Spray 1 Spray(s) Both Nostrils once  lamoTRIgine 300 milliGRAM(s) Oral every 12 hours  latanoprost 0.005% Ophthalmic Solution 1 Drop(s) Both EYES at bedtime  OLANZapine 5 milliGRAM(s) Oral at bedtime  pantoprazole    Tablet 40 milliGRAM(s) Oral before breakfast  primidone 250 milliGRAM(s) Oral two times a day  QUEtiapine 25 milliGRAM(s) Oral at bedtime    MEDICATIONS  (PRN):  diazepam    Tablet 5 milliGRAM(s) Oral every 8 hours PRN spasm    Vital Signs Last 24 Hrs  T(C): 36.6 (22 @ 05:22), Max: 36.6 (22 @ 21:34)  T(F): 97.9 (22 @ 05:22), Max: 97.9 (22 @ 21:34)  HR: 89 (22 @ 05:22) (89 - 99)  BP: 97/63 (22 @ 05:22) (97/63 - 105/64)  BP(mean): --  RR: 16 (22 @ 05:22) (16 - 18)  SpO2: 94% (22 @ 05:22) (94% - 96%)          Constitutional: No fever, fatigue  Skin: No rash.  Eyes: No recent vision problems or eye pain.  ENT: No congestion, ear pain, or sore throat.  Cardiovascular: No chest pain or palpation.  Respiratory: No cough, shortness of breath, congestion, or wheezing.  Gastrointestinal: No abdominal pain, nausea, vomiting, or diarrhea.  Genitourinary: No dysuria.  Musculoskeletal: No joint swelling.  Neurologic: No headache.    PHYSICAL EXAM:  GENERAL: NAD  EYES: EOMI, PERRLA  NECK: Supple, No JVD  CHEST/LUNG: dec breath sounds at bases  HEART:  S1 , S2 +  ABDOMEN: soft , bs+  EXTREMITIES:  edema+  NEUROLOGY:alert awake      LABS:      140  |  100  |  11  ----------------------------<  86  4.3   |  30  |  0.96    Ca    9.2      06 Mar 2022 07:15    TPro  7.1  /  Alb  3.9  /  TBili  0.2  /  DBili      /  AST  40  /  ALT  80<H>  /  AlkPhos  152<H>      Creatinine Trend: 0.96 <--, 0.87 <--    Urine Studies:  Urinalysis Basic - ( 04 Mar 2022 06:25 )    Color: Yellow / Appearance: Slightly Turbid / S.020 / pH:   Gluc:  / Ketone: Negative  / Bili: Negative / Urobili: Negative   Blood:  / Protein: Trace / Nitrite: Negative   Leuk Esterase: Negative / RBC: 0 /hpf / WBC 0 /HPF   Sq Epi:  / Non Sq Epi: 0 /hpf / Bacteria: Negative              LIVER FUNCTIONS - ( 06 Mar 2022 07:15 )  Alb: 3.9 g/dL / Pro: 7.1 g/dL / ALK PHOS: 152 U/L / ALT: 80 U/L / AST: 40 U/L / GGT: x

## 2022-03-06 NOTE — PROGRESS NOTE ADULT - ASSESSMENT
pt with above history p/w nausea / vomiting  < from: CT Head No Cont (03.03.22 @ 23:22) >  No significant interval change in the right transparietal ventriculostomy   catheter terminating in the atrium of the right lateral ventricle.   Multiple abandoned left-sided catheter again noted. No ventricular size   without hydrocephalus. No acute intracranial bleeding.      < end of copied text >    pt had ct abd on feb 18, 2022 with esophagitis- will start regular diet / ppi    - neuro f/u   - GI F/U    Penile discharge - UA with no uti ,  -as per ID, CT chest with opacity - likely aspiration from vomiting - no fever or wbc, hold off abx for now   -Monitor off abx given no fevers, normal WBC.     Seizure disorder - cont home seizure meds       Transaminitis- f/u lfts given anticonvulsants    thrombocytosis - monitor closely      cont psych meds - will confirm from group home staff

## 2022-03-06 NOTE — PROGRESS NOTE ADULT - ASSESSMENT
52 yo M with epilepsy, OP, mood d/o, mild MR BPH, glaucoma,  hx of hydrocephalus s/p  shunt, also has VNS, had VPS revisions presenting with vomiting after having fried feed. also + penile disharge has UTI from 2 days ago.  follows with epilepsy clinic with Dr. Jewell.   CTH no significant interval change in R ventric cath. no change in ventric size no hydro.  Xray shunt series: unremarkable      Impression : Seizure d/o. vomitting unlikely related to h/o hydro ; now improved     - c/w home onfi 10mg BID and lamotrigine 300mg BID; also gets diazepam PRN spasm  - olanzapine and seroquel for mood stabilization  - GI eval vomiting  - monitor LFTs and platelets   - r/o infection; UA unremarkable CT chest obtained ; f/u ID recs   - no need for EEG at this time  - remaining per primary team  - telemetry  - PT/OT/SS/SLP, OOBC  - check FS, glucose control <180  - GI/DVT ppx  - Thank you for allowing me to participate in the care of this patient. Call with questions.   - outpatient f/u wtih Dr maria alejandra jewell upon d/c   Samuel Gauthier MD  Vascular Neurology  Office: [phone]

## 2022-03-06 NOTE — CONSULT NOTE ADULT - SUBJECTIVE AND OBJECTIVE BOX
Chief Complaint:  Patient is a 51y old  Male who presents with a chief complaint of vomiting    Date of service: 03-06-22 @ 12:16    HPI:    The patient is a 51 year old male with coginitive delay with  shunt who presented with vomiting post eating a meal.    The patient does not participate in the interview. He was noted to have a UTI.    Per chart he had an EGD about a year ago which was unremarkable.    He has been hospitalized with these type of symptoms in the past.      Allergies:  codeine (Stomach Upset)  Depakote (Other)  seasonal allergies: nasal congestion (Other)      Home Medications:    Hospital Medications:  cloBAZam 10 milliGRAM(s) Oral two times a day  diazepam    Tablet 5 milliGRAM(s) Oral every 8 hours PRN  dorzolamide 2%/timolol 0.5% Ophthalmic Solution 1 Drop(s) Both EYES two times a day  fluticasone propionate 50 MICROgram(s)/spray Nasal Spray 1 Spray(s) Both Nostrils once  lamoTRIgine 300 milliGRAM(s) Oral every 12 hours  latanoprost 0.005% Ophthalmic Solution 1 Drop(s) Both EYES at bedtime  OLANZapine 5 milliGRAM(s) Oral at bedtime  pantoprazole    Tablet 40 milliGRAM(s) Oral before breakfast  primidone 250 milliGRAM(s) Oral two times a day  QUEtiapine 25 milliGRAM(s) Oral at bedtime      PMHX/PSHX:  Hydrocephalus    Legally blind    Glaucoma    Sleep apnea    Epilepsy    Osteoporosis    Elevated liver enzymes    Environmental Allergies    Impulse control disorder    Mild mental retardation    Hiatal hernia    Aspiration pneumonia    Viral pneumonia    BPH without obstruction/lower urinary tract symptoms    Glaucoma    S/P  Shunt    Epilepsy    H/O craniotomy    Heel cord contracture    History of tonsillectomy    S/p bilateral myringotomy with tube placement    Obstructed  shunt        Family history:  Family history of stroke (Mother)        Social History:   Denies ethanol use.  Denies illicit drug use.    ROS:     cannot obtain      PHYSICAL EXAM:     GENERAL:  Appears stated age, well-groomed, well-nourished, no distress  HEENT:  NC/AT,  conjunctivae anicteric, clear and pink,   NECK: supple, trachea midline  CHEST:  Full & symmetric excursion, no increased effort, breath sounds clear  HEART:  Regular rhythm, no JVD  ABDOMEN:  Soft, RUQ-tender, non-distended, normoactive bowel sounds,  no masses , no hepatosplenomegaly,   EXTREMITIES:  no cyanosis,clubbing or edema  SKIN:  No rash, erythema, or, ecchymoses, no jaundice  NEURO:  Alert, non-focal, no asterixis  PSYCH: Appropriate affect, oriented to place and time  RECTAL: Deferred      Vital Signs:  Vital Signs Last 24 Hrs  T(C): 37.2 (06 Mar 2022 11:03), Max: 37.2 (06 Mar 2022 11:03)  T(F): 98.9 (06 Mar 2022 11:03), Max: 98.9 (06 Mar 2022 11:03)  HR: 93 (06 Mar 2022 11:03) (89 - 99)  BP: 123/82 (06 Mar 2022 11:03) (97/63 - 123/82)  BP(mean): --  RR: 18 (06 Mar 2022 11:03) (16 - 18)  SpO2: 96% (06 Mar 2022 11:03) (94% - 96%)  Daily     Daily     LABS: Labs personally reviewed by me:    03-06    140  |  100  |  11  ----------------------------<  86  4.3   |  30  |  0.96    Ca    9.2      06 Mar 2022 07:15    TPro  7.1  /  Alb  3.9  /  TBili  0.2  /  DBili  x   /  AST  40  /  ALT  80<H>  /  AlkPhos  152<H>  03-06    LIVER FUNCTIONS - ( 06 Mar 2022 07:15 )  Alb: 3.9 g/dL / Pro: 7.1 g/dL / ALK PHOS: 152 U/L / ALT: 80 U/L / AST: 40 U/L / GGT: x                   Imaging personally reviewed by me:

## 2022-03-07 LAB
ANION GAP SERPL CALC-SCNC: 12 MMOL/L — SIGNIFICANT CHANGE UP (ref 5–17)
BUN SERPL-MCNC: 11 MG/DL — SIGNIFICANT CHANGE UP (ref 7–23)
CALCIUM SERPL-MCNC: 9.2 MG/DL — SIGNIFICANT CHANGE UP (ref 8.4–10.5)
CHLORIDE SERPL-SCNC: 100 MMOL/L — SIGNIFICANT CHANGE UP (ref 96–108)
CO2 SERPL-SCNC: 25 MMOL/L — SIGNIFICANT CHANGE UP (ref 22–31)
CREAT SERPL-MCNC: 0.96 MG/DL — SIGNIFICANT CHANGE UP (ref 0.5–1.3)
EGFR: 95 ML/MIN/1.73M2 — SIGNIFICANT CHANGE UP
GLUCOSE SERPL-MCNC: 109 MG/DL — HIGH (ref 70–99)
HCT VFR BLD CALC: 38.8 % — LOW (ref 39–50)
HGB BLD-MCNC: 12.3 G/DL — LOW (ref 13–17)
MCHC RBC-ENTMCNC: 28.3 PG — SIGNIFICANT CHANGE UP (ref 27–34)
MCHC RBC-ENTMCNC: 31.7 GM/DL — LOW (ref 32–36)
MCV RBC AUTO: 89.2 FL — SIGNIFICANT CHANGE UP (ref 80–100)
NRBC # BLD: 0 /100 WBCS — SIGNIFICANT CHANGE UP (ref 0–0)
PLATELET # BLD AUTO: 389 K/UL — SIGNIFICANT CHANGE UP (ref 150–400)
POTASSIUM SERPL-MCNC: 4.2 MMOL/L — SIGNIFICANT CHANGE UP (ref 3.5–5.3)
POTASSIUM SERPL-SCNC: 4.2 MMOL/L — SIGNIFICANT CHANGE UP (ref 3.5–5.3)
RBC # BLD: 4.35 M/UL — SIGNIFICANT CHANGE UP (ref 4.2–5.8)
RBC # FLD: 15.8 % — HIGH (ref 10.3–14.5)
SODIUM SERPL-SCNC: 137 MMOL/L — SIGNIFICANT CHANGE UP (ref 135–145)
WBC # BLD: 9.15 K/UL — SIGNIFICANT CHANGE UP (ref 3.8–10.5)
WBC # FLD AUTO: 9.15 K/UL — SIGNIFICANT CHANGE UP (ref 3.8–10.5)

## 2022-03-07 PROCEDURE — 93010 ELECTROCARDIOGRAM REPORT: CPT

## 2022-03-07 PROCEDURE — 99232 SBSQ HOSP IP/OBS MODERATE 35: CPT

## 2022-03-07 PROCEDURE — 99222 1ST HOSP IP/OBS MODERATE 55: CPT

## 2022-03-07 RX ORDER — OMEPRAZOLE 10 MG/1
1 CAPSULE, DELAYED RELEASE ORAL
Qty: 0 | Refills: 0 | DISCHARGE

## 2022-03-07 RX ORDER — CLOBAZAM 10 MG/1
5 TABLET ORAL
Refills: 0 | Status: DISCONTINUED | OUTPATIENT
Start: 2022-03-07 | End: 2022-03-10

## 2022-03-07 RX ORDER — DILTIAZEM HCL 120 MG
120 CAPSULE, EXT RELEASE 24 HR ORAL DAILY
Refills: 0 | Status: DISCONTINUED | OUTPATIENT
Start: 2022-03-07 | End: 2022-03-10

## 2022-03-07 RX ORDER — METOPROLOL TARTRATE 50 MG
50 TABLET ORAL DAILY
Refills: 0 | Status: DISCONTINUED | OUTPATIENT
Start: 2022-03-07 | End: 2022-03-10

## 2022-03-07 RX ORDER — HEPARIN SODIUM 5000 [USP'U]/ML
5000 INJECTION INTRAVENOUS; SUBCUTANEOUS EVERY 8 HOURS
Refills: 0 | Status: DISCONTINUED | OUTPATIENT
Start: 2022-03-07 | End: 2022-03-10

## 2022-03-07 RX ORDER — CARBAMIDE PEROXIDE 81.86 MG/ML
10 SOLUTION/ DROPS AURICULAR (OTIC)
Qty: 0 | Refills: 0 | DISCHARGE

## 2022-03-07 RX ORDER — FLUTICASONE PROPIONATE 220 MCG
1 AEROSOL WITH ADAPTER (GRAM) INHALATION
Qty: 0 | Refills: 0 | DISCHARGE

## 2022-03-07 RX ORDER — CETIRIZINE HYDROCHLORIDE 10 MG/1
1 TABLET ORAL
Qty: 0 | Refills: 0 | DISCHARGE

## 2022-03-07 RX ADMIN — HEPARIN SODIUM 5000 UNIT(S): 5000 INJECTION INTRAVENOUS; SUBCUTANEOUS at 15:22

## 2022-03-07 RX ADMIN — CLOBAZAM 5 MILLIGRAM(S): 10 TABLET ORAL at 17:52

## 2022-03-07 RX ADMIN — PANTOPRAZOLE SODIUM 40 MILLIGRAM(S): 20 TABLET, DELAYED RELEASE ORAL at 06:24

## 2022-03-07 RX ADMIN — PRIMIDONE 250 MILLIGRAM(S): 250 TABLET ORAL at 06:24

## 2022-03-07 RX ADMIN — DORZOLAMIDE HYDROCHLORIDE TIMOLOL MALEATE 1 DROP(S): 20; 5 SOLUTION/ DROPS OPHTHALMIC at 06:23

## 2022-03-07 RX ADMIN — LATANOPROST 1 DROP(S): 0.05 SOLUTION/ DROPS OPHTHALMIC; TOPICAL at 21:34

## 2022-03-07 RX ADMIN — Medication 50 MILLIGRAM(S): at 17:52

## 2022-03-07 RX ADMIN — Medication 120 MILLIGRAM(S): at 17:52

## 2022-03-07 RX ADMIN — LAMOTRIGINE 300 MILLIGRAM(S): 25 TABLET, ORALLY DISINTEGRATING ORAL at 17:53

## 2022-03-07 RX ADMIN — HEPARIN SODIUM 5000 UNIT(S): 5000 INJECTION INTRAVENOUS; SUBCUTANEOUS at 21:34

## 2022-03-07 RX ADMIN — DORZOLAMIDE HYDROCHLORIDE TIMOLOL MALEATE 1 DROP(S): 20; 5 SOLUTION/ DROPS OPHTHALMIC at 17:53

## 2022-03-07 RX ADMIN — PRIMIDONE 250 MILLIGRAM(S): 250 TABLET ORAL at 17:53

## 2022-03-07 RX ADMIN — CLOBAZAM 10 MILLIGRAM(S): 10 TABLET ORAL at 06:23

## 2022-03-07 RX ADMIN — LAMOTRIGINE 300 MILLIGRAM(S): 25 TABLET, ORALLY DISINTEGRATING ORAL at 06:24

## 2022-03-07 NOTE — CONSULT NOTE ADULT - SUBJECTIVE AND OBJECTIVE BOX
CHIEF COMPLAINT: Patient is a 51y old  Male who presents with a chief complaint of     HPI:  52 yo M with hx of hydrocephalus s/p  shunt presenting with vomiting. Patient denies nausea at this time. Complaining now of dorsal foot pain. Aide at bedside states that he was noted to be vomiting earlier this evening. No reported history of fevers, chills, diarrhea, abdominal pain.     	Collateral information per nurse at [facility] ([phone]), patient had several episodes of vomiting of undigested food this evening with associated headache. Patient has been on a bland diet for the last few weeks following a similar episode for which he was evaluated for in the ER had shunt evaluation which was normal. Today he had a meal of fried foods for the first time. Also noted darkish penile discharge today, currently on Metronidazole and Levaquin (started 2 days ago for UTI). Patient had neurology appointment with Dr. Agusto Jewell which was rescheduled for March 9.    pt at present denies nausea, vomiting,abd pain , constipation/ dysuria  polyuria, says he ambulates at group [place] (04 Mar 2022 12:22)    Interval hx: last night he was noted to be tachycardic. He denies any sx of palpitations. Denies any chest pain, dyspnea, palpitations, PND, orthopnea, near syncope, syncope, lower extremity edema, stroke like symptoms.     EKG:     REVIEW OF SYSTEMS:   All other review of systems are negative unless indicated above    PAST MEDICAL & SURGICAL HISTORY:  Hydrocephalus  age 2 months    Legally blind    Glaucoma    Sleep apnea  not on CPAP    Epilepsy    Osteoporosis    Elevated liver enzymes    Environmental Allergies    Impulse control disorder  worsened when pt on Depakote, pt now off    Mild mental retardation    Hiatal hernia    Aspiration pneumonia  at age 25    Viral pneumonia  h/o    BPH without obstruction/lower urinary tract symptoms    Glaucoma  on eye gtts    S/P  Shunt  originally had shunt to lung, then revised to peritoneal shunt, has had multiple revisions    Epilepsy  s/p Vagal Nerve Stimulator Implant , 2007, last one in 2016    H/O craniotomy  at age 18    Heel cord contracture  s/p surgical correction b/l    History of tonsillectomy    S/p bilateral myringotomy with tube placement    Obstructed  shunt  revision of  shunt 2015        SOCIAL HISTORY:  No tobacco, ethanol, or drug abuse.    FAMILY HISTORY:  Family history of stroke (Mother)      No family history of acute MI or sudden cardiac death.    MEDICATIONS  (STANDING):  cloBAZam 10 milliGRAM(s) Oral two times a day  dorzolamide 2%/timolol 0.5% Ophthalmic Solution 1 Drop(s) Both EYES two times a day  heparin   Injectable 5000 Unit(s) SubCutaneous every 8 hours  lamoTRIgine 300 milliGRAM(s) Oral every 12 hours  latanoprost 0.005% Ophthalmic Solution 1 Drop(s) Both EYES at bedtime  OLANZapine 5 milliGRAM(s) Oral at bedtime  pantoprazole    Tablet 40 milliGRAM(s) Oral before breakfast  primidone 250 milliGRAM(s) Oral two times a day  QUEtiapine 25 milliGRAM(s) Oral at bedtime    MEDICATIONS  (PRN):  diazepam    Tablet 5 milliGRAM(s) Oral every 8 hours PRN spasm      Allergies    codeine (Stomach Upset)  Depakote (Other)  seasonal allergies: nasal congestion (Other)    Intolerances        Home meds:  Home Medications:  cetirizine 10 mg oral tablet: 1 tab(s) orally once a day in the pm (12 Nov 2020 05:57)  Debrox 6.5% otic solution: 10 drop(s) to each ear for the first week of every month (12 Nov 2020 05:57)  dorzolamide-timolol 2.23%-0.68% ophthalmic solution: 1 drop(s) to both eyes 2 times a day (12 Nov 2020 05:57)  Flonase 50 mcg/inh nasal spray: 1 spray(s) nasal  (08 Jul 2021 10:20)  fluticasone 0.5 mg/2 mL inhalation suspension: 1 spray(s) inhaled once a day (12 Nov 2020 05:57)  ketoconazole 2% topical cream: Apply topically to affected area 2 times a day. ( scalp , forehead and eyebrows ) (12 Nov 2020 05:57)  latanoprost 0.005% ophthalmic solution: 1 drop(s) to each affected eye once a day (in the evening) (29 Jun 2021 18:13)  OLANZapine 5 mg oral tablet: 1 tab(s) orally once a day (at bedtime) (08 Jul 2021 10:20)  omeprazole 40 mg oral delayed release capsule: 1 cap(s) orally 2 times a day (12 Nov 2020 05:57)  Oyster Shell Calcium with Vitamin D 500 mg-200 intl units oral tablet: 1 tab(s) orally 3 times a day (12 Nov 2020 05:57)  primidone 250 mg oral tablet: 1 tab(s) orally 2 times a day (29 Jun 2021 18:50)  senna oral tablet: 2 tab(s) orally once a day (at bedtime) (17 Nov 2020 10:46)  SEROquel 25 mg oral tablet: 1 tab(s) orally once a day (at bedtime) (08 Jul 2021 10:20)  Vitamin D3 1000 intl units oral capsule: 1 cap(s) orally once a day in the am (12 Nov 2020 05:57)        VITAL SIGNS:   Vital Signs Last 24 Hrs  T(C): 36.5 (06 Mar 2022 20:41), Max: 37.2 (06 Mar 2022 11:03)  T(F): 97.7 (06 Mar 2022 20:41), Max: 98.9 (06 Mar 2022 11:03)  HR: 100 (06 Mar 2022 20:41) (93 - 100)  BP: 116/79 (06 Mar 2022 20:41) (116/79 - 123/82)  BP(mean): --  RR: 18 (06 Mar 2022 20:41) (18 - 18)  SpO2: 94% (06 Mar 2022 20:41) (94% - 96%)    I&O's Summary    06 Mar 2022 07:01  -  07 Mar 2022 07:00  --------------------------------------------------------  IN: 960 mL / OUT: 1000 mL / NET: -40 mL        On Exam:     Constitutional: NAD, awake   HEENT: Moist Mucous Membranes, Anicteric  Pulmonary: Decreased breath sounds b/l. No rales, crackles or wheeze appreciated.   Cardiovascular: Mildly tachy, S1 and S2, No murmurs, rubs, gallops or clicks  Gastrointestinal: Bowel Sounds present, soft, nontender.   Lymph: + peripheral edema. No lymphadenopathy.  Skin: No visible rashes or ulcers.  Psych:  Mood & affect appropriate    LABS: All Labs Reviewed:                        12.3   9.15  )-----------( 389      ( 07 Mar 2022 07:36 )             38.8     07 Mar 2022 07:34    137    |  100    |  11     ----------------------------<  109    4.2     |  25     |  0.96   06 Mar 2022 07:15    140    |  100    |  11     ----------------------------<  86     4.3     |  30     |  0.96     Ca    9.2        07 Mar 2022 07:34  Ca    9.2        06 Mar 2022 07:15    TPro  7.1    /  Alb  3.9    /  TBili  0.2    /  DBili  x      /  AST  40     /  ALT  80     /  AlkPhos  152    06 Mar 2022 07:15          Blood Culture: Organism --  Gram Stain Blood -- Gram Stain --  Specimen Source Clean Catch Clean Catch (Midstream)  Culture-Blood --            RADIOLOGY:  < from: CT Chest No Cont (03.04.22 @ 17:40) >    ACC: [id number] EXAM:  CT CHEST                          PROCEDURE DATE:  03/04/2022          INTERPRETATION:  CLINICAL INFORMATION: Hydrocephalus. Vomiting. Abnormal   chest radiograph 3/4/2022.    COMPARISON: CT chest 1/29/2019 and CT abdomen/pelvis 2/17/2022.    CONTRAST/COMPLICATIONS:  IV Contrast: NONE  Oral Contrast: NONE  Complications: None reported at time of study completion    PROCEDURE:  CT scan of the chest was obtained without intravenous contrast.    FINDINGS:    LYMPH NODES: No lymphadenopathy.    HEART/VASCULATURE: Heart size is normal. No pericardial effusion. Aortic   and coronary artery calcifications.    AIRWAYS/LUNGS/PLEURA: Patent central airways. Patchy right upper lobe   ground glass opacity medially is new compared to prior CT 1/29/2019.   Right lower lobe consolidations, mildly decreased compared to 2/17/2022.   Stable 0.5 cm left lower lobe nodule (2:42). Right pleural   calcifications, similar to prior. No pleural effusion.    UPPER ABDOMEN: Redemonstrated wall thickening of the distal esophagus.   Residual contrast in the bowel.    BONES/SOFT TISSUES: Degenerative changes of the osseous structures.   Scoliosis. Left anterior chest wall pacemaker. Partially visualized    shunt catheter coursing through the anterior soft tissues of the right   neck, chest, and abdomen. Additional catheter in the anterior right chest   wall which terminates in the right upper quadrant.    IMPRESSION:  Patchy right upper lobe groundglass opacity. Differential includes   infection or inflammatory etiologies.    Right lower lobe consolidations, mildly decreased compared to 2/17/2022.    Coronary artery atherosclerosis.        --- End of Report ---          ENMA KNUTSON MD; Resident Radiology  This document has been electronically signed.  PATRICK ERIC MD; Attending Radiologist  This document has been electronically signed. Mar  5 2022 11:01AM    < end of copied text >      < from: [facility] Duplex Lower Ext Vein Scan, Bilat (03.06.22 @ 14:54) >    ACC: [id number] EXAM:  DUPLEX SCAN EXT VEINS LOWER BI                          PROCEDURE DATE:  03/06/2022          INTERPRETATION:  CLINICAL INFORMATION: Leg swelling.    COMPARISON: November 13, 2020.    TECHNIQUE: Duplex sonography of the BILATERAL LOWER extremity veins with   color and spectral Doppler, with and without compression.    FINDINGS:    RIGHT:  Normal compressibility of the RIGHT common femoral, femoral and popliteal   veins.  Doppler examination shows normal spontaneous and phasic flow.  No RIGHT calf vein thrombosis is detected.    LEFT:  Normal compressibility of the LEFT common femoral, femoral and popliteal   veins.  Doppler examination shows normal spontaneous and phasic flow.  No LEFT calf vein thrombosis is detected.    IMPRESSION:  No evidence of deep venous thrombosis in either lower extremity.    --- End of Report ---            CHIARA BELTRÁN MD; Attending Radiologist  This document has been electronically signed. Mar  6 2022  2:59PM    < end of copied text >

## 2022-03-07 NOTE — PHYSICAL THERAPY INITIAL EVALUATION ADULT - GAIT TRAINING, PT EVAL
GOAL: Patient will ambulate 50ft Min A using least restrictive device for household distances in 2 weeks.

## 2022-03-07 NOTE — PROGRESS NOTE ADULT - SUBJECTIVE AND OBJECTIVE BOX
Chief Complaint:  Patient is a 51y old  Male who presents with a chief complaint of     Date of service 03-07-22 @ 14:23      Interval Events:   Patient seen and examined.   Legal guardian at bedside.   No abdominal pain/ tenderness noted.       Hospital Medications:  cloBAZam 5 milliGRAM(s) Oral two times a day  diazepam    Tablet 5 milliGRAM(s) Oral every 8 hours PRN  dorzolamide 2%/timolol 0.5% Ophthalmic Solution 1 Drop(s) Both EYES two times a day  heparin   Injectable 5000 Unit(s) SubCutaneous every 8 hours  lamoTRIgine 300 milliGRAM(s) Oral every 12 hours  latanoprost 0.005% Ophthalmic Solution 1 Drop(s) Both EYES at bedtime  pantoprazole    Tablet 40 milliGRAM(s) Oral before breakfast  primidone 250 milliGRAM(s) Oral two times a day        Review of Systems:  unable to obtain    PHYSICAL EXAM:   Vital Signs:  Vital Signs Last 24 Hrs  T(C): 36.4 (07 Mar 2022 11:27), Max: 36.5 (06 Mar 2022 20:41)  T(F): 97.6 (07 Mar 2022 11:27), Max: 97.7 (06 Mar 2022 20:41)  HR: 95 (07 Mar 2022 12:27) (94 - 100)  BP: 157/72 (07 Mar 2022 12:27) (116/79 - 163/69)  BP(mean): --  RR: 18 (07 Mar 2022 11:27) (18 - 18)  SpO2: 98% (07 Mar 2022 12:27) (94% - 99%)  Daily     Daily       PHYSICAL EXAM:     GENERAL:  Appears stated age, well-groomed, well-nourished, no distress  HEENT:  NC/AT,  conjunctivae anicteric, clear and pink,   NECK: supple, trachea midline  CHEST:  Full & symmetric excursion, no increased effort, breath sounds clear  HEART:  Regular rhythm, no JVD  ABDOMEN:  Soft, non-tender, non-distended, normoactive bowel sounds,  no masses , no hepatosplenomegaly  EXTREMITIES:  no cyanosis,clubbing or edema  SKIN:  No rash, erythema, or, ecchymoses, no jaundice  NEURO:  Alert, non-focal, no asterixis  RECTAL: Deferred      LABS Personally reviewed by me:                        12.3   9.15  )-----------( 389      ( 07 Mar 2022 07:36 )             38.8     Mean Cell Volume: 89.2 fl (03-07-22 @ 07:36)    03-07    137  |  100  |  11  ----------------------------<  109<H>  4.2   |  25  |  0.96    Ca    9.2      07 Mar 2022 07:34    TPro  7.1  /  Alb  3.9  /  TBili  0.2  /  DBili  x   /  AST  40  /  ALT  80<H>  /  AlkPhos  152<H>  03-06    LIVER FUNCTIONS - ( 06 Mar 2022 07:15 )  Alb: 3.9 g/dL / Pro: 7.1 g/dL / ALK PHOS: 152 U/L / ALT: 80 U/L / AST: 40 U/L / GGT: x                                       12.3   9.15  )-----------( 389      ( 07 Mar 2022 07:36 )             38.8       Imaging personally reviewed by me:           Mago Mondragon Billings  36 y.o. female  Chief Complaint   Patient presents with   • Chest Pressure     Pt reports having chest pressure x1.5h following dialysis. Upon EMS arrival pt was hypertensive 174/109. 12l NSR per EMS. Pt reports lethargy and dizziness associated with pressure.          Pt is alert and oriented, speaking in full sentences, follows commands and responds appropriately to questions. Resp are even and unlabored. EKG in progress.

## 2022-03-07 NOTE — CHART NOTE - NSCHARTNOTEFT_GEN_A_CORE
Medicine PA Note    Notified by RN that patient's HR: 120 this morning. Pt was evaluated at bedside, no acute distress is noted. Pt denies pain, palpitations, chest pain, nausea/vomiting, abdominal pain, sob, and diaphoresis.   EKG performed: sinus tachycardia   Continue to monitor patient's vitals   Endorse/sign out to day team on overnight event  RN aware of management     Clarisa Woodward PA-C   Dept of Medicine   [id number]

## 2022-03-07 NOTE — CONSULT NOTE ADULT - ASSESSMENT
51 year old man with n/v, UTI    1. N/V. ddx includes food poisoning, cholecystitis (RUQ tenderness on exam, abnml LFT), UTI  -check RUQ US    2. UTI    3. Cognitive delay    4.  shunt. CT shows no hydro    Advanced care planning forms were discussed. Code status including forceful chest compressions, defibrillation and intubation were discussed. The risks benefits and alternatives to pertinent gastrointestinal procedures and interventions were discussed in detail and all questions were answered. Duration: 15 Minutes.      Norbert Burden M.D.   Gastroenterology and Hepatology  [address] [place], [place]  Office: [phone]  Cell: [phone]
52 yo M with hx of hydrocephalus s/p  shunt presenting with N/V and now found to be tachy    - Appears to have had a bout of sinus tachycardia.   - His initial EKF on 3/4 also demonstrated sinus tachy  - His HR tends to run in high 90s low 100s per flowsheet  - doubt this is arrhythmic  - this may be reflective of his deconditioning + mild vol depletion from vomiting and possible an infectious process  - lower ext venous duplex neg for DVT. though he is higher risk for VTE given comorbidities. cont dvt proph  - check echo to assess for structural disease   - can give gentle hydration    - fu ID. No abx for now. ?asp PNA  - Monitor and replete electrolytes. Keep K>4.0 and Mg>2.0.  - Further cardiac workup will depend on clinical course.   - All other workup per primary team. Will followup. 
52 yo M with epilepsy, OP, mood d/o, mild MR BPH, glaucoma,  hx of hydrocephalus s/p  shunt, also has VNS, had VPS revisions presenting with vomiting after having fried feed. also + penile disharge has UTI from 2 days ago.  follows with epilepsy clinic with Dr. Jewell.   CTH no significant interval change in R ventric cath. no change in ventric size no hydro.   ImpressIon : Seizure d/o. vomitting unlikely related to h/o hydro   - c/w home onfi 10mg BID and lamotrigine 300mg BID; also gets diazepam PRN spasm  - olanzapine and seroquel for mood stabilization  - GI eval vomiting  - can get xray shunt series but no hydro or evidence of obstruction on CTH  - monitor LFTs and platelets   - r/o infection  - no need for EEG at this time  - remaining per primary team  - telemetry  - PT/OT/SS/SLP, OOBC  - check FS, glucose control <180  - GI/DVT ppx  - Counseling on diet, exercise, and medication adherence was done  - Counseling on smoking cessation and alcohol consumption offered when appropriate.  - Pain assessed and judicious use of narcotics when appropriate was discussed.    - Stroke education given when appropriate.  - Importance of fall prevention discussed.   - Differential diagnosis and plan of care discussed with patient and/or family and primary team  - Thank you for allowing me to participate in the care of this patient. Call with questions.   - outpatient f/u wtih Dr maria alejandra jewell upon d/c   Samuel Gauthier MD  Vascular Neurology  Office: [phone]      
51 year old male with hydrocephalus, VPS presenting with vomiting.     Patient no longer with nausea and vomiting. No diarrhea.  CT A/P performed.  No fevers, normal WBC.  UA with WBC 0 low suspicion for UTI.  VPS shunt study unremarkable.  CT head with normal ventricular size without hydrocephalus.  CXR with right patchy opacity - no cough.    Recommend:  #Nausea and vomiting  -?Viral gastroenteritis  -F/U CT A/P read  -Continue supportive care  -No fevers, normal WBC - monitor off abx  -UA not suggestive of UTI - continue to monitor for worsening urinary symptoms  -CT head with normal ventricular study - Shunt XR unremarkable    #CXR with R chest opacity  -Check CT chest to further characterize  -Monitor off abx given no fevers, normal WBC.    Chapito Reeder MD  Available through MS Teams  If no response, or after 5pm/weekends, call [phone]    Discussed plan with primary team.

## 2022-03-07 NOTE — PROGRESS NOTE ADULT - SUBJECTIVE AND OBJECTIVE BOX
Neurology Progress Note    S: Patient seen and examined. No new events overnight. patient denied CP, SOB, HA or pain. no neuro change tachycardic earlier     Medication:    MEDICATIONS  (STANDING):  cloBAZam 5 milliGRAM(s) Oral two times a day  dorzolamide 2%/timolol 0.5% Ophthalmic Solution 1 Drop(s) Both EYES two times a day  heparin   Injectable 5000 Unit(s) SubCutaneous every 8 hours  lamoTRIgine 300 milliGRAM(s) Oral every 12 hours  latanoprost 0.005% Ophthalmic Solution 1 Drop(s) Both EYES at bedtime  pantoprazole    Tablet 40 milliGRAM(s) Oral before breakfast  primidone 250 milliGRAM(s) Oral two times a day    MEDICATIONS  (PRN):  diazepam    Tablet 5 milliGRAM(s) Oral every 8 hours PRN spasm    Vitals:      Vital Signs Last 24 Hrs  T(C): 36.4 (22 @ 11:27), Max: 36.5 (22 @ 20:41)  T(F): 97.6 (22 @ 11:27), Max: 97.7 (22 @ 20:41)  HR: 95 (22 @ 12:27) (94 - 100)  BP: 157/72 (22 @ 12:27) (116/79 - 163/69)  BP(mean): --  RR: 18 (22 @ 11:27) (18 - 18)  SpO2: 98% (22 @ 12:27) (94% - 99%)          General Exam:   General Appearance: Appropriately dressed and in no acute distress       Head: Normocephalic, atraumatic and no dysmorphic features  Ear, Nose, and Throat: Moist mucous membranes  CVS: S1S2+  Resp: No SOB, no wheeze or rhonchi  GI: soft NT/ND  Extremities: No edema or cyanosis  Skin: No bruises or rashes     Neurological Exam:  Mental Status: Awake, alert and oriented x 2-3.  Able to follow simple  verbal commands. Able to name and repeat. fluent speech. No obvious aphasia mild dysarthria noted.   Cranial Nerves: PERRL, EOMI, VFFC, sensation V1-V3 intact,  no obvious facial asymmetry, equal elevation of palate, scm/trap 5/5, tongue is midline on protrusion. no obvious papilledema on fundoscopic exam. hearing is grossly intact.   Motor: SORIANO at least 4+/5   Sensation: Intact to light touch and pinprick throughout. no right/left confusion. no extinction to tactile on DSS.    Reflexes: 1+ throughout at biceps, brachioradialis, triceps, patellars and ankles bilaterally and equal. No clonus. R toe and L toe were both downgoing.  Coordination: No dysmetria on FNF   Gait:deferred       I personally reviewed the below data/images/labs:\\      CBC Full  -  ( 07 Mar 2022 07:36 )  WBC Count : 9.15 K/uL  RBC Count : 4.35 M/uL  Hemoglobin : 12.3 g/dL  Hematocrit : 38.8 %  Platelet Count - Automated : 389 K/uL  Mean Cell Volume : 89.2 fl  Mean Cell Hemoglobin : 28.3 pg  Mean Cell Hemoglobin Concentration : 31.7 gm/dL  Auto Neutrophil # : x  Auto Lymphocyte # : x  Auto Monocyte # : x  Auto Eosinophil # : x  Auto Basophil # : x  Auto Neutrophil % : x  Auto Lymphocyte % : x  Auto Monocyte % : x  Auto Eosinophil % : x  Auto Basophil % : x        03-    137  |  100  |  11  ----------------------------<  109<H>  4.2   |  25  |  0.96    Ca    9.2      07 Mar 2022 07:34    TPro  7.1  /  Alb  3.9  /  TBili  0.2  /  DBili  x   /  AST  40  /  ALT  80<H>  /  AlkPhos  152<H>  03        Urinalysis Basic - ( 04 Mar 2022 06:25 )    Color: Yellow / Appearance: Slightly Turbid / S.020 / pH: x  Gluc: x / Ketone: Negative  / Bili: Negative / Urobili: Negative   Blood: x / Protein: Trace / Nitrite: Negative   Leuk Esterase: Negative / RBC: 0 /hpf / WBC 0 /HPF   Sq Epi: x / Non Sq Epi: 0 /hpf / Bacteria: Negative        ACC: [id number] EXAM:  CT BRAIN                          PROCEDURE DATE:  2022          INTERPRETATION:  CLINICAL INFORMATION: Headache and vomiting.    TECHNIQUE: Axial noncontrast CT images from the skull base to the vertex   were obtained and submitted for interpretation. Coronal and sagittal   reformatted images were performed. Bone and soft tissue windows were   evaluated.    COMPARISON: CT head 2022.    FINDINGS:    Again noted is a right parietal approach shunt catheter with tipin the   posterior horn of the right lateral ventricle. Multiple additional   retained shunt catheters are unchanged in position since the prior   examination. No interval change in the size of the ventricular system.    Encephalomalacia and gliosis of the right frontotemporal regions along   prior ventriculostomy catheter tract and right occipital region,   unchanged.    There is no acute intracranial mass-effect, hemorrhage, midline shift, or   abnormal extra-axial fluid collection. Basal cisterns are patent.    Mucosal thickening of the paranasal sinuses. Mastoid air cells are clear.   Biparietal craniotomy noted.    IMPRESSION:    No significant interval change in the right transparietal ventriculostomy   catheter terminating in the atrium of the right lateral ventricle.   Multiple abandoned left-sided catheter again noted. No ventricular size   without hydrocephalus. No acute intracranial bleeding.    --- End of Report ---          CLIFF ANTHONY MD; Resident Radiologist  This document has been electronically signed.  RAVEN ROSAS MD; Attending Radiologist  This document has been electronically signed. Mar  4 2022  1:33AM    < end of copied text >

## 2022-03-07 NOTE — PHARMACOTHERAPY INTERVENTION NOTE - COMMENTS
Medication reconciliation completed. Please refer to specifics in home medication list (outpatient medication review). Medications verified with patient and patient's group home (nurse phone # [phone])    -------------------------------------------------------------------------------------------  Home Medications:  Dorzolamide-timolol 2.23%-0.68% ophthalmic solution: 1 drop to both eyes twice daily   Flonase 50 mcg/inh nasal spray: 1 spray nasally   Ketoconazole 2% topical cream: Apply topically to affected area 2 times a day. ( scalp , forehead and eyebrows )  Latanoprost 0.005% ophthalmic solution: 1 drop into each affected eye once a day (in the evening)   Lamotrigine 100 mg tablet: 3 tablets orally twice daily  Oyster Shell Calcium with Vitamin D 500 mg-200 intl units oral tablet: 1 tab orally 3 times a day   Primidone 250 mg oral tablet: 1 tab orally twice daily  senna oral tablet: 2 tab(s) orally once a day (at bedtime)  Vitamin D3 1000 intl units oral capsule: 1 cap(s) orally once a day in the am     Added:   Cardizem  mg/24 hours oral tablet, extended release: 1 tab orally once a day   Metoprolol Tartrate 50 mg oral tablet: 1 tab orally once a day  Pantoprazole 40 mg oral delayed release tablet: 1 tab orally once a day     Changed:   CloBAZam 10 mg oral tablet: one-half tablet orally 2 times a day (previously listed as 1 tablet 2 times a day)    Removed:  Olanzapine 5 mg tablet: 1 tablet orally at bedtime   Seroquel 25 mg tablet: 1 tablet orally at bedtime  Diazepam 5 mg tablet: 1 tablet orally every 8 hours PRN spasms; MDD 3 tablets  Omeprazole 40 mg DR capsule: 1 capsule orally twice daily  -------------------------------------------------------------------------------------------  Recommendations:  Recommend decreasing clobazam to 5 mg BID to align with outpatient regimen.  Recommend re-instating patient's Cardizem and metoprolol tartrate given patient's tachycardia during admission.   Recommend discontinuing olanzapine, seroquel, and diazepam as per nurse patient's no longer on these medications.   Discussed with primary team and answered guardian Christi's medication-related questions    Time spent: 20 minutes    Thank you,  David Metzger, PharmD  PGY-1 Pharmacy Resident  #[id number] Medication reconciliation completed. Please refer to specifics in home medication list (outpatient medication review). Medications verified with patient and patient's group home (nurse phone # [phone])    -------------------------------------------------------------------------------------------  Home Medications:  Dorzolamide-timolol 2.23%-0.68% ophthalmic solution: 1 drop to both eyes twice daily   Flonase 50 mcg/inh nasal spray: 1 spray nasally   Ketoconazole 2% topical cream: Apply topically to affected area 2 times a day. ( scalp , forehead and eyebrows )  Latanoprost 0.005% ophthalmic solution: 1 drop into each affected eye once a day (in the evening)   Lamotrigine 100 mg tablet: 3 tablets orally twice daily  Oyster Shell Calcium with Vitamin D 500 mg-200 intl units oral tablet: 1 tab orally 3 times a day   Primidone 250 mg oral tablet: 1 tab orally twice daily  senna oral tablet: 2 tab(s) orally once a day (at bedtime)  Vitamin D3 1000 intl units oral capsule: 1 cap(s) orally once a day in the am     Added:   Cardizem  mg/24 hours oral tablet, extended release: 1 tab orally once a day   Metoprolol Tartrate 50 mg oral tablet: 1 tab orally once a day  Pantoprazole 40 mg oral delayed release tablet: 1 tab orally once a day     Changed:   CloBAZam 10 mg oral tablet: one-half tablet orally 2 times a day (previously listed as 1 tablet 2 times a day)    Removed:  Olanzapine 5 mg tablet: 1 tablet orally at bedtime   Seroquel 25 mg tablet: 1 tablet orally at bedtime  Diazepam 5 mg tablet: 1 tablet orally every 8 hours PRN spasms; MDD 3 tablets  Omeprazole 40 mg DR capsule: 1 capsule orally twice daily  -------------------------------------------------------------------------------------------  Recommendations:  Recommend decreasing clobazam to 5 mg BID to align with outpatient regimen.  Recommend re-instating patient's diltiazem and metoprolol tartrate if no contraindications given patient's tachycardia during this admission.   Recommend discontinuing olanzapine, quetiapine, and diazepam (as per nurse, patient is no longer on these medications).   Discussed with primary team and answered guardian Christi's medication-related questions.    Time spent: 20 minutes    Thank you,  David Metzger, PharmD  PGY-1 Pharmacy Resident  #[id number]

## 2022-03-07 NOTE — PROGRESS NOTE ADULT - ASSESSMENT
51 year old male with hydrocephalus, VPS presenting with vomiting.     Patient no longer with nausea and vomiting. No diarrhea.  CT A/P performed.  No fevers, normal WBC.  UA with WBC 0 low suspicion for UTI.  VPS shunt study unremarkable.  CT head with normal ventricular size without hydrocephalus.  CXR with right patchy opacity - no cough.    Recommend:  #Nausea and vomiting  -?Viral gastroenteritis  -Continue supportive care  -No fevers, normal WBC - monitor off abx  -UA not suggestive of UTI - continue to monitor for worsening urinary symptoms  -CT head with normal ventricular study - Shunt XR unremarkable    #CXR with R chest opacity  -CT chest with opacity - likely aspiration from vomiting - no fever or wbc, hold off abx for now   -Monitor off abx given no fevers, normal WBC.    Chapito Reeder MD  Available through MS Teams  If no response, or after 5pm/weekends, call [phone]

## 2022-03-07 NOTE — PROGRESS NOTE ADULT - ASSESSMENT
51 year old man with n/v, UTI    1. N/V. ddx includes food poisoning, cholecystitis (RUQ tenderness on exam, abnml LFT), UTI  -pending RUQ US    2. UTI    3. Cognitive delay    4.  shunt. CT shows no hydro      Attending supervision statement: I have personally seen and examined the patient. I fully participated in the care of this patient. I have made amendments to the documentation where necessary, and agree with the history, physical exam, and plan as outlined by the ACP.      [facility]   Gastroenterology and Hepatology  [address] [place], [place]  Office: [phone]  Cell: [phone] 51 year old man with n/v, UTI    1. N/V. ddx includes food poisoning, cholecystitis (RUQ tenderness on exam, abnml LFT), UTI  -pending RUQ US    2. UTI    3. Cognitive delay    4.  shunt. CT shows no hydro          [facility]   Gastroenterology and Hepatology  [address] [place], [place]  Office: [phone]  Cell: [phone]

## 2022-03-07 NOTE — PROGRESS NOTE ADULT - ASSESSMENT
52 yo M with epilepsy, OP, mood d/o, mild MR BPH, glaucoma,  hx of hydrocephalus s/p  shunt, also has VNS, had VPS revisions presenting with vomiting after having fried feed. also + penile disharge has UTI from 2 days ago.  follows with epilepsy clinic with Dr. Jewell.   CTH no significant interval change in R ventric cath. no change in ventric size no hydro.  Xray shunt series: unremarkable    LE doppler neg     Impression : Seizure d/o. vomitting unlikely related to h/o hydro ; now improved     - c/w home onfi 10mg BID and lamotrigine 300mg BID; also gets diazepam PRN spasm  - olanzapine and seroquel for mood stabilization  - GI eval vomiting  - cardio for tachycardia   - monitor LFTs and platelets   - r/o infection; UA unremarkable CT chest obtained ; f/u ID recs   - no need for EEG at this time  - remaining per primary team  - telemetry  - PT/OT/SS/SLP, OOBC  - check FS, glucose control <180  - GI/DVT ppx  - Thank you for allowing me to participate in the care of this patient. Call with questions.   - outpatient f/u wtih Dr maria alejandra jewell upon d/c   Samuel Gauthier MD  Vascular Neurology  Office: [phone]

## 2022-03-07 NOTE — PROGRESS NOTE ADULT - ASSESSMENT
pt with above history p/w nausea / vomiting  < from: CT Head No Cont (03.03.22 @ 23:22) >  No significant interval change in the right transparietal ventriculostomy   catheter terminating in the atrium of the right lateral ventricle.   Multiple abandoned left-sided catheter again noted. No ventricular size   without hydrocephalus. No acute intracranial bleeding.      < end of copied text >    pt had ct abd on feb 18, 2022 with esophagitis- will start regular diet / ppi    - neuro f/u   - GI F/U  - transaminitis- will check ruq sono    Penile discharge - UA with no uti ,  -as per ID, CT chest with opacity - likely aspiration from vomiting - no fever or wbc, hold off abx for now   -Monitor off abx given no fevers, normal WBC.     Seizure disorder - cont home seizure meds       Transaminitis- f/u lfts given anticonvulsants    thrombocytosis - monitor closely      cont psych meds - will confirm from group home staff

## 2022-03-07 NOTE — PHYSICAL THERAPY INITIAL EVALUATION ADULT - MANUAL MUSCLE TESTING RESULTS, REHAB EVAL
Functionally assessed atleast 3+/5 for B UE and LE while pushing up from bed into sitting./grossly assessed due to

## 2022-03-07 NOTE — PHYSICAL THERAPY INITIAL EVALUATION ADULT - PERTINENT HX OF CURRENT PROBLEM, REHAB EVAL
50 yo M with hx of hydrocephalus s/p  shunt presenting with vomiting. Complaining now of dorsal foot pain. Aide at bedside states that he was noted to be vomiting earlier this evening. No reported history of fevers, chills, diarrhea, abdominal pain. Ankle/Foot Xray: (-) Chest Xray: Patchy opacity in the right mid/lower lung. Unchanged findings of right pleural calcification with associated right lung volume loss.

## 2022-03-07 NOTE — PROGRESS NOTE ADULT - SUBJECTIVE AND OBJECTIVE BOX
CC: Patient is a 51y old  Male who presents with a chief complaint of     ID following for nausea and vomiting    Interval History/ROS: Patient remains afebrile. On 2 L NC. WBC WNL.    Rest of ROS negative.    Allergies  codeine (Stomach Upset)  Depakote (Other)  seasonal allergies: nasal congestion (Other)    ANTIMICROBIALS:      OTHER MEDS:  cloBAZam 5 milliGRAM(s) Oral two times a day  diazepam    Tablet 5 milliGRAM(s) Oral every 8 hours PRN  diltiazem    milliGRAM(s) Oral daily  dorzolamide 2%/timolol 0.5% Ophthalmic Solution 1 Drop(s) Both EYES two times a day  heparin   Injectable 5000 Unit(s) SubCutaneous every 8 hours  lamoTRIgine 300 milliGRAM(s) Oral every 12 hours  latanoprost 0.005% Ophthalmic Solution 1 Drop(s) Both EYES at bedtime  metoprolol tartrate 50 milliGRAM(s) Oral daily  pantoprazole    Tablet 40 milliGRAM(s) Oral before breakfast  primidone 250 milliGRAM(s) Oral two times a day      PE:    Vital Signs Last 24 Hrs  T(C): 36.4 (07 Mar 2022 11:27), Max: 36.5 (06 Mar 2022 20:41)  T(F): 97.6 (07 Mar 2022 11:27), Max: 97.7 (06 Mar 2022 20:41)  HR: 95 (07 Mar 2022 12:27) (94 - 100)  BP: 157/72 (07 Mar 2022 12:27) (116/79 - 163/69)  BP(mean): --  RR: 18 (07 Mar 2022 11:27) (18 - 18)  SpO2: 98% (07 Mar 2022 12:27) (94% - 99%)    Gen: Awake, alert, NAD  CV: S1+S2 normal, no murmurs  Resp: Clear bilat, no resp distress  Abd: Soft, nontender, +BS  Ext: No LE edema, no wounds  : No Mullins  IV/Skin: No thrombophlebitis  Neuro: no focal deficits    LABS:                          12.3   9.15  )-----------( 389      ( 07 Mar 2022 07:36 )             38.8       03-07    137  |  100  |  11  ----------------------------<  109<H>  4.2   |  25  |  0.96    Ca    9.2      07 Mar 2022 07:34    TPro  7.1  /  Alb  3.9  /  TBili  0.2  /  DBili  x   /  AST  40  /  ALT  80<H>  /  AlkPhos  152<H>  03-06    MICROBIOLOGY:  v  Clean Catch Clean Catch (Midstream)  03-04-22   No growth  --  --      Clean Catch Clean Catch (Midstream)  02-18-22   No growth  --  --    RADIOLOGY:    < from: [facility] Duplex Lower Ext Vein Scan, Bilwanda (03.06.22 @ 14:54) >  IMPRESSION:  No evidence of deep venous thrombosis in either lower extremity.    < end of copied text >

## 2022-03-07 NOTE — PHYSICAL THERAPY INITIAL EVALUATION ADULT - ADDITIONAL COMMENTS
Per pt and aunt at bedside, pt has been living with aunt/uncle for the past few months, since prior hospitalization (was in group home before last hospitalization). Has 2 steps to enter and has been staying on main floor. Amb without AD. Per aunt, pt was functionally independent with verbal cues/assist 2/2 visual impairments, however recently, pt has been requiring physical assist for mobility/ADLs. In group home, has assistance 24/7. Has not ambulated in several weeks/days. Typically getting from bed to w/c with assistance from staff. As per pt, staff at group home using equipment to assist with transfers.

## 2022-03-07 NOTE — PROGRESS NOTE ADULT - SUBJECTIVE AND OBJECTIVE BOX
SUBJECTIVE / OVERNIGHT EVENTS:  22    MEDICATIONS  (STANDING):  cloBAZam 5 milliGRAM(s) Oral two times a day  diltiazem    milliGRAM(s) Oral daily  dorzolamide 2%/timolol 0.5% Ophthalmic Solution 1 Drop(s) Both EYES two times a day  heparin   Injectable 5000 Unit(s) SubCutaneous every 8 hours  lamoTRIgine 300 milliGRAM(s) Oral every 12 hours  latanoprost 0.005% Ophthalmic Solution 1 Drop(s) Both EYES at bedtime  metoprolol tartrate 50 milliGRAM(s) Oral daily  pantoprazole    Tablet 40 milliGRAM(s) Oral before breakfast  primidone 250 milliGRAM(s) Oral two times a day    MEDICATIONS  (PRN):  diazepam    Tablet 5 milliGRAM(s) Oral every 8 hours PRN spasm    Vital Signs Last 24 Hrs  T(C): 36.7 (22 @ 21:03), Max: 36.7 (22 @ 21:03)  T(F): 98.1 (22 @ 21:03), Max: 98.1 (22 @ 21:03)  HR: 97 (22 @ 21:03) (94 - 97)  BP: 112/80 (22 @ 21:03) (112/80 - 163/69)  BP(mean): --  RR: 18 (22 @ 21:03) (18 - 18)  SpO2: 94% (22 @ 21:03) (94% - 99%)            Constitutional: No fever, fatigue  Skin: No rash.  Eyes: No recent vision problems or eye pain.  ENT: No congestion, ear pain, or sore throat.  Cardiovascular: No chest pain or palpation.  Respiratory: No cough, shortness of breath, congestion, or wheezing.  Gastrointestinal: No abdominal pain, nausea, vomiting, or diarrhea.  Genitourinary: No dysuria.  Musculoskeletal: No joint swelling.  Neurologic: No headache.    PHYSICAL EXAM:  GENERAL: NAD  EYES: EOMI, PERRLA  NECK: Supple, No JVD  CHEST/LUNG: dec breath sounds at bases  HEART:  S1 , S2 +  ABDOMEN: soft , bs+  EXTREMITIES:  edema+  NEUROLOGY:alert awake      LABS:      137  |  100  |  11  ----------------------------<  109<H>  4.2   |  25  |  0.96    Ca    9.2      07 Mar 2022 07:34    TPro  7.1  /  Alb  3.9  /  TBili  0.2  /  DBili      /  AST  40  /  ALT  80<H>  /  AlkPhos  152<H>  03-06    Creatinine Trend: 0.96 <--, 0.96 <--, 0.87 <--                        12.3   9.15  )-----------( 389      ( 07 Mar 2022 07:36 )             38.8     Urine Studies:  Urinalysis Basic - ( 04 Mar 2022 06:25 )    Color: Yellow / Appearance: Slightly Turbid / S.020 / pH:   Gluc:  / Ketone: Negative  / Bili: Negative / Urobili: Negative   Blood:  / Protein: Trace / Nitrite: Negative   Leuk Esterase: Negative / RBC: 0 /hpf / WBC 0 /HPF   Sq Epi:  / Non Sq Epi: 0 /hpf / Bacteria: Negative              LIVER FUNCTIONS - ( 06 Mar 2022 07:15 )  Alb: 3.9 g/dL / Pro: 7.1 g/dL / ALK PHOS: 152 U/L / ALT: 80 U/L / AST: 40 U/L / GGT: x

## 2022-03-07 NOTE — PHYSICAL THERAPY INITIAL EVALUATION ADULT - TRANSFER TRAINING, PT EVAL
GOAL: Patient will perform sit to stand Min A using least restrictive device for out of bed activities in 2 weeks.

## 2022-03-08 LAB
ANION GAP SERPL CALC-SCNC: 13 MMOL/L — SIGNIFICANT CHANGE UP (ref 5–17)
BUN SERPL-MCNC: 11 MG/DL — SIGNIFICANT CHANGE UP (ref 7–23)
CALCIUM SERPL-MCNC: 9.3 MG/DL — SIGNIFICANT CHANGE UP (ref 8.4–10.5)
CHLORIDE SERPL-SCNC: 100 MMOL/L — SIGNIFICANT CHANGE UP (ref 96–108)
CO2 SERPL-SCNC: 26 MMOL/L — SIGNIFICANT CHANGE UP (ref 22–31)
CREAT SERPL-MCNC: 0.88 MG/DL — SIGNIFICANT CHANGE UP (ref 0.5–1.3)
EGFR: 103 ML/MIN/1.73M2 — SIGNIFICANT CHANGE UP
GLUCOSE SERPL-MCNC: 86 MG/DL — SIGNIFICANT CHANGE UP (ref 70–99)
HCT VFR BLD CALC: 42.3 % — SIGNIFICANT CHANGE UP (ref 39–50)
HGB BLD-MCNC: 13.2 G/DL — SIGNIFICANT CHANGE UP (ref 13–17)
MCHC RBC-ENTMCNC: 28.2 PG — SIGNIFICANT CHANGE UP (ref 27–34)
MCHC RBC-ENTMCNC: 31.2 GM/DL — LOW (ref 32–36)
MCV RBC AUTO: 90.4 FL — SIGNIFICANT CHANGE UP (ref 80–100)
NRBC # BLD: 0 /100 WBCS — SIGNIFICANT CHANGE UP (ref 0–0)
PLATELET # BLD AUTO: 377 K/UL — SIGNIFICANT CHANGE UP (ref 150–400)
POTASSIUM SERPL-MCNC: 4.1 MMOL/L — SIGNIFICANT CHANGE UP (ref 3.5–5.3)
POTASSIUM SERPL-SCNC: 4.1 MMOL/L — SIGNIFICANT CHANGE UP (ref 3.5–5.3)
RBC # BLD: 4.68 M/UL — SIGNIFICANT CHANGE UP (ref 4.2–5.8)
RBC # FLD: 15.7 % — HIGH (ref 10.3–14.5)
SODIUM SERPL-SCNC: 139 MMOL/L — SIGNIFICANT CHANGE UP (ref 135–145)
WBC # BLD: 7.7 K/UL — SIGNIFICANT CHANGE UP (ref 3.8–10.5)
WBC # FLD AUTO: 7.7 K/UL — SIGNIFICANT CHANGE UP (ref 3.8–10.5)

## 2022-03-08 PROCEDURE — 99232 SBSQ HOSP IP/OBS MODERATE 35: CPT

## 2022-03-08 PROCEDURE — 93306 TTE W/DOPPLER COMPLETE: CPT | Mod: 26

## 2022-03-08 PROCEDURE — 76700 US EXAM ABDOM COMPLETE: CPT | Mod: 26

## 2022-03-08 RX ADMIN — LAMOTRIGINE 300 MILLIGRAM(S): 25 TABLET, ORALLY DISINTEGRATING ORAL at 17:12

## 2022-03-08 RX ADMIN — CLOBAZAM 5 MILLIGRAM(S): 10 TABLET ORAL at 05:17

## 2022-03-08 RX ADMIN — HEPARIN SODIUM 5000 UNIT(S): 5000 INJECTION INTRAVENOUS; SUBCUTANEOUS at 21:25

## 2022-03-08 RX ADMIN — LAMOTRIGINE 300 MILLIGRAM(S): 25 TABLET, ORALLY DISINTEGRATING ORAL at 05:17

## 2022-03-08 RX ADMIN — LATANOPROST 1 DROP(S): 0.05 SOLUTION/ DROPS OPHTHALMIC; TOPICAL at 21:25

## 2022-03-08 RX ADMIN — Medication 120 MILLIGRAM(S): at 05:17

## 2022-03-08 RX ADMIN — HEPARIN SODIUM 5000 UNIT(S): 5000 INJECTION INTRAVENOUS; SUBCUTANEOUS at 15:29

## 2022-03-08 RX ADMIN — PRIMIDONE 250 MILLIGRAM(S): 250 TABLET ORAL at 17:12

## 2022-03-08 RX ADMIN — DORZOLAMIDE HYDROCHLORIDE TIMOLOL MALEATE 1 DROP(S): 20; 5 SOLUTION/ DROPS OPHTHALMIC at 17:11

## 2022-03-08 RX ADMIN — DORZOLAMIDE HYDROCHLORIDE TIMOLOL MALEATE 1 DROP(S): 20; 5 SOLUTION/ DROPS OPHTHALMIC at 05:18

## 2022-03-08 RX ADMIN — CLOBAZAM 5 MILLIGRAM(S): 10 TABLET ORAL at 17:10

## 2022-03-08 RX ADMIN — PRIMIDONE 250 MILLIGRAM(S): 250 TABLET ORAL at 05:18

## 2022-03-08 RX ADMIN — PANTOPRAZOLE SODIUM 40 MILLIGRAM(S): 20 TABLET, DELAYED RELEASE ORAL at 06:09

## 2022-03-08 RX ADMIN — HEPARIN SODIUM 5000 UNIT(S): 5000 INJECTION INTRAVENOUS; SUBCUTANEOUS at 05:17

## 2022-03-08 NOTE — PROGRESS NOTE ADULT - SUBJECTIVE AND OBJECTIVE BOX
SUBJECTIVE / OVERNIGHT EVENTS:  22    MEDICATIONS  (STANDING):  cloBAZam 5 milliGRAM(s) Oral two times a day  diltiazem    milliGRAM(s) Oral daily  dorzolamide 2%/timolol 0.5% Ophthalmic Solution 1 Drop(s) Both EYES two times a day  heparin   Injectable 5000 Unit(s) SubCutaneous every 8 hours  lamoTRIgine 300 milliGRAM(s) Oral every 12 hours  latanoprost 0.005% Ophthalmic Solution 1 Drop(s) Both EYES at bedtime  metoprolol tartrate 50 milliGRAM(s) Oral daily  pantoprazole    Tablet 40 milliGRAM(s) Oral before breakfast  primidone 250 milliGRAM(s) Oral two times a day    MEDICATIONS  (PRN):  diazepam    Tablet 5 milliGRAM(s) Oral every 8 hours PRN spasm    Vital Signs Last 24 Hrs  T(C): 36.7 (22 @ 20:53), Max: 36.7 (22 @ 04:53)  T(F): 98.1 (22 @ 20:53), Max: 98.1 (22 @ 04:53)  HR: 97 (22 @ 20:53) (92 - 98)  BP: 100/69 (22 @ 20:53) (100/69 - 102/68)  BP(mean): --  RR: 18 (22 @ 20:53) (18 - 18)  SpO2: 92% (22 @ 20:53) (92% - 94%)              Constitutional: No fever, fatigue  Skin: No rash.  Eyes: No recent vision problems or eye pain.  ENT: No congestion, ear pain, or sore throat.  Cardiovascular: No chest pain or palpation.  Respiratory: No cough, shortness of breath, congestion, or wheezing.  Gastrointestinal: No abdominal pain, nausea, vomiting, or diarrhea.  Genitourinary: No dysuria.  Musculoskeletal: No joint swelling.  Neurologic: No headache.    PHYSICAL EXAM:  GENERAL: NAD  EYES: EOMI, PERRLA  NECK: Supple, No JVD  CHEST/LUNG: dec breath sounds at bases  HEART:  S1 , S2 +  ABDOMEN: soft , bs+  EXTREMITIES:  edema+  NEUROLOGY:alert awake    LABS:      139  |  100  |  11  ----------------------------<  86  4.1   |  26  |  0.88    Ca    9.3      08 Mar 2022 06:46      Creatinine Trend: 0.88 <--, 0.96 <--, 0.96 <--, 0.87 <--                        13.2   7.70  )-----------( 377      ( 08 Mar 2022 06:46 )             42.3     Urine Studies:  Urinalysis Basic - ( 04 Mar 2022 06:25 )    Color: Yellow / Appearance: Slightly Turbid / S.020 / pH:   Gluc:  / Ketone: Negative  / Bili: Negative / Urobili: Negative   Blood:  / Protein: Trace / Nitrite: Negative   Leuk Esterase: Negative / RBC: 0 /hpf / WBC 0 /HPF   Sq Epi:  / Non Sq Epi: 0 /hpf / Bacteria: Negative                  Alb: 3.9 g/dL / Pro: 7.1 g/dL / ALK PHOS: 152 U/L / ALT: 80 U/L / AST: 40 U/L / GGT: x

## 2022-03-08 NOTE — PROGRESS NOTE ADULT - ASSESSMENT
51 year old man with n/v, UTI    1. N/V. ddx includes food poisoning, UTI  -Abdominal US shows no evidence of acute cholecystitis. Hepatic steatosis and mild hepatomegaly noted.   -continue to trend LFTs    2. UTI    3. Cognitive delay    4.  shunt. CT shows no hydro      Attending supervision statement: I have personally seen and examined the patient. I fully participated in the care of this patient. I have made amendments to the documentation where necessary, and agree with the history, physical exam, and plan as outlined by the ACP.      [facility]   Gastroenterology and Hepatology  [address] [place], [place]  Office: [phone]  Cell: [phone]

## 2022-03-08 NOTE — PROGRESS NOTE ADULT - ASSESSMENT
52 yo M with hx of hydrocephalus s/p  shunt presenting with N/V and now found to be tachy    - Appears to have had a bout of sinus tachycardia.   - His initial EKF on 3/4 also demonstrated sinus tachy  - His HR tends to run in high 90s low 100s per flowsheet  - doubt this is arrhythmic  - this may be reflective of his deconditioning + mild vol depletion from vomiting and possible an infectious process  - lower ext venous duplex neg for DVT. though he is higher risk for VTE given comorbidities. cont dvt proph  - check echo to assess for structural disease   - can give gentle hydration  - cont bb and ccb    - fu ID. No abx for now. ?asp PNA  - Monitor and replete electrolytes. Keep K>4.0 and Mg>2.0.  - Further cardiac workup will depend on clinical course.   - All other workup per primary team. Will followup.

## 2022-03-08 NOTE — PROGRESS NOTE ADULT - SUBJECTIVE AND OBJECTIVE BOX
CC: Patient is a 51y old  Male who presents with a chief complaint of n/vomiting (08 Mar 2022 08:52)    ID following for nausea and vomiting    Interval History/ROS: Patient has no new complaints. No longer with nausea and vomiting. No diarrhea. No abd pain. No cough. No dysuria. No fevers, no chills.    Rest of ROS negative.    Allergies  codeine (Stomach Upset)  Depakote (Other)  seasonal allergies: nasal congestion (Other)    ANTIMICROBIALS:      OTHER MEDS:  cloBAZam 5 milliGRAM(s) Oral two times a day  diazepam    Tablet 5 milliGRAM(s) Oral every 8 hours PRN  diltiazem    milliGRAM(s) Oral daily  dorzolamide 2%/timolol 0.5% Ophthalmic Solution 1 Drop(s) Both EYES two times a day  heparin   Injectable 5000 Unit(s) SubCutaneous every 8 hours  lamoTRIgine 300 milliGRAM(s) Oral every 12 hours  latanoprost 0.005% Ophthalmic Solution 1 Drop(s) Both EYES at bedtime  metoprolol tartrate 50 milliGRAM(s) Oral daily  pantoprazole    Tablet 40 milliGRAM(s) Oral before breakfast  primidone 250 milliGRAM(s) Oral two times a day    PE:    Vital Signs Last 24 Hrs  T(C): 36.6 (08 Mar 2022 14:47), Max: 36.7 (07 Mar 2022 21:03)  T(F): 97.9 (08 Mar 2022 14:47), Max: 98.1 (07 Mar 2022 21:03)  HR: 98 (08 Mar 2022 14:47) (92 - 98)  BP: 102/68 (08 Mar 2022 14:47) (101/66 - 112/80)  BP(mean): --  RR: 18 (08 Mar 2022 14:47) (18 - 18)  SpO2: 94% (08 Mar 2022 14:47) (94% - 94%)    Gen: Awake, alert, NAD  CV: S1+S2 normal, no murmurs  Resp: Clear bilat, no resp distress  Abd: Soft, nontender, +BS  Ext: No LE edema, no wounds  : No Mullins  IV/Skin: No thrombophlebitis    LABS:                          13.2   7.70  )-----------( 377      ( 08 Mar 2022 06:46 )             42.3       03-08    139  |  100  |  11  ----------------------------<  86  4.1   |  26  |  0.88    Ca    9.3      08 Mar 2022 06:46    MICROBIOLOGY:  v  Clean Catch Clean Catch (Midstream)  03-04-22   No growth  --  --      Clean Catch Clean Catch (Midstream)  02-18-22   No growth  --  --    RADIOLOGY:    < from: US Abdomen Complete (US Abdomen Complete .) (03.08.22 @ 08:44) >  IMPRESSION:  No evidence for acute cholecystitis.    Hepatic steatosis and mild hepatomegaly.    < end of copied text >

## 2022-03-08 NOTE — PROGRESS NOTE ADULT - SUBJECTIVE AND OBJECTIVE BOX
Chief Complaint:  Patient is a 51y old  Male who presents with a chief complaint of n/vomiting (08 Mar 2022 08:52)      Date of service 03-08-22 @ 13:13      Interval Events:   Patient seen and examined.   No abdominal pain, nausea, vomiting.    Hospital Medications:  cloBAZam 5 milliGRAM(s) Oral two times a day  diazepam    Tablet 5 milliGRAM(s) Oral every 8 hours PRN  diltiazem    milliGRAM(s) Oral daily  dorzolamide 2%/timolol 0.5% Ophthalmic Solution 1 Drop(s) Both EYES two times a day  heparin   Injectable 5000 Unit(s) SubCutaneous every 8 hours  lamoTRIgine 300 milliGRAM(s) Oral every 12 hours  latanoprost 0.005% Ophthalmic Solution 1 Drop(s) Both EYES at bedtime  metoprolol tartrate 50 milliGRAM(s) Oral daily  pantoprazole    Tablet 40 milliGRAM(s) Oral before breakfast  primidone 250 milliGRAM(s) Oral two times a day        Review of Systems:  General:  No wt loss, fevers, chills, night sweats, fatigue,   Eyes:  Good vision, no reported pain  ENT:  No sore throat, pain, runny nose, dysphagia  CV:  No pain, palpitations, hypo/hypertension  Resp:  No dyspnea, cough, tachypnea, wheezing  GI:  See HPI  :  No pain, bleeding, incontinence, nocturia  Muscle:  No pain, weakness  Neuro:  No weakness, tingling, memory problems  Psych:  No fatigue, insomnia, mood problems, depression  Endocrine:  No polyuria, polydipsia, cold/heat intolerance  Heme:  No petechiae, ecchymosis, easy bruisability  Integumentary:  No rash, edema    PHYSICAL EXAM:   Vital Signs:  Vital Signs Last 24 Hrs  T(C): 36.7 (08 Mar 2022 04:53), Max: 36.7 (07 Mar 2022 21:03)  T(F): 98.1 (08 Mar 2022 04:53), Max: 98.1 (07 Mar 2022 21:03)  HR: 92 (08 Mar 2022 04:53) (92 - 97)  BP: 101/66 (08 Mar 2022 04:53) (101/66 - 112/80)  BP(mean): --  RR: 18 (08 Mar 2022 04:53) (18 - 18)  SpO2: 94% (08 Mar 2022 04:53) (94% - 94%)  Daily     Daily       PHYSICAL EXAM:     GENERAL:  Appears stated age, well-groomed, well-nourished, no distress  HEENT:  NC/AT,  conjunctivae anicteric, clear and pink,   NECK: supple, trachea midline  CHEST:  Full & symmetric excursion, no increased effort, breath sounds clear  HEART:  Regular rhythm, no JVD  ABDOMEN:  Soft, non-tender, non-distended, normoactive bowel sounds,  no masses , no hepatosplenomegaly  EXTREMITIES:  no cyanosis,clubbing or edema  SKIN:  No rash, erythema, or, ecchymoses, no jaundice  NEURO:  Alert, non-focal, no asterixis  PSYCH: Appropriate affect, oriented to place and time  RECTAL: Deferred      LABS Personally reviewed by me:                        13.2   7.70  )-----------( 377      ( 08 Mar 2022 06:46 )             42.3     Mean Cell Volume: 90.4 fl (03-08-22 @ 06:46)    03-08    139  |  100  |  11  ----------------------------<  86  4.1   |  26  |  0.88    Ca    9.3      08 Mar 2022 06:46                                    13.2   7.70  )-----------( 377      ( 08 Mar 2022 06:46 )             42.3                         12.3   9.15  )-----------( 389      ( 07 Mar 2022 07:36 )             38.8       Imaging personally reviewed by me:

## 2022-03-08 NOTE — PROGRESS NOTE ADULT - ASSESSMENT
51 year old male with hydrocephalus, VPS presenting with vomiting.     Patient no longer with nausea and vomiting. No diarrhea.  CT A/P performed.  No fevers, normal WBC.  UA with WBC 0 low suspicion for UTI.  VPS shunt study unremarkable.  CT head with normal ventricular size without hydrocephalus.  CXR with right patchy opacity - no cough.    Recommend:  #Nausea and vomiting  -?Viral gastroenteritis  -Continue supportive care  -No fevers, normal WBC - monitor off abx  -UA not suggestive of UTI - continue to monitor for worsening urinary symptoms  -CT head with normal ventricular study - Shunt XR unremarkable    #CXR with R chest opacity  -CT chest with opacity - likely aspiration from vomiting - no fever or wbc, hold off abx for now   -Monitor off abx given no fevers, normal WBC, no cough.    Chapito Reeder MD  Available through MS Teams  If no response, or after 5pm/weekends, call [phone]

## 2022-03-09 ENCOUNTER — TRANSCRIPTION ENCOUNTER (OUTPATIENT)
Age: 52
End: 2022-03-09

## 2022-03-09 DIAGNOSIS — R00.0 TACHYCARDIA, UNSPECIFIED: ICD-10-CM

## 2022-03-09 DIAGNOSIS — G91.9 HYDROCEPHALUS, UNSPECIFIED: ICD-10-CM

## 2022-03-09 LAB — SARS-COV-2 RNA SPEC QL NAA+PROBE: SIGNIFICANT CHANGE UP

## 2022-03-09 PROCEDURE — 99232 SBSQ HOSP IP/OBS MODERATE 35: CPT

## 2022-03-09 RX ADMIN — PRIMIDONE 250 MILLIGRAM(S): 250 TABLET ORAL at 17:56

## 2022-03-09 RX ADMIN — DORZOLAMIDE HYDROCHLORIDE TIMOLOL MALEATE 1 DROP(S): 20; 5 SOLUTION/ DROPS OPHTHALMIC at 05:22

## 2022-03-09 RX ADMIN — LATANOPROST 1 DROP(S): 0.05 SOLUTION/ DROPS OPHTHALMIC; TOPICAL at 21:42

## 2022-03-09 RX ADMIN — LAMOTRIGINE 300 MILLIGRAM(S): 25 TABLET, ORALLY DISINTEGRATING ORAL at 05:21

## 2022-03-09 RX ADMIN — PANTOPRAZOLE SODIUM 40 MILLIGRAM(S): 20 TABLET, DELAYED RELEASE ORAL at 05:21

## 2022-03-09 RX ADMIN — HEPARIN SODIUM 5000 UNIT(S): 5000 INJECTION INTRAVENOUS; SUBCUTANEOUS at 13:09

## 2022-03-09 RX ADMIN — CLOBAZAM 5 MILLIGRAM(S): 10 TABLET ORAL at 05:22

## 2022-03-09 RX ADMIN — PRIMIDONE 250 MILLIGRAM(S): 250 TABLET ORAL at 05:21

## 2022-03-09 RX ADMIN — Medication 120 MILLIGRAM(S): at 05:21

## 2022-03-09 RX ADMIN — HEPARIN SODIUM 5000 UNIT(S): 5000 INJECTION INTRAVENOUS; SUBCUTANEOUS at 05:20

## 2022-03-09 RX ADMIN — Medication 50 MILLIGRAM(S): at 05:21

## 2022-03-09 RX ADMIN — CLOBAZAM 5 MILLIGRAM(S): 10 TABLET ORAL at 18:50

## 2022-03-09 RX ADMIN — HEPARIN SODIUM 5000 UNIT(S): 5000 INJECTION INTRAVENOUS; SUBCUTANEOUS at 21:43

## 2022-03-09 RX ADMIN — DORZOLAMIDE HYDROCHLORIDE TIMOLOL MALEATE 1 DROP(S): 20; 5 SOLUTION/ DROPS OPHTHALMIC at 17:56

## 2022-03-09 RX ADMIN — LAMOTRIGINE 300 MILLIGRAM(S): 25 TABLET, ORALLY DISINTEGRATING ORAL at 17:56

## 2022-03-09 NOTE — PROGRESS NOTE ADULT - SUBJECTIVE AND OBJECTIVE BOX
CC: Patient is a 51y old  Male who presents with a chief complaint of n/vomiting (08 Mar 2022 08:52)    ID following for nausea and vomiting    Interval History/ROS: patient has no new complaints. No fevers, no chills. No longer with nausea and vomiting. RUQ US negative for acute cholecystitis. On room air.    Rest of ROS negative.    Allergies  codeine (Stomach Upset)  Depakote (Other)  seasonal allergies: nasal congestion (Other)    ANTIMICROBIALS:      OTHER MEDS:  cloBAZam 5 milliGRAM(s) Oral two times a day  diazepam    Tablet 5 milliGRAM(s) Oral every 8 hours PRN  diltiazem    milliGRAM(s) Oral daily  dorzolamide 2%/timolol 0.5% Ophthalmic Solution 1 Drop(s) Both EYES two times a day  heparin   Injectable 5000 Unit(s) SubCutaneous every 8 hours  lamoTRIgine 300 milliGRAM(s) Oral every 12 hours  latanoprost 0.005% Ophthalmic Solution 1 Drop(s) Both EYES at bedtime  metoprolol tartrate 50 milliGRAM(s) Oral daily  pantoprazole    Tablet 40 milliGRAM(s) Oral before breakfast  primidone 250 milliGRAM(s) Oral two times a day    PE:    Vital Signs Last 24 Hrs  T(C): 36.7 (09 Mar 2022 11:10), Max: 36.7 (08 Mar 2022 20:53)  T(F): 98.1 (09 Mar 2022 11:10), Max: 98.1 (08 Mar 2022 20:53)  HR: 98 (09 Mar 2022 11:10) (97 - 98)  BP: 113/68 (09 Mar 2022 11:10) (100/69 - 126/65)  BP(mean): --  RR: 16 (09 Mar 2022 11:10) (16 - 18)  SpO2: 92% (09 Mar 2022 11:10) (92% - 94%)    Gen: Awake, alert, NAD  CV: S1+S2 normal, no murmurs  Resp: Clear bilat, no resp distress  Abd: Soft, nontender, +BS  Ext: No LE edema, no wounds  : No Mullins  IV/Skin: No thrombophlebitis  Neuro: no focal deficits    LABS:                          13.2   7.70  )-----------( 377      ( 08 Mar 2022 06:46 )             42.3       03-08    139  |  100  |  11  ----------------------------<  86  4.1   |  26  |  0.88    Ca    9.3      08 Mar 2022 06:46    MICROBIOLOGY:  v  Clean Catch Clean Catch (Midstream)  03-04-22   No growth  --  --      Clean Catch Clean Catch (Midstream)  02-18-22   No growth  --  --    RADIOLOGY:    < from: US Abdomen Complete (US Abdomen Complete .) (03.08.22 @ 08:44) >  IMPRESSION:  No evidence for acute cholecystitis.    Hepatic steatosis and mild hepatomegaly.      < end of copied text >

## 2022-03-09 NOTE — CHART NOTE - NSCHARTNOTEFT_GEN_A_CORE
Medicine NP     51y old  Male who presents with a chief complaint of n/vomiting. Seen and follwed by GI and ID.  Pt. is planned for discharge, 3/10 tomorrow and is tolerating a regular diet, has resumed home meds and was seen by PT as an in-pt and will require PT in the group home setting.     YIN Anna  [phone] Medicine NP     51y old  Male who presents with a chief complaint of n/vomiting. Seen and followed by GI and ID.  Pt. is planned for discharge, 3/10 tomorrow and may continue a regular diet, home medications and physical therapy. He was seen by PT as an in-pt and will require PT in the group home setting.     PJ Mullen NP  [phone]

## 2022-03-09 NOTE — DISCHARGE NOTE PROVIDER - NSDCMRMEDTOKEN_GEN_ALL_CORE_FT
Cardizem  mg/24 hours oral tablet, extended release: 1 tab(s) orally once a day  cloBAZam 10 mg oral tablet: one-half tablet orally 2 times a day  dorzolamide-timolol 2.23%-0.68% ophthalmic solution: 1 drop(s) to both eyes 2 times a day  Flonase 50 mcg/inh nasal spray: 1 spray(s) nasal   ketoconazole 2% topical cream: Apply topically to affected area 2 times a day. ( scalp , forehead and eyebrows )  lamoTRIgine 100 mg oral tablet, extended release: 3 tab(s) orally 2 times a day MDD:2  latanoprost 0.005% ophthalmic solution: 1 drop(s) to each affected eye once a day (in the evening)  Metoprolol Tartrate 50 mg oral tablet: 1 tab(s) orally once a day  Oyster Shell Calcium with Vitamin D 500 mg-200 intl units oral tablet: 1 tab(s) orally 3 times a day  pantoprazole 40 mg oral delayed release tablet: 1 tab(s) orally once a day  primidone 250 mg oral tablet: 1 tab(s) orally 2 times a day  senna oral tablet: 2 tab(s) orally once a day (at bedtime)  Vitamin D3 1000 intl units oral capsule: 1 cap(s) orally once a day in the am   Cardizem  mg/24 hours oral tablet, extended release: 1 tab(s) orally once a day  cloBAZam 10 mg oral tablet: one-half tablet orally 2 times a day  dorzolamide-timolol 2.23%-0.68% ophthalmic solution: 1 drop(s) to both eyes 2 times a day  Flonase 50 mcg/inh nasal spray: 1 spray(s) nasal   lamoTRIgine 100 mg oral tablet, extended release: 3 tab(s) orally 2 times a day MDD:2  latanoprost 0.005% ophthalmic solution: 1 drop(s) to each affected eye once a day (in the evening)  Metoprolol Tartrate 50 mg oral tablet: 1 tab(s) orally once a day  Oyster Shell Calcium with Vitamin D 500 mg-200 intl units oral tablet: 1 tab(s) orally 3 times a day  pantoprazole 40 mg oral delayed release tablet: 1 tab(s) orally once a day  primidone 250 mg oral tablet: 1 tab(s) orally 2 times a day  senna oral tablet: 2 tab(s) orally once a day (at bedtime)  Vitamin D3 1000 intl units oral capsule: 1 cap(s) orally once a day in the am

## 2022-03-09 NOTE — PROGRESS NOTE ADULT - SUBJECTIVE AND OBJECTIVE BOX
[facility] - Rosalia Miller, Scooby, Sasha, Nikolas, Yamila Ojeda  Office Number:  [phone]    Patient resting comfortably in bed in NAD.  Laying flat with no respiratory distress.  No complaints of chest pain, dyspnea, palpitations, PND, or orthopnea.    F/U for:  Tachycardia       MEDICATIONS  (STANDING):  cloBAZam 5 milliGRAM(s) Oral two times a day  diltiazem    milliGRAM(s) Oral daily  dorzolamide 2%/timolol 0.5% Ophthalmic Solution 1 Drop(s) Both EYES two times a day  heparin   Injectable 5000 Unit(s) SubCutaneous every 8 hours  lamoTRIgine 300 milliGRAM(s) Oral every 12 hours  latanoprost 0.005% Ophthalmic Solution 1 Drop(s) Both EYES at bedtime  metoprolol tartrate 50 milliGRAM(s) Oral daily  pantoprazole    Tablet 40 milliGRAM(s) Oral before breakfast  primidone 250 milliGRAM(s) Oral two times a day    MEDICATIONS  (PRN):  diazepam    Tablet 5 milliGRAM(s) Oral every 8 hours PRN spasm      Allergies    codeine (Stomach Upset)  Depakote (Other)  seasonal allergies: nasal congestion (Other)    Intolerances        Vital Signs Last 24 Hrs  T(C): 36.7 (09 Mar 2022 05:11), Max: 36.7 (08 Mar 2022 20:53)  T(F): 98 (09 Mar 2022 05:11), Max: 98.1 (08 Mar 2022 20:53)  HR: 97 (09 Mar 2022 05:11) (97 - 98)  BP: 126/65 (09 Mar 2022 05:11) (100/69 - 126/65)  BP(mean): --  RR: 18 (09 Mar 2022 05:11) (18 - 18)  SpO2: 94% (09 Mar 2022 05:11) (92% - 94%)    I&O's Summary    08 Mar 2022 07:01  -  09 Mar 2022 07:00  --------------------------------------------------------  IN: 480 mL / OUT: 350 mL / NET: 130 mL        ON EXAM:    Constitutional: NAD, awake   HEENT: Moist Mucous Membranes, Anicteric  Pulmonary: Decreased breath sounds b/l. No rales, crackles or wheeze appreciated.   Cardiovascular: Mildly tachy, S1 and S2, No murmurs, rubs, gallops or clicks  Gastrointestinal: Bowel Sounds present, soft, nontender.   Lymph: + peripheral edema. No lymphadenopathy.  Skin: No visible rashes or ulcers.  Psych:  Mood & affect appropriate      LABS: All Labs Reviewed:                        13.2   7.70  )-----------( 377      ( 08 Mar 2022 06:46 )             42.3                         12.3   9.15  )-----------( 389      ( 07 Mar 2022 07:36 )             38.8     08 Mar 2022 06:46    139    |  100    |  11     ----------------------------<  86     4.1     |  26     |  0.88   07 Mar 2022 07:34    137    |  100    |  11     ----------------------------<  109    4.2     |  25     |  0.96     Ca    9.3        08 Mar 2022 06:46  Ca    9.2        07 Mar 2022 07:34            Blood Culture: Organism --  Gram Stain Blood -- Gram Stain --  Specimen Source Clean Catch Clean Catch (Midstream)  Culture-Blood --

## 2022-03-09 NOTE — PROGRESS NOTE ADULT - ASSESSMENT
51 year old male with hydrocephalus, VPS presenting with vomiting.     Patient no longer with nausea and vomiting. No diarrhea.  CT A/P performed.  No fevers, normal WBC.  UA with WBC 0 low suspicion for UTI.  VPS shunt study unremarkable.  CT head with normal ventricular size without hydrocephalus.  CXR with right patchy opacity - no cough.    Recommend:  #Nausea and vomiting  -?Viral gastroenteritis  -Continue supportive care  -No fevers, normal WBC - monitor off abx  -UA not suggestive of UTI - continue to monitor for worsening urinary symptoms  -Urine cxs remain negative  -CT head with normal ventricular study - Shunt XR unremarkable    #CXR with R chest opacity  -CT chest with opacity - likely aspiration from vomiting - no fever or wbc, hold off abx for now   -Monitor off abx given no fevers, normal WBC, no cough.    Chapito Reeder MD  Available through MS Teams  If no response, or after 5pm/weekends, call [phone]

## 2022-03-09 NOTE — PROGRESS NOTE ADULT - ASSESSMENT
51 year old man with n/v, UTI    1. N/V. ddx includes food poisoning, UTI  -Abdominal US shows no evidence of acute cholecystitis. Hepatic steatosis and mild hepatomegaly noted.   -continue to trend LFTs    2. UTI    3. Cognitive delay    4.  shunt. CT shows no hydro      Attending supervision statement: I have personally seen and examined the patient. I fully participated in the care of this patient. I have made amendments to the documentation where necessary, and agree with the history, physical exam, and plan as outlined by the ACP.      [facility]   Gastroenterology and Hepatology  [address] [place], [place]  Office: [phone]  Cell: [phone]

## 2022-03-09 NOTE — PROGRESS NOTE ADULT - SUBJECTIVE AND OBJECTIVE BOX
SUBJECTIVE / OVERNIGHT EVENTS:pt seen and examined  22    MEDICATIONS  (STANDING):  cloBAZam 5 milliGRAM(s) Oral two times a day  diltiazem    milliGRAM(s) Oral daily  dorzolamide 2%/timolol 0.5% Ophthalmic Solution 1 Drop(s) Both EYES two times a day  heparin   Injectable 5000 Unit(s) SubCutaneous every 8 hours  lamoTRIgine 300 milliGRAM(s) Oral every 12 hours  latanoprost 0.005% Ophthalmic Solution 1 Drop(s) Both EYES at bedtime  metoprolol tartrate 50 milliGRAM(s) Oral daily  pantoprazole    Tablet 40 milliGRAM(s) Oral before breakfast  primidone 250 milliGRAM(s) Oral two times a day    MEDICATIONS  (PRN):  diazepam    Tablet 5 milliGRAM(s) Oral every 8 hours PRN spasm    Vital Signs Last 24 Hrs  T(C): 36.7 (22 @ 21:20), Max: 36.7 (22 @ 05:11)  T(F): 98 (22 @ 21:20), Max: 98.1 (22 @ 11:10)  HR: 100 (22 @ 21:20) (97 - 100)  BP: 105/70 (22 @ 21:20) (105/70 - 126/65)  BP(mean): --  RR: 16 (22 @ 21:20) (16 - 18)  SpO2: 93% (22 @ 21:20) (92% - 94%)            Constitutional: No fever, fatigue  Skin: No rash.  Eyes: No recent vision problems or eye pain.  ENT: No congestion, ear pain, or sore throat.  Cardiovascular: No chest pain or palpation.  Respiratory: No cough, shortness of breath, congestion, or wheezing.  Gastrointestinal: No abdominal pain, nausea, vomiting, or diarrhea.  Genitourinary: No dysuria.  Musculoskeletal: No joint swelling.  Neurologic: No headache.    PHYSICAL EXAM:  GENERAL: NAD  EYES: EOMI, PERRLA  NECK: Supple, No JVD  CHEST/LUNG: dec breath sounds at bases  HEART:  S1 , S2 +  ABDOMEN: soft , bs+  EXTREMITIES:  edema+  NEUROLOGY:alert awake    LABS:      139  |  100  |  11  ----------------------------<  86  4.1   |  26  |  0.88    Ca    9.3      08 Mar 2022 06:46      Creatinine Trend: 0.88 <--, 0.96 <--, 0.96 <--, 0.87 <--                        13.2   7.70  )-----------( 377      ( 08 Mar 2022 06:46 )             42.3     Urine Studies:  Urinalysis Basic - ( 04 Mar 2022 06:25 )    Color: Yellow / Appearance: Slightly Turbid / S.020 / pH:   Gluc:  / Ketone: Negative  / Bili: Negative / Urobili: Negative   Blood:  / Protein: Trace / Nitrite: Negative   Leuk Esterase: Negative / RBC: 0 /hpf / WBC 0 /HPF   Sq Epi:  / Non Sq Epi: 0 /hpf / Bacteria: Negative                          Alb: 3.9 g/dL / Pro: 7.1 g/dL / ALK PHOS: 152 U/L / ALT: 80 U/L / AST: 40 U/L / GGT: x

## 2022-03-09 NOTE — DISCHARGE NOTE PROVIDER - CARE PROVIDER_API CALL
Yolanda Pavon (DO)  Medicine  [address]  [place] [address]  Phone: [phone]  Fax: [phone]  Follow Up Time:

## 2022-03-09 NOTE — DISCHARGE NOTE PROVIDER - NSDCCPCAREPLAN_GEN_ALL_CORE_FT
PRINCIPAL DISCHARGE DIAGNOSIS  Diagnosis: Pneumonia, aspiration  Assessment and Plan of Treatment: Aspiration   - ?from vomiting  - CXR with R chest opacity  -CT chest with opacity  - no fever or wbc, held off on abx & monitored off abx given no fevers, normal WBC, no cough.  Seen and followed by ID      SECONDARY DISCHARGE DIAGNOSES  Diagnosis: Nausea & vomiting  Assessment and Plan of Treatment: Nausea / vomiting  - patient no longer with nausea and vomiting; no diarrhea.  CT A/P with no acute findings  - no fevers, normal WBC and U/A with WBC low suspicion for UTI. UA not suggestive of UTI - monitored for worsening urinary symptoms  Seen and followed by GI -?Viral gastroenteritis - monitored tor off abx    Diagnosis: Hydrocephalus  Assessment and Plan of Treatment: Hydrocephalus - VPS shunt study unremarkable - CT head with normal ventricular size without hydrocephalus.    Diagnosis: Sinus tachycardia  Assessment and Plan of Treatment: Sinus tachycardia; initial EKF on 3/4 also demonstrated sinus tachy, HR tends to run in high 90s low 100s per flow sheet  doubt this is arrhythmic; may be reflective of his deconditioning + mild vol depletion from vomiting and possible an infectious process  Lower ext venous duplex neg for DVT. though he is higher risk for VTE given comorbidities. cont dvt proph  TTE  /  gentle hydration and c/w  bb and ccb

## 2022-03-09 NOTE — PROGRESS NOTE ADULT - SUBJECTIVE AND OBJECTIVE BOX
Chief Complaint:  Patient is a 51y old  Male who presents with a chief complaint of n/vomiting (08 Mar 2022 08:52)      Date of service 03-09-22 @ 13:35      Interval Events:   Patient seen and examined.   No abdominal pain, n/v/d.      Hospital Medications:  cloBAZam 5 milliGRAM(s) Oral two times a day  diazepam    Tablet 5 milliGRAM(s) Oral every 8 hours PRN  diltiazem    milliGRAM(s) Oral daily  dorzolamide 2%/timolol 0.5% Ophthalmic Solution 1 Drop(s) Both EYES two times a day  heparin   Injectable 5000 Unit(s) SubCutaneous every 8 hours  lamoTRIgine 300 milliGRAM(s) Oral every 12 hours  latanoprost 0.005% Ophthalmic Solution 1 Drop(s) Both EYES at bedtime  metoprolol tartrate 50 milliGRAM(s) Oral daily  pantoprazole    Tablet 40 milliGRAM(s) Oral before breakfast  primidone 250 milliGRAM(s) Oral two times a day        Review of Systems:  General:  No wt loss, fevers, chills, night sweats, fatigue,   Eyes:  Good vision, no reported pain  ENT:  No sore throat, pain, runny nose, dysphagia  CV:  No pain, palpitations, hypo/hypertension  Resp:  No dyspnea, cough, tachypnea, wheezing  GI:  See HPI  :  No pain, bleeding, incontinence, nocturia  Muscle:  No pain, weakness  Neuro:  No weakness, tingling, memory problems  Psych:  No fatigue, insomnia, mood problems, depression  Endocrine:  No polyuria, polydipsia, cold/heat intolerance  Heme:  No petechiae, ecchymosis, easy bruisability  Integumentary:  No rash, edema    PHYSICAL EXAM:   Vital Signs:  Vital Signs Last 24 Hrs  T(C): 36.7 (09 Mar 2022 11:10), Max: 36.7 (08 Mar 2022 20:53)  T(F): 98.1 (09 Mar 2022 11:10), Max: 98.1 (08 Mar 2022 20:53)  HR: 98 (09 Mar 2022 11:10) (97 - 98)  BP: 113/68 (09 Mar 2022 11:10) (100/69 - 126/65)  BP(mean): --  RR: 16 (09 Mar 2022 11:10) (16 - 18)  SpO2: 92% (09 Mar 2022 11:10) (92% - 94%)  Daily     Daily       PHYSICAL EXAM:     GENERAL:  Appears stated age, well-groomed, well-nourished, no distress  HEENT:  NC/AT,  conjunctivae anicteric, clear and pink,   NECK: supple, trachea midline  CHEST:  Full & symmetric excursion, no increased effort, breath sounds clear  HEART:  Regular rhythm, no JVD  ABDOMEN:  Soft, non-tender, non-distended, normoactive bowel sounds,  no masses , no hepatosplenomegaly  EXTREMITIES:  no cyanosis,clubbing or edema  SKIN:  No rash, erythema, or, ecchymoses, no jaundice  NEURO:  Alert, non-focal, no asterixis  PSYCH: Appropriate affect, oriented to place and time  RECTAL: Deferred      LABS Personally reviewed by me:                        13.2   7.70  )-----------( 377      ( 08 Mar 2022 06:46 )             42.3       03-08    139  |  100  |  11  ----------------------------<  86  4.1   |  26  |  0.88    Ca    9.3      08 Mar 2022 06:46                                    13.2   7.70  )-----------( 377      ( 08 Mar 2022 06:46 )             42.3                         12.3   9.15  )-----------( 389      ( 07 Mar 2022 07:36 )             38.8       Imaging personally reviewed by me:

## 2022-03-09 NOTE — DISCHARGE NOTE PROVIDER - HOSPITAL COURSE
51 year old male with hydrocephalus, VPS presenting with vomiting. 51yoM with hx of hydrocephalus s/p  shunt p/w vomiting. Patient denies nausea at admission.    Complaining now of incidental foot pain;  aide is at bedside. Admitted for eval. of nausea, vomiting  - viral gastroenteritis?    Nausea / vomiting  - patient no longer with nausea and vomiting; no diarrhea.  CT A/P with no acute findings  - no fevers, normal WBC and U/A with WBC low suspicion for UTI. UA not suggestive of UTI - monitored for worsening urinary symptoms  Seen and followed by GI -?Viral gastroenteritis - monitored tor off abx    Aspiration   - ?from vomiting  - CXR with R chest opacity  -CT chest with opacity  - no fever or wbc, held off on abx & monitored off abx given no fevers, normal WBC, no cough.  Seen and followed by ID     Hydrocephalus - VPS shunt study unremarkable - CT head with normal ventricular size without hydrocephalus.    Sinus tachycardia; initial EKF on 3/4 also demonstrated sinus tachy, HR tends to run in high 90s low 100s per flow sheet  doubt this is arrhythmic; may be reflective of his deconditioning + mild vol depletion from vomiting and possible an infectious process  Lower ext venous duplex neg for DVT. though he is higher risk for VTE given comorbidities. cont dvt proph  TTE  /  gentle hydration and c/w  bb and ccb    On 3/9 pt walked with assistance with PT and was OOB to chair day #2

## 2022-03-09 NOTE — PROGRESS NOTE ADULT - ASSESSMENT
pt with above history p/w nausea / vomiting  < from: CT Head No Cont (03.03.22 @ 23:22) >  No significant interval change in the right transparietal ventriculostomy   catheter terminating in the atrium of the right lateral ventricle.   Multiple abandoned left-sided catheter again noted. No ventricular size   without hydrocephalus. No acute intracranial bleeding.      < end of copied text >    pt had ct abd on feb 18, 2022 with esophagitis- will start regular diet / ppi    - neuro f/u   - GI F/U  - transaminitis- ruq sono - < from: US Abdomen Complete (US Abdomen Complete .) (03.08.22 @ 08:44) >  No evidence for acute cholecystitis.    Hepatic steatosis and mild hepatomegaly.    < end of copied text >      Penile discharge - UA with no uti ,  -as per ID, CT chest with opacity - likely aspiration from vomiting - no fever or wbc, hold off abx for now   -Monitor off abx given no fevers, normal WBC.     Seizure disorder - cont home seizure meds       Transaminitis- f/u lfts given anticonvulsants    thrombocytosis - monitor closely      cont psych meds - will confirm from group home staff

## 2022-03-10 ENCOUNTER — TRANSCRIPTION ENCOUNTER (OUTPATIENT)
Age: 52
End: 2022-03-10

## 2022-03-10 VITALS
DIASTOLIC BLOOD PRESSURE: 71 MMHG | TEMPERATURE: 98 F | OXYGEN SATURATION: 92 % | RESPIRATION RATE: 16 BRPM | HEART RATE: 91 BPM | SYSTOLIC BLOOD PRESSURE: 105 MMHG

## 2022-03-10 PROCEDURE — 83880 ASSAY OF NATRIURETIC PEPTIDE: CPT

## 2022-03-10 PROCEDURE — 85025 COMPLETE CBC W/AUTO DIFF WBC: CPT

## 2022-03-10 PROCEDURE — 87086 URINE CULTURE/COLONY COUNT: CPT

## 2022-03-10 PROCEDURE — 83735 ASSAY OF MAGNESIUM: CPT

## 2022-03-10 PROCEDURE — 99232 SBSQ HOSP IP/OBS MODERATE 35: CPT

## 2022-03-10 PROCEDURE — 83690 ASSAY OF LIPASE: CPT

## 2022-03-10 PROCEDURE — 94640 AIRWAY INHALATION TREATMENT: CPT

## 2022-03-10 PROCEDURE — 84484 ASSAY OF TROPONIN QUANT: CPT

## 2022-03-10 PROCEDURE — 99285 EMERGENCY DEPT VISIT HI MDM: CPT

## 2022-03-10 PROCEDURE — 76700 US EXAM ABDOM COMPLETE: CPT

## 2022-03-10 PROCEDURE — 87635 SARS-COV-2 COVID-19 AMP PRB: CPT

## 2022-03-10 PROCEDURE — 74177 CT ABD & PELVIS W/CONTRAST: CPT | Mod: MA

## 2022-03-10 PROCEDURE — 71045 X-RAY EXAM CHEST 1 VIEW: CPT

## 2022-03-10 PROCEDURE — 97530 THERAPEUTIC ACTIVITIES: CPT

## 2022-03-10 PROCEDURE — U0005: CPT

## 2022-03-10 PROCEDURE — 93005 ELECTROCARDIOGRAM TRACING: CPT

## 2022-03-10 PROCEDURE — 80048 BASIC METABOLIC PNL TOTAL CA: CPT

## 2022-03-10 PROCEDURE — U0003: CPT

## 2022-03-10 PROCEDURE — 36415 COLL VENOUS BLD VENIPUNCTURE: CPT

## 2022-03-10 PROCEDURE — 85027 COMPLETE CBC AUTOMATED: CPT

## 2022-03-10 PROCEDURE — 93970 EXTREMITY STUDY: CPT

## 2022-03-10 PROCEDURE — 73610 X-RAY EXAM OF ANKLE: CPT

## 2022-03-10 PROCEDURE — 81001 URINALYSIS AUTO W/SCOPE: CPT

## 2022-03-10 PROCEDURE — 97116 GAIT TRAINING THERAPY: CPT

## 2022-03-10 PROCEDURE — 70250 X-RAY EXAM OF SKULL: CPT

## 2022-03-10 PROCEDURE — 74018 RADEX ABDOMEN 1 VIEW: CPT

## 2022-03-10 PROCEDURE — 93306 TTE W/DOPPLER COMPLETE: CPT

## 2022-03-10 PROCEDURE — 84100 ASSAY OF PHOSPHORUS: CPT

## 2022-03-10 PROCEDURE — 80053 COMPREHEN METABOLIC PANEL: CPT

## 2022-03-10 PROCEDURE — 71250 CT THORAX DX C-: CPT

## 2022-03-10 PROCEDURE — 70450 CT HEAD/BRAIN W/O DYE: CPT | Mod: MA

## 2022-03-10 PROCEDURE — 97162 PT EVAL MOD COMPLEX 30 MIN: CPT

## 2022-03-10 PROCEDURE — 73620 X-RAY EXAM OF FOOT: CPT

## 2022-03-10 RX ORDER — KETOCONAZOLE 20 MG/G
1 AEROSOL, FOAM TOPICAL
Qty: 0 | Refills: 0 | DISCHARGE

## 2022-03-10 RX ADMIN — HEPARIN SODIUM 5000 UNIT(S): 5000 INJECTION INTRAVENOUS; SUBCUTANEOUS at 05:58

## 2022-03-10 RX ADMIN — DORZOLAMIDE HYDROCHLORIDE TIMOLOL MALEATE 1 DROP(S): 20; 5 SOLUTION/ DROPS OPHTHALMIC at 05:58

## 2022-03-10 RX ADMIN — CLOBAZAM 5 MILLIGRAM(S): 10 TABLET ORAL at 05:58

## 2022-03-10 RX ADMIN — PANTOPRAZOLE SODIUM 40 MILLIGRAM(S): 20 TABLET, DELAYED RELEASE ORAL at 06:01

## 2022-03-10 RX ADMIN — LAMOTRIGINE 300 MILLIGRAM(S): 25 TABLET, ORALLY DISINTEGRATING ORAL at 05:58

## 2022-03-10 RX ADMIN — Medication 120 MILLIGRAM(S): at 05:59

## 2022-03-10 RX ADMIN — PRIMIDONE 250 MILLIGRAM(S): 250 TABLET ORAL at 05:58

## 2022-03-10 RX ADMIN — Medication 50 MILLIGRAM(S): at 05:59

## 2022-03-10 NOTE — PROGRESS NOTE ADULT - NSPROGADDITIONALINFOA_GEN_ALL_CORE
- Counseling on diet, exercise, and medication adherence was done  - Counseling on smoking cessation and alcohol consumption offered when appropriate.  - Pain assessed and judicious use of narcotics when appropriate was discussed.    - Stroke education given when appropriate.  - Importance of fall prevention discussed.   - Differential diagnosis and plan of care discussed with patient and/or family and primary team

## 2022-03-10 NOTE — PROGRESS NOTE ADULT - SUBJECTIVE AND OBJECTIVE BOX
Chief Complaint:  Patient is a 51y old  Male who presents with a chief complaint of nausea / vomiting  (09 Mar 2022 13:32)      Date of service 03-10-22 @ 11:38      Interval Events:   Patient seen and examined.   No abdominal pain or tenderness.  Eating regular diet with no episodes of vomiting.  DC planning underway.     Hospital Medications:  cloBAZam 5 milliGRAM(s) Oral two times a day  diazepam    Tablet 5 milliGRAM(s) Oral every 8 hours PRN  diltiazem    milliGRAM(s) Oral daily  dorzolamide 2%/timolol 0.5% Ophthalmic Solution 1 Drop(s) Both EYES two times a day  heparin   Injectable 5000 Unit(s) SubCutaneous every 8 hours  lamoTRIgine 300 milliGRAM(s) Oral every 12 hours  latanoprost 0.005% Ophthalmic Solution 1 Drop(s) Both EYES at bedtime  metoprolol tartrate 50 milliGRAM(s) Oral daily  pantoprazole    Tablet 40 milliGRAM(s) Oral before breakfast  primidone 250 milliGRAM(s) Oral two times a day        Review of Systems:  General:  No wt loss, fevers, chills, night sweats, fatigue,   Eyes:  Good vision, no reported pain  ENT:  No sore throat, pain, runny nose, dysphagia  CV:  No pain, palpitations, hypo/hypertension  Resp:  No dyspnea, cough, tachypnea, wheezing  GI:  See HPI  :  No pain, bleeding, incontinence, nocturia  Muscle:  No pain, weakness  Neuro:  No weakness, tingling, memory problems  Psych:  No fatigue, insomnia, mood problems, depression  Endocrine:  No polyuria, polydipsia, cold/heat intolerance  Heme:  No petechiae, ecchymosis, easy bruisability  Integumentary:  No rash, edema    PHYSICAL EXAM:   Vital Signs:  Vital Signs Last 24 Hrs  T(C): 36.7 (10 Mar 2022 11:18), Max: 36.7 (09 Mar 2022 21:20)  T(F): 98 (10 Mar 2022 11:18), Max: 98 (09 Mar 2022 21:20)  HR: 91 (10 Mar 2022 11:18) (91 - 108)  BP: 105/71 (10 Mar 2022 11:18) (105/70 - 113/75)  BP(mean): --  RR: 16 (10 Mar 2022 11:18) (16 - 16)  SpO2: 92% (10 Mar 2022 11:18) (92% - 93%)  Daily     Daily       PHYSICAL EXAM:     GENERAL:  Appears stated age, well-groomed, well-nourished, no distress  HEENT:  NC/AT,  conjunctivae anicteric, clear and pink,   NECK: supple, trachea midline  CHEST:  Full & symmetric excursion, no increased effort, breath sounds clear  HEART:  Regular rhythm, no JVD  ABDOMEN:  Soft, non-tender, non-distended, normoactive bowel sounds,  no masses , no hepatosplenomegaly  EXTREMITIES:  no cyanosis,clubbing or edema  SKIN:  No rash, erythema, or, ecchymoses, no jaundice  NEURO:  Alert, non-focal, no asterixis  PSYCH: Appropriate affect, oriented to place and time  RECTAL: Deferred      LABS Personally reviewed by me:                                          13.2   7.70  )-----------( 377      ( 08 Mar 2022 06:46 )             42.3       Imaging personally reviewed by me:

## 2022-03-10 NOTE — PROGRESS NOTE ADULT - SUBJECTIVE AND OBJECTIVE BOX
CC: Patient is a 51y old  Male who presents with a chief complaint of nausea / vomiting  (09 Mar 2022 13:32)    ID following for nausea and vomiting    Interval History/ROS: patient feels well. Has no new complaints. No fevers, no chills. No longer with nausea and vomiting.    Rest of ROS negative.    Allergies  codeine (Stomach Upset)  Depakote (Other)  seasonal allergies: nasal congestion (Other)    ANTIMICROBIALS:      OTHER MEDS:  cloBAZam 5 milliGRAM(s) Oral two times a day  diazepam    Tablet 5 milliGRAM(s) Oral every 8 hours PRN  diltiazem    milliGRAM(s) Oral daily  dorzolamide 2%/timolol 0.5% Ophthalmic Solution 1 Drop(s) Both EYES two times a day  heparin   Injectable 5000 Unit(s) SubCutaneous every 8 hours  lamoTRIgine 300 milliGRAM(s) Oral every 12 hours  latanoprost 0.005% Ophthalmic Solution 1 Drop(s) Both EYES at bedtime  metoprolol tartrate 50 milliGRAM(s) Oral daily  pantoprazole    Tablet 40 milliGRAM(s) Oral before breakfast  primidone 250 milliGRAM(s) Oral two times a day    PE:    Vital Signs Last 24 Hrs  T(C): 36.7 (10 Mar 2022 11:18), Max: 36.7 (09 Mar 2022 21:20)  T(F): 98 (10 Mar 2022 11:18), Max: 98 (09 Mar 2022 21:20)  HR: 91 (10 Mar 2022 11:18) (91 - 108)  BP: 105/71 (10 Mar 2022 11:18) (105/70 - 113/75)  BP(mean): --  RR: 16 (10 Mar 2022 11:18) (16 - 16)  SpO2: 92% (10 Mar 2022 11:18) (92% - 93%)    Gen: Awake, alert, NAD  CV: S1+S2 normal, no murmurs  Resp: Clear bilat, no resp distress  Abd: Soft, nontender, +BS  Ext: No LE edema, no wounds  : No Mullins  IV/Skin: No thrombophlebitis  Neuro: no focal deficits    LABS:    MICROBIOLOGY:  v  Clean Catch Clean Catch (Midstream)  03-04-22   No growth  --  --      Clean Catch Clean Catch (Midstream)  02-18-22   No growth  --  --    RADIOLOGY:    < from: US Abdomen Complete (US Abdomen Complete .) (03.08.22 @ 08:44) >  IMPRESSION:  No evidence for acute cholecystitis.    Hepatic steatosis and mild hepatomegaly.    --- End of Report ---    < end of copied text >

## 2022-03-10 NOTE — PROGRESS NOTE ADULT - SUBJECTIVE AND OBJECTIVE BOX
Neurology Progress Note    S: Patient seen and examined. No new events overnight. patient denied CP, SOB, HA or pain. no neuro change  dc plan     Medication:    MEDICATIONS  (STANDING):  MEDICATIONS  (STANDING):  cloBAZam 5 milliGRAM(s) Oral two times a day  diltiazem    milliGRAM(s) Oral daily  dorzolamide 2%/timolol 0.5% Ophthalmic Solution 1 Drop(s) Both EYES two times a day  heparin   Injectable 5000 Unit(s) SubCutaneous every 8 hours  lamoTRIgine 300 milliGRAM(s) Oral every 12 hours  latanoprost 0.005% Ophthalmic Solution 1 Drop(s) Both EYES at bedtime  metoprolol tartrate 50 milliGRAM(s) Oral daily  pantoprazole    Tablet 40 milliGRAM(s) Oral before breakfast  primidone 250 milliGRAM(s) Oral two times a day    MEDICATIONS  (PRN):  diazepam    Tablet 5 milliGRAM(s) Oral every 8 hours PRN spasm    Vitals:  Vital Signs Last 24 Hrs  T(C): 36.7 (03-10-22 @ 11:18), Max: 36.7 (22 @ 21:20)  T(F): 98 (03-10-22 @ 11:18), Max: 98 (22 @ 21:20)  HR: 91 (03-10-22 @ 11:18) (91 - 108)  BP: 105/71 (03-10-22 @ 11:18) (105/70 - 113/75)  BP(mean): --  RR: 16 (03-10-22 @ 11:18) (16 - 16)  SpO2: 92% (03-10-22 @ 11:18) (92% - 93%)            General Exam:   General Appearance: Appropriately dressed and in no acute distress       Head: Normocephalic, atraumatic and no dysmorphic features  Ear, Nose, and Throat: Moist mucous membranes  CVS: S1S2+  Resp: No SOB, no wheeze or rhonchi  GI: soft NT/ND  Extremities: No edema or cyanosis  Skin: No bruises or rashes     Neurological Exam:  Mental Status: Awake, alert and oriented x 2-3.  Able to follow simple  verbal commands. Able to name and repeat. fluent speech. No obvious aphasia mild dysarthria noted.   Cranial Nerves: PERRL, EOMI, VFFC, sensation V1-V3 intact,  no obvious facial asymmetry, equal elevation of palate, scm/trap 5/5, tongue is midline on protrusion. no obvious papilledema on fundoscopic exam. hearing is grossly intact.   Motor: SORIANO at least 4+/5   Sensation: Intact to light touch and pinprick throughout. no right/left confusion. no extinction to tactile on DSS.    Reflexes: 1+ throughout at biceps, brachioradialis, triceps, patellars and ankles bilaterally and equal. No clonus. R toe and L toe were both downgoing.  Coordination: No dysmetria on FNF   Gait:deferred       I personally reviewed the below data/images/labs:\\      no new labs     Urinalysis Basic - ( 04 Mar 2022 06:25 )    Color: Yellow / Appearance: Slightly Turbid / S.020 / pH: x  Gluc: x / Ketone: Negative  / Bili: Negative / Urobili: Negative   Blood: x / Protein: Trace / Nitrite: Negative   Leuk Esterase: Negative / RBC: 0 /hpf / WBC 0 /HPF   Sq Epi: x / Non Sq Epi: 0 /hpf / Bacteria: Negative        ACC: [id number] EXAM:  CT BRAIN                          PROCEDURE DATE:  2022          INTERPRETATION:  CLINICAL INFORMATION: Headache and vomiting.    TECHNIQUE: Axial noncontrast CT images from the skull base to the vertex   were obtained and submitted for interpretation. Coronal and sagittal   reformatted images were performed. Bone and soft tissue windows were   evaluated.    COMPARISON: CT head 2022.    FINDINGS:    Again noted is a right parietal approach shunt catheter with tipin the   posterior horn of the right lateral ventricle. Multiple additional   retained shunt catheters are unchanged in position since the prior   examination. No interval change in the size of the ventricular system.    Encephalomalacia and gliosis of the right frontotemporal regions along   prior ventriculostomy catheter tract and right occipital region,   unchanged.    There is no acute intracranial mass-effect, hemorrhage, midline shift, or   abnormal extra-axial fluid collection. Basal cisterns are patent.    Mucosal thickening of the paranasal sinuses. Mastoid air cells are clear.   Biparietal craniotomy noted.    IMPRESSION:    No significant interval change in the right transparietal ventriculostomy   catheter terminating in the atrium of the right lateral ventricle.   Multiple abandoned left-sided catheter again noted. No ventricular size   without hydrocephalus. No acute intracranial bleeding.    --- End of Report ---          CLIFF ANTHONY MD; Resident Radiologist  This document has been electronically signed.  RAVEN ROSAS MD; Attending Radiologist  This document has been electronically signed. Mar  4 2022  1:33AM    < end of copied text >

## 2022-03-10 NOTE — PROGRESS NOTE ADULT - ASSESSMENT
50 yo M with hx of hydrocephalus s/p  shunt presenting with N/V and now found to be tachy    - Appears to have had a bout of sinus tachycardia.   - His initial EKF on 3/4 also demonstrated sinus tachy  - His HR tends to run in high 90s low 100s per flowsheet  - doubt this is arrhythmic  - this may be reflective of his deconditioning + mild vol depletion from vomiting and possible an infectious process  - lower ext venous duplex neg for DVT. though he is higher risk for VTE given comorbidities. cont dvt proph  - echo results noted, grossly normal ef   - cont bb and ccb     - Monitor and replete electrolytes. Keep K>4.0 and Mg>2.0.  - Further cardiac workup will depend on clinical course.   - All other workup per primary team. Will followup.

## 2022-03-10 NOTE — DISCHARGE NOTE NURSING/CASE MANAGEMENT/SOCIAL WORK - NSTRANSFERBELONGINGSDISPO_GEN_A_NUR
Progress Note - Dr. David Madrigal - Internal Medicine  PCP: Kwesi Found  #210 / Joselin Amaya [id number] Day: 2  Code Status: Full Code  Current Diet: DIET RENAL;        CC: follow up on medical issues    Subjective:   Reyes Werner is a 79 y.o. male. He denies problems    Doing ok  cpk normal    He denies chest pain, denies shortness of breath, denies nausea,  denies emesis. 10 system Review of Systems is reviewed with patient, and pertinent positives are listed here: None . Otherwise, Review of systems is negative. I have reviewed the patient's medical and social history in detail and updated the computerized patient record. To recap: He  has a past medical history of AAA (abdominal aortic aneurysm) ([facility] CAD (coronary artery disease); Cerebral artery occlusion with cerebral infarction [facility]); DDD (degenerative disc disease), cervical; Dementia; Diabetes mellitus ([facility] Hyperlipidemia; Hypertension; Pacemaker; and PVD (peripheral vascular disease) ([facility] . He  has a past surgical history that includes Cardiac surgery (May 2007) and Leg amputation below knee. Zuleima Woods He  reports that he quit smoking about 12 years ago. His smoking use included Cigarettes. He has never used smokeless tobacco. He reports that he does not drink alcohol or use drugs. .        Active Hospital Problems    Diagnosis Date Noted    Rhabdomyolysis [M62.82] 01/30/2019    HTN (hypertension) [I10] 01/30/2019    High cholesterol [E78.00] 01/30/2019    DM2 (diabetes mellitus, type 2) ([facility] [E11.9] 01/30/2019       Current Facility-Administered Medications: glipiZIDE (GLUCOTROL) tablet 5 mg, 5 mg, Oral, BID WC  [START ON 2/3/2019] simvastatin (ZOCOR) tablet 20 mg, 20 mg, Oral, Nightly  menthol-zinc oxide (CALMOSEPTINE) 0.44-20.6 % ointment, , Topical, BID PRN  influenza quadrivalent split vaccine (FLUZONE;FLUARIX;FLULAVAL;AFLURIA) injection 0.5 mL, 0.5 mL, Intramuscular, Once  pneumococcal 13-valent Acuity: Unknown Type of Exam: Unknown FINDINGS: BRAIN/VENTRICLES: The ventricles are unchanged in size. There is unchanged cerebellar atrophy. There is no acute intracranial hemorrhage or mass effect. Multiple dilated perivascular spaces and/or remote lacunar infarcts involving the basal ganglia and thalami, similar to previous exam.  Chronic superior left frontal infarct posteriorly. ORBITS: The visualized portion of the orbits demonstrate no acute abnormality. SINUSES: The visualized paranasal sinuses and mastoid air cells demonstrate no acute abnormality. SOFT TISSUES/SKULL:  No acute abnormality of the visualized skull or soft tissues. No acute intracranial hemorrhage or mass effect. Xr Chest Portable    Result Date: 1/30/2019  EXAMINATION: SINGLE XRAY VIEW OF THE CHEST 1/30/2019 2:00 pm COMPARISON: December 7, 2018 HISTORY: ORDERING SYSTEM PROVIDED HISTORY: AMS TECHNOLOGIST PROVIDED HISTORY: Reason for exam:->AMS Ordering Physician Provided Reason for Exam: Fall (Pt. comes in today by [facility] from home with a fall. Pt. was found in front of his recliner. EMS reports that there was a line of stool in front of his chair from where the patient had been crawling. Pt. has history of dementia.) Acuity: Acute Type of Exam: Initial FINDINGS: Cardiac silhouette is upper limits of normal.  Left-sided cardiac device. Lungs appear clear. No acute bony abnormality. No acute findings. Assessment and Plan:  Patient Active Hospital Problem List:   Rhabdomyolysis (1/30/2019)    Assessment: Established problem. Improving. CPK normal    Plan: cont iv bicarb drip. Cont to follow cpk and creat levels. DM2 (diabetes mellitus, type 2) ([facility] (1/30/2019)    Assessment: Established problem. Stable. FSBS 93 this am    Plan: Continue present orders/plan. HTN (hypertension) (1/30/2019)    Assessment: Established problem. Stable. 138/56    Plan: Continue present orders/plan.      High cholesterol with patient

## 2022-03-10 NOTE — PROGRESS NOTE ADULT - ASSESSMENT
52 yo M with epilepsy, OP, mood d/o, mild MR BPH, glaucoma,  hx of hydrocephalus s/p  shunt, also has VNS, had VPS revisions presenting with vomiting after having fried feed. also + penile disharge has UTI from 2 days ago.  follows with epilepsy clinic with Dr. Jewell.   CTH no significant interval change in R ventric cath. no change in ventric size no hydro.  Xray shunt series: unremarkable    LE doppler neg     Impression : Seizure d/o. vomitting unlikely related to h/o hydro ; now improved     - c/w home onfi 10mg BID and lamotrigine 300mg BID; also gets diazepam PRN spasm  - olanzapine and seroquel for mood stabilization  - GI eval vomiting  - cardio for tachycardia   - monitor LFTs and platelets   - r/o infection; UA unremarkable CT chest obtained ; f/u ID recs   - no need for EEG at this time  - remaining per primary team  - telemetry  - PT/OT/SS/SLP, OOBC  - check FS, glucose control <180  - GI/DVT ppx  - Thank you for allowing me to participate in the care of this patient. Call with questions.   - outpatient f/u wtih Dr maria alejandra jewell upon d/c . no objection nto d/c plan   Samuel Gauthier MD  Vascular Neurology  Office: [phone]

## 2022-03-10 NOTE — PROGRESS NOTE ADULT - SUBJECTIVE AND OBJECTIVE BOX
[facility]    Rosalia Miller, Scooby, Sasha, Nikolas, Rusty, Yamila      [phone]    CHIEF COMPLAINT: Patient is a 51y old  Male who presents with a chief complaint of nausea / vomiting  (09 Mar 2022 13:32)      Follow Up: tachycardia    Interim history: Unable to provide a history on the basis of a poor mental status.  Events noted.    MEDICATIONS  (STANDING):  cloBAZam 5 milliGRAM(s) Oral two times a day  diltiazem    milliGRAM(s) Oral daily  dorzolamide 2%/timolol 0.5% Ophthalmic Solution 1 Drop(s) Both EYES two times a day  heparin   Injectable 5000 Unit(s) SubCutaneous every 8 hours  lamoTRIgine 300 milliGRAM(s) Oral every 12 hours  latanoprost 0.005% Ophthalmic Solution 1 Drop(s) Both EYES at bedtime  metoprolol tartrate 50 milliGRAM(s) Oral daily  pantoprazole    Tablet 40 milliGRAM(s) Oral before breakfast  primidone 250 milliGRAM(s) Oral two times a day    MEDICATIONS  (PRN):  diazepam    Tablet 5 milliGRAM(s) Oral every 8 hours PRN spasm      REVIEW OF SYSTEMS: unable to provide  Vital Signs Last 24 Hrs  T(C): 36.3 (10 Mar 2022 05:06), Max: 36.7 (09 Mar 2022 11:10)  T(F): 97.3 (10 Mar 2022 05:06), Max: 98.1 (09 Mar 2022 11:10)  HR: 108 (10 Mar 2022 05:06) (98 - 108)  BP: 113/75 (10 Mar 2022 05:06) (105/70 - 113/75)  BP(mean): --  RR: 16 (10 Mar 2022 05:06) (16 - 16)  SpO2: 93% (10 Mar 2022 05:06) (92% - 93%)    I&O's Summary      Telemetry past 24h:    PHYSICAL EXAM:    Constitutional: well-nourished, well-developed, NAD   HEENT:  MMM, sclerae anicteric, conjunctivae clear, no oral cyanosis. cranial deformity  Pulmonary: Non-labored, breath sounds are clear bilaterally, No wheezing, rales or rhonchi  Cardiovascular: Regular, S1 and S2.  distant, no murmur.  No rubs, gallops or clicks  Gastrointestinal: Bowel Sounds present, soft, nontender.   Lymph: No peripheral edema.   Neurological: unable to evaluate, poor mental status  Skin: No rashes.  Psych:  Mood & affect not evaluable    LABS: All Labs Reviewed:                        13.2   7.70  )-----------( 377      ( 08 Mar 2022 06:46 )             42.3     08 Mar 2022 06:46    139    |  100    |  11     ----------------------------<  86     4.1     |  26     |  0.88     Ca    9.3        08 Mar 2022 06:46            Blood Culture:         RADIOLOGY:    EKG:    Echo:    < from: Transthoracic Echocardiogram (03.08.22 @ 13:26) >    Patient name: LINO AREVALO  YOB: 1970   Age: 51 (M)   MR#: [id number]  Study Date: 3/8/2022  Location: [address] [place], [place] [id number]: [facility]  Study quality: Technically good  Referring Physician: Aman De La Cruz MD  Blood Pressure: 102/68 mmHg  Height: 165 cm  Weight: 82 kg  BSA: 1.9 m2  Heart Rate: 98 mmHg  ------------------------------------------------------------------------  PROCEDURE: Transthoracic echocardiogram with 2-D, M-Mode  and complete spectral and color flowDoppler.  INDICATION: Abnormal electrocardiogram (ECG) (EKG) (R94.31)  ------------------------------------------------------------------------  Dimensions:    Normal Values:  LA:     3.3    2.0 - 4.0 cm  Ao:     3.3    2.0 - 3.8 cm  SEPTUM: 1.1    0.6 - 1.2 cm  PWT:    0.9    0.6 - 1.1 cm  LVIDd:  3.0    3.0 - 5.6 cm  LVIDs:  1.9    1.8 - 4.0 cm  Derived variables:  LVMI: 43 g/m2  RWT: 0.60  Fractional short: 37 %  EF (Visual Estimate): 65-70 %  ------------------------------------------------------------------------  Observations:  Mitral Valve: Normal mitral valve.  Aortic Valve/Aorta: Normal trileaflet aortic valve. No  aortic valve regurgitation seen.  Aortic Root: 3.3 cm.  Left Atrium: Normal left atrium.  Left Ventricle: Endocardium not well visualized; grossly  normal left ventricular systolic function. Normal left  ventricular internal dimensions and wall thicknesses.  Normal diastolic function  Right Heart: Normal right atrium. The right ventricle is  not well visualized. Normaltricuspid valve. Mild tricuspid  regurgitation. Normal pulmonic valve. No pulmonic  regurgitation.  Pericardium/Pleura: Normal pericardium with no pericardial  effusion.  Hemodynamic: Estimated right atrial pressure is 8 mm Hg.  Estimated right ventricular systolic pressure equals 35 mm  Hg, assuming right atrial pressure equals 8 mm Hg,  consistent with borderline pulmonary hypertension.  ------------------------------------------------------------------------  Conclusions:  1. Endocardium not well visualized; grossly normal left  ventricular systolic function.  2. The right ventricle is not well visualized.  *** No previous Echo exam.  ------------------------------------------------------------------------  Confirmed on  3/8/2022 - 17:30:11 by Jeffrey Chavarria M.D.  ------------------------------------------------------------------------    < end of copied text >

## 2022-03-10 NOTE — DISCHARGE NOTE NURSING/CASE MANAGEMENT/SOCIAL WORK - PATIENT PORTAL LINK FT
You can access the FollowMyHealth Patient Portal offered by [facility] by registering at the following website: [email or web address]. By joining [facility]’s FollowMyHealth portal, you will also be able to view your health information using other applications (apps) compatible with our system.

## 2022-03-10 NOTE — PROGRESS NOTE ADULT - ASSESSMENT
51 year old man with n/v, UTI    1. N/V. ddx includes food poisoning, UTI  -Abdominal US shows no evidence of acute cholecystitis. Hepatic steatosis and mild hepatomegaly noted.   -tolerating regular diet, no vomiting or abdominal pain  -dc planning     2. UTI    3. Cognitive delay    4.  shunt. CT shows no hydro      Attending supervision statement: I have personally seen and examined the patient. I fully participated in the care of this patient. I have made amendments to the documentation where necessary, and agree with the history, physical exam, and plan as outlined by the ACP.      [facility]   Gastroenterology and Hepatology  [address] [place], [place]  Office: [phone]  Cell: [phone]

## 2022-03-10 NOTE — PROGRESS NOTE ADULT - PROVIDER SPECIALTY LIST ADULT
Gastroenterology
Internal Medicine
Cardiology
Cardiology
Infectious Disease
Internal Medicine
Neurology
Gastroenterology
Infectious Disease
Infectious Disease
Internal Medicine
Neurology
Cardiology
Neurology
Neurology

## 2022-03-10 NOTE — PROGRESS NOTE ADULT - ASSESSMENT
51 year old male with hydrocephalus, VPS presenting with vomiting.     Patient no longer with nausea and vomiting. No diarrhea.  CT A/P performed.  No fevers, normal WBC.  UA with WBC 0 low suspicion for UTI.  VPS shunt study unremarkable.  CT head with normal ventricular size without hydrocephalus.  CXR with right patchy opacity - no cough.    Recommend:  #Nausea and vomiting  -?Viral gastroenteritis  -Continue supportive care  -No fevers, normal WBC - monitor off abx  -UA not suggestive of UTI - continue to monitor for worsening urinary symptoms  -Urine cxs remain negative  -CT head with normal ventricular study - Shunt XR unremarkable    #CXR with R chest opacity  -CT chest with opacity - likely aspiration from vomiting - no fever or wbc, hold off abx for now   -Monitor off abx given no fevers, normal WBC, no cough.    Chapito Reeder MD  Available through MS Teams  If no response, or after 5pm/weekends, call [phone]    Please call with questions.

## 2022-04-01 ENCOUNTER — APPOINTMENT (OUTPATIENT)
Dept: NEUROLOGY | Facility: CLINIC | Age: 52
End: 2022-04-01
Payer: MEDICARE

## 2022-04-01 VITALS
BODY MASS INDEX: 28.93 KG/M2 | RESPIRATION RATE: 16 BRPM | SYSTOLIC BLOOD PRESSURE: 98 MMHG | HEART RATE: 87 BPM | DIASTOLIC BLOOD PRESSURE: 69 MMHG | HEIGHT: 66 IN | WEIGHT: 180 LBS

## 2022-04-01 PROCEDURE — 99215 OFFICE O/P EST HI 40 MIN: CPT

## 2022-04-01 NOTE — DISCUSSION/SUMMARY
[FreeTextEntry1] : 51 year old man with history developmental delay and epilepsy with shunt placement and VNS placement.\par \par Had long discussion about options.  \par \par The events in the hospital (shirt pulling, headache, etc) were not associated with seizure activity.  His behavioral episodes also unlikely 2/2 seizure activity.  As for headache, no change in CT and not severe.  Not complaining of pain at this time.\par \par Episodes of starting upward could be behavioral with negative EMU stay. Recent events \par \par Plan;\par Continue current AED regimen\par -reviewed seizure triggers\par - annual labwork including AED levels.\par - psych (in [facility]) for behavior\par - DEXA scan next visit (holding off because of COVID)\par - following with cardiology\par - memory evaluated by Dr. Allen with no change per caregiver\par - follow with local psychiatry\par - follow with GI for aspiration / ? if behavioral component.\par \par Total time 40 min.\par \par \par  [Medically Refractory (seizure within the last year)] : Medically Refractory (seizure within the last year) [Inpatient video EEG study ordered with length of stay anticipated in days: _____] : Inpatient video EEG study ordered with length of stay anticipated in days: [unfilled] [Event capture (for localization, differential diagnosis) (typically 3-5 days)] : Event capture (for localization, differential diagnosis) (typically 3-5 days)

## 2022-04-01 NOTE — PHYSICAL EXAM
[General Appearance - Alert] : alert [General Appearance - In No Acute Distress] : in no acute distress [General Appearance - Well Nourished] : well nourished [Person] : oriented to person [Time] : oriented to time [Fluency] : fluency intact [Cranial Nerves Optic (II)] : visual acuity intact bilaterally,  visual fields full to confrontation, pupils equal round and reactive to light [Cranial Nerves Oculomotor (III)] : extraocular motion intact [Cranial Nerves Trigeminal (V)] : facial sensation intact symmetrically [Cranial Nerves Facial (VII)] : face symmetrical [Cranial Nerves Vestibulocochlear (VIII)] : hearing was intact bilaterally [Cranial Nerves Glossopharyngeal (IX)] : tongue and palate midline [Cranial Nerves Accessory (XI - Cranial And Spinal)] : head turning and shoulder shrug symmetric [Cranial Nerves Hypoglossal (XII)] : there was no tongue deviation with protrusion [Involuntary Movements] : no involuntary movements were seen [No Muscle Atrophy] : normal bulk in all four extremities [Sensation Tactile Decrease] : light touch was intact [Sensation Pain / Temperature Decrease] : pain and temperature was intact [Past-pointing] : there was no past-pointing [Balance] : balance was intact [Tremor] : no tremor present [2+] : Ankle jerk left 2+ [FreeTextEntry5] : Only vision to light in right eye, none in left eye [FreeTextEntry6] : Increased tone throughout, left greater than right.  5/5 strength. [Sclera] : the sclera and conjunctiva were normal [Extraocular Movements] : extraocular movements were intact [FreeTextEntry1] : see above [Edema] : there was no peripheral edema [Full Pulse] : the pedal pulses are present [Abnormal Walk] : normal gait [Nail Clubbing] : no clubbing  or cyanosis of the fingernails [Musculoskeletal - Swelling] : no joint swelling seen [Motor Tone] : muscle strength and tone were normal

## 2022-04-01 NOTE — HISTORY OF PRESENT ILLNESS
[FreeTextEntry1] : **UPDATE:4/1/2022***\par Mr Andi Ferrara is here today for a scheduled follow up office visit and is accompanied by his aunt and caregiver\par Esophagitis and episodes of aspiration - swallow studies were normal. Shunt evaluation done and normal as well. He has been depressed of late (new home, uncle passed away last year). \par \par **UPDATE:2/17/2022***\par Mr Andi Ferrara is here today for a scheduled follow up office visit and is accompanied by his mother and caregiver\par Esophagalitis\par \par ***UPDATE:9/24/21***\par Mr Andi Ferrara is accompanied by his home care worker - Aunt not here today. Tried to reach out to her with no answer (? wrong number).\par EMU with no seizures - GI was consulted.\par Had surgery for his cervical stenosis and much improved.  Mild seizures shortly after surgery, but doing well.\par Per message from Aunt memory worse with weight gain.\par \par ***UPDATE:9/24/21***\par Mr Andi Ferrara is accompanied by his home care worker - Aunt not here today.\par EMU with no seizures - GI was consulted.\par Had surgery for his cervical stenosis and much improved.  Mild seizures shortly after surgery, but doing well.\par \par \par ***UPDATE:6/21/21***\par Mr Andi Ferrara is accompanied by his legal guardian Ms Melissa uGy\par Patient has been having LUQ pain for past few months.  Awaiting GI consultation at this point.\par Aunt notes has been having episode a few times per week with staring upwards for several minutes - no other seizure activity noted.\par Also, worsening of behavior overall.\par Had surgery for his cervical stenosis and much improved.  Mild seizures shortly after surgery, but doing well.\par \par ***UPDATE:11/24/20***\par Mr Andi Ferrara is accompanied by his legal guardian Ms Melissa Guy\par He is doing well overall.\par Had surgery for his cervical stenosis and much improved.  Mild seizures shortly after surgery, but doing well.\par \par ***UPDATE:7/31/20***\par Mr Andi Ferrara is accompanied by his legal guardian Ms Melissa Guy\par He has no reported interval seizures . He is doing well overall.\par \par Patient now with intermittent weakness when walking.  However, had bug bite to right ankle in the fall and has been walking less.  No issues bowel/bladder but mild ataxia.\par \par ***UPDATE:2/3/20***\par Mr Andi Ferrara is accompanied by his legal guardian Ms Melissa Guy\par He has no reported interval seizures . he had a fall on 1/25 but did not hit his head. It was noted that he had a bug bite from end of September on right ankle which is now swollen and red\par \par LTG 300mg BID\par Onfi 5mg BID\par Primidone 250mg BID\par \par Last office visit:\par  \par Possible small seizure a few weeks ago where was a little confused at bowling.  Had CT head which showed no change.\par \par On LTG 300mg bid and Primidone bid. Onfi 5mg bid.\par \par Seeing psych who started Lexapro 10 daily and Zyprexa\par \par Mr. 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  He comes accompanied by his aunt and uncle (legal guardians) and the .\par \par The seizures started shortly after birth with unclear details.  He was in the ICU for some time and found to have hydrocephalus.  At 16 months of age, his shunt was found to be malfunctioning and a new shunt was placed.  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  In 2013, the shut malfunctioned once again with further visual loss, now with only vision to light in the right eye and no vision in the left eye.\par \par The seizures at a younger age are unclear in semiology.  However, for the past several decades his typical seizures have been pulling at his clothes for seconds to minutes with confusion thereafter.  He gets confused with these events with behavioral arrest. \par the exact frequency is unknown (perhaps several times a month).  He does not have convulsions.\par \par In addition to the seizures above, he has been having behavioral outbursts at his home.  He gets mad at times or very tired at times.  It has been unclear if these are seizures.  Also episodes of head pain as well.  This has been followed by his psychiatrist.\par \par The patient has been tried on several AEDS (PHT, Vimpat, VPA, PHB, CBZ, LEV, Banzel, TPM) and has not been treated with these meds.  He is currently on Primidone 250 bidand Lamictal 400mg bid.  He was tried on Onfi 5mg bid in December 2015  and did well, but this was stopped because he ran out.  This was restarted in February 2016 at 5mg bid.\par \par The patient had a VNS placed several years ago at "maximum output" and has followed [place]. [place] [facility]  It was stated that would be difficult to do any further surgery on him.\par \par CT scans in the past have shown right parietal encephalomalacia.  EEG studies have shown (AEEG and VEEG) bilateral temporal frontal discharges (R>L) with one seizure caught on the right in the past.\par \par He has been followed by Dr. Phillips and at [place] in the past.  VEEG testing July 2016 showed no seizures and no changes to meds.  Also showed episodes of headache and pulling at shirt with no changes.  Still with headaches at this time with intermittent abdominal pain with no known etiology.\par \par Several seizures with ZNS increased to 100 bid earlier in the year.  Worsening of behavior with ZNS and no improvement.  We stopped ZNS and still with behavioral problems.\par \par VNS was shut off/\par \par PMHx as above\par FHx No seizures\par Meds/All: see below\par

## 2022-04-05 NOTE — PROGRESS NOTE ADULT - SUBJECTIVE AND OBJECTIVE BOX
[facility] CARE AT [facility]   Progress Note    Admitting Date and Time: 4/2/2022  7:24 AM  Admit Dx: Splenomegaly [R16.1]  Pancreatic mass [K86.89]  DKA, type 1, not at goal [facility]) [E10.10]  Diabetic ketoacidosis without coma associated with diabetes mellitus due to underlying condition ([facility] [address] [E08.10]  Acute pulmonary embolism without acute cor pulmonale, unspecified pulmonary embolism type ([facility] [I26.99]    Seen for follow on multiple problems as listed below. Subjective:  CP ,nuasea , abdominal pain has resolved. Denies SOB , Tolerating regular diabetic diet without problems. Uneventful night. D/w nursing. ROS: denies fever, chills, cp, sob, n/v, HA unless stated above.      insulin glargine  18 Units SubCUTAneous BID    insulin lispro  10 Units SubCUTAneous TID WC    insulin lispro  0-12 Units SubCUTAneous TID WC    insulin lispro  0-6 Units SubCUTAneous Nightly    lipase-protease-amylase  36,000 Units Oral TID WC    sodium chloride  15 mL/kg IntraVENous Once    budesonide  0.5 mg Nebulization BID    Arformoterol Tartrate  15 mcg Nebulization BID    sodium chloride flush  5-40 mL IntraVENous 2 times per day    folic acid  1 mg Oral Daily    enoxaparin  1 mg/kg SubCUTAneous BID    pantoprazole  40 mg Oral QAM AC    multivitamin  1 tablet Oral Daily    thiamine  100 mg IntraVENous Daily    nicotine  1 patch TransDERmal Daily     glucose, 15 g, PRN  glucagon (rDNA), 1 mg, PRN  dextrose, 100 mL/hr, PRN  dextrose bolus (hypoglycemia), 125 mL, PRN   Or  dextrose bolus (hypoglycemia), 250 mL, PRN  polyethylene glycol, 17 g, Daily PRN  HYDROcodone 5 mg - acetaminophen, 1 tablet, Q4H PRN   Or  HYDROcodone 5 mg - acetaminophen, 2 tablet, Q4H PRN  HYDROmorphone, 0.25 mg, Q3H PRN   Or  HYDROmorphone, 0.5 mg, Q3H PRN  dextrose bolus (hypoglycemia), 125 mL, PRN   Or  dextrose bolus (hypoglycemia), 250 mL, PRN  sodium chloride flush, 5-40 mL, PRN  sodium chloride, , PRN  LORazepam, 1 mg, Q1H PRN Or  LORazepam, 1 mg, Q1H PRN   Or  LORazepam, 2 mg, Q1H PRN   Or  LORazepam, 2 mg, Q1H PRN   Or  LORazepam, 3 mg, Q1H PRN   Or  LORazepam, 3 mg, Q1H PRN   Or  LORazepam, 4 mg, Q1H PRN   Or  LORazepam, 4 mg, Q1H PRN         Objective:    /82   Pulse 94   Temp 98 °F (36.7 °C) (Oral)   Resp 16   Ht 5' 7\" (1.702 m)   Wt 180 lb 12.4 oz (82 kg)   SpO2 100%   BMI 28.31 kg/m²   General Appearance: alert and oriented to person, place and time, in no acute distress  Skin: warm and dry  Head: normocephalic and atraumatic  Eyes:  extraocular eye movements intact, conjunctivae normal  Neck: supple and non-tender without mass  Pulmonary/Chest: clear to auscultation bilaterally  Cardiovascular: normal rate, regular rhythm, normal S1 and S2  Abdomen: soft, non-tender, non-distended, normal bowel sounds, no masses or organomegaly  Extremities: no cyanosis, clubbing Neurologic: no cranial nerve deficit, speech normal      Recent Labs     04/03/22  0630 04/04/22  0412 04/05/22  0253    138 139   K 3.4* 3.1* 3.0*    100 103   CO2 21* 29 27   BUN <2* 5* 7   CREATININE 0.5* 0.6* 0.7   GLUCOSE 254* 203* 175*   CALCIUM 8.0* 8.9 8.9       Recent Labs     04/03/22  0630 04/04/22  0412 04/05/22  0253   WBC 2.0* 1.5* 1.9*   RBC 3.81 4.21 4.11   HGB 12.8 14.1 13.8   HCT 34.3* 38.3 38.0   MCV 90.0 91.0 92.5   MCH 33.6 33.5 33.6   MCHC 37.3* 36.8* 36.3*   RDW 12.4 12.4 12.6   PLT 64* 66* 70*   MPV 8.7 9.2 9.6       Labs and images reviewed . Radiology:   US DUP LOWER EXTREMITIES BILATERAL VENOUS   Final Result   No evidence of DVT in either lower extremity. CTA TRIPHASIC PANCREAS   Final Result   Located within the tail of the pancreas is a peripherally calcified area of   soft tissue density with heterogeneous enhancement. Multiple considerations   both benign and malignant with correlation of prior inflammation,   pancreatitis.   In the absence of vascular etiology most highest consideration   is pancreatic [facility] - Rosalia Miller, Scooby, Sasha, Nikolas, Yamila Ojeda  Office Number:  [phone]    Patient resting comfortably in bed in NAD.  Laying flat with no respiratory distress.  No complaints of chest pain, dyspnea, palpitations, PND, or orthopnea.  says he feels ok this morning    ROS: negative unless otherwise mentioned.    Telemetry:  off    MEDICATIONS  (STANDING):  cloBAZam 5 milliGRAM(s) Oral two times a day  diltiazem    milliGRAM(s) Oral daily  dorzolamide 2%/timolol 0.5% Ophthalmic Solution 1 Drop(s) Both EYES two times a day  heparin   Injectable 5000 Unit(s) SubCutaneous every 8 hours  lamoTRIgine 300 milliGRAM(s) Oral every 12 hours  latanoprost 0.005% Ophthalmic Solution 1 Drop(s) Both EYES at bedtime  metoprolol tartrate 50 milliGRAM(s) Oral daily  pantoprazole    Tablet 40 milliGRAM(s) Oral before breakfast  primidone 250 milliGRAM(s) Oral two times a day    MEDICATIONS  (PRN):  diazepam    Tablet 5 milliGRAM(s) Oral every 8 hours PRN spasm      Allergies    codeine (Stomach Upset)  Depakote (Other)  seasonal allergies: nasal congestion (Other)    Intolerances        Vital Signs Last 24 Hrs  T(C): 36.7 (08 Mar 2022 04:53), Max: 36.7 (07 Mar 2022 21:03)  T(F): 98.1 (08 Mar 2022 04:53), Max: 98.1 (07 Mar 2022 21:03)  HR: 92 (08 Mar 2022 04:53) (92 - 97)  BP: 101/66 (08 Mar 2022 04:53) (101/66 - 163/69)  BP(mean): --  RR: 18 (08 Mar 2022 04:53) (18 - 18)  SpO2: 94% (08 Mar 2022 04:53) (94% - 99%)    I&O's Summary    07 Mar 2022 07:01  -  08 Mar 2022 07:00  --------------------------------------------------------  IN: 360 mL / OUT: 1300 mL / NET: -940 mL        ON EXAM:  Constitutional: NAD, awake   HEENT: Moist Mucous Membranes, Anicteric  Pulmonary: Decreased breath sounds b/l. No rales, crackles or wheeze appreciated.   Cardiovascular: Mildly tachy, S1 and S2, No murmurs, rubs, gallops or clicks  Gastrointestinal: Bowel Sounds present, soft, nontender.   Lymph: + peripheral edema. No lymphadenopathy.  Skin: No visible rashes or ulcers.  Psych:  Mood & affect appropriate    LABS: All Labs Reviewed:                        13.2   7.70  )-----------( 377      ( 08 Mar 2022 06:46 )             42.3                         12.3   9.15  )-----------( 389      ( 07 Mar 2022 07:36 )             38.8     08 Mar 2022 06:46    139    |  100    |  11     ----------------------------<  86     4.1     |  26     |  0.88   07 Mar 2022 07:34    137    |  100    |  11     ----------------------------<  109    4.2     |  25     |  0.96   06 Mar 2022 07:15    140    |  100    |  11     ----------------------------<  86     4.3     |  30     |  0.96     Ca    9.3        08 Mar 2022 06:46  Ca    9.2        07 Mar 2022 07:34  Ca    9.2        06 Mar 2022 07:15    TPro  7.1    /  Alb  3.9    /  TBili  0.2    /  DBili  x      /  AST  40     /  ALT  80     /  AlkPhos  152    06 Mar 2022 07:15          Blood Culture: Organism --  Gram Stain Blood -- Gram Stain --  Specimen Source Clean Catch Clean Catch (Midstream)  Culture-Blood --           neuroendocrine tumor versus serous cystadenoma. Pseudopapillary neoplasm can be seen more likely in female population   demographic. RECOMMENDATIONS:   GI consultation recommended for further need of workup such as PET-CT and   biochemical values. CTA PULMONARY W CONTRAST   Final Result   Addendum 1 of 1   ADDENDUM:   Critical results were called by Dr. Praveen Ross MD to Fanny Yo on   4/2/2022 at 10:50. Final   1. Small nonocclusive segmental filling defects in the lower lobes   compatible with pulmonary pulmonary embolism. Evaluation of the lower lungs   is mildly compromised due to motion artifact. 2.  No pleural fluid or focal pneumonia. 3.  Diffuse hepatic steatosis. Mild splenomegaly. 4.  Peripherally calcified lesion in the pancreatic tail measuring 3.7 x 2.5   x 1.5 cm could represent an old calcified pseudocyst or neoplasm   (pseudopapillary versus mucinous). If there is no known history of   pancreatic disease, triple phase CT of the pancreas or MRI recommended. XR CHEST PORTABLE   Final Result   No acute process. Assessment:    Principal Problem:    DKA, type 1, not at goal [facility])  Active Problems:    History of alcohol abuse    History of pancreatitis    High anion gap metabolic acidosis    Essential hypertension    Elevated transaminase level    Pancreatic mass    Bilateral pulmonary embolism (HCC)    Diabetic ketoacidosis without coma associated with type 2 diabetes mellitus ([facility] [address]  Resolved Problems:    * No resolved hospital problems. *      Plan:  DKA II  with underlying diabetes- treated with  IV insulin as per protocol- now  transitioned to Lantus plus sliding scale. Intensivist and endocrinologist following. Continue IV fluids and other supportive care. At home on Metformin with Humalog coverage. Will defer further treatment to endocrinology. Currently on lantus 15 U sc bid, Humalog with meals & ISS. Titrate as necessary. D/w patient , will receive diabetic teaching. He will follow with dr Naya Mtz as out pt. Lactic acidosis, ketoacidosis, hyponatremia and hypokalemia-secondary to above , on IV fluids, replete as necessary. Pancreatic mass secondary to chronic pancreatitis. GI consulted and following. Borderline elevated lipase continue to monitor. Appereciate GI input. Triphasic CT with calcified tail of pancreas with areas of soft tissue density with heterogenous enhancement. May need EUS  bx /work up. Will follow with gi as out pt. Elevated transaminase- monitor , work up as per GI . Likely sec to etoh use. As per GI serologies neg in 2016. repeat work up ordered & will follow with GI . Will need out pt follow with gi. Bilateral lower lobe PE -started on Lovenox. D dimer normal x2  , Bilat lower ext us neg for dvt. Wells score 0. Likely does not need anticoag. Await dr Timo Willis suggestions. Atypical chest pain-secondary to PE, treat supportively    History of alcohol use - monitor for withdrawals. On CIWA . Continue with thiamine, FA , mvi. History of bipolar disorder    On Lovenox okay for DVT prophylaxis  Full code      If ok with pulm / GI will consider disch today. D/w charge nurse to follow & let us know.             Electronically signed by Ana Maria Martinez MD on 4/5/2022 at 7:34 AM

## 2022-04-10 ENCOUNTER — EMERGENCY (EMERGENCY)
Facility: HOSPITAL | Age: 52
LOS: 1 days | Discharge: HOME | End: 2022-04-10
Attending: STUDENT IN AN ORGANIZED HEALTH CARE EDUCATION/TRAINING PROGRAM | Admitting: STUDENT IN AN ORGANIZED HEALTH CARE EDUCATION/TRAINING PROGRAM
Payer: MEDICARE

## 2022-04-10 VITALS
SYSTOLIC BLOOD PRESSURE: 96 MMHG | TEMPERATURE: 97 F | OXYGEN SATURATION: 93 % | HEIGHT: 68 IN | WEIGHT: 182.1 LBS | DIASTOLIC BLOOD PRESSURE: 68 MMHG | HEART RATE: 64 BPM | RESPIRATION RATE: 22 BRPM

## 2022-04-10 VITALS
OXYGEN SATURATION: 97 % | RESPIRATION RATE: 16 BRPM | DIASTOLIC BLOOD PRESSURE: 65 MMHG | SYSTOLIC BLOOD PRESSURE: 100 MMHG | TEMPERATURE: 98 F | HEART RATE: 79 BPM

## 2022-04-10 DIAGNOSIS — Z96.22 MYRINGOTOMY TUBE(S) STATUS: Chronic | ICD-10-CM

## 2022-04-10 DIAGNOSIS — T85.09XA OTHER MECHANICAL COMPLICATION OF VENTRICULAR INTRACRANIAL (COMMUNICATING) SHUNT, INITIAL ENCOUNTER: Chronic | ICD-10-CM

## 2022-04-10 DIAGNOSIS — Z98.2 PRESENCE OF CEREBROSPINAL FLUID DRAINAGE DEVICE: Chronic | ICD-10-CM

## 2022-04-10 PROCEDURE — 70450 CT HEAD/BRAIN W/O DYE: CPT | Mod: 26,MA

## 2022-04-10 PROCEDURE — 99282 EMERGENCY DEPT VISIT SF MDM: CPT

## 2022-04-10 NOTE — ED PROVIDER NOTE - CLINICAL SUMMARY MEDICAL DECISION MAKING FREE TEXT BOX
52yo M with MR hit his hea don wall during behavioral outburst, no loc no ac use, neuro at baseline - will get CT head, dc back to group home

## 2022-04-10 NOTE — ED PROVIDER NOTE - PATIENT PORTAL LINK FT
You can access the FollowMyHealth Patient Portal offered by [facility] by registering at the following website: [email or web address]. By joining [employer]’s FollowMyHealth portal, you will also be able to view your health information using other applications (apps) compatible with our system.

## 2022-04-10 NOTE — ED PROVIDER NOTE - OBJECTIVE STATEMENT
52 y/o M with hx of MR, cerebral shunt, seizures, mood d/o, GERD pw head injury. pt had behavioral outburst and hit his head on wall tile. no loc, no nausea, no vomiting, no headache.

## 2022-04-10 NOTE — ED ADULT NURSE NOTE - NSIMPLEMENTINTERV_GEN_ALL_ED
Implemented All Fall with Harm Risk Interventions:  [place] to call system. Call bell, personal items and telephone within reach. Instruct patient to call for assistance. Room bathroom lighting operational. Non-slip footwear when patient is off stretcher. Physically safe environment: no spills, clutter or unnecessary equipment. Stretcher in lowest position, wheels locked, appropriate side rails in place. Provide visual cue, wrist band, yellow gown, etc. Monitor gait and stability. Monitor for mental status changes and reorient to person, place, and time. Review medications for side effects contributing to fall risk. Reinforce activity limits and safety measures with patient and family. Provide visual clues: red socks.

## 2022-04-10 NOTE — ED PROVIDER NOTE - GASTROINTESTINAL NEGATIVE STATEMENT, MLM
Kenalog Preparation: Kenalog Include Z78.9 (Other Specified Conditions Influencing Health Status) As An Associated Diagnosis?: No Concentration Of Solution Injected (Mg/Ml): 2.5 Consent: The risks of atrophy were reviewed with the patient. Validate Note Data When Using Inventory: Yes Treatment Number (Optional): 1 Medical Necessity Clause: This procedure was medically necessary because the lesions that were treated were: Administered By (Optional): Nga Detail Level: Detailed no abdominal pain, no bloating, no constipation, no diarrhea, no nausea and no vomiting. X Size Of Lesion In Cm (Optional): 0

## 2022-04-10 NOTE — ED PROVIDER NOTE - NS ED MD DISPO DISCHARGE CCDA
Duration Of Freeze Thaw-Cycle (Seconds): 0 Post-Care Instructions: I reviewed with the patient in detail post-care instructions. Patient is to wear sunprotection, and avoid picking at any of the treated lesions. Pt may apply Vaseline to crusted or scabbing areas. Show Applicator Variable?: Yes Render Note In Bullet Format When Appropriate: No Consent: The patient's consent was obtained including but not limited to risks of crusting, scabbing, blistering, scarring, darker or lighter pigmentary change, recurrence, incomplete removal and infection. Detail Level: Detailed Patient/Caregiver provided printed discharge information.

## 2022-04-10 NOTE — ED ADULT TRIAGE NOTE - CHIEF COMPLAINT QUOTE
patient had a behavioural outburst and hit his head on the tile floor- denies LOC, abrasion to forehead, patient is blind,

## 2022-04-10 NOTE — ED PROVIDER NOTE - PHYSICAL EXAMINATION
Gen:  Well appearning in NAD  Head:  NC/AT  Resp: No distress   Ext: no deformities  Skin: warm and dry as visualized  neuro following commands, motor and sensory itnact

## 2022-04-26 ENCOUNTER — RX RENEWAL (OUTPATIENT)
Age: 52
End: 2022-04-26

## 2022-06-01 ENCOUNTER — RX RENEWAL (OUTPATIENT)
Age: 52
End: 2022-06-01

## 2022-06-14 NOTE — PATIENT PROFILE ADULT - BRAND OF FIRST COVID-19 BOOSTER
Occupational Therapy  Visit Type: treatment  Treatment diagnosis: Decreased ADLs  Precautions:  Medical precautions:  fall risk; standard precautions. History of falls      Lines:     Basic: O2 and capped IV      Lines in chart and on patient reviewed, precautions maintained throughout session. Hearing: hard of hearing  Vision:     Visual history: macular degeneration  Safety Measures: chair alarm    SUBJECTIVE  Patient agreed to participate in therapy this date. Patient in chair at start of session    Prior treatment in the past year for same condition: no therapiesPatient has not been hospitalized, in a skilled nursing facility, or seen by home health in the last 30 days. Patient / Family Goal: return home and return to previous functional status    Pain     Location: pt denies    At onset of session (out of 10): 0    OBJECTIVE     Pt on 3L O2 via nasal cannula. Level of consciousness: alert    Oriented to person, place, time and situation     Arousal alertness: appropriate responses to stimuli    Affect/Behavior: appropriate, cooperative and flat  Patient activity tolerance: 1 to 1 activity to rest  Functional Communication/Cognition    Overall status:  Within functional limits    Form of communication:  Verbal  Range of Motion (measured in degrees unless otherwise indicated)  WFL: DAVIN PACHECO  Elbow/Forearm:   - Flexion (140-150):      â¢ Right: pain  Strength (out of 5 unless otherwise indicated)  WFL: DAVIN RUVICKY  Balance (trials in sec unless otherwise indicated)  Sitting:   - Static:  modified independent    - Dynamic:  supervision  Standing - Firm Surface - Eyes Open:    - Static: stand by assist double upper extremity support   - Dynamic:  stand by assist double upper extremity support      Transfers:    Assistive devices: gait belt and 2-wheeled walker    Sit to stand: stand by assist    Stand to sit: stand by assist    Training completed:    Tasks: stand to sit, sit to stand and toilet    Education "details: patient safety and body mechanics  Functional Ambulation:    Assistance:stand by assist   Assistive device:2-wheeled walker and gait belt    Distance (ft): 86;69      Education details: patient safety and body mechanics  Details/Comments: Assist with O2 line management, educated on fall prevention with this  Activities of Daily Living (ADLs):  Grooming/Oral Hygiene:     Oral hygiene assist: stand by assist    Position: standing at sink    Assist needed for: standing with assistive device and denture care  Toilet transfer:     Assist: stand by assist    Device: gait belt    Equipment: grab bar use  Toileting:     Assist: moderate assist    Assist needed for: perineal hygiene, clothing management down and clothing management up  Activities of daily living training:   Patient reports she completed all other ADLs in morning and declines at this time. Interventions    Training provided: ADL training, activity tolerance, balance retraining, transfer training, safety training and functional ambulation  Skilled input: verbal instruction/cues  Verbal Consent: Writer verbally educated and received verbal consent for hand placement, positioning of patient, and techniques to be performed today from patient for clothing adjustments for techniques and therapist position for techniques as described above and how they are pertinent to the patient's plan of care. ASSESSMENT  Impairments: balance, strength, pain and activity tolerance  Functional Limitations: bed mobility, grooming, bathing, toileting, showering, dressing, functional transfers and functional mobility  Patient tolerated todays session well and continues to make progress towards her functional goals. She continues to present with weakness during toileting tasks and was educated on energy conservation. Patient also reports he will ""have assistance with this at home\"".  She continues to be appropriate to return home with home health at the time of " medical discharge. Discharge Recommendations:  Recommendation for Discharge Location: OT WI: Home with Home therapy  Recommendation for Discharge Support: OT WI: Intermittent assist daily  PT/OT Mobility Equipment for Discharge: pt has 2ww  Progress: improving as expected    â¢ Skilled therapy is required to address these limitations in attempt to maximize the patient's independence. â¢ Personal Occupations Profile Affected: bathing/showering, functional mobility/transfers, upper body dressing, lower body dressing, toileting/toilet hygiene, personal hygiene/grooming     â¢ Clinical decision making: Moderate - Patient has several limitations (3-5), comorbidities and/or complexities, as noted in detailed assessment above, that impact their occupational profile. Resulting in several treatment options and minimal to moderate task modification consistent with moderate clinical decision making complexity.     Education Provided:   Learning Assessment:  - Primary learner: patient  - Are they ready to learn: yes  - Preferred learning style: verbal and demonstration  - Barriers to learning: no barriers apparent at this time  Education provided during session:  - Receiving Education: patient  - Results of above outlined education: Verbalizes understanding and Demonstrates understanding  Pain at End of Session: RN informed on pain level, location: pt denies    Patient at End of Session:   Location: in chair  Safety measures: alarm system in place/re-engaged, call light within reach, equipment intact and lines intact  Handoff to: nurse    PLAN  Suggestions for next session as indicated: LB dressing, review toileting, progress standing sinkside ADLs (chair behind for safety)    OT Frequency: 4 days/week  Frequency Comments: 6/14 (Breanna Johnston)    Interventions: activity tolerance training, energy conservation, functional transfer training, patient/family training, safety training, therapeutic activity, upper extremity strengthening/ROM, use of adaptive equipment, therapeutic exercise, positioning, equipment eval/education, compensatory technique education, ADL retraining, balance, patient education, transfer training, compensatory techniques and body mechanics  Agreement to plan and goals: patient agrees with goals and treatment plan      GOALS  Review Date: 6/18/2022  Long Term Goals: (to be met by time of discharge from hospital)  Grooming: Patient will complete grooming tasks modified independent. Status: progressing/ongoing  Upper body dressing: Patient will complete upper body dressing modified independent. Status: progressing/ongoing  Lower body dressing: Patient will complete lower body dressing modified independent. Status: progressing/ongoing  Toileting: Patient will complete toileting modified independent. Status: progressing/ongoing  Bathing: Patient will complete bathingmoderate assist  Status: progressing/ongoingToilet transfer: Patient will complete toilet transfer with modified independent. Status: progressing/ongoing  Walk in shower: Patient will complete walk in shower transfer with modified independent. Status: progressing/ongoing        Documented in the chart in the following areas: Assessment. Plan.       Admitting diagnosis: Hypoglycemia (E16.2)    Co-morbidities and problem list:   Patient Active Problem List:   Essential hypertension, benign   Open-angle glaucoma   Type 2 diabetes mellitus with stage 3 chronic kidney disease, with long-term current use of insulin (CMS/[facility])   Other and unspecified hyperlipidemia   MUSCLE WEAKNESS (GENERALIZED) PT   Arthrosis of left midfoot   Pain in joint, lower leg   Headache(784.0)   Incidental lung nodule, > 3mm and < 8mm   Anxiety state, unspecified   Diverticula of colon   Intertrigo   Lichen sclerosus et atrophicus of the vulva   Morbid obesity with BMI of 50.0-59.9, adult (CMS/[facility])   CKD (chronic kidney disease) stage 3, GFR 30-59 ml/min (CMS/HCC)   Urinary incontinence   Lumbar stenosis with neurogenic claudication   Spondylolisthesis at L4-L5 level   Atrophic vaginitis   Multiple falls   Medication management   Chronic left shoulder pain   Left foot pain   Chronic bilateral low back pain without sciatica   Tubular adenoma   Closed fracture of right ankle with routine healing   Coronary artery disease   Mild recurrent major depression (CMS/HCC)   Urinary retention   Hypoxemia   Dependence on supplemental oxygen   Hyperlipidemia associated with type 2 diabetes mellitus (CMS/HCC)   Uncontrolled type 2 diabetes mellitus with hyperglycemia (CMS/HCC)   Chronic respiratory failure with hypoxia and hypercapnia (CMS/HCC)   Diabetes mellitus due to underlying condition with stage 3b chronic kidney disease (CMS/HCC)   Non compliance with medical treatment   Exudative age-related macular degeneration of right eye with active choroidal neovascularization (CMS/HCC)   Mild nonproliferative diabetic retinopathy of both eyes without macular edema associated with type 2 diabetes mellitus (CMS/HCC)   Extreme obesity with alveolar hypoventilation (CMS/HCC)   Restrictive lung disease secondary to obesity   Thoracic aortic ectasia (CMS/HCC)   Hypoglycemia   MSSA bacteremia    Treatment Diagnosis: Decreased ADLs    The referring provider's electronic signature on the evaluation authorizes the therapy plan of care and certifies the need for these services, furnished under this plan of care while under their care.       Therapy procedure time and total treatment time can be found documented on the Time Entry flowsheet [employer]

## 2022-06-21 PROBLEM — K21.9 GASTRO-ESOPHAGEAL REFLUX DISEASE WITHOUT ESOPHAGITIS: Chronic | Status: ACTIVE | Noted: 2022-02-23

## 2022-06-21 PROBLEM — F79 UNSPECIFIED INTELLECTUAL DISABILITIES: Chronic | Status: ACTIVE | Noted: 2022-02-23

## 2022-06-22 ENCOUNTER — APPOINTMENT (OUTPATIENT)
Dept: CARDIOLOGY | Facility: CLINIC | Age: 52
End: 2022-06-22
Payer: MEDICARE

## 2022-06-22 ENCOUNTER — RX RENEWAL (OUTPATIENT)
Age: 52
End: 2022-06-22

## 2022-06-22 ENCOUNTER — NON-APPOINTMENT (OUTPATIENT)
Age: 52
End: 2022-06-22

## 2022-06-22 VITALS
HEIGHT: 66 IN | DIASTOLIC BLOOD PRESSURE: 68 MMHG | OXYGEN SATURATION: 98 % | RESPIRATION RATE: 16 BRPM | HEART RATE: 83 BPM | SYSTOLIC BLOOD PRESSURE: 120 MMHG

## 2022-06-22 DIAGNOSIS — I47.1 SUPRAVENTRICULAR TACHYCARDIA: ICD-10-CM

## 2022-06-22 DIAGNOSIS — I10 ESSENTIAL (PRIMARY) HYPERTENSION: ICD-10-CM

## 2022-06-22 PROCEDURE — 99214 OFFICE O/P EST MOD 30 MIN: CPT

## 2022-06-22 PROCEDURE — 93000 ELECTROCARDIOGRAM COMPLETE: CPT

## 2022-06-22 RX ORDER — FLUTICASONE PROPIONATE 50 UG/1
50 SPRAY, METERED NASAL
Refills: 0 | Status: DISCONTINUED | COMMUNITY
End: 2022-06-22

## 2022-06-22 RX ORDER — ASPIRIN 81 MG
6.5 TABLET, DELAYED RELEASE (ENTERIC COATED) ORAL
Refills: 0 | Status: DISCONTINUED | COMMUNITY
End: 2022-06-22

## 2022-06-22 NOTE — HISTORY OF PRESENT ILLNESS
[FreeTextEntry1] : Patient is a 50yo M with developmental delay (lives in group home), legally blind, HTN, PIERCE, spinal stenosis s/p surgery,   shunt,  family history CAD,  seizure disorder here for cardiac follow up.  Here with his aide and aunt (legal guardian).  Had episodes SVT when in hospital fall 2021 after fall and was aborted by adenosine. Put on cardizem, has also been on metoprolol. Walks with walker now, here in wheelchair today. CP/SOB. No PND/orthopnea/palps/syncope. Has not been walking since fall due to fear. Used to hold hand over chest in past, coudlnt communicate what issues was but does not happen recently. No CP now. \par \par ROS: GI and  negative

## 2022-06-22 NOTE — PHYSICAL EXAM
[Normal S1, S2] : normal S1, S2 [Normal] : soft, non-tender, no masses/organomegaly, normal bowel sounds [Moves all extremities] : moves all extremities [Alert and Oriented] : alert and oriented [de-identified] : Tachy with regular rhythm, no m/r/g, no JVD/S3 [de-identified] : trace edema

## 2022-06-22 NOTE — DISCUSSION/SUMMARY
[FreeTextEntry1] : Patient is a 52yo M with developmental delay,  HTN, PIERCE, seizure disorder here for cardiac evaluation. 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 Continue cardizem/metoprolol, tolerating well and no clear symptoms related to prior SVT. Metoprolol only being given daily and is tartrate so needs to be BID\par 2. Increase physical activity as tolerated \par 3. Recommend aggressive diet and lifestyle modifications \par 4. Follow up annually \par 5. Contact me if any change in symptoms. NO CP/SOB, no indication for ischemic evaluation \par 6. REgular PMD follow up

## 2022-07-01 ENCOUNTER — APPOINTMENT (OUTPATIENT)
Dept: NEUROLOGY | Facility: CLINIC | Age: 52
End: 2022-07-01

## 2022-07-05 NOTE — ED ADULT NURSE REASSESSMENT NOTE - NS ED NURSE REASSESS COMMENT FT1
Handoff report received from TERA Smith. Pt back from xray. Pt A&O x 2, VSS except tachycardia. MD Pachecoile at bedside. Pt aid at bedside. Pending CTr. Bed in lowest position, side rails up and call bell in reach. Acitretin Counseling:  I discussed with the patient the risks of acitretin including but not limited to hair loss, dry lips/skin/eyes, liver damage, hyperlipidemia, depression/suicidal ideation, photosensitivity.  Serious rare side effects can include but are not limited to pancreatitis, pseudotumor cerebri, bony changes, clot formation/stroke/heart attack.  Patient understands that alcohol is contraindicated since it can result in liver toxicity and significantly prolong the elimination of the drug by many years.

## 2022-07-11 ENCOUNTER — OUTPATIENT (OUTPATIENT)
Dept: OUTPATIENT SERVICES | Facility: HOSPITAL | Age: 52
LOS: 1 days | End: 2022-07-11
Payer: MEDICARE

## 2022-07-11 ENCOUNTER — NON-APPOINTMENT (OUTPATIENT)
Age: 52
End: 2022-07-11

## 2022-07-11 DIAGNOSIS — Z96.22 MYRINGOTOMY TUBE(S) STATUS: Chronic | ICD-10-CM

## 2022-07-11 DIAGNOSIS — Z98.2 PRESENCE OF CEREBROSPINAL FLUID DRAINAGE DEVICE: Chronic | ICD-10-CM

## 2022-07-11 DIAGNOSIS — Z91.89 OTHER SPECIFIED PERSONAL RISK FACTORS, NOT ELSEWHERE CLASSIFIED: ICD-10-CM

## 2022-07-11 DIAGNOSIS — T85.09XA OTHER MECHANICAL COMPLICATION OF VENTRICULAR INTRACRANIAL (COMMUNICATING) SHUNT, INITIAL ENCOUNTER: Chronic | ICD-10-CM

## 2022-07-11 PROCEDURE — 92611 MOTION FLUOROSCOPY/SWALLOW: CPT

## 2022-07-11 PROCEDURE — 99284 EMERGENCY DEPT VISIT MOD MDM: CPT | Mod: 25

## 2022-07-11 PROCEDURE — 70450 CT HEAD/BRAIN W/O DYE: CPT | Mod: MA

## 2022-07-11 PROCEDURE — 74230 X-RAY XM SWLNG FUNCJ C+: CPT

## 2022-07-11 PROCEDURE — 74230 X-RAY XM SWLNG FUNCJ C+: CPT | Mod: 26

## 2022-07-11 PROCEDURE — A9698: CPT

## 2022-07-11 NOTE — ASSESSMENT
[FreeTextEntry1] : MODIFIED BARIUM SWALLOW STUDY     \par \par Date of Report: 07/11/2022 \par Date of Evaluation: 07/11/2022 \par Patient Name: Andi Ferrara \par YOB: 1969 \par Date of Onset: 3 months ago \par Primary Diagnosis: Dysphagia \par Treatment Diagnosis: Dysphagia \par Referring Physician: Dc Patel MD \par \par Impressions/Results:      \par 1. There was SILENT penetration during/post swallow when given thin liquids. \par 2. There was flash penetration with complete retrieval when given mildly thick liquids. \par 3. There was no penetration and/or aspiration pre/during/post swallow when given moderately thick liquids, puree, and regular solids. \par 4. There was trace to mild pharyngeal residue post swallow. \par \par Recommendations:     \par 1. Regular solids with mildly thick liquids. \par 2. Aspiration/reflux precautions. \par 3. 1:1 supervision/assistance with all PO. \par 4. Safe swallowing guidelines: position upright 90 degrees, small bite/sip, complete full mastication of solids, alternate bite/sip, allow for swallow in between trials, pace meal slowly, and remain upright ~1 hour post meals. \par 5. Ongoing oral care. \par 6. Trial swallowing therapy to maximize oropharyngeal swallow mechanism. \par 7. Consider GI consult given observed retention/retrograde. \par 8. Follow up with referring physician, as directed. \par \par History: This 51 year old male was seen this morning for a Modified Barium Swallow Study to: _x_ rule out aspiration; _x_ assess for diet texture change as appropriate; and/or _x_ explore positional strategies and/or compensatory techniques to eliminate penetration/aspiration. The physician ordered this procedure because he wants the patient to meet their nutrition/hydration needs by mouth without compromising respiratory status.    \par \par Patient was accompanied by group home staff member to [facility]. 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 Shunt evaluation done and normal as well.” Group home staff member reported swallowing difficulty occurred 3 months ago and was likely behavioral due to distaste of certain food. Staff member reported overall improvement noted. Staff member reported questionable history of recent pneumonia.  \par \par Current Nutritional Intake:  \par 1. solids: regular solids \par 2. 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) \par Epilepsy (345.90) (G40.909) \par Gait instability (781.2) (R26.81) \par Hydrocephalus (331.4) (G91.9) \par Hypertension, essential (401.9) (I10) \par LPRD (laryngopharyngeal reflux disease) (478.79) (K21.9) \par Memory loss (780.93) (R41.3) \par PIERCE (obstructive sleep apnea) (327.23) (G47.33) \par Paroxysmal SVT (supraventricular tachycardia) (427.0) (I47.1) \par S/P cervical spinal fusion (V45.4) (Z98.1) \par Spinal stenosis of cervical region (723.0) (M48.02) \par Tremor (781.0) (R25.1) \par \par Past Medical History \par History of glaucoma (V12.49) (Z86.69) \par History of hearing loss (V12.49) (Z86.69) \par History of scoliosis (V13.59) (Z87.39) \par History of Legally blind (369.4) (H54.8) \par \par ASSESSMENT     \par Patient is wheelchair bound and safely transferred to [facility] chair in lateral plane of view. Patient was awake/cooperative and agreeable to study. Patient on room air with adequate secretion management. Patient inconsistently followed low level directives. Patient’s vocal quality/intensity was WFL. Patient’s speech production was intelligible. Patient’s oral musculature was WFL. Patient denied pain pre and post study.  \par \par Consistencies Administered: \par 1. Liquids: moderately thick liquids, mildly thick liquids, thin liquids \par 2. Solids: puree, regular solids  \par \par SUMMARY & IMPRESSION  \par \par Preliminary Fluoroscopy reveals:  \par 1. Patient was unable to follow command for volitional swallow. \par 2. There was cervical hardware at level C4-C7, per radiologist report. \par 3. There was a device-appearing image throughout laryngopharyngeal space, per radiologist report, likely consistent with history of VNS. \par   \par Videofluoroscopic Evaluation Reveals:   \par \par 1. Functional oral phase when given thin liquids, mildly thick liquids, moderately thick liquids, puree, and regular solids marked by adequate retrieval and containment, timely mastication, adequate bolus cohesion, timely posterior transfer and oral transit, and adequate clearance post swallow. \par 2. Moderate pharyngeal dysphagia when given thin liquids marked by timely pharyngeal swallow trigger, reduced BOT retraction, reduced hyolaryngeal elevation/excursion, and incomplete epiglottic retroflexion. There was intermittent SILENT penetration during the swallow without complete retrieval, with residue in laryngeal vestibule migrating down to the level of the vocal folds. Patient was cued to cough, which resulted in partial clearance of barium from airway. Patient was unable to follow commands for purposes of compensatory strategies. There was trace BOT and valleculae residue and mild pyriform residue post swallow. There was suspected SILENT penetration post swallow, as when fluoro was resumed, there was increased barium observed in laryngeal vestibule. \par 3. Mild to moderate pharyngeal dysphagia when given mildly thick liquids marked by timely pharyngeal swallow trigger, reduced BOT retraction, reduced hyolaryngeal elevation/excursion, and incomplete epiglottic retroflexion. There was flash penetration during the swallow with complete retrieval. There was trace BOT and valleculae residue and mild pyriform residue post swallow.  \par 4. Mild pharyngeal dysphagia when given moderately thick liquids, puree, and regular solids marked by timely pharyngeal swallow trigger, reduced BOT retraction, mildly reduced hyolaryngeal elevation/excursion, and complete epiglottic retroflexion. There was no penetration and/or aspiration pre/during/post swallow. There was trace BOT, trace valleculae (which increased to mild with solids), and trace to mild pyriform residue post swallow. Patient was unable to follow command for volitional swallow in an attempt to reduce pharyngeal residue; however, suspect patient completed intermittent delayed/spontaneous reswallows, as there was partially reduced pharyngeal residue upon resuming fluoro. \par 5. Of note: there was mild retention at the level of the cervical esophagus post swallow with subsequent trace retrograde, per radiologist report. \par \par Aspiration - Penetration Scale: 3- thin liquids, 1- mildly thick liquids, moderately thick liquids, puree, and regular solids. \par \par Aspiration - Penetration Scale (Clintk et al Dysphagia 11:93-98 (April 1996), Aspiration-Penetration Scale)     \par 1. Material does not enter the airway     \par 2. Material enters the airway, remains above the vocal folds, and is ejected from the airway     \par 3. Material enters the airway, remains above the vocal folds, and is not ejected     \par 4. Material enters the airway, contacts the vocal folds, and is ejected from the airway     \par 5. Material enters the airway, contacts the vocal folds, and is not ejected from the airway     \par 6. Material enters the airway, passes below the vocal folds and is ejected into the larynx or out of the airway     \par 7. Material enters the airway, passes below the vocal folds, and is not ejected from the trachea despite effort     \par 8. Material enters the airway, passes below the vocal folds, and no effort is made to eject     \par \par     \par \par The above results and recommendations have been discussed in length with the patient and group home staff member, who verbalized understanding and are in agreement with plan of care. Should you have any additional concerns, please contact the Center at [phone].     \par \par    \par      \par \par Elana Quinonez M.S., CCC-SLP     \par Speech-Language Pathologist     \par [facility] [place].

## 2022-07-12 ENCOUNTER — RX RENEWAL (OUTPATIENT)
Age: 52
End: 2022-07-12

## 2022-08-31 PROBLEM — K21.9 LPRD (LARYNGOPHARYNGEAL REFLUX DISEASE): Status: ACTIVE | Noted: 2020-11-11

## 2022-08-31 PROBLEM — R62.50 DEVELOPMENTAL DELAY: Status: ACTIVE | Noted: 2021-01-21

## 2022-09-02 NOTE — REVIEW OF SYSTEMS
[Feeling Poorly] : not feeling poorly [Feeling Tired] : not feeling tired [Chest Pain] : no chest pain [Palpitations] : no palpitations [Shortness Of Breath] : no shortness of breath [SOB on Exertion] : shortness of breath during exertion [Abdominal Pain] : no abdominal pain [Vomiting] : no vomiting [Constipation] : no constipation [Diarrhea] : no diarrhea [Heartburn] : heartburn [Melena] : no melena [Negative] : Heme/Lymph

## 2022-09-02 NOTE — PHYSICAL EXAM
[General Appearance - Well Nourished] : well nourished [General Appearance - Well Developed] : well developed [Sclera] : the sclera and conjunctiva were normal [Outer Ear] : the ears and nose were normal in appearance [Neck Appearance] : the appearance of the neck was normal [Respiration, Rhythm And Depth] : normal respiratory rhythm and effort [Auscultation Breath Sounds / Voice Sounds] : lungs were clear to auscultation bilaterally [Heart Rate And Rhythm] : heart rate was normal and rhythm regular [FreeTextEntry1] : wheelchair [Skin Color & Pigmentation] : normal skin color and pigmentation [Sensation] : the sensory exam was normal to light touch and pinprick [Impaired Insight] : insight and judgment were intact [Affect] : the affect was normal [Mood] : the mood was normal

## 2022-09-02 NOTE — HISTORY OF PRESENT ILLNESS
[FreeTextEntry1] : Mr. AREVALO is a 52 year old male referred by Dr. Márquez who presents for consultation. His past medical history includes epilepsy secondary to premature birth, hydrocephalus, lives in group home, pneumonia, and aspiration. \par \par A modified barium swallow study was performed on 07/22/22. This revealed moderate pharyngeal dysphagia when given thin liquids. He is now tolerating thickened liquids. He is doing overall well and is able to communicate his needs and care requirements effectively and independently. \par \par \par \par

## 2022-09-02 NOTE — ASSESSMENT
[FreeTextEntry1] : Mr Ferrara reports to the office today accompanied by his formal caregiver. He is in a wheelchair and has some developmental delays but is able to participate in his care. After reviewing the imaging there are nodules present. We discussed that nodules can be infectious, inflammatory or malignant in nature. When evaluating suspicion we look at certain characteristics of nodules such as size, consistency, shape, and rate of growth. Mr Ferrara does have recurrent episodes of reflux and occasional dysphagia with meals. The nodules may represent an inflammatory reaction related to his regurgitation. I have recommended that he return to care in 6 months for surveillance.\par \par \par PLAN:\par - CT Scan of the chest in 6 months\par - Return to care after imaging\par \par \par \par \par \par Naeem SALES NP am scribing for and in the presence of Dr. Vann the following sections HISTORY OF PRESENT ILLNESS, PAST MEDICAL/FAMILY/SOCIAL HISTORY; REVIEW OF SYSTEMS; VITAL SIGNS; PHYSICAL EXAM; DISPOSITION.\par \par "I personally performed the services described in the documentation, reviewed the documentation recorded by the scribe in my presence and accurately and completely records my words and actions."\par

## 2022-09-19 NOTE — DISCUSSION/SUMMARY
[FreeTextEntry1] : 52 year old man with history developmental delay and epilepsy with shunt placement and VNS placement.\par \par Had long discussion about options.  \par \par The events in the hospital (shirt pulling, headache, etc) were not associated with seizure activity.  His behavioral episodes also unlikely 2/2 seizure activity.  As for headache, no change in CT and not severe.  Not complaining of pain at this time.\par \par Episodes of starting upward could be behavioral with negative EMU stay. Recent events \par \par Plan;\par Continue current AED regimen\par -reviewed seizure triggers\par - annual labwork including AED levels.\par - psych at [facility] for behavior\par - DEXA scan next visit (holding off because of COVID)\par - following with cardiology\par - memory evaluated by Dr. Allen with no change per caregiver\par - follow with local psychiatry and will have social work consultation.\par - follow with GI for aspiration / ? if behavioral component.\par \par Total time 40 min.\par \par \par  [Medically Refractory (seizure within the last year)] : Medically Refractory (seizure within the last year) [Inpatient video EEG study ordered with length of stay anticipated in days: _____] : Inpatient video EEG study ordered with length of stay anticipated in days: [unfilled] [Event capture (for localization, differential diagnosis) (typically 3-5 days)] : Event capture (for localization, differential diagnosis) (typically 3-5 days)

## 2022-09-19 NOTE — HISTORY OF PRESENT ILLNESS
[FreeTextEntry1] : **UPDATE:9/19/2022***\par Mr Andi Ferrara is here today for a scheduled follow up office visit and is accompanied by his aunt and caregiver He has been depressed of late (new home, uncle passed away last year). \par No seizures.\par \par **UPDATE:4/1/2022***\par Mr Andi Ferrara is here today for a scheduled follow up office visit and is accompanied by his aunt and caregiver\par Esophagitis and episodes of aspiration - swallow studies were normal. Shunt evaluation done and normal as well. He has been depressed of late (new home, uncle passed away last year). \par \par **UPDATE:2/17/2022***\par Mr Andi Ferrara is here today for a scheduled follow up office visit and is accompanied by his mother and caregiver\par Esophagalitis\par \par ***UPDATE:9/24/21***\par Mr Andi Ferrara is accompanied by his home care worker - Aunt not here today. Tried to reach out to her with no answer (? wrong number).\par EMU with no seizures - GI was consulted.\par Had surgery for his cervical stenosis and much improved.  Mild seizures shortly after surgery, but doing well.\par Per message from Aunt memory worse with weight gain.\par \par ***UPDATE:9/24/21***\par Mr Andi Ferrara is accompanied by his home care worker - Aunt not here today.\par EMU with no seizures - GI was consulted.\par Had surgery for his cervical stenosis and much improved.  Mild seizures shortly after surgery, but doing well.\par \par \par ***UPDATE:6/21/21***\par Mr Andi Ferrara is accompanied by his legal guardian Ms Melissa Guy\par Patient has been having LUQ pain for past few months.  Awaiting GI consultation at this point.\par Aunt notes has been having episode a few times per week with staring upwards for several minutes - no other seizure activity noted.\par Also, worsening of behavior overall.\par Had surgery for his cervical stenosis and much improved.  Mild seizures shortly after surgery, but doing well.\par \par ***UPDATE:11/24/20***\par Mr Andi Ferrara is accompanied by his legal guardian Ms Melissa Guy\par He is doing well overall.\par Had surgery for his cervical stenosis and much improved.  Mild seizures shortly after surgery, but doing well.\par \par ***UPDATE:7/31/20***\par Mr Andi Ferrara is accompanied by his legal guardian Ms Melissa Guy\par He has no reported interval seizures . He is doing well overall.\par \par Patient now with intermittent weakness when walking.  However, had bug bite to right ankle in the fall and has been walking less.  No issues bowel/bladder but mild ataxia.\par \par ***UPDATE:2/3/20***\par Mr Andi Ferrara is accompanied by his legal guardian Ms Melissa Guy\par He has no reported interval seizures . he had a fall on 1/25 but did not hit his head. It was noted that he had a bug bite from end of September on right ankle which is now swollen and red\par \par LTG 300mg BID\par Onfi 5mg BID\par Primidone 250mg BID\par \par Last office visit:\par  \par Possible small seizure a few weeks ago where was a little confused at bowling.  Had CT head which showed no change.\par \par On LTG 300mg bid and Primidone bid. Onfi 5mg bid.\par \par Seeing psych who started Lexapro 10 daily and Zyprexa\par \par Mr. 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  He comes accompanied by his aunt and uncle (legal guardians) and the .\par \par The seizures started shortly after birth with unclear details.  He was in the ICU for some time and found to have hydrocephalus.  At 16 months of age, his shunt was found to be malfunctioning and a new shunt was placed.  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  In 2013, the shut malfunctioned once again with further visual loss, now with only vision to light in the right eye and no vision in the left eye.\par \par The seizures at a younger age are unclear in semiology.  However, for the past several decades his typical seizures have been pulling at his clothes for seconds to minutes with confusion thereafter.  He gets confused with these events with behavioral arrest. \par the exact frequency is unknown (perhaps several times a month).  He does not have convulsions.\par \par In addition to the seizures above, he has been having behavioral outbursts at his home.  He gets mad at times or very tired at times.  It has been unclear if these are seizures.  Also episodes of head pain as well.  This has been followed by his psychiatrist.\par \par The patient has been tried on several AEDS (PHT, Vimpat, VPA, PHB, CBZ, LEV, Banzel, TPM) and has not been treated with these meds.  He is currently on Primidone 250 bidand Lamictal 400mg bid.  He was tried on Onfi 5mg bid in December 2015  and did well, but this was stopped because he ran out.  This was restarted in February 2016 at 5mg bid.\par \par The patient had a VNS placed several years ago at "maximum output" and has followed with Dr. Yuri Yanez in [facility].  It was stated that would be difficult to do any further surgery on him.\par \par CT scans in the past have shown right parietal encephalomalacia.  EEG studies have shown (AEEG and VEEG) bilateral temporal frontal discharges (R>L) with one seizure caught on the right in the past.\par \par He has been followed by Dr. Phillips and at [facility] in the past.  VEEG testing July 2016 showed no seizures and no changes to meds.  Also showed episodes of headache and pulling at shirt with no changes.  Still with headaches at this time with intermittent abdominal pain with no known etiology.\par \par Several seizures with ZNS increased to 100 bid earlier in the year.  Worsening of behavior with ZNS and no improvement.  We stopped ZNS and still with behavioral problems.\par \par VNS was shut off/\par \par PMHx as above\par FHx No seizures\par Meds/All: see below\par

## 2022-11-01 PROBLEM — M54.9 OTHER ACUTE BACK PAIN: Status: ACTIVE | Noted: 2022-01-01

## 2022-11-01 PROBLEM — M41.9 KYPHOSCOLIOSIS: Status: ACTIVE | Noted: 2022-01-01

## 2022-11-01 NOTE — HISTORY OF PRESENT ILLNESS
[Lower back] : lower back [de-identified] : No complaints of back pain. Had spinal stenosis decompression in the past.  [FreeTextEntry1] : SPINE

## 2022-11-01 NOTE — PHYSICAL EXAM
[Bilateral] : rib bilaterally [] : negative sitting straight leg raise [FreeTextEntry2] : kyphoscoliosis- kyphotic thoracic spine- 62 degrees, scoliotic curve 27 degrees

## 2022-12-08 PROBLEM — M85.80 OSTEOPENIA: Status: ACTIVE | Noted: 2022-01-01

## 2022-12-08 NOTE — PROCEDURE
[FreeTextEntry1] : 10 white male who has a past medical history of osteoporosis and osteopenia and who is currently on Prolia injection.  Patient presents for his routine follow-up and injection.  He was given his Prolia injection 60 mg over the right deltoid area.  Lot number is [id number] expiration date is July 2024.  The NDC number is 01907-835-43 patient tolerated the procedure well without any immediate complications.  Patient is recommended to follow-up in 6 months time for his regular Prolia injection and also will have a blood test prior to his office visit.  Thank you

## 2022-12-08 NOTE — PHYSICAL EXAM
[Alert] : alert [No Acute Distress] : no acute distress [Normal Sclera/Conjunctiva] : normal sclera/conjunctiva [PERRL] : pupils equal, round and reactive to light [Normal Outer Ear/Nose] : the ears and nose were normal in appearance [No Neck Mass] : no neck mass was observed [Thyroid Not Enlarged] : the thyroid was not enlarged [No Respiratory Distress] : no respiratory distress [Clear to Auscultation] : lungs were clear to auscultation bilaterally [Normal S1, S2] : normal S1 and S2 [Normal Rate] : heart rate was normal [Regular Rhythm] : with a regular rhythm [Carotids Normal] : carotid pulses were normal with no bruits [No Edema] : no peripheral edema [Not Tender] : non-tender [No Masses] : no abdominal mass palpated [Soft] : abdomen soft [No HSM] : no hepato-splenomegaly [No CVA Tenderness] : no ~M costovertebral angle tenderness [No Spinal Tenderness] : no spinal tenderness [Kyphosis] : kyphosis present [No Stigmata of Cushings Syndrome] : no stigmata of Cushings Syndrome [No Joint Swelling] : no joint swelling seen [No Rash] : no rash [No Skin Lesions] : no skin lesions [No Motor Deficits] : the motor exam was normal [No Tremors] : no tremors [de-identified] : Patient in wheelchair [de-identified] : Deferred [de-identified] : Obese [FreeTextEntry1] : Deferred [de-identified] : Deferred [de-identified] : Truncal obesity [de-identified] : Patient in wheelchair [de-identified] : Patient with i intellectual disability

## 2022-12-08 NOTE — HISTORY OF PRESENT ILLNESS
[FreeTextEntry1] : 52-year-old white male who has a past medical history of osteoporosis which was partially treated with the Fosamax.  Patient unable to tolerate it secondary to the diagnosis of acute esophagitis.  Patient has been started on Prolia and today he is here for his second routine injection.  As per the accompanying aide there are notes no new complaints except the fact that the patient recently diagnosed with prostate cancer and his therapy still pending.  Patient did not have any side effects from his previous shots 6 months ago.  There is no history of any falls or fractures fever chills skin rashes or diarrhea.  Patient recently was also started on Lexapro

## 2023-01-01 ENCOUNTER — INPATIENT (INPATIENT)
Facility: HOSPITAL | Age: 53
LOS: 10 days | Discharge: HOSPICE | DRG: 391 | End: 2023-04-10
Attending: HOSPITALIST | Admitting: INTERNAL MEDICINE
Payer: MEDICARE

## 2023-01-01 ENCOUNTER — TRANSCRIPTION ENCOUNTER (OUTPATIENT)
Age: 53
End: 2023-01-01

## 2023-01-01 ENCOUNTER — NON-APPOINTMENT (OUTPATIENT)
Age: 53
End: 2023-01-01

## 2023-01-01 ENCOUNTER — APPOINTMENT (OUTPATIENT)
Dept: NEUROLOGY | Facility: CLINIC | Age: 53
End: 2023-01-01
Payer: MEDICARE

## 2023-01-01 ENCOUNTER — RX RENEWAL (OUTPATIENT)
Age: 53
End: 2023-01-01

## 2023-01-01 ENCOUNTER — INPATIENT (INPATIENT)
Facility: HOSPITAL | Age: 53
LOS: 6 days | Discharge: HOME | DRG: 871 | End: 2023-02-24
Attending: FAMILY MEDICINE | Admitting: STUDENT IN AN ORGANIZED HEALTH CARE EDUCATION/TRAINING PROGRAM
Payer: MEDICARE

## 2023-01-01 ENCOUNTER — APPOINTMENT (OUTPATIENT)
Dept: ENDOCRINOLOGY | Facility: CLINIC | Age: 53
End: 2023-01-01

## 2023-01-01 ENCOUNTER — APPOINTMENT (OUTPATIENT)
Dept: THORACIC SURGERY | Facility: CLINIC | Age: 53
End: 2023-01-01
Payer: MEDICARE

## 2023-01-01 ENCOUNTER — APPOINTMENT (OUTPATIENT)
Dept: CARDIOLOGY | Facility: CLINIC | Age: 53
End: 2023-01-01

## 2023-01-01 VITALS
OXYGEN SATURATION: 92 % | DIASTOLIC BLOOD PRESSURE: 81 MMHG | HEART RATE: 90 BPM | SYSTOLIC BLOOD PRESSURE: 112 MMHG | RESPIRATION RATE: 18 BRPM | TEMPERATURE: 98 F

## 2023-01-01 VITALS
TEMPERATURE: 98 F | DIASTOLIC BLOOD PRESSURE: 107 MMHG | OXYGEN SATURATION: 94 % | HEART RATE: 77 BPM | WEIGHT: 149.91 LBS | RESPIRATION RATE: 18 BRPM | HEIGHT: 62 IN | SYSTOLIC BLOOD PRESSURE: 152 MMHG

## 2023-01-01 VITALS
TEMPERATURE: 98 F | DIASTOLIC BLOOD PRESSURE: 126 MMHG | HEART RATE: 117 BPM | OXYGEN SATURATION: 95 % | RESPIRATION RATE: 18 BRPM

## 2023-01-01 VITALS — WEIGHT: 199.96 LBS

## 2023-01-01 VITALS — DIASTOLIC BLOOD PRESSURE: 67 MMHG | SYSTOLIC BLOOD PRESSURE: 94 MMHG | HEART RATE: 67 BPM

## 2023-01-01 DIAGNOSIS — J69.0 PNEUMONITIS DUE TO INHALATION OF FOOD AND VOMIT: ICD-10-CM

## 2023-01-01 DIAGNOSIS — Z98.2 PRESENCE OF CEREBROSPINAL FLUID DRAINAGE DEVICE: Chronic | ICD-10-CM

## 2023-01-01 DIAGNOSIS — Z98.2 PRESENCE OF CEREBROSPINAL FLUID DRAINAGE DEVICE: ICD-10-CM

## 2023-01-01 DIAGNOSIS — E43 UNSPECIFIED SEVERE PROTEIN-CALORIE MALNUTRITION: ICD-10-CM

## 2023-01-01 DIAGNOSIS — R65.20 SEVERE SEPSIS WITHOUT SEPTIC SHOCK: ICD-10-CM

## 2023-01-01 DIAGNOSIS — G91.9 HYDROCEPHALUS, UNSPECIFIED: ICD-10-CM

## 2023-01-01 DIAGNOSIS — J96.02 ACUTE RESPIRATORY FAILURE WITH HYPERCAPNIA: ICD-10-CM

## 2023-01-01 DIAGNOSIS — Z20.822 CONTACT WITH AND (SUSPECTED) EXPOSURE TO COVID-19: ICD-10-CM

## 2023-01-01 DIAGNOSIS — J96.20 ACUTE AND CHRONIC RESPIRATORY FAILURE, UNSPECIFIED WHETHER WITH HYPOXIA OR HYPERCAPNIA: ICD-10-CM

## 2023-01-01 DIAGNOSIS — F70 MILD INTELLECTUAL DISABILITIES: ICD-10-CM

## 2023-01-01 DIAGNOSIS — G40.909 EPILEPSY, UNSPECIFIED, NOT INTRACTABLE, WITHOUT STATUS EPILEPTICUS: ICD-10-CM

## 2023-01-01 DIAGNOSIS — K21.9 GASTRO-ESOPHAGEAL REFLUX DISEASE WITHOUT ESOPHAGITIS: ICD-10-CM

## 2023-01-01 DIAGNOSIS — R13.10 DYSPHAGIA, UNSPECIFIED: ICD-10-CM

## 2023-01-01 DIAGNOSIS — Z96.22 MYRINGOTOMY TUBE(S) STATUS: Chronic | ICD-10-CM

## 2023-01-01 DIAGNOSIS — M81.0 AGE-RELATED OSTEOPOROSIS WITHOUT CURRENT PATHOLOGICAL FRACTURE: ICD-10-CM

## 2023-01-01 DIAGNOSIS — H40.9 UNSPECIFIED GLAUCOMA: ICD-10-CM

## 2023-01-01 DIAGNOSIS — J98.6 DISORDERS OF DIAPHRAGM: ICD-10-CM

## 2023-01-01 DIAGNOSIS — G93.41 METABOLIC ENCEPHALOPATHY: ICD-10-CM

## 2023-01-01 DIAGNOSIS — J96.01 ACUTE RESPIRATORY FAILURE WITH HYPOXIA: ICD-10-CM

## 2023-01-01 DIAGNOSIS — R68.0 HYPOTHERMIA, NOT ASSOCIATED WITH LOW ENVIRONMENTAL TEMPERATURE: ICD-10-CM

## 2023-01-01 DIAGNOSIS — Z88.6 ALLERGY STATUS TO ANALGESIC AGENT: ICD-10-CM

## 2023-01-01 DIAGNOSIS — A41.9 SEPSIS, UNSPECIFIED ORGANISM: ICD-10-CM

## 2023-01-01 DIAGNOSIS — J98.11 ATELECTASIS: ICD-10-CM

## 2023-01-01 DIAGNOSIS — K59.00 CONSTIPATION, UNSPECIFIED: ICD-10-CM

## 2023-01-01 DIAGNOSIS — J18.9 PNEUMONIA, UNSPECIFIED ORGANISM: ICD-10-CM

## 2023-01-01 DIAGNOSIS — E66.2 MORBID (SEVERE) OBESITY WITH ALVEOLAR HYPOVENTILATION: ICD-10-CM

## 2023-01-01 DIAGNOSIS — T85.09XA OTHER MECHANICAL COMPLICATION OF VENTRICULAR INTRACRANIAL (COMMUNICATING) SHUNT, INITIAL ENCOUNTER: Chronic | ICD-10-CM

## 2023-01-01 DIAGNOSIS — G47.33 OBSTRUCTIVE SLEEP APNEA (ADULT) (PEDIATRIC): ICD-10-CM

## 2023-01-01 DIAGNOSIS — J96.21 ACUTE AND CHRONIC RESPIRATORY FAILURE WITH HYPOXIA: ICD-10-CM

## 2023-01-01 DIAGNOSIS — K44.9 DIAPHRAGMATIC HERNIA WITHOUT OBSTRUCTION OR GANGRENE: ICD-10-CM

## 2023-01-01 DIAGNOSIS — N40.0 BENIGN PROSTATIC HYPERPLASIA WITHOUT LOWER URINARY TRACT SYMPTOMS: ICD-10-CM

## 2023-01-01 DIAGNOSIS — F63.9 IMPULSE DISORDER, UNSPECIFIED: ICD-10-CM

## 2023-01-01 DIAGNOSIS — H54.8 LEGAL BLINDNESS, AS DEFINED IN USA: ICD-10-CM

## 2023-01-01 DIAGNOSIS — Z66 DO NOT RESUSCITATE: ICD-10-CM

## 2023-01-01 LAB
ADD ON TEST-SPECIMEN IN LAB: SIGNIFICANT CHANGE UP
ALBUMIN SERPL ELPH-MCNC: 3 G/DL — LOW (ref 3.3–5)
ALBUMIN SERPL ELPH-MCNC: 3.1 G/DL — LOW (ref 3.3–5)
ALBUMIN SERPL ELPH-MCNC: 3.1 G/DL — LOW (ref 3.3–5)
ALBUMIN SERPL ELPH-MCNC: 3.2 G/DL — LOW (ref 3.3–5)
ALBUMIN SERPL ELPH-MCNC: 3.4 G/DL — SIGNIFICANT CHANGE UP (ref 3.3–5)
ALBUMIN SERPL ELPH-MCNC: 3.6 G/DL — SIGNIFICANT CHANGE UP (ref 3.3–5)
ALP SERPL-CCNC: 138 U/L — HIGH (ref 40–120)
ALP SERPL-CCNC: 154 U/L — HIGH (ref 40–120)
ALP SERPL-CCNC: 163 U/L — HIGH (ref 40–120)
ALP SERPL-CCNC: 164 U/L — HIGH (ref 40–120)
ALP SERPL-CCNC: 169 U/L — HIGH (ref 40–120)
ALP SERPL-CCNC: 186 U/L — HIGH (ref 40–120)
ALT FLD-CCNC: 23 U/L — SIGNIFICANT CHANGE UP (ref 12–78)
ALT FLD-CCNC: 23 U/L — SIGNIFICANT CHANGE UP (ref 12–78)
ALT FLD-CCNC: 25 U/L — SIGNIFICANT CHANGE UP (ref 12–78)
ALT FLD-CCNC: 26 U/L — SIGNIFICANT CHANGE UP (ref 12–78)
ALT FLD-CCNC: 29 U/L — SIGNIFICANT CHANGE UP (ref 12–78)
ALT FLD-CCNC: 46 U/L — SIGNIFICANT CHANGE UP (ref 12–78)
AMMONIA BLD-MCNC: 35 UMOL/L — HIGH (ref 11–32)
AMMONIA BLD-MCNC: 37 UMOL/L — HIGH (ref 11–32)
ANION GAP SERPL CALC-SCNC: 2 MMOL/L — LOW (ref 5–17)
ANION GAP SERPL CALC-SCNC: 3 MMOL/L — LOW (ref 5–17)
ANION GAP SERPL CALC-SCNC: 4 MMOL/L — LOW (ref 5–17)
ANION GAP SERPL CALC-SCNC: 4 MMOL/L — LOW (ref 5–17)
ANION GAP SERPL CALC-SCNC: 5 MMOL/L — SIGNIFICANT CHANGE UP (ref 5–17)
ANION GAP SERPL CALC-SCNC: 6 MMOL/L — SIGNIFICANT CHANGE UP (ref 5–17)
ANION GAP SERPL CALC-SCNC: 6 MMOL/L — SIGNIFICANT CHANGE UP (ref 5–17)
APPEARANCE UR: CLEAR — SIGNIFICANT CHANGE UP
APTT BLD: 29 SEC — SIGNIFICANT CHANGE UP (ref 27.5–35.5)
AST SERPL-CCNC: 17 U/L — SIGNIFICANT CHANGE UP (ref 15–37)
AST SERPL-CCNC: 17 U/L — SIGNIFICANT CHANGE UP (ref 15–37)
AST SERPL-CCNC: 18 U/L — SIGNIFICANT CHANGE UP (ref 15–37)
AST SERPL-CCNC: 19 U/L — SIGNIFICANT CHANGE UP (ref 15–37)
AST SERPL-CCNC: 19 U/L — SIGNIFICANT CHANGE UP (ref 15–37)
AST SERPL-CCNC: 31 U/L — SIGNIFICANT CHANGE UP (ref 15–37)
BASE EXCESS BLDA CALC-SCNC: 1.5 MMOL/L — SIGNIFICANT CHANGE UP (ref -2–3)
BASE EXCESS BLDV CALC-SCNC: 3 MMOL/L — SIGNIFICANT CHANGE UP
BASE EXCESS BLDV CALC-SCNC: 3.5 MMOL/L — SIGNIFICANT CHANGE UP (ref -2–3)
BASOPHILS # BLD AUTO: 0.03 K/UL — SIGNIFICANT CHANGE UP (ref 0–0.2)
BASOPHILS # BLD AUTO: 0.03 K/UL — SIGNIFICANT CHANGE UP (ref 0–0.2)
BASOPHILS # BLD AUTO: 0.04 K/UL — SIGNIFICANT CHANGE UP (ref 0–0.2)
BASOPHILS # BLD AUTO: 0.05 K/UL — SIGNIFICANT CHANGE UP (ref 0–0.2)
BASOPHILS NFR BLD AUTO: 0.4 % — SIGNIFICANT CHANGE UP (ref 0–2)
BASOPHILS NFR BLD AUTO: 0.5 % — SIGNIFICANT CHANGE UP (ref 0–2)
BASOPHILS NFR BLD AUTO: 0.6 % — SIGNIFICANT CHANGE UP (ref 0–2)
BASOPHILS NFR BLD AUTO: 0.7 % — SIGNIFICANT CHANGE UP (ref 0–2)
BILIRUB DIRECT SERPL-MCNC: <0.1 MG/DL — SIGNIFICANT CHANGE UP (ref 0–0.3)
BILIRUB INDIRECT FLD-MCNC: >0.3 MG/DL — SIGNIFICANT CHANGE UP (ref 0.2–1)
BILIRUB SERPL-MCNC: 0.2 MG/DL — SIGNIFICANT CHANGE UP (ref 0.2–1.2)
BILIRUB SERPL-MCNC: 0.3 MG/DL — SIGNIFICANT CHANGE UP (ref 0.2–1.2)
BILIRUB SERPL-MCNC: 0.3 MG/DL — SIGNIFICANT CHANGE UP (ref 0.2–1.2)
BILIRUB SERPL-MCNC: 0.4 MG/DL — SIGNIFICANT CHANGE UP (ref 0.2–1.2)
BILIRUB SERPL-MCNC: 0.5 MG/DL — SIGNIFICANT CHANGE UP (ref 0.2–1.2)
BILIRUB SERPL-MCNC: 0.5 MG/DL — SIGNIFICANT CHANGE UP (ref 0.2–1.2)
BILIRUB UR-MCNC: NEGATIVE — SIGNIFICANT CHANGE UP
BLOOD GAS COMMENTS ARTERIAL: SIGNIFICANT CHANGE UP
BUN SERPL-MCNC: 10 MG/DL — SIGNIFICANT CHANGE UP (ref 7–23)
BUN SERPL-MCNC: 10 MG/DL — SIGNIFICANT CHANGE UP (ref 7–23)
BUN SERPL-MCNC: 5 MG/DL — LOW (ref 7–23)
BUN SERPL-MCNC: 6 MG/DL — LOW (ref 7–23)
BUN SERPL-MCNC: 6 MG/DL — LOW (ref 7–23)
BUN SERPL-MCNC: 7 MG/DL — SIGNIFICANT CHANGE UP (ref 7–23)
BUN SERPL-MCNC: 8 MG/DL — SIGNIFICANT CHANGE UP (ref 7–23)
BUN SERPL-MCNC: 8 MG/DL — SIGNIFICANT CHANGE UP (ref 7–23)
BUN SERPL-MCNC: 9 MG/DL — SIGNIFICANT CHANGE UP (ref 7–23)
CALCIUM SERPL-MCNC: 7.8 MG/DL — LOW (ref 8.5–10.1)
CALCIUM SERPL-MCNC: 8.1 MG/DL — LOW (ref 8.5–10.1)
CALCIUM SERPL-MCNC: 8.1 MG/DL — LOW (ref 8.5–10.1)
CALCIUM SERPL-MCNC: 8.3 MG/DL — LOW (ref 8.5–10.1)
CALCIUM SERPL-MCNC: 8.3 MG/DL — LOW (ref 8.5–10.1)
CALCIUM SERPL-MCNC: 8.4 MG/DL — LOW (ref 8.5–10.1)
CALCIUM SERPL-MCNC: 8.5 MG/DL — SIGNIFICANT CHANGE UP (ref 8.5–10.1)
CALCIUM SERPL-MCNC: 8.6 MG/DL — SIGNIFICANT CHANGE UP (ref 8.5–10.1)
CALCIUM SERPL-MCNC: 8.6 MG/DL — SIGNIFICANT CHANGE UP (ref 8.5–10.1)
CALCIUM SERPL-MCNC: 8.9 MG/DL — SIGNIFICANT CHANGE UP (ref 8.5–10.1)
CHLORIDE SERPL-SCNC: 105 MMOL/L — SIGNIFICANT CHANGE UP (ref 96–108)
CHLORIDE SERPL-SCNC: 107 MMOL/L — SIGNIFICANT CHANGE UP (ref 96–108)
CHLORIDE SERPL-SCNC: 108 MMOL/L — SIGNIFICANT CHANGE UP (ref 96–108)
CHLORIDE SERPL-SCNC: 110 MMOL/L — HIGH (ref 96–108)
CHLORIDE SERPL-SCNC: 111 MMOL/L — HIGH (ref 96–108)
CHLORIDE SERPL-SCNC: 111 MMOL/L — HIGH (ref 96–108)
CO2 BLDA-SCNC: 29 MMOL/L — HIGH (ref 19–24)
CO2 BLDV-SCNC: 30 MMOL/L — HIGH (ref 22–26)
CO2 BLDV-SCNC: 34 MMOL/L — HIGH (ref 22–26)
CO2 SERPL-SCNC: 23 MMOL/L — SIGNIFICANT CHANGE UP (ref 22–31)
CO2 SERPL-SCNC: 24 MMOL/L — SIGNIFICANT CHANGE UP (ref 22–31)
CO2 SERPL-SCNC: 25 MMOL/L — SIGNIFICANT CHANGE UP (ref 22–31)
CO2 SERPL-SCNC: 26 MMOL/L — SIGNIFICANT CHANGE UP (ref 22–31)
CO2 SERPL-SCNC: 27 MMOL/L — SIGNIFICANT CHANGE UP (ref 22–31)
CO2 SERPL-SCNC: 28 MMOL/L — SIGNIFICANT CHANGE UP (ref 22–31)
CO2 SERPL-SCNC: 29 MMOL/L — SIGNIFICANT CHANGE UP (ref 22–31)
CO2 SERPL-SCNC: 32 MMOL/L — HIGH (ref 22–31)
COLOR SPEC: YELLOW — SIGNIFICANT CHANGE UP
CREAT SERPL-MCNC: 0.5 MG/DL — SIGNIFICANT CHANGE UP (ref 0.5–1.3)
CREAT SERPL-MCNC: 0.53 MG/DL — SIGNIFICANT CHANGE UP (ref 0.5–1.3)
CREAT SERPL-MCNC: 0.57 MG/DL — SIGNIFICANT CHANGE UP (ref 0.5–1.3)
CREAT SERPL-MCNC: 0.74 MG/DL — SIGNIFICANT CHANGE UP (ref 0.5–1.3)
CREAT SERPL-MCNC: 0.78 MG/DL — SIGNIFICANT CHANGE UP (ref 0.5–1.3)
CREAT SERPL-MCNC: 0.79 MG/DL — SIGNIFICANT CHANGE UP (ref 0.5–1.3)
CREAT SERPL-MCNC: 0.87 MG/DL — SIGNIFICANT CHANGE UP (ref 0.5–1.3)
CREAT SERPL-MCNC: 0.88 MG/DL — SIGNIFICANT CHANGE UP (ref 0.5–1.3)
CREAT SERPL-MCNC: 0.88 MG/DL — SIGNIFICANT CHANGE UP (ref 0.5–1.3)
CREAT SERPL-MCNC: 0.93 MG/DL — SIGNIFICANT CHANGE UP (ref 0.5–1.3)
CREAT SERPL-MCNC: 0.95 MG/DL — SIGNIFICANT CHANGE UP (ref 0.5–1.3)
CREAT SERPL-MCNC: 0.98 MG/DL — SIGNIFICANT CHANGE UP (ref 0.5–1.3)
CREAT SERPL-MCNC: 0.98 MG/DL — SIGNIFICANT CHANGE UP (ref 0.5–1.3)
CULTURE RESULTS: SIGNIFICANT CHANGE UP
D DIMER BLD IA.RAPID-MCNC: 222 NG/ML DDU — SIGNIFICANT CHANGE UP
DIFF PNL FLD: NEGATIVE — SIGNIFICANT CHANGE UP
EGFR: 103 ML/MIN/1.73M2 — SIGNIFICANT CHANGE UP
EGFR: 106 ML/MIN/1.73M2 — SIGNIFICANT CHANGE UP
EGFR: 107 ML/MIN/1.73M2 — SIGNIFICANT CHANGE UP
EGFR: 108 ML/MIN/1.73M2 — SIGNIFICANT CHANGE UP
EGFR: 117 ML/MIN/1.73M2 — SIGNIFICANT CHANGE UP
EGFR: 120 ML/MIN/1.73M2 — SIGNIFICANT CHANGE UP
EGFR: 122 ML/MIN/1.73M2 — SIGNIFICANT CHANGE UP
EGFR: 92 ML/MIN/1.73M2 — SIGNIFICANT CHANGE UP
EGFR: 92 ML/MIN/1.73M2 — SIGNIFICANT CHANGE UP
EGFR: 96 ML/MIN/1.73M2 — SIGNIFICANT CHANGE UP
EGFR: 98 ML/MIN/1.73M2 — SIGNIFICANT CHANGE UP
EOSINOPHIL # BLD AUTO: 0.38 K/UL — SIGNIFICANT CHANGE UP (ref 0–0.5)
EOSINOPHIL # BLD AUTO: 0.48 K/UL — SIGNIFICANT CHANGE UP (ref 0–0.5)
EOSINOPHIL # BLD AUTO: 0.5 K/UL — SIGNIFICANT CHANGE UP (ref 0–0.5)
EOSINOPHIL # BLD AUTO: 0.53 K/UL — HIGH (ref 0–0.5)
EOSINOPHIL NFR BLD AUTO: 5.4 % — SIGNIFICANT CHANGE UP (ref 0–6)
EOSINOPHIL NFR BLD AUTO: 6.4 % — HIGH (ref 0–6)
EOSINOPHIL NFR BLD AUTO: 6.9 % — HIGH (ref 0–6)
EOSINOPHIL NFR BLD AUTO: 9.2 % — HIGH (ref 0–6)
FLUAV AG NPH QL: SIGNIFICANT CHANGE UP
FLUBV AG NPH QL: SIGNIFICANT CHANGE UP
FOLATE SERPL-MCNC: >20 NG/ML — SIGNIFICANT CHANGE UP
GLUCOSE BLDC GLUCOMTR-MCNC: 94 MG/DL — SIGNIFICANT CHANGE UP (ref 70–99)
GLUCOSE SERPL-MCNC: 103 MG/DL — HIGH (ref 70–99)
GLUCOSE SERPL-MCNC: 110 MG/DL — HIGH (ref 70–99)
GLUCOSE SERPL-MCNC: 112 MG/DL — HIGH (ref 70–99)
GLUCOSE SERPL-MCNC: 113 MG/DL — HIGH (ref 70–99)
GLUCOSE SERPL-MCNC: 131 MG/DL — HIGH (ref 70–99)
GLUCOSE SERPL-MCNC: 139 MG/DL — HIGH (ref 70–99)
GLUCOSE SERPL-MCNC: 150 MG/DL — HIGH (ref 70–99)
GLUCOSE SERPL-MCNC: 87 MG/DL — SIGNIFICANT CHANGE UP (ref 70–99)
GLUCOSE SERPL-MCNC: 91 MG/DL — SIGNIFICANT CHANGE UP (ref 70–99)
GLUCOSE SERPL-MCNC: 92 MG/DL — SIGNIFICANT CHANGE UP (ref 70–99)
GLUCOSE SERPL-MCNC: 92 MG/DL — SIGNIFICANT CHANGE UP (ref 70–99)
GLUCOSE SERPL-MCNC: 94 MG/DL — SIGNIFICANT CHANGE UP (ref 70–99)
GLUCOSE SERPL-MCNC: 95 MG/DL — SIGNIFICANT CHANGE UP (ref 70–99)
GLUCOSE UR QL: NEGATIVE — SIGNIFICANT CHANGE UP
HCO3 BLDA-SCNC: 28 MMOL/L — SIGNIFICANT CHANGE UP (ref 21–28)
HCO3 BLDV-SCNC: 29 MMOL/L — SIGNIFICANT CHANGE UP (ref 22–29)
HCO3 BLDV-SCNC: 32 MMOL/L — HIGH (ref 22–29)
HCT VFR BLD CALC: 38.9 % — LOW (ref 39–50)
HCT VFR BLD CALC: 39.3 % — SIGNIFICANT CHANGE UP (ref 39–50)
HCT VFR BLD CALC: 39.6 % — SIGNIFICANT CHANGE UP (ref 39–50)
HCT VFR BLD CALC: 39.7 % — SIGNIFICANT CHANGE UP (ref 39–50)
HCT VFR BLD CALC: 39.9 % — SIGNIFICANT CHANGE UP (ref 39–50)
HCT VFR BLD CALC: 40.5 % — SIGNIFICANT CHANGE UP (ref 39–50)
HCT VFR BLD CALC: 40.6 % — SIGNIFICANT CHANGE UP (ref 39–50)
HCT VFR BLD CALC: 40.9 % — SIGNIFICANT CHANGE UP (ref 39–50)
HCT VFR BLD CALC: 41.5 % — SIGNIFICANT CHANGE UP (ref 39–50)
HCT VFR BLD CALC: 42.1 % — SIGNIFICANT CHANGE UP (ref 39–50)
HCT VFR BLD CALC: 44.8 % — SIGNIFICANT CHANGE UP (ref 39–50)
HGB BLD-MCNC: 12.6 G/DL — LOW (ref 13–17)
HGB BLD-MCNC: 12.7 G/DL — LOW (ref 13–17)
HGB BLD-MCNC: 13 G/DL — SIGNIFICANT CHANGE UP (ref 13–17)
HGB BLD-MCNC: 13.1 G/DL — SIGNIFICANT CHANGE UP (ref 13–17)
HGB BLD-MCNC: 13.2 G/DL — SIGNIFICANT CHANGE UP (ref 13–17)
HGB BLD-MCNC: 13.7 G/DL — SIGNIFICANT CHANGE UP (ref 13–17)
HGB BLD-MCNC: 14.8 G/DL — SIGNIFICANT CHANGE UP (ref 13–17)
IMM GRANULOCYTES NFR BLD AUTO: 0.3 % — SIGNIFICANT CHANGE UP (ref 0–0.9)
IMM GRANULOCYTES NFR BLD AUTO: 0.4 % — SIGNIFICANT CHANGE UP (ref 0–0.9)
IMM GRANULOCYTES NFR BLD AUTO: 0.5 % — SIGNIFICANT CHANGE UP (ref 0–0.9)
IMM GRANULOCYTES NFR BLD AUTO: 0.7 % — SIGNIFICANT CHANGE UP (ref 0–0.9)
INR BLD: 1.12 RATIO — SIGNIFICANT CHANGE UP (ref 0.88–1.16)
KETONES UR-MCNC: NEGATIVE — SIGNIFICANT CHANGE UP
LACTATE SERPL-SCNC: 0.7 MMOL/L — SIGNIFICANT CHANGE UP (ref 0.7–2)
LACTATE SERPL-SCNC: 1.4 MMOL/L — SIGNIFICANT CHANGE UP (ref 0.7–2)
LEUKOCYTE ESTERASE UR-ACNC: NEGATIVE — SIGNIFICANT CHANGE UP
LEVETIRACETAM SERPL-MCNC: 23.3 UG/ML — SIGNIFICANT CHANGE UP (ref 10–40)
LYMPHOCYTES # BLD AUTO: 0.76 K/UL — LOW (ref 1–3.3)
LYMPHOCYTES # BLD AUTO: 0.98 K/UL — LOW (ref 1–3.3)
LYMPHOCYTES # BLD AUTO: 1.05 K/UL — SIGNIFICANT CHANGE UP (ref 1–3.3)
LYMPHOCYTES # BLD AUTO: 1.17 K/UL — SIGNIFICANT CHANGE UP (ref 1–3.3)
LYMPHOCYTES # BLD AUTO: 16.2 % — SIGNIFICANT CHANGE UP (ref 13–44)
LYMPHOCYTES # BLD AUTO: 16.4 % — SIGNIFICANT CHANGE UP (ref 13–44)
LYMPHOCYTES # BLD AUTO: 18.1 % — SIGNIFICANT CHANGE UP (ref 13–44)
LYMPHOCYTES # BLD AUTO: 8.6 % — LOW (ref 13–44)
MAGNESIUM SERPL-MCNC: 2 MG/DL — SIGNIFICANT CHANGE UP (ref 1.6–2.6)
MAGNESIUM SERPL-MCNC: 2 MG/DL — SIGNIFICANT CHANGE UP (ref 1.6–2.6)
MAGNESIUM SERPL-MCNC: 2.1 MG/DL — SIGNIFICANT CHANGE UP (ref 1.6–2.6)
MAGNESIUM SERPL-MCNC: 2.2 MG/DL — SIGNIFICANT CHANGE UP (ref 1.6–2.6)
MAGNESIUM SERPL-MCNC: 2.3 MG/DL — SIGNIFICANT CHANGE UP (ref 1.6–2.6)
MCHC RBC-ENTMCNC: 30.8 PG — SIGNIFICANT CHANGE UP (ref 27–34)
MCHC RBC-ENTMCNC: 31.1 PG — SIGNIFICANT CHANGE UP (ref 27–34)
MCHC RBC-ENTMCNC: 31.1 PG — SIGNIFICANT CHANGE UP (ref 27–34)
MCHC RBC-ENTMCNC: 31.2 PG — SIGNIFICANT CHANGE UP (ref 27–34)
MCHC RBC-ENTMCNC: 31.2 PG — SIGNIFICANT CHANGE UP (ref 27–34)
MCHC RBC-ENTMCNC: 31.3 PG — SIGNIFICANT CHANGE UP (ref 27–34)
MCHC RBC-ENTMCNC: 31.4 PG — SIGNIFICANT CHANGE UP (ref 27–34)
MCHC RBC-ENTMCNC: 31.6 GM/DL — LOW (ref 32–36)
MCHC RBC-ENTMCNC: 31.6 PG — SIGNIFICANT CHANGE UP (ref 27–34)
MCHC RBC-ENTMCNC: 31.7 PG — SIGNIFICANT CHANGE UP (ref 27–34)
MCHC RBC-ENTMCNC: 31.8 GM/DL — LOW (ref 32–36)
MCHC RBC-ENTMCNC: 31.8 PG — SIGNIFICANT CHANGE UP (ref 27–34)
MCHC RBC-ENTMCNC: 31.9 PG — SIGNIFICANT CHANGE UP (ref 27–34)
MCHC RBC-ENTMCNC: 32 GM/DL — SIGNIFICANT CHANGE UP (ref 32–36)
MCHC RBC-ENTMCNC: 32 GM/DL — SIGNIFICANT CHANGE UP (ref 32–36)
MCHC RBC-ENTMCNC: 32.5 GM/DL — SIGNIFICANT CHANGE UP (ref 32–36)
MCHC RBC-ENTMCNC: 32.6 GM/DL — SIGNIFICANT CHANGE UP (ref 32–36)
MCHC RBC-ENTMCNC: 32.6 GM/DL — SIGNIFICANT CHANGE UP (ref 32–36)
MCHC RBC-ENTMCNC: 33 GM/DL — SIGNIFICANT CHANGE UP (ref 32–36)
MCHC RBC-ENTMCNC: 33.1 GM/DL — SIGNIFICANT CHANGE UP (ref 32–36)
MCV RBC AUTO: 95.1 FL — SIGNIFICANT CHANGE UP (ref 80–100)
MCV RBC AUTO: 95.9 FL — SIGNIFICANT CHANGE UP (ref 80–100)
MCV RBC AUTO: 95.9 FL — SIGNIFICANT CHANGE UP (ref 80–100)
MCV RBC AUTO: 97.1 FL — SIGNIFICANT CHANGE UP (ref 80–100)
MCV RBC AUTO: 97.3 FL — SIGNIFICANT CHANGE UP (ref 80–100)
MCV RBC AUTO: 97.4 FL — SIGNIFICANT CHANGE UP (ref 80–100)
MCV RBC AUTO: 97.8 FL — SIGNIFICANT CHANGE UP (ref 80–100)
MCV RBC AUTO: 97.8 FL — SIGNIFICANT CHANGE UP (ref 80–100)
MCV RBC AUTO: 98 FL — SIGNIFICANT CHANGE UP (ref 80–100)
MCV RBC AUTO: 98.3 FL — SIGNIFICANT CHANGE UP (ref 80–100)
MCV RBC AUTO: 98.5 FL — SIGNIFICANT CHANGE UP (ref 80–100)
MONOCYTES # BLD AUTO: 0.77 K/UL — SIGNIFICANT CHANGE UP (ref 0–0.9)
MONOCYTES # BLD AUTO: 0.79 K/UL — SIGNIFICANT CHANGE UP (ref 0–0.9)
MONOCYTES # BLD AUTO: 0.86 K/UL — SIGNIFICANT CHANGE UP (ref 0–0.9)
MONOCYTES # BLD AUTO: 1.16 K/UL — HIGH (ref 0–0.9)
MONOCYTES NFR BLD AUTO: 11.9 % — SIGNIFICANT CHANGE UP (ref 2–14)
MONOCYTES NFR BLD AUTO: 13.1 % — SIGNIFICANT CHANGE UP (ref 2–14)
MONOCYTES NFR BLD AUTO: 13.3 % — SIGNIFICANT CHANGE UP (ref 2–14)
MONOCYTES NFR BLD AUTO: 13.3 % — SIGNIFICANT CHANGE UP (ref 2–14)
NEUTROPHILS # BLD AUTO: 3.36 K/UL — SIGNIFICANT CHANGE UP (ref 1.8–7.4)
NEUTROPHILS # BLD AUTO: 3.75 K/UL — SIGNIFICANT CHANGE UP (ref 1.8–7.4)
NEUTROPHILS # BLD AUTO: 4.63 K/UL — SIGNIFICANT CHANGE UP (ref 1.8–7.4)
NEUTROPHILS # BLD AUTO: 6.38 K/UL — SIGNIFICANT CHANGE UP (ref 1.8–7.4)
NEUTROPHILS NFR BLD AUTO: 58 % — SIGNIFICANT CHANGE UP (ref 43–77)
NEUTROPHILS NFR BLD AUTO: 62.9 % — SIGNIFICANT CHANGE UP (ref 43–77)
NEUTROPHILS NFR BLD AUTO: 64.2 % — SIGNIFICANT CHANGE UP (ref 43–77)
NEUTROPHILS NFR BLD AUTO: 72 % — SIGNIFICANT CHANGE UP (ref 43–77)
NITRITE UR-MCNC: NEGATIVE — SIGNIFICANT CHANGE UP
NT-PROBNP SERPL-SCNC: 197 PG/ML — HIGH (ref 0–125)
NT-PROBNP SERPL-SCNC: 704 PG/ML — HIGH (ref 0–125)
PCO2 BLDA: 49 MMHG — HIGH (ref 35–48)
PCO2 BLDV: 49 MMHG — SIGNIFICANT CHANGE UP (ref 42–55)
PCO2 BLDV: 69 MMHG — HIGH (ref 42–55)
PH BLDA: 7.36 — SIGNIFICANT CHANGE UP (ref 7.35–7.45)
PH BLDV: 7.28 — LOW (ref 7.32–7.43)
PH BLDV: 7.38 — SIGNIFICANT CHANGE UP (ref 7.32–7.43)
PH UR: 7 — SIGNIFICANT CHANGE UP (ref 5–8)
PHENOBARB SERPL-MCNC: 22.8 UG/ML — SIGNIFICANT CHANGE UP (ref 15–40)
PHOSPHATE SERPL-MCNC: 1.3 MG/DL — LOW (ref 2.5–4.5)
PHOSPHATE SERPL-MCNC: 1.8 MG/DL — LOW (ref 2.5–4.5)
PHOSPHATE SERPL-MCNC: 2.1 MG/DL — LOW (ref 2.5–4.5)
PHOSPHATE SERPL-MCNC: 2.2 MG/DL — LOW (ref 2.5–4.5)
PHOSPHATE SERPL-MCNC: 2.5 MG/DL — SIGNIFICANT CHANGE UP (ref 2.5–4.5)
PHOSPHATE SERPL-MCNC: 2.6 MG/DL — SIGNIFICANT CHANGE UP (ref 2.5–4.5)
PHOSPHATE SERPL-MCNC: 4.7 MG/DL — HIGH (ref 2.5–4.5)
PLATELET # BLD AUTO: 195 K/UL — SIGNIFICANT CHANGE UP (ref 150–400)
PLATELET # BLD AUTO: 203 K/UL — SIGNIFICANT CHANGE UP (ref 150–400)
PLATELET # BLD AUTO: 219 K/UL — SIGNIFICANT CHANGE UP (ref 150–400)
PLATELET # BLD AUTO: 221 K/UL — SIGNIFICANT CHANGE UP (ref 150–400)
PLATELET # BLD AUTO: 240 K/UL — SIGNIFICANT CHANGE UP (ref 150–400)
PLATELET # BLD AUTO: 242 K/UL — SIGNIFICANT CHANGE UP (ref 150–400)
PLATELET # BLD AUTO: 245 K/UL — SIGNIFICANT CHANGE UP (ref 150–400)
PLATELET # BLD AUTO: 262 K/UL — SIGNIFICANT CHANGE UP (ref 150–400)
PLATELET # BLD AUTO: 265 K/UL — SIGNIFICANT CHANGE UP (ref 150–400)
PO2 BLDA: 90 MMHG — SIGNIFICANT CHANGE UP (ref 83–108)
PO2 BLDV: 190 MMHG — SIGNIFICANT CHANGE UP (ref 25–45)
PO2 BLDV: 193 MMHG — SIGNIFICANT CHANGE UP
POTASSIUM SERPL-MCNC: 3 MMOL/L — LOW (ref 3.5–5.3)
POTASSIUM SERPL-MCNC: 3.3 MMOL/L — LOW (ref 3.5–5.3)
POTASSIUM SERPL-MCNC: 3.4 MMOL/L — LOW (ref 3.5–5.3)
POTASSIUM SERPL-MCNC: 3.4 MMOL/L — LOW (ref 3.5–5.3)
POTASSIUM SERPL-MCNC: 3.5 MMOL/L — SIGNIFICANT CHANGE UP (ref 3.5–5.3)
POTASSIUM SERPL-MCNC: 3.5 MMOL/L — SIGNIFICANT CHANGE UP (ref 3.5–5.3)
POTASSIUM SERPL-MCNC: 3.6 MMOL/L — SIGNIFICANT CHANGE UP (ref 3.5–5.3)
POTASSIUM SERPL-MCNC: 3.6 MMOL/L — SIGNIFICANT CHANGE UP (ref 3.5–5.3)
POTASSIUM SERPL-MCNC: 3.7 MMOL/L — SIGNIFICANT CHANGE UP (ref 3.5–5.3)
POTASSIUM SERPL-MCNC: 4.2 MMOL/L — SIGNIFICANT CHANGE UP (ref 3.5–5.3)
POTASSIUM SERPL-MCNC: 4.3 MMOL/L — SIGNIFICANT CHANGE UP (ref 3.5–5.3)
POTASSIUM SERPL-MCNC: 4.3 MMOL/L — SIGNIFICANT CHANGE UP (ref 3.5–5.3)
POTASSIUM SERPL-MCNC: 4.7 MMOL/L — SIGNIFICANT CHANGE UP (ref 3.5–5.3)
POTASSIUM SERPL-SCNC: 3 MMOL/L — LOW (ref 3.5–5.3)
POTASSIUM SERPL-SCNC: 3.3 MMOL/L — LOW (ref 3.5–5.3)
POTASSIUM SERPL-SCNC: 3.4 MMOL/L — LOW (ref 3.5–5.3)
POTASSIUM SERPL-SCNC: 3.4 MMOL/L — LOW (ref 3.5–5.3)
POTASSIUM SERPL-SCNC: 3.5 MMOL/L — SIGNIFICANT CHANGE UP (ref 3.5–5.3)
POTASSIUM SERPL-SCNC: 3.5 MMOL/L — SIGNIFICANT CHANGE UP (ref 3.5–5.3)
POTASSIUM SERPL-SCNC: 3.6 MMOL/L — SIGNIFICANT CHANGE UP (ref 3.5–5.3)
POTASSIUM SERPL-SCNC: 3.6 MMOL/L — SIGNIFICANT CHANGE UP (ref 3.5–5.3)
POTASSIUM SERPL-SCNC: 3.7 MMOL/L — SIGNIFICANT CHANGE UP (ref 3.5–5.3)
POTASSIUM SERPL-SCNC: 4.2 MMOL/L — SIGNIFICANT CHANGE UP (ref 3.5–5.3)
POTASSIUM SERPL-SCNC: 4.3 MMOL/L — SIGNIFICANT CHANGE UP (ref 3.5–5.3)
POTASSIUM SERPL-SCNC: 4.3 MMOL/L — SIGNIFICANT CHANGE UP (ref 3.5–5.3)
POTASSIUM SERPL-SCNC: 4.7 MMOL/L — SIGNIFICANT CHANGE UP (ref 3.5–5.3)
PROT SERPL-MCNC: 7 GM/DL — SIGNIFICANT CHANGE UP (ref 6–8.3)
PROT SERPL-MCNC: 7 GM/DL — SIGNIFICANT CHANGE UP (ref 6–8.3)
PROT SERPL-MCNC: 7.1 GM/DL — SIGNIFICANT CHANGE UP (ref 6–8.3)
PROT SERPL-MCNC: 7.6 GM/DL — SIGNIFICANT CHANGE UP (ref 6–8.3)
PROT SERPL-MCNC: 7.6 GM/DL — SIGNIFICANT CHANGE UP (ref 6–8.3)
PROT SERPL-MCNC: 7.9 GM/DL — SIGNIFICANT CHANGE UP (ref 6–8.3)
PROT UR-MCNC: NEGATIVE — SIGNIFICANT CHANGE UP
PROTHROM AB SERPL-ACNC: 13 SEC — SIGNIFICANT CHANGE UP (ref 10.5–13.4)
RAPID RVP RESULT: SIGNIFICANT CHANGE UP
RBC # BLD: 4 M/UL — LOW (ref 4.2–5.8)
RBC # BLD: 4.04 M/UL — LOW (ref 4.2–5.8)
RBC # BLD: 4.08 M/UL — LOW (ref 4.2–5.8)
RBC # BLD: 4.09 M/UL — LOW (ref 4.2–5.8)
RBC # BLD: 4.1 M/UL — LOW (ref 4.2–5.8)
RBC # BLD: 4.12 M/UL — LOW (ref 4.2–5.8)
RBC # BLD: 4.14 M/UL — LOW (ref 4.2–5.8)
RBC # BLD: 4.16 M/UL — LOW (ref 4.2–5.8)
RBC # BLD: 4.26 M/UL — SIGNIFICANT CHANGE UP (ref 4.2–5.8)
RBC # BLD: 4.39 M/UL — SIGNIFICANT CHANGE UP (ref 4.2–5.8)
RBC # BLD: 4.71 M/UL — SIGNIFICANT CHANGE UP (ref 4.2–5.8)
RBC # FLD: 13.2 % — SIGNIFICANT CHANGE UP (ref 10.3–14.5)
RBC # FLD: 13.4 % — SIGNIFICANT CHANGE UP (ref 10.3–14.5)
RBC # FLD: 13.4 % — SIGNIFICANT CHANGE UP (ref 10.3–14.5)
RBC # FLD: 13.7 % — SIGNIFICANT CHANGE UP (ref 10.3–14.5)
RBC # FLD: 13.9 % — SIGNIFICANT CHANGE UP (ref 10.3–14.5)
RBC # FLD: 14 % — SIGNIFICANT CHANGE UP (ref 10.3–14.5)
RBC # FLD: 14 % — SIGNIFICANT CHANGE UP (ref 10.3–14.5)
RBC # FLD: 14.2 % — SIGNIFICANT CHANGE UP (ref 10.3–14.5)
RBC # FLD: 14.3 % — SIGNIFICANT CHANGE UP (ref 10.3–14.5)
RBC # FLD: 14.3 % — SIGNIFICANT CHANGE UP (ref 10.3–14.5)
RBC # FLD: 14.4 % — SIGNIFICANT CHANGE UP (ref 10.3–14.5)
RSV RNA NPH QL NAA+NON-PROBE: SIGNIFICANT CHANGE UP
SAO2 % BLDA: 97 % — SIGNIFICANT CHANGE UP (ref 94–98)
SAO2 % BLDV: 98.5 % — SIGNIFICANT CHANGE UP
SAO2 % BLDV: 99 % — SIGNIFICANT CHANGE UP (ref 67–88)
SARS-COV-2 RNA SPEC QL NAA+PROBE: SIGNIFICANT CHANGE UP
SODIUM SERPL-SCNC: 137 MMOL/L — SIGNIFICANT CHANGE UP (ref 135–145)
SODIUM SERPL-SCNC: 138 MMOL/L — SIGNIFICANT CHANGE UP (ref 135–145)
SODIUM SERPL-SCNC: 138 MMOL/L — SIGNIFICANT CHANGE UP (ref 135–145)
SODIUM SERPL-SCNC: 139 MMOL/L — SIGNIFICANT CHANGE UP (ref 135–145)
SODIUM SERPL-SCNC: 139 MMOL/L — SIGNIFICANT CHANGE UP (ref 135–145)
SODIUM SERPL-SCNC: 140 MMOL/L — SIGNIFICANT CHANGE UP (ref 135–145)
SODIUM SERPL-SCNC: 141 MMOL/L — SIGNIFICANT CHANGE UP (ref 135–145)
SODIUM SERPL-SCNC: 141 MMOL/L — SIGNIFICANT CHANGE UP (ref 135–145)
SODIUM SERPL-SCNC: 142 MMOL/L — SIGNIFICANT CHANGE UP (ref 135–145)
SODIUM SERPL-SCNC: 142 MMOL/L — SIGNIFICANT CHANGE UP (ref 135–145)
SODIUM SERPL-SCNC: 143 MMOL/L — SIGNIFICANT CHANGE UP (ref 135–145)
SP GR SPEC: 1 — LOW (ref 1.01–1.02)
SPECIMEN SOURCE: SIGNIFICANT CHANGE UP
TRIGL SERPL-MCNC: 127 MG/DL — SIGNIFICANT CHANGE UP
TROPONIN I, HIGH SENSITIVITY RESULT: 4.83 NG/L — SIGNIFICANT CHANGE UP
TSH SERPL-MCNC: 0.75 UU/ML — SIGNIFICANT CHANGE UP (ref 0.34–4.82)
UROBILINOGEN FLD QL: NEGATIVE — SIGNIFICANT CHANGE UP
VIT B12 SERPL-MCNC: 1086 PG/ML — SIGNIFICANT CHANGE UP (ref 232–1245)
WBC # BLD: 5.46 K/UL — SIGNIFICANT CHANGE UP (ref 3.8–10.5)
WBC # BLD: 5.49 K/UL — SIGNIFICANT CHANGE UP (ref 3.8–10.5)
WBC # BLD: 5.79 K/UL — SIGNIFICANT CHANGE UP (ref 3.8–10.5)
WBC # BLD: 5.96 K/UL — SIGNIFICANT CHANGE UP (ref 3.8–10.5)
WBC # BLD: 6.09 K/UL — SIGNIFICANT CHANGE UP (ref 3.8–10.5)
WBC # BLD: 6.11 K/UL — SIGNIFICANT CHANGE UP (ref 3.8–10.5)
WBC # BLD: 6.23 K/UL — SIGNIFICANT CHANGE UP (ref 3.8–10.5)
WBC # BLD: 6.27 K/UL — SIGNIFICANT CHANGE UP (ref 3.8–10.5)
WBC # BLD: 6.89 K/UL — SIGNIFICANT CHANGE UP (ref 3.8–10.5)
WBC # BLD: 7.22 K/UL — SIGNIFICANT CHANGE UP (ref 3.8–10.5)
WBC # BLD: 8.86 K/UL — SIGNIFICANT CHANGE UP (ref 3.8–10.5)
WBC # FLD AUTO: 5.46 K/UL — SIGNIFICANT CHANGE UP (ref 3.8–10.5)
WBC # FLD AUTO: 5.49 K/UL — SIGNIFICANT CHANGE UP (ref 3.8–10.5)
WBC # FLD AUTO: 5.79 K/UL — SIGNIFICANT CHANGE UP (ref 3.8–10.5)
WBC # FLD AUTO: 5.96 K/UL — SIGNIFICANT CHANGE UP (ref 3.8–10.5)
WBC # FLD AUTO: 6.09 K/UL — SIGNIFICANT CHANGE UP (ref 3.8–10.5)
WBC # FLD AUTO: 6.11 K/UL — SIGNIFICANT CHANGE UP (ref 3.8–10.5)
WBC # FLD AUTO: 6.23 K/UL — SIGNIFICANT CHANGE UP (ref 3.8–10.5)
WBC # FLD AUTO: 6.27 K/UL — SIGNIFICANT CHANGE UP (ref 3.8–10.5)
WBC # FLD AUTO: 6.89 K/UL — SIGNIFICANT CHANGE UP (ref 3.8–10.5)
WBC # FLD AUTO: 7.22 K/UL — SIGNIFICANT CHANGE UP (ref 3.8–10.5)
WBC # FLD AUTO: 8.86 K/UL — SIGNIFICANT CHANGE UP (ref 3.8–10.5)

## 2023-01-01 PROCEDURE — 99233 SBSQ HOSP IP/OBS HIGH 50: CPT

## 2023-01-01 PROCEDURE — 99223 1ST HOSP IP/OBS HIGH 75: CPT

## 2023-01-01 PROCEDURE — 99239 HOSP IP/OBS DSCHRG MGMT >30: CPT

## 2023-01-01 PROCEDURE — 84100 ASSAY OF PHOSPHORUS: CPT

## 2023-01-01 PROCEDURE — 99232 SBSQ HOSP IP/OBS MODERATE 35: CPT

## 2023-01-01 PROCEDURE — 99213 OFFICE O/P EST LOW 20 MIN: CPT

## 2023-01-01 PROCEDURE — 71250 CT THORAX DX C-: CPT | Mod: 26

## 2023-01-01 PROCEDURE — 83735 ASSAY OF MAGNESIUM: CPT

## 2023-01-01 PROCEDURE — 87086 URINE CULTURE/COLONY COUNT: CPT

## 2023-01-01 PROCEDURE — 71045 X-RAY EXAM CHEST 1 VIEW: CPT | Mod: 26

## 2023-01-01 PROCEDURE — 97163 PT EVAL HIGH COMPLEX 45 MIN: CPT | Mod: GP

## 2023-01-01 PROCEDURE — 99285 EMERGENCY DEPT VISIT HI MDM: CPT

## 2023-01-01 PROCEDURE — 80048 BASIC METABOLIC PNL TOTAL CA: CPT

## 2023-01-01 PROCEDURE — 93306 TTE W/DOPPLER COMPLETE: CPT | Mod: 26

## 2023-01-01 PROCEDURE — 99215 OFFICE O/P EST HI 40 MIN: CPT

## 2023-01-01 PROCEDURE — 80177 DRUG SCRN QUAN LEVETIRACETAM: CPT

## 2023-01-01 PROCEDURE — 74018 RADEX ABDOMEN 1 VIEW: CPT | Mod: 26

## 2023-01-01 PROCEDURE — 87635 SARS-COV-2 COVID-19 AMP PRB: CPT

## 2023-01-01 PROCEDURE — 95816 EEG AWAKE AND DROWSY: CPT

## 2023-01-01 PROCEDURE — 82140 ASSAY OF AMMONIA: CPT

## 2023-01-01 PROCEDURE — 81003 URINALYSIS AUTO W/O SCOPE: CPT

## 2023-01-01 PROCEDURE — 70496 CT ANGIOGRAPHY HEAD: CPT | Mod: 26,MA

## 2023-01-01 PROCEDURE — 99291 CRITICAL CARE FIRST HOUR: CPT

## 2023-01-01 PROCEDURE — 92610 EVALUATE SWALLOWING FUNCTION: CPT | Mod: GN

## 2023-01-01 PROCEDURE — 99497 ADVNCD CARE PLAN 30 MIN: CPT

## 2023-01-01 PROCEDURE — 95720 EEG PHY/QHP EA INCR W/VEEG: CPT

## 2023-01-01 PROCEDURE — 85027 COMPLETE CBC AUTOMATED: CPT

## 2023-01-01 PROCEDURE — 93010 ELECTROCARDIOGRAM REPORT: CPT

## 2023-01-01 PROCEDURE — 92523 SPEECH SOUND LANG COMPREHEN: CPT | Mod: GN

## 2023-01-01 PROCEDURE — 70498 CT ANGIOGRAPHY NECK: CPT | Mod: 26,MA

## 2023-01-01 PROCEDURE — 97110 THERAPEUTIC EXERCISES: CPT | Mod: GP

## 2023-01-01 PROCEDURE — 70450 CT HEAD/BRAIN W/O DYE: CPT | Mod: 26

## 2023-01-01 PROCEDURE — 82803 BLOOD GASES ANY COMBINATION: CPT

## 2023-01-01 PROCEDURE — 80184 ASSAY OF PHENOBARBITAL: CPT

## 2023-01-01 PROCEDURE — 82607 VITAMIN B-12: CPT

## 2023-01-01 PROCEDURE — 83605 ASSAY OF LACTIC ACID: CPT

## 2023-01-01 PROCEDURE — 0042T: CPT | Mod: MA

## 2023-01-01 PROCEDURE — C9113: CPT

## 2023-01-01 PROCEDURE — 71045 X-RAY EXAM CHEST 1 VIEW: CPT

## 2023-01-01 PROCEDURE — 99222 1ST HOSP IP/OBS MODERATE 55: CPT

## 2023-01-01 PROCEDURE — 94640 AIRWAY INHALATION TREATMENT: CPT

## 2023-01-01 PROCEDURE — 74176 CT ABD & PELVIS W/O CONTRAST: CPT

## 2023-01-01 PROCEDURE — 94668 MNPJ CHEST WALL SBSQ: CPT

## 2023-01-01 PROCEDURE — 80053 COMPREHEN METABOLIC PANEL: CPT

## 2023-01-01 PROCEDURE — 95816 EEG AWAKE AND DROWSY: CPT | Mod: 26

## 2023-01-01 PROCEDURE — 95714 VEEG EA 12-26 HR UNMNTR: CPT

## 2023-01-01 PROCEDURE — 74018 RADEX ABDOMEN 1 VIEW: CPT

## 2023-01-01 PROCEDURE — 85379 FIBRIN DEGRADATION QUANT: CPT

## 2023-01-01 PROCEDURE — 80076 HEPATIC FUNCTION PANEL: CPT

## 2023-01-01 PROCEDURE — 12345: CPT | Mod: NC

## 2023-01-01 PROCEDURE — 84478 ASSAY OF TRIGLYCERIDES: CPT

## 2023-01-01 PROCEDURE — 93970 EXTREMITY STUDY: CPT | Mod: 26

## 2023-01-01 PROCEDURE — 82746 ASSAY OF FOLIC ACID SERUM: CPT

## 2023-01-01 PROCEDURE — 84443 ASSAY THYROID STIM HORMONE: CPT

## 2023-01-01 PROCEDURE — 85025 COMPLETE CBC W/AUTO DIFF WBC: CPT

## 2023-01-01 PROCEDURE — 70450 CT HEAD/BRAIN W/O DYE: CPT

## 2023-01-01 PROCEDURE — 99221 1ST HOSP IP/OBS SF/LOW 40: CPT

## 2023-01-01 PROCEDURE — 93970 EXTREMITY STUDY: CPT

## 2023-01-01 PROCEDURE — 70250 X-RAY EXAM OF SKULL: CPT | Mod: 26

## 2023-01-01 PROCEDURE — 92526 ORAL FUNCTION THERAPY: CPT | Mod: GN

## 2023-01-01 PROCEDURE — 97530 THERAPEUTIC ACTIVITIES: CPT | Mod: GP

## 2023-01-01 PROCEDURE — 97535 SELF CARE MNGMENT TRAINING: CPT | Mod: GP

## 2023-01-01 PROCEDURE — 99497 ADVNCD CARE PLAN 30 MIN: CPT | Mod: 25

## 2023-01-01 PROCEDURE — 97116 GAIT TRAINING THERAPY: CPT | Mod: GP

## 2023-01-01 PROCEDURE — 71045 X-RAY EXAM CHEST 1 VIEW: CPT | Mod: 26,77

## 2023-01-01 PROCEDURE — 36415 COLL VENOUS BLD VENIPUNCTURE: CPT

## 2023-01-01 PROCEDURE — 36600 WITHDRAWAL OF ARTERIAL BLOOD: CPT

## 2023-01-01 PROCEDURE — 95700 EEG CONT REC W/VID EEG TECH: CPT

## 2023-01-01 PROCEDURE — 71250 CT THORAX DX C-: CPT

## 2023-01-01 PROCEDURE — 74176 CT ABD & PELVIS W/O CONTRAST: CPT | Mod: 26

## 2023-01-01 PROCEDURE — 93306 TTE W/DOPPLER COMPLETE: CPT

## 2023-01-01 PROCEDURE — 83880 ASSAY OF NATRIURETIC PEPTIDE: CPT

## 2023-01-01 RX ORDER — LACTULOSE 10 G/15ML
15 SOLUTION ORAL
Qty: 0 | Refills: 0 | DISCHARGE

## 2023-01-01 RX ORDER — PANTOPRAZOLE SODIUM 20 MG/1
40 TABLET, DELAYED RELEASE ORAL
Refills: 0 | Status: DISCONTINUED | OUTPATIENT
Start: 2023-01-01 | End: 2023-01-01

## 2023-01-01 RX ORDER — CLOBAZAM 10 MG/1
5 TABLET ORAL EVERY 24 HOURS
Refills: 0 | Status: DISCONTINUED | OUTPATIENT
Start: 2023-01-01 | End: 2023-01-01

## 2023-01-01 RX ORDER — OLANZAPINE 15 MG/1
5 TABLET, FILM COATED ORAL EVERY 12 HOURS
Refills: 0 | Status: DISCONTINUED | OUTPATIENT
Start: 2023-01-01 | End: 2023-01-01

## 2023-01-01 RX ORDER — LATANOPROST 0.05 MG/ML
1 SOLUTION/ DROPS OPHTHALMIC; TOPICAL AT BEDTIME
Refills: 0 | Status: DISCONTINUED | OUTPATIENT
Start: 2023-01-01 | End: 2023-01-01

## 2023-01-01 RX ORDER — SODIUM CHLORIDE 0.65 %
3 AEROSOL, SPRAY (ML) NASAL
Refills: 0 | Status: DISCONTINUED | OUTPATIENT
Start: 2023-01-01 | End: 2023-01-01

## 2023-01-01 RX ORDER — PIPERACILLIN AND TAZOBACTAM 4; .5 G/20ML; G/20ML
3.38 INJECTION, POWDER, LYOPHILIZED, FOR SOLUTION INTRAVENOUS ONCE
Refills: 0 | Status: COMPLETED | OUTPATIENT
Start: 2023-01-01 | End: 2023-01-01

## 2023-01-01 RX ORDER — LEVETIRACETAM 250 MG/1
50 TABLET, FILM COATED ORAL
Qty: 0 | Refills: 0 | DISCHARGE
Start: 2023-01-01

## 2023-01-01 RX ORDER — LINACLOTIDE 145 UG/1
1 CAPSULE, GELATIN COATED ORAL
Qty: 0 | Refills: 0 | DISCHARGE

## 2023-01-01 RX ORDER — LATANOPROST 0.05 MG/ML
1 SOLUTION/ DROPS OPHTHALMIC; TOPICAL
Qty: 0 | Refills: 0 | DISCHARGE

## 2023-01-01 RX ORDER — SODIUM CHLORIDE 9 MG/ML
1000 INJECTION INTRAMUSCULAR; INTRAVENOUS; SUBCUTANEOUS ONCE
Refills: 0 | Status: COMPLETED | OUTPATIENT
Start: 2023-01-01 | End: 2023-01-01

## 2023-01-01 RX ORDER — BENZOYL PEROXIDE MICRONIZED 5.8 %
1 TOWELETTE (EA) TOPICAL
Qty: 0 | Refills: 0 | DISCHARGE

## 2023-01-01 RX ORDER — POTASSIUM PHOSPHATE, MONOBASIC POTASSIUM PHOSPHATE, DIBASIC 236; 224 MG/ML; MG/ML
30 INJECTION, SOLUTION INTRAVENOUS ONCE
Refills: 0 | Status: COMPLETED | OUTPATIENT
Start: 2023-01-01 | End: 2023-01-01

## 2023-01-01 RX ORDER — LEVETIRACETAM 250 MG/1
750 TABLET, FILM COATED ORAL EVERY 12 HOURS
Refills: 0 | Status: DISCONTINUED | OUTPATIENT
Start: 2023-01-01 | End: 2023-01-01

## 2023-01-01 RX ORDER — PRUCALOPRIDE 2 MG/1
1 TABLET, FILM COATED ORAL
Qty: 0 | Refills: 0 | DISCHARGE

## 2023-01-01 RX ORDER — CICLOPIROX OLAMINE 7.7 MG/G
1 CREAM TOPICAL
Qty: 0 | Refills: 0 | DISCHARGE

## 2023-01-01 RX ORDER — DORZOLAMIDE HYDROCHLORIDE TIMOLOL MALEATE 20; 5 MG/ML; MG/ML
1 SOLUTION/ DROPS OPHTHALMIC
Qty: 0 | Refills: 0 | DISCHARGE

## 2023-01-01 RX ORDER — VANCOMYCIN HCL 1 G
1000 VIAL (EA) INTRAVENOUS ONCE
Refills: 0 | Status: COMPLETED | OUTPATIENT
Start: 2023-01-01 | End: 2023-01-01

## 2023-01-01 RX ORDER — DIAZEPAM 5 MG
5 TABLET ORAL EVERY 12 HOURS
Refills: 0 | Status: DISCONTINUED | OUTPATIENT
Start: 2023-01-01 | End: 2023-01-01

## 2023-01-01 RX ORDER — OLANZAPINE 15 MG/1
1 TABLET, FILM COATED ORAL
Refills: 0 | DISCHARGE

## 2023-01-01 RX ORDER — PRIMIDONE 250 MG/1
1 TABLET ORAL
Qty: 0 | Refills: 0 | DISCHARGE
Start: 2023-01-01

## 2023-01-01 RX ORDER — ACETAMINOPHEN 500 MG
650 TABLET ORAL EVERY 6 HOURS
Refills: 0 | Status: DISCONTINUED | OUTPATIENT
Start: 2023-01-01 | End: 2023-01-01

## 2023-01-01 RX ORDER — POTASSIUM CHLORIDE 20 MEQ
40 PACKET (EA) ORAL ONCE
Refills: 0 | Status: COMPLETED | OUTPATIENT
Start: 2023-01-01 | End: 2023-01-01

## 2023-01-01 RX ORDER — PANTOPRAZOLE SODIUM 20 MG/1
1 TABLET, DELAYED RELEASE ORAL
Qty: 0 | Refills: 0 | DISCHARGE

## 2023-01-01 RX ORDER — ALBUTEROL 90 UG/1
2 AEROSOL, METERED ORAL EVERY 6 HOURS
Refills: 0 | Status: DISCONTINUED | OUTPATIENT
Start: 2023-01-01 | End: 2023-01-01

## 2023-01-01 RX ORDER — LEVETIRACETAM 250 MG/1
500 TABLET, FILM COATED ORAL EVERY 12 HOURS
Refills: 0 | Status: DISCONTINUED | OUTPATIENT
Start: 2023-01-01 | End: 2023-01-01

## 2023-01-01 RX ORDER — HEPARIN SODIUM 5000 [USP'U]/ML
5000 INJECTION INTRAVENOUS; SUBCUTANEOUS EVERY 12 HOURS
Refills: 0 | Status: DISCONTINUED | OUTPATIENT
Start: 2023-01-01 | End: 2023-01-01

## 2023-01-01 RX ORDER — POTASSIUM PHOSPHATE, MONOBASIC POTASSIUM PHOSPHATE, DIBASIC 236; 224 MG/ML; MG/ML
15 INJECTION, SOLUTION INTRAVENOUS ONCE
Refills: 0 | Status: COMPLETED | OUTPATIENT
Start: 2023-01-01 | End: 2023-01-01

## 2023-01-01 RX ORDER — CHOLECALCIFEROL (VITAMIN D3) 125 MCG
1 CAPSULE ORAL
Qty: 0 | Refills: 0 | DISCHARGE

## 2023-01-01 RX ORDER — PHENOBARBITAL 60 MG
50 TABLET ORAL EVERY 12 HOURS
Refills: 0 | Status: DISCONTINUED | OUTPATIENT
Start: 2023-01-01 | End: 2023-01-01

## 2023-01-01 RX ORDER — DILTIAZEM HCL 120 MG
1 CAPSULE, EXT RELEASE 24 HR ORAL
Qty: 0 | Refills: 0 | DISCHARGE

## 2023-01-01 RX ORDER — LACTULOSE 10 G/15ML
10 SOLUTION ORAL
Refills: 0 | Status: DISCONTINUED | OUTPATIENT
Start: 2023-01-01 | End: 2023-01-01

## 2023-01-01 RX ORDER — LAMOTRIGINE 100 MG/1
100 TABLET ORAL
Qty: 180 | Refills: 5 | Status: ACTIVE | COMMUNITY
Start: 1900-01-01 | End: 1900-01-01

## 2023-01-01 RX ORDER — OLANZAPINE 15 MG/1
5 TABLET, FILM COATED ORAL
Refills: 0 | Status: DISCONTINUED | OUTPATIENT
Start: 2023-01-01 | End: 2023-01-01

## 2023-01-01 RX ORDER — AZITHROMYCIN 500 MG/1
500 TABLET, FILM COATED ORAL ONCE
Refills: 0 | Status: COMPLETED | OUTPATIENT
Start: 2023-01-01 | End: 2023-01-01

## 2023-01-01 RX ORDER — CLOBAZAM 10 MG/1
10 TABLET ORAL AT BEDTIME
Refills: 0 | Status: DISCONTINUED | OUTPATIENT
Start: 2023-01-01 | End: 2023-01-01

## 2023-01-01 RX ORDER — OLANZAPINE 15 MG/1
1 TABLET, FILM COATED ORAL
Qty: 0 | Refills: 0 | DISCHARGE

## 2023-01-01 RX ORDER — MAGNESIUM HYDROXIDE 400 MG/1
15 TABLET, CHEWABLE ORAL
Qty: 0 | Refills: 0 | DISCHARGE

## 2023-01-01 RX ORDER — KETOCONAZOLE 20 MG/G
1 AEROSOL, FOAM TOPICAL
Qty: 0 | Refills: 0 | DISCHARGE

## 2023-01-01 RX ORDER — POLYETHYLENE GLYCOL 3350 17 G/17G
17 POWDER, FOR SOLUTION ORAL
Qty: 0 | Refills: 0 | DISCHARGE

## 2023-01-01 RX ORDER — IPRATROPIUM/ALBUTEROL SULFATE 18-103MCG
3 AEROSOL WITH ADAPTER (GRAM) INHALATION EVERY 6 HOURS
Refills: 0 | Status: DISCONTINUED | OUTPATIENT
Start: 2023-01-01 | End: 2023-01-01

## 2023-01-01 RX ORDER — ENOXAPARIN SODIUM 100 MG/ML
40 INJECTION SUBCUTANEOUS EVERY 24 HOURS
Refills: 0 | Status: DISCONTINUED | OUTPATIENT
Start: 2023-01-01 | End: 2023-01-01

## 2023-01-01 RX ORDER — LAMOTRIGINE 25 MG/1
2 TABLET, ORALLY DISINTEGRATING ORAL
Qty: 0 | Refills: 0 | DISCHARGE
Start: 2023-01-01

## 2023-01-01 RX ORDER — POLYETHYLENE GLYCOL 3350 17 G/17G
17 POWDER, FOR SOLUTION ORAL DAILY
Refills: 0 | Status: DISCONTINUED | OUTPATIENT
Start: 2023-01-01 | End: 2023-01-01

## 2023-01-01 RX ORDER — METOPROLOL TARTRATE 50 MG/1
50 TABLET, FILM COATED ORAL
Qty: 180 | Refills: 0 | Status: ACTIVE | COMMUNITY
Start: 2022-01-01 | End: 1900-01-01

## 2023-01-01 RX ORDER — PANTOPRAZOLE SODIUM 20 MG/1
40 TABLET, DELAYED RELEASE ORAL DAILY
Refills: 0 | Status: DISCONTINUED | OUTPATIENT
Start: 2023-01-01 | End: 2023-01-01

## 2023-01-01 RX ORDER — ESCITALOPRAM OXALATE 10 MG/1
1 TABLET, FILM COATED ORAL
Qty: 0 | Refills: 0 | DISCHARGE

## 2023-01-01 RX ORDER — DENOSUMAB 60 MG/ML
1 INJECTION SUBCUTANEOUS
Qty: 0 | Refills: 0 | DISCHARGE

## 2023-01-01 RX ORDER — POTASSIUM CHLORIDE 20 MEQ
10 PACKET (EA) ORAL
Refills: 0 | Status: COMPLETED | OUTPATIENT
Start: 2023-01-01 | End: 2023-01-01

## 2023-01-01 RX ORDER — CLOBAZAM 10 MG/1
1 TABLET ORAL
Qty: 30 | Refills: 0
Start: 2023-01-01 | End: 2023-01-01

## 2023-01-01 RX ORDER — PIPERACILLIN AND TAZOBACTAM 4; .5 G/20ML; G/20ML
3.38 INJECTION, POWDER, LYOPHILIZED, FOR SOLUTION INTRAVENOUS EVERY 8 HOURS
Refills: 0 | Status: DISCONTINUED | OUTPATIENT
Start: 2023-01-01 | End: 2023-01-01

## 2023-01-01 RX ORDER — ONDANSETRON 8 MG/1
4 TABLET, FILM COATED ORAL EVERY 8 HOURS
Refills: 0 | Status: DISCONTINUED | OUTPATIENT
Start: 2023-01-01 | End: 2023-01-01

## 2023-01-01 RX ORDER — LANOLIN ALCOHOL/MO/W.PET/CERES
3 CREAM (GRAM) TOPICAL AT BEDTIME
Refills: 0 | Status: DISCONTINUED | OUTPATIENT
Start: 2023-01-01 | End: 2023-01-01

## 2023-01-01 RX ORDER — LACTULOSE 10 G/15ML
10 SOLUTION ORAL DAILY
Refills: 0 | Status: DISCONTINUED | OUTPATIENT
Start: 2023-01-01 | End: 2023-01-01

## 2023-01-01 RX ORDER — PRIMIDONE 250 MG/1
250 TABLET ORAL
Refills: 0 | Status: DISCONTINUED | OUTPATIENT
Start: 2023-01-01 | End: 2023-01-01

## 2023-01-01 RX ORDER — ELECTROLYTE SOLUTION,INJ
1 VIAL (ML) INTRAVENOUS
Refills: 0 | Status: DISCONTINUED | OUTPATIENT
Start: 2023-01-01 | End: 2023-01-01

## 2023-01-01 RX ORDER — SODIUM CHLORIDE 9 MG/ML
1000 INJECTION INTRAMUSCULAR; INTRAVENOUS; SUBCUTANEOUS
Refills: 0 | Status: DISCONTINUED | OUTPATIENT
Start: 2023-01-01 | End: 2023-01-01

## 2023-01-01 RX ORDER — DILTIAZEM HYDROCHLORIDE 120 MG/1
120 CAPSULE, EXTENDED RELEASE ORAL
Qty: 90 | Refills: 2 | Status: ACTIVE | COMMUNITY
Start: 2022-01-20 | End: 1900-01-01

## 2023-01-01 RX ORDER — METOPROLOL TARTRATE 50 MG
1 TABLET ORAL
Qty: 0 | Refills: 0 | DISCHARGE

## 2023-01-01 RX ORDER — LAMOTRIGINE 25 MG/1
300 TABLET, ORALLY DISINTEGRATING ORAL
Refills: 0 | Status: DISCONTINUED | OUTPATIENT
Start: 2023-01-01 | End: 2023-01-01

## 2023-01-01 RX ORDER — ESCITALOPRAM OXALATE 10 MG/1
10 TABLET, FILM COATED ORAL DAILY
Refills: 0 | Status: DISCONTINUED | OUTPATIENT
Start: 2023-01-01 | End: 2023-01-01

## 2023-01-01 RX ORDER — PHENOBARBITAL 60 MG
50 TABLET ORAL
Qty: 0 | Refills: 0 | DISCHARGE
Start: 2023-01-01

## 2023-01-01 RX ORDER — DORZOLAMIDE HYDROCHLORIDE TIMOLOL MALEATE 20; 5 MG/ML; MG/ML
1 SOLUTION/ DROPS OPHTHALMIC
Refills: 0 | Status: DISCONTINUED | OUTPATIENT
Start: 2023-01-01 | End: 2023-01-01

## 2023-01-01 RX ORDER — METOPROLOL TARTRATE 50 MG
50 TABLET ORAL
Refills: 0 | Status: DISCONTINUED | OUTPATIENT
Start: 2023-01-01 | End: 2023-01-01

## 2023-01-01 RX ORDER — SALIVA SUBSTITUTE COMB NO.11 351 MG
5 POWDER IN PACKET (EA) MUCOUS MEMBRANE
Refills: 0 | Status: DISCONTINUED | OUTPATIENT
Start: 2023-01-01 | End: 2023-01-01

## 2023-01-01 RX ORDER — CLOBAZAM 10 MG/1
0.5 TABLET ORAL
Qty: 30 | Refills: 0
Start: 2023-01-01

## 2023-01-01 RX ORDER — ASCORBIC ACID 60 MG
1 TABLET,CHEWABLE ORAL
Qty: 0 | Refills: 0 | DISCHARGE

## 2023-01-01 RX ORDER — SODIUM CHLORIDE 9 MG/ML
2100 INJECTION INTRAMUSCULAR; INTRAVENOUS; SUBCUTANEOUS ONCE
Refills: 0 | Status: COMPLETED | OUTPATIENT
Start: 2023-01-01 | End: 2023-01-01

## 2023-01-01 RX ORDER — OLANZAPINE 15 MG/1
5 TABLET, FILM COATED ORAL
Qty: 0 | Refills: 0 | DISCHARGE
Start: 2023-01-01

## 2023-01-01 RX ORDER — PRIMIDONE 250 MG/1
1 TABLET ORAL
Qty: 0 | Refills: 0 | DISCHARGE

## 2023-01-01 RX ORDER — LEVETIRACETAM 250 MG/1
1000 TABLET, FILM COATED ORAL ONCE
Refills: 0 | Status: COMPLETED | OUTPATIENT
Start: 2023-01-01 | End: 2023-01-01

## 2023-01-01 RX ORDER — CLOBAZAM 10 MG/1
5 TABLET ORAL
Refills: 0 | Status: DISCONTINUED | OUTPATIENT
Start: 2023-01-01 | End: 2023-01-01

## 2023-01-01 RX ORDER — SENNA PLUS 8.6 MG/1
2 TABLET ORAL AT BEDTIME
Refills: 0 | Status: DISCONTINUED | OUTPATIENT
Start: 2023-01-01 | End: 2023-01-01

## 2023-01-01 RX ORDER — SODIUM CHLORIDE 9 MG/ML
1000 INJECTION, SOLUTION INTRAVENOUS
Refills: 0 | Status: DISCONTINUED | OUTPATIENT
Start: 2023-01-01 | End: 2023-01-01

## 2023-01-01 RX ORDER — CLOBAZAM 10 MG/1
10 TABLET ORAL
Qty: 30 | Refills: 0 | Status: ACTIVE | COMMUNITY
Start: 2022-06-22 | End: 1900-01-01

## 2023-01-01 RX ORDER — CALCIUM CARBONATE 500(1250)
1 TABLET ORAL
Qty: 0 | Refills: 0 | DISCHARGE

## 2023-01-01 RX ORDER — DOCUSATE SODIUM 100 MG
3 CAPSULE ORAL
Qty: 0 | Refills: 0 | DISCHARGE

## 2023-01-01 RX ADMIN — Medication 200 MILLIGRAM(S): at 17:43

## 2023-01-01 RX ADMIN — PANTOPRAZOLE SODIUM 40 MILLIGRAM(S): 20 TABLET, DELAYED RELEASE ORAL at 10:14

## 2023-01-01 RX ADMIN — OLANZAPINE 5 MILLIGRAM(S): 15 TABLET, FILM COATED ORAL at 22:02

## 2023-01-01 RX ADMIN — PRIMIDONE 250 MILLIGRAM(S): 250 TABLET ORAL at 22:17

## 2023-01-01 RX ADMIN — Medication 5 MILLILITER(S): at 17:25

## 2023-01-01 RX ADMIN — DORZOLAMIDE HYDROCHLORIDE TIMOLOL MALEATE 1 DROP(S): 20; 5 SOLUTION/ DROPS OPHTHALMIC at 10:23

## 2023-01-01 RX ADMIN — Medication 403.08 MILLIGRAM(S): at 09:13

## 2023-01-01 RX ADMIN — Medication 3 SPRAY(S): at 09:16

## 2023-01-01 RX ADMIN — Medication 5 MILLILITER(S): at 05:48

## 2023-01-01 RX ADMIN — OLANZAPINE 5 MILLIGRAM(S): 15 TABLET, FILM COATED ORAL at 09:32

## 2023-01-01 RX ADMIN — PRIMIDONE 250 MILLIGRAM(S): 250 TABLET ORAL at 09:10

## 2023-01-01 RX ADMIN — DORZOLAMIDE HYDROCHLORIDE TIMOLOL MALEATE 1 DROP(S): 20; 5 SOLUTION/ DROPS OPHTHALMIC at 22:46

## 2023-01-01 RX ADMIN — Medication 50 MILLIGRAM(S): at 22:17

## 2023-01-01 RX ADMIN — PIPERACILLIN AND TAZOBACTAM 25 GRAM(S): 4; .5 INJECTION, POWDER, LYOPHILIZED, FOR SOLUTION INTRAVENOUS at 22:51

## 2023-01-01 RX ADMIN — PANTOPRAZOLE SODIUM 40 MILLIGRAM(S): 20 TABLET, DELAYED RELEASE ORAL at 06:36

## 2023-01-01 RX ADMIN — POTASSIUM PHOSPHATE, MONOBASIC POTASSIUM PHOSPHATE, DIBASIC 62.5 MILLIMOLE(S): 236; 224 INJECTION, SOLUTION INTRAVENOUS at 11:25

## 2023-01-01 RX ADMIN — LACTULOSE 10 GRAM(S): 10 SOLUTION ORAL at 23:02

## 2023-01-01 RX ADMIN — LACTULOSE 10 GRAM(S): 10 SOLUTION ORAL at 09:50

## 2023-01-01 RX ADMIN — PRIMIDONE 250 MILLIGRAM(S): 250 TABLET ORAL at 22:16

## 2023-01-01 RX ADMIN — Medication 50 MILLIGRAM(S): at 23:14

## 2023-01-01 RX ADMIN — LATANOPROST 1 DROP(S): 0.05 SOLUTION/ DROPS OPHTHALMIC; TOPICAL at 21:55

## 2023-01-01 RX ADMIN — Medication 5 MILLILITER(S): at 05:55

## 2023-01-01 RX ADMIN — LAMOTRIGINE 300 MILLIGRAM(S): 25 TABLET, ORALLY DISINTEGRATING ORAL at 22:18

## 2023-01-01 RX ADMIN — CLOBAZAM 5 MILLIGRAM(S): 10 TABLET ORAL at 21:46

## 2023-01-01 RX ADMIN — OLANZAPINE 5 MILLIGRAM(S): 15 TABLET, FILM COATED ORAL at 21:23

## 2023-01-01 RX ADMIN — LEVETIRACETAM 400 MILLIGRAM(S): 250 TABLET, FILM COATED ORAL at 22:29

## 2023-01-01 RX ADMIN — Medication 200 MILLIGRAM(S): at 13:19

## 2023-01-01 RX ADMIN — Medication 3 MILLILITER(S): at 08:28

## 2023-01-01 RX ADMIN — ENOXAPARIN SODIUM 40 MILLIGRAM(S): 100 INJECTION SUBCUTANEOUS at 09:31

## 2023-01-01 RX ADMIN — Medication 110 MILLIGRAM(S): at 05:36

## 2023-01-01 RX ADMIN — HEPARIN SODIUM 5000 UNIT(S): 5000 INJECTION INTRAVENOUS; SUBCUTANEOUS at 22:03

## 2023-01-01 RX ADMIN — DORZOLAMIDE HYDROCHLORIDE TIMOLOL MALEATE 1 DROP(S): 20; 5 SOLUTION/ DROPS OPHTHALMIC at 09:11

## 2023-01-01 RX ADMIN — OLANZAPINE 5 MILLIGRAM(S): 15 TABLET, FILM COATED ORAL at 21:55

## 2023-01-01 RX ADMIN — LACTULOSE 10 GRAM(S): 10 SOLUTION ORAL at 10:21

## 2023-01-01 RX ADMIN — Medication 403.08 MILLIGRAM(S): at 22:28

## 2023-01-01 RX ADMIN — OLANZAPINE 5 MILLIGRAM(S): 15 TABLET, FILM COATED ORAL at 11:23

## 2023-01-01 RX ADMIN — DORZOLAMIDE HYDROCHLORIDE TIMOLOL MALEATE 1 DROP(S): 20; 5 SOLUTION/ DROPS OPHTHALMIC at 21:48

## 2023-01-01 RX ADMIN — PANTOPRAZOLE SODIUM 40 MILLIGRAM(S): 20 TABLET, DELAYED RELEASE ORAL at 11:06

## 2023-01-01 RX ADMIN — PIPERACILLIN AND TAZOBACTAM 25 GRAM(S): 4; .5 INJECTION, POWDER, LYOPHILIZED, FOR SOLUTION INTRAVENOUS at 22:15

## 2023-01-01 RX ADMIN — Medication 3 SPRAY(S): at 12:09

## 2023-01-01 RX ADMIN — Medication 3 MILLILITER(S): at 02:22

## 2023-01-01 RX ADMIN — Medication 403.08 MILLIGRAM(S): at 22:18

## 2023-01-01 RX ADMIN — Medication 3 MILLILITER(S): at 20:07

## 2023-01-01 RX ADMIN — OLANZAPINE 5 MILLIGRAM(S): 15 TABLET, FILM COATED ORAL at 09:49

## 2023-01-01 RX ADMIN — Medication 5 MILLILITER(S): at 17:19

## 2023-01-01 RX ADMIN — SODIUM CHLORIDE 75 MILLILITER(S): 9 INJECTION INTRAMUSCULAR; INTRAVENOUS; SUBCUTANEOUS at 23:16

## 2023-01-01 RX ADMIN — SENNA PLUS 2 TABLET(S): 8.6 TABLET ORAL at 23:51

## 2023-01-01 RX ADMIN — OLANZAPINE 5 MILLIGRAM(S): 15 TABLET, FILM COATED ORAL at 10:17

## 2023-01-01 RX ADMIN — Medication 110 MILLIGRAM(S): at 02:34

## 2023-01-01 RX ADMIN — Medication 5 MILLILITER(S): at 23:14

## 2023-01-01 RX ADMIN — Medication 50 MILLIGRAM(S): at 21:54

## 2023-01-01 RX ADMIN — Medication 3 MILLILITER(S): at 13:17

## 2023-01-01 RX ADMIN — PIPERACILLIN AND TAZOBACTAM 25 GRAM(S): 4; .5 INJECTION, POWDER, LYOPHILIZED, FOR SOLUTION INTRAVENOUS at 05:38

## 2023-01-01 RX ADMIN — LEVETIRACETAM 400 MILLIGRAM(S): 250 TABLET, FILM COATED ORAL at 22:03

## 2023-01-01 RX ADMIN — Medication 1 MILLIGRAM(S): at 22:28

## 2023-01-01 RX ADMIN — LACTULOSE 10 GRAM(S): 10 SOLUTION ORAL at 22:29

## 2023-01-01 RX ADMIN — LEVETIRACETAM 400 MILLIGRAM(S): 250 TABLET, FILM COATED ORAL at 10:30

## 2023-01-01 RX ADMIN — Medication 5 MILLILITER(S): at 18:45

## 2023-01-01 RX ADMIN — LEVETIRACETAM 400 MILLIGRAM(S): 250 TABLET, FILM COATED ORAL at 22:55

## 2023-01-01 RX ADMIN — Medication 100 MILLIEQUIVALENT(S): at 11:55

## 2023-01-01 RX ADMIN — PIPERACILLIN AND TAZOBACTAM 25 GRAM(S): 4; .5 INJECTION, POWDER, LYOPHILIZED, FOR SOLUTION INTRAVENOUS at 06:37

## 2023-01-01 RX ADMIN — Medication 403.08 MILLIGRAM(S): at 22:06

## 2023-01-01 RX ADMIN — OLANZAPINE 5 MILLIGRAM(S): 15 TABLET, FILM COATED ORAL at 10:02

## 2023-01-01 RX ADMIN — LAMOTRIGINE 300 MILLIGRAM(S): 25 TABLET, ORALLY DISINTEGRATING ORAL at 09:49

## 2023-01-01 RX ADMIN — HEPARIN SODIUM 5000 UNIT(S): 5000 INJECTION INTRAVENOUS; SUBCUTANEOUS at 22:52

## 2023-01-01 RX ADMIN — PIPERACILLIN AND TAZOBACTAM 25 GRAM(S): 4; .5 INJECTION, POWDER, LYOPHILIZED, FOR SOLUTION INTRAVENOUS at 15:45

## 2023-01-01 RX ADMIN — Medication 3 SPRAY(S): at 15:11

## 2023-01-01 RX ADMIN — Medication 3 MILLILITER(S): at 02:08

## 2023-01-01 RX ADMIN — SENNA PLUS 2 TABLET(S): 8.6 TABLET ORAL at 21:47

## 2023-01-01 RX ADMIN — Medication 3 MILLILITER(S): at 16:06

## 2023-01-01 RX ADMIN — PANTOPRAZOLE SODIUM 40 MILLIGRAM(S): 20 TABLET, DELAYED RELEASE ORAL at 09:11

## 2023-01-01 RX ADMIN — SODIUM CHLORIDE 50 MILLILITER(S): 9 INJECTION, SOLUTION INTRAVENOUS at 14:05

## 2023-01-01 RX ADMIN — Medication 3 SPRAY(S): at 18:55

## 2023-01-01 RX ADMIN — DORZOLAMIDE HYDROCHLORIDE TIMOLOL MALEATE 1 DROP(S): 20; 5 SOLUTION/ DROPS OPHTHALMIC at 09:46

## 2023-01-01 RX ADMIN — CLOBAZAM 5 MILLIGRAM(S): 10 TABLET ORAL at 12:27

## 2023-01-01 RX ADMIN — Medication 50 MILLIGRAM(S): at 22:15

## 2023-01-01 RX ADMIN — SENNA PLUS 2 TABLET(S): 8.6 TABLET ORAL at 21:45

## 2023-01-01 RX ADMIN — Medication 5 MILLILITER(S): at 23:26

## 2023-01-01 RX ADMIN — LAMOTRIGINE 300 MILLIGRAM(S): 25 TABLET, ORALLY DISINTEGRATING ORAL at 10:21

## 2023-01-01 RX ADMIN — HEPARIN SODIUM 5000 UNIT(S): 5000 INJECTION INTRAVENOUS; SUBCUTANEOUS at 10:23

## 2023-01-01 RX ADMIN — Medication 1 MILLIGRAM(S): at 10:28

## 2023-01-01 RX ADMIN — Medication 10 MILLIGRAM(S): at 13:09

## 2023-01-01 RX ADMIN — HEPARIN SODIUM 5000 UNIT(S): 5000 INJECTION INTRAVENOUS; SUBCUTANEOUS at 22:46

## 2023-01-01 RX ADMIN — SENNA PLUS 2 TABLET(S): 8.6 TABLET ORAL at 21:55

## 2023-01-01 RX ADMIN — PANTOPRAZOLE SODIUM 40 MILLIGRAM(S): 20 TABLET, DELAYED RELEASE ORAL at 05:38

## 2023-01-01 RX ADMIN — HEPARIN SODIUM 5000 UNIT(S): 5000 INJECTION INTRAVENOUS; SUBCUTANEOUS at 10:15

## 2023-01-01 RX ADMIN — PANTOPRAZOLE SODIUM 40 MILLIGRAM(S): 20 TABLET, DELAYED RELEASE ORAL at 10:31

## 2023-01-01 RX ADMIN — SODIUM CHLORIDE 50 MILLILITER(S): 9 INJECTION, SOLUTION INTRAVENOUS at 14:59

## 2023-01-01 RX ADMIN — PRIMIDONE 250 MILLIGRAM(S): 250 TABLET ORAL at 10:22

## 2023-01-01 RX ADMIN — Medication 5 MILLILITER(S): at 10:27

## 2023-01-01 RX ADMIN — LEVETIRACETAM 400 MILLIGRAM(S): 250 TABLET, FILM COATED ORAL at 11:01

## 2023-01-01 RX ADMIN — CLOBAZAM 5 MILLIGRAM(S): 10 TABLET ORAL at 10:28

## 2023-01-01 RX ADMIN — PRIMIDONE 250 MILLIGRAM(S): 250 TABLET ORAL at 23:51

## 2023-01-01 RX ADMIN — LACTULOSE 10 GRAM(S): 10 SOLUTION ORAL at 21:48

## 2023-01-01 RX ADMIN — Medication 3 SPRAY(S): at 20:40

## 2023-01-01 RX ADMIN — CLOBAZAM 5 MILLIGRAM(S): 10 TABLET ORAL at 23:45

## 2023-01-01 RX ADMIN — Medication 5 MILLILITER(S): at 05:16

## 2023-01-01 RX ADMIN — LEVETIRACETAM 400 MILLIGRAM(S): 250 TABLET, FILM COATED ORAL at 10:23

## 2023-01-01 RX ADMIN — Medication 3 SPRAY(S): at 11:49

## 2023-01-01 RX ADMIN — Medication 1 MILLIGRAM(S): at 22:21

## 2023-01-01 RX ADMIN — PRIMIDONE 250 MILLIGRAM(S): 250 TABLET ORAL at 21:55

## 2023-01-01 RX ADMIN — Medication 3 SPRAY(S): at 10:21

## 2023-01-01 RX ADMIN — OLANZAPINE 5 MILLIGRAM(S): 15 TABLET, FILM COATED ORAL at 22:15

## 2023-01-01 RX ADMIN — LACTULOSE 10 GRAM(S): 10 SOLUTION ORAL at 11:07

## 2023-01-01 RX ADMIN — Medication 3 SPRAY(S): at 16:00

## 2023-01-01 RX ADMIN — PANTOPRAZOLE SODIUM 40 MILLIGRAM(S): 20 TABLET, DELAYED RELEASE ORAL at 12:53

## 2023-01-01 RX ADMIN — Medication 5 MILLILITER(S): at 22:03

## 2023-01-01 RX ADMIN — PANTOPRAZOLE SODIUM 40 MILLIGRAM(S): 20 TABLET, DELAYED RELEASE ORAL at 06:37

## 2023-01-01 RX ADMIN — LAMOTRIGINE 300 MILLIGRAM(S): 25 TABLET, ORALLY DISINTEGRATING ORAL at 21:48

## 2023-01-01 RX ADMIN — OLANZAPINE 5 MILLIGRAM(S): 15 TABLET, FILM COATED ORAL at 10:24

## 2023-01-01 RX ADMIN — Medication 1 MILLIGRAM(S): at 22:20

## 2023-01-01 RX ADMIN — OLANZAPINE 5 MILLIGRAM(S): 15 TABLET, FILM COATED ORAL at 11:06

## 2023-01-01 RX ADMIN — HEPARIN SODIUM 5000 UNIT(S): 5000 INJECTION INTRAVENOUS; SUBCUTANEOUS at 10:31

## 2023-01-01 RX ADMIN — Medication 3 SPRAY(S): at 13:19

## 2023-01-01 RX ADMIN — Medication 403.08 MILLIGRAM(S): at 22:29

## 2023-01-01 RX ADMIN — Medication 5 MILLILITER(S): at 12:20

## 2023-01-01 RX ADMIN — OLANZAPINE 5 MILLIGRAM(S): 15 TABLET, FILM COATED ORAL at 22:16

## 2023-01-01 RX ADMIN — PIPERACILLIN AND TAZOBACTAM 25 GRAM(S): 4; .5 INJECTION, POWDER, LYOPHILIZED, FOR SOLUTION INTRAVENOUS at 23:50

## 2023-01-01 RX ADMIN — Medication 403.08 MILLIGRAM(S): at 22:45

## 2023-01-01 RX ADMIN — HEPARIN SODIUM 5000 UNIT(S): 5000 INJECTION INTRAVENOUS; SUBCUTANEOUS at 21:23

## 2023-01-01 RX ADMIN — Medication 110 MILLIGRAM(S): at 18:35

## 2023-01-01 RX ADMIN — Medication 3 SPRAY(S): at 02:30

## 2023-01-01 RX ADMIN — Medication 5 MILLILITER(S): at 11:02

## 2023-01-01 RX ADMIN — Medication 50 MILLIGRAM(S): at 21:47

## 2023-01-01 RX ADMIN — PIPERACILLIN AND TAZOBACTAM 25 GRAM(S): 4; .5 INJECTION, POWDER, LYOPHILIZED, FOR SOLUTION INTRAVENOUS at 22:46

## 2023-01-01 RX ADMIN — PIPERACILLIN AND TAZOBACTAM 25 GRAM(S): 4; .5 INJECTION, POWDER, LYOPHILIZED, FOR SOLUTION INTRAVENOUS at 06:38

## 2023-01-01 RX ADMIN — LEVETIRACETAM 400 MILLIGRAM(S): 250 TABLET, FILM COATED ORAL at 11:49

## 2023-01-01 RX ADMIN — Medication 403.08 MILLIGRAM(S): at 10:15

## 2023-01-01 RX ADMIN — Medication 200 MILLIGRAM(S): at 06:36

## 2023-01-01 RX ADMIN — OLANZAPINE 5 MILLIGRAM(S): 15 TABLET, FILM COATED ORAL at 21:46

## 2023-01-01 RX ADMIN — DORZOLAMIDE HYDROCHLORIDE TIMOLOL MALEATE 1 DROP(S): 20; 5 SOLUTION/ DROPS OPHTHALMIC at 22:20

## 2023-01-01 RX ADMIN — OLANZAPINE 5 MILLIGRAM(S): 15 TABLET, FILM COATED ORAL at 09:54

## 2023-01-01 RX ADMIN — ESCITALOPRAM OXALATE 10 MILLIGRAM(S): 10 TABLET, FILM COATED ORAL at 09:31

## 2023-01-01 RX ADMIN — DORZOLAMIDE HYDROCHLORIDE TIMOLOL MALEATE 1 DROP(S): 20; 5 SOLUTION/ DROPS OPHTHALMIC at 21:22

## 2023-01-01 RX ADMIN — DORZOLAMIDE HYDROCHLORIDE TIMOLOL MALEATE 1 DROP(S): 20; 5 SOLUTION/ DROPS OPHTHALMIC at 10:17

## 2023-01-01 RX ADMIN — LACTULOSE 10 GRAM(S): 10 SOLUTION ORAL at 21:54

## 2023-01-01 RX ADMIN — SODIUM CHLORIDE 75 MILLILITER(S): 9 INJECTION INTRAMUSCULAR; INTRAVENOUS; SUBCUTANEOUS at 09:17

## 2023-01-01 RX ADMIN — PRIMIDONE 250 MILLIGRAM(S): 250 TABLET ORAL at 21:45

## 2023-01-01 RX ADMIN — OLANZAPINE 5 MILLIGRAM(S): 15 TABLET, FILM COATED ORAL at 22:47

## 2023-01-01 RX ADMIN — Medication 1 MILLIGRAM(S): at 21:23

## 2023-01-01 RX ADMIN — OLANZAPINE 5 MILLIGRAM(S): 15 TABLET, FILM COATED ORAL at 22:20

## 2023-01-01 RX ADMIN — SODIUM CHLORIDE 50 MILLILITER(S): 9 INJECTION, SOLUTION INTRAVENOUS at 06:15

## 2023-01-01 RX ADMIN — PIPERACILLIN AND TAZOBACTAM 25 GRAM(S): 4; .5 INJECTION, POWDER, LYOPHILIZED, FOR SOLUTION INTRAVENOUS at 22:26

## 2023-01-01 RX ADMIN — Medication 3 SPRAY(S): at 18:35

## 2023-01-01 RX ADMIN — LEVETIRACETAM 400 MILLIGRAM(S): 250 TABLET, FILM COATED ORAL at 10:32

## 2023-01-01 RX ADMIN — ESCITALOPRAM OXALATE 10 MILLIGRAM(S): 10 TABLET, FILM COATED ORAL at 10:22

## 2023-01-01 RX ADMIN — PIPERACILLIN AND TAZOBACTAM 25 GRAM(S): 4; .5 INJECTION, POWDER, LYOPHILIZED, FOR SOLUTION INTRAVENOUS at 06:32

## 2023-01-01 RX ADMIN — LEVETIRACETAM 400 MILLIGRAM(S): 250 TABLET, FILM COATED ORAL at 22:27

## 2023-01-01 RX ADMIN — Medication 403.08 MILLIGRAM(S): at 09:27

## 2023-01-01 RX ADMIN — LATANOPROST 1 DROP(S): 0.05 SOLUTION/ DROPS OPHTHALMIC; TOPICAL at 22:22

## 2023-01-01 RX ADMIN — Medication 1 MILLIGRAM(S): at 21:58

## 2023-01-01 RX ADMIN — CLOBAZAM 5 MILLIGRAM(S): 10 TABLET ORAL at 10:21

## 2023-01-01 RX ADMIN — OLANZAPINE 5 MILLIGRAM(S): 15 TABLET, FILM COATED ORAL at 22:30

## 2023-01-01 RX ADMIN — LAMOTRIGINE 300 MILLIGRAM(S): 25 TABLET, ORALLY DISINTEGRATING ORAL at 09:34

## 2023-01-01 RX ADMIN — PIPERACILLIN AND TAZOBACTAM 25 GRAM(S): 4; .5 INJECTION, POWDER, LYOPHILIZED, FOR SOLUTION INTRAVENOUS at 04:00

## 2023-01-01 RX ADMIN — Medication 403.08 MILLIGRAM(S): at 22:58

## 2023-01-01 RX ADMIN — LACTULOSE 10 GRAM(S): 10 SOLUTION ORAL at 23:48

## 2023-01-01 RX ADMIN — SENNA PLUS 2 TABLET(S): 8.6 TABLET ORAL at 22:16

## 2023-01-01 RX ADMIN — Medication 3 SPRAY(S): at 08:00

## 2023-01-01 RX ADMIN — PRIMIDONE 250 MILLIGRAM(S): 250 TABLET ORAL at 23:14

## 2023-01-01 RX ADMIN — SODIUM CHLORIDE 2000 MILLILITER(S): 9 INJECTION INTRAMUSCULAR; INTRAVENOUS; SUBCUTANEOUS at 20:57

## 2023-01-01 RX ADMIN — LEVETIRACETAM 400 MILLIGRAM(S): 250 TABLET, FILM COATED ORAL at 15:26

## 2023-01-01 RX ADMIN — Medication 5 MILLILITER(S): at 17:21

## 2023-01-01 RX ADMIN — Medication 50 MILLIGRAM(S): at 10:22

## 2023-01-01 RX ADMIN — SODIUM CHLORIDE 75 MILLILITER(S): 9 INJECTION INTRAMUSCULAR; INTRAVENOUS; SUBCUTANEOUS at 16:11

## 2023-01-01 RX ADMIN — LAMOTRIGINE 300 MILLIGRAM(S): 25 TABLET, ORALLY DISINTEGRATING ORAL at 21:55

## 2023-01-01 RX ADMIN — PANTOPRAZOLE SODIUM 40 MILLIGRAM(S): 20 TABLET, DELAYED RELEASE ORAL at 11:38

## 2023-01-01 RX ADMIN — DORZOLAMIDE HYDROCHLORIDE TIMOLOL MALEATE 1 DROP(S): 20; 5 SOLUTION/ DROPS OPHTHALMIC at 10:02

## 2023-01-01 RX ADMIN — LEVETIRACETAM 400 MILLIGRAM(S): 250 TABLET, FILM COATED ORAL at 01:49

## 2023-01-01 RX ADMIN — Medication 5 MILLILITER(S): at 16:50

## 2023-01-01 RX ADMIN — LEVETIRACETAM 400 MILLIGRAM(S): 250 TABLET, FILM COATED ORAL at 23:15

## 2023-01-01 RX ADMIN — DORZOLAMIDE HYDROCHLORIDE TIMOLOL MALEATE 1 DROP(S): 20; 5 SOLUTION/ DROPS OPHTHALMIC at 09:22

## 2023-01-01 RX ADMIN — ESCITALOPRAM OXALATE 10 MILLIGRAM(S): 10 TABLET, FILM COATED ORAL at 09:51

## 2023-01-01 RX ADMIN — LAMOTRIGINE 300 MILLIGRAM(S): 25 TABLET, ORALLY DISINTEGRATING ORAL at 11:07

## 2023-01-01 RX ADMIN — LEVETIRACETAM 400 MILLIGRAM(S): 250 TABLET, FILM COATED ORAL at 23:49

## 2023-01-01 RX ADMIN — Medication 3 SPRAY(S): at 15:46

## 2023-01-01 RX ADMIN — Medication 3 MILLILITER(S): at 14:10

## 2023-01-01 RX ADMIN — Medication 2 MILLIGRAM(S): at 00:24

## 2023-01-01 RX ADMIN — Medication 5 MILLILITER(S): at 10:29

## 2023-01-01 RX ADMIN — Medication 100 MILLIEQUIVALENT(S): at 13:09

## 2023-01-01 RX ADMIN — Medication 2 MILLIGRAM(S): at 22:47

## 2023-01-01 RX ADMIN — Medication 1 MILLIGRAM(S): at 10:31

## 2023-01-01 RX ADMIN — Medication 200 MILLIGRAM(S): at 05:38

## 2023-01-01 RX ADMIN — ENOXAPARIN SODIUM 40 MILLIGRAM(S): 100 INJECTION SUBCUTANEOUS at 11:01

## 2023-01-01 RX ADMIN — DORZOLAMIDE HYDROCHLORIDE TIMOLOL MALEATE 1 DROP(S): 20; 5 SOLUTION/ DROPS OPHTHALMIC at 09:12

## 2023-01-01 RX ADMIN — Medication 2 MILLIGRAM(S): at 12:57

## 2023-01-01 RX ADMIN — Medication 3 SPRAY(S): at 16:54

## 2023-01-01 RX ADMIN — Medication 5 MILLILITER(S): at 11:38

## 2023-01-01 RX ADMIN — Medication 403.08 MILLIGRAM(S): at 10:22

## 2023-01-01 RX ADMIN — HEPARIN SODIUM 5000 UNIT(S): 5000 INJECTION INTRAVENOUS; SUBCUTANEOUS at 22:28

## 2023-01-01 RX ADMIN — LACTULOSE 10 GRAM(S): 10 SOLUTION ORAL at 09:33

## 2023-01-01 RX ADMIN — CLOBAZAM 5 MILLIGRAM(S): 10 TABLET ORAL at 22:15

## 2023-01-01 RX ADMIN — Medication 3 MILLILITER(S): at 21:31

## 2023-01-01 RX ADMIN — SENNA PLUS 2 TABLET(S): 8.6 TABLET ORAL at 23:14

## 2023-01-01 RX ADMIN — DORZOLAMIDE HYDROCHLORIDE TIMOLOL MALEATE 1 DROP(S): 20; 5 SOLUTION/ DROPS OPHTHALMIC at 09:01

## 2023-01-01 RX ADMIN — POTASSIUM PHOSPHATE, MONOBASIC POTASSIUM PHOSPHATE, DIBASIC 83.33 MILLIMOLE(S): 236; 224 INJECTION, SOLUTION INTRAVENOUS at 00:41

## 2023-01-01 RX ADMIN — DORZOLAMIDE HYDROCHLORIDE TIMOLOL MALEATE 1 DROP(S): 20; 5 SOLUTION/ DROPS OPHTHALMIC at 00:16

## 2023-01-01 RX ADMIN — LEVETIRACETAM 400 MILLIGRAM(S): 250 TABLET, FILM COATED ORAL at 09:54

## 2023-01-01 RX ADMIN — Medication 3 SPRAY(S): at 20:37

## 2023-01-01 RX ADMIN — Medication 5 MILLILITER(S): at 05:07

## 2023-01-01 RX ADMIN — LAMOTRIGINE 300 MILLIGRAM(S): 25 TABLET, ORALLY DISINTEGRATING ORAL at 08:59

## 2023-01-01 RX ADMIN — DORZOLAMIDE HYDROCHLORIDE TIMOLOL MALEATE 1 DROP(S): 20; 5 SOLUTION/ DROPS OPHTHALMIC at 21:46

## 2023-01-01 RX ADMIN — CLOBAZAM 5 MILLIGRAM(S): 10 TABLET ORAL at 11:23

## 2023-01-01 RX ADMIN — Medication 250 MILLIGRAM(S): at 13:35

## 2023-01-01 RX ADMIN — LATANOPROST 1 DROP(S): 0.05 SOLUTION/ DROPS OPHTHALMIC; TOPICAL at 22:34

## 2023-01-01 RX ADMIN — DORZOLAMIDE HYDROCHLORIDE TIMOLOL MALEATE 1 DROP(S): 20; 5 SOLUTION/ DROPS OPHTHALMIC at 10:20

## 2023-01-01 RX ADMIN — Medication 3 MILLIGRAM(S): at 21:54

## 2023-01-01 RX ADMIN — Medication 3 MILLILITER(S): at 20:23

## 2023-01-01 RX ADMIN — Medication 50 MILLIGRAM(S): at 08:56

## 2023-01-01 RX ADMIN — LEVETIRACETAM 400 MILLIGRAM(S): 250 TABLET, FILM COATED ORAL at 09:34

## 2023-01-01 RX ADMIN — OLANZAPINE 5 MILLIGRAM(S): 15 TABLET, FILM COATED ORAL at 10:26

## 2023-01-01 RX ADMIN — LEVETIRACETAM 400 MILLIGRAM(S): 250 TABLET, FILM COATED ORAL at 10:20

## 2023-01-01 RX ADMIN — Medication 3 MILLILITER(S): at 21:41

## 2023-01-01 RX ADMIN — PANTOPRAZOLE SODIUM 40 MILLIGRAM(S): 20 TABLET, DELAYED RELEASE ORAL at 06:07

## 2023-01-01 RX ADMIN — Medication 650 MILLIGRAM(S): at 14:41

## 2023-01-01 RX ADMIN — HEPARIN SODIUM 5000 UNIT(S): 5000 INJECTION INTRAVENOUS; SUBCUTANEOUS at 22:20

## 2023-01-01 RX ADMIN — PRIMIDONE 250 MILLIGRAM(S): 250 TABLET ORAL at 11:07

## 2023-01-01 RX ADMIN — DORZOLAMIDE HYDROCHLORIDE TIMOLOL MALEATE 1 DROP(S): 20; 5 SOLUTION/ DROPS OPHTHALMIC at 22:29

## 2023-01-01 RX ADMIN — DORZOLAMIDE HYDROCHLORIDE TIMOLOL MALEATE 1 DROP(S): 20; 5 SOLUTION/ DROPS OPHTHALMIC at 09:50

## 2023-01-01 RX ADMIN — SODIUM CHLORIDE 50 MILLILITER(S): 9 INJECTION, SOLUTION INTRAVENOUS at 09:37

## 2023-01-01 RX ADMIN — LACTULOSE 10 GRAM(S): 10 SOLUTION ORAL at 09:10

## 2023-01-01 RX ADMIN — DORZOLAMIDE HYDROCHLORIDE TIMOLOL MALEATE 1 DROP(S): 20; 5 SOLUTION/ DROPS OPHTHALMIC at 23:47

## 2023-01-01 RX ADMIN — PIPERACILLIN AND TAZOBACTAM 25 GRAM(S): 4; .5 INJECTION, POWDER, LYOPHILIZED, FOR SOLUTION INTRAVENOUS at 16:35

## 2023-01-01 RX ADMIN — PIPERACILLIN AND TAZOBACTAM 25 GRAM(S): 4; .5 INJECTION, POWDER, LYOPHILIZED, FOR SOLUTION INTRAVENOUS at 15:14

## 2023-01-01 RX ADMIN — PIPERACILLIN AND TAZOBACTAM 25 GRAM(S): 4; .5 INJECTION, POWDER, LYOPHILIZED, FOR SOLUTION INTRAVENOUS at 23:17

## 2023-01-01 RX ADMIN — Medication 5 MILLILITER(S): at 11:56

## 2023-01-01 RX ADMIN — ENOXAPARIN SODIUM 40 MILLIGRAM(S): 100 INJECTION SUBCUTANEOUS at 08:54

## 2023-01-01 RX ADMIN — PIPERACILLIN AND TAZOBACTAM 200 GRAM(S): 4; .5 INJECTION, POWDER, LYOPHILIZED, FOR SOLUTION INTRAVENOUS at 15:15

## 2023-01-01 RX ADMIN — Medication 5 MILLILITER(S): at 23:05

## 2023-01-01 RX ADMIN — LATANOPROST 1 DROP(S): 0.05 SOLUTION/ DROPS OPHTHALMIC; TOPICAL at 23:49

## 2023-01-01 RX ADMIN — Medication 50 MILLIGRAM(S): at 11:14

## 2023-01-01 RX ADMIN — Medication 3 MILLILITER(S): at 14:29

## 2023-01-01 RX ADMIN — PANTOPRAZOLE SODIUM 40 MILLIGRAM(S): 20 TABLET, DELAYED RELEASE ORAL at 11:56

## 2023-01-01 RX ADMIN — LACTULOSE 10 GRAM(S): 10 SOLUTION ORAL at 23:13

## 2023-01-01 RX ADMIN — Medication 3 MILLILITER(S): at 09:34

## 2023-01-01 RX ADMIN — Medication 3 SPRAY(S): at 22:16

## 2023-01-01 RX ADMIN — SODIUM CHLORIDE 2100 MILLILITER(S): 9 INJECTION INTRAMUSCULAR; INTRAVENOUS; SUBCUTANEOUS at 12:35

## 2023-01-01 RX ADMIN — LATANOPROST 1 DROP(S): 0.05 SOLUTION/ DROPS OPHTHALMIC; TOPICAL at 21:53

## 2023-01-01 RX ADMIN — LEVETIRACETAM 400 MILLIGRAM(S): 250 TABLET, FILM COATED ORAL at 21:58

## 2023-01-01 RX ADMIN — Medication 3 SPRAY(S): at 23:17

## 2023-01-01 RX ADMIN — OLANZAPINE 5 MILLIGRAM(S): 15 TABLET, FILM COATED ORAL at 10:23

## 2023-01-01 RX ADMIN — OLANZAPINE 5 MILLIGRAM(S): 15 TABLET, FILM COATED ORAL at 08:58

## 2023-01-01 RX ADMIN — PIPERACILLIN AND TAZOBACTAM 25 GRAM(S): 4; .5 INJECTION, POWDER, LYOPHILIZED, FOR SOLUTION INTRAVENOUS at 21:27

## 2023-01-01 RX ADMIN — Medication 50 MILLIGRAM(S): at 09:31

## 2023-01-01 RX ADMIN — LEVETIRACETAM 400 MILLIGRAM(S): 250 TABLET, FILM COATED ORAL at 10:18

## 2023-01-01 RX ADMIN — CLOBAZAM 5 MILLIGRAM(S): 10 TABLET ORAL at 08:57

## 2023-01-01 RX ADMIN — Medication 50 MILLIGRAM(S): at 21:45

## 2023-01-01 RX ADMIN — SODIUM CHLORIDE 50 MILLILITER(S): 9 INJECTION, SOLUTION INTRAVENOUS at 05:16

## 2023-01-01 RX ADMIN — OLANZAPINE 5 MILLIGRAM(S): 15 TABLET, FILM COATED ORAL at 21:57

## 2023-01-01 RX ADMIN — PRIMIDONE 250 MILLIGRAM(S): 250 TABLET ORAL at 08:58

## 2023-01-01 RX ADMIN — Medication 3 MILLILITER(S): at 08:45

## 2023-01-01 RX ADMIN — OLANZAPINE 5 MILLIGRAM(S): 15 TABLET, FILM COATED ORAL at 09:10

## 2023-01-01 RX ADMIN — Medication 50 MILLIGRAM(S): at 23:49

## 2023-01-01 RX ADMIN — DORZOLAMIDE HYDROCHLORIDE TIMOLOL MALEATE 1 DROP(S): 20; 5 SOLUTION/ DROPS OPHTHALMIC at 09:33

## 2023-01-01 RX ADMIN — CLOBAZAM 5 MILLIGRAM(S): 10 TABLET ORAL at 00:32

## 2023-01-01 RX ADMIN — PIPERACILLIN AND TAZOBACTAM 25 GRAM(S): 4; .5 INJECTION, POWDER, LYOPHILIZED, FOR SOLUTION INTRAVENOUS at 14:10

## 2023-01-01 RX ADMIN — Medication 3 SPRAY(S): at 00:07

## 2023-01-01 RX ADMIN — LATANOPROST 1 DROP(S): 0.05 SOLUTION/ DROPS OPHTHALMIC; TOPICAL at 21:46

## 2023-01-01 RX ADMIN — Medication 403.08 MILLIGRAM(S): at 12:59

## 2023-01-01 RX ADMIN — DORZOLAMIDE HYDROCHLORIDE TIMOLOL MALEATE 1 DROP(S): 20; 5 SOLUTION/ DROPS OPHTHALMIC at 22:03

## 2023-01-01 RX ADMIN — Medication 5 MILLILITER(S): at 11:35

## 2023-01-01 RX ADMIN — Medication 403.08 MILLIGRAM(S): at 23:05

## 2023-01-01 RX ADMIN — Medication 403.08 MILLIGRAM(S): at 23:15

## 2023-01-01 RX ADMIN — SODIUM CHLORIDE 50 MILLILITER(S): 9 INJECTION, SOLUTION INTRAVENOUS at 22:27

## 2023-01-01 RX ADMIN — Medication 3 SPRAY(S): at 20:08

## 2023-01-01 RX ADMIN — CLOBAZAM 5 MILLIGRAM(S): 10 TABLET ORAL at 21:55

## 2023-01-01 RX ADMIN — Medication 403.08 MILLIGRAM(S): at 23:04

## 2023-01-01 RX ADMIN — OLANZAPINE 5 MILLIGRAM(S): 15 TABLET, FILM COATED ORAL at 22:55

## 2023-01-01 RX ADMIN — DORZOLAMIDE HYDROCHLORIDE TIMOLOL MALEATE 1 DROP(S): 20; 5 SOLUTION/ DROPS OPHTHALMIC at 23:13

## 2023-01-01 RX ADMIN — OLANZAPINE 5 MILLIGRAM(S): 15 TABLET, FILM COATED ORAL at 21:47

## 2023-01-01 RX ADMIN — PRIMIDONE 250 MILLIGRAM(S): 250 TABLET ORAL at 21:50

## 2023-01-01 RX ADMIN — ESCITALOPRAM OXALATE 10 MILLIGRAM(S): 10 TABLET, FILM COATED ORAL at 09:19

## 2023-01-01 RX ADMIN — Medication 50 MILLIGRAM(S): at 09:09

## 2023-01-01 RX ADMIN — LEVETIRACETAM 400 MILLIGRAM(S): 250 TABLET, FILM COATED ORAL at 10:41

## 2023-01-01 RX ADMIN — Medication 50 MILLIGRAM(S): at 09:49

## 2023-01-01 RX ADMIN — PANTOPRAZOLE SODIUM 40 MILLIGRAM(S): 20 TABLET, DELAYED RELEASE ORAL at 06:09

## 2023-01-01 RX ADMIN — OLANZAPINE 5 MILLIGRAM(S): 15 TABLET, FILM COATED ORAL at 22:05

## 2023-01-01 RX ADMIN — Medication 200 MILLIGRAM(S): at 18:14

## 2023-01-01 RX ADMIN — PRIMIDONE 250 MILLIGRAM(S): 250 TABLET ORAL at 09:49

## 2023-01-01 RX ADMIN — DORZOLAMIDE HYDROCHLORIDE TIMOLOL MALEATE 1 DROP(S): 20; 5 SOLUTION/ DROPS OPHTHALMIC at 22:13

## 2023-01-01 RX ADMIN — OLANZAPINE 5 MILLIGRAM(S): 15 TABLET, FILM COATED ORAL at 09:11

## 2023-01-01 RX ADMIN — Medication 403.08 MILLIGRAM(S): at 23:25

## 2023-01-01 RX ADMIN — OLANZAPINE 5 MILLIGRAM(S): 15 TABLET, FILM COATED ORAL at 23:50

## 2023-01-01 RX ADMIN — LAMOTRIGINE 300 MILLIGRAM(S): 25 TABLET, ORALLY DISINTEGRATING ORAL at 00:31

## 2023-01-01 RX ADMIN — Medication 10 MILLIGRAM(S): at 11:27

## 2023-01-01 RX ADMIN — PANTOPRAZOLE SODIUM 40 MILLIGRAM(S): 20 TABLET, DELAYED RELEASE ORAL at 10:46

## 2023-01-01 RX ADMIN — Medication 403.08 MILLIGRAM(S): at 10:02

## 2023-01-01 RX ADMIN — Medication 1 MILLIGRAM(S): at 11:01

## 2023-01-01 RX ADMIN — Medication 5 MILLILITER(S): at 23:03

## 2023-01-01 RX ADMIN — Medication 1 MILLIGRAM(S): at 23:02

## 2023-01-01 RX ADMIN — Medication 1 EACH: at 00:25

## 2023-01-01 RX ADMIN — LEVETIRACETAM 400 MILLIGRAM(S): 250 TABLET, FILM COATED ORAL at 22:26

## 2023-01-01 RX ADMIN — SODIUM CHLORIDE 50 MILLILITER(S): 9 INJECTION, SOLUTION INTRAVENOUS at 10:47

## 2023-01-01 RX ADMIN — LEVETIRACETAM 400 MILLIGRAM(S): 250 TABLET, FILM COATED ORAL at 09:25

## 2023-01-01 RX ADMIN — Medication 403.08 MILLIGRAM(S): at 10:23

## 2023-01-01 RX ADMIN — Medication 5 MILLILITER(S): at 05:35

## 2023-01-01 RX ADMIN — OLANZAPINE 5 MILLIGRAM(S): 15 TABLET, FILM COATED ORAL at 00:31

## 2023-01-01 RX ADMIN — LAMOTRIGINE 300 MILLIGRAM(S): 25 TABLET, ORALLY DISINTEGRATING ORAL at 21:45

## 2023-01-01 RX ADMIN — CLOBAZAM 10 MILLIGRAM(S): 10 TABLET ORAL at 22:12

## 2023-01-01 RX ADMIN — LACTULOSE 10 GRAM(S): 10 SOLUTION ORAL at 08:58

## 2023-01-01 RX ADMIN — SODIUM CHLORIDE 50 MILLILITER(S): 9 INJECTION, SOLUTION INTRAVENOUS at 22:24

## 2023-01-01 RX ADMIN — LAMOTRIGINE 300 MILLIGRAM(S): 25 TABLET, ORALLY DISINTEGRATING ORAL at 23:49

## 2023-01-01 RX ADMIN — Medication 5 MILLILITER(S): at 11:07

## 2023-01-01 RX ADMIN — Medication 200 MILLIGRAM(S): at 23:48

## 2023-01-01 RX ADMIN — Medication 1 MILLIGRAM(S): at 10:14

## 2023-01-01 RX ADMIN — Medication 200 MILLIGRAM(S): at 23:17

## 2023-01-01 RX ADMIN — Medication 403.08 MILLIGRAM(S): at 09:04

## 2023-01-01 RX ADMIN — DORZOLAMIDE HYDROCHLORIDE TIMOLOL MALEATE 1 DROP(S): 20; 5 SOLUTION/ DROPS OPHTHALMIC at 10:41

## 2023-01-01 RX ADMIN — DORZOLAMIDE HYDROCHLORIDE TIMOLOL MALEATE 1 DROP(S): 20; 5 SOLUTION/ DROPS OPHTHALMIC at 21:56

## 2023-01-01 RX ADMIN — Medication 1 MILLIGRAM(S): at 09:54

## 2023-01-01 RX ADMIN — CLOBAZAM 5 MILLIGRAM(S): 10 TABLET ORAL at 09:48

## 2023-01-01 RX ADMIN — PIPERACILLIN AND TAZOBACTAM 25 GRAM(S): 4; .5 INJECTION, POWDER, LYOPHILIZED, FOR SOLUTION INTRAVENOUS at 06:09

## 2023-01-01 RX ADMIN — LEVETIRACETAM 400 MILLIGRAM(S): 250 TABLET, FILM COATED ORAL at 09:12

## 2023-01-01 RX ADMIN — ENOXAPARIN SODIUM 40 MILLIGRAM(S): 100 INJECTION SUBCUTANEOUS at 09:19

## 2023-01-01 RX ADMIN — LEVETIRACETAM 400 MILLIGRAM(S): 250 TABLET, FILM COATED ORAL at 23:05

## 2023-01-01 RX ADMIN — ENOXAPARIN SODIUM 40 MILLIGRAM(S): 100 INJECTION SUBCUTANEOUS at 09:09

## 2023-01-01 RX ADMIN — LAMOTRIGINE 300 MILLIGRAM(S): 25 TABLET, ORALLY DISINTEGRATING ORAL at 09:09

## 2023-01-01 RX ADMIN — LEVETIRACETAM 400 MILLIGRAM(S): 250 TABLET, FILM COATED ORAL at 00:25

## 2023-01-01 RX ADMIN — Medication 100 MILLIEQUIVALENT(S): at 10:30

## 2023-01-01 RX ADMIN — PIPERACILLIN AND TAZOBACTAM 25 GRAM(S): 4; .5 INJECTION, POWDER, LYOPHILIZED, FOR SOLUTION INTRAVENOUS at 06:00

## 2023-01-01 RX ADMIN — SODIUM CHLORIDE 50 MILLILITER(S): 9 INJECTION, SOLUTION INTRAVENOUS at 05:49

## 2023-01-01 RX ADMIN — DORZOLAMIDE HYDROCHLORIDE TIMOLOL MALEATE 1 DROP(S): 20; 5 SOLUTION/ DROPS OPHTHALMIC at 11:27

## 2023-01-01 RX ADMIN — OLANZAPINE 5 MILLIGRAM(S): 15 TABLET, FILM COATED ORAL at 21:59

## 2023-01-01 RX ADMIN — LAMOTRIGINE 300 MILLIGRAM(S): 25 TABLET, ORALLY DISINTEGRATING ORAL at 22:15

## 2023-01-01 RX ADMIN — Medication 200 MILLIGRAM(S): at 20:09

## 2023-01-01 RX ADMIN — PANTOPRAZOLE SODIUM 40 MILLIGRAM(S): 20 TABLET, DELAYED RELEASE ORAL at 06:20

## 2023-01-01 RX ADMIN — Medication 3 SPRAY(S): at 09:18

## 2023-01-01 RX ADMIN — ESCITALOPRAM OXALATE 10 MILLIGRAM(S): 10 TABLET, FILM COATED ORAL at 11:13

## 2023-01-01 RX ADMIN — DORZOLAMIDE HYDROCHLORIDE TIMOLOL MALEATE 1 DROP(S): 20; 5 SOLUTION/ DROPS OPHTHALMIC at 22:44

## 2023-01-01 RX ADMIN — SODIUM CHLORIDE 50 MILLILITER(S): 9 INJECTION, SOLUTION INTRAVENOUS at 09:27

## 2023-01-01 RX ADMIN — ENOXAPARIN SODIUM 40 MILLIGRAM(S): 100 INJECTION SUBCUTANEOUS at 09:49

## 2023-01-01 RX ADMIN — Medication 3 SPRAY(S): at 14:41

## 2023-01-01 RX ADMIN — Medication 3 SPRAY(S): at 22:25

## 2023-01-01 RX ADMIN — AZITHROMYCIN 255 MILLIGRAM(S): 500 TABLET, FILM COATED ORAL at 12:10

## 2023-01-01 RX ADMIN — PIPERACILLIN AND TAZOBACTAM 25 GRAM(S): 4; .5 INJECTION, POWDER, LYOPHILIZED, FOR SOLUTION INTRAVENOUS at 14:58

## 2023-01-01 RX ADMIN — LEVETIRACETAM 400 MILLIGRAM(S): 250 TABLET, FILM COATED ORAL at 22:21

## 2023-01-01 RX ADMIN — Medication 403.08 MILLIGRAM(S): at 09:35

## 2023-01-01 RX ADMIN — Medication 5 MILLILITER(S): at 17:24

## 2023-01-01 RX ADMIN — ENOXAPARIN SODIUM 40 MILLIGRAM(S): 100 INJECTION SUBCUTANEOUS at 10:28

## 2023-01-01 RX ADMIN — DORZOLAMIDE HYDROCHLORIDE TIMOLOL MALEATE 1 DROP(S): 20; 5 SOLUTION/ DROPS OPHTHALMIC at 22:00

## 2023-01-01 RX ADMIN — Medication 5 MILLILITER(S): at 06:14

## 2023-01-01 RX ADMIN — LACTULOSE 10 GRAM(S): 10 SOLUTION ORAL at 22:14

## 2023-01-01 RX ADMIN — Medication 1 MILLIGRAM(S): at 10:30

## 2023-01-01 RX ADMIN — Medication 3 MILLIGRAM(S): at 23:14

## 2023-01-01 RX ADMIN — Medication 40 MILLIEQUIVALENT(S): at 16:35

## 2023-01-01 RX ADMIN — DORZOLAMIDE HYDROCHLORIDE TIMOLOL MALEATE 1 DROP(S): 20; 5 SOLUTION/ DROPS OPHTHALMIC at 22:52

## 2023-01-01 RX ADMIN — CLOBAZAM 10 MILLIGRAM(S): 10 TABLET ORAL at 21:46

## 2023-01-01 RX ADMIN — PIPERACILLIN AND TAZOBACTAM 25 GRAM(S): 4; .5 INJECTION, POWDER, LYOPHILIZED, FOR SOLUTION INTRAVENOUS at 13:43

## 2023-01-01 RX ADMIN — Medication 200 MILLIGRAM(S): at 05:59

## 2023-01-01 RX ADMIN — PRIMIDONE 250 MILLIGRAM(S): 250 TABLET ORAL at 09:33

## 2023-01-01 RX ADMIN — Medication 3 SPRAY(S): at 09:45

## 2023-01-01 RX ADMIN — SODIUM CHLORIDE 50 MILLILITER(S): 9 INJECTION, SOLUTION INTRAVENOUS at 17:14

## 2023-01-01 RX ADMIN — Medication 2 MILLIGRAM(S): at 09:12

## 2023-01-01 RX ADMIN — Medication 1 MILLIGRAM(S): at 10:45

## 2023-01-01 RX ADMIN — LEVETIRACETAM 400 MILLIGRAM(S): 250 TABLET, FILM COATED ORAL at 21:53

## 2023-01-01 RX ADMIN — LATANOPROST 1 DROP(S): 0.05 SOLUTION/ DROPS OPHTHALMIC; TOPICAL at 23:12

## 2023-01-01 RX ADMIN — HEPARIN SODIUM 5000 UNIT(S): 5000 INJECTION INTRAVENOUS; SUBCUTANEOUS at 10:03

## 2023-01-01 RX ADMIN — OLANZAPINE 5 MILLIGRAM(S): 15 TABLET, FILM COATED ORAL at 10:41

## 2023-01-01 RX ADMIN — Medication 5 MILLILITER(S): at 23:37

## 2023-01-01 RX ADMIN — Medication 5 MILLILITER(S): at 17:15

## 2023-01-01 RX ADMIN — Medication 1 MILLIGRAM(S): at 22:02

## 2023-01-01 RX ADMIN — HEPARIN SODIUM 5000 UNIT(S): 5000 INJECTION INTRAVENOUS; SUBCUTANEOUS at 10:41

## 2023-01-01 RX ADMIN — ESCITALOPRAM OXALATE 10 MILLIGRAM(S): 10 TABLET, FILM COATED ORAL at 08:55

## 2023-02-17 NOTE — ED PROVIDER NOTE - CRITICAL CARE ATTENDING CONTRIBUTION TO CARE
La SALES M.D. independently provided 35 minutes of critical care including but not limited to talking to consultants, speaking with patient and family and reviewing lab and radiology results.

## 2023-02-17 NOTE — H&P ADULT - NSHPLABSRESULTS_GEN_ALL_CORE
13.2   7.22  )-----------( 245      ( 2023 09:12 )             40.5         139  |  107  |  9   ----------------------------<  112<H>  4.3   |  29  |  0.98    Ca    8.5      2023 09:12    TPro  7.6  /  Alb  3.4  /  TBili  0.2  /  DBili  x   /  AST  19  /  ALT  26  /  AlkPhos  186<H>      CAPILLARY BLOOD GLUCOSE  112 (2023 09:12)      POCT Blood Glucose.: 94 mg/dL (2023 08:45)    PT/INR - ( 2023 09:12 )   PT: 13.0 sec;   INR: 1.12 ratio         PTT - ( 2023 09:12 )  PTT:29.0 sec  Urinalysis Basic - ( 2023 12:57 )    Color: Yellow / Appearance: Clear / S.005 / pH: x  Gluc: x / Ketone: Negative  / Bili: Negative / Urobili: Negative   Blood: x / Protein: Negative / Nitrite: Negative   Leuk Esterase: Negative / RBC: x / WBC x   Sq Epi: x / Non Sq Epi: x / Bacteria: x 13.2   7.22  )-----------( 245      ( 2023 09:12 )             40.5         139  |  107  |  9   ----------------------------<  112<H>  4.3   |  29  |  0.98    Ca    8.5      2023 09:12    TPro  7.6  /  Alb  3.4  /  TBili  0.2  /  DBili  x   /  AST  19  /  ALT  26  /  AlkPhos  186<H>      CAPILLARY BLOOD GLUCOSE  112 (2023 09:12)      POCT Blood Glucose.: 94 mg/dL (2023 08:45)    PT/INR - ( 2023 09:12 )   PT: 13.0 sec;   INR: 1.12 ratio         PTT - ( 2023 09:12 )  PTT:29.0 sec  Urinalysis Basic - ( 2023 12:57 )    Color: Yellow / Appearance: Clear / S.005 / pH: x  Gluc: x / Ketone: Negative  / Bili: Negative / Urobili: Negative   Blood: x / Protein: Negative / Nitrite: Negative   Leuk Esterase: Negative / RBC: x / WBC x   Sq Epi: x / Non Sq Epi: x / Bacteria: x    < from: CT Brain Perfusion Maps Stroke (23 @ 09:24) >      IMPRESSION:    CT brain:  No hydrocephalus, acute intracranial hemorrhage, mass effect, or brain   edema.    Postsurgical changes as described.    CT brain perfusion:  No perfusion abnormality.    CTA brain:  No flow-limiting stenosis or vascular aneurysm. No AVM.    CTA neck:  No flow-limiting stenosis, evidence for arterial dissection, or vascular   aneurysm.    Dr. Connolly discussed these findings with Dr. Adams on 2023 9:03 AM   with read back.    < end of copied text >

## 2023-02-17 NOTE — ED PROVIDER NOTE - OBJECTIVE STATEMENT
51 y/o male w/ a PMHx of blindness, BPH, GERD, hydrocephalus, neurogenic bladder, and epilepsy presents to the ED via EMS for lethargy and AMS. Per EMS, pt seemed normal at 7am, when checked around 8 pt was altered and was less responsive than usual so  was sent to ED for eval. Pt w/ no other complaints at this time. Hx limited secondary to AMS.

## 2023-02-17 NOTE — ED PROVIDER NOTE - NSICDXPASTMEDICALHX_GEN_ALL_CORE_FT
PAST MEDICAL HISTORY:  Aspiration pneumonia at age 25    BPH without obstruction/lower urinary tract symptoms     Chronic GERD     Elevated liver enzymes     Environmental Allergies     Epilepsy     Glaucoma on eye gtts    Glaucoma     Hiatal hernia     Hydrocephalus age 2 months    Impulse control disorder worsened when pt on Depakote, pt now off    Legally blind     Mentally disabled     Mild mental retardation     Osteoporosis     Sleep apnea not on CPAP    Viral pneumonia h/o

## 2023-02-17 NOTE — ED ADULT NURSE REASSESSMENT NOTE - NS ED NURSE REASSESS COMMENT FT1
at bedside with aunt and pt. Pt awake alert and oriented x4 at this time. Denies any pain or complaint. Updated with plan of care. Pending bed assignment. Will cont to monitor for safety and comfort.

## 2023-02-17 NOTE — STROKE CODE NOTE - NSMDCONSULT QTN_Y FT
Patient is a 52y old  Male who presents with a chief complaint of altered mental status    HPI: MR. Heredia is a 52 year old man with mild intellectual disability, mood disorder, GERD, seizures, scoliosis, osteopenia, SVT, glaucoma, neurogenic bladder, dysphagia, gait instability, cervical cord compression with myelopathy s/p spinal surgery,  shunt, VNS placement,  Mata's esophagus, hiatal hernia, gastritis, aspiration pneumonia. At 7 Am he was seen in his usual state of health. At baseline he is minimally verbal and can assist with his care. Shortly after, staff found him to be diaphoretic, lethargic, altered.      PAST MEDICAL & SURGICAL HISTORY:  Hydrocephalus  age 2 months      Legally blind      Glaucoma      Sleep apnea  not on CPAP      Epilepsy      Osteoporosis      Elevated liver enzymes      Environmental Allergies      Impulse control disorder  worsened when pt on Depakote, pt now off      Mild mental retardation      Hiatal hernia      Aspiration pneumonia  at age 25      Viral pneumonia  h/o      BPH without obstruction/lower urinary tract symptoms      Glaucoma  on eye gtts      Mentally disabled      Chronic GERD      S/P  Shunt  originally had shunt to lung, then revised to peritoneal shunt, has had multiple revisions      Epilepsy  s/p Vagal Nerve Stimulator Implant , 2007, last one in 2016      H/O craniotomy  at age 18      Heel cord contracture  s/p surgical correction b/l      History of tonsillectomy      S/p bilateral myringotomy with tube placement      Obstructed  shunt  revision of  shunt 2015      S/P  shunt          FAMILY HISTORY:  Family history of stroke (Mother)        Social Hx:  Nonsmoker, no drug or alcohol use    MEDICATIONS  (STANDING):  clobazam 5 mg bid  Diltiazem 120 mg/day  docusate 300 mg hs  dorzolamide-timolol eye drops  escitalopram 10 mg/day  fluticasone nasal spray  lactulose  Lamotrigine 300 mg BID  Latanoprost eye drops  Linzess 290 mg daily  Metoprolol 50 mg q12  motegrity 2 mg/day  olanzapine 5 mg bid  MCI  Calcium  Pantoprazole 40 mg/day  primidone 250 mg BID  senna  vitamin   c, vitamin D  polyethylene glycol  Prolia       Allergies    codeine (Hives)  codeine (Stomach Upset)  Depakote (Other)  seasonal allergies: nasal congestion (Other)    Intolerances        ROS: Pertinent positives in HPI, all other ROS were reviewed and are negative.      Vital Signs Last 24 Hrs  T(C): --  T(F): --  HR: --  BP: --  BP(mean): --  RR: --  SpO2: --            Constitutional: awake and alert.  HEENT: PERRLA, EOMI,   Neck: Supple.  Respiratory: Breath sounds are clear bilaterally  Cardiovascular: S1 and S2, regular / irregular rhythm  Gastrointestinal: soft, nontender  Extremities:  no edema  Vascular: Caritid Bruit - no  Musculoskeletal: no joint swelling/tenderness, no abnormal movements  Skin: No rashes    Neurological exam:  HF: A x O x 3. Appropriately interactive, normal affect. Speech fluent, No Aphasia or paraphasic errors. Naming /repetition intact   CN: GILLIAN, EOMI, VFF, facial sensation normal, no NLFD, tongue midline, Palate moves equally, SCM equal bilaterally  Motor: No pronator drift, Strength 5/5 in all 4 ext, normal bulk and tone, no tremor, rigidity or bradykinesia.    Sens: Intact to light touch / PP/ VS/ JS    Reflexes: Symmetric and normal . BJ 2+, BR 2+, KJ 2+, AJ 2+, downgoing toes b/l  Coord:  No FNFA, dysmetria, DAVID intact   Gait/Balance: Normal/Cannot test    NIHSS:          Labs:                 Radiology:  - CT Head:  - MRI brain  -MRA brain/Carotids  - EEG    A/P:    No IV tpa given because…    -ASA/PLAVIX  -Atorvastatin  -DVT prophylaxis  -Dysphagia screen  -Speech and swallow eval  -PT eval/ rehab eval    Mangement d/w Pt / family /     Total Critical Care Time spent: Patient is a 52y old  Male who presents with a chief complaint of altered mental status    HPI: MR. Heredia is a 52 year old man with mild intellectual disability, mood disorder, GERD, seizures, scoliosis, osteopenia, SVT, glaucoma, neurogenic bladder, dysphagia, gait instability, cervical cord compression with myelopathy s/p spinal surgery,  shunt, VNS placement,  Mata's esophagus, hiatal hernia, gastritis, aspiration pneumonia. At 7 Am he was seen in his usual state of health. At baseline he is minimally verbal and can assist with his care. Shortly after, staff found him to be diaphoretic, lethargic, altered.      PAST MEDICAL & SURGICAL HISTORY:  Hydrocephalus  age 2 months      Legally blind      Glaucoma      Sleep apnea  not on CPAP      Epilepsy      Osteoporosis      Elevated liver enzymes      Environmental Allergies      Impulse control disorder  worsened when pt on Depakote, pt now off      Mild mental retardation      Hiatal hernia      Aspiration pneumonia  at age 25      Viral pneumonia  h/o      BPH without obstruction/lower urinary tract symptoms      Glaucoma  on eye gtts      Mentally disabled      Chronic GERD      S/P  Shunt  originally had shunt to lung, then revised to peritoneal shunt, has had multiple revisions      Epilepsy  s/p Vagal Nerve Stimulator Implant , 2007, last one in 2016      H/O craniotomy  at age 18      Heel cord contracture  s/p surgical correction b/l      History of tonsillectomy      S/p bilateral myringotomy with tube placement      Obstructed  shunt  revision of  shunt 2015      S/P  shunt          FAMILY HISTORY:  Family history of stroke (Mother)        Social Hx:  Nonsmoker, no drug or alcohol use    MEDICATIONS  (STANDING):  clobazam 5 mg bid  Diltiazem 120 mg/day  docusate 300 mg hs  dorzolamide-timolol eye drops  escitalopram 10 mg/day  fluticasone nasal spray  lactulose  Lamotrigine 300 mg BID  Latanoprost eye drops  Linzess 290 mg daily  Metoprolol 50 mg q12  motegrity 2 mg/day  olanzapine 5 mg bid  MCI  Calcium  Pantoprazole 40 mg/day  primidone 250 mg BID  senna  vitamin   c, vitamin D  polyethylene glycol  Prolia       Allergies    codeine (Hives)  codeine (Stomach Upset)  Depakote (Other)  seasonal allergies: nasal congestion (Other)    Intolerances        ROS: Pertinent positives in HPI, all other ROS were reviewed and are negative.      Vital Signs Last 24 Hrs  T(C): --  T(F): --  HR: --  BP: --  BP(mean): --  RR: --  SpO2: --            Constitutional: awake and alert.  HEENT: PERRLA, EOMI,   Neck: Supple.  Respiratory: Breath sounds are clear bilaterally  Cardiovascular: S1 and S2, regular / irregular rhythm  Gastrointestinal: soft, nontender  Extremities:  no edema  Vascular: Caritid Bruit - no  Musculoskeletal: no joint swelling/tenderness, no abnormal movements  Skin: No rashes    Neurological exam:  HF: A x O x 3. Appropriately interactive, normal affect. Speech fluent, No Aphasia or paraphasic errors. Naming /repetition intact   CN: GILLIAN, EOMI, VFF, facial sensation normal, no NLFD, tongue midline, Palate moves equally, SCM equal bilaterally  Motor: No pronator drift, Strength 5/5 in all 4 ext, normal bulk and tone, no tremor, rigidity or bradykinesia.    Sens: Intact to light touch / PP/ VS/ JS    Reflexes: Symmetric and normal . BJ 2+, BR 2+, KJ 2+, AJ 2+, downgoing toes b/l  Coord:  No FNFA, dysmetria, DAVID intact   Gait/Balance: Normal/Cannot test    NIHSS:          Labs:                 Radiology:  CT head, CTA head and neck, CT perfusion 2/17/23:  CT brain:  No hydrocephalus, acute intracranial hemorrhage, mass effect, or brain   edema.    Postsurgical changes as described.    CT brain perfusion:  No perfusion abnormality.    CTA brain:  No flow-limiting stenosis or vascular aneurysm. No AVM.    CTA neck:  No flow-limiting stenosis, evidence for arterial dissection, or vascular   aneurysm. Patient is a 52y old  Male who presents with a chief complaint of altered mental status    HPI: MR. Heredia is a 52 year old man with mild intellectual disability, mood disorder, GERD, seizures, scoliosis, osteopenia, SVT, glaucoma, neurogenic bladder, dysphagia, gait instability, cervical cord compression with myelopathy s/p spinal surgery,  shunt, VNS placement,  Mata's esophagus, hiatal hernia, gastritis, aspiration pneumonia. At 7 Am he was seen in his usual state of health. At baseline he is minimally verbal and can assist with his care. Shortly after, staff found him to be diaphoretic, lethargic, altered.      PAST MEDICAL & SURGICAL HISTORY:  Hydrocephalus  age 2 months      Legally blind      Glaucoma      Sleep apnea  not on CPAP      Epilepsy      Osteoporosis      Elevated liver enzymes      Environmental Allergies      Impulse control disorder  worsened when pt on Depakote, pt now off      Mild mental retardation      Hiatal hernia      Aspiration pneumonia  at age 25      Viral pneumonia  h/o      BPH without obstruction/lower urinary tract symptoms      Glaucoma  on eye gtts      Mentally disabled      Chronic GERD      S/P  Shunt  originally had shunt to lung, then revised to peritoneal shunt, has had multiple revisions      Epilepsy  s/p Vagal Nerve Stimulator Implant , 2007, last one in 2016      H/O craniotomy  at age 18      Heel cord contracture  s/p surgical correction b/l      History of tonsillectomy      S/p bilateral myringotomy with tube placement      Obstructed  shunt  revision of  shunt 2015      S/P  shunt          FAMILY HISTORY:  Family history of stroke (Mother)        Social Hx:  Nonsmoker, no drug or alcohol use    MEDICATIONS  (STANDING):  clobazam 5 mg bid  Diltiazem 120 mg/day  docusate 300 mg hs  dorzolamide-timolol eye drops  escitalopram 10 mg/day  fluticasone nasal spray  lactulose  Lamotrigine 300 mg BID  Latanoprost eye drops  Linzess 290 mg daily  Metoprolol 50 mg q12  motegrity 2 mg/day  olanzapine 5 mg bid  MCI  Calcium  Pantoprazole 40 mg/day  primidone 250 mg BID  senna  vitamin   c, vitamin D  polyethylene glycol  Prolia       Allergies    codeine (Hives)  codeine (Stomach Upset)  Depakote (Other)  seasonal allergies: nasal congestion (Other)    Intolerances        ROS: Pertinent positives in HPI, all other ROS were reviewed and are negative.      Vital Signs Last 24 Hrs  T(C): --  T(F): --  HR: --  BP: --  BP(mean): --  RR: --  SpO2: --            Constitutional: awake and alert.  HEENT: PERRLA, EOMI,   Neck: Supple.  Respiratory: Breath sounds are clear bilaterally  Cardiovascular: S1 and S2, regular / irregular rhythm  Gastrointestinal: soft, nontender  Extremities:  no edema  Musculoskeletal: no joint swelling/tenderness, no abnormal movements  Skin: No rashes    Neurological exam:  HF: Obtunded, non verbal, groaning  CN: Pupils not reactive, blind, no facial weakness noted   Motor: moves all extremities against gravity  Sens: withdraws in all extremities  Reflexes: Symmetric and normal . BJ 1+, BR 1+, KJ 1+, plantars equivocal bilaterally  Coord:  No involuntary movements noted, otherwise unable to test  Gait/Balance: Unable to test    NIHSS: 19          Labs:                 Radiology:  CT head, CTA head and neck, CT perfusion 2/17/23:  CT brain:  No hydrocephalus, acute intracranial hemorrhage, mass effect, or brain   edema.    Postsurgical changes as described.    CT brain perfusion:  No perfusion abnormality.    CTA brain:  No flow-limiting stenosis or vascular aneurysm. No AVM.    CTA neck:  No flow-limiting stenosis, evidence for arterial dissection, or vascular   aneurysm.    A/P: 53 yo man with complex medical history including intellectual disability and seizures presenting with sudden altered mental status.  Code stroke was called.   IV thrombolytics were not given as etiology of clinical presentation was felt to be more likely a seizure.  -Check EEG  -f/u results of shunt series  -Labs including Urinalysis  -Continue clobazam 0.5 mg bid for now  -Continue Lamotrigine 300 mg BID for now  -Will review outpatient records and determine what adjustments should be made to the seizure medication regimen and will update as needed.    Dr. Patrick will cover neurology beginning 2/18 and will follow up as needed.

## 2023-02-17 NOTE — ED ADULT NURSE REASSESSMENT NOTE - NS ED NURSE REASSESS COMMENT FT1
Rectal temp 97.5 noted. Bear alvaro d/cooper at this time. Will cont to monitor for safety and comfort.

## 2023-02-17 NOTE — H&P ADULT - NSHPREVIEWOFSYSTEMS_GEN_ALL_CORE
ROS:  General:  No fevers, chills, or unexplained weight loss  Skin: No rash or bothersome skin lesions  Musculoskeletal: No arthalgias, myalgias or joint swelling  Eyes: No visual changes or eye pain  Ears: No hearing loss , otorrhea or ear pain  Nose, Mouth, Throat: No nasal congestion, rhinorrhea, oral lesions, postnasal drip or sore throat  Cardio: No chest pain or palpitations. no lower extremity edema. no syncope. no claudication.   Respiratory: No cough, shortness of breath or wheezing   GI: No diarrhea, constipation, blood in stools, abdominal pain, vomiting or heartburn  : No urinary frequency, hematuria, incontinence, or dysuria  Neurologic: No headaches, parasthesias, confusion, dysarthria or gait instability  Psychiatric:  No anxiety or depression  Lymphatic:  No easy bruising, easy bleeding or swollen glands  Allergic: No itching, sneezing , watery eyes, clear rhinorrhea or recurrent infections ROS:-- limited due to lethargy  General: lethargy  Skin: No rash or bothersome skin lesions  Musculoskeletal: No arthalgias, myalgias or joint swelling  Eyes: No visual changes or eye pain  Ears: No hearing loss , otorrhea or ear pain  Nose, Mouth, Throat: No nasal congestion, rhinorrhea, oral lesions, postnasal drip or sore throat  Cardio: No chest pain or palpitations. no lower extremity edema. no syncope. no claudication.   Respiratory: No cough, shortness of breath or wheezing   GI: No diarrhea, constipation, blood in stools, abdominal pain, vomiting or heartburn  : No urinary frequency, hematuria, incontinence, or dysuria  Neurologic: slurred speech  Psychiatric:  No anxiety or depression  Lymphatic:  No easy bruising, easy bleeding or swollen glands  Allergic: No itching, sneezing , watery eyes, clear rhinorrhea or recurrent infections

## 2023-02-17 NOTE — PHARMACOTHERAPY INTERVENTION NOTE - COMMENTS
Medication reconciliation completed.  Patient was unable to provide medication information, spoke to pt's Aunt/Caregiver at bedside and they provided current medication list; confirmed with Dr. [facility].

## 2023-02-17 NOTE — ED PROVIDER NOTE - PHYSICAL EXAMINATION
Constitutional: Middle aged male, appears older than actual age.   Eyes: Pupils unreactive as pt is blind at baseline.   Head: Normocephalic atraumatic  Mouth: oropharynx dry  Cardiac: regular rate   Resp: Lungs CTAB  GI: Abd s/nt/nd, no rebound or guarding.  Neuro:  awake, drowsy but follows commands, unable to complete full neuro exam  Skin: No rashes

## 2023-02-17 NOTE — STROKE CODE NOTE - MRS SCORE
Left message for patient to return phone call to clinic.    (4) Moderately severe disabilty.  Unable to attend to own bodily needs without assistance, and unable to walk unassisted.

## 2023-02-17 NOTE — ED ADULT TRIAGE NOTE - CHIEF COMPLAINT QUOTE
BIBEMS for lethargy and AMS, LKW 0700. as per EMS, at baseline pt follows basic commands and is able to ambulate. today pt is unable to ambulate and EMS reports the few words he said to them were "slurred". BGL obtained by . pt bedded directly to trauma, placed on oxygen and cardiac monitor. BGL 94. code stroke activated at 0845 as per evaluating MD. hx blindness, hydrocephalus, neurogenic bladder, epilepsy.

## 2023-02-17 NOTE — ED ADULT NURSE REASSESSMENT NOTE - NS ED NURSE REASSESS COMMENT FT1
Report taken at the change of shift at bedside. Pt awake alert and oriented x2 resting comfortably in bed with no acute distress noted. Rectal 96.2 noted. Dr. Michaud made aware and at bedside evaluating the pt.  Plan of care updated to pt and family at bedside.  Will cont to monitor for safety and comfort.

## 2023-02-17 NOTE — H&P ADULT - NSHPPHYSICALEXAM_GEN_ALL_CORE
PEx  T(C): 35.7 (02-17-23 @ 14:15), Max: 35.7 (02-17-23 @ 14:15)  HR: 83 (02-17-23 @ 14:15) (66 - 88)  BP: 100/74 (02-17-23 @ 14:15) (94/73 - 147/77)  RR: 18 (02-17-23 @ 14:15) (11 - 18)  SpO2: 98% (02-17-23 @ 11:45) (88% - 99%)  Wt(kg): --  General:     Well appearing, well nourished in no distress, no identifying marks , scars, or tattoos.  Skin: no rash or prominent lesions  Head: normocephalic, atraumatic     Sinuses: non-tender  Nose: no external lesions, mucosa non-inflamed, septum and turbinates normal  Throat: no erythema, exudates or lesions.  Neck: Supple without lymphadenopathy. Thyroid no thyromegaly, no palpable thyroid nodules, no palpable nodules or masses, carotid arteries no bruits.   Breasts: No palpable masses or lesions.  Heart: RRR, no murmur or gallop.  Normal S1, S2.  No S3, S4.   Lungs: CTA bilaterally, no wheezes, rhonchi, rales.  Breathing unlabored.   Chest wall: Normal insp   Abdomen:  Soft, NT/ND, normal bowel sounds, no HSM, no masses.  No peritoneal signs.   Back: spine normal without deformity or tenderness.  Normal ROM   : Exam normal.  no inguinal hernias.  Extremities: No deformities, clubbing, cyanosis, or edema.  Musculoskeletal: Normal gait and station. No decreased range of motion, instability, atrophy or abnormal strength or tone in the head, neck, spine, ribs, pelvis or extremities.   Neurologic: CN 2-12 normal. Sensation to pain, touch and proprioception normal. DTRs normal in upper and lower extremities. No pathologic reflexes.  Motor normal.  Psychiatric: Oriented X3, intact recent and remote memory, judgement and insight, normal mood and affect. PEx  T(C): 35.7 (02-17-23 @ 14:15), Max: 35.7 (02-17-23 @ 14:15)  HR: 83 (02-17-23 @ 14:15) (66 - 88)  BP: 100/74 (02-17-23 @ 14:15) (94/73 - 147/77)  RR: 18 (02-17-23 @ 14:15) (11 - 18)  SpO2: 98% (02-17-23 @ 11:45) (88% - 99%)    General:   ill appearing.  lethargic.  50% venti mask.    Skin: no rash or prominent lesions  Head: normocephalic, atraumatic     Sinuses: non-tender  Nose: no external lesions, mucosa non-inflamed, septum and turbinates normal  Throat: no erythema, exudates or lesions.  Neck: Supple without lymphadenopathy. Thyroid no thyromegaly, no palpable thyroid nodules, no palpable nodules or masses, carotid arteries no bruits.   Breasts: No palpable masses or lesions.  Heart: RRR, no murmur or gallop.  Normal S1, S2.  No S3, S4.   Lungs: coarse breath sounds.    Chest wall: Normal insp   Abdomen:  Soft, NT/ND, normal bowel sounds, no HSM, no masses.  No peritoneal signs.   Back: spine normal without deformity or tenderness.  Normal ROM   : Exam normal.  no inguinal hernias.  Extremities: trace edema  Musculoskeletal: Normal gait and station. No decreased range of motion, instability, atrophy or abnormal strength or tone in the head, neck, spine, ribs, pelvis or extremities.   Neurologic:lethargic.  moving all 4 extremities.    Psychiatric: lethargic.  makes some sounds to name.

## 2023-02-17 NOTE — ED PROVIDER NOTE - NSICDXPASTSURGICALHX_GEN_ALL_CORE_FT
PAST SURGICAL HISTORY:  Epilepsy s/p Vagal Nerve Stimulator Implant , 2007, last one in 2016    H/O craniotomy at age 18    Heel cord contracture s/p surgical correction b/l    History of tonsillectomy     Obstructed  shunt revision of  shunt 2015    S/p bilateral myringotomy with tube placement     S/P  shunt     S/P  Shunt originally had shunt to lung, then revised to peritoneal shunt, has had multiple revisions

## 2023-02-17 NOTE — H&P ADULT - HISTORY OF PRESENT ILLNESS
53 y/o male with PMHx of hydrocephalus (age 2 months) s/p multiple brain surgeries/ peritoneal shunt, legally blind, glaucoma, mild MR, PIERCE, Epilepsy (last seizure years ago), impulse control disorder, obesity, GERD and hx of aspiration PNA who presented to  with progressive lethargy since 2/14/ Valentines Day.  Patient's legal guardian is his Aunt, Christi Guy.  Patient lived with her for many years but now has been in group homes.  Patient presently in group home in [facility].  Patient's baseline status is he is able to communicate with his guardian on the phone.  They talk daily.  He is also able to ambulate around the group home but is in a wheelchair most of the time.  As per guardian, she noticed increased lethargy when she was trying to talk to him on the phone over the last 3 days.        PAST MEDICAL & SURGICAL HISTORY:  Hydrocephalus age 2 months  Legally blind  Glaucoma  Sleep apnea  not on CPAP  Epilepsy  Osteoporosis  Elevated liver enzymes  Environmental Allergies  Impulse control disorder  worsened when pt on Depakote, pt now off  Mild mental retardation  Hiatal hernia  Aspiration pneumonia  at age 25  Viral pneumonia  h/o  BPH without obstruction/lower urinary tract symptoms  Glaucoma  on eye gtt  Mentally disabled  Chronic GERD  S/P  Shunt  originally had shunt to lung, then revised to peritoneal shunt, has had multiple revisions  Epilepsy  s/p Vagal Nerve Stimulator Implant , 2007, last one in 2016  H/O craniotomy  at age 18  Heel cord contracture  s/p surgical correction b/l  History of tonsillectomy  S/p bilateral myringotomy with tube placement  Obstructed  shunt  revision of  shunt 2015  S/P  shunt      FAMILY HISTORY:  Family history of stroke (Mother)      Social History:      Allergies:    codeine (Hives)  codeine (Stomach Upset)  Depakote (Other)  seasonal allergies: nasal congestion (Other)   53 y/o male with PMHx of hydrocephalus (age 2 months) s/p multiple brain surgeries/ peritoneal shunt, legally blind, glaucoma, mild MR, PIERCE, Epilepsy (last seizure years ago), impulse control disorder, obesity, GERD and hx of aspiration PNA who presented to  with progressive lethargy since 2/14/ Valentines Day.  Patient's legal guardian is his Aunt, Christi Guy.  Patient lived with her for many years but now has been in group homes.  Patient presently in group home in [facility].  Patient's baseline status is he is able to communicate with his guardian on the phone.  They talk daily.  He is also able to ambulate around the group home but is in a wheelchair most of the time.  As per guardian, she noticed increased lethargy when she was trying to talk to him on the phone over the last 3 days.  Today, around 8am at the group home, patient found more altered, slurring words and diaphoretic and sent to the ER for evaluation.  As per guardian, patient did go to a pulmonologist on Thursday/ yesterday for clearance appointment prior to colonoscopy.  Upon arrival, patient was a code stroke.  CTA brain negative for acute infarct/ occlusion.  After further work up, patient was found to have bilateral PNA-- worse on right than left and hypothermia with temp of 95.  BP also soft in ER  systolic.  Patient also hypoxic and was on nonrebreather mask on arrival.  Patient was also seen by neuro and question was made to ? patient being postictal; however, as per aunt, patient has not had a seizure in many years.  Patient given IV ABX and 2.1 L NSS and admitted.        PAST MEDICAL & SURGICAL HISTORY:  Hydrocephalus age 2 months  Legally blind  Glaucoma  Sleep apnea  not on CPAP  Epilepsy  Osteoporosis  Elevated liver enzymes  Environmental Allergies  Impulse control disorder  worsened when pt on Depakote, pt now off  Mild mental retardation  Hiatal hernia  Aspiration pneumonia  at age 25  Viral pneumonia  h/o  BPH without obstruction/lower urinary tract symptoms  Glaucoma  on eye gtt  Mentally disabled  Chronic GERD  S/P  Shunt  originally had shunt to lung, then revised to peritoneal shunt, has had multiple revisions  Epilepsy  s/p Vagal Nerve Stimulator Implant , 2007, last one in 2016  H/O craniotomy  at age 18  Heel cord contracture  s/p surgical correction b/l  History of tonsillectomy  S/p bilateral myringotomy with tube placement  Obstructed  shunt  revision of  shunt 2015  S/P  shunt      FAMILY HISTORY:  Family history of stroke (Mother)      Social History:    Lives in group home.    No tob/ etoh use.        Allergies:    codeine (Hives)  codeine (Stomach Upset)  Depakote (Other)  seasonal allergies: nasal congestion (Other)

## 2023-02-17 NOTE — H&P ADULT - ASSESSMENT
51 y/o male with PMHx of hydrocephalus (age 2 months) s/p multiple brain surgeries/ peritoneal shunt, legally blind, glaucoma, mild MR, PIERCE, Epilepsy (last seizure years ago), impulse control disorder, obesity, GERD and hx of aspiration PNA who presented to  with progressive lethargy since 2/14/ Valentines Day.  Patient initially code stroke but CT negative and patient found to have signs/ sx of sepsis secondary to likely aspiration PNA.      #Acute Hypoxic Respiratory Failure secondary to Sepsis secondary to Aspiration PNA:    Patient with hypothermia/ hypotension/ hypoxia secondary to suspected aspiration PNA.    Treat for gram negative rods.    Vanco and Zosyn.    F/u pancultures.    Presently on 50% venti mask-- titrate.    Repeat ABG.  1st 7.28/ 34/ 69/ 32.    CXR reviewed-- extensive right infiltrates and left sided as well.    Follow hemodynamics after 2.1 L bolus.    Lactate WNL.      #Metabolic Encephalopathy:    Suspect related to hypoxia/ sepsis.    CT head negative.    Check EEG-- r/o possible seizure as cause for acute worsening MS this am.    Keppra as per neuro.    Neuro f/u.    Neuro checks.      #Hydrocephalus/ mild MR:    Baseline patient is able to talk on phone with aunt/ have conversation/ walk at group home.      #Epilepsy:    Cont home meds.      #GERD:  cont PPI.      #DVT Proph:  Lovenox.      #Advanced Directives/ Goals of Care:    Goals of care discussed with guardian Christi Guy.  Diagnosis/ Prognosis reviewed.  Guardian states she would want to proceed with making patient DNR/ DNI; however, as patient is MR/ group home patient will need to discuss further with [employer] [facility].  Relayed to JASWINDER Benavidez who will reach out to group home/ state to see if guardian can make changes to patient's advanced directives.    Total time spent disucssing goals of care:  26 minutes.   51 y/o male with PMHx of hydrocephalus (age 2 months) s/p multiple brain surgeries/ peritoneal shunt, legally blind, glaucoma, mild MR, PIERCE, Epilepsy (last seizure years ago), impulse control disorder, obesity, GERD and hx of aspiration PNA who presented to  with progressive lethargy since 2/14/ Valentines Day.  Patient initially code stroke but CT negative and patient found to have signs/ sx of sepsis secondary to likely aspiration PNA.      #Acute Hypoxic Respiratory Failure secondary to Sepsis secondary to Aspiration PNA:    Patient with hypothermia/ hypotension/ hypoxia secondary to suspected aspiration PNA.    Treat for gram negative rods.    Vanco and Zosyn.    F/u pancultures.    Presently on 50% venti mask-- titrate.    Repeat ABG.  1st 7.28/ 34/ 69/ 32.    CXR reviewed-- extensive right infiltrates and left sided as well.    Follow hemodynamics after 2.1 L bolus.    Lactate WNL.      #Metabolic Encephalopathy:    Suspect related to hypoxia/ sepsis.    CT head negative.    Check EEG-- r/o possible seizure as cause for acute worsening MS this am.    Keppra as per neuro.    Neuro f/u.    Neuro checks.      #Hydrocephalus/ mild MR:    Baseline patient is able to talk on phone with aunt/ have conversation/ walk at group home.      #Epilepsy:    Cont home meds.      #GERD:  cont PPI.      #DVT Proph:  Lovenox.      #Advanced Directives/ Goals of Care:    Goals of care discussed with guardian Christi Guy.  Diagnosis/ Prognosis reviewed.  Guardian states she would want to proceed with setting limitations on care (should would not want him to get CPR/ intubation); however, as patient is MR/ group home patient will need to discuss further with [employer] [facility].  Relayed to JASWINDER Benavidez who will reach out to group home/ state to see if guardian can make changes to patient's advanced directives.    CODE STATUS:  FULL CODE  Total time spent disucssing goals of care:  26 minutes.

## 2023-02-17 NOTE — PATIENT PROFILE ADULT - FALL HARM RISK - HARM RISK INTERVENTIONS
Assistance with ambulation/Assistance OOB with selected safe patient handling equipment/Communicate Risk of Fall with Harm to all staff/Discuss with provider need for PT consult/Monitor gait and stability/Reinforce activity limits and safety measures with patient and family/Tailored Fall Risk Interventions/Visual Cue: Yellow wristband and red socks/Bed in lowest position, wheels locked, appropriate side rails in place/Call bell, personal items and telephone in reach/Instruct patient to call for assistance before getting out of bed or chair/Non-slip footwear when patient is out of bed/Globe to call system/Physically safe environment - no spills, clutter or unnecessary equipment/Purposeful Proactive Rounding/Room/bathroom lighting operational, light cord in reach

## 2023-02-17 NOTE — ED PROVIDER NOTE - CLINICAL SUMMARY MEDICAL DECISION MAKING FREE TEXT BOX
53 y/o male w/ a PMHx of presents w/ AMS, pt has hx of seizures. Differential includes seizures vs CVA vs infectious process. Seizures is highest on differential at this point. Will call code STROKE, obtain CT, blood work, eval for seizure, discuss w/ neuro, and admit for further workup and treatment.

## 2023-02-18 NOTE — SWALLOW BEDSIDE ASSESSMENT ADULT - ORAL PHASE
Likely antereior lingual pumping to move the bolus aposteriorly because there was mild expulsiononto the lips of the bolus: reduced ability to maintain cohesive biolus. After swallow bolus was seen still in the mouth.

## 2023-02-18 NOTE — SWALLOW BEDSIDE ASSESSMENT ADULT - SWALLOW EVAL: RECOMMENDED DIET
Keep NPO fir the present. pt cannot safely take po , or sufficiently in this setting of lethargy and incomplete rousability.

## 2023-02-18 NOTE — SWALLOW BEDSIDE ASSESSMENT ADULT - SWALLOW EVAL: STRUCTURAL ABNORMALITIES
Note: pt has a very narrow maxilla with a highly arched palate;  teeth are crowded. Mandible is likewise very narrow. There is what appears to be a torus on the insice maxilla below the gum ridge and on the left.  At rest tongue sinks at the backof the mouth> Pt appears to be a mouth breather.

## 2023-02-18 NOTE — SWALLOW BEDSIDE ASSESSMENT ADULT - SWALLOW EVAL: DIAGNOSIS
Oral-pharyngeal dysphagia in a setting of multiple medical issues: as above.  now with bilateral pneumonia.  Lethargic and only briefly and incompletely rousable.

## 2023-02-18 NOTE — SWALLOW BEDSIDE ASSESSMENT ADULT - SLP GENERAL OBSERVATIONS
pt semi reclining in bed, turned slightly to the right; note pt has glaucoma and is legally blind:  pt was rousable with sternal rub , but when not stimulated, sank back to lethargy.  . pt semi reclining in bed, turned slightly to the right; note pt has glaucoma and is legally blind:  pt was rousable with sternal rub , but when not stimulated, sank back to lethargy. pt speaking, but hard to understand because of dysarthria and lethargy.  .

## 2023-02-18 NOTE — SWALLOW BEDSIDE ASSESSMENT ADULT - PHARYNGEAL PHASE
Slow onset of swallow: pt likely needs the bolus ot be over the tongue base for swallow to be initiated. No o vert s/s aspiration on puree and moderately thick , but pt's own adaptive manner of taking po makes him nore at risk for aspiration when he is lethargic or otherwise unwell.

## 2023-02-18 NOTE — SWALLOW BEDSIDE ASSESSMENT ADULT - COMMENTS
As per H&P: 53 y/o male with PMHx of hydrocephalus (age 2 months) s/p multiple brain surgeries/ peritoneal shunt, legally blind, glaucoma, mild MR, PIERCE, Epilepsy (last seizure years ago), impulse control disorder, obesity, GERD and hx of aspiration PNA who presented to  with progressive lethargy since 2/14/ Valentines Day.  Patient's legal guardian is his Aunt, Christi Guy.  Patient lived with her for many years but now has been in group homes.  Patient presently in group home in [facility].  Patient's baseline status is he is able to communicate with his guardian on the phone.  They talk daily.  He is also able to ambulate around the group home but is in a wheelchair most of the time.  As per guardian, she noticed increased lethargy when she was trying to talk to him on the phone over the last 3 days.  Today, around 8am at the group home, patient found more altered, slurring words and diaphoretic and sent to the ER for evaluation.  As per guardian, patient did go to a pulmonologist on Thursday/ yesterday for clearance appointment prior to colonoscopy.  Upon arrival, patient was a code stroke.  CTA brain negative for acute infarct/ occlusion.  After further work up, patient was found to have bilateral PNA-- worse on right than left and hypothermia with temp of 95.  BP also soft in ER  systolic.  Patient also hypoxic and was on nonrebreather mask on arrival.  Patient was also seen by neuro and question was made to ? patient being postictal; however, as per aunt, patient has not had a seizure in many years.  Patient given IV ABX and 2.1 L NSS and admitted.    pt is seen by Service for po intake.

## 2023-02-18 NOTE — PROGRESS NOTE ADULT - SUBJECTIVE AND OBJECTIVE BOX
HPI:  53 y/o man with PMHx of hydrocephalus (age 2 months) s/p multiple brain surgeries/ peritoneal shunt, legally blind, glaucoma, mild MR, PIERCE, Epilepsy (last seizure years ago), impulse control disorder, obesity, GERD and hx of aspiration PNA who presented to  with progressive lethargy since 2/14, in ED  was a code stroke.  CTA brain negative for acute infarct/ occlusion.  After further work up, patient was found to have bilateral PNA, hypothermia with temp of 95.  BP also soft in ER  systolic.  Patient also hypoxic and was on nonrebreather mask on arrival. Treated with IV ABX and 2.1 L NSS, admitted.      MEDICATIONS  (STANDING):  cloBAZam 5 milliGRAM(s) Oral two times a day  dorzolamide 2%/timolol 0.5% Ophthalmic Solution 1 Drop(s) Both EYES two times a day  doxycycline IVPB      doxycycline IVPB 100 milliGRAM(s) IV Intermittent every 12 hours  enoxaparin Injectable 40 milliGRAM(s) SubCutaneous every 24 hours  escitalopram 10 milliGRAM(s) Oral daily  lactulose Syrup 10 Gram(s) Oral two times a day  lamoTRIgine 300 milliGRAM(s) Oral two times a day  latanoprost 0.005% Ophthalmic Solution 1 Drop(s) Both EYES at bedtime  levETIRAcetam  IVPB 500 milliGRAM(s) IV Intermittent every 12 hours  metoprolol tartrate 50 milliGRAM(s) Oral two times a day  OLANZapine 5 milliGRAM(s) Oral two times a day  pantoprazole    Tablet 40 milliGRAM(s) Oral before breakfast  piperacillin/tazobactam IVPB.. 3.375 Gram(s) IV Intermittent every 8 hours  primidone 250 milliGRAM(s) Oral two times a day  senna 2 Tablet(s) Oral at bedtime  sodium chloride 0.9%. 1000 milliLiter(s) (75 mL/Hr) IV Continuous <Continuous>    MEDICATIONS  (PRN):  acetaminophen     Tablet .. 650 milliGRAM(s) Oral every 6 hours PRN Temp greater or equal to 38C (100.4F), Mild Pain (1 - 3)  aluminum hydroxide/magnesium hydroxide/simethicone Suspension 30 milliLiter(s) Oral every 4 hours PRN Dyspepsia  melatonin 3 milliGRAM(s) Oral at bedtime PRN Insomnia  ondansetron Injectable 4 milliGRAM(s) IV Push every 8 hours PRN Nausea and/or Vomiting      Vital Signs Last 24 Hrs  T(C): 36.4 (18 Feb 2023 08:32), Max: 36.7 (17 Feb 2023 18:43)  T(F): 97.5 (18 Feb 2023 08:32), Max: 98.1 (17 Feb 2023 18:43)  HR: 80 (18 Feb 2023 08:32) (70 - 88)  BP: 105/71 (18 Feb 2023 08:32) (94/73 - 147/77)  BP(mean): 83 (17 Feb 2023 15:44) (83 - 84)  RR: 18 (18 Feb 2023 08:32) (12 - 20)  SpO2: 95% (18 Feb 2023 08:32) (88% - 100%)    Parameters below as of 18 Feb 2023 08:32  Patient On (Oxygen Delivery Method): nasal cannula  O2 Flow (L/min): 2    Neurological Exam:    HF: Patient is alert, wants to get out of bed, fluent, follows simple commands.   CN:  Pupils are equal and reactive. Extra ocular muscles are intact. There is no facial droop. Sensation is intact in the face. Other CN II-XII are intact.   Motor: motor examination all muscles are ~4/5.   Sensory: intact to touch.   DTR: 0-1/4 all 4 extremities. Babinski is negative bilateral.  Co-ord:  Limited, poor cooperation.   Gait/balance: Cannot test.     Basic Metabolic Panel (02.18.23 @ 07:58)   Sodium, Serum: 143 mmol/L   Potassium, Serum: 3.5 mmol/L   Chloride, Serum: 111 mmol/L   Carbon Dioxide, Serum: 27 mmol/L   Anion Gap, Serum: 5 mmol/L   Blood Urea Nitrogen, Serum: 7 mg/dL   Creatinine, Serum: 0.95 mg/dL   Glucose, Serum: 92 mg/dL   Calcium, Total Serum: 8.1 mg/dL   eGFR: 96:    < from: CT Angio Neck Stroke Protocol w/ IV Cont (02.17.23 @ 09:38) >  IMPRESSION:    CT brain:  No hydrocephalus, acute intracranial hemorrhage, mass effect, or brain   edema.    Postsurgical changes as described.    CT brain perfusion:  No perfusion abnormality.    CTA brain:  No flow-limiting stenosis or vascular aneurysm. No AVM.    CTA neck:  No flow-limiting stenosis, evidence for arterial dissection, or vascular   aneurysm.    < end of copied text >

## 2023-02-18 NOTE — PROGRESS NOTE ADULT - ASSESSMENT
50 yo man mild MR BPH, glaucoma,  hx of hydrocephalus s/p  shunt, also has VNS, had VPS revisions presenting with change in MS, bilat PNA, on antibx.   CTH no significant findings, CTR angio no acute LVO. Likely metabolic encephalopathy,  no recent hx of seizures. On lamotrigine, Keppra added while NPO.   Suggest:  pending EEG    Keppra as long as NPO, when able to restart lamotrigine can d/c.     Supportive care as per medicine  Dr. Ray is on service from 2/20, can call if needed.

## 2023-02-18 NOTE — SWALLOW BEDSIDE ASSESSMENT ADULT - ORAL PREPARATORY PHASE
pt had to be roused for each presentation of bolus: pt has oral alerting response to presence of spoon at lip but did not immediately make lip seal., can follow directions to close mouth.. Note that on mouth openbing TONGUE IS LIFTED AND PULLED BACK TOWARDS THE SOF PALATE. AND BOLUS HAS TO BE PLACED INTO THE  SUBLINGUAL SPACE. ATTEMPT TO PLACE BOLUS ON THE UPPER TONMGUE SURFACE LED TO PT BEING UNABLE TO CONTROL FLOW AND PT HAD IMMEDIATE LARYNGEAL C LOSURE AND EXPULSION OF THE BOLUS (puree) FROM THE MOUTH.  Initial oral containment when bolus placed under the tonguue.

## 2023-02-18 NOTE — CONSULT NOTE ADULT - SUBJECTIVE AND OBJECTIVE BOX
51 y/o male with PMHx of hydrocephalus (age 2 months) s/p multiple brain surgeries/ peritoneal shunt, legally blind, glaucoma, mild MR, PIERCE, Epilepsy (last seizure years ago), impulse control disorder, obesity, GERD and hx of aspiration PNA who presented to  with progressive lethargy since 2/14/ Valentines Day.  Patient's legal guardian is his Aunt, Christi Guy.  Patient lived with her for many years but now has been in group homes.  Patient presently in group home in [facility].  Patient's baseline status is he is able to communicate with his guardian on the phone.  They talk daily.  He is also able to ambulate around the group home but is in a wheelchair most of the time.  As per guardian, she noticed increased lethargy when she was trying to talk to him on the phone over the last 3 days.  Today, around 8am at the group home, patient found more altered, slurring words and diaphoretic and sent to the ER for evaluation.  As per guardian, patient did go to a pulmonologist on Thursday/ yesterday for clearance appointment prior to colonoscopy.  Upon arrival, patient was a code stroke.  CTA brain negative for acute infarct/ occlusion.  After further work up, patient was found to have bilateral PNA-- worse on right than left and hypothermia with temp of 95.  BP also soft in ER  systolic.  Patient also hypoxic and was on nonrebreather mask on arrival.  Patient was also seen by neuro and question was made to ? patient being postictal; however, as per aunt, patient has not had a seizure in many years.  Patient given IV ABX and 2.1 L NSS and admitted.   On abdominal CT found to have evidence of pneumatosis of transverse colon. I have been asked to evaluate him regarding this.  PAST MEDICAL HISTORY:  Aspiration pneumonia at age 25    BPH without obstruction/lower urinary tract symptoms     Elevated liver enzymes     Environmental Allergies     Epilepsy     Glaucoma on eye gtts    Glaucoma     Hiatal hernia     Hydrocephalus age 2 months    Impulse control disorder worsened when pt on Depakote, pt now off    Legally blind     Mild mental retardation     Osteoporosis     Sleep apnea not on CPAP    Viral pneumonia h/o.     PAST SURGICAL HISTORY:  Epilepsy s/p Vagal Nerve Stimulator Implant , 2007, last one in 2016    H/O craniotomy at age 18    Heel cord contracture s/p surgical correction b/l    History of tonsillectomy     Obstructed  shunt revision of  shunt 2015    S/p bilateral myringotomy with tube placement     S/P  Shunt originally had shunt to lung, then revised to peritoneal shunt, has had multiple revisions.     FAMILY HISTORY:  Mother  Still living? Unknown  Family history of stroke, Age at diagnosis: Age Unknown.    Physical exam  VSS  51 yo male obese, in NAD  skin- warm,dry  HEENT- Pupils equal, sclerae anicteric  Neck- no JVD  Lungs- clearCor- RRR  Abd- + BS soft non tender, no guarding  Ext- no edema    < from: CT Abdomen and Pelvis No Cont (02.18.23 @ 11:55) >  ABDOMINAL WALL: Shunt catheter descends along the midline of the ventral   abdominal wall, entering the peritoneal cavity above the umbilicus..   Small bilateral fat-containing inguinal hernias. Left ventral abdominal   wall subcutaneous foci of gas, likely due to subcutaneous injections.  BONES: Degenerative changes. No acute fracture.    IMPRESSION:  Mild bilateral interlobular septal thickening may indicate mild pulmonary   interstitial edema.    No pneumothorax or hemothorax.    No pneumoperitoneum or ascites.    Pneumatosis of the transverse colon. Although there are no signs of   colonic inflammation recommend correlating with lactate level. Other   etiologies including benign etiologies should be considered.    < end of copied text >

## 2023-02-18 NOTE — PROGRESS NOTE ADULT - SUBJECTIVE AND OBJECTIVE BOX
CC: lethargy (2023 13:43)    HPI:  53 y/o male with PMHx of hydrocephalus (age 2 months) s/p multiple brain surgeries/ peritoneal shunt, legally blind, glaucoma, mild MR, PIERCE, Epilepsy (last seizure years ago), impulse control disorder, obesity, GERD and hx of aspiration PNA who presented to  with progressive lethargy since .   Patient's legal guardian is his Aunt, Christi Guy.  Patient lived with her for many years but now has been in group homes.  Patient presently in group home in [facility].  Patient's baseline status is he is able to communicate with his guardian on the phone.  They talk daily.  He is also able to ambulate around the group home but is in a wheelchair most of the time.  As per guardian, she noticed increased lethargy when she was trying to talk to him on the phone over the last 3 days.  Today, around 8am at the group home, patient found more altered, slurring words and diaphoretic and sent to the ER for evaluation.  As per guardian, patient did go to a pulmonologist on Thursday/ yesterday for clearance appointment prior to colonoscopy.  Upon arrival to , patient was a code stroke.  CTA brain negative for acute infarct/ occlusion.  After further work up, patient was found to have bilateral PNA-- worse on right than left and hypothermia with temp of 95.  BP also soft in ER  systolic.  Patient also hypoxic and was on nonrebreather mask on arrival.  Patient was also seen by neuro and question was made to ? patient being postictal; however, as per aunt, patient has not had a seizure in many years.  Patient given IV ABX and 2.1 L NS and admitted.        INTERVAL HPI/ OVERNIGHT EVENTS:    Vital Signs Last 24 Hrs  T(C): 36.4 (2023 08:32), Max: 36.7 (2023 18:43)  T(F): 97.5 (2023 08:32), Max: 98.1 (2023 18:43)  HR: 80 (2023 08:32) (70 - 88)  BP: 105/71 (2023 08:32) (94/73 - 147/77)  BP(mean): 83 (2023 15:44) (83 - 84)  RR: 18 (2023 08:32) (12 - 20)  SpO2: 95% (2023 08:32) (88% - 100%)    Parameters below as of 2023 08:32  Patient On (Oxygen Delivery Method): nasal cannula  O2 Flow (L/min): 2    I&O's Detail    2023 07:01  -  2023 07:00  --------------------------------------------------------  IN:  Total IN: 0 mL    OUT:    Voided (mL): 250 mL  Total OUT: 250 mL    Total NET: -250 mL        REVIEW OF SYSTEMS:    CONSTITUTIONAL: No weakness, fevers or chills  EYES/ENT: No visual changes;  No vertigo or throat pain   NECK: No pain or stiffness  RESPIRATORY: No cough, wheezing, hemoptysis; No shortness of breath  CARDIOVASCULAR: No chest pain or palpitations  GASTROINTESTINAL: No abdominal or epigastric pain. No nausea, vomiting, or hematemesis; No diarrhea or constipation. No melena or hematochezia.  GENITOURINARY: No dysuria, frequency or hematuria  NEUROLOGICAL: No numbness or weakness  SKIN: No itching, burning, rashes, or lesions   All other review of systems is negative unless indicated above.  PHYSICAL EXAM:    General: Well developed; well nourished; in no acute distress  Eyes: PERRLA, EOMI; conjunctiva and sclera clear  Head: Normocephalic; atraumatic  ENMT: No nasal discharge; airway clear  Neck: Supple; non tender; no masses  Respiratory: No wheezes, rales or rhonchi  Cardiovascular: Regular rate and rhythm. S1 and S2 Normal; No murmurs, gallops or rubs  Gastrointestinal: Soft non-tender non-distended; Normal bowel sounds  Genitourinary: No  suprapubic  tenderness  Extremities: Normal range of motion, No clubbing, cyanosis or edema  Vascular: Peripheral pulses palpable 2+ bilaterally  Neurological: Alert and oriented x4  Skin: Warm and dry. No acute rash  Lymph Nodes: No acute cervical adenopathy  Musculoskeletal: Normal muscle tone, without deformities  Psychiatric: Cooperative and appropriate                            13.0   5.46  )-----------( 219      ( 2023 07:58 )             40.6     2023 07:58    143    |  111    |  7      ----------------------------<  92     3.5     |  27     |  0.95     Ca    8.1        2023 07:58    TPro  7.6    /  Alb  3.4    /  TBili  0.2    /  DBili  x      /  AST  19     /  ALT  26     /  AlkPhos  186    2023 09:12    PT/INR - ( 2023 09:12 )   PT: 13.0 sec;   INR: 1.12 ratio         PTT - ( 2023 09:12 )  PTT:29.0 sec  CAPILLARY BLOOD GLUCOSE        LIVER FUNCTIONS - ( 2023 09:12 )  Alb: 3.4 g/dL / Pro: 7.6 gm/dL / ALK PHOS: 186 U/L / ALT: 26 U/L / AST: 19 U/L / GGT: x           Urinalysis Basic - ( 2023 12:57 )    Color: Yellow / Appearance: Clear / S.005 / pH: x  Gluc: x / Ketone: Negative  / Bili: Negative / Urobili: Negative   Blood: x / Protein: Negative / Nitrite: Negative   Leuk Esterase: Negative / RBC: x / WBC x   Sq Epi: x / Non Sq Epi: x / Bacteria: x        MEDICATIONS  (STANDING):  cloBAZam 5 milliGRAM(s) Oral two times a day  dorzolamide 2%/timolol 0.5% Ophthalmic Solution 1 Drop(s) Both EYES two times a day  doxycycline IVPB      doxycycline IVPB 100 milliGRAM(s) IV Intermittent every 12 hours  enoxaparin Injectable 40 milliGRAM(s) SubCutaneous every 24 hours  escitalopram 10 milliGRAM(s) Oral daily  lactulose Syrup 10 Gram(s) Oral two times a day  lamoTRIgine 300 milliGRAM(s) Oral two times a day  latanoprost 0.005% Ophthalmic Solution 1 Drop(s) Both EYES at bedtime  levETIRAcetam  IVPB 500 milliGRAM(s) IV Intermittent every 12 hours  metoprolol tartrate 50 milliGRAM(s) Oral two times a day  OLANZapine 5 milliGRAM(s) Oral two times a day  pantoprazole    Tablet 40 milliGRAM(s) Oral before breakfast  piperacillin/tazobactam IVPB.. 3.375 Gram(s) IV Intermittent every 8 hours  primidone 250 milliGRAM(s) Oral two times a day  senna 2 Tablet(s) Oral at bedtime  sodium chloride 0.9%. 1000 milliLiter(s) (75 mL/Hr) IV Continuous <Continuous>    MEDICATIONS  (PRN):  acetaminophen     Tablet .. 650 milliGRAM(s) Oral every 6 hours PRN Temp greater or equal to 38C (100.4F), Mild Pain (1 - 3)  aluminum hydroxide/magnesium hydroxide/simethicone Suspension 30 milliLiter(s) Oral every 4 hours PRN Dyspepsia  melatonin 3 milliGRAM(s) Oral at bedtime PRN Insomnia  ondansetron Injectable 4 milliGRAM(s) IV Push every 8 hours PRN Nausea and/or Vomiting      RADIOLOGY & ADDITIONAL TESTS: CC: lethargy (2023 13:43)    HPI:  51 y/o male with PMHx of hydrocephalus (age 2 months) s/p multiple brain surgeries/ peritoneal shunt, legally blind, glaucoma, mild MR, PIERCE, Epilepsy (last seizure years ago), impulse control disorder, obesity, GERD and hx of aspiration PNA who presented to  with progressive lethargy since .   Patient's legal guardian is his Aunt, Christi Guy.  Patient lived with her for many years but now has been in group homes.  Patient presently in group home in [facility].  Patient's baseline status is he is able to communicate with his guardian on the phone.  They talk daily.  He is also able to ambulate around the group home but is in a wheelchair most of the time.  As per guardian, she noticed increased lethargy when she was trying to talk to him on the phone over the last 3 days.  Today, around 8am at the group home, patient found more altered, slurring words and diaphoretic and sent to the ER for evaluation.  As per guardian, patient did go to a pulmonologist on Thursday/ yesterday for clearance appointment prior to colonoscopy.  Upon arrival to , patient was a code stroke.  CTA brain negative for acute infarct/ occlusion.  After further work up, patient was found to have bilateral PNA-- worse on right than left and hypothermia with temp of 95.  BP also soft in ER  systolic.  Patient also hypoxic and was on nonrebreather mask on arrival.  Patient was also seen by neuro and question was made to ? patient being postictal; however, as per aunt, patient has not had a seizure in many years.  Patient given IV ABX and 2.1 L NS and admitted.        INTERVAL HPI/ OVERNIGHT EVENTS: chart reviewed. Was called by RN that Pt was found on the floor next to his bed, unwitnessed fall.   On my assessment Pt was transferred  to bed,  pt is awake, poor historian due to confusion and garbled speech. Reports no headache. No other pain.   pt was also evaluated later, was more alert,   reported some SOB. More conversive but still difficult to understand. Trying to communicate with his Aunt. Results and POC discussed     Vital Signs Last 24 Hrs  T(C): 36.4 (2023 08:32), Max: 36.7 (2023 18:43)  T(F): 97.5 (2023 08:32), Max: 98.1 (2023 18:43)  HR: 80 (2023 08:32) (70 - 88)  BP: 105/71 (2023 08:32) (94/73 - 147/77)  BP(mean): 83 (2023 15:44) (83 - 84)  RR: 18 (2023 08:32) (12 - 20)  SpO2: 95% (2023 08:32) (88% - 100%)    Parameters below as of 2023 08:32  Patient On (Oxygen Delivery Method): nasal cannula  O2 Flow (L/min): 2    I&O's Detail  2023 07:01  -  2023 07:00  --------------------------------------------------------  IN:  Total IN: 0 mL    OUT:    Voided (mL): 250 mL  Total OUT: 250 mL  Total NET: -250 mL      REVIEW OF SYSTEMS:  Unable to obtain due to developmental delay and encephalopathy    PHYSICAL EXAM:  General:   in no acute distress  Eyes: L eye reactive to light, R eye with cataract, conjunctiva and sclera clear  Head:  oval shape s/p cranial expansion, no signs of trauma, no hematoma or ecchymosis no abrasion, no tenderness   ENMT: No nasal discharge; congested nasal passages   Respiratory: Decreased BS, no wheezing   Cardiovascular: Regular rate and rhythm. S1 and S2 Normal;   Gastrointestinal: Soft non-tender non-distended; Normal bowel sounds  Genitourinary: No  suprapubic  tenderness  Extremities: + LE edema  Vascular: Peripheral pulses palpable 2+ bilaterally  Neurological: Alert and oriented x 1, confused, Moves all extremities but doesn't follow commands. Speech is garbled ( better after mouth care), no facial asymmetry  Skin: Warm and dry. No acute rash  Musculoskeletal: Normal muscle tone, no ecchymoses, no abrasions,  or joint edema. full PROM, no pain       LABS:                         13.0   5.46  )-----------( 219      ( 2023 07:58 )             40.6     2023 07:58    143    |  111    |  7      ----------------------------<  92     3.5     |  27     |  0.95     Ca    8.1        2023 07:58    TPro  7.6    /  Alb  3.4    /  TBili  0.2    /  DBili  x      /  AST  19     /  ALT  26     /  AlkPhos  186    2023 09:12    PT/INR - ( 2023 09:12 )   PT: 13.0 sec;   INR: 1.12 ratio         PTT - ( 2023 09:12 )  PTT:29.0 sec  CAPILLARY BLOOD GLUCOSE        LIVER FUNCTIONS - ( 2023 09:12 )  Alb: 3.4 g/dL / Pro: 7.6 gm/dL / ALK PHOS: 186 U/L / ALT: 26 U/L / AST: 19 U/L / GGT: x           Urinalysis Basic - ( 2023 12:57 )    Color: Yellow / Appearance: Clear / S.005 / pH: x  Gluc: x / Ketone: Negative  / Bili: Negative / Urobili: Negative   Blood: x / Protein: Negative / Nitrite: Negative   Leuk Esterase: Negative / RBC: x / WBC x   Sq Epi: x / Non Sq Epi: x / Bacteria: x    Culture - Urine (23 @ 12:57)   Specimen Source: Clean Catch Clean Catch (Midstream)   Culture Results:   <10,000 CFU/mL Normal Urogenital Debbie   Culture - Blood (23 @ 11:28)   Specimen Source: .Blood None   Culture Results:   No growth to date.   MEDICATIONS  (STANDING):  cloBAZam 5 milliGRAM(s) Oral two times a day  dorzolamide 2%/timolol 0.5% Ophthalmic Solution 1 Drop(s) Both EYES two times a day  doxycycline IVPB      doxycycline IVPB 100 milliGRAM(s) IV Intermittent every 12 hours  enoxaparin Injectable 40 milliGRAM(s) SubCutaneous every 24 hours  escitalopram 10 milliGRAM(s) Oral daily  lactulose Syrup 10 Gram(s) Oral two times a day  lamoTRIgine 300 milliGRAM(s) Oral two times a day  latanoprost 0.005% Ophthalmic Solution 1 Drop(s) Both EYES at bedtime  levETIRAcetam  IVPB 500 milliGRAM(s) IV Intermittent every 12 hours  metoprolol tartrate 50 milliGRAM(s) Oral two times a day  OLANZapine 5 milliGRAM(s) Oral two times a day  pantoprazole    Tablet 40 milliGRAM(s) Oral before breakfast  piperacillin/tazobactam IVPB.. 3.375 Gram(s) IV Intermittent every 8 hours  primidone 250 milliGRAM(s) Oral two times a day  senna 2 Tablet(s) Oral at bedtime  sodium chloride 0.9%. 1000 milliLiter(s) (75 mL/Hr) IV Continuous <Continuous>    MEDICATIONS  (PRN):  acetaminophen     Tablet .. 650 milliGRAM(s) Oral every 6 hours PRN Temp greater or equal to 38C (100.4F), Mild Pain (1 - 3)  aluminum hydroxide/magnesium hydroxide/simethicone Suspension 30 milliLiter(s) Oral every 4 hours PRN Dyspepsia  melatonin 3 milliGRAM(s) Oral at bedtime PRN Insomnia  ondansetron Injectable 4 milliGRAM(s) IV Push every 8 hours PRN Nausea and/or Vomiting      RADIOLOGY & ADDITIONAL TESTS:    < from: CT Abdomen and Pelvis No Cont (23 @ 11:55) >  ACC: [id number] EXAM:  CT ABDOMEN AND PELVIS   ORDERED BY: WILDA HEALY     ACC: [id number] EXAM:  CT CHEST   ORDERED BY: WILDA HEALY     PROCEDURE DATE:  2023        < from: CT Abdomen and Pelvis No Cont (23 @ 11:55) >  FINDINGS:  CHEST:  LUNGS AND LARGE AIRWAYS: Patent central airways. Stable right lung volume   loss. Stable right lower lobe scarring. Mild bilateral interlobular   septal thickening. Compressive left lower lobe atelectasis adjacent to   the elevated diaphragm.  PLEURA: Elevated left diaphragm. Stable right pleural calcifications. No   pneumothorax or pleural effusion.  VESSELS: Stable calcification in the wall of the right brachiocephalic   vein, likely reflecting sequela of prior thrombus. Mild atherosclerotic   coronary artery calcifications.  HEART: Heart size is normal. No pericardial effusion.  MEDIASTINUM AND IDRIS: No lymphadenopathy.  Abdominal hernia.  CHEST WALL AND LOWER NECK:  shunt catheter descends down the right neck   and chest wall. Left anterior chest wall millimeters device with catheter   ascending the left neck.    ABDOMEN AND PELVIS:  LIVER: Within normal limits.  BILE DUCTS: Normal caliber.  GALLBLADDER: Within normal limits.  SPLEEN: Within normal limits.  PANCREAS: Within normal limits.  ADRENALS: Within normal limits.  KIDNEYS/URETERS: Within normal limits.    BLADDER: Within normal limits.  REPRODUCTIVE ORGANS: Prostate within normal limits.    BOWEL: Pneumatosis of the transverse colon. No bowel wall thickening or   pericolonic inflammatory change. No bowel obstruction. Appendix is normal.  PERITONEUM: No free air. Tip of the shunt catheter rests in the left   lower quadrant. No ascites.  VESSELS: Within normal limits.  RETROPERITONEUM/LYMPH NODES: No lymphadenopathy.  ABDOMINAL WALL: Shunt catheter descends along the midline of the ventral   abdominal wall, entering the peritoneal cavity above the umbilicus..   Small bilateral fat-containing inguinal hernias. Left ventral abdominal   wall subcutaneous foci of gas, likely due to subcutaneous injections.  BONES: Degenerative changes. No acute fracture.    IMPRESSION:  Mild bilateral interlobular septal thickening may indicate mild pulmonary   interstitial edema.    No pneumothorax or hemothorax.    No pneumoperitoneum or ascites.    Pneumatosis of the transverse colon. Although there are no signs of   colonic inflammation recommend correlating with lactate level. Other   etiologies including benign etiologies should be considered.

## 2023-02-18 NOTE — PROGRESS NOTE ADULT - ASSESSMENT
53 y/o male with PMHx of hydrocephalus (age 2 months) s/p multiple brain surgeries/ peritoneal shunt, legally blind, glaucoma, mild MR, PIERCE, Epilepsy (last seizure years ago), impulse control disorder, obesity, GERD and hx of aspiration PNA who presented to  with progressive lethargy since 2/14/ Valentines Day.  Patient initially code stroke but CT negative and patient found to have signs/ sx of sepsis secondary to likely aspiration PNA.      #Acute Hypoxic Respiratory Failure secondary to Sepsis secondary to Aspiration PNA:    Patient with hypothermia/ hypotension/ hypoxia secondary to suspected aspiration PNA.    Treat for gram negative rods.    Vanco and Zosyn.    F/u pancultures.    Presently on 50% venti mask-- titrate.    Repeat ABG.  1st 7.28/ 34/ 69/ 32.    CXR reviewed-- extensive right infiltrates and left sided as well.    Follow hemodynamics after 2.1 L bolus.    Lactate WNL.      #Metabolic Encephalopathy:    Suspect related to hypoxia/ sepsis.    CT head negative.    Check EEG-- r/o possible seizure as cause for acute worsening MS this am.    Keppra as per neuro.    Neuro f/u.    Neuro checks.      #Hydrocephalus/ mild MR:    Baseline patient is able to talk on phone with aunt/ have conversation/ walk at group home.      #Epilepsy:    Cont home meds.      #GERD:  cont PPI.      #DVT Proph:  Lovenox.      #Advanced Directives/ Goals of Care:    Goals of care discussed with guardian Christi Guy.  Diagnosis/ Prognosis reviewed.  Guardian states she would want to proceed with setting limitations on care (should would not want him to get CPR/ intubation); however, as patient is MR/ group home patient will need to discuss further with [facility].  Relayed to JASWINDER Benavidez who will reach out to group home/ state to see if guardian can make changes to patient's advanced directives.    CODE STATUS:  FULL CODE 53 y/o male with PMHx of hydrocephalus (age 2 months) s/p multiple brain surgeries/ peritoneal shunt, legally blind, glaucoma, mild MR, PIERCE, Epilepsy (last seizure years ago), impulse control disorder, obesity, GERD and hx of aspiration PNA who presented to  with progressive lethargy since 2/14/ Valentines Day.  Patient initially code stroke but CT negative and patient found to have signs/ sx of sepsis secondary to likely aspiration PNA.      #Acute Hypoxic Respiratory Failure suspected due to Sepsis secondary to Aspiration PNA:    Patient with hypothermia/ hypotension/ hypoxia  on admission   CXR reviewed-- extensive right infiltrates and left sided as well. But CT chest this am with no infiltrates + volume loss, atelectases, interstitial thickening     S/p Keven mask, ABG noted   s/p @l IVF bolus in ED, On Maintenance fluids will hold for now   Treat for gram negative rods.    Down to 4L NC, monitor pulse ox   F/u BCX: NGTD, UCX<10K   C/w IV Abxs, On OV Doxy and IV Zosyn  Pulm and ID eval           #Metabolic Encephalopathy.   Suspect related to hypoxia/ sepsis.    CT head negative for acute findings, extensive post sx changes   EEG is pending to R/o Seizures   C/w IV Keppra   Resume lamotrigine when able to tolerate PO  Neuro checks.    neuro recs noted, call placed to Dr Patrikc awaiting for call back     #Hydrocephalus/ mild MR:    Baseline patient is able to talk on phone with aunt/ have conversation/ walk at group home.    XR shunt series : unremarkable     #Epilepsy:    Cont IV Keppra  lamotrigine when tolerates PO   EEG pending   seizure precautions     #GERD:  cont PPI.      #DVT Proph:  Lovenox.      #Advanced Directives/ Goals of Care:    Goals of care discussed with guardian Christi Guy.  Diagnosis/ Prognosis reviewed.  Guardian states she would want to proceed with setting limitations on care (should would not want him to get CPR/ intubation); however, as patient is MR/ group home patient will need to discuss further with [facility].  Relayed to JASWINDER Benavidez who will reach out to group home/ state to see if guardian can make changes to patient's advanced directives.    CODE STATUS:  FULL CODE 51 y/o male with PMHx of hydrocephalus (age 2 months) s/p multiple brain surgeries/ peritoneal shunt, legally blind, glaucoma, mild MR, PIERCE, Epilepsy (last seizure years ago), impulse control disorder, obesity, GERD and hx of aspiration PNA who presented to  with progressive lethargy since 2/14/ Valentines Day.  Patient initially code stroke but CT negative and patient found to have signs/ sx of sepsis secondary to likely aspiration PNA.      #Acute Hypoxic Respiratory Failure suspected due to Sepsis secondary to Aspiration PNA:    Patient with hypothermia/ hypotension/ hypoxia  on admission   CXR reviewed-- extensive right infiltrates and left sided as well. But CT chest this am with no infiltrates + volume loss, atelectases, interstitial thickening     S/p Keven mask, ABG noted   s/p @l IVF bolus in ED, On Maintenance fluids will hold for now   Treat for gram negative rods.    Down to 4L NC, monitor pulse ox   F/u BCX: NGTD, UCX<10K   C/w IV Abxs, On OV Doxy and IV Zosyn  Pulm and ID eval       #Metabolic Encephalopathy.   Suspect related to hypoxia/ sepsis.    CT head negative for acute findings, extensive post sx changes   EEG is pending to R/o Seizures   C/w IV Keppra   Resume lamotrigine when able to tolerate PO  Neuro checks.    Check B12, folate, TSH   S&S eval appreciated keep NPO for now  recommended, will further f/u   neuro recs noted, call placed to Dr Patrick awaiting for call back       #Hydrocephalus/ mild MR:    Baseline patient is able to talk on phone with aunt/ have conversation/ walk at group home.    XR shunt series : unremarkable   On Olanzapine outpt, hold for now     #Epilepsy:    Cont IV Keppra  lamotrigine when tolerates PO   EEG pending   seizure precautions     # LE edema  Check D-Dimers, Check LE doppler  Check TSH  Albumin wnl  No proteinuria as per UA   Check ECHO     #GERD:  cont PPI.      #DVT Proph:  Lovenox.      #Advanced Directives/ Goals of Care:    Goals of care discussed with guardian Christi Guy.   Guardian states she would want to proceed with setting limitations on care (should would not want him to get CPR/ intubation); however, as patient is MR/ group home patient will need to discuss further with [facility].   CODE STATUS:  FULL CODE  Will ask Palliative team eval

## 2023-02-18 NOTE — SWALLOW BEDSIDE ASSESSMENT ADULT - NS SPL SWALLOW CLINIC TRIAL FT
Pt shows some  preservation of oral-pharyngeal capacity for po intake.  Not presently awake enough over a sustained period to take po safely and sufficiently.   Keep NPO for now, also meds.  Disc with NSg and with MD, and with pt's aunt.   Service will follow for po intake.

## 2023-02-19 NOTE — DIETITIAN INITIAL EVALUATION ADULT - PERTINENT MEDS FT
MEDICATIONS  (STANDING):  cloBAZam 5 milliGRAM(s) Oral two times a day  dorzolamide 2%/timolol 0.5% Ophthalmic Solution 1 Drop(s) Both EYES two times a day  enoxaparin Injectable 40 milliGRAM(s) SubCutaneous every 24 hours  escitalopram 10 milliGRAM(s) Oral daily  guaiFENesin Oral Liquid (Sugar-Free) 200 milliGRAM(s) Oral every 6 hours  lactulose Syrup 10 Gram(s) Oral two times a day  lamoTRIgine 300 milliGRAM(s) Oral two times a day  latanoprost 0.005% Ophthalmic Solution 1 Drop(s) Both EYES at bedtime  levETIRAcetam  IVPB 500 milliGRAM(s) IV Intermittent every 12 hours  metoprolol tartrate 50 milliGRAM(s) Oral two times a day  OLANZapine 5 milliGRAM(s) Oral two times a day  pantoprazole    Tablet 40 milliGRAM(s) Oral before breakfast  piperacillin/tazobactam IVPB.. 3.375 Gram(s) IV Intermittent every 8 hours  primidone 250 milliGRAM(s) Oral two times a day  senna 2 Tablet(s) Oral at bedtime  sodium chloride 0.65% Nasal 3 Spray(s) Both Nostrils five times a day  sodium chloride 0.9%. 1000 milliLiter(s) (75 mL/Hr) IV Continuous <Continuous>    MEDICATIONS  (PRN):  acetaminophen     Tablet .. 650 milliGRAM(s) Oral every 6 hours PRN Temp greater or equal to 38C (100.4F), Mild Pain (1 - 3)  albuterol    90 MICROgram(s) HFA Inhaler 2 Puff(s) Inhalation every 6 hours PRN Shortness of Breath and/or Wheezing  aluminum hydroxide/magnesium hydroxide/simethicone Suspension 30 milliLiter(s) Oral every 4 hours PRN Dyspepsia  melatonin 3 milliGRAM(s) Oral at bedtime PRN Insomnia  ondansetron Injectable 4 milliGRAM(s) IV Push every 8 hours PRN Nausea and/or Vomiting

## 2023-02-19 NOTE — CONSULT NOTE ADULT - SUBJECTIVE AND OBJECTIVE BOX
Patient is a 52y old  Male who presents with a chief complaint of lethargy     HPI:  53 y/o male with h/o hydrocephalus (dgn at age 2 months) s/p multiple brain surgeries/ peritoneal shunt, legally blind, glaucoma, mild MR, PIERCE, epilepsy (last seizure years ago), impulse control disorder, obesity, GERD and h/o aspiration PNA was admitted on  for progressive lethargy since . Patient presently in group home in [facility]. Patient's baseline status is he is able to communicate with his guardian on the phone. He is also able to ambulate around the group home, but is in a wheelchair most of the time. As per guardian, she noticed increased lethargy when she was trying to talk to him on the phone over the last 3 days. On the day of admission, the patient found more altered, slurring words and diaphoretic and sent to the ER for evaluation. Upon arrival, patient was a code stroke.  CTA brain negative for acute infarct/ occlusion. After further work up, patient was found to have bilateral PNA worse on right than left and hypothermia with temp of 95.  BP also soft in ER  systolic.  Patient also hypoxic and was on nonrebreather mask on arrival. In ER, the patient received zosyn and doxycycline.     PAST MEDICAL & SURGICAL HISTORY:  Hydrocephalus age 2 months  Legally blind  Glaucoma  Sleep apnea  not on CPAP  Epilepsy  Osteoporosis  Elevated liver enzymes  Environmental Allergies  Impulse control disorder  worsened when pt on Depakote, pt now off  Mild mental retardation  Hiatal hernia  Aspiration pneumonia  at age 25  Viral pneumonia  h/o  BPH without obstruction/lower urinary tract symptoms  Glaucoma  on eye gtt  Mentally disabled  Chronic GERD  S/P  Shunt  originally had shunt to lung, then revised to peritoneal shunt, has had multiple revisions  Epilepsy  s/p Vagal Nerve Stimulator Implant , , last one in 2016  H/O craniotomy  at age 18  Heel cord contracture  s/p surgical correction b/l  History of tonsillectomy  S/p bilateral myringotomy with tube placement  Obstructed  shunt  revision of  shunt   S/P  shunt    Meds: per reconciliation sheet, noted below  MEDICATIONS  (STANDING):  cloBAZam 5 milliGRAM(s) Oral two times a day  dorzolamide 2%/timolol 0.5% Ophthalmic Solution 1 Drop(s) Both EYES two times a day  doxycycline IVPB      doxycycline IVPB 100 milliGRAM(s) IV Intermittent every 12 hours  enoxaparin Injectable 40 milliGRAM(s) SubCutaneous every 24 hours  escitalopram 10 milliGRAM(s) Oral daily  guaiFENesin Oral Liquid (Sugar-Free) 200 milliGRAM(s) Oral every 6 hours  lactulose Syrup 10 Gram(s) Oral two times a day  lamoTRIgine 300 milliGRAM(s) Oral two times a day  latanoprost 0.005% Ophthalmic Solution 1 Drop(s) Both EYES at bedtime  levETIRAcetam  IVPB 500 milliGRAM(s) IV Intermittent every 12 hours  metoprolol tartrate 50 milliGRAM(s) Oral two times a day  OLANZapine 5 milliGRAM(s) Oral two times a day  pantoprazole    Tablet 40 milliGRAM(s) Oral before breakfast  piperacillin/tazobactam IVPB.. 3.375 Gram(s) IV Intermittent every 8 hours  primidone 250 milliGRAM(s) Oral two times a day  senna 2 Tablet(s) Oral at bedtime  sodium chloride 0.65% Nasal 3 Spray(s) Both Nostrils five times a day  sodium chloride 0.9%. 1000 milliLiter(s) (75 mL/Hr) IV Continuous <Continuous>    MEDICATIONS  (PRN):  acetaminophen     Tablet .. 650 milliGRAM(s) Oral every 6 hours PRN Temp greater or equal to 38C (100.4F), Mild Pain (1 - 3)  albuterol    90 MICROgram(s) HFA Inhaler 2 Puff(s) Inhalation every 6 hours PRN Shortness of Breath and/or Wheezing  aluminum hydroxide/magnesium hydroxide/simethicone Suspension 30 milliLiter(s) Oral every 4 hours PRN Dyspepsia  melatonin 3 milliGRAM(s) Oral at bedtime PRN Insomnia  ondansetron Injectable 4 milliGRAM(s) IV Push every 8 hours PRN Nausea and/or Vomiting    Allergies    codeine (Hives)  codeine (Stomach Upset)  Depakote (Other)  seasonal allergies: nasal congestion (Other)    Intolerances    Social: no smoking, no alcohol, no illegal drugs; no recent travel, no exposure to TB  Patient's legal guardian is his Aunt, Christi Guy.  Patient lived with her for many years but now has been in group homes.   FAMILY HISTORY:  Family history of stroke (Mother)      no history of premature cardiovascular disease in first degree relatives    ROS: the patient is confused, limited, dose not seem in pain  All other systems reviewed and are negative    Vital Signs Last 24 Hrs  T(C): 36.7 (2023 09:), Max: 37.1 (2023 21:01)  T(F): 98.1 (2023 09:), Max: 98.7 (2023 21:01)  HR: 77 (:) (73 - 79)  BP: 128/92 (:) (125/92 - 131/92)  BP(mean): --  RR: 19 (:) (19 - 20)  SpO2: 96% (:) (91% - 99%)    Parameters below as of :  Patient On (Oxygen Delivery Method): nasal cannula  O2 Flow (L/min): 4    PE:    Constitutional:  No acute distress  HEENT: NC/AT, EOMI, PERRLA, conjunctivae clear; ears and nose atraumatic; pharynx benign  Neck: supple; thyroid not palpable  Back: no tenderness  Respiratory: respiratory effort normal; clear to auscultation  Cardiovascular: S1S2 regular, no murmurs  Abdomen: soft, not tender, not distended, positive BS; no liver or spleen organomegaly  Genitourinary: no suprapubic tenderness  Lymphatic: no LN palpable  Musculoskeletal: no muscle tenderness, no joint swelling or tenderness  Extremities: no pedal edema  Neurological/ Psychiatric: Alert, judgement and insight impaired; moving all extremities  Skin: no rashes; no palpable lesions    Labs: all available labs reviewed                        12.7   5.49  )-----------( 203      ( 2023 07:21 )             39.9     02-19    140  |  108  |  6<L>  ----------------------------<  94  3.6   |  29  |  0.87    Ca    7.8<L>      2023 07:21    TPro  7.0  /  Alb  3.2<L>  /  TBili  0.3  /  DBili  x   /  AST  17  /  ALT  25  /  AlkPhos  169<H>       LIVER FUNCTIONS - ( 2023 07:21 )  Alb: 3.2 g/dL / Pro: 7.0 gm/dL / ALK PHOS: 169 U/L / ALT: 25 U/L / AST: 17 U/L / GGT: x           Urinalysis Basic - ( 2023 12:57 )    Color: Yellow / Appearance: Clear / S.005 / pH: x  Gluc: x / Ketone: Negative  / Bili: Negative / Urobili: Negative   Blood: x / Protein: Negative / Nitrite: Negative   Leuk Esterase: Negative / RBC: x / WBC x   Sq Epi: x / Non Sq Epi: x / Bacteria: x    ( @ 08:53)  NotDete    Radiology: all available radiological tests reviewed    < from: CT Abdomen and Pelvis No Cont (23 @ 11:55) >  Mild bilateral interlobular septal thickening may indicate mild pulmonary   interstitial edema.    No pneumothorax or hemothorax.    No pneumoperitoneum or ascites.    Pneumatosis of the transverse colon. Although there are no signs of   colonic inflammation recommend correlating with lactate level. Other   etiologies including benign etiologies should be considered.  < end of copied text >    < from: CT Chest No Cont (23 @ 11:54) >  LUNGS AND LARGE AIRWAYS: Patent central airways. Stable right lung volume   loss. Stable right lower lobe scarring. Mild bilateral interlobular   septal thickening. Compressive left lower lobe atelectasis adjacent to the elevated diaphragm.  < end of copied text >      Advanced directives addressed: full resuscitation Patient is a 52y old  Male who presents with a chief complaint of lethargy     HPI:  51 y/o male with h/o hydrocephalus (dgn at age 2 months) s/p multiple brain surgeries/ peritoneal shunt, legally blind, glaucoma, mild MR, PIERCE, epilepsy (last seizure years ago), impulse control disorder, obesity, GERD and h/o aspiration PNA was admitted on  for progressive lethargy since . Patient presently in group home in [facility]. Patient's baseline status is he is able to communicate with his guardian on the phone. He is also able to ambulate around the group home, but is in a wheelchair most of the time. As per guardian, she noticed increased lethargy when she was trying to talk to him on the phone over the last 3 days. On the day of admission, the patient found more altered, slurring words and diaphoretic and sent to the ER for evaluation. Upon arrival, patient was a code stroke.  CTA brain negative for acute infarct/ occlusion. After further work up, patient was found to have bilateral PNA worse on right than left and hypothermia with temp of 95.  BP also soft in ER  systolic.  Patient also hypoxic and was on nonrebreather mask on arrival. In ER, the patient received zosyn and doxycycline.     PAST MEDICAL & SURGICAL HISTORY:  Hydrocephalus age 2 months  Legally blind  Glaucoma  Sleep apnea  not on CPAP  Epilepsy  Osteoporosis  Elevated liver enzymes  Environmental Allergies  Impulse control disorder  worsened when pt on Depakote, pt now off  Mild mental retardation  Hiatal hernia  Aspiration pneumonia  at age 25  Viral pneumonia  h/o  BPH without obstruction/lower urinary tract symptoms  Glaucoma  on eye gtt  Mentally disabled  Chronic GERD  S/P  Shunt  originally had shunt to lung, then revised to peritoneal shunt, has had multiple revisions  Epilepsy  s/p Vagal Nerve Stimulator Implant , , last one in 2016  H/O craniotomy  at age 18  Heel cord contracture  s/p surgical correction b/l  History of tonsillectomy  S/p bilateral myringotomy with tube placement  Obstructed  shunt  revision of  shunt   S/P  shunt    Meds: per reconciliation sheet, noted below  MEDICATIONS  (STANDING):  cloBAZam 5 milliGRAM(s) Oral two times a day  dorzolamide 2%/timolol 0.5% Ophthalmic Solution 1 Drop(s) Both EYES two times a day  doxycycline IVPB      doxycycline IVPB 100 milliGRAM(s) IV Intermittent every 12 hours  enoxaparin Injectable 40 milliGRAM(s) SubCutaneous every 24 hours  escitalopram 10 milliGRAM(s) Oral daily  guaiFENesin Oral Liquid (Sugar-Free) 200 milliGRAM(s) Oral every 6 hours  lactulose Syrup 10 Gram(s) Oral two times a day  lamoTRIgine 300 milliGRAM(s) Oral two times a day  latanoprost 0.005% Ophthalmic Solution 1 Drop(s) Both EYES at bedtime  levETIRAcetam  IVPB 500 milliGRAM(s) IV Intermittent every 12 hours  metoprolol tartrate 50 milliGRAM(s) Oral two times a day  OLANZapine 5 milliGRAM(s) Oral two times a day  pantoprazole    Tablet 40 milliGRAM(s) Oral before breakfast  piperacillin/tazobactam IVPB.. 3.375 Gram(s) IV Intermittent every 8 hours  primidone 250 milliGRAM(s) Oral two times a day  senna 2 Tablet(s) Oral at bedtime  sodium chloride 0.65% Nasal 3 Spray(s) Both Nostrils five times a day  sodium chloride 0.9%. 1000 milliLiter(s) (75 mL/Hr) IV Continuous <Continuous>    MEDICATIONS  (PRN):  acetaminophen     Tablet .. 650 milliGRAM(s) Oral every 6 hours PRN Temp greater or equal to 38C (100.4F), Mild Pain (1 - 3)  albuterol    90 MICROgram(s) HFA Inhaler 2 Puff(s) Inhalation every 6 hours PRN Shortness of Breath and/or Wheezing  aluminum hydroxide/magnesium hydroxide/simethicone Suspension 30 milliLiter(s) Oral every 4 hours PRN Dyspepsia  melatonin 3 milliGRAM(s) Oral at bedtime PRN Insomnia  ondansetron Injectable 4 milliGRAM(s) IV Push every 8 hours PRN Nausea and/or Vomiting    Allergies    codeine (Hives)  codeine (Stomach Upset)  Depakote (Other)  seasonal allergies: nasal congestion (Other)    Intolerances    Social: no smoking, no alcohol, no illegal drugs; no recent travel, no exposure to TB  Patient's legal guardian is his Aunt, Christi Guy.  Patient lived with her for many years but now has been in group homes.   FAMILY HISTORY:  Family history of stroke (Mother)      no history of premature cardiovascular disease in first degree relatives    ROS: the patient is confused, limited, denies pain  All other systems reviewed and are negative    Vital Signs Last 24 Hrs  T(C): 36.7 (2023 09:), Max: 37.1 (2023 21:01)  T(F): 98.1 (2023 09:), Max: 98.7 (2023 21:01)  HR: 77 (:) (73 - 79)  BP: 128/92 (:) (125/92 - 131/92)  BP(mean): --  RR: 19 (:) (19 - 20)  SpO2: 96% () (91% - 99%)    Parameters below as of :  Patient On (Oxygen Delivery Method): nasal cannula  O2 Flow (L/min): 4    PE:    Constitutional:  No acute distress  HEENT: NC/AT, EOMI, PERRLA, conjunctivae clear; ears and nose atraumatic; pharynx benign  Neck: supple; thyroid not palpable  Back: no tenderness  Respiratory: respiratory effort normal; clear to auscultation  Cardiovascular: S1S2 regular, no murmurs  Abdomen: soft, not tender, not distended, positive BS; no liver or spleen organomegaly  Genitourinary: no suprapubic tenderness  Lymphatic: no LN palpable  Musculoskeletal: no muscle tenderness, no joint swelling or tenderness  Extremities: no pedal edema  Neurological/ Psychiatric: Alert, judgement and insight impaired; moving all extremities  Skin: no rashes; no palpable lesions    Labs: all available labs reviewed                        12.7   5.49  )-----------( 203      ( 2023 07:21 )             39.9     02-19    140  |  108  |  6<L>  ----------------------------<  94  3.6   |  29  |  0.87    Ca    7.8<L>      2023 07:21    TPro  7.0  /  Alb  3.2<L>  /  TBili  0.3  /  DBili  x   /  AST  17  /  ALT  25  /  AlkPhos  169<H>       LIVER FUNCTIONS - ( 2023 07:21 )  Alb: 3.2 g/dL / Pro: 7.0 gm/dL / ALK PHOS: 169 U/L / ALT: 25 U/L / AST: 17 U/L / GGT: x           Urinalysis Basic - ( 2023 12:57 )    Color: Yellow / Appearance: Clear / S.005 / pH: x  Gluc: x / Ketone: Negative  / Bili: Negative / Urobili: Negative   Blood: x / Protein: Negative / Nitrite: Negative   Leuk Esterase: Negative / RBC: x / WBC x   Sq Epi: x / Non Sq Epi: x / Bacteria: x    ( @ 08:53)  [facility]    Radiology: all available radiological tests reviewed    < from: CT Abdomen and Pelvis No Cont (23 @ 11:55) >  Mild bilateral interlobular septal thickening may indicate mild pulmonary   interstitial edema.    No pneumothorax or hemothorax.    No pneumoperitoneum or ascites.    Pneumatosis of the transverse colon. Although there are no signs of   colonic inflammation recommend correlating with lactate level. Other   etiologies including benign etiologies should be considered.  < end of copied text >    < from: CT Chest No Cont (23 @ 11:54) >  LUNGS AND LARGE AIRWAYS: Patent central airways. Stable right lung volume   loss. Stable right lower lobe scarring. Mild bilateral interlobular   septal thickening. Compressive left lower lobe atelectasis adjacent to the elevated diaphragm.  < end of copied text >      Advanced directives addressed: full resuscitation

## 2023-02-19 NOTE — DIETITIAN INITIAL EVALUATION ADULT - OTHER INFO
51 y/o male with PMH of hydrocephalus (age 2 months) s/p multiple brain surgeries/ peritoneal shunt, legally blind, glaucoma, mild MR, PIERCE, Epilepsy (last seizure years ago), impulse control disorder, obesity, GERD and hx of aspiration PNA who presented to  with progressive lethargy since 2/14. Patient's legal guardian is his Aunt, Christi Guy. Patient lived with her for many years but now has been in group homes.    Pt NPO 2/2 failed dysphagia screen. Seen by SLP on 2/18, present at bed side upon RD visit (consult appreciated) - pt still unable to consume adequate PO, recommended to keep NPO. If unable to advance diet within 24-48 hrs, highly recommend to initiate nutrition support, re-consult RD for recommendations. Palliative consult placed to confirm goals of care, currently full code. Bed scale wt of 199# taken by RD on 2/19 (2+ edema is skewing current wt appearance). NFPE reveals upper body muscle/fat wasting, however edema is skewing/masking losses. See recommendations below.  53 y/o male with PMH of hydrocephalus (age 2 months) s/p multiple brain surgeries/ peritoneal shunt, legally blind, glaucoma, mild MR, PIERCE, Epilepsy (last seizure years ago), impulse control disorder, obesity, GERD and hx of aspiration PNA who presented to  with progressive lethargy since 2/14. Patient's legal guardian is his Aunt, Christi Guy. Patient lived with her for many years but now has been in group homes.    RD consulted 2/2 BMI <19, however pt is obese w/ BMI of 30.2 (?). Pt NPO 2/2 failed dysphagia screen. Seen by SLP on 2/18, present at bed side upon RD visit (consult appreciated) - pt still unable to consume adequate PO, recommended to keep NPO. If unable to advance diet within 24-48 hrs, highly recommend to initiate nutrition support, re-consult RD for recommendations. Palliative consult placed to confirm goals of care, currently full code. Bed scale wt of 199# taken by RD on 2/19 (2+ edema is skewing current wt appearance). NFPE reveals upper body muscle/fat wasting, however edema is skewing/masking losses. See recommendations below.

## 2023-02-19 NOTE — DIETITIAN INITIAL EVALUATION ADULT - PERTINENT LABORATORY DATA
02-19    140  |  108  |  6<L>  ----------------------------<  94  3.6   |  29  |  0.87    Ca    7.8<L>      19 Feb 2023 07:21    TPro  7.0  /  Alb  3.2<L>  /  TBili  0.3  /  DBili  x   /  AST  17  /  ALT  25  /  AlkPhos  169<H>  02-19    Vitamin B12, Serum: 966 pg/mL (07-02-21 @ 09:02)  Folate, Serum: 14.3 ng/mL (07-02-21 @ 09:02)

## 2023-02-19 NOTE — DIETITIAN INITIAL EVALUATION ADULT - FACTORS AFF FOOD INTAKE
change in mental status/difficulty chewing/difficulty feeding self/difficulty swallowing/difficulty with food procurement/preparation/NPO

## 2023-02-19 NOTE — PROGRESS NOTE ADULT - SUBJECTIVE AND OBJECTIVE BOX
Resting comfortably  VSS afeb  lungs- clear  cor-RRR  Abd- + BS distended, soft, non tender, no guarding  Ext- no edema

## 2023-02-19 NOTE — PROGRESS NOTE ADULT - ASSESSMENT
Incidental finding of transverse colon pneumatosis. Abdominal exam benign, other than a bit distension. Cont abx. Diet as tolerated

## 2023-02-19 NOTE — PROGRESS NOTE ADULT - ASSESSMENT
50 yo man mild MR, BPH, glaucoma,  hx of hydrocephalus s/p  shunt, also has VNS, had VPS revisions presenting with change in MS, hypothermic, hypotensive. ? bilat PNA, on antibx.  Initial w/u [facility] no significant findings, CT angio no acute LVO. EEG at bedside, mildly slow, but no seizure activities seen. ?  metabolic encephalopathy,  ? post ictal. no recent hx of seizures. On lamotrigine, but not taking PO,  Keppra added while NPO.   Suggest:  24 hr video EEG    Keppra as long as NPO, when able to restart lamotrigine can d/c.     consider MR head, r/o CVA  Supportive care as per medicine    Dr. Ray is on service from 2/20, can call if needed.

## 2023-02-19 NOTE — CONSULT NOTE ADULT - SUBJECTIVE AND OBJECTIVE BOX
HPI:  51 y/o male with PMHx of hydrocephalus (age 2 months) s/p multiple brain surgeries/ peritoneal shunt, legally blind, glaucoma, mild MR, PIERCE, Epilepsy (last seizure years ago), impulse control disorder, obesity, GERD and hx of aspiration PNA who presented to  with progressive lethargy since / Valentines Day.  Patient's legal guardian is his Aunt, Christi Guy.  Patient lived with her for many years but now has been in group homes.  Patient presently in group home in [facility].  Patient's baseline status is he is able to communicate with his guardian on the phone.  They talk daily.  He is also able to ambulate around the group home but is in a wheelchair most of the time.  As per guardian, she noticed increased lethargy when she was trying to talk to him on the phone over the last 3 days.  Today, around 8am at the group home, patient found more altered, slurring words and diaphoretic and sent to the ER for evaluation.  As per guardian, patient did go to a pulmonologist on Thursday/ yesterday for clearance appointment prior to colonoscopy.  Upon arrival, patient was a code stroke.  CTA brain negative for acute infarct/ occlusion.  After further work up, patient was found to have bilateral PNA-- worse on right than left and hypothermia with temp of 95.  BP also soft in ER  systolic.  Patient also hypoxic and was on nonrebreather mask on arrival.  Patient was also seen by neuro and question was made to ? patient being postictal; however, as per aunt, patient has not had a seizure in many years.  Patient given IV ABX and 2.1 L NSS and admitted.      : History as above. Patient lethargic and non verbal. Aunt and caretaker at bedside. Has a hx of aspiration in past. Speech and swallow eval noted. On zosyn. Has hx PIERCE but not o CPAP.       PAST MEDICAL & SURGICAL HISTORY:  Hydrocephalus age 2 months  Legally blind  Glaucoma  Sleep apnea  not on CPAP  Epilepsy  Osteoporosis  Elevated liver enzymes  Environmental Allergies  Impulse control disorder  worsened when pt on Depakote, pt now off  Mild mental retardation  Hiatal hernia  Aspiration pneumonia  at age 25  Viral pneumonia  h/o  BPH without obstruction/lower urinary tract symptoms  Glaucoma  on eye gtt  Mentally disabled  Chronic GERD  S/P  Shunt  originally had shunt to lung, then revised to peritoneal shunt, has had multiple revisions  Epilepsy  s/p Vagal Nerve Stimulator Implant , , last one in   H/O craniotomy  at age 18  Heel cord contracture  s/p surgical correction b/l  History of tonsillectomy  S/p bilateral myringotomy with tube placement  Obstructed  shunt  revision of  shunt   S/P  shunt      FAMILY HISTORY:  Family history of stroke (Mother)      Social History:    Lives in group home.    No tob/ etoh use.        Allergies:    codeine (Hives)  codeine (Stomach Upset)  Depakote (Other)  seasonal allergies: nasal congestion (Other)   (2023 13:43)      PAST MEDICAL & SURGICAL HISTORY:  Hydrocephalus  age 2 months      Legally blind      Glaucoma      Sleep apnea  not on CPAP      Epilepsy      Osteoporosis      Elevated liver enzymes      Environmental Allergies      Impulse control disorder  worsened when pt on Depakote, pt now off      Mild mental retardation      Hiatal hernia      Aspiration pneumonia  at age 25      Viral pneumonia  h/o      BPH without obstruction/lower urinary tract symptoms      Glaucoma  on eye gtts      Mentally disabled      Chronic GERD      S/P  Shunt  originally had shunt to lung, then revised to peritoneal shunt, has had multiple revisions      Epilepsy  s/p Vagal Nerve Stimulator Implant , , last one in       H/O craniotomy  at age 18      Heel cord contracture  s/p surgical correction b/l      History of tonsillectomy      S/p bilateral myringotomy with tube placement      Obstructed  shunt  revision of  shunt       S/P  shunt          MEDICATIONS  (STANDING):  cloBAZam 5 milliGRAM(s) Oral two times a day  dorzolamide 2%/timolol 0.5% Ophthalmic Solution 1 Drop(s) Both EYES two times a day  enoxaparin Injectable 40 milliGRAM(s) SubCutaneous every 24 hours  escitalopram 10 milliGRAM(s) Oral daily  guaiFENesin Oral Liquid (Sugar-Free) 200 milliGRAM(s) Oral every 6 hours  lactulose Syrup 10 Gram(s) Oral two times a day  lamoTRIgine 300 milliGRAM(s) Oral two times a day  latanoprost 0.005% Ophthalmic Solution 1 Drop(s) Both EYES at bedtime  levETIRAcetam  IVPB 500 milliGRAM(s) IV Intermittent every 12 hours  metoprolol tartrate 50 milliGRAM(s) Oral two times a day  OLANZapine 5 milliGRAM(s) Oral two times a day  pantoprazole    Tablet 40 milliGRAM(s) Oral before breakfast  piperacillin/tazobactam IVPB.. 3.375 Gram(s) IV Intermittent every 8 hours  primidone 250 milliGRAM(s) Oral two times a day  senna 2 Tablet(s) Oral at bedtime  sodium chloride 0.65% Nasal 3 Spray(s) Both Nostrils five times a day  sodium chloride 0.9%. 1000 milliLiter(s) (75 mL/Hr) IV Continuous <Continuous>    MEDICATIONS  (PRN):  acetaminophen     Tablet .. 650 milliGRAM(s) Oral every 6 hours PRN Temp greater or equal to 38C (100.4F), Mild Pain (1 - 3)  albuterol    90 MICROgram(s) HFA Inhaler 2 Puff(s) Inhalation every 6 hours PRN Shortness of Breath and/or Wheezing  aluminum hydroxide/magnesium hydroxide/simethicone Suspension 30 milliLiter(s) Oral every 4 hours PRN Dyspepsia  melatonin 3 milliGRAM(s) Oral at bedtime PRN Insomnia  ondansetron Injectable 4 milliGRAM(s) IV Push every 8 hours PRN Nausea and/or Vomiting      Allergies    codeine (Hives)  codeine (Stomach Upset)  Depakote (Other)  seasonal allergies: nasal congestion (Other)    Intolerances        SOCIAL HISTORY: Denies tobacco, etoh abuse or illicit drug use    FAMILY HISTORY:  Family history of stroke (Mother)        Vital Signs Last 24 Hrs  T(C): 36.7 (2023 09:27), Max: 37.1 (2023 21:01)  T(F): 98.1 (2023 09:27), Max: 98.7 (2023 21:01)  HR: 77 (2023 09:27) (77 - 79)  BP: 128/92 (2023 09:27) (128/92 - 131/92)  BP(mean): --  RR: 19 (2023 09:27) (19 - 19)  SpO2: 96% (2023 09:27) (95% - 99%)    Parameters below as of 2023 09:27  Patient On (Oxygen Delivery Method): nasal cannula  O2 Flow (L/min): 4      REVIEW OF SYSTEMS:  unable  .     PHYSICAL EXAMINATION:    GENERAL APPEARANCE:  lethargic  HEENT:  Pupils are normal and react normally. No icterus. Mucous membranes well colored.  NECK:  Supple. No lymphadenopathy. Jugular venous pressure not elevated. Carotids equal.   HEART:   S1S2 reg  CHEST:  bilat coarse ronchi  ABDOMEN:  Soft and nontender.   EXTREMITIES:  There is no cyanosis, clubbing or edema.   SKIN:  No rash or significant lesions are noted.    LABS:                        12.7   5.49  )-----------( 203      ( 2023 07:21 )             39.9         140  |  108  |  6<L>  ----------------------------<  94  3.6   |  29  |  0.87    Ca    7.8<L>      2023 07:21    TPro  7.0  /  Alb  3.2<L>  /  TBili  0.3  /  DBili  x   /  AST  17  /  ALT  25  /  AlkPhos  169<H>      LIVER FUNCTIONS - ( 2023 07:21 )  Alb: 3.2 g/dL / Pro: 7.0 gm/dL / ALK PHOS: 169 U/L / ALT: 25 U/L / AST: 17 U/L / GGT: x                 Urinalysis Basic - ( 2023 12:57 )    Color: Yellow / Appearance: Clear / S.005 / pH: x  Gluc: x / Ketone: Negative  / Bili: Negative / Urobili: Negative   Blood: x / Protein: Negative / Nitrite: Negative   Leuk Esterase: Negative / RBC: x / WBC x   Sq Epi: x / Non Sq Epi: x / Bacteria: x      ABG - ( 2023 15:58 )  pH, Arterial: 7.36  pH, Blood: x     /  pCO2: 49    /  pO2: 90    / HCO3: 28    / Base Excess: 1.5   /  SaO2: 97                  RADIOLOGY & ADDITIONAL STUDIES:

## 2023-02-19 NOTE — DIETITIAN INITIAL EVALUATION ADULT - ADD RECOMMEND
1. ADAT as per MD and SLP recommendations. C/w current NPO diet recommended as per SLP. If unable to advance diet within 24-48 hrs, highly recommend to initiate nutrition support to optimize nutritional needs, re-consult RD for recommendations.  2. Confirm goals of care regarding nutrition support. Palliative consult appreciated, currently full code  3. Maintain aspiration precautions, back of bed >45 degrees.   4. Monitor daily wt to track/trend changes  5. MVI w/ minerals daily to ensure 100% RDA met   6. In v/o malnutrition, consider adding thiamine 100 mg daily   RD will continue to monitor PO intake, labs, hydration, and wt prn.

## 2023-02-19 NOTE — PROGRESS NOTE ADULT - SUBJECTIVE AND OBJECTIVE BOX
CC: lethargy (2023 13:43)    HPI:  51 y/o male with PMHx of hydrocephalus (age 2 months) s/p multiple brain surgeries/ peritoneal shunt, legally blind, glaucoma, mild MR, PIERCE, Epilepsy (last seizure years ago), impulse control disorder, obesity, GERD and hx of aspiration PNA who presented to  with progressive lethargy since .   Patient's legal guardian is his Aunt, Christi Guy.  Patient lived with her for many years but now has been in group homes.  Patient presently in group home in [facility].  Patient's baseline status is he is able to communicate with his guardian on the phone.  They talk daily.  He is also able to ambulate around the group home but is in a wheelchair most of the time.  As per guardian, she noticed increased lethargy when she was trying to talk to him on the phone over the last 3 days.  Today, around 8am at the group home, patient found more altered, slurring words and diaphoretic and sent to the ER for evaluation.  As per guardian, patient did go to a pulmonologist on Thursday/ yesterday for clearance appointment prior to colonoscopy.  Upon arrival to , patient was a code stroke.  CTA brain negative for acute infarct/ occlusion.  After further work up, patient was found to have bilateral PNA-- worse on right than left and hypothermia with temp of 95.  BP also soft in ER  systolic.  Patient also hypoxic and was on nonrebreather mask on arrival.  Patient was also seen by neuro and question was made to ? patient being postictal; however, as per aunt, patient has not had a seizure in many years.  Patient given IV ABX and 2.1 L NS and admitted.        INTERVAL HPI/ OVERNIGHT EVENTS:  pt was seen and examined, family at bedside. Pt is still lethargic, but when family approaches, answers some questions and asking for food. No pain. Fmily  and aid reports episodes of cough         Vital Signs Last 24 Hrs  T(C): 36.7 (2023 09:27), Max: 37.1 (2023 21:01)  T(F): 98.1 (2023 09:27), Max: 98.7 (2023 21:01)  HR: 77 (2023 09:27) (77 - 79)  BP: 128/92 (2023 09:27) (128/92 - 131/92)  RR: 19 (2023 09:27) (19 - 19)  SpO2: 96% (2023 09:27) (95% - 99%)    Parameters below as of 2023 09:27  Patient On (Oxygen Delivery Method): nasal cannula    REVIEW OF SYSTEMS:  Unable to obtain due to developmental delay and encephalopathy    PHYSICAL EXAM:  General:   in no acute distress  Eyes: L eye reactive to light, R eye with cataract, conjunctiva and sclera clear  Head:  oval shape s/p cranial expansion, no signs of trauma, no hematoma or ecchymosis no abrasion, no tenderness   ENMT: No nasal discharge; congested nasal passages   Respiratory: Decreased BS, no wheezing   Cardiovascular: Regular rate and rhythm. S1 and S2 Normal;   Gastrointestinal: Soft non-tender non-distended; Normal bowel sounds  Genitourinary: No  suprapubic  tenderness  Extremities: + LE edema  Vascular: Peripheral pulses palpable 2+ bilaterally  Neurological: Alert and oriented x 1, confused, Moves all extremities but doesn't follow commands. Speech is garbled no facial asymmetry  Skin: Warm and dry. No acute rash  Musculoskeletal: Normal muscle tone, no ecchymoses, no abrasions,  or joint edema. full PROM, no pain       LABS:                         12.7   5.49  )-----------( 203      ( 2023 07:21 )             39.9     02-19    140  |  108  |  6<L>  ----------------------------<  94  3.6   |  29  |  0.87    Ca    7.8<L>      2023 07:21    TPro  7.0  /  Alb  3.2<L>  /  TBili  0.3  /  DBili  x   /  AST  17  /  ALT  25  /  AlkPhos  169<H>  02-19        LIVER FUNCTIONS - ( 2023 07:21 )  Alb: 3.2 g/dL / Pro: 7.0 gm/dL / ALK PHOS: 169 U/L / ALT: 25 U/L / AST: 17 U/L / GGT: x                                                                 13.0   5.46  )-----------( 219      ( 2023 07:58 )             40.6     2023 07:58    143    |  111    |  7      ----------------------------<  92     3.5     |  27     |  0.95     Ca    8.1        2023 07:58    TPro  7.6    /  Alb  3.4    /  TBili  0.2    /  DBili  x      /  AST  19     /  ALT  26     /  AlkPhos  186    2023 09:12    PT/INR - ( 2023 09:12 )   PT: 13.0 sec;   INR: 1.12 ratio         PTT - ( 2023 09:12 )  PTT:29.0 sec  CAPILLARY BLOOD GLUCOSE        LIVER FUNCTIONS - ( 2023 09:12 )  Alb: 3.4 g/dL / Pro: 7.6 gm/dL / ALK PHOS: 186 U/L / ALT: 26 U/L / AST: 19 U/L / GGT: x           Urinalysis Basic - ( 2023 12:57 )    Color: Yellow / Appearance: Clear / S.005 / pH: x  Gluc: x / Ketone: Negative  / Bili: Negative / Urobili: Negative   Blood: x / Protein: Negative / Nitrite: Negative   Leuk Esterase: Negative / RBC: x / WBC x   Sq Epi: x / Non Sq Epi: x / Bacteria: x    Culture - Urine (23 @ 12:57)   Specimen Source: Clean Catch Clean Catch (Midstream)   Culture Results:   <10,000 CFU/mL Normal Urogenital Debbie   Culture - Blood (23 @ 11:28)   Specimen Source: .Blood None   Culture Results:   No growth to date.   MEDICATIONS  (STANDING):  cloBAZam 5 milliGRAM(s) Oral two times a day  dorzolamide 2%/timolol 0.5% Ophthalmic Solution 1 Drop(s) Both EYES two times a day  doxycycline IVPB      doxycycline IVPB 100 milliGRAM(s) IV Intermittent every 12 hours  enoxaparin Injectable 40 milliGRAM(s) SubCutaneous every 24 hours  escitalopram 10 milliGRAM(s) Oral daily  lactulose Syrup 10 Gram(s) Oral two times a day  lamoTRIgine 300 milliGRAM(s) Oral two times a day  latanoprost 0.005% Ophthalmic Solution 1 Drop(s) Both EYES at bedtime  levETIRAcetam  IVPB 500 milliGRAM(s) IV Intermittent every 12 hours  metoprolol tartrate 50 milliGRAM(s) Oral two times a day  OLANZapine 5 milliGRAM(s) Oral two times a day  pantoprazole    Tablet 40 milliGRAM(s) Oral before breakfast  piperacillin/tazobactam IVPB.. 3.375 Gram(s) IV Intermittent every 8 hours  primidone 250 milliGRAM(s) Oral two times a day  senna 2 Tablet(s) Oral at bedtime  sodium chloride 0.9%. 1000 milliLiter(s) (75 mL/Hr) IV Continuous <Continuous>    MEDICATIONS  (PRN):  acetaminophen     Tablet .. 650 milliGRAM(s) Oral every 6 hours PRN Temp greater or equal to 38C (100.4F), Mild Pain (1 - 3)  aluminum hydroxide/magnesium hydroxide/simethicone Suspension 30 milliLiter(s) Oral every 4 hours PRN Dyspepsia  melatonin 3 milliGRAM(s) Oral at bedtime PRN Insomnia  ondansetron Injectable 4 milliGRAM(s) IV Push every 8 hours PRN Nausea and/or Vomiting      RADIOLOGY & ADDITIONAL TESTS:    < from: CT Abdomen and Pelvis No Cont (23 @ 11:55) >  ACC: [id number] EXAM:  CT ABDOMEN AND PELVIS   ORDERED BY: WILDA HEALY     ACC: [id number] EXAM:  CT CHEST   ORDERED BY: WILDA HEALY     PROCEDURE DATE:  2023        < from: CT Abdomen and Pelvis No Cont (23 @ 11:55) >  FINDINGS:  CHEST:  LUNGS AND LARGE AIRWAYS: Patent central airways. Stable right lung volume   loss. Stable right lower lobe scarring. Mild bilateral interlobular   septal thickening. Compressive left lower lobe atelectasis adjacent to   the elevated diaphragm.  PLEURA: Elevated left diaphragm. Stable right pleural calcifications. No   pneumothorax or pleural effusion.  VESSELS: Stable calcification in the wall of the right brachiocephalic   vein, likely reflecting sequela of prior thrombus. Mild atherosclerotic   coronary artery calcifications.  HEART: Heart size is normal. No pericardial effusion.  MEDIASTINUM AND IDRIS: No lymphadenopathy.  Abdominal hernia.  CHEST WALL AND LOWER NECK:  shunt catheter descends down the right neck   and chest wall. Left anterior chest wall millimeters device with catheter   ascending the left neck.    ABDOMEN AND PELVIS:  LIVER: Within normal limits.  BILE DUCTS: Normal caliber.  GALLBLADDER: Within normal limits.  SPLEEN: Within normal limits.  PANCREAS: Within normal limits.  ADRENALS: Within normal limits.  KIDNEYS/URETERS: Within normal limits.    BLADDER: Within normal limits.  REPRODUCTIVE ORGANS: Prostate within normal limits.    BOWEL: Pneumatosis of the transverse colon. No bowel wall thickening or   pericolonic inflammatory change. No bowel obstruction. Appendix is normal.  PERITONEUM: No free air. Tip of the shunt catheter rests in the left   lower quadrant. No ascites.  VESSELS: Within normal limits.  RETROPERITONEUM/LYMPH NODES: No lymphadenopathy.  ABDOMINAL WALL: Shunt catheter descends along the midline of the ventral   abdominal wall, entering the peritoneal cavity above the umbilicus..   Small bilateral fat-containing inguinal hernias. Left ventral abdominal   wall subcutaneous foci of gas, likely due to subcutaneous injections.  BONES: Degenerative changes. No acute fracture.    IMPRESSION:  Mild bilateral interlobular septal thickening may indicate mild pulmonary   interstitial edema.    No pneumothorax or hemothorax.    No pneumoperitoneum or ascites.    Pneumatosis of the transverse colon. Although there are no signs of   colonic inflammation recommend correlating with lactate level. Other   etiologies including benign etiologies should be considered.

## 2023-02-19 NOTE — PROGRESS NOTE ADULT - ASSESSMENT
53 y/o male with PMHx of hydrocephalus (age 2 months) s/p multiple brain surgeries/ peritoneal shunt, legally blind, glaucoma, mild MR, PIERCE, Epilepsy (last seizure years ago), impulse control disorder, obesity, GERD and hx of aspiration PNA who presented to  with progressive lethargy since 2/14/ Valentines Day.  Patient initially code stroke but CT negative and patient found to have signs/ sx of sepsis secondary to likely aspiration PNA.      #Acute Hypoxic Respiratory Failure suspected due to Sepsis secondary to Aspiration PNA:    Patient with hypothermia/ hypotension/ hypoxia  on admission   CXR reviewed-- extensive right infiltrates and left sided as well. But CT chest this am with no infiltrates + volume loss, atelectases, interstitial thickening     S/p Keven mask, ABG noted   s/p 2l IVF bolus in ED, On Maintenance fluids will hold for now   Treat for gram negative rods.    Down to 4L NC, monitor pulse ox   F/u BCX: NGTD, UCX<10K   C/w IV Abxs, On  IV Zosyn as per ID recs   D/w Dr hudson      #Metabolic Encephalopathy.   Suspect related to hypoxia/ sepsis. R/o Seizures r/o Stroke    CT head negative for acute findings, extensive post sx changes   EEG neg for seizure, plan for 24h EEG  C/w IV Keppra   Resume lamotrigine when able to tolerate PO  Neuro checks.    Check B12, folate, TSH wnl  S&S resc  appreciated keep NPO for now  recommended, will further f/u   D/w Dr Patrick plan for 24h EEG and MRI       #Hydrocephalus/MR:    Baseline patient is able to talk on phone with aunt/ have conversation/ walk at group home.    XR shunt series : unremarkable   On Olanzapine outpt, hold for now     #Epilepsy:    Cont IV Keppra  lamotrigine when tolerates PO   24h EEG   seizure precautions     # LE edema  D-Dimers wnl  Check LE doppler: neg for DVT b/l  TSH wnl  Albumin wnl  No proteinuria as per UA   Check ECHO     #GERD:  cont PPI.      #DVT Proph:  Lovenox.      #Advanced Directives/ Goals of Care:    Goals of care discussed with guardian Christi Malena on admission.   Guardian states she would want to proceed with setting limitations on care (should would not want him to get CPR/ intubation); however, as patient is MR/ group home patient will need to discuss further with [facility].   CODE STATUS:  FULL CODE  Palliative team nirali

## 2023-02-19 NOTE — PROGRESS NOTE ADULT - SUBJECTIVE AND OBJECTIVE BOX
HPI:  53 y/o male with PMHx of hydrocephalus (age 2 months) s/p multiple brain surgeries/ peritoneal shunt, legally blind, glaucoma, mild MR, PIERCE, Epilepsy (last seizure years ago), impulse control disorder, obesity, GERD and hx of aspiration PNA who presented to  with progressive lethargy   Upon arrival, patient was a code stroke.  CTA brain negative for acute infarct/ occlusion.  After further work up, patient was found to have bilateral PNA-- worse on right than left and hypothermia with temp of 95.  BP also soft in ER  systolic.  Patient also hypoxic and was on nonrebreather mask on arrival.  Patient given IV ABX and 2.1 L NSS and admitted.    While hospitalized remains confused, not himself.    PAST MEDICAL & SURGICAL HISTORY:  Hydrocephalus age 2 months  Legally blind  Glaucoma  Sleep apnea  not on CPAP  Epilepsy  Osteoporosis  Elevated liver enzymes  Environmental Allergies  Impulse control disorder  worsened when pt on Depakote, pt now off  Mild mental retardation  Hiatal hernia  Aspiration pneumonia  at age 25  Viral pneumonia  h/o  BPH without obstruction/lower urinary tract symptoms  Glaucoma  on eye gtt  Mentally disabled  Chronic GERD  S/P  Shunt  originally had shunt to lung, then revised to peritoneal shunt, has had multiple revisions  Epilepsy  s/p Vagal Nerve Stimulator Implant , 2007, last one in 2016  H/O craniotomy  at age 18  Heel cord contracture  s/p surgical correction b/l  History of tonsillectomy  S/p bilateral myringotomy with tube placement  Obstructed  shunt  revision of  shunt 2015  S/P  shunt      FAMILY HISTORY:  Family history of stroke (Mother)      Social History:    Lives in group home.    No tob/ etoh use.        Allergies:    codeine (Hives)  codeine (Stomach Upset)  Depakote (Other)  seasonal allergies: nasal congestion (Other)   (17 Feb 2023 13:43)      ROS:     MEDICATIONS  (STANDING):  cloBAZam 5 milliGRAM(s) Oral two times a day  dorzolamide 2%/timolol 0.5% Ophthalmic Solution 1 Drop(s) Both EYES two times a day  enoxaparin Injectable 40 milliGRAM(s) SubCutaneous every 24 hours  escitalopram 10 milliGRAM(s) Oral daily  guaiFENesin Oral Liquid (Sugar-Free) 200 milliGRAM(s) Oral every 6 hours  lactulose Syrup 10 Gram(s) Oral two times a day  lamoTRIgine 300 milliGRAM(s) Oral two times a day  latanoprost 0.005% Ophthalmic Solution 1 Drop(s) Both EYES at bedtime  levETIRAcetam  IVPB 500 milliGRAM(s) IV Intermittent every 12 hours  metoprolol tartrate 50 milliGRAM(s) Oral two times a day  OLANZapine 5 milliGRAM(s) Oral two times a day  pantoprazole    Tablet 40 milliGRAM(s) Oral before breakfast  piperacillin/tazobactam IVPB.. 3.375 Gram(s) IV Intermittent every 8 hours  primidone 250 milliGRAM(s) Oral two times a day  senna 2 Tablet(s) Oral at bedtime  sodium chloride 0.65% Nasal 3 Spray(s) Both Nostrils five times a day  sodium chloride 0.9%. 1000 milliLiter(s) (75 mL/Hr) IV Continuous <Continuous>    MEDICATIONS  (PRN):  acetaminophen     Tablet .. 650 milliGRAM(s) Oral every 6 hours PRN Temp greater or equal to 38C (100.4F), Mild Pain (1 - 3)  albuterol    90 MICROgram(s) HFA Inhaler 2 Puff(s) Inhalation every 6 hours PRN Shortness of Breath and/or Wheezing  aluminum hydroxide/magnesium hydroxide/simethicone Suspension 30 milliLiter(s) Oral every 4 hours PRN Dyspepsia  melatonin 3 milliGRAM(s) Oral at bedtime PRN Insomnia  ondansetron Injectable 4 milliGRAM(s) IV Push every 8 hours PRN Nausea and/or Vomiting      Vital Signs Last 24 Hrs  T(C): 36.7 (19 Feb 2023 09:27), Max: 37.1 (18 Feb 2023 21:01)  T(F): 98.1 (19 Feb 2023 09:27), Max: 98.7 (18 Feb 2023 21:01)  HR: 77 (19 Feb 2023 09:27) (77 - 79)  BP: 128/92 (19 Feb 2023 09:27) (128/92 - 131/92)  RR: 19 (19 Feb 2023 09:27) (19 - 19)  SpO2: 96% (19 Feb 2023 09:27) (95% - 99%)    Parameters below as of 19 Feb 2023 09:27  Patient On (Oxygen Delivery Method): nasal cannula  O2 Flow (L/min): 4    Neurological Exam:  HF: Patient is lethargic, arouses, not cooperative.  CN:  Pupils are equal and reactive. Extra ocular muscles are intact. There is no gross facial asymmetry. Other CN II-XII are intact.   Motor: moving all extremities.   Sensory: intact to pinprick.  DTR: 0-1/4 all 4 extremities. Babinski is negative bilateral.  Co-ord:  Cannot test.     Ammonia, Serum: 35 umol/L (02.19.23 @ 07:21)  Thyroid Stimulating Hormone, Serum: 0.75 uU/mL (02.19.23 @ 07:21)     Comprehensive Metabolic Panel in AM (02.19.23 @ 07:21)   Sodium, Serum: 140 mmol/L   Potassium, Serum: 3.6 mmol/L   Chloride, Serum: 108 mmol/L   Carbon Dioxide, Serum: 29 mmol/L   Anion Gap, Serum: 3 mmol/L   Blood Urea Nitrogen, Serum: 6 mg/dL   Creatinine, Serum: 0.87 mg/dL   Glucose, Serum: 94 mg/dL   Calcium, Total Serum: 7.8 mg/dL   Protein Total, Serum: 7.0 gm/dL   Albumin, Serum: 3.2 g/dL   Bilirubin Total, Serum: 0.3 mg/dL   Alkaline Phosphatase, Serum: 169 U/L   Aspartate Aminotransferase (AST/SGOT): 17 U/L   Alanine Aminotransferase (ALT/SGPT): 25 U/L   eGFR: 103:    Complete Blood Count in AM (02.19.23 @ 07:21)   WBC Count: 5.49 K/uL   RBC Count: 4.08 M/uL   Hemoglobin: 12.7 g/dL   Hematocrit: 39.9 %   Mean Cell Volume: 97.8 fl   Mean Cell Hemoglobin: 31.1 pg   Mean Cell Hemoglobin Conc: 31.8 gm/dL   Red Cell Distrib Width: 13.9 %   Platelet Count - Automated: 203 K/uL    < from: CT Head No Cont (02.18.23 @ 11:53) >    IMPRESSION: No hemorrhage. No hydrocephalus. Multiple ventricular   catheters. No change since 2/17/2023.    < end of copied text >      < from: CT Chest No Cont (02.18.23 @ 11:54) >    IMPRESSION:  Mild bilateral interlobular septal thickening may indicate mild pulmonary   interstitial edema.    No pneumothorax or hemothorax.    No pneumoperitoneum or ascites.    Pneumatosis of the transverse colon. Although there are no signs of   colonic inflammation recommend correlating with lactate level. Other   etiologies including benign etiologies should be considered.    < end of copied text >      < from: EEG Awake or Drowsy (02.19.23 @ 10:40) >    IMPRESSION: Abnormal EEG performed with the patient awake, drowsy and   briefly asleep because of slowing of the background activity, excessive   amount of theta range activities which are consistent with a diffuse   cerebral dysfunction, AB on the basis of a diffuse metabolic, toxic or   structural abnormality. Clinical correlation recommended.  If a seizure disorder is suspected, a more prolonged study is recommended.    < end of copied text >

## 2023-02-20 NOTE — PHYSICAL THERAPY INITIAL EVALUATION ADULT - MANUAL MUSCLE TESTING RESULTS, REHAB EVAL
Bilateral UE and LE stregth 3+/5 throughout grossly. Pt having some difficulty following commands to assess strength via MMT.

## 2023-02-20 NOTE — PROGRESS NOTE ADULT - SUBJECTIVE AND OBJECTIVE BOX
Date of service: 23 @ 12:30    Lying in bed in [facility]  More alert today  Has dry cough    ROS: no fever or chills; no HA, no abdominal pain, no diarrhea or constipation; no dysuria, no legs pain, no rashes    MEDICATIONS  (STANDING):  albuterol/ipratropium for Nebulization 3 milliLiter(s) Nebulizer every 6 hours  cloBAZam 5 milliGRAM(s) Oral two times a day  dorzolamide 2%/timolol 0.5% Ophthalmic Solution 1 Drop(s) Both EYES two times a day  enoxaparin Injectable 40 milliGRAM(s) SubCutaneous every 24 hours  escitalopram 10 milliGRAM(s) Oral daily  guaiFENesin Oral Liquid (Sugar-Free) 200 milliGRAM(s) Oral every 6 hours  lactulose Syrup 10 Gram(s) Oral two times a day  lamoTRIgine 300 milliGRAM(s) Oral two times a day  latanoprost 0.005% Ophthalmic Solution 1 Drop(s) Both EYES at bedtime  metoprolol tartrate 50 milliGRAM(s) Oral two times a day  OLANZapine 5 milliGRAM(s) Oral two times a day  pantoprazole    Tablet 40 milliGRAM(s) Oral before breakfast  piperacillin/tazobactam IVPB.. 3.375 Gram(s) IV Intermittent every 8 hours  primidone 250 milliGRAM(s) Oral two times a day  senna 2 Tablet(s) Oral at bedtime  sodium chloride 0.65% Nasal 3 Spray(s) Both Nostrils five times a day  sodium chloride 0.9%. 1000 milliLiter(s) (75 mL/Hr) IV Continuous <Continuous>    Vital Signs Last 24 Hrs  T(C): 37 (2023 08:27), Max: 37 (2023 08:27)  T(F): 98.6 (2023 08:27), Max: 98.6 (2023 08:27)  HR: 87 (2023 08:27) (71 - 87)  BP: 121/70 (2023 08:27) (107/71 - 136/95)  BP(mean): --  RR: 18 (2023 08:27) (18 - 18)  SpO2: 94% (2023 08:27) (94% - 96%)    Parameters below as of 2023 08:27  Patient On (Oxygen Delivery Method): nasal cannula     Physical exam:    Constitutional:  No acute distress  HEENT: NC/AT, EOMI, PERRLA, conjunctivae clear; ears and nose atraumatic  Neck: supple; thyroid not palpable  Back: no tenderness  Respiratory: respiratory effort normal; crackles at bases  Cardiovascular: S1S2 regular, no murmurs  Abdomen: soft, not tender, not distended, positive BS; no liver or spleen organomegaly  Genitourinary: no suprapubic tenderness  Lymphatic: no LN palpable  Musculoskeletal: no muscle tenderness, no joint swelling or tenderness  Extremities: no pedal edema  Neurological/ Psychiatric: Alert, moving all extremities  Skin: no rashes; no palpable lesions    Labs: reviewed                        12.6   6.09  )-----------( 195      ( 2023 08:43 )             39.6     02-    137  |  108  |  9   ----------------------------<  87  3.7   |  23  |  0.78    Ca    8.1<L>      2023 08:43  Phos  2.1     02-  Mg     2.2     -    TPro  7.0  /  Alb  3.1<L>  /  TBili  0.4  /  DBili  <0.1  /  AST  17  /  ALT  23  /  AlkPhos  164<H>  02-20    D-Dimer Assay, Quantitative: 222 ng/mL DDU (23 @ 07:21)                        12.7   5.49  )-----------( 203      ( 2023 07:21 )             39.9     02-    140  |  108  |  6<L>  ----------------------------<  94  3.6   |  29  |  0.87    Ca    7.8<L>      2023 07:21    TPro  7.0  /  Alb  3.2<L>  /  TBili  0.3  /  DBili  x   /  AST  17  /  ALT  25  /  AlkPhos  169<H>  0219     LIVER FUNCTIONS - ( 2023 07:21 )  Alb: 3.2 g/dL / Pro: 7.0 gm/dL / ALK PHOS: 169 U/L / ALT: 25 U/L / AST: 17 U/L / GGT: x           Urinalysis Basic - ( 2023 12:57 )    Color: Yellow / Appearance: Clear / S.005 / pH: x  Gluc: x / Ketone: Negative  / Bili: Negative / Urobili: Negative   Blood: x / Protein: Negative / Nitrite: Negative   Leuk Esterase: Negative / RBC: x / WBC x   Sq Epi: x / Non Sq Epi: x / Bacteria: x    ( @ 08:53)  NotDetec    Culture - Urine (collected 2023 12:57)  Source: Clean Catch Clean Catch (Midstream)  Final Report (2023 16:35):    <10,000 CFU/mL Normal Urogenital Debbie    Culture - Blood (collected 2023 11:28)  Source: .Blood None  Preliminary Report (2023 15:09):    No growth to date.    Culture - Blood (collected 2023 11:03)  Source: .Blood Blood  Preliminary Report (2023 15:09):    No growth to date.    Radiology: all available radiological tests reviewed    < from: CT Abdomen and Pelvis No Cont (23 @ 11:55) >  Mild bilateral interlobular septal thickening may indicate mild pulmonary   interstitial edema.    No pneumothorax or hemothorax.    No pneumoperitoneum or ascites.    Pneumatosis of the transverse colon. Although there are no signs of   colonic inflammation recommend correlating with lactate level. Other   etiologies including benign etiologies should be considered.  < end of copied text >    < from: CT Chest No Cont (23 @ 11:54) >  LUNGS AND LARGE AIRWAYS: Patent central airways. Stable right lung volume   loss. Stable right lower lobe scarring. Mild bilateral interlobular   septal thickening. Compressive left lower lobe atelectasis adjacent to the elevated diaphragm.  < end of copied text >      Advanced directives addressed: full resuscitation

## 2023-02-20 NOTE — PROGRESS NOTE ADULT - SUBJECTIVE AND OBJECTIVE BOX
Pt more alert today, conversant, no seizure activity, still NPO, lamotrigine held - is on Keppra    ROS: As above, other ROS Negative    MEDICATIONS  (STANDING):  albuterol/ipratropium for Nebulization 3 milliLiter(s) Nebulizer every 6 hours  cloBAZam 5 milliGRAM(s) Oral two times a day  dorzolamide 2%/timolol 0.5% Ophthalmic Solution 1 Drop(s) Both EYES two times a day  enoxaparin Injectable 40 milliGRAM(s) SubCutaneous every 24 hours  escitalopram 10 milliGRAM(s) Oral daily  guaiFENesin Oral Liquid (Sugar-Free) 200 milliGRAM(s) Oral every 6 hours  lactulose Syrup 10 Gram(s) Oral two times a day  lamoTRIgine 300 milliGRAM(s) Oral two times a day  latanoprost 0.005% Ophthalmic Solution 1 Drop(s) Both EYES at bedtime  metoprolol tartrate 50 milliGRAM(s) Oral two times a day  OLANZapine 5 milliGRAM(s) Oral two times a day  pantoprazole    Tablet 40 milliGRAM(s) Oral before breakfast  piperacillin/tazobactam IVPB.. 3.375 Gram(s) IV Intermittent every 8 hours  primidone 250 milliGRAM(s) Oral two times a day  senna 2 Tablet(s) Oral at bedtime  sodium chloride 0.65% Nasal 3 Spray(s) Both Nostrils five times a day  sodium chloride 0.9%. 1000 milliLiter(s) (75 mL/Hr) IV Continuous <Continuous>      Vital Signs Last 24 Hrs  T(C): 37 (20 Feb 2023 08:27), Max: 37 (20 Feb 2023 08:27)  T(F): 98.6 (20 Feb 2023 08:27), Max: 98.6 (20 Feb 2023 08:27)  HR: 87 (20 Feb 2023 08:27) (71 - 87)  BP: 121/70 (20 Feb 2023 08:27) (107/71 - 136/95)  BP(mean): --  RR: 18 (20 Feb 2023 08:27) (18 - 18)  SpO2: 94% (20 Feb 2023 08:27) (94% - 96%)    Parameters below as of 20 Feb 2023 08:27  Patient On (Oxygen Delivery Method): nasal cannula    Neurological Exam:  Right frontal Crani defect.  HF: Patient awake, alert, answres in yes/no, follows simple caommnads.  CN:  Pupils are equal and reactive. Extra ocular muscles are intact. Lips crusted, There is no gross facial asymmetry. Other CN II-XII are intact.   Motor: moving all extremities.   Sensory: intact to pinprick.  DTR: 0-1/4 all 4 extremities. Babinski is negative bilateral.  Co-ord:  Cannot test.     Labs:  Ammonia, Serum: 35 umol/L (02.19.23 @ 07:21)  Thyroid Stimulating Hormone, Serum: 0.75 uU/mL (02.19.23 @ 07:21)                           12.6   6.09  )-----------( 195      ( 20 Feb 2023 08:43 )             39.6     02-20    137  |  108  |  9   ----------------------------<  87  3.7   |  23  |  0.78    Ca    8.1<L>      20 Feb 2023 08:43  Phos  2.1     02-20  Mg     2.2     02-20    TPro  7.0  /  Alb  3.1<L>  /  TBili  0.4  /  DBili  <0.1  /  AST  17  /  ALT  23  /  AlkPhos  164<H>  02-20        Radiology report:  < from: CT Head No Cont (02.18.23 @ 11:53) >    IMPRESSION: No hemorrhage. No hydrocephalus. Multiple ventricular   catheters. No change since 2/17/2023.  < from: EEG Awake or Drowsy (02.19.23 @ 10:40) >    IMPRESSION: Abnormal EEG performed with the patient awake, drowsy and   briefly asleep because of slowing of the background activity, excessive   amount of theta range activities which are consistent with a diffuse   cerebral dysfunction, AB on the basis of a diffuse metabolic, toxic or   structural abnormality. Clinical correlation recommended.  If a seizure disorder is suspected, a more prolonged study is recommended.

## 2023-02-20 NOTE — PHYSICAL THERAPY INITIAL EVALUATION ADULT - PRECAUTIONS/LIMITATIONS, REHAB EVAL
Tele/cardiac precautions Tele, MR, impulse control disorder, legally blind/cardiac precautions/fall precautions

## 2023-02-20 NOTE — PHYSICAL THERAPY INITIAL EVALUATION ADULT - ADDITIONAL COMMENTS
In group home, has assistance 24/7. Has not ambulated in several weeks/days. Typically getting from bed to w/c with assistance from staff. As per pt, staff at group home using equipment to assist with transfers.  Info obtained from eval 3/2022. In group home, has assistance 24/7. Has not ambulated in several weeks/days. Typically getting from bed to w/c with assistance from staff. As per pt, staff at group home using equipment to assist with transfers.  Info obtained from eval 3/2022. Update 2/20/2023- Pt is not a good historian of PLOF or home environment.

## 2023-02-20 NOTE — PHYSICAL THERAPY INITIAL EVALUATION ADULT - PERTINENT HX OF CURRENT PROBLEM, REHAB EVAL
Pt admitted to  secondary to increase lethargy. Pt with a hx of hydrocephalus from age 2 months and s/p multiple brain sx's. Pt is legally blind, hx of impulse control disorder, hx of epilepsy, and MR. Bilateral dopplers: neg. CT head: neg for acute changes. MRI of head is ordered.

## 2023-02-20 NOTE — PHYSICAL THERAPY INITIAL EVALUATION ADULT - PLANNED THERAPY INTERVENTIONS, PT EVAL
Eval, transfers, bed mobility, standing bouts. Pt left in bed with all observation section equipment/material intact and bed alarm activated. Pt left with 1:1. PAINAD- 2/10 Will cont to follow pt and increase as tolerated./bed mobility training/gait training/ROM/strengthening/transfer training

## 2023-02-20 NOTE — PROGRESS NOTE ADULT - SUBJECTIVE AND OBJECTIVE BOX
CC: lethargy (17 Feb 2023 13:43)    HPI:  51 y/o male with PMHx of hydrocephalus (age 2 months) s/p multiple brain surgeries/ peritoneal shunt, legally blind, glaucoma, mild MR, PIERCE, Epilepsy (last seizure years ago), impulse control disorder, obesity, GERD and hx of aspiration PNA who presented to  with progressive lethargy since 2/14.   Patient's legal guardian is his Aunt, Christi Guy.  Patient lived with her for many years but now has been in group homes.  Patient presently in group home in [facility].  Patient's baseline status is he is able to communicate with his guardian on the phone.  They talk daily.  He is also able to ambulate around the group home but is in a wheelchair most of the time.  As per guardian, she noticed increased lethargy when she was trying to talk to him on the phone over the last 3 days.  Today, around 8am at the group home, patient found more altered, slurring words and diaphoretic and sent to the ER for evaluation.  As per guardian, patient did go to a pulmonologist on Thursday/ yesterday for clearance appointment prior to colonoscopy.  Upon arrival to , patient was a code stroke.  CTA brain negative for acute infarct/ occlusion.  After further work up, patient was found to have bilateral PNA-- worse on right than left and hypothermia with temp of 95.  BP also soft in ER  systolic.  Patient also hypoxic and was on nonrebreather mask on arrival.  Patient was also seen by neuro and question was made to ? patient being postictal; however, as per aunt, patient has not had a seizure in many years.  Patient given IV ABX and 2.1 L NS and admitted.        INTERVAL HPI/ OVERNIGHT EVENTS:  pt was seen and examined, aunt at bedside. pt much more awake today and requesting coca cola. seen by SLP and can be advanced to puree diet with thickened liquids. No SOB. off of supplemental O2 today.     Vital Signs Last 24 Hrs  T(C): 37 (20 Feb 2023 08:27), Max: 37 (20 Feb 2023 08:27)  T(F): 98.6 (20 Feb 2023 08:27), Max: 98.6 (20 Feb 2023 08:27)  HR: 87 (20 Feb 2023 08:27) (80 - 87)  BP: 121/70 (20 Feb 2023 08:27) (121/70 - 136/95)  BP(mean): --  RR: 18 (20 Feb 2023 08:27) (18 - 18)  SpO2: 94% (20 Feb 2023 08:27) (94% - 96%)    Parameters below as of 20 Feb 2023 08:27  Patient On (Oxygen Delivery Method): nasal cannula      REVIEW OF SYSTEMS:  Unable to obtain due to developmental delay and encephalopathy    PHYSICAL EXAM:  General:   in no acute distress  Eyes: L eye reactive to light, R eye with cataract, conjunctiva and sclera clear  Head:  oval shape s/p cranial expansion, no signs of trauma, no hematoma or ecchymosis no abrasion, no tenderness   ENMT: No nasal discharge; congested nasal passages   Respiratory: Decreased BS, no wheezing   Cardiovascular: Regular rate and rhythm. S1 and S2 Normal;   Gastrointestinal: Soft non-tender non-distended; Normal bowel sounds  Genitourinary: No  suprapubic  tenderness  Extremities: + LE edema  Vascular: Peripheral pulses palpable 2+ bilaterally  Neurological: Alert and oriented x 1, confused, Moves all extremities but doesn't follow commands. Speech is garbled no facial asymmetry  Skin: Warm and dry. No acute rash  Musculoskeletal: Normal muscle tone, no ecchymoses, no abrasions,  or joint edema. full PROM, no pain     Labs                              12.6   6.09  )-----------( 195      ( 20 Feb 2023 08:43 )             39.6     20 Feb 2023 08:43    137    |  108    |  9      ----------------------------<  87     3.7     |  23     |  0.78     Ca    8.1        20 Feb 2023 08:43  Phos  2.1       20 Feb 2023 08:43  Mg     2.2       20 Feb 2023 08:43    TPro  7.0    /  Alb  3.1    /  TBili  0.4    /  DBili  <0.1   /  AST  17     /  ALT  23     /  AlkPhos  164    20 Feb 2023 08:43      CAPILLARY BLOOD GLUCOSE        LIVER FUNCTIONS - ( 20 Feb 2023 08:43 )  Alb: 3.1 g/dL / Pro: 7.0 gm/dL / ALK PHOS: 164 U/L / ALT: 23 U/L / AST: 17 U/L / GGT: x           MEDICATIONS  (STANDING):  albuterol/ipratropium for Nebulization 3 milliLiter(s) Nebulizer every 6 hours  cloBAZam 5 milliGRAM(s) Oral two times a day  dorzolamide 2%/timolol 0.5% Ophthalmic Solution 1 Drop(s) Both EYES two times a day  enoxaparin Injectable 40 milliGRAM(s) SubCutaneous every 24 hours  escitalopram 10 milliGRAM(s) Oral daily  guaiFENesin Oral Liquid (Sugar-Free) 200 milliGRAM(s) Oral every 6 hours  lactulose Syrup 10 Gram(s) Oral two times a day  lamoTRIgine 300 milliGRAM(s) Oral two times a day  latanoprost 0.005% Ophthalmic Solution 1 Drop(s) Both EYES at bedtime  metoprolol tartrate 50 milliGRAM(s) Oral two times a day  OLANZapine 5 milliGRAM(s) Oral two times a day  pantoprazole    Tablet 40 milliGRAM(s) Oral before breakfast  piperacillin/tazobactam IVPB.. 3.375 Gram(s) IV Intermittent every 8 hours  primidone 250 milliGRAM(s) Oral two times a day  senna 2 Tablet(s) Oral at bedtime  sodium chloride 0.65% Nasal 3 Spray(s) Both Nostrils five times a day  sodium chloride 0.9%. 1000 milliLiter(s) (75 mL/Hr) IV Continuous <Continuous>    MEDICATIONS  (PRN):  acetaminophen     Tablet .. 650 milliGRAM(s) Oral every 6 hours PRN Temp greater or equal to 38C (100.4F), Mild Pain (1 - 3)  aluminum hydroxide/magnesium hydroxide/simethicone Suspension 30 milliLiter(s) Oral every 4 hours PRN Dyspepsia  melatonin 3 milliGRAM(s) Oral at bedtime PRN Insomnia  ondansetron Injectable 4 milliGRAM(s) IV Push every 8 hours PRN Nausea and/or Vomiting        RADIOLOGY & ADDITIONAL TESTS:    < from: CT Abdomen and Pelvis No Cont (02.18.23 @ 11:55) >  ACC: [id number] EXAM:  CT ABDOMEN AND PELVIS   ORDERED BY: WILDA HEALY     ACC: [id number] EXAM:  CT CHEST   ORDERED BY: WILDA HEALY     PROCEDURE DATE:  02/18/2023        < from: CT Abdomen and Pelvis No Cont (02.18.23 @ 11:55) >  FINDINGS:  CHEST:  LUNGS AND LARGE AIRWAYS: Patent central airways. Stable right lung volume   loss. Stable right lower lobe scarring. Mild bilateral interlobular   septal thickening. Compressive left lower lobe atelectasis adjacent to   the elevated diaphragm.  PLEURA: Elevated left diaphragm. Stable right pleural calcifications. No   pneumothorax or pleural effusion.  VESSELS: Stable calcification in the wall of the right brachiocephalic   vein, likely reflecting sequela of prior thrombus. Mild atherosclerotic   coronary artery calcifications.  HEART: Heart size is normal. No pericardial effusion.  MEDIASTINUM AND IDRIS: No lymphadenopathy.  Abdominal hernia.  CHEST WALL AND LOWER NECK:  shunt catheter descends down the right neck   and chest wall. Left anterior chest wall millimeters device with catheter   ascending the left neck.    ABDOMEN AND PELVIS:  LIVER: Within normal limits.  BILE DUCTS: Normal caliber.  GALLBLADDER: Within normal limits.  SPLEEN: Within normal limits.  PANCREAS: Within normal limits.  ADRENALS: Within normal limits.  KIDNEYS/URETERS: Within normal limits.    BLADDER: Within normal limits.  REPRODUCTIVE ORGANS: Prostate within normal limits.    BOWEL: Pneumatosis of the transverse colon. No bowel wall thickening or   pericolonic inflammatory change. No bowel obstruction. Appendix is normal.  PERITONEUM: No free air. Tip of the shunt catheter rests in the left   lower quadrant. No ascites.  VESSELS: Within normal limits.  RETROPERITONEUM/LYMPH NODES: No lymphadenopathy.  ABDOMINAL WALL: Shunt catheter descends along the midline of the ventral   abdominal wall, entering the peritoneal cavity above the umbilicus..   Small bilateral fat-containing inguinal hernias. Left ventral abdominal   wall subcutaneous foci of gas, likely due to subcutaneous injections.  BONES: Degenerative changes. No acute fracture.    IMPRESSION:  Mild bilateral interlobular septal thickening may indicate mild pulmonary   interstitial edema.    No pneumothorax or hemothorax.    No pneumoperitoneum or ascites.    Pneumatosis of the transverse colon. Although there are no signs of   colonic inflammation recommend correlating with lactate level. Other   etiologies including benign etiologies should be considered.

## 2023-02-20 NOTE — PROGRESS NOTE ADULT - SUBJECTIVE AND OBJECTIVE BOX
Subjective:    Awake, responsive.    MEDICATIONS  (STANDING):  albuterol/ipratropium for Nebulization 3 milliLiter(s) Nebulizer every 6 hours  cloBAZam 5 milliGRAM(s) Oral two times a day  dorzolamide 2%/timolol 0.5% Ophthalmic Solution 1 Drop(s) Both EYES two times a day  enoxaparin Injectable 40 milliGRAM(s) SubCutaneous every 24 hours  escitalopram 10 milliGRAM(s) Oral daily  guaiFENesin Oral Liquid (Sugar-Free) 200 milliGRAM(s) Oral every 6 hours  lactulose Syrup 10 Gram(s) Oral two times a day  lamoTRIgine 300 milliGRAM(s) Oral two times a day  latanoprost 0.005% Ophthalmic Solution 1 Drop(s) Both EYES at bedtime  levETIRAcetam  IVPB 500 milliGRAM(s) IV Intermittent every 12 hours  metoprolol tartrate 50 milliGRAM(s) Oral two times a day  OLANZapine 5 milliGRAM(s) Oral two times a day  pantoprazole    Tablet 40 milliGRAM(s) Oral before breakfast  piperacillin/tazobactam IVPB.. 3.375 Gram(s) IV Intermittent every 8 hours  primidone 250 milliGRAM(s) Oral two times a day  senna 2 Tablet(s) Oral at bedtime  sodium chloride 0.65% Nasal 3 Spray(s) Both Nostrils five times a day  sodium chloride 0.9%. 1000 milliLiter(s) (75 mL/Hr) IV Continuous <Continuous>    MEDICATIONS  (PRN):  acetaminophen     Tablet .. 650 milliGRAM(s) Oral every 6 hours PRN Temp greater or equal to 38C (100.4F), Mild Pain (1 - 3)  aluminum hydroxide/magnesium hydroxide/simethicone Suspension 30 milliLiter(s) Oral every 4 hours PRN Dyspepsia  melatonin 3 milliGRAM(s) Oral at bedtime PRN Insomnia  ondansetron Injectable 4 milliGRAM(s) IV Push every 8 hours PRN Nausea and/or Vomiting      Allergies    codeine (Hives)  codeine (Stomach Upset)  Depakote (Other)  seasonal allergies: nasal congestion (Other)    Intolerances        Vital Signs Last 24 Hrs  T(C): 37 (20 Feb 2023 08:27), Max: 37 (20 Feb 2023 08:27)  T(F): 98.6 (20 Feb 2023 08:27), Max: 98.6 (20 Feb 2023 08:27)  HR: 87 (20 Feb 2023 08:27) (71 - 87)  BP: 121/70 (20 Feb 2023 08:27) (107/71 - 136/95)  BP(mean): --  RR: 18 (20 Feb 2023 08:27) (18 - 19)  SpO2: 94% (20 Feb 2023 08:27) (94% - 96%)    Parameters below as of 20 Feb 2023 08:27  Patient On (Oxygen Delivery Method): nasal cannula        PHYSICAL EXAMINATION:    NECK:  Supple. No lymphadenopathy. Jugular venous pressure not elevated.   HEART:   The cardiac impulse has a normal quality. Reg., Nl S1 and S2.   CHEST:  Chest with decreased BS at bases. Clear anteriorly. Normal respiratory effort.  ABDOMEN:  Soft and nontender.   EXTREMITIES:  There is no edema.       LABS:                        12.6   6.09  )-----------( 195      ( 20 Feb 2023 08:43 )             39.6     02-20    137  |  108  |  9   ----------------------------<  87  3.7   |  23  |  0.78    Ca    8.1<L>      20 Feb 2023 08:43    TPro  7.0  /  Alb  3.1<L>  /  TBili  0.4  /  DBili  <0.1  /  AST  17  /  ALT  23  /  AlkPhos  164<H>  02-20          RADIOLOGY & ADDITIONAL TESTS:    Assessment and Recommendation:   · Assessment  	  - cont O2  - on zosyn for possible aspiration pneumonia-no definitive infiltrate seen on Chest CT  - has hx aspiration in past  - speech and swallow eval noted  - dvt proph  - Change albuterol MDI to Duoneb aerosol  - Aspiration Precautions  - Monitor O2 sats     Problem/Recommendation - 1:  ·  Acute on chronic respiratory failure with hypoxia and hypercapnia.   Problem/Recommendation - 2:  ·  Pneumonia, aspiration.   Problem/Recommendation - 3:  ·  Obesity hypoventilation syndrome.   Problem/Recommendation - 4:  ·  S/P  shunt.   Problem/Recommendation - 5:  ·  Atelectasis of both lungs.

## 2023-02-20 NOTE — PHYSICAL THERAPY INITIAL EVALUATION ADULT - LEVEL OF INDEPENDENCE: SIT/STAND, REHAB EVAL
Pt stood at EOB for ~ 20 sec x 2 with RW and min-mod x 1. Pt unable to take any steps at this time. Pt sat back down and transferred back to bed with above assistance. Pt legally blind so requires a lot of tactile cues with mobility./minimum assist (75% patients effort)/moderate assist (50% patients effort)

## 2023-02-20 NOTE — PROGRESS NOTE ADULT - ASSESSMENT
51 y/o male with h/o hydrocephalus (dgn at age 2 months) s/p multiple brain surgeries/ peritoneal shunt, legally blind, glaucoma, mild MR, PIERCE, epilepsy (last seizure years ago), impulse control disorder, obesity, GERD and h/o aspiration PNA was admitted on 2/17 for progressive lethargy since 2/14. Patient presently in group home in [facility]. Patient's baseline status is he is able to communicate with his guardian on the phone. He is also able to ambulate around the group home, but is in a wheelchair most of the time. As per guardian, she noticed increased lethargy when she was trying to talk to him on the phone over the last 3 days. On the day of admission, the patient found more altered, slurring words and diaphoretic and sent to the ER for evaluation. Upon arrival, patient was a code stroke.  CTA brain negative for acute infarct/ occlusion. After further work up, patient was found to have bilateral PNA worse on right than left and hypothermia with temp of 95.  BP also soft in ER  systolic.  Patient also hypoxic and was on nonrebreather mask on arrival. In ER, the patient received zosyn and doxycycline.     1. Hypothermia resolved. Possible sepsis. Acute respiratory hypoxic respiratory failure improving. Possible aspiration pneumonia. Hydrocephalus s/p prior  shunt. Metabolic encephalopathy.   -BC x 2, urine c/s noetd  -on zosyn 3.375 gm IV q8h # 2  -tolerating abx well so far; no side effects noted  -aspiration precautions  -continue abx coverage   -monitor temps  -f/u CBC  -supportive care  2. Other issues:   -care per medicine

## 2023-02-20 NOTE — PROGRESS NOTE ADULT - ASSESSMENT
53 y/o male with PMHx of hydrocephalus (age 2 months) s/p multiple brain surgeries/ peritoneal shunt, legally blind, glaucoma, mild MR, PIERCE, Epilepsy (last seizure years ago), impulse control disorder, obesity, GERD and hx of aspiration PNA who presented to  with progressive lethargy since 2/14/ Valentines Day.  Patient initially code stroke but CT negative and patient found to have signs/ sx of sepsis secondary to likely aspiration PNA.      #Acute Hypoxic Respiratory Failure suspected due to Sepsis secondary to Aspiration PNA:    Patient with hypothermia/ hypotension/ hypoxia  on admission   CXR reviewed-- extensive right infiltrates and left sided as well. But CT chest this am with no infiltrates + volume loss, atelectases, interstitial thickening     S/p Keven mask, ABG noted   s/p 2l IVF bolus in ED, On Maintenance fluids will hold for now   Treat for gram negative rods.    now off O2, monitor pulse ox   F/u BCX: NGTD, UCX<10K   C/w IV Abxs, On  IV Zosyn as per ID recs   D/w Dr hudson      #Metabolic Encephalopathy. - improved  Suspect related to hypoxia/ sepsis. R/o Seizures r/o Stroke    CT head negative for acute findings, extensive post sx changes   EEG neg for seizure, plan for 24h EEG  d/w IV Keppra as pt now tolerating diet  Resume lamotrigine   Neuro checks.    Check B12, folate, TSH wnl  S&S resc  appreciated keep NPO for now  recommended, will further f/u   D/w Dr Arevalo plan for 24h EEG and MRI     #Hydrocephalus/MR:    Baseline patient is able to talk on phone with aunt/ have conversation/ walk at group home.    XR shunt series : unremarkable   On Olanzapine outpt, hold for now     #Epilepsy:    Cont IV Keppra  lamotrigine when tolerates PO   24h EEG   seizure precautions     # LE edema  D-Dimers wnl  Check LE doppler: neg for DVT b/l  TSH wnl  Albumin wnl  No proteinuria as per UA   Check ECHO     #GERD:  cont PPI.      #DVT Proph:  Lovenox.      #Advanced Directives/ Goals of Care:    Goals of care discussed with guardian Christi Guy on admission.   Guardian states she would want to proceed with setting limitations on care (should would not want him to get CPR/ intubation); however, as patient is MR/ group home patient will need to discuss further with [facility].   CODE STATUS:  FULL CODE  Palliative team nirali

## 2023-02-20 NOTE — PHYSICAL THERAPY INITIAL EVALUATION ADULT - ACTIVE RANGE OF MOTION EXAMINATION, REHAB EVAL
Bilateral shoulder flex ~ 140 degrees, bilateral hip flex ~ 90 degrees, and bilateral DF neutral. Pt having some difficulty following commands to assess ROM./Left UE Active ROM was WFL (within functional limits)/Right UE Active ROM was WFL (within functional limits)/Left LE Active ROM was WFL (within functional limits)/Right LE Active ROM was WFL (within functional limits)

## 2023-02-20 NOTE — PROGRESS NOTE ADULT - ASSESSMENT
50 yo man mild MR, BPH, glaucoma,  hx of hydrocephalus s/p  shunt, also has VNS, had VPS revisions presented to  with change in MS, hypothermic, hypotension, ? bilat PNA, was treated with antibx. CTH no significant findings, CT angio no acute LVO. EEG at bedside, mildly slow, but no seizure activities seen.     # Metabolic encephalopathy,     # Possibility of ? sz/ post ictal - no recent hx of seizures. Is on lamotrigine, NPO, hence Keppra added while NPO.     - Re-eval with SLP today, if NPO removed, restart lamotrigine can d/c Keppra.     - consider MR head, r/o CVA  - Will reconsider 24 hr EEG as pt has improved    Above D/W Dr. FARHAN Basilio

## 2023-02-20 NOTE — PHYSICAL THERAPY INITIAL EVALUATION ADULT - GENERAL OBSERVATIONS, REHAB EVAL
Pt found supine in bed with tele monitor, O2 NC 2 L/min, 1:1 present, and bed alarm activated. Pt with no c/o pain. PAINAD- 0/10.

## 2023-02-21 NOTE — CONSULT NOTE ADULT - SUBJECTIVE AND OBJECTIVE BOX
HPI: Pt is a 52y old Male with hx of hydrocephalus (dgn at age 2 months) s/p multiple brain surgeries/ peritoneal shunt, legally blind, glaucoma, mild MR, PIERCE, epilepsy (last seizure years ago), impulse control disorder, obesity, GERD and h/o aspiration PNA was admitted on 2/17 for progressive lethargy since 2/14. Patient presently in group home in [facility]. Patient's baseline status is he is able to communicate with his guardian on the phone. He is also able to ambulate around the group home, but is in a wheelchair most of the time. As per guardian, she noticed increased lethargy when she was trying to talk to him on the phone over the last 3 days. On the day of admission, the patient found more altered, slurring words and diaphoretic and sent to the ER for evaluation. Upon arrival, patient was a code stroke.  CTA brain negative for acute infarct/ occlusion. After further work up, patient was found to have bilateral PNA worse on right than left and hypothermia with temp of 95. Patient also hypoxic and was on nonrebreather mask on arrival. In ER, the patient received zosyn and doxycycline.   Palliative medicine Consult to further establish GOC  2/21/23 Seen and examined at bedside. Awake stating he is in [place]. Denies C/O pain at present although unable to express himself clearly.     PAIN: ( )Yes   ( )No  Denies  -No non verbal signs or symptoms    DYSPNEA: ( ) Yes  ( X) No  Not currently at rest    PAST MEDICAL & SURGICAL HISTORY:  Hydrocephalus  age 2 months  Legally blind  Glaucoma  Sleep apnea  not on CPAP  Epilepsy  Osteoporosis  Elevated liver enzymes  Environmental Allergies  Impulse control disorder  worsened when pt on Depakote, pt now off  Mild mental retardation  Hiatal hernia  Aspiration pneumonia  at age 25  Viral pneumonia  h/o  BPH without obstruction/lower urinary tract symptoms  Glaucoma  on eye gtts  Mentally disabled  Chronic GERD  S/P  Shunt  originally had shunt to lung, then revised to peritoneal shunt, has had multiple revisions  Epilepsy  s/p Vagal Nerve Stimulator Implant , 2007, last one in 2016  H/O craniotomy  at age 18  Heel cord contracture  s/p surgical correction b/l  History of tonsillectomy  S/p bilateral myringotomy with tube placement  Obstructed  shunt  revision of  shunt 2015  S/P  shunt      SOCIAL HX:  Lives in group home  Has guardian  Hx opiate tolerance ( )YES  (X )NO    Baseline ADLs  (Prior to Admission)  ( ) Independent   (X )Dependent    FAMILY HISTORY:  Family history of stroke (Mother)    Review of Systems:      All other systems reviewed and negative  Unable to obtain/Limited due to:      PHYSICAL EXAM:    Vital Signs Last 24 Hrs  T(C): 36.1 (21 Feb 2023 07:39), Max: 36.9 (20 Feb 2023 16:19)  T(F): 97 (21 Feb 2023 07:39), Max: 98.4 (20 Feb 2023 16:19)  HR: 84 (21 Feb 2023 07:39) (84 - 94)  BP: 136/86 (21 Feb 2023 07:39) (129/88 - 154/82)  RR: 18 (21 Feb 2023 07:39) (18 - 18)  SpO2: 91% (21 Feb 2023 07:39) (91% - 92%)  PPSV2:  20-40 %      General: Middle aged Male in bed in no distress  Mental Status: alert disoriented to place  HEENT: oral mucosa dry  Lungs: clear to auscultation rogelio  Cardiac: S1S2+  GI: abd softly distended/+BS/NT   : voids  Ext: SORIANO on bed  Neuro: unable to test      LABS:                        12.6   6.09  )-----------( 195      ( 20 Feb 2023 08:43 )             39.6     02-20    137  |  108  |  9   ----------------------------<  87  3.7   |  23  |  0.78    Ca    8.1<L>      20 Feb 2023 08:43  Phos  2.1     02-20  Mg     2.2     02-20    TPro  7.0  /  Alb  3.1<L>  /  TBili  0.4  /  DBili  <0.1  /  AST  17  /  ALT  23  /  AlkPhos  164<H>  02-20      Albumin: Albumin, Serum: 3.1 g/dL (02-20 @ 08:43)      Allergies    codeine (Hives)  codeine (Stomach Upset)  Depakote (Other)  seasonal allergies: nasal congestion (Other)    Intolerances      MEDICATIONS  (STANDING):  albuterol/ipratropium for Nebulization 3 milliLiter(s) Nebulizer every 6 hours  cloBAZam 5 milliGRAM(s) Oral two times a day  dorzolamide 2%/timolol 0.5% Ophthalmic Solution 1 Drop(s) Both EYES two times a day  enoxaparin Injectable 40 milliGRAM(s) SubCutaneous every 24 hours  escitalopram 10 milliGRAM(s) Oral daily  guaiFENesin Oral Liquid (Sugar-Free) 200 milliGRAM(s) Oral every 6 hours  lactulose Syrup 10 Gram(s) Oral two times a day  lamoTRIgine 300 milliGRAM(s) Oral two times a day  latanoprost 0.005% Ophthalmic Solution 1 Drop(s) Both EYES at bedtime  metoprolol tartrate 50 milliGRAM(s) Oral two times a day  OLANZapine 5 milliGRAM(s) Oral two times a day  pantoprazole    Tablet 40 milliGRAM(s) Oral before breakfast  piperacillin/tazobactam IVPB.. 3.375 Gram(s) IV Intermittent every 8 hours  primidone 250 milliGRAM(s) Oral two times a day  senna 2 Tablet(s) Oral at bedtime  sodium chloride 0.65% Nasal 3 Spray(s) Both Nostrils five times a day  sodium chloride 0.9%. 1000 milliLiter(s) (75 mL/Hr) IV Continuous <Continuous>    MEDICATIONS  (PRN):  acetaminophen     Tablet .. 650 milliGRAM(s) Oral every 6 hours PRN Temp greater or equal to 38C (100.4F), Mild Pain (1 - 3)  aluminum hydroxide/magnesium hydroxide/simethicone Suspension 30 milliLiter(s) Oral every 4 hours PRN Dyspepsia  melatonin 3 milliGRAM(s) Oral at bedtime PRN Insomnia  ondansetron Injectable 4 milliGRAM(s) IV Push every 8 hours PRN Nausea and/or Vomiting      RADIOLOGY/ADDITIONAL STUDIES:    < from: US Duplex Venous Lower Ext Complete, Bilateral (02.18.23 @ 19:10) >    ACC: [id number] EXAM:  US DPLX LWR EXT VEINS COMPL BI   ORDERED BY:   WILDA HEALY     PROCEDURE DATE:  02/18/2023          INTERPRETATION:  CLINICAL INFORMATION: Leg swelling    COMPARISON: 3/6/2022.    TECHNIQUE: Duplex sonography of the BILATERAL LOWER extremity veins with   color and spectral Doppler, with and without compression.    FINDINGS:    RIGHT:  Normal compressibility of the RIGHT common femoral, femoral and popliteal   veins.  Doppler examination shows normal spontaneous and phasic flow.  No RIGHT calf vein thrombosis is detected.    LEFT:  Normal compressibility of the LEFT common femoral, femoral and popliteal   veins.  Doppler examination shows normal spontaneous and phasic flow.  No LEFT calf vein thrombosis is detected.    IMPRESSION:  No evidence of deep venous thrombosis in either lower extremity.    < from: CT Abdomen and Pelvis No Cont (02.18.23 @ 11:55) >    ACC: [id number] EXAM:  CT ABDOMEN AND PELVIS   ORDERED BY: WILDA HEALY     ACC: [id number] EXAM:  CT CHEST   ORDERED BY: WILDA HEALY     PROCEDURE DATE:  02/18/2023          INTERPRETATION:  CLINICAL INFORMATION: Abdominal distention. Status post   fall.    COMPARISON: CT chest abdomen and pelvis 3/4/2022    CONTRAST/COMPLICATIONS:  IV Contrast: NONE  Oral Contrast: NONE  Complications: None reported at time of study completion    PROCEDURE:  CT of the Chest, Abdomen and Pelvis was performed.  Sagittal and coronal reformats were performed.    FINDINGS:  CHEST:  LUNGS AND LARGE AIRWAYS: Patent central airways. Stable right lung volume   loss. Stable right lower lobe scarring. Mild bilateral interlobular   septal thickening. Compressive left lower lobe atelectasis adjacent to   the elevated diaphragm.  PLEURA: Elevated left diaphragm. Stable right pleural calcifications. No   pneumothorax or pleural effusion.  VESSELS: Stable calcification in the wall of the right brachiocephalic   vein, likely reflecting sequela of prior thrombus. Mild atherosclerotic   coronary artery calcifications.  HEART: Heart size is normal. No pericardial effusion.  MEDIASTINUM AND IDRIS: No lymphadenopathy.  Abdominal hernia.  CHEST WALL AND LOWER NECK:  shunt catheter descends down the right neck   and chest wall. Left anterior chest wall millimeters device with catheter   ascending the left neck.    ABDOMEN AND PELVIS:  LIVER: Within normal limits.  BILE DUCTS: Normal caliber.  GALLBLADDER: Within normal limits.  SPLEEN: Within normal limits.  PANCREAS: Within normal limits.  ADRENALS: Within normal limits.  KIDNEYS/URETERS: Within normal limits.    BLADDER: Within normal limits.  REPRODUCTIVE ORGANS: Prostate within normal limits.    BOWEL: Pneumatosis of the transverse colon. No bowel wall thickening or   pericolonic inflammatory change. No bowel obstruction. Appendix is normal.  PERITONEUM: No free air. Tip of the shunt catheter rests in the left   lower quadrant. No ascites.  VESSELS: Within normal limits.  RETROPERITONEUM/LYMPH NODES: No lymphadenopathy.  ABDOMINAL WALL: Shunt catheter descends along the midline of the ventral   abdominal wall, entering the peritoneal cavity above the umbilicus..   Small bilateral fat-containing inguinal hernias. Left ventral abdominal   wall subcutaneous foci of gas, likely due to subcutaneous injections.  BONES: Degenerative changes. No acute fracture.    IMPRESSION:  Mild bilateral interlobular septal thickening may indicate mild pulmonary   interstitial edema.    No pneumothorax or hemothorax.    No pneumoperitoneum or ascites.    Pneumatosis of the transverse colon. Although there are no signs of   colonic inflammation recommend correlating with lactate level. Other   etiologies including benign etiologies should be considered.        --- End of Report ---      < from: CT Head No Cont (02.18.23 @ 11:53) >    ACC: [id number] EXAM:  CT BRAIN   ORDERED BY: WILDA HEALY     PROCEDURE DATE:  02/18/2023          INTERPRETATION:  CLINICAL INDICATION: Unwitnessed fall    5mm axial sections of the brain were obtained from base to vertex,   without the intravenous administration of contrast material. Coronal and   sagittal computer generated reconstructed views are available.    Comparison is made with the prior CT of 2/17/2023.    No significant interval change is identified.    There is a right temporalcraniotomy adjacent embolic material. There is   evidence of bilateral parietal craniotomy with multiple craniotomy   defects. Multiple ventricular catheters are identified some of which   appear to be abandoned. A right parietal ventricular catheterhas its tip   in the right lateral ventricle. A left parietal ventricular catheter   appears abandoned. A right frontal ventricular catheter has its tip in   left basal ganglia and appears abandoned. A right frontal ventricular   catheter is also abandoned. There is no hydrocephalus. There has been   right-sided lens replacement surgery.        IMPRESSION: No hemorrhage. No hydrocephalus. Multiple ventricular   catheters. No change since 2/17/2023.    < end of copied text >    < from: Xray Shunt Series (02.17.23 @ 09:45) >    ACC: [id number] EXAM:  XR SHUNT SERIES#   ORDERED BY: MENDOZA AL     PROCEDURE DATE:  02/17/2023          INTERPRETATION:  Shunt series    HISTORY:  shunt    COMPARISON: 3/3/2022    TECHNIQUE: Two views of the skull and neck with frontal views of the   chest and abdomen were obtained.    Interpretation: Right sided  shunt shows no definite kink or   discontinuity. The lower tip reaches the left abdominal flank.    Left chest nerve stimulator is present. Right pleural or parenchymal   calcification is present with moderate pulmonary congestion. Additional   presumably old cranial components of the  shunt are present as well as   in a band and segment at the neck level.    IMPRESSION: Unremarkable right  shunt. Pulmonary congestion.    < from: CT Angio Neck Stroke Protocol w/ IV Cont (02.17.23 @ 09:38) >    ACC: [id number] EXAM:  CT ANGIO BRAIN STROKE PROTC IC   ORDERED BY: MENDOZA AL     ACC: [id number] EXAM:  CT ANGIO NECK STROKE PROTCL IC   ORDERED BY: MENDOZA AL     ACC: [id number] EXAM:  CT BRAIN STROKE PROTOCOL   ORDERED BY: MENDOZA AL     ACC: [id number] EXAM:  CT BRAIN PERFUSION MAPS STROKE   ORDERED BY: MENDOZA AL     PROCEDURE DATE:  02/17/2023          INTERPRETATION:  CT angiography of the brain and neck. CT brain perfusion.    CLINICAL INDICATION: Code stroke    TECHNIQUE: Direct axial CT scanning of the brain and neck was obtained   from the vertex to the level of the clavicular heads after the dynamic   intravenous injection of Omnipaque 350. Sagittal and coronal maximum   intensity projection reformats were provided.  Three-dimensional   reconstructions were performed by the radiologist using the [employer]   workstation.    CT perfusion was obtained, and post processed utilizing RAPID software.    RAPID AI software was utilized to detect presence ofintracranial   hemorrhage.    Additionally, noncontrast axial CT scanning of the brain was obtained   from the skull base to the vertex. Sagittal and coronal reformats were   provided.    A total of 140 cc of Omnipaque 350 was intravenously administered for   this examination. 10 cc were discarded.    COMPARISON: CT brain 4/10/2022.    FINDINGS:    CT BRAIN:    RAPID AI detects no intracranial hemorrhage.    Numerous bilateral anterior parietal craniotomies and cranioplasties.    Redemonstration of right pterional craniotomy and right temporal   craniectomy with embolic material in the region of the craniectomy.    Redemonstration of multiple abandoned ventriculostomy catheter fragments.    Redemonstration of right parietal approach ventriculostomy catheter which   terminates in the posterior right ventricular body.    Ventricles are similar in size. No hydrocephalus.    Similar minimal rightward midline shift. Basal cisterns are visualized.    No acute intracranial hemorrhage or brain edema.    Bilateral ethmoid sinus and left frontal sinus mucosal thickening.    CT BRAIN PERFUSION:    Cerebral blood flow less than 30% = 0 mL.    Tmax greater than 6 seconds = 0 mL. Normal flow-related enhancement of   the bilateral common and internal carotid arteries.    Normal flow-related enhancement of the bilateral vertebral arteries.    No flow-limiting stenosis, evidence for arterial dissection, or vascular   aneurysm.    CTA BRAIN:    Normal flow-related enhancement of the skull base/intracranial internal   carotid arteries.    Normal flow-related enhancement of the bilateral anterior, middle, and   posterior cerebral arteries.    Anterior and bilateral posterior communicating arteries are present.    Normal flow-related enhancement of the bilateral intradural vertebral   arteries and the basilar artery.    No flow-limiting stenosis or vascular aneurysm. No AVM.    CTA NECK:    Normal flow-related enhancement of the bilateral common and internal   carotid arteries.    Normal flow-related enhancement of the bilateral vertebral arteries.    No flow-limiting stenosis, evidence for arterial dissection, or vascular   aneurysm.    IMPRESSION:    CT brain:  No hydrocephalus, acute intracranial hemorrhage, mass effect, or brain   edema.    Postsurgical changes as described.    CT brain perfusion:  No perfusion abnormality.    CTA brain:  No flow-limiting stenosis or vascular aneurysm. No AVM.    CTA neck:  No flow-limiting stenosis, evidence for arterial dissection, or vascular   aneurysm.    Dr. Connolly discussed these findings with Dr. Al on 2/17/2023 9:03 AM   with read back.

## 2023-02-21 NOTE — PROGRESS NOTE ADULT - ASSESSMENT
51 y/o male with PMHx of hydrocephalus (age 2 months) s/p multiple brain surgeries/ peritoneal shunt, legally blind, glaucoma, mild MR, PIERCE, Epilepsy (last seizure years ago), impulse control disorder, obesity, GERD and hx of aspiration PNA who presented to  with progressive lethargy since 2/14/ Valentines Day.  Patient initially code stroke but CT negative and patient found to have signs/ sx of sepsis secondary to likely aspiration PNA.      #Acute Hypoxic Respiratory Failure suspected due to Sepsis secondary to Aspiration PNA:    Patient with hypothermia/ hypotension/ hypoxia  on admission   CXR reviewed-- extensive right infiltrates and left sided as well. But CT chest this am with no infiltrates + volume loss, atelectases, interstitial thickening     S/p Keven mask, ABG noted   s/p 2l IVF bolus in ED, On Maintenance fluids will hold for now   Treat for gram negative rods.    now off O2, monitor pulse ox   F/u BCX: NGTD, UCX<10K   C/w IV Abxs, On  IV Zosyn as per ID recs   D/w Dr hudson      #Metabolic Encephalopathy. - improved  Suspect related to hypoxia/ sepsis. R/o Seizures r/o Stroke    CT head negative for acute findings, extensive post sx changes   EEG neg for seizure, plan for 24h EEG  d/w IV Keppra as pt now tolerating diet  Resume lamotrigine   Neuro checks.    Check B12, folate, TSH wnl  S&S resc  appreciated keep NPO for now  recommended, will further f/u   D/w Dr Arevalo plan for 24h EEG and MRI     #Hydrocephalus/MR:    Baseline patient is able to talk on phone with aunt/ have conversation/ walk at group home.    XR shunt series : unremarkable   On Olanzapine outpt, hold for now     #Epilepsy:    Cont IV Keppra  lamotrigine when tolerates PO   24h EEG   seizure precautions     # LE edema  D-Dimers wnl  Check LE doppler: neg for DVT b/l  TSH wnl  Albumin wnl  No proteinuria as per UA   Check ECHO     #GERD:  cont PPI.      #DVT Proph:  Lovenox.      #Advanced Directives/ Goals of Care:    Goals of care discussed with guardian Christi Guy on admission.   Guardian states she would want to proceed with setting limitations on care (should would not want him to get CPR/ intubation); however, as patient is MR/ group home patient will need to discuss further with [facility].   CODE STATUS:  FULL CODE  Palliative team nirali  53 y/o male with PMHx of hydrocephalus (age 2 months) s/p multiple brain surgeries/ peritoneal shunt, legally blind, glaucoma, mild MR, PIERCE, Epilepsy (last seizure years ago), impulse control disorder, obesity, GERD and hx of aspiration PNA admitted for:       #Acute Hypoxic Respiratory Failure suspected due to Sepsis secondary to Aspiration PNA:    Patient with hypothermia/ hypotension/ hypoxia  on admission   CXR reviewed-- extensive right infiltrates and left sided as well. But CT chest  with no infiltrates + volume loss, atelectases, interstitial thickening     S/p Venti mask, ABG noted   s/p 2l IVF bolus in ED, On Maintenance fluids will d/c now     now off O2, monitor pulse ox   F/u BCX: NGTD, UCX<10K   C/w IV Abxs, On  IV Zosyn as per ID recs , will cover suspected GNR bacteria infection   D/w Dr Brown      #Metabolic Encephalopathy. - improved   Suspect related to hypoxia/ sepsis. R/o Seizures  Can  not r/o stroke  as Pt has non working stimulator with no certain info     CT head negative for acute findings, extensive post sx changes   EEG neg for seizure, plan for 24h EEG, d/w Dr Gaming  Off  IV Keppra as pt now tolerating diet  Resumed on  lamotrigine  and Clobazam  Neuro checks.    B12, folate, TSH wnl  S&S resc  cleared for puree diet with thickened liquids   D/w Dr Arevalo plan for 24h EEG     #Hydrocephalus/MR:    Baseline patient is able to talk on phone with aunt/ have simple  conversation/ walk at group home.    XR shunt series : unremarkable   On Olanzapine continued     # LE edema  D-Dimers wnl  LE doppler: neg for DVT b/l  TSH wnl  Albumin wnl  No proteinuria as per UA   ECHO: EF preserved, no significant valvular Dz     #GERD:  cont PPI.      #DVT Proph:  Lovenox.      #Advanced Directives/ Goals of Care:    guardian Christi Guy   CODE STATUS:  FULL CODE  Palliative team eval in progress

## 2023-02-21 NOTE — PROGRESS NOTE ADULT - SUBJECTIVE AND OBJECTIVE BOX
Subjective:    Awake, more communicative. No sputum.    MEDICATIONS  (STANDING):  albuterol/ipratropium for Nebulization 3 milliLiter(s) Nebulizer every 6 hours  cloBAZam 5 milliGRAM(s) Oral two times a day  dorzolamide 2%/timolol 0.5% Ophthalmic Solution 1 Drop(s) Both EYES two times a day  enoxaparin Injectable 40 milliGRAM(s) SubCutaneous every 24 hours  escitalopram 10 milliGRAM(s) Oral daily  guaiFENesin Oral Liquid (Sugar-Free) 200 milliGRAM(s) Oral every 6 hours  lactulose Syrup 10 Gram(s) Oral two times a day  lamoTRIgine 300 milliGRAM(s) Oral two times a day  latanoprost 0.005% Ophthalmic Solution 1 Drop(s) Both EYES at bedtime  metoprolol tartrate 50 milliGRAM(s) Oral two times a day  OLANZapine 5 milliGRAM(s) Oral two times a day  pantoprazole    Tablet 40 milliGRAM(s) Oral before breakfast  piperacillin/tazobactam IVPB.. 3.375 Gram(s) IV Intermittent every 8 hours  primidone 250 milliGRAM(s) Oral two times a day  senna 2 Tablet(s) Oral at bedtime  sodium chloride 0.65% Nasal 3 Spray(s) Both Nostrils five times a day  sodium chloride 0.9%. 1000 milliLiter(s) (75 mL/Hr) IV Continuous <Continuous>    MEDICATIONS  (PRN):  acetaminophen     Tablet .. 650 milliGRAM(s) Oral every 6 hours PRN Temp greater or equal to 38C (100.4F), Mild Pain (1 - 3)  aluminum hydroxide/magnesium hydroxide/simethicone Suspension 30 milliLiter(s) Oral every 4 hours PRN Dyspepsia  melatonin 3 milliGRAM(s) Oral at bedtime PRN Insomnia  ondansetron Injectable 4 milliGRAM(s) IV Push every 8 hours PRN Nausea and/or Vomiting      Allergies    codeine (Hives)  codeine (Stomach Upset)  Depakote (Other)  seasonal allergies: nasal congestion (Other)    Intolerances        Vital Signs Last 24 Hrs  T(C): 36.8 (20 Feb 2023 21:20), Max: 36.9 (20 Feb 2023 16:19)  T(F): 98.3 (20 Feb 2023 21:20), Max: 98.4 (20 Feb 2023 16:19)  HR: 94 (20 Feb 2023 21:20) (89 - 94)  BP: 154/82 (20 Feb 2023 21:20) (129/88 - 154/82)  BP(mean): --  RR: 18 (20 Feb 2023 21:20) (18 - 18)  SpO2: 91% (21 Feb 2023 02:30) (91% - 92%)    Parameters below as of 21 Feb 2023 02:30  Patient On (Oxygen Delivery Method): room air        PHYSICAL EXAMINATION:    NECK:  Supple. No lymphadenopathy. Jugular venous pressure not elevated.   HEART:   The cardiac impulse has a normal quality. Reg., Nl S1 and S2.   CHEST:  Chest is clear to auscultation anteriorly. Sl decreased BS at bases. Normal respiratory effort.  ABDOMEN:  Soft and nontender.   EXTREMITIES:  There is tr edema.       LABS:                        12.6   6.09  )-----------( 195      ( 20 Feb 2023 08:43 )             39.6     02-20    137  |  108  |  9   ----------------------------<  87  3.7   |  23  |  0.78    Ca    8.1<L>      20 Feb 2023 08:43  Phos  2.1     02-20  Mg     2.2     02-20    TPro  7.0  /  Alb  3.1<L>  /  TBili  0.4  /  DBili  <0.1  /  AST  17  /  ALT  23  /  AlkPhos  164<H>  02-20          RADIOLOGY & ADDITIONAL TESTS:    Assessment and Recommendation:   · Assessment  	  - cont O2  - on zosyn for possible aspiration pneumonia-no definitive infiltrate seen on Chest CT  - speech and swallow eval noted-now on puree diet and thickened liquids  - dvt proph  - Change albuterol MDI to Duoneb aerosol  - Aspiration Precautions  - Monitor O2 sats  - Repeat CXR today-previously had increased interstitial markings on CT scan     Problem/Recommendation - 1:  ·  Acute on chronic respiratory failure with hypoxia and hypercapnia.   Problem/Recommendation - 2:  ·  Pneumonia, aspiration.   Problem/Recommendation - 3:  ·  Obesity hypoventilation syndrome.   Problem/Recommendation - 4:  ·  S/P  shunt.   Problem/Recommendation - 5:  ·  Atelectasis of both lungs.

## 2023-02-21 NOTE — PROGRESS NOTE ADULT - ASSESSMENT
50 yo man mild MR, BPH, glaucoma,  hx of hydrocephalus s/p  shunt, also has VNS, had VPS revisions presented to  with change in MS, hypothermic, hypotension, ? bilat PNA, was treated with antibx. CTH no significant findings, CT angio no acute LVO. EEG at bedside, mildly slow, but no seizure activities seen.     # Metabolic encephalopathy, resolved    # Possibility of ? sz/ post ictal - no recent hx of seizures; lamotrigine / clobazam and Primodone restarted - Keppra d/c       - MR head cannot be done due to stimulator  - Will get 24 hr EEG     Above D/W Dr. Bazan  `

## 2023-02-21 NOTE — PROGRESS NOTE ADULT - SUBJECTIVE AND OBJECTIVE BOX
Date of service: 23 @ 11:13    Lying in bed in NAD  More alert  Has dry cough  Alert and confused    ROS: no fever or chills; poorly verbal    MEDICATIONS  (STANDING):  albuterol/ipratropium for Nebulization 3 milliLiter(s) Nebulizer every 6 hours  cloBAZam 5 milliGRAM(s) Oral two times a day  dorzolamide 2%/timolol 0.5% Ophthalmic Solution 1 Drop(s) Both EYES two times a day  enoxaparin Injectable 40 milliGRAM(s) SubCutaneous every 24 hours  escitalopram 10 milliGRAM(s) Oral daily  guaiFENesin Oral Liquid (Sugar-Free) 200 milliGRAM(s) Oral every 6 hours  lactulose Syrup 10 Gram(s) Oral two times a day  lamoTRIgine 300 milliGRAM(s) Oral two times a day  latanoprost 0.005% Ophthalmic Solution 1 Drop(s) Both EYES at bedtime  metoprolol tartrate 50 milliGRAM(s) Oral two times a day  OLANZapine 5 milliGRAM(s) Oral two times a day  pantoprazole    Tablet 40 milliGRAM(s) Oral before breakfast  piperacillin/tazobactam IVPB.. 3.375 Gram(s) IV Intermittent every 8 hours  primidone 250 milliGRAM(s) Oral two times a day  senna 2 Tablet(s) Oral at bedtime  sodium chloride 0.65% Nasal 3 Spray(s) Both Nostrils five times a day  sodium chloride 0.9%. 1000 milliLiter(s) (75 mL/Hr) IV Continuous <Continuous>    Vital Signs Last 24 Hrs  T(C): 36.1 (2023 07:39), Max: 36.9 (2023 16:19)  T(F): 97 (2023 07:39), Max: 98.4 (2023 16:19)  HR: 103 (2023 09:34) (84 - 103)  BP: 136/86 (2023 07:39) (129/88 - 154/82)  BP(mean): --  RR: 18 (2023 07:39) (18 - 18)  SpO2: 92% (2023 09:34) (91% - 92%)    Parameters below as of 2023 09:34  Patient On (Oxygen Delivery Method): room air     Physical exam:    Constitutional:  No acute distress  HEENT: NC/AT, EOMI, PERRLA, conjunctivae clear; ears and nose atraumatic  Neck: supple; thyroid not palpable  Back: no tenderness  Respiratory: respiratory effort normal; crackles at bases  Cardiovascular: S1S2 regular, no murmurs  Abdomen: soft, not tender, not distended, positive BS; no liver or spleen organomegaly  Genitourinary: no suprapubic tenderness  Lymphatic: no LN palpable  Musculoskeletal: no muscle tenderness, no joint swelling or tenderness  Extremities: no pedal edema  Neurological/ Psychiatric: Alert, moving all extremities  Skin: no rashes; no palpable lesions    Labs: reviewed                        12.6   6.09  )-----------( 195      ( 2023 08:43 )             39.6     02-20    137  |  108  |  9   ----------------------------<  87  3.7   |  23  |  0.78    Ca    8.1<L>      2023 08:43  Phos  2.1     02-20  Mg     2.2     02-20    TPro  7.0  /  Alb  3.1<L>  /  TBili  0.4  /  DBili  <0.1  /  AST  17  /  ALT  23  /  AlkPhos  164<H>  02-20    D-Dimer Assay, Quantitative: 222 ng/mL DDU (23 @ 07:21)                        12.7   5.49  )-----------( 203      ( 2023 07:21 )             39.9     02-19    140  |  108  |  6<L>  ----------------------------<  94  3.6   |  29  |  0.87    Ca    7.8<L>      2023 07:21    TPro  7.0  /  Alb  3.2<L>  /  TBili  0.3  /  DBili  x   /  AST  17  /  ALT  25  /  AlkPhos  169<H>  02-19     LIVER FUNCTIONS - ( 2023 07:21 )  Alb: 3.2 g/dL / Pro: 7.0 gm/dL / ALK PHOS: 169 U/L / ALT: 25 U/L / AST: 17 U/L / GGT: x           Urinalysis Basic - ( 2023 12:57 )    Color: Yellow / Appearance: Clear / S.005 / pH: x  Gluc: x / Ketone: Negative  / Bili: Negative / Urobili: Negative   Blood: x / Protein: Negative / Nitrite: Negative   Leuk Esterase: Negative / RBC: x / WBC x   Sq Epi: x / Non Sq Epi: x / Bacteria: x    ( @ 08:53)  NotDetec    Culture - Urine (collected 2023 12:57)  Source: Clean Catch Clean Catch (Midstream)  Final Report (2023 16:35):    <10,000 CFU/mL Normal Urogenital Debbie    Culture - Blood (collected 2023 11:28)  Source: .Blood None  Preliminary Report (2023 15:09):    No growth to date.    Culture - Blood (collected 2023 11:03)  Source: .Blood Blood  Preliminary Report (2023 15:09):    No growth to date.    Radiology: all available radiological tests reviewed    < from: CT Abdomen and Pelvis No Cont (23 @ 11:55) >  Mild bilateral interlobular septal thickening may indicate mild pulmonary   interstitial edema.    No pneumothorax or hemothorax.    No pneumoperitoneum or ascites.    Pneumatosis of the transverse colon. Although there are no signs of   colonic inflammation recommend correlating with lactate level. Other   etiologies including benign etiologies should be considered.  < end of copied text >    < from: CT Chest No Cont (23 @ 11:54) >  LUNGS AND LARGE AIRWAYS: Patent central airways. Stable right lung volume   loss. Stable right lower lobe scarring. Mild bilateral interlobular   septal thickening. Compressive left lower lobe atelectasis adjacent to the elevated diaphragm.  < end of copied text >      Advanced directives addressed: full resuscitation

## 2023-02-21 NOTE — PROGRESS NOTE ADULT - SUBJECTIVE AND OBJECTIVE BOX
CC: lethargy (17 Feb 2023 13:43)    HPI:  51 y/o male with PMHx of hydrocephalus (age 2 months) s/p multiple brain surgeries/ peritoneal shunt, legally blind, glaucoma, mild MR, PIERCE, Epilepsy (last seizure years ago), impulse control disorder, obesity, GERD and hx of aspiration PNA who presented to  with progressive lethargy since 2/14.   Patient's legal guardian is his Aunt, Christi Guy.  Patient lived with her for many years but now has been in group homes.  Patient presently in group home in [facility].  Patient's baseline status is he is able to communicate with his guardian on the phone.  They talk daily.  He is also able to ambulate around the group home but is in a wheelchair most of the time.  As per guardian, she noticed increased lethargy when she was trying to talk to him on the phone over the last 3 days.  Today, around 8am at the group home, patient found more altered, slurring words and diaphoretic and sent to the ER for evaluation.  As per guardian, patient did go to a pulmonologist on Thursday/ yesterday for clearance appointment prior to colonoscopy.  Upon arrival to , patient was a code stroke.  CTA brain negative for acute infarct/ occlusion.  After further work up, patient was found to have bilateral PNA-- worse on right than left and hypothermia with temp of 95.  BP also soft in ER  systolic.  Patient also hypoxic and was on nonrebreather mask on arrival.  Patient was also seen by neuro and question was made to ? patient being postictal; however, as per aunt, patient has not had a seizure in many years.  Patient given IV ABX and 2.1 L NS and admitted.        INTERVAL HPI/ OVERNIGHT EVENTS:        Vital Signs Last 24 Hrs  T(C): 36.1 (21 Feb 2023 07:39), Max: 36.9 (20 Feb 2023 16:19)  T(F): 97 (21 Feb 2023 07:39), Max: 98.4 (20 Feb 2023 16:19)  HR: 103 (21 Feb 2023 09:34) (84 - 103)  BP: 136/86 (21 Feb 2023 07:39) (129/88 - 154/82)  RR: 18 (21 Feb 2023 07:39) (18 - 18)  SpO2: 92% (21 Feb 2023 09:34) (91% - 92%)    Parameters below as of 21 Feb 2023 09:34  Patient On (Oxygen Delivery Method): room air      REVIEW OF SYSTEMS:  Unable to obtain due to developmental delay and encephalopathy      PHYSICAL EXAM:  General:   in no acute distress  Eyes: L eye reactive to light, R eye with cataract, conjunctiva and sclera clear  Head:  oval shape s/p cranial expansion, no signs of trauma, no hematoma or ecchymosis no abrasion, no tenderness   ENMT: No nasal discharge; congested nasal passages   Respiratory: Decreased BS, no wheezing   Cardiovascular: Regular rate and rhythm. S1 and S2 Normal;   Gastrointestinal: Soft non-tender non-distended; Normal bowel sounds  Genitourinary: No  suprapubic  tenderness  Extremities: + LE edema  Vascular: Peripheral pulses palpable 2+ bilaterally  Neurological: Alert and oriented x 1, confused, Moves all extremities but doesn't follow commands. Speech is garbled no facial asymmetry  Skin: Warm and dry. No acute rash  Musculoskeletal: Normal muscle tone, no ecchymoses, no abrasions,  or joint edema. full PROM, no pain     Labs:                            13.0   6.23  )-----------( 219      ( 21 Feb 2023 08:59 )             39.3     02-21    138  |  107  |  8   ----------------------------<  150<H>  3.0<L>   |  26  |  0.88    Ca    8.3<L>      21 Feb 2023 08:59  Phos  2.1     02-20  Mg     2.2     02-20    TPro  7.1  /  Alb  3.1<L>  /  TBili  0.5  /  DBili  x   /  AST  18  /  ALT  23  /  AlkPhos  154<H>  02-21    LIVER FUNCTIONS - ( 21 Feb 2023 08:59 )  Alb: 3.1 g/dL / Pro: 7.1 gm/dL / ALK PHOS: 154 U/L / ALT: 23 U/L / AST: 18 U/L / GGT: x                                 12.6   6.09  )-----------( 195      ( 20 Feb 2023 08:43 )             39.6     20 Feb 2023 08:43    137    |  108    |  9      ----------------------------<  87     3.7     |  23     |  0.78     Ca    8.1        20 Feb 2023 08:43  Phos  2.1       20 Feb 2023 08:43  Mg     2.2       20 Feb 2023 08:43    TPro  7.0    /  Alb  3.1    /  TBili  0.4    /  DBili  <0.1   /  AST  17     /  ALT  23     /  AlkPhos  164    20 Feb 2023 08:43      LIVER FUNCTIONS - ( 20 Feb 2023 08:43 )  Alb: 3.1 g/dL / Pro: 7.0 gm/dL / ALK PHOS: 164 U/L / ALT: 23 U/L / AST: 17 U/L / GGT: x           MEDICATIONS  (STANDING):  albuterol/ipratropium for Nebulization 3 milliLiter(s) Nebulizer every 6 hours  cloBAZam 5 milliGRAM(s) Oral two times a day  dorzolamide 2%/timolol 0.5% Ophthalmic Solution 1 Drop(s) Both EYES two times a day  enoxaparin Injectable 40 milliGRAM(s) SubCutaneous every 24 hours  escitalopram 10 milliGRAM(s) Oral daily  guaiFENesin Oral Liquid (Sugar-Free) 200 milliGRAM(s) Oral every 6 hours  lactulose Syrup 10 Gram(s) Oral two times a day  lamoTRIgine 300 milliGRAM(s) Oral two times a day  latanoprost 0.005% Ophthalmic Solution 1 Drop(s) Both EYES at bedtime  metoprolol tartrate 50 milliGRAM(s) Oral two times a day  OLANZapine 5 milliGRAM(s) Oral two times a day  pantoprazole    Tablet 40 milliGRAM(s) Oral before breakfast  piperacillin/tazobactam IVPB.. 3.375 Gram(s) IV Intermittent every 8 hours  primidone 250 milliGRAM(s) Oral two times a day  senna 2 Tablet(s) Oral at bedtime  sodium chloride 0.65% Nasal 3 Spray(s) Both Nostrils five times a day  sodium chloride 0.9%. 1000 milliLiter(s) (75 mL/Hr) IV Continuous <Continuous>    MEDICATIONS  (PRN):  acetaminophen     Tablet .. 650 milliGRAM(s) Oral every 6 hours PRN Temp greater or equal to 38C (100.4F), Mild Pain (1 - 3)  aluminum hydroxide/magnesium hydroxide/simethicone Suspension 30 milliLiter(s) Oral every 4 hours PRN Dyspepsia  melatonin 3 milliGRAM(s) Oral at bedtime PRN Insomnia  ondansetron Injectable 4 milliGRAM(s) IV Push every 8 hours PRN Nausea and/or Vomiting        RADIOLOGY & ADDITIONAL TESTS:      ACC: [id number] EXAM:  CT ABDOMEN AND PELVIS   ORDERED BY: WILDA HEALY     ACC: [id number] EXAM:  CT CHEST   ORDERED BY: WILDA HEALY     PROCEDURE DATE:  02/18/2023        < from: CT Abdomen and Pelvis No Cont (02.18.23 @ 11:55) >  FINDINGS:  CHEST:  LUNGS AND LARGE AIRWAYS: Patent central airways. Stable right lung volume   loss. Stable right lower lobe scarring. Mild bilateral interlobular   septal thickening. Compressive left lower lobe atelectasis adjacent to   the elevated diaphragm.  PLEURA: Elevated left diaphragm. Stable right pleural calcifications. No   pneumothorax or pleural effusion.  VESSELS: Stable calcification in the wall of the right brachiocephalic   vein, likely reflecting sequela of prior thrombus. Mild atherosclerotic   coronary artery calcifications.  HEART: Heart size is normal. No pericardial effusion.  MEDIASTINUM AND IDRIS: No lymphadenopathy.  Abdominal hernia.  CHEST WALL AND LOWER NECK:  shunt catheter descends down the right neck   and chest wall. Left anterior chest wall millimeters device with catheter   ascending the left neck.    ABDOMEN AND PELVIS:  LIVER: Within normal limits.  BILE DUCTS: Normal caliber.  GALLBLADDER: Within normal limits.  SPLEEN: Within normal limits.  PANCREAS: Within normal limits.  ADRENALS: Within normal limits.  KIDNEYS/URETERS: Within normal limits.    BLADDER: Within normal limits.  REPRODUCTIVE ORGANS: Prostate within normal limits.    BOWEL: Pneumatosis of the transverse colon. No bowel wall thickening or   pericolonic inflammatory change. No bowel obstruction. Appendix is normal.  PERITONEUM: No free air. Tip of the shunt catheter rests in the left   lower quadrant. No ascites.  VESSELS: Within normal limits.  RETROPERITONEUM/LYMPH NODES: No lymphadenopathy.  ABDOMINAL WALL: Shunt catheter descends along the midline of the ventral   abdominal wall, entering the peritoneal cavity above the umbilicus..   Small bilateral fat-containing inguinal hernias. Left ventral abdominal   wall subcutaneous foci of gas, likely due to subcutaneous injections.  BONES: Degenerative changes. No acute fracture.    IMPRESSION:  Mild bilateral interlobular septal thickening may indicate mild pulmonary   interstitial edema.    No pneumothorax or hemothorax.    No pneumoperitoneum or ascites.    Pneumatosis of the transverse colon. Although there are no signs of   colonic inflammation recommend correlating with lactate level. Other   etiologies including benign etiologies should be considered.       CC: lethargy    HPI:  53 y/o male with PMHx of hydrocephalus (age 2 months) s/p multiple brain surgeries/ peritoneal shunt, legally blind, glaucoma, mild MR, PIERCE, Epilepsy (last seizure years ago), impulse control disorder, obesity, GERD and hx of aspiration PNA who presented to  with progressive lethargy since 2/14.   Patient's legal guardian is his Aunt, Christi Guy.  Patient lived with her for many years but now has been in group homes.  Patient presently in group home in [facility] [address] [facility].  Patient's baseline status is he is able to communicate with his guardian on the phone.  They talk daily.  He is also able to ambulate around the group home but is in a wheelchair most of the time.  As per guardian, she noticed increased lethargy when she was trying to talk to him on the phone over the last 3 days.  Today, around 8am at the group home, patient found more altered, slurring words and diaphoretic and sent to the ER for evaluation.  As per guardian, patient did go to a pulmonologist on Thursday/ yesterday for clearance appointment prior to colonoscopy.  Upon arrival to , patient was a code stroke.  CTA brain negative for acute infarct/ occlusion.  After further work up, patient was found to have bilateral PNA-- worse on right than left and hypothermia with temp of 95.  BP also soft in ER  systolic.  Patient also hypoxic and was on nonrebreather mask on arrival.  Patient was also seen by neuro and question was made to ? patient being postictal; however, as per aunt, patient has not had a seizure in many years.  Patient given IV ABX and 2.1 L NS and admitted.        INTERVAL HPI/ OVERNIGHT EVENTS:   chart reviewed. Pt is more awake and conversive, reports no pain, Breathing is "ok". + cough. Tolerates puree det       Vital Signs Last 24 Hrs  T(C): 36.1 (21 Feb 2023 07:39), Max: 36.9 (20 Feb 2023 16:19)  T(F): 97 (21 Feb 2023 07:39), Max: 98.4 (20 Feb 2023 16:19)  HR: 103 (21 Feb 2023 09:34) (84 - 103)  BP: 136/86 (21 Feb 2023 07:39) (129/88 - 154/82)  RR: 18 (21 Feb 2023 07:39) (18 - 18)  SpO2: 92% (21 Feb 2023 09:34) (91% - 92%)    Parameters below as of 21 Feb 2023 09:34  Patient On (Oxygen Delivery Method): room air      REVIEW OF SYSTEMS:  Unable to obtain due to developmental delay and encephalopathy      PHYSICAL EXAM:  General:   in no acute distress  Eyes: L eye reactive to light, R eye with cataract, conjunctiva and sclera clear  Head:  head is oval shape s/p cranial expansion, no signs of trauma, no hematoma or ecchymosis no abrasion, no tenderness   ENMT: No nasal discharge; congested nasal passages   Respiratory: Decreased BS, no wheezing   Cardiovascular: Regular rate and rhythm. S1 and S2 Normal;   Gastrointestinal: Soft non-tender non-distended; Normal bowel sounds  Genitourinary: No  suprapubic  tenderness  Extremities: + LE edema, better  Vascular: Peripheral pulses palpable 2+ bilaterally  Neurological: Alert and oriented x 1, confused, Moves all extremities  MS 5/5 B/l UE, not following with LE  Speech is garbled no facial asymmetry  Skin: Warm and dry. No acute rash  Musculoskeletal: Normal muscle tone, no ecchymoses, no abrasions,  or joint edema. full PROM, no pain     Labs:                           13.0   6.23  )-----------( 219      ( 21 Feb 2023 08:59 )             39.3     02-21    138  |  107  |  8   ----------------------------<  150<H>  3.0<L>   |  26  |  0.88    Ca    8.3<L>      21 Feb 2023 08:59  Phos  2.1     02-20  Mg     2.2     02-20    TPro  7.1  /  Alb  3.1<L>  /  TBili  0.5  /  DBili  x   /  AST  18  /  ALT  23  /  AlkPhos  154<H>  02-21    LIVER FUNCTIONS - ( 21 Feb 2023 08:59 )  Alb: 3.1 g/dL / Pro: 7.1 gm/dL / ALK PHOS: 154 U/L / ALT: 23 U/L / AST: 18 U/L / GGT: x                                 12.6   6.09  )-----------( 195      ( 20 Feb 2023 08:43 )             39.6     20 Feb 2023 08:43    137    |  108    |  9      ----------------------------<  87     3.7     |  23     |  0.78     Ca    8.1        20 Feb 2023 08:43  Phos  2.1       20 Feb 2023 08:43  Mg     2.2       20 Feb 2023 08:43    TPro  7.0    /  Alb  3.1    /  TBili  0.4    /  DBili  <0.1   /  AST  17     /  ALT  23     /  AlkPhos  164    20 Feb 2023 08:43      LIVER FUNCTIONS - ( 20 Feb 2023 08:43 )  Alb: 3.1 g/dL / Pro: 7.0 gm/dL / ALK PHOS: 164 U/L / ALT: 23 U/L / AST: 17 U/L / GGT: x           MEDICATIONS  (STANDING):  albuterol/ipratropium for Nebulization 3 milliLiter(s) Nebulizer every 6 hours  cloBAZam 5 milliGRAM(s) Oral two times a day  dorzolamide 2%/timolol 0.5% Ophthalmic Solution 1 Drop(s) Both EYES two times a day  enoxaparin Injectable 40 milliGRAM(s) SubCutaneous every 24 hours  escitalopram 10 milliGRAM(s) Oral daily  guaiFENesin Oral Liquid (Sugar-Free) 200 milliGRAM(s) Oral every 6 hours  lactulose Syrup 10 Gram(s) Oral two times a day  lamoTRIgine 300 milliGRAM(s) Oral two times a day  latanoprost 0.005% Ophthalmic Solution 1 Drop(s) Both EYES at bedtime  metoprolol tartrate 50 milliGRAM(s) Oral two times a day  OLANZapine 5 milliGRAM(s) Oral two times a day  pantoprazole    Tablet 40 milliGRAM(s) Oral before breakfast  piperacillin/tazobactam IVPB.. 3.375 Gram(s) IV Intermittent every 8 hours  potassium chloride    Tablet ER 40 milliEquivalent(s) Oral once  primidone 250 milliGRAM(s) Oral two times a day  senna 2 Tablet(s) Oral at bedtime  sodium chloride 0.65% Nasal 3 Spray(s) Both Nostrils five times a day    MEDICATIONS  (PRN):  acetaminophen     Tablet .. 650 milliGRAM(s) Oral every 6 hours PRN Temp greater or equal to 38C (100.4F), Mild Pain (1 - 3)  aluminum hydroxide/magnesium hydroxide/simethicone Suspension 30 milliLiter(s) Oral every 4 hours PRN Dyspepsia  melatonin 3 milliGRAM(s) Oral at bedtime PRN Insomnia  ondansetron Injectable 4 milliGRAM(s) IV Push every 8 hours PRN Nausea and/or Vomiting      RADIOLOGY & ADDITIONAL TESTS:      ACC: [id number] EXAM:  CT ABDOMEN AND PELVIS   ORDERED BY: WILDA HEALY     ACC: [id number] EXAM:  CT CHEST   ORDERED BY: WILDA HEALY     PROCEDURE DATE:  02/18/2023        < from: CT Abdomen and Pelvis No Cont (02.18.23 @ 11:55) >  FINDINGS:  CHEST:  LUNGS AND LARGE AIRWAYS: Patent central airways. Stable right lung volume   loss. Stable right lower lobe scarring. Mild bilateral interlobular   septal thickening. Compressive left lower lobe atelectasis adjacent to   the elevated diaphragm.  PLEURA: Elevated left diaphragm. Stable right pleural calcifications. No   pneumothorax or pleural effusion.  VESSELS: Stable calcification in the wall of the right brachiocephalic   vein, likely reflecting sequela of prior thrombus. Mild atherosclerotic   coronary artery calcifications.  HEART: Heart size is normal. No pericardial effusion.  MEDIASTINUM AND IDRIS: No lymphadenopathy.  Abdominal hernia.  CHEST WALL AND LOWER NECK:  shunt catheter descends down the right neck   and chest wall. Left anterior chest wall millimeters device with catheter   ascending the left neck.    ABDOMEN AND PELVIS:  LIVER: Within normal limits.  BILE DUCTS: Normal caliber.  GALLBLADDER: Within normal limits.  SPLEEN: Within normal limits.  PANCREAS: Within normal limits.  ADRENALS: Within normal limits.  KIDNEYS/URETERS: Within normal limits.    BLADDER: Within normal limits.  REPRODUCTIVE ORGANS: Prostate within normal limits.    BOWEL: Pneumatosis of the transverse colon. No bowel wall thickening or   pericolonic inflammatory change. No bowel obstruction. Appendix is normal.  PERITONEUM: No free air. Tip of the shunt catheter rests in the left   lower quadrant. No ascites.  VESSELS: Within normal limits.  RETROPERITONEUM/LYMPH NODES: No lymphadenopathy.  ABDOMINAL WALL: Shunt catheter descends along the midline of the ventral   abdominal wall, entering the peritoneal cavity above the umbilicus..   Small bilateral fat-containing inguinal hernias. Left ventral abdominal   wall subcutaneous foci of gas, likely due to subcutaneous injections.  BONES: Degenerative changes. No acute fracture.    IMPRESSION:  Mild bilateral interlobular septal thickening may indicate mild pulmonary   interstitial edema.    No pneumothorax or hemothorax.    No pneumoperitoneum or ascites.    Pneumatosis of the transverse colon. Although there are no signs of   colonic inflammation recommend correlating with lactate level. Other   etiologies including benign etiologies should be considered.

## 2023-02-21 NOTE — PROGRESS NOTE ADULT - ASSESSMENT
51 y/o male with h/o hydrocephalus (dgn at age 2 months) s/p multiple brain surgeries/ peritoneal shunt, legally blind, glaucoma, mild MR, PIERCE, epilepsy (last seizure years ago), impulse control disorder, obesity, GERD and h/o aspiration PNA was admitted on 2/17 for progressive lethargy since 2/14. Patient presently in group home in [facility]. Patient's baseline status is he is able to communicate with his guardian on the phone. He is also able to ambulate around the group home, but is in a wheelchair most of the time. As per guardian, she noticed increased lethargy when she was trying to talk to him on the phone over the last 3 days. On the day of admission, the patient found more altered, slurring words and diaphoretic and sent to the ER for evaluation. Upon arrival, patient was a code stroke.  CTA brain negative for acute infarct/ occlusion. After further work up, patient was found to have bilateral PNA worse on right than left and hypothermia with temp of 95.  BP also soft in ER  systolic.  Patient also hypoxic and was on nonrebreather mask on arrival. In ER, the patient received zosyn and doxycycline.     1. Acute respiratory hypoxic respiratory failure improving. Possible aspiration pneumonia. Hydrocephalus s/p prior  shunt. Metabolic encephalopathy.   -respiratory improving  -BC x 2, urine c/s noted  -on zosyn 3.375 gm IV q8h # 3  -tolerating abx well so far; no side effects noted  -aspiration precautions  -continue abx coverage   -monitor temps  -f/u CBC  -supportive care  2. Other issues:   -care per medicine

## 2023-02-21 NOTE — PROGRESS NOTE ADULT - SUBJECTIVE AND OBJECTIVE BOX
Pt more alert today, conversant, no seizure activity, NPO removed; lamotrigine / clobazam and Primodone restarted - Keppra d/c    ROS: As above, other ROS Negative      MEDICATIONS  (STANDING):  albuterol/ipratropium for Nebulization 3 milliLiter(s) Nebulizer every 6 hours  cloBAZam 5 milliGRAM(s) Oral two times a day  dorzolamide 2%/timolol 0.5% Ophthalmic Solution 1 Drop(s) Both EYES two times a day  enoxaparin Injectable 40 milliGRAM(s) SubCutaneous every 24 hours  escitalopram 10 milliGRAM(s) Oral daily  guaiFENesin Oral Liquid (Sugar-Free) 200 milliGRAM(s) Oral every 6 hours  lactulose Syrup 10 Gram(s) Oral two times a day  lamoTRIgine 300 milliGRAM(s) Oral two times a day  latanoprost 0.005% Ophthalmic Solution 1 Drop(s) Both EYES at bedtime  metoprolol tartrate 50 milliGRAM(s) Oral two times a day  OLANZapine 5 milliGRAM(s) Oral two times a day  pantoprazole    Tablet 40 milliGRAM(s) Oral before breakfast  piperacillin/tazobactam IVPB.. 3.375 Gram(s) IV Intermittent every 8 hours  primidone 250 milliGRAM(s) Oral two times a day  senna 2 Tablet(s) Oral at bedtime  sodium chloride 0.65% Nasal 3 Spray(s) Both Nostrils five times a day  sodium chloride 0.9%. 1000 milliLiter(s) (75 mL/Hr) IV Continuous <Continuous>      Vital Signs Last 24 Hrs  T(C): 36.1 (21 Feb 2023 07:39), Max: 36.9 (20 Feb 2023 16:19)  T(F): 97 (21 Feb 2023 07:39), Max: 98.4 (20 Feb 2023 16:19)  HR: 103 (21 Feb 2023 09:34) (84 - 103)  BP: 136/86 (21 Feb 2023 07:39) (129/88 - 154/82)  BP(mean): --  RR: 18 (21 Feb 2023 07:39) (18 - 18)  SpO2: 92% (21 Feb 2023 09:34) (91% - 92%)    Parameters below as of 21 Feb 2023 09:34  Patient On (Oxygen Delivery Method): room air    Neurological Exam:  Right frontal Crani defect.  HF: Patient awake, alert, answers in yes/no, follows simple commnads.  CN:  Pupils are equal and reactive. Extra ocular muscles are intact. Lips crusted, There is no gross facial asymmetry. Other CN II-XII are intact.   Motor: moving all extremities.   Sensory: intact to pinprick.  DTR: 0-1/4 all 4 extremities. Babinski is negative bilateral.  Co-ord:  Cannot test.                           13.0   6.23  )-----------( 219      ( 21 Feb 2023 08:59 )             39.3     02-21    138  |  107  |  8   ----------------------------<  150<H>  3.0<L>   |  26  |  0.88    Ca    8.3<L>      21 Feb 2023 08:59  Phos  2.1     02-20  Mg     2.2     02-20    TPro  7.1  /  Alb  3.1<L>  /  TBili  0.5  /  DBili  x   /  AST  18  /  ALT  23  /  AlkPhos  154<H>  02-21      Radiology report:  < from: CT Head No Cont (02.18.23 @ 11:53) >  IMPRESSION: No hemorrhage. No hydrocephalus. Multiple ventricular   catheters. No change since 2/17/2023.      < from: EEG Awake or Drowsy (02.19.23 @ 10:40) >    IMPRESSION: Abnormal EEG performed with the patient awake, drowsy and   briefly asleep because of slowing of the background activity, excessive   amount of theta range activities which are consistent with a diffuse   cerebral dysfunction, AB on the basis of a diffuse metabolic, toxic or   structural abnormality. Clinical correlation recommended.  If a seizure disorder is suspected, a more prolonged study is recommended.

## 2023-02-22 NOTE — PROGRESS NOTE ADULT - CONVERSATION DETAILS
The role of Palliative medicine was reviewed. The pt's Aunt and legal guardian discussed his progression over several years and his significant decline in functional status recently. He has hd increased episodes of difficulty swallowing and previous aspiration. She stated that Andi's quality of life has declined and he is lethargic and unable to participate in his previous enjoyed activities. She feels that he would not want aggressive life sustaining measures including resuscitation, intubation or feeding tube placement. She would like to cont with pleasure feeds when he is able to take. She also requests a comfort care focus including hospice support. We discussed that all life sustaining measures withheld will need to be submitted to the state and that we will proceed. Will follow. Discussed with Dr Doss

## 2023-02-22 NOTE — PROGRESS NOTE ADULT - SUBJECTIVE AND OBJECTIVE BOX
Pt more alert, conversant, no seizure activity, on his regular meds; lamotrigine / clobazam and Primodone restarted - Keppra d/c  24 hr EEG montoring is - Abnormal EEG indicative of potentially epileptogenic region of cortical irritability and focal dysfunction in the right temporal region.    ROS: As above, other ROS Negative  MEDICATIONS  (STANDING): albuterol/ipratropium for Nebulization 3 milliLiter(s) Nebulizer every 6 hours cloBAZam 5 milliGRAM(s) Oral two times a day dorzolamide 2%/timolol 0.5% Ophthalmic Solution 1 Drop(s) Both EYES two times a day enoxaparin Injectable 40 milliGRAM(s) SubCutaneous every 24 hours escitalopram 10 milliGRAM(s) Oral daily guaiFENesin Oral Liquid (Sugar-Free) 200 milliGRAM(s) Oral every 6 hours lactulose Syrup 10 Gram(s) Oral two times a day lamoTRIgine 300 milliGRAM(s) Oral two times a day latanoprost 0.005% Ophthalmic Solution 1 Drop(s) Both EYES at bedtime metoprolol tartrate 50 milliGRAM(s) Oral two times a day OLANZapine 5 milliGRAM(s) Oral two times a day pantoprazole    Tablet 40 milliGRAM(s) Oral before breakfast piperacillin/tazobactam IVPB.. 3.375 Gram(s) IV Intermittent every 8 hours primidone 250 milliGRAM(s) Oral two times a day senna 2 Tablet(s) Oral at bedtime sodium chloride 0.65% Nasal 3 Spray(s) Both Nostrils five times a day   Vital Signs Last 24 Hrs T(C): 36.3 (21 Feb 2023 21:57), Max: 36.3 (21 Feb 2023 21:57) T(F): 97.4 (21 Feb 2023 21:57), Max: 97.4 (21 Feb 2023 21:57) HR: 83 (22 Feb 2023 13:21) (69 - 83) BP: 122/70 (22 Feb 2023 09:03) (117/79 - 122/70) BP(mean): -- RR: 18 (22 Feb 2023 09:03) (18 - 18) SpO2: 93% (22 Feb 2023 09:03) (93% - 95%)  Parameters below as of 22 Feb 2023 09:03 Patient On (Oxygen Delivery Method): nasal cannula O2 Flow (L/min): 3  Neurological Exam: Right frontal Crani defect. HF: Patient awake, alert, answers in yes/no, follows simple commnads. CN:  Pupils are equal and reactive. Extra ocular muscles are intact. Lips crusted, There is no gross facial asymmetry. Other CN II-XII are intact.  Motor: moving all extremities.  Sensory: intact to pinprick. DTR: 0-1/4 all 4 extremities. Babinski is negative bilateral. Co-ord:  Cannot test.                        12.7  6.11  )-----------( 219      ( 22 Feb 2023 08:08 )            39.7   02-22  142  |  110<H>  |  8  ----------------------------<  103<H> 4.2   |  27  |  0.79  Ca    8.3<L>      22 Feb 2023 08:08 Phos  4.7     02-22 Mg     2.3     02-22  TPro  7.1  /  Alb  3.1<L>  /  TBili  0.5  /  DBili  x   /  AST  18  /  ALT  23  /  AlkPhos  154<H>  02-21  Radiology report: -< from: CT Head No Cont (02.18.23 @ 11:53) > IMPRESSION: No hemorrhage. No hydrocephalus. Multiple ventricular  catheters. No change since 2/17/2023.  EEG Summary / Classification:  Abnormal EEG in the awake / drowsy / asleep states. Augie, focal, right anterior temporal region Intermittent polymorphic delta slowing, focal, right temporal Background slowing, generalized, mild Excessive drowsiness  EEG Impression / Clinical Correlate:  Abnormal EEG indicative of potentially epileptogenic region of cortical irritability and focal dysfunction in the right temporal region.  There is also evidence of mild diffuse cerebral dysfunction that is not specific for etiology.   ________________________________________     Flex Gallardo MD, PhD Director, Epilepsy Division, [facility]

## 2023-02-22 NOTE — PROGRESS NOTE ADULT - ASSESSMENT
HPI: Pt is a 52y old Male with hx of hydrocephalus (dgn at age 2 months) s/p multiple brain surgeries/ peritoneal shunt, legally blind, glaucoma, mild MR, PIERCE, epilepsy (last seizure years ago), impulse control disorder, obesity, GERD and h/o aspiration PNA was admitted on 2/17 for progressive lethargy since 2/14. Patient presently in group home in [facility]. Patient's baseline status is he is able to communicate with his guardian on the phone. He is also able to ambulate around the group home, but is in a wheelchair most of the time. As per guardian, she noticed increased lethargy when she was trying to talk to him on the phone over the last 3 days. On the day of admission, the patient found more altered, slurring words and diaphoretic and sent to the ER for evaluation. Upon arrival, patient was a code stroke.  CTA brain negative for acute infarct/ occlusion. After further work up, patient was found to have bilateral PNA worse on right than left and hypothermia with temp of 95. Patient also hypoxic and was on nonrebreather mask on arrival. In ER, the patient received zosyn and doxycycline.   Palliative medicine Consult to further establish GOC  2/21/23 Seen and examined at bedside. Awake stating he is in [place]. Denies C/O pain at present although unable to express himself clearly.     Assessment and Plan:    1) Acute resp Failure  -Suspected Aspiration PNA  -Improving  -Supplemental O2 PRN  -CT chest noted  -Pulm eval noted  -Abx as per med  -CXR 2/22 worsening pneumonia    2) Metabolic Encephalopathy:    -Suspect related to hypoxia/ sepsis.    -CT head negative.    -24 hr in progress -EEG-- r/o possible seizure as cause for acute worsening MS this am.      3) Hydrocephalus/ mild MR:    -Baseline patient is able to talk on phone with aunt/ have conversation/ walk at group home.    -Cont home meds.    -Currently alert to name only    4) Advanced Directives  -Pt without capacity  -Guardianship paperwork on chart naming Christi Guy  -Will follow with OPWDD and further GOC discussion   -GOC discussion with Christi lincoln       HPI: Pt is a 52y old Male with hx of hydrocephalus (dgn at age 2 months) s/p multiple brain surgeries/ peritoneal shunt, legally blind, glaucoma, mild MR, PIERCE, epilepsy (last seizure years ago), impulse control disorder, obesity, GERD and h/o aspiration PNA was admitted on 2/17 for progressive lethargy since 2/14. Patient presently in group home in [facility]. Patient's baseline status is he is able to communicate with his guardian on the phone. He is also able to ambulate around the group home, but is in a wheelchair most of the time. As per guardian, she noticed increased lethargy when she was trying to talk to him on the phone over the last 3 days. On the day of admission, the patient found more altered, slurring words and diaphoretic and sent to the ER for evaluation. Upon arrival, patient was a code stroke.  CTA brain negative for acute infarct/ occlusion. After further work up, patient was found to have bilateral PNA worse on right than left and hypothermia with temp of 95. Patient also hypoxic and was on nonrebreather mask on arrival. In ER, the patient received zosyn and doxycycline.   Palliative medicine Consult to further establish GOC  2/21/23 Seen and examined at bedside. Awake stating he is in [place]. Denies C/O pain at present although unable to express himself clearly.     Assessment and Plan:    1) Acute resp Failure  -Suspected Aspiration PNA  -Improving  -Supplemental O2 PRN  -CT chest noted  -Pulm eval noted  -Abx as per med  -CXR 2/22 worsening pneumonia    2) Metabolic Encephalopathy:    -Suspect related to hypoxia/ sepsis.    -CT head negative.    -24 hr in progress -EEG-- r/o possible seizure as cause for acute worsening MS this am.      3) Hydrocephalus/ mild MR:    -Baseline patient is able to talk on phone with aunt/ have conversation/ walk at group home.    -Cont home meds.    -Currently alert to name only    4) Advanced Directives  -Pt without capacity  -Guardianship paperwork on chart naming Christi Guy  -Will follow with OPWDD and further GOC discussion   -GOC discussion with Christi lincoln  -Discussed with Dr Doss   -Will apply for DNR/DNI/NFT/comfort measures with the office of mental hygiene service.

## 2023-02-22 NOTE — PROGRESS NOTE ADULT - NS ATTEND AMEND GEN_ALL_CORE FT
seen at bedside, tp is in bed, eyes closed.  he speaks, but it is very difficult for me to understand.  his aunt, Christi Guy, who is also his guardian, is also at bedside.  she is better able to understand her nephew.  Pt is not in pain or other distress.  Aunt reports that he is requesting to go home.   Pt has an extensive medical and surgical history as noted above.  At this point, pt's aunt feels that it is more appropriate to focus on pt's comfort and dignity rather then pursue more aggressive measures to sustain life.  before the Pandemic, pt was able to travel with her and she felt he had a good quality of life.  Since the Pandemic, she feels that her Nephew's quality of life has suffered greatly and further interventions to preserve life would not be something he would want.   Placing a feeding tube (PEG) has risks associated with it, and would not change aspiration risk.  Placing a feeding tube or performing CPR would not improve pt's quality of life. She is comfortable with her requests for DNR/DNI, no feeding tubes.  she wants comfort measures only and to pursue hospice care for her nephew.  she did not have questions about these measures when I spoke with her this afternoon.   I will cosign the forms being sent to [facility] to request the above measures. seen at bedside, tp is in bed, eyes closed.  he speaks, but it is very difficult for me to understand.  his aunt, Christi Guy, who is also his guardian, is also at bedside.  she is better able to understand her nephew.  Pt is not in pain or other distress.  Aunt reports that he is requesting to go home.   Pt has an extensive medical and surgical history as noted above.  At this point, pt's aunt feels that it is more appropriate to focus on pt's comfort and dignity rather then pursue more aggressive measures to sustain life.  before the Pandemic, pt was able to travel with her and she felt he had a good quality of life.  Since the Pandemic, she feels that her Nephew's quality of life has suffered greatly and further interventions to preserve life would not be something he would want.   Placing a feeding tube (PEG) has risks associated with it, and would not change aspiration risk.  Placing a feeding tube or performing CPR would not improve pt's quality of life. She is comfortable with her requests for DNR/DNI, no feeding tubes.  she wants comfort measures only and to pursue hospice care for her nephew.  she did not have questions about these measures when I spoke with her this afternoon.   I will cosign the forms being sent to [facility] to request the above measures.  I was with pt's aunt for 18 minutes discussing the GOC as documented above.

## 2023-02-22 NOTE — PROGRESS NOTE ADULT - SUBJECTIVE AND OBJECTIVE BOX
Subjective:    Sleeping this AM.    MEDICATIONS  (STANDING):  albuterol/ipratropium for Nebulization 3 milliLiter(s) Nebulizer every 6 hours  cloBAZam 5 milliGRAM(s) Oral two times a day  dorzolamide 2%/timolol 0.5% Ophthalmic Solution 1 Drop(s) Both EYES two times a day  enoxaparin Injectable 40 milliGRAM(s) SubCutaneous every 24 hours  escitalopram 10 milliGRAM(s) Oral daily  guaiFENesin Oral Liquid (Sugar-Free) 200 milliGRAM(s) Oral every 6 hours  lactulose Syrup 10 Gram(s) Oral two times a day  lamoTRIgine 300 milliGRAM(s) Oral two times a day  latanoprost 0.005% Ophthalmic Solution 1 Drop(s) Both EYES at bedtime  metoprolol tartrate 50 milliGRAM(s) Oral two times a day  OLANZapine 5 milliGRAM(s) Oral two times a day  pantoprazole    Tablet 40 milliGRAM(s) Oral before breakfast  piperacillin/tazobactam IVPB.. 3.375 Gram(s) IV Intermittent every 8 hours  primidone 250 milliGRAM(s) Oral two times a day  senna 2 Tablet(s) Oral at bedtime  sodium chloride 0.65% Nasal 3 Spray(s) Both Nostrils five times a day    MEDICATIONS  (PRN):  acetaminophen     Tablet .. 650 milliGRAM(s) Oral every 6 hours PRN Temp greater or equal to 38C (100.4F), Mild Pain (1 - 3)  aluminum hydroxide/magnesium hydroxide/simethicone Suspension 30 milliLiter(s) Oral every 4 hours PRN Dyspepsia  melatonin 3 milliGRAM(s) Oral at bedtime PRN Insomnia  ondansetron Injectable 4 milliGRAM(s) IV Push every 8 hours PRN Nausea and/or Vomiting      Allergies    codeine (Hives)  codeine (Stomach Upset)  Depakote (Other)  seasonal allergies: nasal congestion (Other)    Intolerances        Vital Signs Last 24 Hrs  T(C): 36.3 (21 Feb 2023 21:57), Max: 36.3 (21 Feb 2023 21:57)  T(F): 97.4 (21 Feb 2023 21:57), Max: 97.4 (21 Feb 2023 21:57)  HR: 69 (22 Feb 2023 02:08) (69 - 103)  BP: 117/79 (21 Feb 2023 21:57) (117/79 - 117/79)  BP(mean): --  RR: 18 (21 Feb 2023 21:57) (18 - 18)  SpO2: 94% (22 Feb 2023 02:08) (92% - 95%)    Parameters below as of 22 Feb 2023 02:08  Patient On (Oxygen Delivery Method): nasal cannula,2L        PHYSICAL EXAMINATION:    NECK:  Supple. No lymphadenopathy. Jugular venous pressure not elevated.   HEART:   The cardiac impulse has a normal quality. Reg., Nl S1 and S2.    CHEST:  Chest with scattered rhonchi. Decreased BS at bases. Normal respiratory effort.  ABDOMEN:  Soft and nontender.   EXTREMITIES:  There is tr edema.       LABS:                        13.0   6.23  )-----------( 219      ( 21 Feb 2023 08:59 )             39.3     02-21    138  |  107  |  8   ----------------------------<  150<H>  3.0<L>   |  26  |  0.88    Ca    8.3<L>      21 Feb 2023 08:59  Phos  2.1     02-20  Mg     2.2     02-20    TPro  7.1  /  Alb  3.1<L>  /  TBili  0.5  /  DBili  x   /  AST  18  /  ALT  23  /  AlkPhos  154<H>  02-21          RADIOLOGY & ADDITIONAL TESTS:< from: Xray Chest 1 View- PORTABLE-Routine (Xray Chest 1 View- PORTABLE-Routine .) (02.21.23 @ 09:23) >  ACC: [id number] EXAM:  XR CHEST PORTABLE ROUTINE 1V   ORDERED BY: ALESHIA GUPTA     PROCEDURE DATE:  02/21/2023          INTERPRETATION:  INDICATION: Respiratory failure    COMPARISON: 2/17/2023    FINDINGS:  An AP portable chest x-ray shows the patient rotated to the right. There   are areas of subsegmental atelectasis in the left lower lobe. To a   greater extent there is suspected atelectasis in the right middle and   right lower lobe, adjacent to which there is right-sided calcified   pleural plaque. No pneumothorax is seen on either side. There are no air   bronchograms to suggest pneumonia. In the setting of patient rotation,   the hilar, mediastinal structures and heart size cannot be accurately   assessed. There is no evidence of pulmonary edema. There are degenerative   changes of the spine. An electronic module is seen over the left lateral   thorax, unchanged.    IMPRESSION:  1. While the patient is rotated, there is subsegmental atelectasis   suspected in the left lower lobeand to a greater extent, atelectasis in   the right middle and right lower lobe is associated with volume loss. No   air bronchograms are seen to indicate pneumonia.  2. Right-sided calcified pleural plaque, the unilaterality of which   suggests old empyema or old hemothorax.  3. Electronic module over the left lateral hemithorax, unchanged.        MIKE FLEMING MD;      Assessment and Recommendation:   · Assessment  	  - cont O2  - on zosyn for possible aspiration pneumonia  - speech and swallow eval noted-now on puree diet and thickened liquids  - dvt proph  - Maintain Duoneb aerosols  - Aspiration Precautions  - Monitor O2 sats  - Repeat CXR today-noted  - Pulmonary Toilet/Chest PT    Problem/Recommendation - 1:  ·  Acute on chronic respiratory failure with hypoxia and hypercapnia.   Problem/Recommendation - 2:  ·  Pneumonia, aspiration.   Problem/Recommendation - 3:  ·  Obesity hypoventilation syndrome.   Problem/Recommendation - 4:  ·  S/P  shunt.   Problem/Recommendation - 5:  ·  Atelectasis of both lungs.

## 2023-02-22 NOTE — PROGRESS NOTE ADULT - SUBJECTIVE AND OBJECTIVE BOX
HPI: Pt is a 52y old Male with hx of hydrocephalus (dgn at age 2 months) s/p multiple brain surgeries/ peritoneal shunt, legally blind, glaucoma, mild MR, PIERCE, epilepsy (last seizure years ago), impulse control disorder, obesity, GERD and h/o aspiration PNA was admitted on 2/17 for progressive lethargy since 2/14. Patient presently in group home in [facility]. Patient's baseline status is he is able to communicate with his guardian on the phone. He is also able to ambulate around the group home, but is in a wheelchair most of the time. As per guardian, she noticed increased lethargy when she was trying to talk to him on the phone over the last 3 days. On the day of admission, the patient found more altered, slurring words and diaphoretic and sent to the ER for evaluation. Upon arrival, patient was a code stroke.  CTA brain negative for acute infarct/ occlusion. After further work up, patient was found to have bilateral PNA worse on right than left and hypothermia with temp of 95. Patient also hypoxic and was on nonrebreather mask on arrival. In ER, the patient received zosyn and doxycycline.   Palliative medicine Consult to further establish GOC  2/21/23 Seen and examined at bedside. Awake stating he is in [place]. Denies C/O pain at present although unable to express himself clearly.   2/22/23 Seen and examined at bedside with aide at bedside. 24 hr EEG in progress. Pt unresponsive to voice. In no resp distress  PAIN: ( )Yes   ( )No  Denies  -No non verbal signs or symptoms    DYSPNEA: ( ) Yes  ( X) No  Not currently at rest    PAST MEDICAL & SURGICAL HISTORY:  Hydrocephalus  age 2 months  Legally blind  Glaucoma  Sleep apnea  not on CPAP  Epilepsy  Osteoporosis  Elevated liver enzymes  Environmental Allergies  Impulse control disorder  worsened when pt on Depakote, pt now off  Mild mental retardation  Hiatal hernia  Aspiration pneumonia  at age 25  Viral pneumonia  h/o  BPH without obstruction/lower urinary tract symptoms  Glaucoma  on eye gtts  Mentally disabled  Chronic GERD  S/P  Shunt  originally had shunt to lung, then revised to peritoneal shunt, has had multiple revisions  Epilepsy  s/p Vagal Nerve Stimulator Implant , 2007, last one in 2016  H/O craniotomy  at age 18  Heel cord contracture  s/p surgical correction b/l  History of tonsillectomy  S/p bilateral myringotomy with tube placement  Obstructed  shunt  revision of  shunt 2015  S/P  shunt      SOCIAL HX:  Lives in group home  Has guardian  Hx opiate tolerance ( )YES  (X )NO    Baseline ADLs  (Prior to Admission)  ( ) Independent   (X )Dependent    FAMILY HISTORY:  Family history of stroke (Mother)    Review of Systems:      All other systems reviewed and negative  Unable to obtain/Limited due to:      PHYSICAL EXAM:  ICU Vital Signs Last 24 Hrs  T(C): 36.3 (21 Feb 2023 21:57), Max: 36.3 (21 Feb 2023 21:57)  T(F): 97.4 (21 Feb 2023 21:57), Max: 97.4 (21 Feb 2023 21:57)  HR: 70 (22 Feb 2023 09:03) (69 - 81)  BP: 122/70 (22 Feb 2023 09:03) (117/79 - 122/70)  RR: 18 (22 Feb 2023 09:03) (18 - 18)  SpO2: 93% (22 Feb 2023 09:03) (93% - 95%)    O2 Parameters below as of 22 Feb 2023 09:03  Patient On (Oxygen Delivery Method): nasal cannula  O2 Flow (L/min): 3        General: Middle aged Male in bed in no distress  Mental Status: alert disoriented to place  HEENT: oral mucosa dry  Lungs: clear to auscultation rogelio  Cardiac: S1S2+  GI: abd softly distended/ +BS/NT   : voids  Ext: SORIANO on bed  Neuro: unable to test      LABS:                                 12.7   6.11  )-----------( 219      ( 22 Feb 2023 08:08 )             39.7     02-22    142  |  110<H>  |  8   ----------------------------<  103<H>  4.2   |  27  |  0.79    Ca    8.3<L>      22 Feb 2023 08:08    TPro  7.1  /  Alb  3.1<L>  /  TBili  0.5  /  DBili  x   /  AST  18  /  ALT  23  /  AlkPhos  154<H>  02-21    Albumin: Albumin, Serum: 3.1 g/dL (02-20 @ 08:43)      Allergies    codeine (Hives)  codeine (Stomach Upset)  Depakote (Other)  seasonal allergies: nasal congestion (Other)    Intolerances    MEDICATIONS  (STANDING):  albuterol/ipratropium for Nebulization 3 milliLiter(s) Nebulizer every 6 hours  cloBAZam 5 milliGRAM(s) Oral two times a day  dorzolamide 2%/timolol 0.5% Ophthalmic Solution 1 Drop(s) Both EYES two times a day  enoxaparin Injectable 40 milliGRAM(s) SubCutaneous every 24 hours  escitalopram 10 milliGRAM(s) Oral daily  guaiFENesin Oral Liquid (Sugar-Free) 200 milliGRAM(s) Oral every 6 hours  lactulose Syrup 10 Gram(s) Oral two times a day  lamoTRIgine 300 milliGRAM(s) Oral two times a day  latanoprost 0.005% Ophthalmic Solution 1 Drop(s) Both EYES at bedtime  metoprolol tartrate 50 milliGRAM(s) Oral two times a day  OLANZapine 5 milliGRAM(s) Oral two times a day  pantoprazole    Tablet 40 milliGRAM(s) Oral before breakfast  piperacillin/tazobactam IVPB.. 3.375 Gram(s) IV Intermittent every 8 hours  primidone 250 milliGRAM(s) Oral two times a day  senna 2 Tablet(s) Oral at bedtime  sodium chloride 0.65% Nasal 3 Spray(s) Both Nostrils five times a day    MEDICATIONS  (PRN):  acetaminophen     Tablet .. 650 milliGRAM(s) Oral every 6 hours PRN Temp greater or equal to 38C (100.4F), Mild Pain (1 - 3)  aluminum hydroxide/magnesium hydroxide/simethicone Suspension 30 milliLiter(s) Oral every 4 hours PRN Dyspepsia  melatonin 3 milliGRAM(s) Oral at bedtime PRN Insomnia  ondansetron Injectable 4 milliGRAM(s) IV Push every 8 hours PRN Nausea and/or Vomiting      RADIOLOGY/ADDITIONAL STUDIES:  < from: Xray Chest 1 View- PORTABLE-Routine (Xray Chest 1 View- PORTABLE-Routine .) (02.21.23 @ 09:23) >    ACC: [id number] EXAM:  XR CHEST PORTABLE ROUTINE 1V   ORDERED BY: ALESHIA GUPTA     PROCEDURE DATE:  02/21/2023          INTERPRETATION:  INDICATION: Respiratory failure    COMPARISON: 2/17/2023    FINDINGS:  An AP portable chest x-ray shows the patient rotated to the right. There   are areas of subsegmental atelectasis in the left lower lobe. To a   greater extent there is suspected atelectasis in the right middle and   right lower lobe, adjacent to which there is right-sided calcified   pleural plaque. No pneumothorax is seen on either side. There are no air   bronchograms to suggest pneumonia. In the setting of patient rotation,   the hilar, mediastinal structures and heart size cannot be accurately   assessed. There is no evidence of pulmonary edema. There are degenerative   changes of the spine. An electronic module is seen over the left lateral   thorax, unchanged.    IMPRESSION:  1. While the patient is rotated, there is subsegmental atelectasis   suspected in the left lower lobeand to a greater extent, atelectasis in   the right middle and right lower lobe is associated with volume loss. No   air bronchograms are seen to indicate pneumonia.  2. Right-sided calcified pleural plaque, the unilaterality of which   suggests old empyema or old hemothorax.  3. Electronic module over the left lateral hemithorax, unchanged.      < end of copied text >         HPI: Pt is a 52y old Male with hx of hydrocephalus (dgn at age 2 months) s/p multiple brain surgeries/ peritoneal shunt, legally blind, glaucoma, mild MR, PIERCE, epilepsy (last seizure years ago), impulse control disorder, obesity, GERD and h/o aspiration PNA was admitted on 2/17 for progressive lethargy since 2/14. Patient presently in group home in [facility]. Patient's baseline status is he is able to communicate with his guardian on the phone. He is also able to ambulate around the group home, but is in a wheelchair most of the time. As per guardian, she noticed increased lethargy when she was trying to talk to him on the phone over the last 3 days. On the day of admission, the patient found more altered, slurring words and diaphoretic and sent to the ER for evaluation. Upon arrival, patient was a code stroke.  CTA brain negative for acute infarct/ occlusion. After further work up, patient was found to have bilateral PNA worse on right than left and hypothermia with temp of 95. Patient also hypoxic and was on nonrebreather mask on arrival. In ER, the patient received zosyn and doxycycline.   Palliative medicine Consult to further establish GOC  2/21/23 Seen and examined at bedside. Awake stating he is in [place]. Denies C/O pain at present although unable to express himself clearly.   2/22/23 Seen and examined at bedside with aide at bedside. 24 hr EEG in progress. Pt unresponsive to voice.   PAIN: ( )Yes   ( )No  Denies  -No non verbal signs or symptoms    DYSPNEA: ( ) Yes  ( X) No  Not currently at rest    PAST MEDICAL & SURGICAL HISTORY:  Hydrocephalus  age 2 months  Legally blind  Glaucoma  Sleep apnea  not on CPAP  Epilepsy  Osteoporosis  Elevated liver enzymes  Environmental Allergies  Impulse control disorder  worsened when pt on Depakote, pt now off  Mild mental retardation  Hiatal hernia  Aspiration pneumonia  at age 25  Viral pneumonia  h/o  BPH without obstruction/lower urinary tract symptoms  Glaucoma  on eye gtts  Mentally disabled  Chronic GERD  S/P  Shunt  originally had shunt to lung, then revised to peritoneal shunt, has had multiple revisions  Epilepsy  s/p Vagal Nerve Stimulator Implant , 2007, last one in 2016  H/O craniotomy  at age 18  Heel cord contracture  s/p surgical correction b/l  History of tonsillectomy  S/p bilateral myringotomy with tube placement  Obstructed  shunt  revision of  shunt 2015  S/P  shunt      SOCIAL HX:  Lives in group home  Has guardian  Hx opiate tolerance ( )YES  (X )NO    Baseline ADLs  (Prior to Admission)  ( ) Independent   (X )Dependent    FAMILY HISTORY:  Family history of stroke (Mother)    Review of Systems:      All other systems reviewed and negative  Unable to obtain/Limited due to:      PHYSICAL EXAM:  ICU Vital Signs Last 24 Hrs  T(C): 36.3 (21 Feb 2023 21:57), Max: 36.3 (21 Feb 2023 21:57)  T(F): 97.4 (21 Feb 2023 21:57), Max: 97.4 (21 Feb 2023 21:57)  HR: 70 (22 Feb 2023 09:03) (69 - 81)  BP: 122/70 (22 Feb 2023 09:03) (117/79 - 122/70)  RR: 18 (22 Feb 2023 09:03) (18 - 18)  SpO2: 93% (22 Feb 2023 09:03) (93% - 95%)    O2 Parameters below as of 22 Feb 2023 09:03  Patient On (Oxygen Delivery Method): nasal cannula  O2 Flow (L/min): 3        General: Middle aged Male in bed in no distress  Mental Status: unresponsive to voice  HEENT: oral mucosa dry  Lungs: clear to auscultation rogelio  Cardiac: S1S2+  GI: abd softly distended/ +BS/NT   : voids  Ext: SORIANO on bed  Neuro: unable to test      LABS:                        12.7   6.11  )-----------( 219      ( 22 Feb 2023 08:08 )             39.7          02-22    142  |  110<H>  |  8   ----------------------------<  103<H>  4.2   |  27  |  0.79    Ca    8.3<L>      22 Feb 2023 08:08    TPro  7.1  /  Alb  3.1<L>  /  TBili  0.5  /  DBili  x   /  AST  18  /  ALT  23  /  AlkPhos  154<H>  02-21    Albumin: Albumin, Serum: 3.1 g/dL (02-20 @ 08:43)      Allergies    codeine (Hives)  codeine (Stomach Upset)  Depakote (Other)  seasonal allergies: nasal congestion (Other)    Intolerances  MEDICATIONS  (STANDING):  albuterol/ipratropium for Nebulization 3 milliLiter(s) Nebulizer every 6 hours  cloBAZam 5 milliGRAM(s) Oral two times a day  dorzolamide 2%/timolol 0.5% Ophthalmic Solution 1 Drop(s) Both EYES two times a day  enoxaparin Injectable 40 milliGRAM(s) SubCutaneous every 24 hours  escitalopram 10 milliGRAM(s) Oral daily  guaiFENesin Oral Liquid (Sugar-Free) 200 milliGRAM(s) Oral every 6 hours  lactulose Syrup 10 Gram(s) Oral two times a day  lamoTRIgine 300 milliGRAM(s) Oral two times a day  latanoprost 0.005% Ophthalmic Solution 1 Drop(s) Both EYES at bedtime  metoprolol tartrate 50 milliGRAM(s) Oral two times a day  OLANZapine 5 milliGRAM(s) Oral two times a day  pantoprazole    Tablet 40 milliGRAM(s) Oral before breakfast  piperacillin/tazobactam IVPB.. 3.375 Gram(s) IV Intermittent every 8 hours  primidone 250 milliGRAM(s) Oral two times a day  senna 2 Tablet(s) Oral at bedtime  sodium chloride 0.65% Nasal 3 Spray(s) Both Nostrils five times a day    MEDICATIONS  (PRN):  acetaminophen     Tablet .. 650 milliGRAM(s) Oral every 6 hours PRN Temp greater or equal to 38C (100.4F), Mild Pain (1 - 3)  aluminum hydroxide/magnesium hydroxide/simethicone Suspension 30 milliLiter(s) Oral every 4 hours PRN Dyspepsia  melatonin 3 milliGRAM(s) Oral at bedtime PRN Insomnia  ondansetron Injectable 4 milliGRAM(s) IV Push every 8 hours PRN Nausea and/or Vomiting    RADIOLOGY/ADDITIONAL STUDIES:    < from: Xray Chest 1 View- PORTABLE-Routine (Xray Chest 1 View- PORTABLE-Routine .) (02.21.23 @ 09:23) >    ACC: [id number] EXAM:  XR CHEST PORTABLE ROUTINE 1V   ORDERED BY: ALESHIA GUPTA     PROCEDURE DATE:  02/21/2023          INTERPRETATION:  INDICATION: Respiratory failure    COMPARISON: 2/17/2023    FINDINGS:  An AP portable chest x-ray shows the patient rotated to the right. There   are areas of subsegmental atelectasis in the left lower lobe. To a   greater extent there is suspected atelectasis in the right middle and   right lower lobe, adjacent to which there is right-sided calcified   pleural plaque. No pneumothorax is seen on either side. There are no air   bronchograms to suggest pneumonia. In the setting of patient rotation,   the hilar, mediastinal structures and heart size cannot be accurately   assessed. There is no evidence of pulmonary edema. There are degenerative   changes of the spine. An electronic module is seen over the left lateral   thorax, unchanged.    IMPRESSION:  1. While the patient is rotated, there is subsegmental atelectasis   suspected in the left lower lobe and to a greater extent, atelectasis in   the right middle and right lower lobe is associated with volume loss. No   air bronchograms are seen to indicate pneumonia.  2. Right-sided calcified pleural plaque, the unilaterality of which   suggests old empyema or old hemothorax.  3. Electronic module over the left lateral hemithorax, unchanged.      < end of copied text >

## 2023-02-22 NOTE — PROGRESS NOTE ADULT - ASSESSMENT
51 y/o male with h/o hydrocephalus (dgn at age 2 months) s/p multiple brain surgeries/ peritoneal shunt, legally blind, glaucoma, mild MR, PIERCE, epilepsy (last seizure years ago), impulse control disorder, obesity, GERD and h/o aspiration PNA was admitted on 2/17 for progressive lethargy since 2/14. Patient presently in group home in [facility]. Patient's baseline status is he is able to communicate with his guardian on the phone. He is also able to ambulate around the group home, but is in a wheelchair most of the time. As per guardian, she noticed increased lethargy when she was trying to talk to him on the phone over the last 3 days. On the day of admission, the patient found more altered, slurring words and diaphoretic and sent to the ER for evaluation. Upon arrival, patient was a code stroke.  CTA brain negative for acute infarct/ occlusion. After further work up, patient was found to have bilateral PNA worse on right than left and hypothermia with temp of 95.  BP also soft in ER  systolic.  Patient also hypoxic and was on nonrebreather mask on arrival. In ER, the patient received zosyn and doxycycline.     1. Acute respiratory hypoxic respiratory failure resolving. Possible aspiration pneumonia. Hydrocephalus s/p prior  shunt. Metabolic encephalopathy.   -respiratory improving  -BC x 2, urine c/s noted  -on zosyn 3.375 gm IV q8h # 4  -tolerating abx well so far; no side effects noted  -aspiration precautions  -continue abx coverage   -monitor temps  -f/u CBC  -supportive care  2. Other issues:   -care per medicine

## 2023-02-22 NOTE — EEG REPORT - NS EEG TEXT BOX
REPORT OF CONTINUOUS VIDEO EEG   [facility]: [address], [place] [address], Phone: [phone] [facility]: [address], [place] [address], Phone: [phone] [facility]: [address] [place] [address], Phone: [phone]  Patient Name: LINO AREVALO   Age: 52 year, : 1970 MRN #: [id number], Nunez: [id number] Referring Physician: KRISTINE EEG #: [id number]  Study Date: 2023   Start Time: 2:28:29 PM     End Date: 2023     End Time: 11:51:57 AM    Study Duration: 1256.3  Study Information:  EEG Recording Technique: The patient underwent continuous Video-EEG monitoring, using Telemetry System hardware on the XLTek Digital System. EEG and video data were stored on a computer hard drive with important events saved in digital archive files. The material was reviewed by a physician (electroencephalographer / epileptologist) on a daily basis. Augie and seizure detection algorithms were utilized and reviewed. An EEG Technician attended to the patient, and was available throughout daytime work hours.  The epilepsy center neurologist was available in person or on call 24-hours per day.  EEG Placement and Labeling of Electrodes: The EEG was performed utilizing 20 channel referential EEG connections (coronal over temporal over parasagittal montage) using all standard 10-20 electrode placements with EKG, with additional electrodes placed in the inferior temporal region using the modified 10-10 montage electrode placements for elective admissions, or if deemed necessary. Recording was at a sampling rate of 256 samples per second per channel. Time synchronized digital video recording was done simultaneously with EEG recording. A low light infrared camera was used for low light recording.   History: None listed  Medication PRIMIDONE METOPROLOL CLOBAZAM ESCITALOPRAM LAMOTRIGINE LEVETIRACETAM  Interpretation:  [[[Abbreviation Key:  PDR=alpha rhythm/posterior dominant rhythm. A-P=anterior posterior.  Amplitude: ‘very low’:<20; ‘low’:20-49; ‘medium’:; ‘high’:>150uV.  Persistence for periodic/rhythmic patterns (% of epoch) ‘rare’:<1%; ‘occasional’:1-10%; ‘frequent’:10-50%; ‘abundant’:50-90%; ‘continuous’:>90%.  Persistence for sporadic discharges: ‘rare’:<1/hr; ‘occasional’:1/min-1/hr; ‘frequent’:>1/min; ‘abundant’:>1/10 sec.  RPP=rhythmic and periodic patterns; GRDA=generalized rhythmic delta activity; FIRDA=frontal intermittent GRDA; LRDA=lateralized rhythmic delta activity; TIRDA=temporal intermittent rhythmic delta activity;  LPD=PLED=lateralized periodic discharges; GPD=generalized periodic discharges; BIPDs =bilateral independent periodic discharges; Mf=multifocal; SIRPDs=stimulus induced rhythmic, periodic, or ictal appearing discharges; BIRDs=brief potentially ictal rhythmic discharges >4 Hz, lasting .5-10s; PFA (paroxysmal bursts >13 Hz or =8 Hz <10s).  Modifiers: +F=with fast component; +S=with spike component; +R=with rhythmic component.  S-B=burst suppression pattern.  Max=maximal. N1-drowsy; N2-stage II sleep; N3-slow wave sleep. SSS/BETS=small sharp spikes/benign epileptiform transients of sleep. HV=hyperventilation; PS=photic stimulation]]]  Daily EEG Visual Analysis  Study Date: 2023   Start Time: 2:28:29 PM    Duration = 21h 27m   FINDINGS:    Background: Symmetry: symmetric Continuous: continuous PDR: fragments of 7-8 Hz posterior dominant rhythm.  Wakefulness occurs rarely and is not sustained.  Reactivity: present Voltage: normal, mostly 20-150uV Anterior Posterior Gradient: present Breach: absent  Background Slowing: Generalized slowing: theta and delta diffusely. Focal slowing: there is intermittent   State Changes:  -Drowsiness was characterized by fragmentation, attenuation, and slowing of the background activity.   -N2 sleep transients with symmetric spindles and K-complexes.  Sporadic Epileptiform Discharges:  There are spikes in the right anterior temporal region (F8).   Rhythmic and Periodic Patterns (RPPs): None   Electrographic and Electroclinical seizures: None  Other Clinical Events: None  Activation Procedures:  -Hyperventilation was not performed.   -Photic stimulation was not performed.  Artifacts: Intermittent myogenic and movement artifacts were noted.  ECG: The heart rate on single channel ECG was predominantly between 70-80 BPM.  EEG Summary / Classification:  Abnormal EEG in the awake / drowsy / asleep states. Augie, focal, right anterior temporal region Intermittent polymorphic delta slowing, focal, right temporal Background slowing, generalized, mild Excessive drowsiness  EEG Impression / Clinical Correlate:  Abnormal EEG indicative of potentially epileptogenic region of cortical irritability and focal dysfunction in the right temporal region.  There is also evidence of mild diffuse cerebral dysfunction that is not specific for etiology.   ________________________________________     Flex Gallardo MD, PhD Director, Epilepsy Division, [facility]  ------------------------------------ EEG Reading Room: [phone] On Call Service After Hours: [phone]

## 2023-02-22 NOTE — PROGRESS NOTE ADULT - SUBJECTIVE AND OBJECTIVE BOX
Date of service: 23 @ 10:40    Lying in bed in NAD  Alert and confused  Has dry cough  No SOB at rest    ROS: no fever or chills; denies dizziness, no HA, no abdominal pain, no diarrhea or constipation; no dysuria, no legs pain, no rashes    MEDICATIONS  (STANDING):  albuterol/ipratropium for Nebulization 3 milliLiter(s) Nebulizer every 6 hours  cloBAZam 5 milliGRAM(s) Oral two times a day  dorzolamide 2%/timolol 0.5% Ophthalmic Solution 1 Drop(s) Both EYES two times a day  enoxaparin Injectable 40 milliGRAM(s) SubCutaneous every 24 hours  escitalopram 10 milliGRAM(s) Oral daily  guaiFENesin Oral Liquid (Sugar-Free) 200 milliGRAM(s) Oral every 6 hours  lactulose Syrup 10 Gram(s) Oral two times a day  lamoTRIgine 300 milliGRAM(s) Oral two times a day  latanoprost 0.005% Ophthalmic Solution 1 Drop(s) Both EYES at bedtime  metoprolol tartrate 50 milliGRAM(s) Oral two times a day  OLANZapine 5 milliGRAM(s) Oral two times a day  pantoprazole    Tablet 40 milliGRAM(s) Oral before breakfast  piperacillin/tazobactam IVPB.. 3.375 Gram(s) IV Intermittent every 8 hours  primidone 250 milliGRAM(s) Oral two times a day  senna 2 Tablet(s) Oral at bedtime  sodium chloride 0.65% Nasal 3 Spray(s) Both Nostrils five times a day    Vital Signs Last 24 Hrs  T(C): 36.3 (2023 21:57), Max: 36.3 (2023 21:57)  T(F): 97.4 (2023 21:57), Max: 97.4 (2023 21:57)  HR: 70 (2023 09:03) (69 - 81)  BP: 122/70 (2023 09:03) (117/79 - 122/70)  BP(mean): --  RR: 18 (2023 09:03) (18 - 18)  SpO2: 93% (2023 09:03) (93% - 95%)    Parameters below as of 2023 09:03  Patient On (Oxygen Delivery Method): nasal cannula  O2 Flow (L/min): 3     Physical exam:    Constitutional:  No acute distress  HEENT: NC/AT, EOMI, PERRLA, conjunctivae clear; ears and nose atraumatic  Neck: supple; thyroid not palpable  Back: no tenderness  Respiratory: respiratory effort normal; crackles at bases  Cardiovascular: S1S2 regular, no murmurs  Abdomen: soft, not tender, not distended, positive BS; no liver or spleen organomegaly  Genitourinary: no suprapubic tenderness  Lymphatic: no LN palpable  Musculoskeletal: no muscle tenderness, no joint swelling or tenderness  Extremities: no pedal edema  Neurological/ Psychiatric: Alert, moving all extremities  Skin: no rashes; no palpable lesions    Labs: reviewed                        12.7   6.11  )-----------( 219      ( 2023 08:08 )             39.7     02-    142  |  110<H>  |  8   ----------------------------<  103<H>  4.2   |  27  |  0.79    Ca    8.3<L>      2023 08:08    TPro  7.1  /  Alb  3.1<L>  /  TBili  0.5  /  DBili  x   /  AST  18  /  ALT  23  /  AlkPhos  154<H>      D-Dimer Assay, Quantitative: 222 ng/mL DDU (23 @ 07:21)                        12.6   6.09  )-----------( 195      ( 2023 08:43 )             39.6     02-    137  |  108  |  9   ----------------------------<  87  3.7   |  23  |  0.78    Ca    8.1<L>      2023 08:43  Phos  2.1     02-20  Mg     2.2         TPro  7.0  /  Alb  3.1<L>  /  TBili  0.4  /  DBili  <0.1  /  AST  17  /  ALT  23  /  AlkPhos  164<H>      D-Dimer Assay, Quantitative: 222 ng/mL DDU (23 @ 07:21)                        12.7   5.49  )-----------( 203      ( 2023 07:21 )             39.9         140  |  108  |  6<L>  ----------------------------<  94  3.6   |  29  |  0.87    Ca    7.8<L>      2023 07:21    TPro  7.0  /  Alb  3.2<L>  /  TBili  0.3  /  DBili  x   /  AST  17  /  ALT  25  /  AlkPhos  169<H>       LIVER FUNCTIONS - ( 2023 07:21 )  Alb: 3.2 g/dL / Pro: 7.0 gm/dL / ALK PHOS: 169 U/L / ALT: 25 U/L / AST: 17 U/L / GGT: x           Urinalysis Basic - ( 2023 12:57 )    Color: Yellow / Appearance: Clear / S.005 / pH: x  Gluc: x / Ketone: Negative  / Bili: Negative / Urobili: Negative   Blood: x / Protein: Negative / Nitrite: Negative   Leuk Esterase: Negative / RBC: x / WBC x   Sq Epi: x / Non Sq Epi: x / Bacteria: x    ( @ 08:53)  NotDetec    Culture - Urine (collected 2023 12:57)  Source: Clean Catch Clean Catch (Midstream)  Final Report (2023 16:35):    <10,000 CFU/mL Normal Urogenital Debbie    Culture - Blood (collected 2023 11:28)  Source: .Blood None  Preliminary Report (2023 15:09):    No growth to date.    Culture - Blood (collected 2023 11:03)  Source: .Blood Blood  Preliminary Report (2023 15:09):    No growth to date.    Radiology: all available radiological tests reviewed    < from: CT Abdomen and Pelvis No Cont (23 @ 11:55) >  Mild bilateral interlobular septal thickening may indicate mild pulmonary   interstitial edema.    No pneumothorax or hemothorax.    No pneumoperitoneum or ascites.    Pneumatosis of the transverse colon. Although there are no signs of   colonic inflammation recommend correlating with lactate level. Other   etiologies including benign etiologies should be considered.  < end of copied text >    < from: CT Chest No Cont (23 @ 11:54) >  LUNGS AND LARGE AIRWAYS: Patent central airways. Stable right lung volume   loss. Stable right lower lobe scarring. Mild bilateral interlobular   septal thickening. Compressive left lower lobe atelectasis adjacent to the elevated diaphragm.  < end of copied text >      Advanced directives addressed: full resuscitation

## 2023-02-22 NOTE — PROGRESS NOTE ADULT - SUBJECTIVE AND OBJECTIVE BOX
CC: lethargy    HPI:  53 y/o male with PMHx of hydrocephalus (age 2 months) s/p multiple brain surgeries/ peritoneal shunt, legally blind, glaucoma, mild MR, PIERCE, Epilepsy (last seizure years ago), impulse control disorder, obesity, GERD and hx of aspiration PNA who presented to  with progressive lethargy since 2/14.   Patient's legal guardian is his Aunt, Christi Guy.  Patient lived with her for many years but now has been in group homes.  Patient presently in group home in [facility].  Patient's baseline status is he is able to communicate with his guardian on the phone.  They talk daily.  He is also able to ambulate around the group home but is in a wheelchair most of the time.  As per guardian, she noticed increased lethargy when she was trying to talk to him on the phone over the last 3 days.  Today, around 8am at the group home, patient found more altered, slurring words and diaphoretic and sent to the ER for evaluation.  As per guardian, patient did go to a pulmonologist on Thursday/ yesterday for clearance appointment prior to colonoscopy.  Upon arrival to , patient was a code stroke.  CTA brain negative for acute infarct/ occlusion.  After further work up, patient was found to have bilateral PNA-- worse on right than left and hypothermia with temp of 95.  BP also soft in ER  systolic.  Patient also hypoxic and was on nonrebreather mask on arrival.  Patient was also seen by neuro and question was made to ? patient being postictal; however, as per aunt, patient has not had a seizure in many years.  Patient given IV ABX and 2.1 L NS and admitted.      INTERVAL HPI/ OVERNIGHT EVENTS: Mental status overall improving. VSS with O2 93% on 3L NC.  24hr EEG noting potentially epileptogenic region of cortical irritability and focal dysfunction in the right temporal region. D/w neuro/outpatient neuro - will increase clobazam      REVIEW OF SYSTEMS:  Unable to obtain due to developmental delay and encephalopathy      PHYSICAL EXAM:  Vital Signs Last 24 Hrs  T(C): 36.3 (21 Feb 2023 21:57), Max: 36.3 (21 Feb 2023 21:57)  T(F): 97.4 (21 Feb 2023 21:57), Max: 97.4 (21 Feb 2023 21:57)  HR: 83 (22 Feb 2023 13:21) (69 - 83)  BP: 122/70 (22 Feb 2023 09:03) (117/79 - 122/70)  BP(mean): --  RR: 18 (22 Feb 2023 09:03) (18 - 18)  SpO2: 95% (22 Feb 2023 13:21) (93% - 95%)    Parameters below as of 22 Feb 2023 13:21  Patient On (Oxygen Delivery Method): nasal cannula,2l    General:   in no acute distress  Eyes: L eye reactive to light, R eye with cataract, conjunctiva and sclera clear  Head:  head is oval shape s/p cranial expansion, no signs of trauma, no hematoma or ecchymosis no abrasion, no tenderness   ENMT: No nasal discharge; congested nasal passages   Respiratory: Decreased BS, no wheezing   Cardiovascular: Regular rate and rhythm. S1 and S2 Normal;   Gastrointestinal: Soft non-tender non-distended; Normal bowel sounds  Genitourinary: No  suprapubic  tenderness  Extremities: + LE edema, better  Vascular: Peripheral pulses palpable 2+ bilaterally  Neurological: Alert and oriented x 1-2, confused, Moves all extremities  MS 5/5 B/l UE, not following with LE  Speech is garbled no facial asymmetry  Skin: Warm and dry. No acute rash  Musculoskeletal: Normal muscle tone, no ecchymoses, no abrasions,  or joint edema. full PROM, no pain     Labs:  02-22-23 @ 08:08  Glucose 103 [70 - 99]  CO2 total 27 [22 - 31]  Chloride 110 [96 - 108]  Sodium 142 [135 - 145]  Potassium 4.2 [3.5 - 5.3]  Calcium 8.3 [8.5 - 10.1]  Creatinine 0.79 [0.50 - 1.30]  BUN 8 [7 - 23]  eGFR 106  Anion gap 5 [5 - 17]  AST --  ALT --  Alk phos --  Albumin --    WBC 6.11 [3.80 - 10.50]  Hemoglobin 12.7 [13.0 - 17.0]  Hematocrit 39.7 [39.0 - 50.0]  Platelets 219 [150 - 400]                         13.0   6.23  )-----------( 219      ( 21 Feb 2023 08:59 )             39.3     02-21    138  |  107  |  8   ----------------------------<  150<H>  3.0<L>   |  26  |  0.88    Ca    8.3<L>      21 Feb 2023 08:59  Phos  2.1     02-20  Mg     2.2     02-20    TPro  7.1  /  Alb  3.1<L>  /  TBili  0.5  /  DBili  x   /  AST  18  /  ALT  23  /  AlkPhos  154<H>  02-21    LIVER FUNCTIONS - ( 21 Feb 2023 08:59 )  Alb: 3.1 g/dL / Pro: 7.1 gm/dL / ALK PHOS: 154 U/L / ALT: 23 U/L / AST: 18 U/L / GGT: x                                 12.6   6.09  )-----------( 195      ( 20 Feb 2023 08:43 )             39.6     20 Feb 2023 08:43    137    |  108    |  9      ----------------------------<  87     3.7     |  23     |  0.78     Ca    8.1        20 Feb 2023 08:43  Phos  2.1       20 Feb 2023 08:43  Mg     2.2       20 Feb 2023 08:43    TPro  7.0    /  Alb  3.1    /  TBili  0.4    /  DBili  <0.1   /  AST  17     /  ALT  23     /  AlkPhos  164    20 Feb 2023 08:43      LIVER FUNCTIONS - ( 20 Feb 2023 08:43 )  Alb: 3.1 g/dL / Pro: 7.0 gm/dL / ALK PHOS: 164 U/L / ALT: 23 U/L / AST: 17 U/L / GGT: x           MEDICATIONS  (STANDING):  albuterol/ipratropium for Nebulization 3 milliLiter(s) Nebulizer every 6 hours  cloBAZam 10 milliGRAM(s) Oral at bedtime  dorzolamide 2%/timolol 0.5% Ophthalmic Solution 1 Drop(s) Both EYES two times a day  enoxaparin Injectable 40 milliGRAM(s) SubCutaneous every 24 hours  escitalopram 10 milliGRAM(s) Oral daily  guaiFENesin Oral Liquid (Sugar-Free) 200 milliGRAM(s) Oral every 6 hours  lactulose Syrup 10 Gram(s) Oral two times a day  lamoTRIgine 300 milliGRAM(s) Oral two times a day  latanoprost 0.005% Ophthalmic Solution 1 Drop(s) Both EYES at bedtime  metoprolol tartrate 50 milliGRAM(s) Oral two times a day  OLANZapine 5 milliGRAM(s) Oral two times a day  pantoprazole    Tablet 40 milliGRAM(s) Oral before breakfast  piperacillin/tazobactam IVPB.. 3.375 Gram(s) IV Intermittent every 8 hours  primidone 250 milliGRAM(s) Oral two times a day  senna 2 Tablet(s) Oral at bedtime  sodium chloride 0.65% Nasal 3 Spray(s) Both Nostrils five times a day    MEDICATIONS  (PRN):  acetaminophen     Tablet .. 650 milliGRAM(s) Oral every 6 hours PRN Temp greater or equal to 38C (100.4F), Mild Pain (1 - 3)  aluminum hydroxide/magnesium hydroxide/simethicone Suspension 30 milliLiter(s) Oral every 4 hours PRN Dyspepsia  melatonin 3 milliGRAM(s) Oral at bedtime PRN Insomnia  ondansetron Injectable 4 milliGRAM(s) IV Push every 8 hours PRN Nausea and/or Vomiting      RADIOLOGY & ADDITIONAL TESTS:    EEG Summary / Classification:    Abnormal EEG in the awake / drowsy / asleep states.  Augie, focal, right anterior temporal region  Intermittent polymorphic delta slowing, focal, right temporal  Background slowing, generalized, mild  Excessive drowsiness    EEG Impression / Clinical Correlate:    Abnormal EEG indicative of potentially epileptogenic region of cortical irritability and focal dysfunction in the right temporal region.  There is also evidence of mild diffuse cerebral dysfunction that is not specific for etiology.     ACC: [id number] EXAM:  CT ABDOMEN AND PELVIS   ORDERED BY: WILDA HEALY     ACC: [id number] EXAM:  CT CHEST   ORDERED BY: WLIDA HEALY     PROCEDURE DATE:  02/18/2023        < from: CT Abdomen and Pelvis No Cont (02.18.23 @ 11:55) >  FINDINGS:  CHEST:  LUNGS AND LARGE AIRWAYS: Patent central airways. Stable right lung volume   loss. Stable right lower lobe scarring. Mild bilateral interlobular   septal thickening. Compressive left lower lobe atelectasis adjacent to   the elevated diaphragm.  PLEURA: Elevated left diaphragm. Stable right pleural calcifications. No   pneumothorax or pleural effusion.  VESSELS: Stable calcification in the wall of the right brachiocephalic   vein, likely reflecting sequela of prior thrombus. Mild atherosclerotic   coronary artery calcifications.  HEART: Heart size is normal. No pericardial effusion.  MEDIASTINUM AND IDRIS: No lymphadenopathy.  Abdominal hernia.  CHEST WALL AND LOWER NECK:  shunt catheter descends down the right neck   and chest wall. Left anterior chest wall millimeters device with catheter   ascending the left neck.    ABDOMEN AND PELVIS:  LIVER: Within normal limits.  BILE DUCTS: Normal caliber.  GALLBLADDER: Within normal limits.  SPLEEN: Within normal limits.  PANCREAS: Within normal limits.  ADRENALS: Within normal limits.  KIDNEYS/URETERS: Within normal limits.    BLADDER: Within normal limits.  REPRODUCTIVE ORGANS: Prostate within normal limits.    BOWEL: Pneumatosis of the transverse colon. No bowel wall thickening or   pericolonic inflammatory change. No bowel obstruction. Appendix is normal.  PERITONEUM: No free air. Tip of the shunt catheter rests in the left   lower quadrant. No ascites.  VESSELS: Within normal limits.  RETROPERITONEUM/LYMPH NODES: No lymphadenopathy.  ABDOMINAL WALL: Shunt catheter descends along the midline of the ventral   abdominal wall, entering the peritoneal cavity above the umbilicus..   Small bilateral fat-containing inguinal hernias. Left ventral abdominal   wall subcutaneous foci of gas, likely due to subcutaneous injections.  BONES: Degenerative changes. No acute fracture.    IMPRESSION:  Mild bilateral interlobular septal thickening may indicate mild pulmonary   interstitial edema.    No pneumothorax or hemothorax.    No pneumoperitoneum or ascites.    Pneumatosis of the transverse colon. Although there are no signs of   colonic inflammation recommend correlating with lactate level. Other   etiologies including benign etiologies should be considered.

## 2023-02-22 NOTE — PROGRESS NOTE ADULT - ASSESSMENT
52 yo man mild MR, BPH, glaucoma,  hx of hydrocephalus s/p  shunt, also has VNS, had VPS revisions presented to  with change in MS, hypothermic, hypotension, ? bilat PNA, was treated with antibx. CTH no significant findings, CT angio no acute LVO. EEG at bedside, mildly slow, but no seizure activities seen.     # Metabolic encephalopathy, resolved    # Possibility of ? sz/ post ictal - no recent hx of seizures; lamotrigine / clobazam and Primodone restarted - Keppra d/c     24 hr EEG montoring is abnormal; indicative of potentially epileptogenic region of cortical irritability and focal dysfunction in the right temporal region.        - MR head cannot be done due to stimulator    Above D/W Dr. Bazan  ` 52 yo man mild MR, BPH, glaucoma,  hx of hydrocephalus s/p  shunt, also has VNS, had VPS revisions presented to  with change in MS, hypothermic, hypotension, ? bilat PNA, was treated with antibx. CTH no significant findings, CT angio no acute LVO. EEG at bedside, mildly slow, but no seizure activities seen.     # Metabolic encephalopathy, likely aspiration PNA related - improved    # Possibility of ? sz/ post ictal - no recent hx of seizures; lamotrigine / clobazam and Primodone restarted - Keppra d/c     - 24 hr EEG montoring is abnormal; indicative of potentially epileptogenic region of cortical irritability and focal dysfunction in the right temporal region.    - Left message for Dr. ALICJA Jewell, pts epileptologist ([phone]) to discuss if Clobazam can be increased to 10 mg bid, given abnormal EEG and possibility of breakthro seizure.  - MR head cannot be done due to stimulator    Above was discussed with pts HCP and with TORI Fish.

## 2023-02-22 NOTE — GOALS OF CARE CONVERSATION - ADVANCED CARE PLANNING - CONVERSATION DETAILS
HPI:  51 y/o male with PMHx of hydrocephalus (age 2 months) s/p multiple brain surgeries/ peritoneal shunt, legally blind, glaucoma, mild MR, PIERCE, Epilepsy (last seizure years ago), impulse control disorder, obesity, GERD and hx of aspiration PNA who presented to  with progressive lethargy since 2/14/ Valentines Day.     (17 Feb 2023 13:43)      PERTINENT PMH REVIEWED:  [ X ] YES [ ] NO           Mental Status: [  ] Alert  [  ] Oriented [  ] Confused [ X  ] Lethargic [ X ]  Concerns of Depression [  ]- unable to assess  Anxiety [   ]- unable to assess  Baseline ADLs (prior to admission):  Independent [ ] moderately [ ] fully   Dependent   [ ] moderately [ X ] fully    Anticipated Grief: Patient[  ] Family [  X  ]    Caregiver [place] Assessed: Yes [ X ] No [  ]    Buddhism: Yazdanism     Spiritual Concerns: Not identified     Goals of Care: Pts Aunt/Guardian would like to focus on comfort and pursue home hospice at the longterm if the state approves MOLST Checklist     Previous Services: [phone]  [facility]    ADVANCE DIRECTIVES:  Pt. currently Full Code. Pts Aunt/Guardian is requesting DNR/DNI and MOLST Checklist will be sent to [employer].    Anticipated D/C Plan: likely returning to nursing home, will need response from [facility] before we will know if pt. can return with hospice as they must approve it                      Summary:    Team met with pts Guardian/HCP Christi to discuss goals of care, assist with planning and provide supportive counseling. Pt. was residing at [facility] prior to admission. Pts Aunt shared pts extensive medical history and the struggles he has been through. She shared how the person she sees now she does not feel is the same person as in the past and has seen a decline. She shared how she feels that pt. is getting closer to the end of his life and she just wants him to be comfortable. Feelings explored and support provided.     Pt. is Full Code. We discussed resuscitation and intubation. Pts Guardian reports that she would not want pt. to have resuscitation or intubation and would want a DNR/DNI. She also shared that they would never want pt. to have a feeding tube. Team explained that ultimately it is up to the state ([employer]) if they will approve a DNR/DNI and any limits to be set. We explained that a MOLST checklist with the above requests must be submitted to [employer] for them to review and ultimately it will be there decision if they will allow limits to be set. At this time pt. remains Full Code. Pts Guardian expressed understanding of this. She would also be interested in focusing on pts comfort at the group [place] with [facility], if this would be approved.     MOLST Checklist will REQUEST for DNR, DNI, No feeding tube, Comfort Measures and Hospice. MOLST Checklist cannot be sent to [employer] [phone]. PTA pt resided at [facility] HPI:  53 y/o male with PMHx of hydrocephalus (age 2 months) s/p multiple brain surgeries/ peritoneal shunt, legally blind, glaucoma, mild MR, PIERCE, Epilepsy (last seizure years ago), impulse control disorder, obesity, GERD and hx of aspiration PNA who presented to  with progressive lethargy since 2/14/ Valentines Day.     (17 Feb 2023 13:43)      PERTINENT PMH REVIEWED:  [ X ] YES [ ] NO           Mental Status: [  ] Alert  [  ] Oriented [  ] Confused [ X  ] Lethargic [ X ]  Concerns of Depression [  ]- unable to assess  Anxiety [   ]- unable to assess  Baseline ADLs (prior to admission):  Independent [ ] moderately [ ] fully   Dependent   [ ] moderately [ X ] fully    Anticipated Grief: Patient[  ] Family [  X  ]    Caregiver [place] Assessed: Yes [ X ] No [  ]    Jainism: Protestant     Spiritual Concerns: Not identified     Goals of Care: Pts Aunt/Guardian would like to focus on comfort and pursue home hospice at the [facility] if the state approves MOLST Checklist     Previous Services: [phone]  [facility] [place]    ADVANCE DIRECTIVES:  Pt. currently Full Code. Pts Aunt/Guardian is requesting DNR/DNI and MOLST Checklist will be sent to [employer].    Anticipated D/C Plan: likely returning to [facility], will need response from [facility] before we will know if pt. can return with hospice as they must approve it                      Summary:    Team met with pts Guardian/HCP Christi to discuss goals of care, assist with planning and provide supportive counseling. Pt. was residing at [facility] prior to admission. Pts Aunt shared pts extensive medical history and the struggles he has been through. She shared how the person she sees now she does not feel is the same person as in the past and has seen a decline. She shared how she feels that pt. is getting closer to the end of his life and she just wants him to be comfortable. Feelings explored and support provided.     Pt. is Full Code. We discussed resuscitation and intubation. Pts Guardian reports that she would not want pt. to have resuscitation or intubation and would want a DNR/DNI. She also shared that they would never want pt. to have a feeding tube. Team explained that ultimately it is up to the state ([employer] [phone]) if they will approve a DNR/DNI and any limits to be set. We explained that a MOLST checklist with the above requests must be submitted to [employer] for them to review and ultimately it will be there decision if they will allow limits to be set. At this time pt. remains Full Code. Pts Guardian expressed understanding of this. She would also be interested in focusing on pts comfort at the group home with [facility], if this would be approved.     MOLST Checklist will REQUEST for DNR, DNI, No feeding tube, Comfort Measures and Hospice. MOLST Checklist requires signature from a physician on Step 3 that cant concur that pt. does not have capacity and this must be a Physician who has experience working with OPWDD patients. This SW was able to speak with pts Primary Care Doctor at the custodial Dr. Marquez ([phone]) who can sign for capacity. She reports she works with the state often for these matters and agrees with the families requests. MOLST Checklist faxed to Dr. Kris Loredo (fax #- [phone]) to sign Step 3 and awaiting fax back with signature. Once this is received back [employer] ([phone]) to be contacted and MOLST Checklist and supporting documents to be faxed over  ( Fax- [employer] [phone]) for them to review and a make decision. At this time pt. remains full code and no limits set as we are waiting for MOLST Checklist to be completed and submitted to [employer] for a decision. HPI:  51 y/o male with PMHx of hydrocephalus (age 2 months) s/p multiple brain surgeries/ peritoneal shunt, legally blind, glaucoma, mild MR, PIERCE, Epilepsy (last seizure years ago), impulse control disorder, obesity, GERD and hx of aspiration PNA who presented to  with progressive lethargy since 2/14/ Valentines Day.     (17 Feb 2023 13:43)      PERTINENT PMH REVIEWED:  [ X ] YES [ ] NO           Mental Status: [  ] Alert  [  ] Oriented [  ] Confused [ X  ] Lethargic [ X ]  Concerns of Depression [  ]- unable to assess  Anxiety [   ]- unable to assess  Baseline ADLs (prior to admission):  Independent [ ] moderately [ ] fully   Dependent   [ ] moderately [ X ] fully    Anticipated Grief: Patient [  ] Family [  X  ]    Caregiver [place] Assessed: Yes [ X ] No [  ]    Orthodoxy: Caodaism     Spiritual Concerns: Not identified     Goals of Care: Pts Aunt/Guardian would like to focus on comfort and pursue home hospice at the alf if the state approves MOLST Checklist     Previous Services: [phone]  [facility]    ADVANCE DIRECTIVES:  Pt. currently Full Code. Pts Aunt/Guardian is requesting DNR/DNI and MOLST Checklist will be sent to [employer].    Anticipated D/C Plan: [facility], will need response from [facility] before we will know if pt. can return with hospice as they must approve it                      Summary:    Team met with pts Guardian/HCP Christi to discuss goals of care, assist with planning and provide supportive counseling. Pt. was residing at [facility] prior to admission. Pts Aunt shared pts extensive medical history and the struggles he has been through. She shared how the person she sees now she does not feel is the same person as in the past and has seen a decline. She shared how she feels that pt. is getting closer to the end of his life and she just wants him to be comfortable. Feelings explored and support provided.     Pt. is Full Code. We discussed resuscitation and intubation. Pts Guardian reports that she would not want pt. to have resuscitation or intubation and would want a DNR/DNI. She also shared that they would never want pt. to have a feeding tube. Team explained that ultimately it is up to the state ([employer] [phone]) if they will approve a DNR/DNI and any limits to be set. We explained that a MOLST checklist with the above requests must be submitted to [employer] for them to review and ultimately it will be there decision if they will allow limits to be set. At this time pt. remains Full Code. Pts Guardian expressed understanding of this. She would also be interested in focusing on pts comfort at the group home with [facility], if this would be approved.     MOLST Checklist will REQUEST for DNR, DNI, No feeding tube, Comfort Measures and Hospice. MOLST Checklist requires signature from a physician on Step 3 that cant concur that pt. does not have capacity and this must be a Physician who has experience working with OPWDD patients. This SW was able to speak with pts Primary Care Doctor at the [facility] Dr. Marquez ([phone]) who can sign for capacity. She reports she works with the state often for these matters and agrees with the families requests. MOLST Checklist faxed to Dr. Kris Loredo (fax #- [phone]) to sign Step 3 and awaiting fax back with signature. Once this is received back [employer] ([phone]) to be contacted and MOLST Checklist and supporting documents to be faxed over  ( Fax- [employer] [phone]) for them to review and a make decision. At this time pt. remains full code and no limits set as we are waiting for MOLST Checklist to be completed and submitted to [employer] for a decision.    Plan at this time is to fax MOLST Checklist to [employer] ([facility]) once it is complete. Plan to be further determined once a response if given from [facility]. Our team will continue to follow.

## 2023-02-22 NOTE — PROGRESS NOTE ADULT - ASSESSMENT
53 y/o male with PMHx of hydrocephalus (age 2 months) s/p multiple brain surgeries/ peritoneal shunt, legally blind, glaucoma, mild MR, PIERCE, Epilepsy (last seizure years ago), impulse control disorder, obesity, GERD and hx of aspiration PNA admitted for:       #Acute Hypoxic Respiratory Failure suspected due to Sepsis secondary to Aspiration PNA:    Patient with hypothermia/ hypotension/ hypoxia  on admission   CXR reviewed-- extensive right infiltrates and left sided as well. But CT chest  with no infiltrates + volume loss, atelectases, interstitial thickening     S/p Venti mask, ABG noted   s/p 2l IVF bolus in ED, On Maintenance fluids will d/c now     C/w supplemental O2, wean as tolerated  BCX: NGTD, UCX<10K   C/w IV Abxs, On  IV Zosyn as per ID recs , will cover suspected GNR bacteria infection   ID consult appreciated    #Metabolic Encephalopathy. - improving   #Seizure disorder with abnormal EEG, possible post ictal state  likely multifactorial from hypoxia/ sepsis, post ictal state  Can not r/o stroke on MRI as Pt has non working stimulator with no certain info     CT head negative for acute findings, extensive post sx changes   EEG neg for seizure, plan for 24h EEG, d/w Dr Gaming  Off  IV Keppra as pt now tolerating diet  Resumed on lamotrigine  and Clobazam  B12, folate, TSH wnl  S&S resc  cleared for puree diet with thickened liquids   D/w Dr Arevalo plan for 24h EEG -->  noting potentially epileptogenic region of cortical irritability and focal dysfunction in the right temporal region. D/w neuro/outpatient neuro - will increase clobazam to 5mg qAM, 10mg qHS    #Hydrocephalus/MR:    Baseline patient is able to talk on phone with aunt/ have simple  conversation/ walk at group home.    XR shunt series : unremarkable   On Olanzapine continued     # LE edema  D-Dimers wnl  LE doppler: neg for DVT b/l  TSH wnl  Albumin wnl  No proteinuria as per UA   ECHO: EF preserved, no significant valvular Dz     #GERD:  cont PPI.      #DVT Proph:  Lovenox.      #Advanced Directives/ Goals of Care:    guardian Christi Malena   CODE STATUS:  FULL CODE  Palliative team eval in progress

## 2023-02-23 NOTE — CHART NOTE - NSCHARTNOTEFT_GEN_A_CORE
This SW faxed MOLST checklist and documents to Kolton vera at [employer] with success.  Awaiting response.  GOC held on 2/22.  Guardian requesting DNR/DNI/No feeding tube.  Our team to continue to follow.
This SW left message for pts Guardian Christi [phone] and is awaiting a call back. Pt. is from  [facility] ( [phone]). [employer]   ([phone]) will need to be involved and a MOLST checklist completed and sent for approval if Guardian wishes to request to set any limits. Pt currently Full Code and no limits set. Our team will continue to follow.
Pt given IV potassium phosphate overnight. Unable to tolerate PO K+ supplementation

## 2023-02-23 NOTE — PROGRESS NOTE ADULT - ASSESSMENT
53 y/o male with h/o hydrocephalus (dgn at age 2 months) s/p multiple brain surgeries/ peritoneal shunt, legally blind, glaucoma, mild MR, PIERCE, epilepsy (last seizure years ago), impulse control disorder, obesity, GERD and h/o aspiration PNA was admitted on 2/17 for progressive lethargy since 2/14. Patient presently in group home in [facility]. Patient's baseline status is he is able to communicate with his guardian on the phone. He is also able to ambulate around the group home, but is in a wheelchair most of the time. As per guardian, she noticed increased lethargy when she was trying to talk to him on the phone over the last 3 days. On the day of admission, the patient found more altered, slurring words and diaphoretic and sent to the ER for evaluation. Upon arrival, patient was a code stroke.  CTA brain negative for acute infarct/ occlusion. After further work up, patient was found to have bilateral PNA worse on right than left and hypothermia with temp of 95.  BP also soft in ER  systolic.  Patient also hypoxic and was on nonrebreather mask on arrival. In ER, the patient received zosyn and doxycycline.     1. Acute respiratory hypoxic respiratory failure resolving. Possible aspiration pneumonia. Hydrocephalus s/p prior  shunt. Metabolic encephalopathy.   -respiratory improving, but still frail  -BC x 2, urine c/s noted  -on zosyn 3.375 gm IV q8h # 5  -tolerating abx well so far; no side effects noted  -aspiration precautions  -continue abx coverage   -monitor temps  -f/u CBC  -supportive care  2. Other issues:   -care per medicine

## 2023-02-23 NOTE — PROGRESS NOTE ADULT - SUBJECTIVE AND OBJECTIVE BOX
CC: lethargy    HPI:  53 y/o male with PMHx of hydrocephalus (age 2 months) s/p multiple brain surgeries/ peritoneal shunt, legally blind, glaucoma, mild MR, PIERCE, Epilepsy (last seizure years ago), impulse control disorder, obesity, GERD and hx of aspiration PNA who presented to  with progressive lethargy since 2/14.   Patient's legal guardian is his Aunt, Christi Guy.  Patient lived with her for many years but now has been in group homes.  Patient presently in group home in [facility].  Patient's baseline status is he is able to communicate with his guardian on the phone.  They talk daily.  He is also able to ambulate around the group home but is in a wheelchair most of the time.  As per guardian, she noticed increased lethargy when she was trying to talk to him on the phone over the last 3 days.  Today, around 8am at the group home, patient found more altered, slurring words and diaphoretic and sent to the ER for evaluation.  As per guardian, patient did go to a pulmonologist on Thursday/ yesterday for clearance appointment prior to colonoscopy.  Upon arrival to , patient was a code stroke.  CTA brain negative for acute infarct/ occlusion.  After further work up, patient was found to have bilateral PNA-- worse on right than left and hypothermia with temp of 95.  BP also soft in ER  systolic.  Patient also hypoxic and was on nonrebreather mask on arrival.  Patient was also seen by neuro and question was made to ? patient being postictal; however, as per aunt, patient has not had a seizure in many years.  Patient given IV ABX and 2.1 L NS and admitted.      INTERVAL HPI/ OVERNIGHT EVENTS: Pt reports SOB improving, weaned to 2L NC, remains on IV zosyn. Abd xray with mod stool burden, +abd distention/pain, given dulcolax suppository with improvement  d/c planning in progress     REVIEW OF SYSTEMS:  Unable to obtain due to developmental delay and encephalopathy      PHYSICAL EXAM:  Vital Signs Last 24 Hrs  T(C): 36.7 (23 Feb 2023 08:20), Max: 36.7 (22 Feb 2023 17:07)  T(F): 98.1 (23 Feb 2023 08:20), Max: 98.1 (23 Feb 2023 08:20)  HR: 80 (23 Feb 2023 08:45) (77 - 92)  BP: 110/50 (23 Feb 2023 08:20) (102/55 - 110/50)  BP(mean): --  RR: 19 (23 Feb 2023 08:20) (18 - 19)  SpO2: 96% (23 Feb 2023 08:20) (93% - 96%)    Parameters below as of 23 Feb 2023 08:20  Patient On (Oxygen Delivery Method): nasal cannula  O2 Flow (L/min): 2    General: in no acute distress on NC  Eyes: L eye reactive to light, R eye with cataract, conjunctiva and sclera clear  Head:  head is oval shape s/p cranial expansion, no signs of trauma, no hematoma or ecchymosis no abrasion, no tenderness   ENMT: No nasal discharge; congested nasal passages   Respiratory: Decreased BS, no wheezing   Cardiovascular: Regular rate and rhythm. S1 and S2 Normal;   Gastrointestinal: Soft, mild diffuse TTP, mod abd distention; Normal bowel sounds  Extremities: + LE edema, better  Vascular: Peripheral pulses palpable 2+ bilaterally  Neurological: Alert and oriented x2, intermittently confused, Moves all extremities. Speech is garbled no facial asymmetry  Skin: Warm and dry. No acute rash  Musculoskeletal: Normal muscle tone, no ecchymoses, no abrasions,  or joint edema. full PROM, no pain     Labs:  02-23-23 @ 09:07  Glucose 139 [70 - 99]  CO2 total 29 [22 - 31]  Chloride 107 [96 - 108]  Sodium 140 [135 - 145]  Potassium 3.4 [3.5 - 5.3]  Calcium 8.5 [8.5 - 10.1]  Creatinine 0.93 [0.50 - 1.30]  BUN 10 [7 - 23]  eGFR 98  Anion gap 4 [5 - 17]  AST --  ALT --  Alk phos --  Albumin --    WBC 6.89 [3.80 - 10.50]  Hemoglobin 12.7 [13.0 - 17.0]  Hematocrit 38.9 [39.0 - 50.0]  Platelets 221 [150 - 400]      02-22-23 @ 08:08  Glucose 103 [70 - 99]  CO2 total 27 [22 - 31]  Chloride 110 [96 - 108]  Sodium 142 [135 - 145]  Potassium 4.2 [3.5 - 5.3]  Calcium 8.3 [8.5 - 10.1]  Creatinine 0.79 [0.50 - 1.30]  BUN 8 [7 - 23]  eGFR 106  Anion gap 5 [5 - 17]  AST --  ALT --  Alk phos --  Albumin --    WBC 6.11 [3.80 - 10.50]  Hemoglobin 12.7 [13.0 - 17.0]  Hematocrit 39.7 [39.0 - 50.0]  Platelets 219 [150 - 400]                         13.0   6.23  )-----------( 219      ( 21 Feb 2023 08:59 )             39.3     02-21    138  |  107  |  8   ----------------------------<  150<H>  3.0<L>   |  26  |  0.88    Ca    8.3<L>      21 Feb 2023 08:59  Phos  2.1     02-20  Mg     2.2     02-20    TPro  7.1  /  Alb  3.1<L>  /  TBili  0.5  /  DBili  x   /  AST  18  /  ALT  23  /  AlkPhos  154<H>  02-21    LIVER FUNCTIONS - ( 21 Feb 2023 08:59 )  Alb: 3.1 g/dL / Pro: 7.1 gm/dL / ALK PHOS: 154 U/L / ALT: 23 U/L / AST: 18 U/L / GGT: x                                 12.6   6.09  )-----------( 195      ( 20 Feb 2023 08:43 )             39.6     20 Feb 2023 08:43    137    |  108    |  9      ----------------------------<  87     3.7     |  23     |  0.78     Ca    8.1        20 Feb 2023 08:43  Phos  2.1       20 Feb 2023 08:43  Mg     2.2       20 Feb 2023 08:43    TPro  7.0    /  Alb  3.1    /  TBili  0.4    /  DBili  <0.1   /  AST  17     /  ALT  23     /  AlkPhos  164    20 Feb 2023 08:43      LIVER FUNCTIONS - ( 20 Feb 2023 08:43 )  Alb: 3.1 g/dL / Pro: 7.0 gm/dL / ALK PHOS: 164 U/L / ALT: 23 U/L / AST: 17 U/L / GGT: x           MEDICATIONS  (STANDING):  albuterol/ipratropium for Nebulization 3 milliLiter(s) Nebulizer every 6 hours  cloBAZam 10 milliGRAM(s) Oral at bedtime  cloBAZam 5 milliGRAM(s) Oral every 24 hours  dorzolamide 2%/timolol 0.5% Ophthalmic Solution 1 Drop(s) Both EYES two times a day  enoxaparin Injectable 40 milliGRAM(s) SubCutaneous every 24 hours  escitalopram 10 milliGRAM(s) Oral daily  guaiFENesin Oral Liquid (Sugar-Free) 200 milliGRAM(s) Oral every 6 hours  lactulose Syrup 10 Gram(s) Oral two times a day  lamoTRIgine 300 milliGRAM(s) Oral two times a day  latanoprost 0.005% Ophthalmic Solution 1 Drop(s) Both EYES at bedtime  metoprolol tartrate 50 milliGRAM(s) Oral two times a day  OLANZapine 5 milliGRAM(s) Oral two times a day  pantoprazole    Tablet 40 milliGRAM(s) Oral before breakfast  piperacillin/tazobactam IVPB.. 3.375 Gram(s) IV Intermittent every 8 hours  potassium chloride    Tablet ER 40 milliEquivalent(s) Oral once  primidone 250 milliGRAM(s) Oral two times a day  senna 2 Tablet(s) Oral at bedtime  sodium chloride 0.65% Nasal 3 Spray(s) Both Nostrils five times a day    MEDICATIONS  (PRN):  acetaminophen     Tablet .. 650 milliGRAM(s) Oral every 6 hours PRN Temp greater or equal to 38C (100.4F), Mild Pain (1 - 3)  aluminum hydroxide/magnesium hydroxide/simethicone Suspension 30 milliLiter(s) Oral every 4 hours PRN Dyspepsia  melatonin 3 milliGRAM(s) Oral at bedtime PRN Insomnia  ondansetron Injectable 4 milliGRAM(s) IV Push every 8 hours PRN Nausea and/or Vomiting      RADIOLOGY & ADDITIONAL TESTS:    EEG Summary / Classification:    Abnormal EEG in the awake / drowsy / asleep states.  Augie, focal, right anterior temporal region  Intermittent polymorphic delta slowing, focal, right temporal  Background slowing, generalized, mild  Excessive drowsiness    EEG Impression / Clinical Correlate:    Abnormal EEG indicative of potentially epileptogenic region of cortical irritability and focal dysfunction in the right temporal region.  There is also evidence of mild diffuse cerebral dysfunction that is not specific for etiology.     ACC: [id number] EXAM:  CT ABDOMEN AND PELVIS   ORDERED BY: WILDA HEALY     ACC: [id number] EXAM:  CT CHEST   ORDERED BY: WILDA HEALY     PROCEDURE DATE:  02/18/2023        < from: CT Abdomen and Pelvis No Cont (02.18.23 @ 11:55) >  FINDINGS:  CHEST:  LUNGS AND LARGE AIRWAYS: Patent central airways. Stable right lung volume   loss. Stable right lower lobe scarring. Mild bilateral interlobular   septal thickening. Compressive left lower lobe atelectasis adjacent to   the elevated diaphragm.  PLEURA: Elevated left diaphragm. Stable right pleural calcifications. No   pneumothorax or pleural effusion.  VESSELS: Stable calcification in the wall of the right brachiocephalic   vein, likely reflecting sequela of prior thrombus. Mild atherosclerotic   coronary artery calcifications.  HEART: Heart size is normal. No pericardial effusion.  MEDIASTINUM AND IDRIS: No lymphadenopathy.  Abdominal hernia.  CHEST WALL AND LOWER NECK:  shunt catheter descends down the right neck   and chest wall. Left anterior chest wall millimeters device with catheter   ascending the left neck.    ABDOMEN AND PELVIS:  LIVER: Within normal limits.  BILE DUCTS: Normal caliber.  GALLBLADDER: Within normal limits.  SPLEEN: Within normal limits.  PANCREAS: Within normal limits.  ADRENALS: Within normal limits.  KIDNEYS/URETERS: Within normal limits.    BLADDER: Within normal limits.  REPRODUCTIVE ORGANS: Prostate within normal limits.    BOWEL: Pneumatosis of the transverse colon. No bowel wall thickening or   pericolonic inflammatory change. No bowel obstruction. Appendix is normal.  PERITONEUM: No free air. Tip of the shunt catheter rests in the left   lower quadrant. No ascites.  VESSELS: Within normal limits.  RETROPERITONEUM/LYMPH NODES: No lymphadenopathy.  ABDOMINAL WALL: Shunt catheter descends along the midline of the ventral   abdominal wall, entering the peritoneal cavity above the umbilicus..   Small bilateral fat-containing inguinal hernias. Left ventral abdominal   wall subcutaneous foci of gas, likely due to subcutaneous injections.  BONES: Degenerative changes. No acute fracture.    IMPRESSION:  Mild bilateral interlobular septal thickening may indicate mild pulmonary   interstitial edema.    No pneumothorax or hemothorax.    No pneumoperitoneum or ascites.    Pneumatosis of the transverse colon. Although there are no signs of   colonic inflammation recommend correlating with lactate level. Other   etiologies including benign etiologies should be considered.

## 2023-02-23 NOTE — PROGRESS NOTE ADULT - NS PANP COMMENT GEN_ALL_CORE FT
case discussed with KRISTIN Lucas and I was available when she and JASWINDER Don were speaking with state   I agree with the DNR/DNI and will cosign the MOLST form stating as such    MACRINA Roadrte MD

## 2023-02-23 NOTE — PROGRESS NOTE ADULT - SUBJECTIVE AND OBJECTIVE BOX
Subjective:    Awake, comfortable at rest.    MEDICATIONS  (STANDING):  albuterol/ipratropium for Nebulization 3 milliLiter(s) Nebulizer every 6 hours  bisacodyl Suppository 10 milliGRAM(s) Rectal once  cloBAZam 10 milliGRAM(s) Oral at bedtime  cloBAZam 5 milliGRAM(s) Oral every 24 hours  dorzolamide 2%/timolol 0.5% Ophthalmic Solution 1 Drop(s) Both EYES two times a day  enoxaparin Injectable 40 milliGRAM(s) SubCutaneous every 24 hours  escitalopram 10 milliGRAM(s) Oral daily  guaiFENesin Oral Liquid (Sugar-Free) 200 milliGRAM(s) Oral every 6 hours  lactulose Syrup 10 Gram(s) Oral two times a day  lamoTRIgine 300 milliGRAM(s) Oral two times a day  latanoprost 0.005% Ophthalmic Solution 1 Drop(s) Both EYES at bedtime  metoprolol tartrate 50 milliGRAM(s) Oral two times a day  OLANZapine 5 milliGRAM(s) Oral two times a day  pantoprazole    Tablet 40 milliGRAM(s) Oral before breakfast  piperacillin/tazobactam IVPB.. 3.375 Gram(s) IV Intermittent every 8 hours  primidone 250 milliGRAM(s) Oral two times a day  senna 2 Tablet(s) Oral at bedtime  sodium chloride 0.65% Nasal 3 Spray(s) Both Nostrils five times a day    MEDICATIONS  (PRN):  acetaminophen     Tablet .. 650 milliGRAM(s) Oral every 6 hours PRN Temp greater or equal to 38C (100.4F), Mild Pain (1 - 3)  aluminum hydroxide/magnesium hydroxide/simethicone Suspension 30 milliLiter(s) Oral every 4 hours PRN Dyspepsia  melatonin 3 milliGRAM(s) Oral at bedtime PRN Insomnia  ondansetron Injectable 4 milliGRAM(s) IV Push every 8 hours PRN Nausea and/or Vomiting      Allergies    codeine (Hives)  codeine (Stomach Upset)  Depakote (Other)  seasonal allergies: nasal congestion (Other)    Intolerances        Vital Signs Last 24 Hrs  T(C): 36.7 (23 Feb 2023 08:20), Max: 36.7 (22 Feb 2023 17:07)  T(F): 98.1 (23 Feb 2023 08:20), Max: 98.1 (23 Feb 2023 08:20)  HR: 92 (23 Feb 2023 08:20) (77 - 92)  BP: 110/50 (23 Feb 2023 08:20) (102/55 - 110/50)  BP(mean): --  RR: 19 (23 Feb 2023 08:20) (18 - 19)  SpO2: 96% (23 Feb 2023 08:20) (93% - 96%)    Parameters below as of 23 Feb 2023 08:20  Patient On (Oxygen Delivery Method): nasal cannula  O2 Flow (L/min): 3      PHYSICAL EXAMINATION:    NECK:  Supple. No lymphadenopathy. Jugular venous pressure not elevated.   HEART:   The cardiac impulse has a normal quality. Reg., Nl S1 and S2.    CHEST:  Chest with few coarse BS bilaterally. Normal respiratory effort.  ABDOMEN:  Soft and nontender.   EXTREMITIES:  There is tr. edema.       LABS:                        12.7   6.11  )-----------( 219      ( 22 Feb 2023 08:08 )             39.7     02-22    142  |  110<H>  |  8   ----------------------------<  103<H>  4.2   |  27  |  0.79    Ca    8.3<L>      22 Feb 2023 08:08  Phos  4.7     02-22  Mg     2.3     02-22            RADIOLOGY & ADDITIONAL TESTS:    Assessment and Recommendation:   · Assessment  	  - cont O2  - on zosyn for possible aspiration pneumonia  - speech and swallow eval noted-now on puree diet and thickened liquids  - dvt proph  - Maintain Duoneb aerosols  - Aspiration Precautions  - Monitor O2 sats  - Pulmonary Toilet/Chest PT- Plan as per Hospitalist Team/Soc. Svc.    Problem/Recommendation - 1:  ·  Acute on chronic respiratory failure with hypoxia and hypercapnia.   Problem/Recommendation - 2:  ·  Pneumonia, aspiration.   Problem/Recommendation - 3:  ·  Obesity hypoventilation syndrome.   Problem/Recommendation - 4:  ·  S/P  shunt.   Problem/Recommendation - 5:  ·  Atelectasis of both lungs.

## 2023-02-23 NOTE — PROGRESS NOTE ADULT - ASSESSMENT
52 yo man mild MR, BPH, glaucoma,  hx of hydrocephalus s/p  shunt, also has VNS, had VPS revisions presented to  with change in MS, hypothermic, hypotension, ? bilat PNA, was treated with antibx. CTH no significant findings, CT angio no acute LVO. EEG at bedside, mildly slow, but no seizure activities seen.     # Metabolic encephalopathy, likely aspiration PNA related - improving    # Possibility of ? sz/ post ictal - on lamotrigine / clobazam and Primodone     - 24 hr EEG montoring is abnormal; indicative of potentially epileptogenic region of cortical irritability and focal dysfunction in the right temporal region.    - I spoke with pts neurologist Dr. Jewell, he agreed with increasing Clobazam to 10 mh HS      Above was discussed with pts aunt HCP and with TORI Fish.  Will sign off, pt will f/u with Dr. Jewell in 2-3 weeks

## 2023-02-23 NOTE — PROGRESS NOTE ADULT - NUTRITIONAL ASSESSMENT
This patient has been assessed with a concern for Malnutrition and has been determined to have a diagnosis/diagnoses of Severe protein-calorie malnutrition.    This patient is being managed with:   Diet Pureed-  Moderately Thick Liquids (MODTHICKLIQS)  Entered: Feb 20 2023 10:29AM    
This patient has been assessed with a concern for Malnutrition and has been determined to have a diagnosis/diagnoses of Severe protein-calorie malnutrition.    This patient is being managed with:   Diet NPO-  Except Medications  Entered: Feb 17 2023  3:31PM    
This patient has been assessed with a concern for Malnutrition and has been determined to have a diagnosis/diagnoses of Severe protein-calorie malnutrition.    This patient is being managed with:   Diet Pureed-  Moderately Thick Liquids (MODTHICKLIQS)  Entered: Feb 20 2023 10:29AM    
This patient has been assessed with a concern for Malnutrition and has been determined to have a diagnosis/diagnoses of Severe protein-calorie malnutrition.    This patient is being managed with:   Diet NPO-  Except Medications  Entered: Feb 17 2023  3:31PM    
This patient has been assessed with a concern for Malnutrition and has been determined to have a diagnosis/diagnoses of Severe protein-calorie malnutrition.    This patient is being managed with:   Diet Pureed-  Moderately Thick Liquids (MODTHICKLIQS)  Entered: Feb 20 2023 10:29AM    
This patient has been assessed with a concern for Malnutrition and has been determined to have a diagnosis/diagnoses of Severe protein-calorie malnutrition.    This patient is being managed with:   Diet Pureed-  Moderately Thick Liquids (MODTHICKLIQS)  Entered: Feb 20 2023 10:29AM

## 2023-02-23 NOTE — PROGRESS NOTE ADULT - SUBJECTIVE AND OBJECTIVE BOX
Pt  conversant, no seizure activity, on lamotrigine / clobazam and Primodone same dose.   SOB improving, weaned to 2L NC, remains on IV zosyn.   Has abd distention/ constipation     24 hr EEG montoring is - Abnormal EEG indicative of potentially epileptogenic region of cortical irritability and focal dysfunction in the right temporal region.      I spoke with pts neurologist Dr. Jewell, he agreed with increasing Clobazam to 10 mh HS      ROS: As above, other ROS Negative      MEDICATIONS  (STANDING):  albuterol/ipratropium for Nebulization 3 milliLiter(s) Nebulizer every 6 hours  cloBAZam 10 milliGRAM(s) Oral at bedtime  cloBAZam 5 milliGRAM(s) Oral every 24 hours  dorzolamide 2%/timolol 0.5% Ophthalmic Solution 1 Drop(s) Both EYES two times a day  enoxaparin Injectable 40 milliGRAM(s) SubCutaneous every 24 hours  escitalopram 10 milliGRAM(s) Oral daily  guaiFENesin Oral Liquid (Sugar-Free) 200 milliGRAM(s) Oral every 6 hours  lactulose Syrup 10 Gram(s) Oral two times a day  lamoTRIgine 300 milliGRAM(s) Oral two times a day  latanoprost 0.005% Ophthalmic Solution 1 Drop(s) Both EYES at bedtime  metoprolol tartrate 50 milliGRAM(s) Oral two times a day  OLANZapine 5 milliGRAM(s) Oral two times a day  pantoprazole    Tablet 40 milliGRAM(s) Oral before breakfast  piperacillin/tazobactam IVPB.. 3.375 Gram(s) IV Intermittent every 8 hours  primidone 250 milliGRAM(s) Oral two times a day  senna 2 Tablet(s) Oral at bedtime  sodium chloride 0.65% Nasal 3 Spray(s) Both Nostrils five times a day      Vital Signs Last 24 Hrs  T(C): 36.2 (23 Feb 2023 16:02), Max: 36.7 (23 Feb 2023 08:20)  T(F): 97.1 (23 Feb 2023 16:02), Max: 98.1 (23 Feb 2023 08:20)  HR: 91 (23 Feb 2023 16:02) (80 - 92)  BP: 104/73 (23 Feb 2023 16:02) (104/73 - 110/50)  BP(mean): --  RR: 18 (23 Feb 2023 16:02) (18 - 19)  SpO2: 93% (23 Feb 2023 16:02) (93% - 96%)    Parameters below as of 23 Feb 2023 16:02  Patient On (Oxygen Delivery Method): room air    Neurological Exam:  Right frontal Crani defect.  HF: Patient awake, alert, answers in yes/no, follows simple commnads.  CN:  Pupils are equal and reactive. Extra ocular muscles are intact. Lips crusted, There is no gross facial asymmetry. Other CN II-XII are intact.   Motor: moving all extremities.   Sensory: intact to pinprick.  DTR: 0-1/4 all 4 extremities. Babinski is negative bilateral.  Co-ord:  Cannot test.                           12.7   6.89  )-----------( 221      ( 23 Feb 2023 09:07 )             38.9     02-23    140  |  107  |  10  ----------------------------<  139<H>  3.4<L>   |  29  |  0.93    Ca    8.5      23 Feb 2023 09:07  Phos  2.2     02-23  Mg     2.0     02-23    Radiology report:  -< from: CT Head No Cont (02.18.23 @ 11:53) >  IMPRESSION: No hemorrhage. No hydrocephalus. Multiple ventricular   catheters. No change since 2/17/2023.    EEG Summary / Classification:    Abnormal EEG in the awake / drowsy / asleep states.  Augie, focal, right anterior temporal region  Intermittent polymorphic delta slowing, focal, right temporal  Background slowing, generalized, mild  Excessive drowsiness    EEG Impression / Clinical Correlate:    Abnormal EEG indicative of potentially epileptogenic region of cortical irritability and focal dysfunction in the right temporal region.  There is also evidence of mild diffuse cerebral dysfunction that is not specific for etiology.

## 2023-02-23 NOTE — PROGRESS NOTE ADULT - ASSESSMENT
HPI: Pt is a 52y old Male with hx of hydrocephalus (dgn at age 2 months) s/p multiple brain surgeries/ peritoneal shunt, legally blind, glaucoma, mild MR, PIERCE, epilepsy (last seizure years ago), impulse control disorder, obesity, GERD and h/o aspiration PNA was admitted on 2/17 for progressive lethargy since 2/14. Patient presently in group home in [facility]. Patient's baseline status is he is able to communicate with his guardian on the phone. He is also able to ambulate around the group home, but is in a wheelchair most of the time. As per guardian, she noticed increased lethargy when she was trying to talk to him on the phone over the last 3 days. On the day of admission, the patient found more altered, slurring words and diaphoretic and sent to the ER for evaluation. Upon arrival, patient was a code stroke.  CTA brain negative for acute infarct/ occlusion. After further work up, patient was found to have bilateral PNA worse on right than left and hypothermia with temp of 95. Patient also hypoxic and was on nonrebreather mask on arrival. In ER, the patient received zosyn and doxycycline.   Palliative medicine Consult to further establish GOC  2/21/23 Seen and examined at bedside. Awake stating he is in [place]. Denies C/O pain at present although unable to express himself clearly.     Assessment and Plan:    1) Acute resp Failure  -Suspected Aspiration PNA  -Improving  -Supplemental O2 PRN  -CT chest noted  -Pulm eval noted  -Abx as per med  -CXR 2/22 worsening pneumonia    2) Metabolic Encephalopathy:    -Suspect related to hypoxia/ sepsis.    -CT head negative.    -24 hr in progress -EEG-- r/o possible seizure as cause for acute worsening MS this am.      3) Hydrocephalus/ mild MR:    -Baseline patient is able to talk on phone with aunt/ have conversation/ walk at group home.    -Cont home meds.    -Currently alert to name only    4) Advanced Directives  -Pt without capacity  -Guardianship paperwork on chart naming Christi Guy  -Will follow with OPWDD and further GOC discussion   -GOC discussion with Christi lincoln  -Discussed with Dr Doss   -Application faxed for DNR/DNI/NFT with the office of mental hygiene service.   -Awaiting reply from Office of Mental Hygiene services       HPI: Pt is a 52y old Male with hx of hydrocephalus (dgn at age 2 months) s/p multiple brain surgeries/ peritoneal shunt, legally blind, glaucoma, mild MR, PIERCE, epilepsy (last seizure years ago), impulse control disorder, obesity, GERD and h/o aspiration PNA was admitted on 2/17 for progressive lethargy since 2/14. Patient presently in group home in [facility]. Patient's baseline status is he is able to communicate with his guardian on the phone. He is also able to ambulate around the group home, but is in a wheelchair most of the time. As per guardian, she noticed increased lethargy when she was trying to talk to him on the phone over the last 3 days. On the day of admission, the patient found more altered, slurring words and diaphoretic and sent to the ER for evaluation. Upon arrival, patient was a code stroke.  CTA brain negative for acute infarct/ occlusion. After further work up, patient was found to have bilateral PNA worse on right than left and hypothermia with temp of 95. Patient also hypoxic and was on nonrebreather mask on arrival. In ER, the patient received zosyn and doxycycline.   Palliative medicine Consult to further establish GOC  2/21/23 Seen and examined at bedside. Awake stating he is in [place]. Denies C/O pain at present although unable to express himself clearly.     Assessment and Plan:    1) Acute resp Failure  -Suspected Aspiration PNA  -Improving  -Supplemental O2 PRN  -CT chest noted  -Pulm eval noted  -Abx as per med  -CXR 2/22 worsening pneumonia    2) Metabolic Encephalopathy:    -Suspect related to hypoxia/ sepsis.    -CT head negative.    -24 hr in progress -EEG-- r/o possible seizure as cause for acute worsening MS this am.      3) Hydrocephalus/ mild MR:    -Baseline patient is able to talk on phone with aunt/ have conversation/ walk at group home.    -Cont home meds.    -Currently alert to name only    4) Advanced Directives  -Pt without capacity  -Guardianship paperwork on chart naming Christi Guy  -Will follow with OPWDD and further GOC discussion   -GOC discussion with Christi lincoln  -Discussed with Dr Doss   -Application faxed for DNR/DNI/NFT with the office of mental hygiene service.   -Reply from [place] Office of Mental Hygiene Services sent approval for DNR/DNI only  -MOLST completed with guardian Christi DNR/DNI on chart

## 2023-02-23 NOTE — PROGRESS NOTE ADULT - SUBJECTIVE AND OBJECTIVE BOX
Date of service: 23 @ 10:44    Lying in bed in NAD  Mild hypoxia  Has dry cough  Does not seem in pain    ROS: no fever or chills; denies dizziness, no HA, no abdominal pain, no diarrhea or constipation; no dysuria, no legs pain, no rashes    MEDICATIONS  (STANDING):  albuterol/ipratropium for Nebulization 3 milliLiter(s) Nebulizer every 6 hours  bisacodyl Suppository 10 milliGRAM(s) Rectal once  cloBAZam 10 milliGRAM(s) Oral at bedtime  cloBAZam 5 milliGRAM(s) Oral every 24 hours  dorzolamide 2%/timolol 0.5% Ophthalmic Solution 1 Drop(s) Both EYES two times a day  enoxaparin Injectable 40 milliGRAM(s) SubCutaneous every 24 hours  escitalopram 10 milliGRAM(s) Oral daily  guaiFENesin Oral Liquid (Sugar-Free) 200 milliGRAM(s) Oral every 6 hours  lactulose Syrup 10 Gram(s) Oral two times a day  lamoTRIgine 300 milliGRAM(s) Oral two times a day  latanoprost 0.005% Ophthalmic Solution 1 Drop(s) Both EYES at bedtime  metoprolol tartrate 50 milliGRAM(s) Oral two times a day  OLANZapine 5 milliGRAM(s) Oral two times a day  pantoprazole    Tablet 40 milliGRAM(s) Oral before breakfast  piperacillin/tazobactam IVPB.. 3.375 Gram(s) IV Intermittent every 8 hours  potassium chloride    Tablet ER 40 milliEquivalent(s) Oral once  potassium phosphate IVPB 15 milliMole(s) IV Intermittent once  primidone 250 milliGRAM(s) Oral two times a day  senna 2 Tablet(s) Oral at bedtime  sodium chloride 0.65% Nasal 3 Spray(s) Both Nostrils five times a day    Vital Signs Last 24 Hrs  T(C): 36.7 (2023 08:20), Max: 36.7 (2023 17:07)  T(F): 98.1 (2023 08:20), Max: 98.1 (2023 08:20)  HR: 80 (2023 08:45) (77 - 92)  BP: 110/50 (2023 08:20) (102/55 - 110/50)  BP(mean): --  RR: 19 (2023 08:20) (18 - 19)  SpO2: 96% (2023 08:20) (93% - 96%)    Parameters below as of 2023 08:20  Patient On (Oxygen Delivery Method): nasal cannula  O2 Flow (L/min): 3     Physical exam:    Constitutional:  No acute distress  HEENT: NC/AT, EOMI, PERRLA, conjunctivae clear; ears and nose atraumatic  Neck: supple; thyroid not palpable  Back: no tenderness  Respiratory: respiratory effort normal; crackles at bases  Cardiovascular: S1S2 regular, no murmurs  Abdomen: soft, not tender, not distended, positive BS; no liver or spleen organomegaly  Genitourinary: no suprapubic tenderness  Lymphatic: no LN palpable  Musculoskeletal: no muscle tenderness, no joint swelling or tenderness  Extremities: no pedal edema  Neurological/ Psychiatric: Alert, moving all extremities  Skin: no rashes; no palpable lesions    Labs: reviewed                        12.7   6.11  )-----------( 219      ( 2023 08:08 )             39.7     02-22    142  |  110<H>  |  8   ----------------------------<  103<H>  4.2   |  27  |  0.79    Ca    8.3<L>      2023 08:08    TPro  7.1  /  Alb  3.1<L>  /  TBili  0.5  /  DBili  x   /  AST  18  /  ALT  23  /  AlkPhos  154<H>  02-    D-Dimer Assay, Quantitative: 222 ng/mL DDU (23 @ 07:21)                        12.6   6.09  )-----------( 195      ( 2023 08:43 )             39.6     02-20    137  |  108  |  9   ----------------------------<  87  3.7   |  23  |  0.78    Ca    8.1<L>      2023 08:43  Phos  2.1     02-20  Mg     2.2     02-20    TPro  7.0  /  Alb  3.1<L>  /  TBili  0.4  /  DBili  <0.1  /  AST  17  /  ALT  23  /  AlkPhos  164<H>      D-Dimer Assay, Quantitative: 222 ng/mL DDU (23 @ 07:21)                        12.7   5.49  )-----------( 203      ( 2023 07:21 )             39.9         140  |  108  |  6<L>  ----------------------------<  94  3.6   |  29  |  0.87    Ca    7.8<L>      2023 07:21    TPro  7.0  /  Alb  3.2<L>  /  TBili  0.3  /  DBili  x   /  AST  17  /  ALT  25  /  AlkPhos  169<H>       LIVER FUNCTIONS - ( 2023 07:21 )  Alb: 3.2 g/dL / Pro: 7.0 gm/dL / ALK PHOS: 169 U/L / ALT: 25 U/L / AST: 17 U/L / GGT: x           Urinalysis Basic - ( 2023 12:57 )    Color: Yellow / Appearance: Clear / S.005 / pH: x  Gluc: x / Ketone: Negative  / Bili: Negative / Urobili: Negative   Blood: x / Protein: Negative / Nitrite: Negative   Leuk Esterase: Negative / RBC: x / WBC x   Sq Epi: x / Non Sq Epi: x / Bacteria: x    ( @ 08:53)  NotDete    Culture - Urine (collected 2023 12:57)  Source: Clean Catch Clean Catch (Midstream)  Final Report (2023 16:35):    <10,000 CFU/mL Normal Urogenital Debbie    Culture - Blood (collected 2023 11:28)  Source: .Blood None  Preliminary Report (2023 15:09):    No growth to date.    Culture - Blood (collected 2023 11:03)  Source: .Blood Blood  Preliminary Report (2023 15:09):    No growth to date.    Radiology: all available radiological tests reviewed    < from: CT Abdomen and Pelvis No Cont (23 @ 11:55) >  Mild bilateral interlobular septal thickening may indicate mild pulmonary   interstitial edema.    No pneumothorax or hemothorax.    No pneumoperitoneum or ascites.    Pneumatosis of the transverse colon. Although there are no signs of   colonic inflammation recommend correlating with lactate level. Other   etiologies including benign etiologies should be considered.  < end of copied text >    < from: CT Chest No Cont (23 @ 11:54) >  LUNGS AND LARGE AIRWAYS: Patent central airways. Stable right lung volume   loss. Stable right lower lobe scarring. Mild bilateral interlobular   septal thickening. Compressive left lower lobe atelectasis adjacent to the elevated diaphragm.  < end of copied text >      Advanced directives addressed: full resuscitation

## 2023-02-23 NOTE — PROGRESS NOTE ADULT - ASSESSMENT
51 y/o male with PMHx of hydrocephalus (age 2 months) s/p multiple brain surgeries/ peritoneal shunt, legally blind, glaucoma, mild MR, PIERCE, Epilepsy (last seizure years ago), impulse control disorder, obesity, GERD and hx of aspiration PNA admitted for:       #Acute Hypoxic Respiratory Failure suspected due to Sepsis secondary to Aspiration PNA:    Patient with hypothermia/ hypotension/ hypoxia  on admission   CXR reviewed-- extensive right infiltrates and left sided as well. But CT chest  with no infiltrates + volume loss, atelectases, interstitial thickening     S/p Venti mask, ABG noted   s/p 2l IVF bolus in ED, s/p Maintenance fluids     C/w supplemental O2, wean as tolerated  BCX: NGTD, UCX<10K   C/w IV Abx, On  IV Zosyn as per ID recs , will cover suspected GNR bacteria infection   ID consult appreciated     #Abdominal distention + pain   #constipation  Abd xray with mod stool burden, +abd distention/pain   c/w bowel regimen  given dulcolax suppository with improvement, continue to monitor    #Metabolic Encephalopathy. - improving   #Seizure disorder with abnormal EEG, possible post ictal state  likely multifactorial from hypoxia/ sepsis, post ictal state  Can not r/o stroke on MRI as Pt has non working stimulator with no certain info     CT head negative for acute findings, extensive post sx changes   EEG neg for seizure, plan for 24h EEG, d/w Dr Gaming  Off  IV Keppra as pt now tolerating diet  Resumed on lamotrigine  and Clobazam  B12, folate, TSH wnl  S&S resc  cleared for puree diet with thickened liquids   D/w Dr Arevalo plan for 24h EEG -->  noting potentially epileptogenic region of cortical irritability and focal dysfunction in the right temporal region. D/w neuro/outpatient neuro - will increase clobazam to 5mg qAM, 10mg qHS    #Hydrocephalus/MR:    Baseline patient is able to talk on phone with aunt/ have simple  conversation/ walk at group home.    XR shunt series : unremarkable   On Olanzapine continued     # LE edema  D-Dimers wnl  LE doppler: neg for DVT b/l  TSH wnl  Albumin wnl  No proteinuria as per UA   ECHO: EF preserved, no significant valvular Dz     #GERD:  cont PPI.      #DVT Proph:  Lovenox.      #Advanced Directives/ Goals of Care:    guardian Christi Guy   CODE STATUS:  FULL CODE  Palliative team eval in progress     #Dispo: likely d/c back to group home tomorrow pending off O2

## 2023-02-23 NOTE — PROGRESS NOTE ADULT - NS ATTEND AMEND GEN_ALL_CORE FT
seen at bedside, tp is in bed, eyes closed.  he speaks, but it is very difficult for me to understand.  his aunt, Christi Guy, who is also his guardian, is also at bedside.  she is better able to understand her nephew.  Pt is not in pain or other distress.  Aunt reports that he is requesting to go home.   Pt has an extensive medical and surgical history as noted above.  At this point, pt's aunt feels that it is more appropriate to focus on pt's comfort and dignity rather then pursue more aggressive measures to sustain life.  before the Pandemic, pt was able to travel with her and she felt he had a good quality of life.  Since the Pandemic, she feels that her Nephew's quality of life has suffered greatly and further interventions to preserve life would not be something he would want.   Placing a feeding tube (PEG) has risks associated with it, and would not change aspiration risk.  Placing a feeding tube or performing CPR would not improve pt's quality of life. She is comfortable with her requests for DNR/DNI, no feeding tubes.  she wants comfort measures only and to pursue hospice care for her nephew.  she did not have questions about these measures when I spoke with her this afternoon.   I will cosign the forms being sent to [facility] to request the above measures.  I was with pt's aunt for 18 minutes discussing the GOC as documented above.

## 2023-02-24 NOTE — DISCHARGE NOTE PROVIDER - HOSPITAL COURSE
53 y/o male with PMHx of hydrocephalus (age 2 months) s/p multiple brain surgeries/ peritoneal shunt, legally blind, glaucoma, mild MR, PIERCE, Epilepsy (last seizure years ago), impulse control disorder, obesity, GERD and hx of aspiration PNA admitted for:       #Acute Hypoxic Respiratory Failure suspected due to Sepsis secondary to Aspiration PNA:    Patient with hypothermia/ hypotension/ hypoxia  on admission   CXR reviewed-- extensive right infiltrates and left sided as well. But CT chest  with no infiltrates + volume loss, atelectases, interstitial thickening     S/p Venti mask, ABG noted   s/p 2l IVF bolus in ED, s/p Maintenance fluids     C/w supplemental O2, wean as tolerated  BCX: NGTD, UCX<10K   C/w IV Abx, On  IV Zosyn as per ID recs , will cover suspected GNR bacteria infection   ID consult appreciated  DC home on Augmentin BID x 5 days     #Abdominal distention + pain   #constipation  Abd xray with mod stool burden, +abd distention/pain   c/w bowel regimen  given dulcolax suppository with improvement, continue to monitor    #Metabolic Encephalopathy. - improving   #Seizure disorder with abnormal EEG, possible post ictal state  likely multifactorial from hypoxia/ sepsis, post ictal state  Can not r/o stroke on MRI as Pt has non working stimulator with no certain info     CT head negative for acute findings, extensive post sx changes   EEG neg for seizure, plan for 24h EEG, d/w Dr Gaming  Off  IV Keppra as pt now tolerating diet  Resumed on lamotrigine  and Clobazam  B12, folate, TSH wnl  S&S resc  cleared for puree diet with thickened liquids   D/w Dr Arevalo plan for 24h EEG -->  noting potentially epileptogenic region of cortical irritability and focal dysfunction in the right temporal region. D/w neuro/outpatient neuro - will increase clobazam to 5mg qAM, 10mg qHS    #Hydrocephalus/MR:    Baseline patient is able to talk on phone with aunt/ have simple  conversation/ walk at group home.    XR shunt series : unremarkable   On Olanzapine continued     # LE edema  D-Dimers wnl  LE doppler: neg for DVT b/l  TSH wnl  Albumin wnl  No proteinuria as per UA   ECHO: EF preserved, no significant valvular Dz     #GERD:  cont PPI.

## 2023-02-24 NOTE — DISCHARGE NOTE PROVIDER - NSDCFUSCHEDAPPT_GEN_ALL_CORE_FT
Ace Vann  [facility]  [facility] [address]  Scheduled Appointment: 03/16/2023    Agusto Jewell  [facility]  NEUROLOGY [address] [facility]  Scheduled Appointment: 03/21/2023

## 2023-02-24 NOTE — PROGRESS NOTE ADULT - REASON FOR ADMISSION
lethargy

## 2023-02-24 NOTE — PROGRESS NOTE ADULT - ASSESSMENT
51 y/o male with h/o hydrocephalus (dgn at age 2 months) s/p multiple brain surgeries/ peritoneal shunt, legally blind, glaucoma, mild MR, PIERCE, epilepsy (last seizure years ago), impulse control disorder, obesity, GERD and h/o aspiration PNA was admitted on 2/17 for progressive lethargy since 2/14. Patient presently in group home in [facility]. Patient's baseline status is he is able to communicate with his guardian on the phone. He is also able to ambulate around the group home, but is in a wheelchair most of the time. As per guardian, she noticed increased lethargy when she was trying to talk to him on the phone over the last 3 days. On the day of admission, the patient found more altered, slurring words and diaphoretic and sent to the ER for evaluation. Upon arrival, patient was a code stroke.  CTA brain negative for acute infarct/ occlusion. After further work up, patient was found to have bilateral PNA worse on right than left and hypothermia with temp of 95.  BP also soft in ER  systolic.  Patient also hypoxic and was on nonrebreather mask on arrival. In ER, the patient received zosyn and doxycycline.     1. Acute respiratory hypoxic respiratory failure resolving. Possible aspiration pneumonia. Hydrocephalus s/p prior  shunt. Metabolic encephalopathy.   -respiratory improving, but still frail  -BC x 2, urine c/s noted  -on zosyn 3.375 gm IV q8h # 6  -tolerating abx well so far; no side effects noted  -aspiration precautions  -change abx to augmentin 875 mg PO q12h for 5 more days  -monitor temps  -f/u CBC  -supportive care  2. Other issues:   -care per medicine

## 2023-02-24 NOTE — PROGRESS NOTE ADULT - SUBJECTIVE AND OBJECTIVE BOX
Subjective:    Awake, comfortable at rest.  2/24  awake, no distress, getting neb treatment.     MEDICATIONS  (STANDING):  albuterol/ipratropium for Nebulization 3 milliLiter(s) Nebulizer every 6 hours  bisacodyl Suppository 10 milliGRAM(s) Rectal once  cloBAZam 10 milliGRAM(s) Oral at bedtime  cloBAZam 5 milliGRAM(s) Oral every 24 hours  dorzolamide 2%/timolol 0.5% Ophthalmic Solution 1 Drop(s) Both EYES two times a day  enoxaparin Injectable 40 milliGRAM(s) SubCutaneous every 24 hours  escitalopram 10 milliGRAM(s) Oral daily  guaiFENesin Oral Liquid (Sugar-Free) 200 milliGRAM(s) Oral every 6 hours  lactulose Syrup 10 Gram(s) Oral two times a day  lamoTRIgine 300 milliGRAM(s) Oral two times a day  latanoprost 0.005% Ophthalmic Solution 1 Drop(s) Both EYES at bedtime  metoprolol tartrate 50 milliGRAM(s) Oral two times a day  OLANZapine 5 milliGRAM(s) Oral two times a day  pantoprazole    Tablet 40 milliGRAM(s) Oral before breakfast  piperacillin/tazobactam IVPB.. 3.375 Gram(s) IV Intermittent every 8 hours  primidone 250 milliGRAM(s) Oral two times a day  senna 2 Tablet(s) Oral at bedtime  sodium chloride 0.65% Nasal 3 Spray(s) Both Nostrils five times a day    MEDICATIONS  (PRN):  acetaminophen     Tablet .. 650 milliGRAM(s) Oral every 6 hours PRN Temp greater or equal to 38C (100.4F), Mild Pain (1 - 3)  aluminum hydroxide/magnesium hydroxide/simethicone Suspension 30 milliLiter(s) Oral every 4 hours PRN Dyspepsia  melatonin 3 milliGRAM(s) Oral at bedtime PRN Insomnia  ondansetron Injectable 4 milliGRAM(s) IV Push every 8 hours PRN Nausea and/or Vomiting      Allergies    codeine (Hives)  codeine (Stomach Upset)  Depakote (Other)  seasonal allergies: nasal congestion (Other)    Intolerances        Vital Signs Last 24 Hrs  T(C): 36.7 (23 Feb 2023 08:20), Max: 36.7 (22 Feb 2023 17:07)  T(F): 98.1 (23 Feb 2023 08:20), Max: 98.1 (23 Feb 2023 08:20)  HR: 92 (23 Feb 2023 08:20) (77 - 92)  BP: 110/50 (23 Feb 2023 08:20) (102/55 - 110/50)  BP(mean): --  RR: 19 (23 Feb 2023 08:20) (18 - 19)  SpO2: 96% (23 Feb 2023 08:20) (93% - 96%)    Parameters below as of 23 Feb 2023 08:20  Patient On (Oxygen Delivery Method): nasal cannula  O2 Flow (L/min): 3      PHYSICAL EXAMINATION:    NECK:  Supple. No lymphadenopathy. Jugular venous pressure not elevated.   HEART:   The cardiac impulse has a normal quality. Reg., Nl S1 and S2.    CHEST:  Chest with few coarse BS bilaterally. Normal respiratory effort.  ABDOMEN:  Soft and nontender.   EXTREMITIES:  There is tr. edema.       LABS:                        12.7   6.11  )-----------( 219      ( 22 Feb 2023 08:08 )             39.7     02-22    142  |  110<H>  |  8   ----------------------------<  103<H>  4.2   |  27  |  0.79    Ca    8.3<L>      22 Feb 2023 08:08  Phos  4.7     02-22  Mg     2.3     02-22            RADIOLOGY & ADDITIONAL TESTS:    Assessment and Recommendation:   · Assessment  	  - cont O2  - on zosyn for possible aspiration pneumonia  - speech and swallow eval noted-now on puree diet and thickened liquids  - dvt proph  - Maintain Duoneb aerosols  - Aspiration Precautions  - Monitor O2 sats  - Pulmonary Toilet/Chest PT- Plan as per Hospitalist Team/Soc. Svc.    Problem/Recommendation - 1:  ·  Acute on chronic respiratory failure with hypoxia and hypercapnia.   Problem/Recommendation - 2:  ·  Pneumonia, aspiration.   Problem/Recommendation - 3:  ·  Obesity hypoventilation syndrome.   Problem/Recommendation - 4:  ·  S/P  shunt.   Problem/Recommendation - 5:  ·  Atelectasis of both lungs.

## 2023-02-24 NOTE — DISCHARGE NOTE NURSING/CASE MANAGEMENT/SOCIAL WORK - PATIENT PORTAL LINK FT
You can access the FollowMyHealth Patient Portal offered by [facility] by registering at the following website: [email or web address]. By joining [employer]’s FollowMyHealth portal, you will also be able to view your health information using other applications (apps) compatible with our system.

## 2023-02-24 NOTE — DISCHARGE NOTE PROVIDER - NSDCCAREPROVSEEN_GEN_ALL_CORE_FT
Yvon, Master Lucas, Norma Varghese, Ashley Leiva, Nidhi Arellano, Em Hernadez, Bharati Gallardo, Flex Martin, Imani Brown, Reno Cardona, David Gaming, Roland Burt, Robert Carlos, Moris Osman, Jhony Rodarte, Michael Easley, Hazel Basilio, Antoni

## 2023-02-24 NOTE — PROGRESS NOTE ADULT - SUBJECTIVE AND OBJECTIVE BOX
Date of service: 23 @ 09:26    Lying in bed in NAD  Alert and confused    ROS: no fever or chills; denies pain, limited    MEDICATIONS  (STANDING):  albuterol/ipratropium for Nebulization 3 milliLiter(s) Nebulizer every 6 hours  cloBAZam 10 milliGRAM(s) Oral at bedtime  cloBAZam 5 milliGRAM(s) Oral every 24 hours  dorzolamide 2%/timolol 0.5% Ophthalmic Solution 1 Drop(s) Both EYES two times a day  enoxaparin Injectable 40 milliGRAM(s) SubCutaneous every 24 hours  escitalopram 10 milliGRAM(s) Oral daily  guaiFENesin Oral Liquid (Sugar-Free) 200 milliGRAM(s) Oral every 6 hours  lactulose Syrup 10 Gram(s) Oral two times a day  lamoTRIgine 300 milliGRAM(s) Oral two times a day  latanoprost 0.005% Ophthalmic Solution 1 Drop(s) Both EYES at bedtime  metoprolol tartrate 50 milliGRAM(s) Oral two times a day  OLANZapine 5 milliGRAM(s) Oral two times a day  pantoprazole    Tablet 40 milliGRAM(s) Oral before breakfast  piperacillin/tazobactam IVPB.. 3.375 Gram(s) IV Intermittent every 8 hours  primidone 250 milliGRAM(s) Oral two times a day  senna 2 Tablet(s) Oral at bedtime  sodium chloride 0.65% Nasal 3 Spray(s) Both Nostrils five times a day    Vital Signs Last 24 Hrs  T(C): 36.5 (2023 09:05), Max: 36.7 (2023 21:32)  T(F): 97.7 (2023 09:05), Max: 98.1 (2023 21:32)  HR: 90 (2023 09:05) (82 - 91)  BP: 112/81 (2023 09:05) (104/73 - 130/87)  BP(mean): --  RR: 18 (2023 09:05) (18 - 18)  SpO2: 92% (2023 09:05) (92% - 93%)    Parameters below as of 2023 09:05  Patient On (Oxygen Delivery Method): room air  O2 Flow (L/min): 2     Physical exam:    Constitutional:  No acute distress  HEENT: NC/AT, EOMI, PERRLA, conjunctivae clear; ears and nose atraumatic  Neck: supple; thyroid not palpable  Back: no tenderness  Respiratory: respiratory effort normal; crackles at bases  Cardiovascular: S1S2 regular, no murmurs  Abdomen: soft, not tender, not distended, positive BS; no liver or spleen organomegaly  Genitourinary: no suprapubic tenderness  Lymphatic: no LN palpable  Musculoskeletal: no muscle tenderness, no joint swelling or tenderness  Extremities: no pedal edema  Neurological/ Psychiatric: Alert, moving all extremities  Skin: no rashes; no palpable lesions    Labs: reviewed                        13.1   6.27  )-----------( 242      ( 2023 07:49 )             41.5         142  |  108  |  6<L>  ----------------------------<  91  4.3   |  29  |  0.88    Ca    8.9      2023 07:49  Phos  2.6       Mg     2.0         D-Dimer Assay, Quantitative: 222 ng/mL DDU (23 @ 07:21)                        12.7   6.11  )-----------( 219      ( 2023 08:08 )             39.7     02-    142  |  110<H>  |  8   ----------------------------<  103<H>  4.2   |  27  |  0.79    Ca    8.3<L>      2023 08:08    TPro  7.1  /  Alb  3.1<L>  /  TBili  0.5  /  DBili  x   /  AST  18  /  ALT  23  /  AlkPhos  154<H>      D-Dimer Assay, Quantitative: 222 ng/mL DDU (23 @ 07:21)                        12.7   5.49  )-----------( 203      ( 2023 07:21 )             39.9     02-    140  |  108  |  6<L>  ----------------------------<  94  3.6   |  29  |  0.87    Ca    7.8<L>      2023 07:21    TPro  7.0  /  Alb  3.2<L>  /  TBili  0.3  /  DBili  x   /  AST  17  /  ALT  25  /  AlkPhos  169<H>  02-19     LIVER FUNCTIONS - ( 2023 07:21 )  Alb: 3.2 g/dL / Pro: 7.0 gm/dL / ALK PHOS: 169 U/L / ALT: 25 U/L / AST: 17 U/L / GGT: x           Urinalysis Basic - ( 2023 12:57 )    Color: Yellow / Appearance: Clear / S.005 / pH: x  Gluc: x / Ketone: Negative  / Bili: Negative / Urobili: Negative   Blood: x / Protein: Negative / Nitrite: Negative   Leuk Esterase: Negative / RBC: x / WBC x   Sq Epi: x / Non Sq Epi: x / Bacteria: x    ( @ 08:53)  NotDetec    Culture - Urine (collected 2023 12:57)  Source: Clean Catch Clean Catch (Midstream)  Final Report (2023 16:35):    <10,000 CFU/mL Normal Urogenital Debbie    Culture - Blood (collected 2023 11:28)  Source: .Blood None  Preliminary Report (2023 15:09):    No growth to date.    Culture - Blood (collected 2023 11:03)  Source: .Blood Blood  Preliminary Report (2023 15:09):    No growth to date.    Radiology: all available radiological tests reviewed    < from: CT Abdomen and Pelvis No Cont (23 @ 11:55) >  Mild bilateral interlobular septal thickening may indicate mild pulmonary   interstitial edema.    No pneumothorax or hemothorax.    No pneumoperitoneum or ascites.    Pneumatosis of the transverse colon. Although there are no signs of   colonic inflammation recommend correlating with lactate level. Other   etiologies including benign etiologies should be considered.  < end of copied text >    < from: CT Chest No Cont (23 @ 11:54) >  LUNGS AND LARGE AIRWAYS: Patent central airways. Stable right lung volume   loss. Stable right lower lobe scarring. Mild bilateral interlobular   septal thickening. Compressive left lower lobe atelectasis adjacent to the elevated diaphragm.  < end of copied text >      Advanced directives addressed: full resuscitation

## 2023-02-24 NOTE — DISCHARGE NOTE PROVIDER - CARE PROVIDER_API CALL
Agusto Jewell (MD)  Clinical Neurophysiology; EEGEpilepsy; Neurology  [address]  [place] [address]  Phone: [phone]  Fax: [phone]  Follow Up Time:

## 2023-02-24 NOTE — DISCHARGE NOTE PROVIDER - NSDCMRMEDTOKEN_GEN_ALL_CORE_FT
amoxicillin-clavulanate 875 mg-125 mg oral tablet: 1 tab(s) orally every 12 hours  ciclopirox 8% topical solution: Apply topically to affected area on right thumb nail once a day  Colace 100 mg oral capsule: 3 cap(s) orally once a day (at bedtime)  dorzolamide-timolol 2.23%-0.68% ophthalmic solution: 1 drop(s) to both eyes 2 times a day  escitalopram 10 mg oral tablet: 1 tab(s) orally once a day  Flonase 50 mcg/inh nasal spray: 1 spray(s) nasal 2 times a day  ketoconazole 2% topical cream: Apply topically to affected area on scalp, forehead, and eyebrows once a day, As Needed  ketoconazole 2% topical shampoo: Apply topically to affected area Monday, Wednesday, and Friday  lactulose 10 g/15 mL oral solution: 15 milliliter(s) orally 2 times a day  lamoTRIgine 150 mg oral tablet: 2 tab(s) orally 2 times a day  latanoprost 0.005% ophthalmic solution: 1 drop(s) in each eye once a day (in the evening)  Linzess 290 mcg oral capsule: 1 cap(s) orally once a day  Metoprolol Tartrate 50 mg oral tablet: 1 tab(s) orally 2 times a day , HOLD for BP&lt;100 or Pulse&lt;60   Milk of Magnesia 8% oral suspension: 15 milliliter(s) orally every other day, As Needed  MiraLax oral powder for reconstitution: 17 gram(s) orally once a day (at bedtime)  Motegrity 2 mg oral tablet: 1 tab(s) orally once a day  Multiple Vitamins with Iron oral tablet: 1 tab(s) orally once a day  OLANZapine 5 mg oral tablet: 1 tab(s) orally 2 times a day  Onfi 10 mg oral tablet: 1 tab(s) orally once a day (at bedtime) MDD:10 mg qhs  Onfi 10 mg oral tablet: 0.5 milligram(s) orally once a day (in the morning) MDD:5 mg qam  Oyster Shell Calcium 500 (1250 mg calcium carbonate) oral tablet: 1 tab(s) orally once a day  pantoprazole 40 mg oral delayed release tablet: 1 tab(s) orally once a day  primidone 250 mg oral tablet: 1 tab(s) orally 2 times a day  Prolia 60 mg/mL subcutaneous solution: 1  subcutaneous as directed  ***last dose given 6-13-22 in office***  prune juice: 236 milliliter(s) to each affected eye once a day (at bedtime)  senna oral tablet: 2 tab(s) orally once a day (at bedtime)  Vitamin C 500 mg oral tablet: 1 tab(s) orally once a day (at bedtime)  Vitamin D3 50 mcg (2000 intl units) oral tablet: 1 tab(s) orally once a day

## 2023-02-24 NOTE — PROGRESS NOTE ADULT - PROVIDER SPECIALTY LIST ADULT
Neurology
Neurology
Pulmonology
Pulmonology
Infectious Disease
Infectious Disease
Neurology
Pulmonology
Pulmonology
Hospitalist
Hospitalist
Infectious Disease
Neurology
Neurology
Pulmonology
Hospitalist
Hospitalist
Neurology
Palliative Care
Surgery
Hospitalist
Hospitalist
Palliative Care

## 2023-02-24 NOTE — DISCHARGE NOTE PROVIDER - NSDCCPCAREPLAN_GEN_ALL_CORE_FT
PRINCIPAL DISCHARGE DIAGNOSIS  Diagnosis: HCAP (healthcare-associated pneumonia)  Assessment and Plan of Treatment: You had pneumonia and startee on antibiotics. You improved. Please complete the antibiotics as prescribed. Please FU with your PCP in 1 week. You seizure medications were also adjusted, please fu with your neurologist.      SECONDARY DISCHARGE DIAGNOSES  Diagnosis: Stroke  Assessment and Plan of Treatment:      PRINCIPAL DISCHARGE DIAGNOSIS  Diagnosis: HCAP (healthcare-associated pneumonia)  Assessment and Plan of Treatment: You had pneumonia and startee on antibiotics. You improved. Please complete the antibiotics as prescribed. Please FU with your PCP in 1 week. You seizure medications were also adjusted, please fu with your neurologist. Patient may resume previous medications and is cleared to return to activites and program      SECONDARY DISCHARGE DIAGNOSES  Diagnosis: Stroke  Assessment and Plan of Treatment:

## 2023-02-24 NOTE — DISCHARGE NOTE NURSING/CASE MANAGEMENT/SOCIAL WORK - NSDPACMPNY_GEN_ALL_CORE
I would suggest that he take 5 units of Novolog every 4 hours and monitor his blood sugar closely  We can try to send a long acting insulin such as lantus to a pharmacy near him so he does not have to keeping taking Novolog every 4 hours to function as his basal insulin  Also, He can take an extra 1 unit of Novolog for every 4 grams of carbs with 1 extra unit for every 30 interval his sugar is above 130  Any questions or concerns he can call our on call provider if it is after hours  Traveling alone

## 2023-03-17 PROBLEM — J98.6 ELEVATED HEMIDIAPHRAGM: Status: ACTIVE | Noted: 2023-01-01

## 2023-03-17 NOTE — REVIEW OF SYSTEMS
[Feeling Tired] : feeling tired [Cough] : cough [Negative] : Heme/Lymph [Feeling Poorly] : not feeling poorly [Chest Pain] : no chest pain [Palpitations] : no palpitations [Shortness Of Breath] : no shortness of breath [SOB on Exertion] : no shortness of breath during exertion

## 2023-03-17 NOTE — DATA REVIEWED
[FreeTextEntry1] : CT Chest wo IV contrast on 2/13/23:\par \par IMPRESSION:\par Findings of a calcified right sided fibrothorax with decreased lung volume within the right chest and shift of the heart and mediastinum to the right.\par \par Wedge shaped area of increased density containing mildly bronchiectatic bronchi within the posterior right lower lobe probably reflecting traction bronchiectasis/atelectasis.\par \par Moderate elevation elevation of the left hemidiaphragm. Focus of atelectasis within the left lower lobe superior to the elevated left hemidiaphragm.\par \par \par \par \par \par \par \par \par CT Chest without IV Contrast at [facility] on 8/18/22:\par IMPRESSION:\par -persistent volume loss involving the right hemithorax with righward mediastinal shift\par - new 7mm irregular density and 4mm nodule in the right upper lung\par - short term follow up CT in 3 months is recommended to document interval stability/resolution

## 2023-03-17 NOTE — HISTORY OF PRESENT ILLNESS
[FreeTextEntry1] : Mr. AREVALO is a 52 year old male who presents for a follow up appointment. \par \par A modified barium swallow study was performed on 07/22/22. This revealed moderate pharyngeal dysphagia when given thin liquids. He is now tolerating thickened liquids. 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 \par \par \par Referring: Dr. Márquez

## 2023-03-17 NOTE — ASSESSMENT
[FreeTextEntry1] : Mr Ferrara presents to the office via wheelchair accompanied by his formal care giver. He verbalizes feeling overall well aside from a lingering cough that he has had for a "long time". He is nodding off to sleep mid conversation and it was discussed that this is baseline. On CT imaging there is a small elevated hemidiaphragm for which he would not be a good surgical candidate for repair. I have recommended that he continue aspiration precautions cough and deep breathing and return to care on an as needed basis. \par \par PLAN:\par - Return to care as needed \par \par \par \par IDr. [insert the name], personally performed the evaluation and management (E/M) services for this new patient.  That E/M includes conducting the initial examination, assessing all conditions, and establishing the plan of care.  Today, my ACP, Naeem Matos NP was here to observe my evaluation and management services for this patient to be followed going forward.\par \par \par

## 2023-03-21 NOTE — DISCUSSION/SUMMARY
[FreeTextEntry1] : 52 year old man with history developmental delay and epilepsy with shunt placement and VNS placement.\par \par Had long discussion about options.  \par \par Episodes of starting upward could be behavioral with negative EMU stay.\par \par Hospital stay Feb (2023) with ? seizure but not clear. Worse with increased Onfi.\par \par Plan;\par - Lower Onfi to 5mg bid due to lethargy and unclear if recent seizure.\par -reviewed seizure triggers\par - annual labwork including AED levels.\par - psych at [facility] for behavior\par - DEXA scan next visit (holding off because of COVID)\par - following with cardiology\par - memory evaluated by Dr. Allen with no change per caregiver\par - follow with local psychiatry and will have social work consultation.\par - follow with GI for aspiration / ? if behavioral component.\par - swallow eval\par \par Total time 40 min.\par \par \par  [Medically Refractory (seizure within the last year)] : Medically Refractory (seizure within the last year) [Inpatient video EEG study ordered with length of stay anticipated in days: _____] : Inpatient video EEG study ordered with length of stay anticipated in days: [unfilled] [Event capture (for localization, differential diagnosis) (typically 3-5 days)] : Event capture (for localization, differential diagnosis) (typically 3-5 days)

## 2023-03-21 NOTE — HISTORY OF PRESENT ILLNESS
[FreeTextEntry1] : **UPDATE:3/21/2023***\par Mr Andi Ferrara is here today for a scheduled follow up office visit and is accompanied by his aunt and caregiver He has been depressed of late (new home, uncle passed away last year). \par At [facility] in Feb (2023) with aspiration and sepsis with ? seizure - EEG with right slowing and sharp-waves but no seizures. Onfi was increased to 5/10 with more fatigue. Now not eating solid food - having puree.\par \par \par **UPDATE:9/19/2022***\par Mr Andi Ferrara is here today for a scheduled follow up office visit and is accompanied by his aunt and caregiver He has been depressed of late (new home, uncle passed away last year). \par No seizures.\par \par **UPDATE:4/1/2022***\par Mr Andi Ferrara is here today for a scheduled follow up office visit and is accompanied by his aunt and caregiver\par Esophagitis and episodes of aspiration - swallow studies were normal. Shunt evaluation done and normal as well. He has been depressed of late (new home, uncle passed away last year). \par \par **UPDATE:2/17/2022***\par Mr Andi Ferrara is here today for a scheduled follow up office visit and is accompanied by his mother and caregiver\par Esophagalitis\par \par ***UPDATE:9/24/21***\par Mr Andi Ferrara is accompanied by his home care worker - Aunt not here today. Tried to reach out to her with no answer (? wrong number).\par EMU with no seizures - GI was consulted.\par Had surgery for his cervical stenosis and much improved.  Mild seizures shortly after surgery, but doing well.\par Per message from Aunt memory worse with weight gain.\par \par ***UPDATE:9/24/21***\par Mr Andi Ferrara is accompanied by his home care worker - Aunt not here today.\par EMU with no seizures - GI was consulted.\moody Had surgery for his cervical stenosis and much improved.  Mild seizures shortly after surgery, but doing well.\par \par \par ***UPDATE:6/21/21***\par Mr Andi Ferrara is accompanied by his legal guardian Ms Melissa Guy\par Patient has been having LUQ pain for past few months.  Awaiting GI consultation at this point.\par Aunt notes has been having episode a few times per week with staring upwards for several minutes - no other seizure activity noted.\par Also, worsening of behavior overall.\par Had surgery for his cervical stenosis and much improved.  Mild seizures shortly after surgery, but doing well.\par \par ***UPDATE:11/24/20***\par Mr Andi Ferrara is accompanied by his legal guardian Ms Melissa Guy\par He is doing well overall.\par Had surgery for his cervical stenosis and much improved.  Mild seizures shortly after surgery, but doing well.\par \par ***UPDATE:7/31/20***\par Mr Andi Ferrara is accompanied by his legal guardian Ms Melissa Guy\par He has no reported interval seizures . He is doing well overall.\par \par Patient now with intermittent weakness when walking.  However, had bug bite to right ankle in the fall and has been walking less.  No issues bowel/bladder but mild ataxia.\par \par ***UPDATE:2/3/20***\par Mr Andi Ferrara is accompanied by his legal guardian Ms Melissa Guy\par He has no reported interval seizures . he had a fall on 1/25 but did not hit his head. It was noted that he had a bug bite from end of September on right ankle which is now swollen and red\par \par LTG 300mg BID\par Onfi 5mg BID\par Primidone 250mg BID\par \par Last office visit:\par  \par Possible small seizure a few weeks ago where was a little confused at bowling.  Had CT head which showed no change.\par \par On LTG 300mg bid and Primidone bid. Onfi 5mg bid.\par \par Seeing psych who started Lexapro 10 daily and Zyprexa\par \par Mr. 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  He comes accompanied by his aunt and uncle (legal guardians) and the .\par \par The seizures started shortly after birth with unclear details.  He was in the ICU for some time and found to have hydrocephalus.  At 16 months of age, his shunt was found to be malfunctioning and a new shunt was placed.  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  In 2013, the shut malfunctioned once again with further visual loss, now with only vision to light in the right eye and no vision in the left eye.\par \par The seizures at a younger age are unclear in semiology.  However, for the past several decades his typical seizures have been pulling at his clothes for seconds to minutes with confusion thereafter.  He gets confused with these events with behavioral arrest. \par the exact frequency is unknown (perhaps several times a month).  He does not have convulsions.\par \par In addition to the seizures above, he has been having behavioral outbursts at his home.  He gets mad at times or very tired at times.  It has been unclear if these are seizures.  Also episodes of head pain as well.  This has been followed by his psychiatrist.\par \par The patient has been tried on several AEDS (PHT, Vimpat, VPA, PHB, CBZ, LEV, Banzel, TPM) and has not been treated with these meds.  He is currently on Primidone 250 bidand Lamictal 400mg bid.  He was tried on Onfi 5mg bid in December 2015  and did well, but this was stopped because he ran out.  This was restarted in February 2016 at 5mg bid.\par \par The patient had a VNS placed several years ago at "maximum output" and [place] [facility] [place]  It was stated that would be difficult to do any further surgery on him.\par \par CT scans in the past have shown right parietal encephalomalacia.  EEG studies have shown (AEEG and VEEG) bilateral temporal frontal discharges (R>L) with one seizure caught on the right in the past.\par \par He has been followed by Dr. Phillips and at [place] in the past.  VEEG testing July 2016 showed no seizures and no changes to meds.  Also showed episodes of headache and pulling at shirt with no changes.  Still with headaches at this time with intermittent abdominal pain with no known etiology.\par \par Several seizures with ZNS increased to 100 bid earlier in the year.  Worsening of behavior with ZNS and no improvement.  We stopped ZNS and still with behavioral problems.\par \par VNS was shut off/\par \par PMHx as above\par FHx No seizures\par Meds/All: see below\par

## 2023-03-22 NOTE — OCCUPATIONAL THERAPY INITIAL EVALUATION ADULT - PHYSICAL ASSIST/NONPHYSICAL ASSIST: SUPINE/SIT, REHAB EVAL
[FreeTextEntry3] : AAOx3, pleasant, NAD, no visual lymphadenopathy\par hair, scalp, face, nose, eyelids, ears, lips, oropharynx, neck, chest, abdomen, back, right arm, left arm, nails, and hands examined with all normal findings,\par pertinent findings include:\par \par multiple benign nevi and lentigines\par xerosis and pruritus on back\par + inflamed, waxy, keratotic papule on mid back
verbal cues

## 2023-03-23 NOTE — H&P PST ADULT - NS PRO TALK SOMEONE YN
19-Mar-2023 06:10 22-Mar-2023 22:05 20-Mar-2023 06:06 20-Mar-2023 07:55 19-Mar-2023 22:30 21-Mar-2023 20:40 21-Mar-2023 23:02 22-Mar-2023 10:15 23-Mar-2023 01:10 19-Mar-2023 23:25 21-Mar-2023 17:30 no

## 2023-03-30 NOTE — ED ADULT TRIAGE NOTE - BP NONINVASIVE DIASTOLIC (MM HG)
107
Instructions: This plan will send the code FBSD to the PM system.  DO NOT or CHANGE the price.
Price (Do Not Change): 0.00
Detail Level: Simple

## 2023-03-30 NOTE — ED PROVIDER NOTE - WR INTERPRETATION 1
Unchanged from previous, rotated film with right sided infiltrate. Left sided opacities appear improved.

## 2023-03-30 NOTE — ED PROVIDER NOTE - OBJECTIVE STATEMENT
52 year old male with PMHx of hydrocephalus (age 2 months) s/p multiple brain surgeries/ peritoneal shunt, legally blind, glaucoma, mild MR, PIERCE, Epilepsy (last seizure years ago), impulse control disorder, obesity, GERD and hx of aspiration PNA presents to the ED from [facility] sent in by speech pathologist for aspiration, trouble swallowing and for possible G-Tube placement. Hx obtained by aunt (legal guardian) at bedside. 52 year old male with PMHx of hydrocephalus s/p multiple brain surgeries/ peritoneal shunt, legally blind, glaucoma, mild MR, PIERCE, Epilepsy (last seizure years ago), impulse control disorder, obesity, GERD and hx of aspiration PNA presents to the ED from [facility] sent in by speech pathologist for aspiration, trouble swallowing and for possible G-Tube placement. Hx obtained by aunt (legal guardian) at bedside.

## 2023-03-30 NOTE — ED ADULT TRIAGE NOTE - CHIEF COMPLAINT QUOTE
Resident of [facility], sent n by speech pathologist for Aspiration, per EMS patient is currently NPO, sent in for G-Tube placement. Legally blind. Denies any complaints at triage.

## 2023-03-30 NOTE — ED ADULT NURSE NOTE - OBJECTIVE STATEMENT
Pt presents from [facility] for a G tube placement. Pt was sent in by speech pathologist for aspiration. Pt is legally blind. Pt currently NPO with no complaints at this time.

## 2023-03-30 NOTE — ED PROVIDER NOTE - CLINICAL SUMMARY MEDICAL DECISION MAKING FREE TEXT BOX
Patient sent in with concern for aspiration event and trouble swallowing. Possible need for PEG tube vs other nutrition. Otherwise in no acute distress. No reported SOB, cough, or fever to suggest PNA. Will check labs, xr, and plan on admission.

## 2023-03-30 NOTE — ED PROVIDER NOTE - PROGRESS NOTE DETAILS
Spoke with Dr. Jama for admission for possible PEG tube placement. labs grossly unremarkable. Dagoberto Chavis MD.

## 2023-03-30 NOTE — ED ADULT NURSE NOTE - CHIEF COMPLAINT QUOTE
Resident of [facility], sent n by speech pathologist for Aspiration, per EMS patient is currently NPO, sent in for G-Tube placement. Legally blind. Denies any complaints at triage.

## 2023-03-30 NOTE — ED PROVIDER NOTE - PHYSICAL EXAMINATION
Constitutional: Awake, Alert, non-toxic. Chronically ill appearing.  HEAD: Normocephalic, atraumatic.   EYES: PERRL, EOM intact, conjunctiva and sclera are clear bilaterally.  ENT: External ears normal. No rhinorrhea, no tracheal deviation   NECK: Supple, non-tender  CARDIOVASCULAR: regular rate and rhythm.  RESPIRATORY: Normal respiratory effort; breath sounds CTAB, no wheezes, rhonchi, or rales. No accessory muscle use.   ABDOMEN: Soft; non-tender, non-distended. No rebound or guarding.   MSK:  no lower extremity edema, no deformities  SKIN: Warm, dry  NEURO: A&O. Able to converse in short sentences, pleasant and cooperative.

## 2023-03-31 NOTE — DIETITIAN INITIAL EVALUATION ADULT - NSFNSGIIOFT_GEN_A_CORE
I&O's Detail    30 Mar 2023 07:01  -  31 Mar 2023 07:00  --------------------------------------------------------  IN:  Total IN: 0 mL    OUT:    Voided (mL): 300 mL  Total OUT: 300 mL    Total NET: -300 mL

## 2023-03-31 NOTE — H&P ADULT - BIRTH SEX
Male O-Z Plasty Text: The defect edges were debeveled with a #15 scalpel blade.  Given the location of the defect, shape of the defect and the proximity to free margins an O-Z plasty (double transposition flap) was deemed most appropriate.  Using a sterile surgical marker, the appropriate transposition flaps were drawn incorporating the defect and placing the expected incisions within the relaxed skin tension lines where possible.    The area thus outlined was incised deep to adipose tissue with a #15 scalpel blade.  The skin margins were undermined to an appropriate distance in all directions utilizing iris scissors.  Hemostasis was achieved with electrocautery.  The flaps were then transposed into place, one clockwise and the other counterclockwise, and anchored with interrupted buried subcutaneous sutures.

## 2023-03-31 NOTE — DIETITIAN INITIAL EVALUATION ADULT - PERTINENT MEDS FT
MEDICATIONS  (STANDING):  dorzolamide 2%/timolol 0.5% Ophthalmic Solution 1 Drop(s) Both EYES two times a day  escitalopram 10 milliGRAM(s) Oral daily  lactulose Syrup 10 Gram(s) Oral daily  OLANZapine 5 milliGRAM(s) Oral every 12 hours  pantoprazole    Tablet 40 milliGRAM(s) Oral before breakfast  polyethylene glycol 3350 17 Gram(s) Oral daily  primidone 250 milliGRAM(s) Oral two times a day  senna 2 Tablet(s) Oral at bedtime    MEDICATIONS  (PRN):  acetaminophen     Tablet .. 650 milliGRAM(s) Oral every 6 hours PRN Temp greater or equal to 38C (100.4F), Mild Pain (1 - 3)  aluminum hydroxide/magnesium hydroxide/simethicone Suspension 30 milliLiter(s) Oral every 4 hours PRN Dyspepsia  bisacodyl Suppository 10 milliGRAM(s) Rectal daily PRN Constipation  melatonin 3 milliGRAM(s) Oral at bedtime PRN Insomnia  ondansetron Injectable 4 milliGRAM(s) IV Push every 8 hours PRN Nausea and/or Vomiting    Home Medications:  ciclopirox 8% topical solution: Apply topically to affected area on right thumb nail once a day (31 Mar 2023 00:00)  Colace 100 mg oral capsule: 3 cap(s) orally once a day (at bedtime) (31 Mar 2023 00:00)  dorzolamide-timolol 2.23%-0.68% ophthalmic solution: 1 drop(s) to both eyes 2 times a day (30 Mar 2023 23:58)  escitalopram 10 mg oral tablet: 1 tab(s) orally once a day (30 Mar 2023 23:58)  Flonase 50 mcg/inh nasal spray: 1 spray(s) nasal 2 times a day (30 Mar 2023 23:58)  ketoconazole 2% topical cream: Apply topically to affected area on scalp, forehead, and eyebrows once a day, As Needed (30 Mar 2023 23:58)  ketoconazole 2% topical shampoo: Apply topically to affected area Monday, Wednesday, and Friday (30 Mar 2023 23:58)  lactulose 10 g/15 mL oral solution: 15 milliliter(s) orally 2 times a day (30 Mar 2023 23:58)  Linzess 290 mcg oral capsule: 1 cap(s) orally once a day (30 Mar 2023 23:58)  Milk of Magnesia 8% oral suspension: 15 milliliter(s) orally every other day, As Needed (30 Mar 2023 23:58)  MiraLax oral powder for reconstitution: 17 gram(s) orally once a day (at bedtime) (30 Mar 2023 23:58)  Multiple Vitamins with Iron oral tablet: 1 tab(s) orally once a day (31 Mar 2023 00:02)  OLANZapine 5 mg oral tablet: 1 tab(s) orally 2 times a day (31 Mar 2023 00:00)  Oyster Shell Calcium 500 (1250 mg calcium carbonate) oral tablet: 1 tab(s) orally once a day (30 Mar 2023 23:58)  pantoprazole 40 mg oral delayed release tablet: 1 tab(s) orally once a day (30 Mar 2023 23:58)  primidone 250 mg oral tablet: 1 tab(s) orally 2 times a day (30 Mar 2023 23:58)  prune juice: 236 milliliter(s) to each affected eye once a day (at bedtime) (30 Mar 2023 23:58)  senna oral tablet: 2 tab(s) orally once a day (at bedtime) (30 Mar 2023 23:58)  Vitamin C 500 mg oral tablet: 1 tab(s) orally once a day (at bedtime) (30 Mar 2023 23:58)  Vitamin D3 50 mcg (2000 intl units) oral tablet: 1 tab(s) orally once a day (30 Mar 2023 23:58)

## 2023-03-31 NOTE — DIETITIAN INITIAL EVALUATION ADULT - NUTRITIONGOAL OUTCOME1
[Consultation] : a consultation visit [FreeTextEntry1] : surgical consultation  Pt will meet and tolerate >/= 80% of ENN via tolerated route

## 2023-03-31 NOTE — CONSULT NOTE ADULT - SUBJECTIVE AND OBJECTIVE BOX
Patient is a 52y old  Male who presents with a chief complaint of Dysphagia    HPI:  This is a 52 year old male with significant past of hydrocephalus s/p multiple brain surgeries/  shunt, legally blind, epilepsy (last seizure years ago), and hx of aspiration PNA presented to the ED from [facility] sent in by speech pathologist for aspiration, trouble swallowing and for possible G-Tube placement.     GI team called for possible g tube. Evaluated at bedside. Patient with obvious cognitive delay and limited verbally with blindness. Does admit to some issues swallowing certain things. Denies choking or coughing but again, unable to rely on this history. No data to support any mechanical swallowing issues. Upon review of hospitalizations and notes. With history of aspiration pneumonia recently 2023. Last sp/sw recommended nectar thickened diet and commented on +aspiration thin liquids.     Family Hx:  Unable to get any family History as patient has cognitive impairment.  (31 Mar 2023 03:45)      PAST MEDICAL & SURGICAL HISTORY:  Hydrocephalus  age 2 months      Legally blind      Glaucoma      Sleep apnea  not on CPAP      Epilepsy      Osteoporosis      Elevated liver enzymes      Environmental Allergies      Impulse control disorder  worsened when pt on Depakote, pt now off      Mild mental retardation      Hiatal hernia      Aspiration pneumonia  at age 25      Viral pneumonia  h/o      BPH without obstruction/lower urinary tract symptoms      Glaucoma  on eye gtts      Mentally disabled      Chronic GERD      S/P  Shunt  originally had shunt to lung, then revised to peritoneal shunt, has had multiple revisions      Epilepsy  s/p Vagal Nerve Stimulator Implant , , last one in 2016      H/O craniotomy  at age 18      Heel cord contracture  s/p surgical correction b/l      History of tonsillectomy      S/p bilateral myringotomy with tube placement      Obstructed  shunt  revision of  shunt       S/P  shunt    MEDICATIONS  (STANDING):  dorzolamide 2%/timolol 0.5% Ophthalmic Solution 1 Drop(s) Both EYES two times a day  escitalopram 10 milliGRAM(s) Oral daily  lactulose Syrup 10 Gram(s) Oral daily  OLANZapine 5 milliGRAM(s) Oral every 12 hours  pantoprazole    Tablet 40 milliGRAM(s) Oral before breakfast  polyethylene glycol 3350 17 Gram(s) Oral daily  primidone 250 milliGRAM(s) Oral two times a day  senna 2 Tablet(s) Oral at bedtime    MEDICATIONS  (PRN):  acetaminophen     Tablet .. 650 milliGRAM(s) Oral every 6 hours PRN Temp greater or equal to 38C (100.4F), Mild Pain (1 - 3)  aluminum hydroxide/magnesium hydroxide/simethicone Suspension 30 milliLiter(s) Oral every 4 hours PRN Dyspepsia  bisacodyl Suppository 10 milliGRAM(s) Rectal daily PRN Constipation  melatonin 3 milliGRAM(s) Oral at bedtime PRN Insomnia  ondansetron Injectable 4 milliGRAM(s) IV Push every 8 hours PRN Nausea and/or Vomiting      Allergies    codeine (Hives)  codeine (Stomach Upset)  Depakote (Other)  seasonal allergies: nasal congestion (Other)    Intolerances        SOCIAL HISTORY:    FAMILY HISTORY:  Family history of stroke (Mother)        REVIEW OF SYSTEMS:  Unable to obtain due to cognitive delay    Vital Signs Last 24 Hrs  T(C): 36.9 (31 Mar 2023 07:40), Max: 36.9 (31 Mar 2023 07:40)  T(F): 98.4 (31 Mar 2023 07:40), Max: 98.4 (31 Mar 2023 07:40)  HR: 81 (31 Mar 2023 07:40) (77 - 82)  BP: 111/78 (31 Mar 2023 07:40) (111/78 - 152/107)  BP(mean): --  RR: 18 (31 Mar 2023 07:40) (18 - 18)  SpO2: 89% (31 Mar 2023 10:45) (89% - 96%)    Parameters below as of 31 Mar 2023 10:45  Patient On (Oxygen Delivery Method): room air    PHYSICAL EXAM:    Constitutional: NAD  HEENT: poor phonation with poor dentition  Neck: supple, no lymphadenopathy  Respiratory: clear to ascultation bilaterally, no wheezing, +cough  Cardiovascular: S1 and S2, regular rate and rhythm, no murmurs rubs or gallops  Gastrointestinal: soft, non-tender, protuberant due to habitus, +bowel sounds, no rebound or guarding, no surgical scars, no drains  Extremities: No peripheral edema, no cyanosis or clubbing  Vascular: 2+ peripheral pulses, no venous stasis  Neurological: A/O x 1, able to state name &   Psychiatric: flat affect  Skin: No rashes, not jaundiced    LABS:                        13.0   5.79  )-----------( 262      ( 30 Mar 2023 20:45 )             40.9     03-30    139  |  105  |  10  ----------------------------<  92  4.7   |  32<H>  |  0.98    Ca    8.6      30 Mar 2023 20:45  Mg     2.2         TPro  7.9  /  Alb  3.6  /  TBili  0.3  /  DBili  x   /  AST  31  /  ALT  29  /  AlkPhos  163<H>        LIVER FUNCTIONS - ( 30 Mar 2023 20:45 )  Alb: 3.6 g/dL / Pro: 7.9 gm/dL / ALK PHOS: 163 U/L / ALT: 29 U/L / AST: 31 U/L / GGT: x             RADIOLOGY & ADDITIONAL STUDIES: Patient is a 52y old  Male who presents with a chief complaint of Dysphagia    HPI:  52 year old man with hydrocephalus s/p  shunt, blind, epilepsy, sent in from group home for concerns for dysphagia.     GI team called for possible g tube. Evaluated at bedside. Patient with obvious cognitive delay and limited verbally with blindness. Does admit to some issues swallowing certain things. Denies choking or coughing but again, unable to rely on this history. No data to support any mechanical swallowing issues presently.     PAST MEDICAL & SURGICAL HISTORY:  Hydrocephalus  age 2 months      Legally blind      Glaucoma      Sleep apnea  not on CPAP      Epilepsy      Osteoporosis      Elevated liver enzymes      Environmental Allergies      Impulse control disorder  worsened when pt on Depakote, pt now off      Mild mental retardation      Hiatal hernia      Aspiration pneumonia  at age 25      Viral pneumonia  h/o      BPH without obstruction/lower urinary tract symptoms      Glaucoma  on eye gtts      Mentally disabled      Chronic GERD      S/P  Shunt  originally had shunt to lung, then revised to peritoneal shunt, has had multiple revisions      Epilepsy  s/p Vagal Nerve Stimulator Implant , , last one in       H/O craniotomy  at age 18      Heel cord contracture  s/p surgical correction b/l      History of tonsillectomy      S/p bilateral myringotomy with tube placement      Obstructed  shunt  revision of  shunt       S/P  shunt    MEDICATIONS  (STANDING):  dorzolamide 2%/timolol 0.5% Ophthalmic Solution 1 Drop(s) Both EYES two times a day  escitalopram 10 milliGRAM(s) Oral daily  lactulose Syrup 10 Gram(s) Oral daily  OLANZapine 5 milliGRAM(s) Oral every 12 hours  pantoprazole    Tablet 40 milliGRAM(s) Oral before breakfast  polyethylene glycol 3350 17 Gram(s) Oral daily  primidone 250 milliGRAM(s) Oral two times a day  senna 2 Tablet(s) Oral at bedtime    MEDICATIONS  (PRN):  acetaminophen     Tablet .. 650 milliGRAM(s) Oral every 6 hours PRN Temp greater or equal to 38C (100.4F), Mild Pain (1 - 3)  aluminum hydroxide/magnesium hydroxide/simethicone Suspension 30 milliLiter(s) Oral every 4 hours PRN Dyspepsia  bisacodyl Suppository 10 milliGRAM(s) Rectal daily PRN Constipation  melatonin 3 milliGRAM(s) Oral at bedtime PRN Insomnia  ondansetron Injectable 4 milliGRAM(s) IV Push every 8 hours PRN Nausea and/or Vomiting      Allergies    codeine (Hives)  codeine (Stomach Upset)  Depakote (Other)  seasonal allergies: nasal congestion (Other)    Intolerances        SOCIAL HISTORY:  lives in group home    FAMILY HISTORY:  Family history of stroke (Mother)        REVIEW OF SYSTEMS:  Unable to obtain due to cognitive delay    Vital Signs Last 24 Hrs  T(C): 36.9 (31 Mar 2023 07:40), Max: 36.9 (31 Mar 2023 07:40)  T(F): 98.4 (31 Mar 2023 07:40), Max: 98.4 (31 Mar 2023 07:40)  HR: 81 (31 Mar 2023 07:40) (77 - 82)  BP: 111/78 (31 Mar 2023 07:40) (111/78 - 152/107)  BP(mean): --  RR: 18 (31 Mar 2023 07:40) (18 - 18)  SpO2: 89% (31 Mar 2023 10:45) (89% - 96%)    Parameters below as of 31 Mar 2023 10:45  Patient On (Oxygen Delivery Method): room air    PHYSICAL EXAM:    Constitutional: NAD  HEENT: poor phonation with poor dentition  Neck: supple, no lymphadenopathy  Respiratory: clear to ascultation bilaterally, no wheezing, +cough  Cardiovascular: S1 and S2, regular rate and rhythm, no murmurs rubs or gallops  Gastrointestinal: soft, non-tender, protuberant due to habitus, +bowel sounds, no rebound or guarding,   Extremities: No peripheral edema, no cyanosis or clubbing  Vascular: 2+ peripheral pulses, no venous stasis  Neurological: A/O x 1, able to state name &   Psychiatric: flat affect  Skin: No rashes, not jaundiced    LABS:                        13.0   5.79  )-----------( 262      ( 30 Mar 2023 20:45 )             40.9     03-30    139  |  105  |  10  ----------------------------<  92  4.7   |  32<H>  |  0.98    Ca    8.6      30 Mar 2023 20:45  Mg     2.2     03-30    TPro  7.9  /  Alb  3.6  /  TBili  0.3  /  DBili  x   /  AST  31  /  ALT  29  /  AlkPhos  163<H>  03-30      LIVER FUNCTIONS - ( 30 Mar 2023 20:45 )  Alb: 3.6 g/dL / Pro: 7.9 gm/dL / ALK PHOS: 163 U/L / ALT: 29 U/L / AST: 31 U/L / GGT: x

## 2023-03-31 NOTE — PATIENT PROFILE ADULT - FALL HARM RISK - HARM RISK INTERVENTIONS
Assistance with ambulation/Assistance OOB with selected safe patient handling equipment/Communicate Risk of Fall with Harm to all staff/Discuss with provider need for PT consult/Monitor gait and stability/Reinforce activity limits and safety measures with patient and family/Tailored Fall Risk Interventions/Visual Cue: Yellow wristband and red socks/Bed in lowest position, wheels locked, appropriate side rails in place/Call bell, personal items and telephone in reach/Instruct patient to call for assistance before getting out of bed or chair/Non-slip footwear when patient is out of bed/[place] to call system/Physically safe environment - no spills, clutter or unnecessary equipment/Purposeful Proactive Rounding/Room/bathroom lighting operational, light cord in reach

## 2023-03-31 NOTE — DIETITIAN INITIAL EVALUATION ADULT - ENTERAL
Initiate Jevity 1.5 @ 20 cc/hr, increase by 10 cc/hr q6 hours until goal rate of 70cc/hr met with  packets of DinnerTime TF. Will provide ~ 2100 kcal, 89 g protein, and   1064 mL free water.

## 2023-03-31 NOTE — DIETITIAN INITIAL EVALUATION ADULT - OTHER INFO
51 y/o M with a PMHx of hydrocephalus s/p multiple brain surgeries/ peritoneal shunt, legally blind, Epilepsy (last seizure years ago), and hx of aspiration PNA presented to the ED from [facility] sent in by speech pathologist for aspiration, trouble swallowing and for possible G-Tube placement. Admitted for dysphagia. Pending SLP eval. FULL CODE.     Unable to obtain meaningful information 2/2 pt is a poor historian. Pt currently NPO, pending SLP eval. RD obtained bedscale wt taken on 3/31 - 201#; +2 edema likely skewing wt. Weight hx reviewed: 199# (taken by RD 2/19; met criteria for severe malnutrition). NFPE reveals upper body muscle/fat wasting, however edema is skewing/masking losses. Follow for SLP recommendations. See recommendations below.

## 2023-03-31 NOTE — SWALLOW BEDSIDE ASSESSMENT ADULT - ASR SWALLOW DENTITION
Pt with natural dentition but an under jet malocclusion is apparent. Further, several teeth are misaligned.

## 2023-03-31 NOTE — DIETITIAN INITIAL EVALUATION ADULT - NUTRITON FOCUSED PHYSICAL EXAM
Behavioral Health Belongings     Informed of Valuables Policy  Yes          Item                  Qty   Clothing:     Home   Bin   Security Lock Up Room Returned   Date/Initials   1 Jacket    X    1 pr  Shoes with laces     X    1 pr  Socks   X      1 Hat     X    1 Sweatshirt with string    X    1 pr  Jeans   X      1 Belt    X                                    Behavioral Health Belongings List                     [id number]                          Qty       Luggage / Bags:   Home   Bin   Security Lock Up Room Returned Date/Initials                                                             Qty   Grooming / Hygiene Items:   Home   Bin   Security Lock Up Room Returned Date/Initials                                                                                                                                                                    Behavioral Health Belongings List                            [id number]                               Qty   Electronic Devices:   Home   Bin   Security Lock Up Room Returned Date/Initials   1 Cell phone    X                                                               Qty   Smoking Items:   Home   Bin   Security Lock Up Room Returned Date/Initials   1 Lighter    X                                                                                                    Behavioral Health Belongings List                          [id number]                                                   .                                .            Qty   Cards, Keys, Valuables:   Home   Bin   Security Lock Up Room Returned Date/Initials   1 Key    X    $1.54 Cash/ Change   X                                                                        Qty   Other / Various Sharps:   Home   Bin   Security Lock Up Room Returned Date/Initials                                                                                                                                                                                     Behavioral Health Belongings List                            [id number]                           Qty   Other Misc Items   Home   Bin   Security Lock Up Room Returned Date/Initials                                                                    Signature at Admission ___________________________________________Date: _________    Signature at Update ______________________________________________Date: _________    Signature at Discharge ____________________________________________Date: _________                                                                    Behavioral Health Belongings List                       [id number]          yes...

## 2023-03-31 NOTE — CONSULT NOTE ADULT - ASSESSMENT
52 year old male with cognitive delay, hydrocephalus with  shunt, recent aspiration pneumonia sent by speech pathologist from group home for g-tube due to aspiration    Risk of aspiration will not be any less if PEG placed and utilized.  There is also risk for infection or abscess as well as risk for dislodgement.     Rec:  ::Would recommend speech pathology evaluation here as inpatient  ::Esophagram post speech eval as long as no contraindication from their assessment  ::No plan for gastrostomy tube at this point in time 52 year old male with cognitive delay, hydrocephalus with  shunt, recent aspiration pneumonia sent by speech pathologist from group home for g-tube due to aspiration    Risk of aspiration will not be any less if PEG placed and utilized, although sometimes they are still warranted in certain situations.      Rec:  ::Would recommend speech pathology evaluation here as inpatient  ::Esophagram post speech eval as long as no contraindication from their assessment  ::Will follow up recommendations.

## 2023-03-31 NOTE — SWALLOW BEDSIDE ASSESSMENT ADULT - SWALLOW EVAL: PROGNOSIS
2) On encounter, the pt was awake. His visual and hearing acuity are reduced. The pt was interactive but communicatively passive as well as variably impulsive/internally distractible. Pt periodically verbalized during communicative probes. At these times, his speech output was marked by articulatory slurring & slowed rate c/w Dysarthria. Additionally, his utterances were linguistically intact but variably reflective of decreased memory/insight/reasoning indicative of concomitant Cognitive Dysfunction. Dysarthria as well as Cognitive Dysfunction are felt to be chronic/pre-existing/irreversible in setting of Intellectual Disability/hydrocephalus/seizure disorder.

## 2023-03-31 NOTE — SWALLOW BEDSIDE ASSESSMENT ADULT - SWALLOW EVAL: DIAGNOSIS
1) Pt exhibits feeding compromise in setting of blindness/altered cognition which is atop severe Oropharyngeal Dysphagia with overt aspiration signs/coughing/O2 desaturation with puree food/moderately thick liquids which are the most conservative food textures. Dysphagia is felt to be chronic/pre-existing/irreversible in setting of Intellectual Disability/hydrocephalus/seizure disorder.

## 2023-03-31 NOTE — SWALLOW BEDSIDE ASSESSMENT ADULT - SLP GENERAL OBSERVATIONS
On encounter, the pt was awake. His visual and hearing acuity are reduced. The pt was interactive but communicatively passive as well as variably impulsive/internally distractible. Pt periodically verbalized during communicative probes. At these times, his speech output was marked by articulatory slurring & slowed rate c/w Dysarthria. Additionally, his utterances were linguistically intact but variably reflective of decreased memory/insight/reasoning indicative of concomitant Cognitive Dysfunction. Dysarthria as well as Cognitive Dysfunction are felt to be chronic/pre-existing/irreversible in setting of Intellectual Disability/hydrocephalus/seizure disorder.

## 2023-03-31 NOTE — PROGRESS NOTE ADULT - ASSESSMENT
A/P:    1.  Difficulty swallowing  ?? h/o Aspiration  -keep NPO  -follow Speech therapy evaluation- recommend NPO- will need PEG   -GI made aware  - Legal guardian Christiane Guy [phone]/ [phone]-   - IVF for now  - nutrition consult for TF recommendation     2.  SCD for DVT ppx    3.  Code status: Full code

## 2023-03-31 NOTE — DIETITIAN INITIAL EVALUATION ADULT - ADD RECOMMEND
1) Follow up for SLP eval recommendations   2) If pt passes SLP eval, recommend Regular diet + SLP recommendations  3) Add ensure plus high protein TID to optimize PO intake (provides 350 kcal, 20g protein/ shake) if diet is advanced  4) If pt has G-Tube placed, recommend to initiate TF recommendation above  5) Obtain vitamin D 25OH level to assess nutriture  6) Consider adding thiamine 100 mg daily 2/2 poor PO intake/ malnutrition  7) Recommend to add MVI w/minerals, Vit C 500 mg BID, add Zinc Sulfate 220 mg x 10 days to promote wound healing.   8) Please obtain daily weights  9) Maintain aspiration precautions, back of bed >45 degrees.  10) monitor hydration status; adjust free water flushes prn, consider free water flushes of 35mL/hr (which provides ~ 840mL of water per day)  11) monitor BM: if > 3 days without BM, add bowel regimen prn  12) Confirm goals of care regarding LONG-TERM nutrition support   RD will continue to monitor PO intake, labs, hydration, and wt prn.

## 2023-03-31 NOTE — DIETITIAN INITIAL EVALUATION ADULT - FACTORS AFF FOOD INTAKE
difficulty chewing/difficulty feeding self/difficulty swallowing/difficulty with food procurement/preparation/developmental delay

## 2023-03-31 NOTE — PROGRESS NOTE ADULT - SUBJECTIVE AND OBJECTIVE BOX
52 year old Male with PMHx of hydrocephalus s/p multiple brain surgeries/ peritoneal shunt, legally blind, Epilepsy (last seizure years ago), and hx of aspiration PNA presented to the ED from [facility] sent in by speech pathologist for aspiration, trouble swallowing and for possible G-Tube placement.     3/31- pt was seen and examined. was seen by speech pathologist and recommends NPO as patient is aspirating every consistency that was offered. Stated that he feels hungry.    Vital Signs Last 24 Hrs  T(C): 37 (31 Mar 2023 12:50), Max: 37 (31 Mar 2023 12:50)  T(F): 98.6 (31 Mar 2023 12:50), Max: 98.6 (31 Mar 2023 12:50)  HR: 86 (31 Mar 2023 12:50) (77 - 86)  BP: 107/69 (31 Mar 2023 12:50) (107/69 - 152/107)  BP(mean): 83 (31 Mar 2023 12:50) (83 - 83)  RR: 18 (31 Mar 2023 12:50) (18 - 18)  SpO2: 93% (31 Mar 2023 12:50) (89% - 96%)    Parameters below as of 31 Mar 2023 12:50  Patient On (Oxygen Delivery Method): nasal cannula  O2 Flow (L/min): 2      ROS:   All 10 systems reviewed and found to be negative with the exception of what has been described above.     PE:  Constitutional: NAD, laying in bed  HEENT: NC/AT  Back: no tenderness  Respiratory: respirations even and non labored, diminshed. clear   Cardiovascular: S1S2 regular, no murmurs  Abdomen: soft, not tender, not distended, positive BS  Genitourinary: voiding  Rectal: deferred  Musculoskeletal: no muscle tenderness, no joint swelling or tenderness  Extremities: no pedal edema   Neurological: no focal deficits      MEDICATIONS  (STANDING):  dextrose 5% + sodium chloride 0.9%. 1000 milliLiter(s) (50 mL/Hr) IV Continuous <Continuous>  dorzolamide 2%/timolol 0.5% Ophthalmic Solution 1 Drop(s) Both EYES two times a day  escitalopram 10 milliGRAM(s) Oral daily  lactulose Syrup 10 Gram(s) Oral daily  OLANZapine 5 milliGRAM(s) Oral every 12 hours  pantoprazole    Tablet 40 milliGRAM(s) Oral before breakfast  polyethylene glycol 3350 17 Gram(s) Oral daily  primidone 250 milliGRAM(s) Oral two times a day  senna 2 Tablet(s) Oral at bedtime    MEDICATIONS  (PRN):  acetaminophen     Tablet .. 650 milliGRAM(s) Oral every 6 hours PRN Temp greater or equal to 38C (100.4F), Mild Pain (1 - 3)  aluminum hydroxide/magnesium hydroxide/simethicone Suspension 30 milliLiter(s) Oral every 4 hours PRN Dyspepsia  bisacodyl Suppository 10 milliGRAM(s) Rectal daily PRN Constipation  melatonin 3 milliGRAM(s) Oral at bedtime PRN Insomnia  ondansetron Injectable 4 milliGRAM(s) IV Push every 8 hours PRN Nausea and/or Vomiting      03-30    139  |  105  |  10  ----------------------------<  92  4.7   |  32<H>  |  0.98    Ca    8.6      30 Mar 2023 20:45  Mg     2.2     03-30    TPro  7.9  /  Alb  3.6  /  TBili  0.3  /  DBili  x   /  AST  31  /  ALT  29  /  AlkPhos  163<H>  03-30                            13.0   5.79  )-----------( 262      ( 30 Mar 2023 20:45 )             40.9       < from: Xray Abdomen 1 View PORTABLE -Urgent (Xray Abdomen 1 View PORTABLE -Urgent .) (03.31.23 @ 11:22) >  XR ABDOMEN PORTABLE URGENT 1V   ORDERED BY: PACO KUMAR     PROCEDURE DATE:  03/31/2023          INTERPRETATION:  Supine abdominal film. Concern is aspiration pneumonia   possible obstruction.    Drainage tube overlying the right upper quadrant again noted.    There is mildly increased fecal content in the colon but no sign of   obstruction.    There is a mild left lumbar curve.    Findings are similar to February 22.    IMPRESSION: No sign of obstruction.    < end of copied text >

## 2023-03-31 NOTE — SWALLOW BEDSIDE ASSESSMENT ADULT - ORAL PREPARATORY PHASE
Feeding integrity hindered by reduced visual acuity and altered cognition. Oral aperture was decreased when accepting PO.

## 2023-03-31 NOTE — SWALLOW BEDSIDE ASSESSMENT ADULT - ADDITIONAL RECOMMENDATIONS
HOSPITALIST AND NUTRITION F/U. PT'S SEVERE OROPHARYNGEAL DYSPHAGIA IS CHRONIC/IRREVERSIBLE. I SUGGEST NPO RESTRICTION BE FOLLOWED AS PT CLINICALLY APPEARS TO BE ASPIRATE THE MOST CONSERVATIVE FOOD TEXTURES. FURTHER, HE APPEARS OVERTLY UNCOMFORTABLE/CONGESTED WHEN ASPIRATION EPISODES OCCUR. CONSIDER PEG PLACEMENT FOR PROLONGED SURVIVAL. WHILE PEG PLACEMENT WILL NOT ELIMINATE SALIVA ASPIRATION, PT BEING NPO/RECEIVING PEG IS HIS SAFEST PULMONARY OPTION. I SPOKE WITH PT'S MOTHER WHO INDICATED THAT SHE IS NOT SURE IF SHE IS WILLING TO PURSUE PEG PLACEMENT, AND FEELS THAT CONTINUING AN ORAL DIET(PUREE DIET/MODERATELY THICK LIQUIDS) MAY OPTIMIZE PT'S LIFE QUALITY DESPITE HIS RECURRENT ASPIRATION/REPEATED PNEUMONIAS. THE MANAGER OF PT'S GROUP HOME WAS PRESENT AND ADVISED MOTHER THAT PT WILL NOT BE ALLOWED BACK TO GROUP HOME UNLESS HE IS NPO/HAS A PEG PLACED. SUGGEST GI CONSULT FOR PEG PLACEMENT. IF MOTHER REFUSES, THAN AN ETHICS CONSULT SHOULD BE OBTAINED. IN ANY CASE, THEIR IS NO SERVICE OF BENEFIT THAT CAN BE PROVIDED BY SPEECH PATHOLOGY AT THIS TIME. HOSPITALIST AND NUTRITION F/U. PT'S SEVERE OROPHARYNGEAL DYSPHAGIA IS CHRONIC/IRREVERSIBLE. I SUGGEST NPO RESTRICTION BE FOLLOWED AS PT CLINICALLY APPEARS TO BE ASPIRATE THE MOST CONSERVATIVE FOOD TEXTURES. FURTHER, HE APPEARS OVERTLY UNCOMFORTABLE/CONGESTED WHEN ASPIRATION EPISODES OCCUR. CONSIDER PEG PLACEMENT FOR PROLONGED SURVIVAL. WHILE PEG PLACEMENT WILL NOT ELIMINATE SALIVA ASPIRATION, PT BEING NPO/RECEIVING PEG IS HIS SAFEST PULMONARY OPTION. I SPOKE WITH PT'S MOTHER WHO INDICATED THAT SHE IS NOT SURE IF SHE IS WILLING TO PURSUE PEG PLACEMENT, AND FEELS THAT CONTINUING AN ORAL DIET(PUREE DIET/MODERATELY THICK LIQUIDS) MAY OPTIMIZE PT'S LIFE QUALITY DESPITE HIS RECURRENT ASPIRATION/REPEATED PNEUMONIAS. THE MANAGER OF PT'S GROUP HOME WAS PRESENT AND ADVISED MOTHER THAT PT WILL NOT BE ALLOWED BACK TO GROUP HOME UNLESS HE IS NPO/HAS A PEG PLACED. GI HAS BEEN CONSULTED FOR PEG PLACEMENT. IF MOTHER REFUSES, THAN AN ETHICS CONSULT SHOULD BE OBTAINED. IN ANY CASE, THEIR IS NO SERVICE OF BENEFIT THAT CAN BE PROVIDED BY SPEECH PATHOLOGY AT THIS TIME. AS FOR GI SUGGESTING A POSSIBLE ESOPHAGRAM IN FUTURE, THIS TEST IS CONTRAINDICATED AND WOULD RESULT IN GROSS ASPIRATION OF BARIUM.

## 2023-03-31 NOTE — SWALLOW BEDSIDE ASSESSMENT ADULT - COMMENTS
Pt admitted to  from a Group Home with progression of chronic Oropharyngeal Dysphagia. Pt reportedly exhibits overt aspiration sign/coughing  with the most conservative food consistencies.  indicated that he is no longer able to stay at the Group Home unless he is NPO and a PEG is placed. This profile is superimposed upon a history of chronic Oropharyngeal Dysphagia, recurrent pneumonias, mental retardation, impulse control disorder, seizure disorder s/p placement of a vagal stimulator, hydrocephalus s/p  shunt placement, BPH, glaucoma, legal blindness, hearing loss, myringotomy with tubes, sleep apnea, GERD, hiatal hernia, environmental allergies, OP, heel cord contractures s/p bilateral release and tonsillectomy.

## 2023-03-31 NOTE — H&P ADULT - HISTORY OF PRESENT ILLNESS
52 year old Male with PMHx of hydrocephalus s/p multiple brain surgeries/ peritoneal shunt, legally blind, Epilepsy (last seizure years ago), and hx of aspiration PNA presented to the ED from [facility] sent in by speech pathologist for aspiration, trouble swallowing and for possible G-Tube placement.     Family Hx:  Unable to get any family History as patient has cognitive impairment.

## 2023-03-31 NOTE — SWALLOW BEDSIDE ASSESSMENT ADULT - NS SPL SWALLOW CLINIC TRIAL FT
Pt exhibited feeding compromise in setting of blindness/altered cognition which is atop severe Oropharyngeal Dysphagia with overt aspiration signs/coughing/O2 desaturation with puree food/moderately thick liquids which are the most conservative food textures. Dysphagia is felt to be chronic/pre-existing/irreversible in setting of Intellectual Disability/hydrocephalus/seizure disorder. Solids and liquids less viscous that moderately thick fluids were not offered given the severity of pt's chronic Oropharyngeal Dysphagia.

## 2023-03-31 NOTE — SWALLOW BEDSIDE ASSESSMENT ADULT - SWALLOW EVAL: SECRETION MANAGEMENT
Limited probes revealed that pt's non nutritive cough was moist and produced with decreased strength.

## 2023-03-31 NOTE — CONSULT NOTE ADULT - SUBJECTIVE AND OBJECTIVE BOX
Patient is a 52y old  Male who presents with a chief complaint of Difficulty swallowing (31 Mar 2023 14:28)      HPI:  52 year old Male with PMHx of hydrocephalus s/p multiple brain surgeries/ peritoneal shunt, legally blind, Epilepsy, and hx of aspiration PNA presented to the ED from [facility] sent in by speech pathologist for aspiration, trouble swallowing and for possible G-Tube placement.   He was evaluated by speech pathology in the hospital as well who agreed that patient should be NPO and have PEG placed.  Neurology was consulted to change medications while patient is NPO, awaiting PEG placement (if his Aunt/health care proxy) agrees.    Current anticonvulsants include:  Primidone 250 mg BID  Lamotrigine 300 mg BID  Clobazam 5 mg BID    Family Hx:  Unable to get any family History as patient has cognitive impairment.  (31 Mar 2023 03:45)      PAST MEDICAL & SURGICAL HISTORY:  Hydrocephalus  age 2 months      Legally blind      Glaucoma      Sleep apnea  not on CPAP      Epilepsy      Osteoporosis      Elevated liver enzymes      Environmental Allergies      Impulse control disorder  worsened when pt on Depakote, pt now off      Mild mental retardation      Hiatal hernia      Aspiration pneumonia  at age 25      Viral pneumonia  h/o      BPH without obstruction/lower urinary tract symptoms      Glaucoma  on eye gtts      Mentally disabled      Chronic GERD      S/P  Shunt  originally had shunt to lung, then revised to peritoneal shunt, has had multiple revisions      Epilepsy  s/p Vagal Nerve Stimulator Implant , 2007, last one in 2016      H/O craniotomy  at age 18      Heel cord contracture  s/p surgical correction b/l      History of tonsillectomy      S/p bilateral myringotomy with tube placement      Obstructed  shunt  revision of  shunt 2015      S/P  shunt          FAMILY HISTORY:  Family history of stroke (Mother)        Social Hx:  Nonsmoker, no drug or alcohol use    MEDICATIONS  (STANDING):  dextrose 5% + sodium chloride 0.9%. 1000 milliLiter(s) (50 mL/Hr) IV Continuous <Continuous>  dorzolamide 2%/timolol 0.5% Ophthalmic Solution 1 Drop(s) Both EYES two times a day  escitalopram 10 milliGRAM(s) Oral daily  lactulose Syrup 10 Gram(s) Oral daily  LORazepam   Injectable 2 milliGRAM(s) IV Push once  OLANZapine 5 milliGRAM(s) Oral every 12 hours  pantoprazole    Tablet 40 milliGRAM(s) Oral before breakfast  polyethylene glycol 3350 17 Gram(s) Oral daily  primidone 250 milliGRAM(s) Oral two times a day  senna 2 Tablet(s) Oral at bedtime       Allergies    codeine (Hives)  codeine (Stomach Upset)  Depakote (Other)  seasonal allergies: nasal congestion (Other)    Intolerances        ROS: Pertinent positives in HPI, all other ROS were reviewed and are negative.      Vital Signs Last 24 Hrs  T(C): 36.6 (31 Mar 2023 15:56), Max: 37 (31 Mar 2023 12:50)  T(F): 97.9 (31 Mar 2023 15:56), Max: 98.6 (31 Mar 2023 12:50)  HR: 86 (31 Mar 2023 15:56) (77 - 86)  BP: 101/76 (31 Mar 2023 15:56) (101/76 - 152/107)  BP(mean): 83 (31 Mar 2023 12:50) (83 - 83)  RR: 19 (31 Mar 2023 15:56) (18 - 19)  SpO2: 97% (31 Mar 2023 15:56) (89% - 97%)    Parameters below as of 31 Mar 2023 15:56  Patient On (Oxygen Delivery Method): nasal cannula  O2 Flow (L/min): 2          Constitutional: Somnolent  HEENT: PERRLA, EOMI,   Neck: Supple.  Respiratory: Breath sounds are clear bilaterally  Cardiovascular: S1 and S2, regular / irregular rhythm  Gastrointestinal: soft, nontender  Extremities:  no edema  Musculoskeletal: no joint swelling/tenderness, no abnormal movements  Skin: No rashes    Neurological exam:  HF: Somnolent but arousable. Oriented to person. Says hello.  CN: Visually impaired, pupils sluggish, + dysarthria   Motor: moves all extremities, no focal weakness  Sens: Intact to light touch   Reflexes: Symmetric   Coord:  unable to test  Gait/Balance: unable to test    NIHSS:          Labs:   03-30    139  |  105  |  10  ----------------------------<  92  4.7   |  32<H>  |  0.98    Ca    8.6      30 Mar 2023 20:45  Mg     2.2     03-30    TPro  7.9  /  Alb  3.6  /  TBili  0.3  /  DBili  x   /  AST  31  /  ALT  29  /  AlkPhos  163<H>  03-30                              13.0   5.79  )-----------( 262      ( 30 Mar 2023 20:45 )             40.9       Radiology:  - CT Head:  - MRI brain  -MRA brain/Carotids  - EEG    A/P:    No IV tpa given because…    -ASA/PLAVIX  -Atorvastatin  -DVT prophylaxis  -Dysphagia screen  -Speech and swallow eval  -PT eval/ rehab eval    Mangement d/w Pt / family /     Total Critical Care Time spent:

## 2023-03-31 NOTE — SWALLOW BEDSIDE ASSESSMENT ADULT - SWALLOW EVAL: RECOMMENDED DIET
SUGGEST NPO RESTRICTION AS PT CLINICALLY APPEARS TO BE ASPIRATE THE MOST CONSERVATIVE FOOD TEXTURES. FURTHER, HE APPEARS OVERTLY UNCOMFORTABLE/CONGESTED WHEN ASPIRATION EPISODES OCCUR.

## 2023-03-31 NOTE — SWALLOW BEDSIDE ASSESSMENT ADULT - ORAL PHASE
Bolus formation transfer were characterized by moderately prolonged/disorganized/discontinuous lingual pumping actions. Piecemeal deglutition was evident. Mild+ tongue debris noted with puree food.

## 2023-03-31 NOTE — DIETITIAN INITIAL EVALUATION ADULT - ORAL INTAKE PTA/DIET HISTORY
Unable to obtain intake/ diet hx pta 2/2 pt being a poor historian. Lives at group home as per EMR.

## 2023-03-31 NOTE — H&P ADULT - NSHPPHYSICALEXAM_GEN_ALL_CORE
T(C): 36.3 (03-30-23 @ 23:24), Max: 36.7 (03-30-23 @ 18:28)  HR: 82 (03-30-23 @ 23:24) (77 - 82)  BP: 117/84 (03-30-23 @ 23:24) (117/84 - 152/107)  RR: 18 (03-30-23 @ 23:24) (18 - 18)  SpO2: 96% (03-30-23 @ 23:24) (94% - 96%)    CONSTITUTIONAL: no apparent distress  EYES: PERRLA and symmetric, EOMI, No conjunctival or scleral injection, non-icteric  ENMT: Oral mucosa with moist membranes. Normal dentition; no pharyngeal injection or exudates             NECK: Supple, symmetric and without tracheal deviation   RESP: No respiratory distress, no use of accessory muscles; CTA b/l, no WRR  CV: RRR, +S1S2, no MRG; no JVD; no peripheral edema  GI: Soft, NT, ND, no rebound, no guarding; no palpable masses; no hepatosplenomegaly; no hernia palpated  LYMPH: No cervical LAD or tenderness;   MSK:  Unable to assess as patient has cognitive impairment.   SKIN: No rashes or ulcers noted; no subcutaneous nodules or induration palpable  NEURO: Unable to assess as patient has cognitive impairment.   PSYCH:  Unable to assess as patient has cognitive impairment..

## 2023-03-31 NOTE — CONSULT NOTE ADULT - ASSESSMENT
52 year old Male with PMHx of hydrocephalus s/p multiple brain surgeries/ peritoneal shunt, legally blind, Epilepsy, and hx of aspiration PNA presented to the ED from [facility] sent in by speech pathologist for aspiration, trouble swallowing and for possible G-Tube placement.   He was evaluated by speech pathology in the hospital as well who agreed that patient should be NPO and have PEG placed.    Currently NPO.  -Lamotrigine is not available in IV form. Other sodium channel markers are more for focal seizures. I am not sure about his seizure type so will use levetiracetam 750 mg IV q12.  -Primidone will be substituted with phenobarbital 50 mg IV q12  -Clobazam 5 mg BID will be substituted with diazepam 5 mg IV q12    When the PEG tube is able to be used, can convert back to home meds.    His Aunt is concerned about PEG placement in setting of multiple tubes from previous shunts. I reassured her that imaging can be done if GI feels necessary.

## 2023-03-31 NOTE — SWALLOW BEDSIDE ASSESSMENT ADULT - SWALLOW EVAL: CRITERIA FOR SKILLED INTERVENTION MET
DO NOT FEEL THAT ACUTE SPEECH PATHOLOGY FOLLOW UP WOULD CHANGE CLINICAL MANAGEMENT/OUTCOME IN ACUTE HOSPITAL SETTING. PT'S COGNITIVE DYSFUNCTION., DYSARTHRIA, AND DYSPHAGIA ARE CHRONIC PRE-EXISTING IRREVERSIBLE CONDITIONS. FURTHER, HIS CHRONIC COGNITIVE DYSFUNCTION IS TOO SEVERE FOR PT TO BE CONSIDERED A VIABLE RESTORATIVE SPEECH PATHOLOGY INTERVENTION CANDIDATE. GIVEN ABOVE, THIS SERVICE WILL NOT ACTIVELY FOLLOW. RECONSULT PRN SHOULD STATUS CHANGE AND CONDITION WARRANT.

## 2023-03-31 NOTE — SWALLOW BEDSIDE ASSESSMENT ADULT - DIET PRIOR TO ADMI
PT IS ON A PUREE DIET WITH HONEY THICK LIQUIDS AT [facility]. THE MANAGER OF THE GROUP HOME WAS AT BEDSIDE AND STATED THAT PT OVERTLY COUGHS WHEN EATING, DESPITE FOOD TEXTURE RESTRICTIONS. FURTHER, HE REPORTEDLY FAILED A SWALLOW EXAM THAT WAS DONE IN GROUP HOME THE DAY BEFORE HOSPITALIZATION AT WHICH TIME THE SLP CONDUCTING EXAM SUGGESTED THAT PT BE NPO.

## 2023-03-31 NOTE — PHARMACOTHERAPY INTERVENTION NOTE - COMMENTS
Medication history complete, reviewed medications with patient provided med list and confirmed with doctor first med hx

## 2023-03-31 NOTE — H&P ADULT - ASSESSMENT
A/P:    1.  Difficulty swallowing  ?? h/o Aspiration  -keep NPO  -follow Speech therapy evaluation  -follow GI consult for possible G tube placement    2.  SCD for DVT ppx    3.  Code status: Full code

## 2023-03-31 NOTE — DIETITIAN INITIAL EVALUATION ADULT - PERTINENT LABORATORY DATA
03-30    139  |  105  |  10  ----------------------------<  92  4.7   |  32<H>  |  0.98    Ca    8.6      30 Mar 2023 20:45  Mg     2.2     03-30    TPro  7.9  /  Alb  3.6  /  TBili  0.3  /  DBili  x   /  AST  31  /  ALT  29  /  AlkPhos  163<H>  03-30    Folate, Serum: >20.0 ng/mL (02-20-23 @ 08:43)  Vitamin B12, Serum: 1086 pg/mL (02-20-23 @ 08:43)  Vitamin B12, Serum: 966 pg/mL (07-02-21 @ 09:02)  Folate, Serum: 14.3 ng/mL (07-02-21 @ 09:02)

## 2023-03-31 NOTE — DIETITIAN INITIAL EVALUATION ADULT - OTHER CALCULATIONS
Energy and fluid needs based on bedscale wt taken by RD on 3/31 - 201#  Protein needs based on IBW - 154#

## 2023-04-01 NOTE — PROGRESS NOTE ADULT - SUBJECTIVE AND OBJECTIVE BOX
52 year old Male with PMHx of hydrocephalus s/p multiple brain surgeries/ peritoneal shunt, legally blind, Epilepsy (last seizure years ago), and hx of aspiration PNA presented to the ED from [facility] sent in by speech pathologist for aspiration, trouble swallowing and for possible G-Tube placement.     3/31- pt was seen and examined. was seen by speech pathologist and recommends NPO as patient is aspirating every consistency that was offered. Stated that he feels hungry.  4.01: no change in status, no fever, no chills, no distress    ROS unable to obtain     Vital Signs Last 24 Hrs  T(C): 36.7 (01 Apr 2023 07:38), Max: 36.8 (01 Apr 2023 01:00)  T(F): 98.1 (01 Apr 2023 07:38), Max: 98.2 (01 Apr 2023 01:00)  HR: 80 (01 Apr 2023 07:38) (77 - 80)  BP: 121/82 (01 Apr 2023 07:38) (121/82 - 133/77)  RR: 18 (01 Apr 2023 07:38) (18 - 19)  SpO2: 94% (01 Apr 2023 07:38) (94% - 94%)    Parameters below as of 01 Apr 2023 07:38  Patient On (Oxygen Delivery Method): nasal cannula  O2 Flow (L/min): 2      Parameters below as of 31 Mar 2023 12:50  Patient On (Oxygen Delivery Method): nasal cannula  O2 Flow (L/min): 2      ROS:   All 10 systems reviewed and found to be negative with the exception of what has been described above.     PE:  Constitutional: NAD, laying in bed  HEENT: NC/AT  Back: no tenderness  Respiratory: respirations even and non labored, diminshed. clear   Cardiovascular: S1S2 regular, no murmurs  Abdomen: soft, not tender, not distended, positive BS  Genitourinary: voiding  Rectal: deferred  Musculoskeletal: no muscle tenderness, no joint swelling or tenderness  Extremities: no pedal edema   Neurological: no focal deficits      MEDICATIONS  (STANDING):  dextrose 5% + sodium chloride 0.9%. 1000 milliLiter(s) (50 mL/Hr) IV Continuous <Continuous>  diazepam  Injectable 5 milliGRAM(s) IV Push every 12 hours  dorzolamide 2%/timolol 0.5% Ophthalmic Solution 1 Drop(s) Both EYES two times a day  escitalopram 10 milliGRAM(s) Oral daily  lactulose Syrup 10 Gram(s) Oral daily  levETIRAcetam  IVPB 750 milliGRAM(s) IV Intermittent every 12 hours  LORazepam   Injectable 2 milliGRAM(s) IV Push once  OLANZapine 5 milliGRAM(s) Oral every 12 hours  pantoprazole    Tablet 40 milliGRAM(s) Oral before breakfast  PHENobarbital IVPB 50 milliGRAM(s) IV Intermittent every 12 hours  polyethylene glycol 3350 17 Gram(s) Oral daily  senna 2 Tablet(s) Oral at bedtime      03-30    139  |  105  |  10  ----------------------------<  92  4.7   |  32<H>  |  0.98    Ca    8.6      30 Mar 2023 20:45  Mg     2.2     03-30    TPro  7.9  /  Alb  3.6  /  TBili  0.3  /  DBili  x   /  AST  31  /  ALT  29  /  AlkPhos  163<H>  03-30                            13.0   5.79  )-----------( 262      ( 30 Mar 2023 20:45 )             40.9       < from: Xray Abdomen 1 View PORTABLE -Urgent (Xray Abdomen 1 View PORTABLE -Urgent .) (03.31.23 @ 11:22) >  XR ABDOMEN PORTABLE URGENT 1V   ORDERED BY: PACO KUMAR     PROCEDURE DATE:  03/31/2023          INTERPRETATION:  Supine abdominal film. Concern is aspiration pneumonia   possible obstruction.    Drainage tube overlying the right upper quadrant again noted.    There is mildly increased fecal content in the colon but no sign of   obstruction.    There is a mild left lumbar curve.    Findings are similar to February 22.    IMPRESSION: No sign of obstruction.    A/P:    1. Dysphagia:  ?? h/o Aspiration  -keep NPO  -follow Speech therapy evaluation- recommend NPO- will need PEG   -GI made aware  - Legal guardian Christiane Peymanin [phone]/ [phone]-   - IVF for now  - nutrition consult for TF recommendation     2.  h/o Seizures:  c/w IV AED per neurology    3.  Code status: Full code

## 2023-04-01 NOTE — PROGRESS NOTE ADULT - ASSESSMENT
52 year old Male with PMHx of hydrocephalus s/p multiple brain surgeries/ peritoneal shunt, legally blind, Epilepsy, and hx of aspiration PNA presented to the ED from [facility] sent in by speech pathologist for aspiration, trouble swallowing and for possible G-Tube placement.   He was evaluated by speech pathology in the hospital as well who agreed that patient should be NPO and have PEG placed.    Currently NPO.  PO anticonvulsant medications changed.  -Continue Phenobarbital 50 mg IV q12  -Continue Levetiracetam 750 mg IV q12  -Continue diazepam 5 mg IV q12    After placement of PEG tube, and when able to use tube for meds, can restart:  -Primidone 250 mg bid  -Clobazam 5 mg BID  -Lamotrigine 300 mg BID.   At that point, can d/c above medications.  If off Lamotrigine for more than one week, may need to start at lower lose and titrate up. If so, would start Lamotrigine 150 mg BID and add 50 mg BID each week until back to 300 mg BID.   In that case may need to use levetiracetam 500 mg BID while increasing lamotrigine.     Please call back if additional input is needed from neurology.

## 2023-04-01 NOTE — PROGRESS NOTE ADULT - SUBJECTIVE AND OBJECTIVE BOX
Interval History:  4/1/23: No new events    MEDICATIONS  (STANDING):  dextrose 5% + sodium chloride 0.9%. 1000 milliLiter(s) (50 mL/Hr) IV Continuous <Continuous>  diazepam  Injectable 5 milliGRAM(s) IV Push every 12 hours  dorzolamide 2%/timolol 0.5% Ophthalmic Solution 1 Drop(s) Both EYES two times a day  escitalopram 10 milliGRAM(s) Oral daily  lactulose Syrup 10 Gram(s) Oral daily  levETIRAcetam  IVPB 750 milliGRAM(s) IV Intermittent every 12 hours  LORazepam   Injectable 2 milliGRAM(s) IV Push once  OLANZapine 5 milliGRAM(s) Oral every 12 hours  pantoprazole    Tablet 40 milliGRAM(s) Oral before breakfast  PHENobarbital IVPB 50 milliGRAM(s) IV Intermittent every 12 hours  polyethylene glycol 3350 17 Gram(s) Oral daily  senna 2 Tablet(s) Oral at bedtime    MEDICATIONS  (PRN):  acetaminophen     Tablet .. 650 milliGRAM(s) Oral every 6 hours PRN Temp greater or equal to 38C (100.4F), Mild Pain (1 - 3)  aluminum hydroxide/magnesium hydroxide/simethicone Suspension 30 milliLiter(s) Oral every 4 hours PRN Dyspepsia  bisacodyl Suppository 10 milliGRAM(s) Rectal daily PRN Constipation  melatonin 3 milliGRAM(s) Oral at bedtime PRN Insomnia  ondansetron Injectable 4 milliGRAM(s) IV Push every 8 hours PRN Nausea and/or Vomiting      Allergies    codeine (Hives)  codeine (Stomach Upset)  Depakote (Other)  seasonal allergies: nasal congestion (Other)    Intolerances        PHYSICAL EXAM:  Vital Signs Last 24 Hrs  T(F): 98.1 (04-01-23 @ 07:38)  HR: 80 (04-01-23 @ 07:38)  BP: 121/82 (04-01-23 @ 07:38)  RR: 18 (04-01-23 @ 07:38)    HF: Somnolent but arousable  Says hello.  CN: Visually impaired, pupils sluggish, + dysarthria   Motor: moves all extremities, no focal weakness  Sens: Intact to light touch   Coord:  unable to test  Gait/Balance: unable to test      LABS:                        13.0   5.79  )-----------( 262      ( 30 Mar 2023 20:45 )             40.9     03-30    139  |  105  |  10  ----------------------------<  92  4.7   |  32<H>  |  0.98    Ca    8.6      30 Mar 2023 20:45  Mg     2.2     03-30    TPro  7.9  /  Alb  3.6  /  TBili  0.3  /  DBili  x   /  AST  31  /  ALT  29  /  AlkPhos  163<H>  03-30          RADIOLOGY & ADDITIONAL STUDIES:

## 2023-04-01 NOTE — CHART NOTE - NSCHARTNOTEFT_GEN_A_CORE
Clinical Nutrition BRIEF Note    *51 y/o M with a PMH of hydrocephalus s/p multiple brain surgeries/ peritoneal shunt, legally blind, Epilepsy (last seizure years ago), and hx of aspiration PNA presented to the ED from [facility] sent in by speech pathologist for aspiration, trouble swallowing and for possible G-Tube placement. Admitted for dysphagia. Pending SLP nirali. FULL CODE.     *RD consulted on 3/31 for assess/ TF. IA completed on 3/31. SLP nirali recommended to keep NPO for now. Possible PEG tube placement - to be determined by GI. Spoke w/ Dr. Brunson, unsure when PEG tube to be placed. Currently NPO x 2 days. Recommend to place corpak as unknown when possible PEG tube will be placed. PN not appropriate at this time d/t functioning GI tract. See below for recommendations.       *labs reviewed; 03-30    139  |  105  |  10  ----------------------------<  92  4.7   |  32<H>  |  0.98    Ca    8.6      30 Mar 2023 20:45  Mg     2.2     03-30    TPro  7.9  /  Alb  3.6  /  TBili  0.3  /  DBili  x   /  AST  31  /  ALT  29  /  AlkPhos  163<H>  03-30      *BM (+) 3/31 - large brown soft stool - on lactulose, senna, miralax, bisacodyl prn   *edema: 2+ L/R foot; non-pitting R arm    Diet, NPO (03-30-23 @ 22:08)    ESTIMATED NUTR NEEDS based on 91.1 Kg (bed scale wt taken by RD on 3/31)        1880-8420 Kcal  (20-25 Kcal/Kg)            g protein (1.2-1.5 g/Kg)          1671-2635 mL   (20-25 mL/Kg)    RECOMMENDATIONS  1) NPO as per SLP. Highly suggest to place corpak 2/2 unknown when PEG tube will be placed and not appropriate to keep NPO x > 3 days. PN not appropriate at this time d/t functioning GI tract.  2) When TF placed (corpak vs. PEG), initiate Jevity 1.5 @ 20 cc/hr, increase by 10 cc/hr q6 hours until goal rate of 70cc/hr is met. Will provide ~ 2100 kcal, 89 g protein, and 1064 mL free water.   3) Obtain vitamin D 25OH level to assess nutriture  4) Consider adding thiamine 100 mg daily 2/2 poor PO intake/ malnutrition  5) Recommend to add MVI w/minerals, Vit C 500 mg BID, add Zinc Sulfate 220 mg x 10 days to promote wound healing.   6) Obtain daily weights to track/trend changes   7) Maintain aspiration precautions, back of bed >45 degrees.  8) monitor hydration status; adjust free water flushes prn, consider free water flushes of 35mL/hr (which provides ~ 840mL of water per day)  9) monitor BM: if > 3 days without BM, add bowel regimen prn  10) Confirm goals of care regarding LONG-TERM nutrition support     *will continue to monitor and adjust nutrition plan prn*  Comfort Cardona, RDN [phone] Clinical Nutrition BRIEF Note    *53 y/o M with a PMH of hydrocephalus s/p multiple brain surgeries/ peritoneal shunt, legally blind, Epilepsy (last seizure years ago), and hx of aspiration PNA presented to the ED from [facility] sent in by speech pathologist for aspiration, trouble swallowing and for possible G-Tube placement. Admitted for dysphagia. Pending SLP nirali. FULL CODE.     *RD consulted on 3/31 for assess/ TF. IA completed on 3/31. SLP nirali recommended to keep NPO for now. Possible PEG tube placement - to be determined by GI. Spoke w/ Dr. Brunson, unsure when PEG tube to be placed. Currently NPO x 2 days. Recommend to place corpak if unable to place PEG tube within 48 hrs. PN not appropriate at this time d/t functioning GI tract. See below for recommendations.       *labs reviewed; 03-30    139  |  105  |  10  ----------------------------<  92  4.7   |  32<H>  |  0.98    Ca    8.6      30 Mar 2023 20:45  Mg     2.2     03-30    TPro  7.9  /  Alb  3.6  /  TBili  0.3  /  DBili  x   /  AST  31  /  ALT  29  /  AlkPhos  163<H>  03-30      *BM (+) 3/31 - large brown soft stool - on lactulose, senna, miralax, bisacodyl prn   *edema: 2+ L/R foot; non-pitting R arm    *malnutrition: Pt meets criteria for severe malnutrition in context of acute on chronic illness r/t decreased ability to meet increased nutrient needs 2/2 dysphagia, hydrocephalus AEB mild to severe muscle/ fat wasting     Diet, NPO (03-30-23 @ 22:08)    ESTIMATED NUTR NEEDS based on 91.1 Kg (bed scale wt taken by RD on 3/31)        8789-7518 Kcal  (20-25 Kcal/Kg)          g protein (1.2-1.5 g/Kg)        3832-7118 mL   (20-25 mL/Kg)    RECOMMENDATIONS  1) Keep NPO as per SLP. Highly suggest to place corpak (if unable to place PEG tube within 48 hrs) - not appropriate to keep NPO x > 3 days. PN not appropriate at this time d/t functioning GI tract.  2) When TF placed (corpak vs. PEG), initiate Jevity 1.5 @ 20 cc/hr, increase by 10 cc/hr q6 hours until goal rate of 70cc/hr is met. Will provide ~ 2100 kcal, 89 g protein, and 1064 mL free water.   3) Obtain vitamin D 25OH level to assess nutriture  4) Consider adding thiamine 100 mg daily 2/2 poor PO intake/ malnutrition  5) Recommend to add MVI w/minerals, Vit C 500 mg BID, add Zinc Sulfate 220 mg x 10 days to promote wound healing.   6) Obtain daily weights to track/trend changes   7) Maintain aspiration precautions, back of bed >45 degrees.  8) monitor hydration status; adjust free water flushes prn, consider free water flushes of 35mL/hr (which provides ~ 840mL of water per day)  9) monitor BM: if > 3 days without BM, add bowel regimen prn  10) Confirm goals of care regarding LONG-TERM nutrition support     *will continue to monitor and adjust nutrition plan prn*  Comfort Cardona, STANN [phone]

## 2023-04-02 NOTE — PROGRESS NOTE ADULT - SUBJECTIVE AND OBJECTIVE BOX
52 year old Male with PMHx of hydrocephalus s/p multiple brain surgeries/ peritoneal shunt, legally blind, Epilepsy (last seizure years ago), and hx of aspiration PNA presented to the ED from [facility] sent in by speech pathologist for aspiration, trouble swallowing and for possible G-Tube placement.     3/31- pt was seen and examined. was seen by speech pathologist and recommends NPO as patient is aspirating every consistency that was offered. Stated that he feels hungry.  4.01: no change in status, no fever, no chills, no distress  4.02: no obvious pain    ROS unable to obtain     Vital Signs Last 24 Hrs  T(C): 36.3 (02 Apr 2023 15:49), Max: 36.7 (02 Apr 2023 07:54)  T(F): 97.3 (02 Apr 2023 15:49), Max: 98.1 (02 Apr 2023 07:54)  HR: 82 (02 Apr 2023 15:49) (79 - 85)  BP: 133/96 (02 Apr 2023 15:49) (125/83 - 148/87)  BP(mean): --  RR: 18 (02 Apr 2023 15:49) (18 - 18)  SpO2: 93% (02 Apr 2023 15:49) (93% - 98%)    Parameters below as of 02 Apr 2023 15:49  Patient On (Oxygen Delivery Method): nasal cannula  O2 Flow (L/min): 2      PE:  Constitutional: NAD, laying in bed  HEENT: NC/AT  Back: no tenderness  Respiratory: respirations even and non labored, diminshed. clear   Cardiovascular: S1S2 regular, no murmurs  Abdomen: soft, not tender, not distended, positive BS  Genitourinary: voiding  Rectal: deferred  Musculoskeletal: no muscle tenderness, no joint swelling or tenderness  Extremities: no pedal edema   Neurological: no focal deficits      MEDICATIONS  (STANDING):  dextrose 5% + sodium chloride 0.9%. 1000 milliLiter(s) (50 mL/Hr) IV Continuous <Continuous>  diazepam  Injectable 5 milliGRAM(s) IV Push every 12 hours  dorzolamide 2%/timolol 0.5% Ophthalmic Solution 1 Drop(s) Both EYES two times a day  escitalopram 10 milliGRAM(s) Oral daily  lactulose Syrup 10 Gram(s) Oral daily  levETIRAcetam  IVPB 750 milliGRAM(s) IV Intermittent every 12 hours  LORazepam   Injectable 2 milliGRAM(s) IV Push once  OLANZapine 5 milliGRAM(s) Oral every 12 hours  pantoprazole    Tablet 40 milliGRAM(s) Oral before breakfast  PHENobarbital IVPB 50 milliGRAM(s) IV Intermittent every 12 hours  polyethylene glycol 3350 17 Gram(s) Oral daily  senna 2 Tablet(s) Oral at bedtime      03-30    139  |  105  |  10  ----------------------------<  92  4.7   |  32<H>  |  0.98    Ca    8.6      30 Mar 2023 20:45  Mg     2.2     03-30    TPro  7.9  /  Alb  3.6  /  TBili  0.3  /  DBili  x   /  AST  31  /  ALT  29  /  AlkPhos  163<H>  03-30                            13.0   5.79  )-----------( 262      ( 30 Mar 2023 20:45 )             40.9       < from: Xray Abdomen 1 View PORTABLE -Urgent (Xray Abdomen 1 View PORTABLE -Urgent .) (03.31.23 @ 11:22) >  XR ABDOMEN PORTABLE URGENT 1V   ORDERED BY: PACO KUMAR     PROCEDURE DATE:  03/31/2023          INTERPRETATION:  Supine abdominal film. Concern is aspiration pneumonia   possible obstruction.    Drainage tube overlying the right upper quadrant again noted.    There is mildly increased fecal content in the colon but no sign of   obstruction.    There is a mild left lumbar curve.    Findings are similar to February 22.    IMPRESSION: No sign of obstruction.    A/P:    1. Dysphagia and h/o Aspiration Pna:  -keep NPO  -Speech therapy evaluation- recommend NPO- will need PEG   -GI made aware  - Legal guardian Christiane Guy [phone]/ [phone]-   - IVF for now  - nutrition consult for TF recommendation after GT placement     2.  h/o Seizures:  c/w IV AED per neurology and resume oral AED after feeding tube     3.  Code status: Full code    start DVT prophy

## 2023-04-03 NOTE — PROGRESS NOTE ADULT - ASSESSMENT
Dysphagia and h/o Aspiration PNA  Severe protein-calorie malnutrition  - Maintain NPO  - Speech therapy evaluation- recommend NPO- will need PEG   - was scheduled for PEG today, however guardian does not want PEG placed at this time - procedure cancelled  - Palliative consulted   - Guardian/aunt was requesting a CT head to r/o recurrent CVA this AM, pt on ativan 2mg q12h interchange with home benzo for seizure disorder, will decrease dose, neuro checks   - IVF for now    h/o Seizures  -Home meds: Primidone 250 mg bid, Clobazam 5 mg BID, Lamotrigine 300 mg BID po  -Current meds: Phenobarbital 50 mg IV q12, Levetiracetam 750 mg IV q12, ativan 2mg q12h  -Neurology consult appreciated     Code status: DNR/DNI    VTE PPX  -Heparin subQ       - Legal guardian Christiane Sainiin [phone]/ [phone]    DISPO: pending discussion with guardian/palliative, and approval by OPWDD Dysphagia and h/o Aspiration PNA  Severe protein-calorie malnutrition  - Maintain NPO  - Speech therapy evaluation- recommend NPO- will need PEG   - was scheduled for PEG today, however guardian does not want PEG placed at this time - procedure cancelled  - Palliative consulted   - Guardian/aunt was requesting a CT head to r/o recurrent CVA this AM, pt on ativan 2mg q12h interchange with home benzo for seizure disorder, will decrease dose, neuro checks   - IVF for now    h/o Seizures  -Home meds: Primidone 250 mg bid, Clobazam 5 mg BID, Lamotrigine 300 mg BID po  -Current meds: Phenobarbital 50 mg IV q12, Levetiracetam 750 mg IV q12, ativan 2mg q12h --> will decrease dose   -PRN Ativan   -Neurology consult appreciated     Code status: DNR/DNI    VTE PPX  -Heparin subQ       - Legal guardian Christiane Guy [phone]/ [phone]    DISPO: pending discussion with guardian/palliative, and approval by OPWDD

## 2023-04-03 NOTE — PROGRESS NOTE ADULT - SUBJECTIVE AND OBJECTIVE BOX
HPI: 52 year old Male with PMHx of hydrocephalus s/p multiple brain surgeries/ peritoneal shunt, legally blind, Epilepsy (last seizure years ago), and hx of aspiration PNA presented to the ED from [facility] sent in by speech pathologist for aspiration, trouble swallowing and for possible G-Tube placement.     Interval Hx: Patient seen this AM, drowsy, spontaneously moving upper extremities, has been on ativan 2mg q12h, as his AED were changed to IV on Friday. Noted with abdominal distention today, s/p enema with large BM. Patient was scheduled for PEG placement today, however guardian/aunt does not want a PEG, requests hospice care. Palliative consulted.     ROS: unable to obtain 2/2 patient's condition     Vital Signs Last 24 Hrs  T(C): 37.4 (03 Apr 2023 08:08), Max: 37.4 (03 Apr 2023 08:08)  T(F): 99.3 (03 Apr 2023 08:08), Max: 99.3 (03 Apr 2023 08:08)  HR: 96 (03 Apr 2023 08:08) (74 - 96)  BP: 137/99 (03 Apr 2023 08:08) (120/74 - 137/99)  RR: 17 (03 Apr 2023 08:08) (17 - 18)  SpO2: 94% (03 Apr 2023 08:08) (93% - 97%)    Parameters below as of 03 Apr 2023 08:08  Patient On (Oxygen Delivery Method): nasal cannula  O2 Flow (L/min): 2    PHYSICAL EXAM:  GENERAL: NAD, lying in bed comfortably  HEAD:  Atraumatic, Normocephalic  EYES: conjunctiva and sclera clear  ENT: Moist mucous membranes  NECK: Supple, No JVD  CHEST/LUNG: Clear to auscultation bilaterally; No rales, rhonchi, wheezing. Unlabored respirations  HEART: Regular rate and rhythm; No murmurs  ABDOMEN: Bowel sounds present; Soft, Nontender, Nondistended  EXTREMITIES:  2+ Peripheral Pulses, brisk capillary refill. No clubbing, cyanosis, or edema  NERVOUS SYSTEM:  drowsy    MEDICATIONS  (STANDING):  Biotene Dry Mouth Oral Rinse 5 milliLiter(s) Swish and Spit four times a day  dextrose 5% + sodium chloride 0.9%. 1000 milliLiter(s) (50 mL/Hr) IV Continuous <Continuous>  dorzolamide 2%/timolol 0.5% Ophthalmic Solution 1 Drop(s) Both EYES two times a day  escitalopram 10 milliGRAM(s) Oral daily  heparin   Injectable 5000 Unit(s) SubCutaneous every 12 hours  lactulose Syrup 10 Gram(s) Oral daily  levETIRAcetam  IVPB 750 milliGRAM(s) IV Intermittent every 12 hours  LORazepam   Injectable 2 milliGRAM(s) IV Push every 12 hours  LORazepam   Injectable 2 milliGRAM(s) IV Push once  OLANZapine Injectable 5 milliGRAM(s) IntraMuscular every 12 hours  pantoprazole  Injectable 40 milliGRAM(s) IV Push daily  PHENobarbital IVPB 50 milliGRAM(s) IV Intermittent every 12 hours  polyethylene glycol 3350 17 Gram(s) Oral daily  senna 2 Tablet(s) Oral at bedtime    MEDICATIONS  (PRN):  acetaminophen  Suppository .. 650 milliGRAM(s) Rectal every 6 hours PRN Temp greater or equal to 38C (100.4F), Mild Pain (1 - 3)  aluminum hydroxide/magnesium hydroxide/simethicone Suspension 30 milliLiter(s) Oral every 4 hours PRN Dyspepsia  bisacodyl Suppository 10 milliGRAM(s) Rectal daily PRN Constipation  ondansetron Injectable 4 milliGRAM(s) IV Push every 8 hours PRN Nausea and/or Vomiting            LABS:                          14.8   5.96  )-----------( 240      ( 03 Apr 2023 12:22 )             44.8     03 Apr 2023 12:22    140    |  111    |  7      ----------------------------<  95     3.6     |  24     |  0.74     Ca    8.4        03 Apr 2023 12:22  Phos  2.5       03 Apr 2023 12:22  Mg     2.2       03 Apr 2023 12:22          CAPILLARY BLOOD GLUCOSE                RADIOLOGY:

## 2023-04-03 NOTE — CHART NOTE - NSCHARTNOTEFT_GEN_A_CORE
Had long discussion with legal guardian. Patient never wanted a feeding tube (there is documentation supporting this). When talking with the guardian, she doesn't feel that a feeding tube is appropriate either. She just thought he "had" to have one to be in his group home.     I explained that PEG tubes do not decrease risk of aspiration, prolong life, reduce bedsores; in a patient like him they will only lead to problems. Placement should not dictate PEG placement.     She states that he is happy drinking coffee. If this makes him happy, so be it, in my opinion, as the PEG tube won't decrease the risk of aspiration.     I called palliative care so they can discuss options with her. Procedure cancelled.

## 2023-04-03 NOTE — CONSULT NOTE ADULT - SUBJECTIVE AND OBJECTIVE BOX
HPI: Pt is a 52y old Male with PMHx of hydrocephalus s/p multiple brain surgeries/ peritoneal shunt, legally blind, Epilepsy (last seizure years ago), and hx of aspiration PNA presented to the ED from [facility] sent in by speech pathologist for aspiration, trouble swallowing and for possible G-Tube placement. Palliative medicine has seen him on prior admission in Feb 2023 and we are re consulted as his guardian Christi Guy does not want a PEG placed and requests comfort and hospice care.   4/3/23 Seen and examined at bedside with Christi in room. Pt awake and minimally verbal.         PAIN: ( )Yes   ( )No  No non verbal signs or symptoms    DYSPNEA: ( ) Yes  ( ) No  Mild at rest    PAST MEDICAL & SURGICAL HISTORY:  Hydrocephalus  age 2 months  Legally blind  Glaucoma  Sleep apnea  not on CPAP  Epilepsy  Osteoporosis  Elevated liver enzymes  Environmental Allergies  Impulse control disorder  worsened when pt on Depakote, pt now off  Mild mental retardation  Hiatal hernia  Aspiration pneumonia  at age 25  Viral pneumonia  h/o  BPH without obstruction/lower urinary tract symptoms  Glaucoma  on eye gtts  Mentally disabled  Chronic GERD  S/P  Shunt  originally had shunt to lung, then revised to peritoneal shunt, has had multiple revisions  Epilepsy  s/p Vagal Nerve Stimulator Implant , 2007, last one in 2016  H/O craniotomy  at age 18  Heel cord contracture  s/p surgical correction b/l  History of tonsillectomy  S/p bilateral myringotomy with tube placement  Obstructed  shunt  revision of  shunt 2015  S/P  shunt      SOCIAL HX:  Lives in group home  OPWDD involved  Hx opiate tolerance ( )YES  (X )NO    Baseline ADLs  (Prior to Admission)  ( ) Independent   (X )Dependent    FAMILY HISTORY:  Family history of stroke (Mother)    Review of Systems:    Unable to obtain/Limited due to: altered mentation      PHYSICAL EXAM:    Vital Signs Last 24 Hrs  T(C): 37.4 (03 Apr 2023 08:08), Max: 37.4 (03 Apr 2023 08:08)  T(F): 99.3 (03 Apr 2023 08:08), Max: 99.3 (03 Apr 2023 08:08)  HR: 96 (03 Apr 2023 08:08) (74 - 96)  BP: 137/99 (03 Apr 2023 08:08) (120/74 - 137/99)  RR: 17 (03 Apr 2023 08:08) (17 - 18)  SpO2: 94% (03 Apr 2023 08:08) (93% - 97%)  Parameters below as of 03 Apr 2023 08:08  Patient On (Oxygen Delivery Method): nasal cannula  O2 Flow (L/min): 2  PPSV2: 10-20  %    General: Middle aged male in bed   Mental Status: lethargic   HEENT: Oral mucosa dry  Lungs: clear diminished rogelio  Cardiac: S1S2+  GI: abd soft NT ND + BS  : voids  Ext: moves all ext  Neuro: AMS      LABS:                        14.8   5.96  )-----------( 240      ( 03 Apr 2023 12:22 )             44.8     04-03    140  |  111<H>  |  7   ----------------------------<  95  3.6   |  24  |  0.74    Ca    8.4<L>      03 Apr 2023 12:22  Phos  2.5     04-03  Mg     2.2     04-03  Albumin: Albumin, Serum: 3.6 g/dL (03-30 @ 20:45)    Allergies    codeine (Hives)  codeine (Stomach Upset)  Depakote (Other)  seasonal allergies: nasal congestion (Other)    Intolerances      MEDICATIONS  (STANDING):  Biotene Dry Mouth Oral Rinse 5 milliLiter(s) Swish and Spit four times a day  dextrose 5% + sodium chloride 0.9%. 1000 milliLiter(s) (50 mL/Hr) IV Continuous <Continuous>  dorzolamide 2%/timolol 0.5% Ophthalmic Solution 1 Drop(s) Both EYES two times a day  escitalopram 10 milliGRAM(s) Oral daily  heparin   Injectable 5000 Unit(s) SubCutaneous every 12 hours  lactulose Syrup 10 Gram(s) Oral daily  levETIRAcetam  IVPB 750 milliGRAM(s) IV Intermittent every 12 hours  LORazepam   Injectable 2 milliGRAM(s) IV Push every 12 hours  LORazepam   Injectable 2 milliGRAM(s) IV Push once  OLANZapine Injectable 5 milliGRAM(s) IntraMuscular every 12 hours  pantoprazole  Injectable 40 milliGRAM(s) IV Push daily  PHENobarbital IVPB 50 milliGRAM(s) IV Intermittent every 12 hours  polyethylene glycol 3350 17 Gram(s) Oral daily  senna 2 Tablet(s) Oral at bedtime    MEDICATIONS  (PRN):  acetaminophen  Suppository .. 650 milliGRAM(s) Rectal every 6 hours PRN Temp greater or equal to 38C (100.4F), Mild Pain (1 - 3)  aluminum hydroxide/magnesium hydroxide/simethicone Suspension 30 milliLiter(s) Oral every 4 hours PRN Dyspepsia  bisacodyl Suppository 10 milliGRAM(s) Rectal daily PRN Constipation  ondansetron Injectable 4 milliGRAM(s) IV Push every 8 hours PRN Nausea and/or Vomiting      RADIOLOGY/ADDITIONAL STUDIES:  < from: Xray Abdomen 1 View PORTABLE -Urgent (Xray Abdomen 1 View PORTABLE -Urgent .) (03.31.23 @ 11:22) >    ACC: [id number] EXAM:  XR ABDOMEN PORTABLE URGENT 1V   ORDERED BY: PACO KUMAR     PROCEDURE DATE:  03/31/2023          INTERPRETATION:  Supine abdominal film. Concern is aspiration pneumonia   possible obstruction.    Drainage tube overlying the right upper quadrant again noted.    There is mildly increased fecal content in the colon but no sign of   obstruction.    There is a mild left lumbar curve.    Findings are similar to February 22.    IMPRESSION: No sign of obstruction.      < from: Xray Chest 1 View- PORTABLE-Urgent (03.30.23 @ 18:54) >    ACC: [id number] EXAM:  XR CHEST PORTABLE URGENT 1V   ORDERED BY: MAY MURPHY     PROCEDURE DATE:  03/30/2023          INTERPRETATION:  INDICATION: Admission    AP chest    COMPARISON: 2/21/2023    Limited by rotation    FINDINGS:  Heart/Vascular: The heart size, mediastinum, hilum and aorta are within   normal limits for projection.  Pulmonary: Midline trachea. Portion of the lower lobe is obscured by the   overlying medical device. Partial clearing of previous opacity left base.   Right calcified pleural plaques with pleural thickening. Persistent   airspace infiltrate on the right.  Bones: There is no fracture.  Lines and catheter: None    Impression:    Partial clearing of previous opacity left base.  Right calcified pleural plaques withpleural thickening.  Persistent airspace infiltrate on the right.    Consider obtaining CT scan.    < end of copied text >

## 2023-04-03 NOTE — CONSULT NOTE ADULT - CONVERSATION DETAILS
The role of palliative medicine was reviewed with pt's guardian Aunt who I had met in Feb on the pt's previous adm. She states " I do not want a feeding tube placed" as he has recurring aspiration., I am requesting a referral to [facility] care. I reminded her that we must complete another MOLST checklist with the state OPWDD and apply for comfort measures only as she feels that placing a PEG will cause undue burden to Andi and will NOT eliminate the risk fro aspiration. The pt has chronic aspiration and is unable to tolerate anything PO. I reassured her that we will re apply to OPWDD with her wishes tomorrow.

## 2023-04-03 NOTE — CONSULT NOTE ADULT - NS ATTEND AMEND GEN_ALL_CORE FT
52 year old man with hydrocephalus s/p  shunt, cognitive delay, with dysphagia/aspiration.     For speech and swallow evaluation, will follow up recs.
pt is known to palliative service  he was in the hospital about a month ago 2/2 to aspiration pna 2/2 to his oropharyngeal dysphagia.   Since that he did recover his acute aspiration and was sent back to  w/ modified diet.  Unfortunately his dysphagia progressed signficantly.  He was sent from  d/t inability to eat any consistancy of food w/ out coughing.  Found to have dysphagia fruther progressed and so severe that he no longer could keep eating by mouth and would need NPO w/ PEG lpacment to return back to the .     That being said family member (aunt) and patients guardian who is involved in his care reports she does not want to place a feeding tube d/t the fact he would no longer be able to enjoy food that brings him quality of life and dasha.  He reports to me tthat his favorite foods are chocolate ice-cream .  (although he doesn't seem to have much insight into his care today unfortunately-)

## 2023-04-03 NOTE — CONSULT NOTE ADULT - ASSESSMENT
HPI: Pt is a 52y old Male with PMHx of hydrocephalus s/p multiple brain surgeries/ peritoneal shunt, legally blind, Epilepsy (last seizure years ago), and hx of aspiration PNA presented to the ED from [facility] sent in by speech pathologist for aspiration, trouble swallowing and for possible G-Tube placement. Palliative medicine has seen him on prior admission in Feb 2023 and we are re consulted as his guardian Christi Guy does not want a PEG placed and requests comfort and hospice care.   4/3/23 Seen and examined at bedside with Christi in room. Pt awake and minimally verbal.       Assessment and Plan:    1) Dysphagia  -Recurring aspiration  -Swallow eval noted  -Repeat hospitalization  -CXR=Partial clearing of previous opacity left base.  Right calcified pleural plaques withpleural thickening.  Persistent airspace infiltrate on the right.  Consider obtaining CT scan.    2) Acute on chronic resp Failure  -Suspected Aspiration PNA  -Supplemental O2 PRN  -Abx as per med    3) Metabolic Encephalopathy:    -Suspect related to hypoxia/PNA    -Neuro eval  -Cont Keppra for seizure prophylaxis     4) Hydrocephalus/ mild MR:    -Unable to take home meds PO.    -Currently alert to name only  -Resides in group home    5) Advanced Directives  -Pt without capacity  -Guardianship paperwork on chart naming Christi Guy  -Will follow with OPWDD and further [facility] discussion   -Goals of care discussed with guardian Christi Guy. She does not want placement of feeding tube. We had made this request in Feb 2023 which was denied however due to progression of dysphagia and repeat hospitalization we will apply to OPWDD again.  -MOLST DNR/DNI approved by OPWDD

## 2023-04-04 NOTE — PROGRESS NOTE ADULT - SUBJECTIVE AND OBJECTIVE BOX
HPI: Pt is a 52y old Male with PMHx of hydrocephalus s/p multiple brain surgeries/ peritoneal shunt, legally blind, Epilepsy (last seizure years ago), and hx of aspiration PNA presented to the ED from [facility] sent in by speech pathologist for aspiration, trouble swallowing and for possible G-Tube placement. Palliative medicine has seen him on prior admission in Feb 2023 and we are re consulted as his guardian Christi Guy does not want a PEG placed and requests comfort and hospice care.   4/3/23 Seen and examined at bedside with Christi in room. Pt awake and minimally verbal.     4/4/23 Seen and examined at bedside with no family room. Remains minimally verbal however awake and responsive to voice.    PAIN: ( )Yes   ( )No  No non verbal signs or symptoms    DYSPNEA: ( ) Yes  ( ) No  Mild at rest    PAST MEDICAL & SURGICAL HISTORY:  Hydrocephalus  age 2 months  Legally blind  Glaucoma  Sleep apnea  not on CPAP  Epilepsy  Osteoporosis  Elevated liver enzymes  Environmental Allergies  Impulse control disorder  worsened when pt on Depakote, pt now off  Mild mental retardation  Hiatal hernia  Aspiration pneumonia  at age 25  Viral pneumonia  h/o  BPH without obstruction/lower urinary tract symptoms  Glaucoma  on eye gtts  Mentally disabled  Chronic GERD  S/P  Shunt  originally had shunt to lung, then revised to peritoneal shunt, has had multiple revisions  Epilepsy  s/p Vagal Nerve Stimulator Implant , 2007, last one in 2016  H/O craniotomy  at age 18  Heel cord contracture  s/p surgical correction b/l  History of tonsillectomy  S/p bilateral myringotomy with tube placement  Obstructed  shunt  revision of  shunt 2015  S/P  shunt      SOCIAL HX:  Lives in group home  OPWDD involved  Hx opiate tolerance ( )YES  (X )NO    Baseline ADLs  (Prior to Admission)  ( ) Independent   (X )Dependent    FAMILY HISTORY:  Family history of stroke (Mother)    Review of Systems:    Unable to obtain/Limited due to: altered mentation      PHYSICAL EXAM:  ICU Vital Signs Last 24 Hrs  T(C): 37.2 (04 Apr 2023 08:10), Max: 37.2 (04 Apr 2023 08:10)  T(F): 99 (04 Apr 2023 08:10), Max: 99 (04 Apr 2023 08:10)  HR: 97 (04 Apr 2023 08:10) (84 - 98)  BP: 137/90 (04 Apr 2023 08:10) (132/94 - 139/84)  RR: 17 (04 Apr 2023 08:10) (17 - 17)  SpO2: 94% (04 Apr 2023 08:10) (94% - 96%)  O2 Flow (L/min): 2  PPSV2: 10-20  %    General: Middle aged male in bed   Mental Status: lethargic   HEENT: Oral mucosa dry  Lungs: clear diminished rogelio  Cardiac: S1S2+  GI: abd soft NT ND + BS  : voids  Ext: moves all ext  Neuro: AMS      LABS:                                 14.8   5.96  )-----------( 240      ( 03 Apr 2023 12:22 )             44.8              04-03    140  |  111<H>  |  7   ----------------------------<  95  3.6   |  24  |  0.74    Ca    8.4<L>      03 Apr 2023 12:22  Phos  2.5     04-03  Mg     2.2     04-03  Albumin: Albumin, Serum: 3.6 g/dL (03-30 @ 20:45)    Allergies    codeine (Hives)  codeine (Stomach Upset)  Depakote (Other)  seasonal allergies: nasal congestion (Other)    Intolerances    MEDICATIONS  (STANDING):  Biotene Dry Mouth Oral Rinse 5 milliLiter(s) Swish and Spit four times a day  dextrose 5% + sodium chloride 0.9%. 1000 milliLiter(s) (50 mL/Hr) IV Continuous <Continuous>  dorzolamide 2%/timolol 0.5% Ophthalmic Solution 1 Drop(s) Both EYES two times a day  heparin   Injectable 5000 Unit(s) SubCutaneous every 12 hours  levETIRAcetam  IVPB 750 milliGRAM(s) IV Intermittent every 12 hours  LORazepam   Injectable 2 milliGRAM(s) IV Push once  LORazepam   Injectable 1 milliGRAM(s) IV Push every 12 hours  OLANZapine Injectable 5 milliGRAM(s) IntraMuscular every 12 hours  pantoprazole  Injectable 40 milliGRAM(s) IV Push daily  PHENobarbital IVPB 50 milliGRAM(s) IV Intermittent every 12 hours    MEDICATIONS  (PRN):  acetaminophen  Suppository .. 650 milliGRAM(s) Rectal every 6 hours PRN Temp greater or equal to 38C (100.4F), Mild Pain (1 - 3)  bisacodyl Suppository 10 milliGRAM(s) Rectal daily PRN Constipation  ondansetron Injectable 4 milliGRAM(s) IV Push every 8 hours PRN Nausea and/or Vomiting      RADIOLOGY/ADDITIONAL STUDIES:      < from: Xray Abdomen 1 View PORTABLE -Urgent (Xray Abdomen 1 View PORTABLE -Urgent .) (03.31.23 @ 11:22) >    ACC: [id number] EXAM:  XR ABDOMEN PORTABLE URGENT 1V   ORDERED BY: PACO KUMAR     PROCEDURE DATE:  03/31/2023          INTERPRETATION:  Supine abdominal film. Concern is aspiration pneumonia   possible obstruction.    Drainage tube overlying the right upper quadrant again noted.    There is mildly increased fecal content in the colon but no sign of   obstruction.    There is a mild left lumbar curve.    Findings are similar to February 22.    IMPRESSION: No sign of obstruction.

## 2023-04-04 NOTE — PROGRESS NOTE ADULT - ASSESSMENT
Dysphagia and h/o Aspiration PNA  Severe protein-calorie malnutrition  - Maintain NPO  - Speech therapy evaluation- recommend NPO- will need PEG   - was scheduled for PEG today, however guardian does not want PEG placed at this time - procedure cancelled  - Palliative consulted   - Guardian/aunt was requesting a CT head to r/o recurrent CVA this AM, pt on ativan 2mg q12h interchange with home benzo for seizure disorder, will decrease dose  - IVF for now    h/o Seizures  -Home meds: Primidone 250 mg bid, Clobazam 5 mg BID, Lamotrigine 300 mg BID po  -Current meds: Phenobarbital 50 mg IV q12, Levetiracetam 750 mg IV q12, ativan 2mg q12h --> will decrease dose   -PRN Ativan   -Neurology consult appreciated     Code status: DNR/DNI    VTE PPX  -Heparin subQ       - Legal guardian Christiane Guy [phone]/ [phone]

## 2023-04-04 NOTE — PROGRESS NOTE ADULT - SUBJECTIVE AND OBJECTIVE BOX
HPI: 52 year old Male with PMHx of hydrocephalus s/p multiple brain surgeries/ peritoneal shunt, legally blind, Epilepsy (last seizure years ago), and hx of aspiration PNA presented to the ED from [facility] sent in by speech pathologist for aspiration, trouble swallowing and for possible G-Tube placement.     Interval Hx: Patient seen today, more alert, no overnight issues reported.       ROS: unable to obtain 2/2 patient's condition     Vital Signs Last 24 Hrs  T(C): 37.2 (04 Apr 2023 08:10), Max: 37.2 (04 Apr 2023 08:10)  T(F): 99 (04 Apr 2023 08:10), Max: 99 (04 Apr 2023 08:10)  HR: 97 (04 Apr 2023 08:10) (84 - 97)  BP: 137/90 (04 Apr 2023 08:10) (137/90 - 139/84)  RR: 17 (04 Apr 2023 08:10) (17 - 17)  SpO2: 94% (04 Apr 2023 08:10) (94% - 96%)    Parameters below as of 04 Apr 2023 08:10  Patient On (Oxygen Delivery Method): nasal cannula    PHYSICAL EXAM:  GENERAL: NAD, lying in bed comfortably  HEAD:  Atraumatic, Normocephalic  EYES: conjunctiva and sclera clear  ENT: Moist mucous membranes  NECK: Supple, No JVD  CHEST/LUNG: Clear to auscultation bilaterally; No rales, rhonchi, wheezing. Unlabored respirations  HEART: Regular rate and rhythm; No murmurs  ABDOMEN: Bowel sounds present; Soft, Nontender, Nondistended  EXTREMITIES:  2+ Peripheral Pulses, brisk capillary refill. No clubbing, cyanosis, or edema  NERVOUS SYSTEM:  awake, alert     MEDICATIONS  (STANDING):  Biotene Dry Mouth Oral Rinse 5 milliLiter(s) Swish and Spit four times a day  dextrose 5% + sodium chloride 0.9%. 1000 milliLiter(s) (50 mL/Hr) IV Continuous <Continuous>  dorzolamide 2%/timolol 0.5% Ophthalmic Solution 1 Drop(s) Both EYES two times a day  heparin   Injectable 5000 Unit(s) SubCutaneous every 12 hours  levETIRAcetam  IVPB 750 milliGRAM(s) IV Intermittent every 12 hours  LORazepam   Injectable 1 milliGRAM(s) IV Push every 12 hours  LORazepam   Injectable 2 milliGRAM(s) IV Push once  OLANZapine Injectable 5 milliGRAM(s) IntraMuscular every 12 hours  pantoprazole  Injectable 40 milliGRAM(s) IV Push daily  PHENobarbital IVPB 50 milliGRAM(s) IV Intermittent every 12 hours    MEDICATIONS  (PRN):  acetaminophen  Suppository .. 650 milliGRAM(s) Rectal every 6 hours PRN Temp greater or equal to 38C (100.4F), Mild Pain (1 - 3)  bisacodyl Suppository 10 milliGRAM(s) Rectal daily PRN Constipation  ondansetron Injectable 4 milliGRAM(s) IV Push every 8 hours PRN Nausea and/or Vomiting              LABS:                          14.8   5.96  )-----------( 240      ( 03 Apr 2023 12:22 )             44.8     03 Apr 2023 12:22    140    |  111    |  7      ----------------------------<  95     3.6     |  24     |  0.74     Ca    8.4        03 Apr 2023 12:22  Phos  2.5       03 Apr 2023 12:22  Mg     2.2       03 Apr 2023 12:22          CAPILLARY BLOOD GLUCOSE                RADIOLOGY:

## 2023-04-04 NOTE — CHART NOTE - NSCHARTNOTEFT_GEN_A_CORE
HPI:  52 year old Male with PMHx of hydrocephalus s/p multiple brain surgeries/ peritoneal shunt, legally blind, Epilepsy (last seizure years ago), and hx of aspiration PNA presented to the ED from [facility] sent in by speech pathologist for aspiration, trouble swallowing and for possible G-Tube placement.     (31 Mar 2023 03:45)      PERTINENT PMH REVIEWED:  [ X ] YES [ ] NO                            Summary:        HPI:  53 y/o male with PMHx of hydrocephalus (age 2 months) s/p multiple brain surgeries/ peritoneal shunt, legally blind, glaucoma, mild MR, PIERCE, Epilepsy (last seizure years ago), impulse control disorder, obesity, GERD and hx of aspiration PNA who presented to  with progressive lethargy since 2/14/ Valentines Day.     (17 Feb 2023 13:43)      PERTINENT PMH REVIEWED:  [ X ] YES [ ] NO           Mental Status: [  ] Alert  [  ] Oriented [  ] Confused [ X  ] Lethargic [ X ]  Concerns of Depression [  ]- unable to assess  Anxiety [   ]- unable to assess  Baseline ADLs (prior to admission):  Independent [ ] moderately [ ] fully   Dependent   [ ] moderately [ X ] fully    Anticipated Grief: Patient [  ] Family [  X  ]    Caregiver [place] Assessed: Yes [ X ] No [  ]    Adventism: Adventism     Spiritual Concerns: Not identified     Goals of Care: Pts Aunt/Guardian would like to focus on comfort and pursue home hospice at the [facility] if the state approves MOLST Checklist     Previous Services: [phone]  [facility]    ADVANCE DIRECTIVES:  Pt. currently Full Code. Pts Aunt/Guardian is requesting DNR/DNI and MOLST Checklist will be sent to Office of Mental Hygiene Legal Service.    Anticipated D/C Plan: likely returning to [facility], will need response from [facility] before we will know if pt. can return with hospice as they must approve it                      Summary:    Pt. known to team from a previous admission. Pt. was residing at [facility] prior to admission. During pts last admission a MOLST checklist was submitted and approved for DNR/DNI. Pts condiistu has declined since last admission and he is NPO. PEG has been discussed and Pts Aunt, who is primary support, shared that she does not want to put pt. through a feeding tube and would like to focus on his comfort and pursue inpatient hospice. Feelings explored and support provided. Team explained that just like the last admission with the DNR/DNI and new MOLST checklist will have to be submitted to the state and they will have to decide if they agree with no PEG, comfort focus and inpatient hospice.      MOLST Checklist will REQUEST for DNR, DNI (already approved from last time but must restate on new form), No feeding tube, No hospitalization, No labs, No Diagnostic imaging, No Antibiotics, No IV fluids, comfort only and hospice. MOLST Checklist requires signature from a physician on Step 3 that cant concur that pt. does not have capacity and this must be a Physician who has experience working with OPWDD patients. Pts Primary Care Doctor at the [facility] Dr. Marquez ([phone]) who can sign for capacity. MOLST Checklist faxed to Dr. Kris Loredo (fax #- [phone]) to sign Step 3 and awaiting fax back with signature. Once this is received back MOLST Checklist and supporting documents to be faxed over to [employer] of Mental [employer] Legal Service  ([phone] and fax [phone] for them to review and a make decision. At this time pt. remains DNR/DNI only with no other limits currently set as we are waiting for MOLST Checklist to be completed and submitted to [employer] of Mental [employer] Legal Services for a decision.    Plan at this time is to fax MOLST Checklist to Office of Mental Hygiene Legal Service ([facility]) once it is complete. Plan to be further determined once a response if given from OM. Our team will continue to follow. HPI:  52 year old Male with PMHx of hydrocephalus s/p multiple brain surgeries/ peritoneal shunt, legally blind, Epilepsy (last seizure years ago), and hx of aspiration PNA presented to the ED from [facility] sent in by speech pathologist for aspiration, trouble swallowing and for possible G-Tube placement.     (31 Mar 2023 03:45)      PERTINENT PMH REVIEWED:  [ X ] YES [ ] NO           Mental Status: [  ] Alert  [  ] Oriented [  ] Confused [ X  ] Lethargic [ X ]  Concerns of Depression [  ]- unable to assess  Anxiety [   ]- unable to assess  Baseline ADLs (prior to admission):  Independent [ ] moderately [ ] fully   Dependent   [ ] moderately [ X ] fully    Anticipated Grief: Patient [  ] Family [  X  ]    Caregiver [place] Assessed: Yes [ X ] No [  ]    Church: Sikh     Spiritual Concerns: Not identified     Goals of Care: Pts Aunt/Guardian would like to focus on comfort however, we cannot move forward without permission from Office [facility] Legal Service, MOLST checklist with these requests to be submitted for review.     Previous Services: [phone]  [facility]    ADVANCE DIRECTIVES:  Pt. currently DNR/DNI which MOLST Checklist was approved last admission by [facility].     Anticipated D/C Plan: to be further determined                      Summary:    Pt. known to team from a previous admission. Pt. was residing at [facility] prior to admission. During pts last admission a MOLST checklist was submitted and approved for DNR/DNI. Pts condition has declined since last admission and he is NPO. PEG has been discussed and Pts Aunt, who is primary support, shared that she does not want to put pt. through a feeding tube and would like to focus on his comfort and pursue inpatient hospice. Feelings explored and support provided. Team explained that just like the last admission with the DNR/DNI a new MOLST checklist will have to be submitted to the state and they will have to decide if they agree with no PEG, comfort focus and inpatient hospice.      MOLST Checklist will REQUEST for DNR, DNI (already approved from last time but must restate on new form), No feeding tube, No hospitalization, No labs, No Diagnostic imaging, No Antibiotics, No IV fluids, comfort only and hospice. MOLST Checklist requires signature from a physician on Step 3 that cant concur that pt. does not have capacity and this must be a Physician who has experience working with OPWDD patients. Pts Primary Care Doctor at the shelter Dr. Marquez ([phone]) who can sign for capacity. MOLST Checklist faxed to Dr. Kris Loredo (fax #- [phone]) to sign Step 3 and awaiting fax back with signature. Once this is received back MOLST Checklist and supporting documents to be faxed over to [employer] of Mental [employer] Legal Service  ([phone] and fax [phone] for them to review and a make decision. At this time pt. remains DNR/DNI only with no other limits currently set as we are waiting for MOLST Checklist to be completed and submitted to [employer] Mental [employer] Legal Services for a decision.    Plan at this time is to fax MOLST Checklist to Office of Mental Hygiene Legal Service ([facility]) once it is complete. Plan to be further determined once a response if given from OM. Our team will continue to follow.

## 2023-04-04 NOTE — PROGRESS NOTE ADULT - ASSESSMENT
HPI: Pt is a 52y old Male with PMHx of hydrocephalus s/p multiple brain surgeries/ peritoneal shunt, legally blind, Epilepsy (last seizure years ago), and hx of aspiration PNA presented to the ED from [facility] sent in by speech pathologist for aspiration, trouble swallowing and for possible G-Tube placement. Palliative medicine has seen him on prior admission in Feb 2023 and we are re consulted as his guardian Christi Guy does not want a PEG placed and requests comfort and hospice care.   4/3/23 Seen and examined at bedside with Christi in room. Pt awake and minimally verbal.       Assessment and Plan:    1) Dysphagia  -Recurring aspiration  -Swallow eval noted Severe (oral pharyngeal dysphagia)  -NPO maintained  -Repeat hospitalization within 6 weeks  -CXR=Partial clearing of previous opacity left base.  Right calcified pleural plaques with pleural thickening.  Persistent airspace infiltrate on the right.  Consider obtaining CT scan.    2) Acute on chronic resp Failure  -Suspected Aspiration PNA  -Supplemental O2 PRN  -Abx as per med    3) Metabolic Encephalopathy:    -Suspect related to hypoxia /PNA    -Neuro eval  -Cont Keppra for seizure prophylaxis     4) Hydrocephalus/ mild MR:    -Unable to take home meds PO    -Currently alert to name only  -Resides in group home    5) Advanced Directives  -Pt without capacity  -Guardianship paperwork on chart naming Christi Guy  -Will follow with OPWDD and further [facility] discussion   -Goals of care discussed with guardian Christi Guy. She does not want placement of feeding tube. We had made this request in Feb 2023 which was denied however due to progression of dysphagia and repeat hospitalization we will apply to OPWDD again.  -MOLST DNR/DNI approved by OPWDD on prior adm

## 2023-04-05 NOTE — PROGRESS NOTE ADULT - SUBJECTIVE AND OBJECTIVE BOX
HPI: 52 year old Male with PMHx of hydrocephalus s/p multiple brain surgeries/ peritoneal shunt, legally blind, Epilepsy (last seizure years ago), and hx of aspiration PNA presented to the ED from [facility] sent in by speech pathologist for aspiration, trouble swallowing and for possible G-Tube placement.     Interval Hx: Patient seen today, drowsy today. No overnight issues reported.       ROS: unable to obtain 2/2 patient's condition     Vital Signs Last 24 Hrs  T(C): 37.6 (05 Apr 2023 08:18), Max: 37.6 (05 Apr 2023 08:18)  T(F): 99.7 (05 Apr 2023 08:18), Max: 99.7 (05 Apr 2023 08:18)  HR: 104 (05 Apr 2023 08:18) (97 - 104)  BP: 120/91 (05 Apr 2023 08:18) (120/91 - 126/86)  RR: 18 (05 Apr 2023 08:18) (17 - 18)  SpO2: 94% (05 Apr 2023 08:18) (92% - 96%)    Parameters below as of 05 Apr 2023 08:18  Patient On (Oxygen Delivery Method): nasal cannula    PHYSICAL EXAM:  GENERAL: NAD, lying in bed comfortably  HEAD:  Atraumatic, Normocephalic  EYES: conjunctiva and sclera clear  ENT: Moist mucous membranes  NECK: Supple, No JVD  CHEST/LUNG: Clear to auscultation bilaterally; No rales, rhonchi, wheezing. Unlabored respirations  HEART: Regular rate and rhythm; No murmurs  ABDOMEN: Bowel sounds present; Soft, Nontender, Nondistended  EXTREMITIES:  2+ Peripheral Pulses, brisk capillary refill. No clubbing, cyanosis, or edema  NERVOUS SYSTEM:  drowsy    MEDICATIONS  (STANDING):  Biotene Dry Mouth Oral Rinse 5 milliLiter(s) Swish and Spit four times a day  dextrose 5% + sodium chloride 0.9%. 1000 milliLiter(s) (50 mL/Hr) IV Continuous <Continuous>  dorzolamide 2%/timolol 0.5% Ophthalmic Solution 1 Drop(s) Both EYES two times a day  heparin   Injectable 5000 Unit(s) SubCutaneous every 12 hours  levETIRAcetam  IVPB 750 milliGRAM(s) IV Intermittent every 12 hours  LORazepam   Injectable 1 milliGRAM(s) IV Push every 12 hours  LORazepam   Injectable 2 milliGRAM(s) IV Push once  OLANZapine Injectable 5 milliGRAM(s) IntraMuscular every 12 hours  pantoprazole  Injectable 40 milliGRAM(s) IV Push daily  PHENobarbital IVPB 50 milliGRAM(s) IV Intermittent every 12 hours    MEDICATIONS  (PRN):  acetaminophen  Suppository .. 650 milliGRAM(s) Rectal every 6 hours PRN Temp greater or equal to 38C (100.4F), Mild Pain (1 - 3)  bisacodyl Suppository 10 milliGRAM(s) Rectal daily PRN Constipation  ondansetron Injectable 4 milliGRAM(s) IV Push every 8 hours PRN Nausea and/or Vomiting              LABS:                          14.8   5.96  )-----------( 240      ( 03 Apr 2023 12:22 )             44.8     03 Apr 2023 12:22    140    |  111    |  7      ----------------------------<  95     3.6     |  24     |  0.74     Ca    8.4        03 Apr 2023 12:22  Phos  2.5       03 Apr 2023 12:22  Mg     2.2       03 Apr 2023 12:22          CAPILLARY BLOOD GLUCOSE                RADIOLOGY:

## 2023-04-05 NOTE — CHART NOTE - NSCHARTNOTEFT_GEN_A_CORE
This SW spoke with Kolton Castle [phone] from [employer].  Anastacio [phone] was made aware of the request for the MOLST checklist. The group home however, reached out to Kolton Castle and expressed that they do not agree with the requests for No feeding tube and comfort care. They feel that pt. should have PEG placement. Pts PCP who signed as a concurring physician and for the capacity portion of the MOLST Checklist also now is withdrawing her This SW spoke with Kolton Castle [phone] from [employer].  Anastacio [phone] was made aware of the request for the MOLST checklist. The group home however, reached out to Kolton Castle and expressed that they do not agree with the requests for No feeding tube and comfort care. They feel that pt. should have PEG placement. Kolton Castle [facility]  also spoke with pts PCP Dr. Marquez (who originally signed as a concurring physician and for the capacity portion of the MOLST Checklist) she reports that she is now withdrawing her signature from the MOLST checklist and reports she no longer agrees with the request. DNR/DNI however, is still in place as this was approved pts last admission. Since pts PCP withdrew her signature and disagrees there is currently not a valid MOSLT checklist. The checklist also would be declined even with the needed signatures as the group home is strongly against the request for No feeding tube and comfort and feels pt. should have PEG. Kolton Castle to email a letter stating that the MOLST checklist has been withdrawn and this will be placed on the chart. Pt. will have to have PEG placed and group home is able to take pt. back with PEG tube. This SW informed medical team and also left a message for pts Aunt Christi to make her aware. Our team will continue to follow.

## 2023-04-05 NOTE — PROGRESS NOTE ADULT - ASSESSMENT
Dysphagia and h/o Aspiration PNA  Severe protein-calorie malnutrition  - Maintain NPO  - Speech therapy evaluation- recommend NPO- will need PEG   - was scheduled for PEG, however guardian does not want PEG placed at this time - procedure cancelled  - Palliative consulted   - Guardian/aunt was requesting a CT head to r/o recurrent CVA this AM, pt on ativan 2mg q12h interchange with home benzo for seizure disorder, will decrease dose  - IVF for now  - Patient to have PEG placed --> SW discussed with [employer] this afternoon,      h/o Seizures  -Home meds: Primidone 250 mg bid, Clobazam 5 mg BID, Lamotrigine 300 mg BID po  -Current meds: Phenobarbital 50 mg IV q12, Levetiracetam 750 mg IV q12, ativan 2mg q12h --> will decrease dose   -PRN Ativan   -Neurology consult appreciated     Code status: DNR/DNI    VTE PPX  -Heparin subQ       - Legal guardian Christiane Guy [phone]/ [phone]     Dysphagia and h/o Aspiration PNA  Severe protein-calorie malnutrition  - Maintain NPO  - Speech therapy evaluation- recommend NPO- will need PEG   - was scheduled for PEG, however guardian does not want PEG placed at this time - procedure cancelled  - Palliative consulted   - Guardian/aunt was requesting a CT head to r/o recurrent CVA this AM, pt on ativan 2mg q12h interchange with home benzo for seizure disorder, will decrease dose  - IVF for now  - Patient to have PEG placed --> SW discussed with [employer] this afternoon,  and patient's PCP signed against HCP decision to not place PEG and for hospice care. Decision made for PEG placement, DNR/DNI still in place  - Will discuss with GI tomorrow     h/o Seizures  -Home meds: Primidone 250 mg bid, Clobazam 5 mg BID, Lamotrigine 300 mg BID po  -Current meds: Phenobarbital 50 mg IV q12, Levetiracetam 750 mg IV q12, ativan 2mg q12h --> will decrease dose   -PRN Ativan   -Neurology consult appreciated     Code status: DNR/DNI    VTE PPX  -Heparin subQ       - Legal guardian Christiane Guy [phone]/ [phone]

## 2023-04-05 NOTE — PROGRESS NOTE ADULT - ASSESSMENT
HPI: Pt is a 52y old Male with PMHx of hydrocephalus s/p multiple brain surgeries/ peritoneal shunt, legally blind, Epilepsy (last seizure years ago), and hx of aspiration PNA presented to the ED from [facility] sent in by speech pathologist for aspiration, trouble swallowing and for possible G-Tube placement. Palliative medicine has seen him on prior admission in Feb 2023 and we are re consulted as his guardian Christi Guy does not want a PEG placed and requests comfort and hospice care.   4/3/23 Seen and examined at bedside with Christi in room. Pt awake and minimally verbal.       Assessment and Plan:    1) Dysphagia  -Recurring aspiration  -Swallow eval noted Severe (oral pharyngeal dysphagia)  -NPO maintained  -Repeat hospitalization within 6 weeks  -CXR=Partial clearing of previous opacity left base.  Right calcified pleural plaques with pleural thickening.  Persistent airspace infiltrate on the right.  Consider obtaining CT scan.    2) Acute on chronic resp Failure  -Suspected Aspiration PNA  -Supplemental O2 PRN  -Abx as per med    3) Metabolic Encephalopathy:    -Suspect related to hypoxia /PNA    -Neuro eval  -Cont Keppra for seizure prophylaxis     4) Hydrocephalus/ mild MR:    -Unable to take home meds PO    -Currently alert to name only  -Resides in group home    5) Advanced Directives  -Pt without capacity  -Guardianship paperwork on chart naming Christi Guy     -Goals of care discussed with guardian Christi Guy. She does not want placement of feeding tube. We had made this request in Feb 2023 which was denied however due to progression of dysphagia and repeat hospitalization we will apply to [employer] again.  -MOLST DNR/DNI approved by [employer] on prior adm    MOLST CHECKLIST IS BEING REVIEWED- AS PER REVIEWER LIKELY WILL RECEIVE LETTER OF APPROVAL THIS AFTERNOON.  UNTIL THAT IS CONFIRMED GOC REMAIN UNCHANGED.        HPI: Pt is a 52y old Male with PMHx of hydrocephalus s/p multiple brain surgeries/ peritoneal shunt, legally blind, Epilepsy (last seizure years ago), and hx of aspiration PNA presented to the ED from [facility] sent in by speech pathologist for aspiration, trouble swallowing and for possible G-Tube placement. Palliative medicine has seen him on prior admission in Feb 2023 and we are re consulted as his guardian Christi Guy does not want a PEG placed and requests comfort and hospice care.   4/3/23 Seen and examined at bedside with Christi in room. Pt awake and minimally verbal.       Assessment and Plan:    1) Dysphagia  -Recurring aspiration  -Swallow eval noted Severe (oral pharyngeal dysphagia)  -NPO maintained  -Repeat hospitalization within 6 weeks  -CXR=Partial clearing of previous opacity left base.  Right calcified pleural plaques with pleural thickening.  Persistent airspace infiltrate on the right.  Consider obtaining CT scan.    2) Acute on chronic resp Failure  -Suspected Aspiration PNA  -Supplemental O2 PRN  -Abx as per med    3) Metabolic Encephalopathy:    -Suspect related to hypoxia /PNA    -Neuro eval  -Cont Keppra for seizure prophylaxis     4) Hydrocephalus/ mild MR:    -Unable to take home meds PO    -Currently alert to name only  -Resides in group home    5) Advanced Directives  -Pt without capacity  -Guardianship paperwork on chart naming Christi Guy     -Goals of care discussed with guardian Christi Guy. She does not want placement of feeding tube. We had made this request in Feb 2023 which was denied however due to progression of dysphagia and repeat hospitalization we will apply to [employer] again.  -MOLST DNR/DNI approved by [employer] on prior adm    MOLST CHECKLIST IS now invalid  it was going to be approved-  contested this possible decision and then the concurrent physician withdrew their signature.  molst was rendered invalid    dnr dni from previous checklist stands.     time spent 90 minutes coordinating care and

## 2023-04-05 NOTE — PROGRESS NOTE ADULT - SUBJECTIVE AND OBJECTIVE BOX
HPI: Pt is a 52y old Male with PMHx of hydrocephalus s/p multiple brain surgeries/ peritoneal shunt, legally blind, Epilepsy (last seizure years ago), and hx of aspiration PNA presented to the ED from [facility] sent in by speech pathologist for aspiration, trouble swallowing and for possible G-Tube placement. Palliative medicine has seen him on prior admission in Feb 2023 and we are re consulted as his guardian Christi Guy does not want a PEG placed and requests comfort and hospice care.       he was in the hospital about a month ago 2/2 to aspiration pna 2/2 to his oropharyngeal dysphagia.   Since that he did recover his acute aspiration and was sent back to  w/ modified diet.  Unfortunately his dysphagia progressed signficantly.  He was sent from  d/t inability to eat any consistancy of food w/ out coughing.  Found to have dysphagia fruther progressed and so severe that he no longer could keep eating by mouth and would need NPO w/ PEG lpacment to return back to the .     That being said family member (aunt) and patients guardian who is involved in his care reports she does not want to place a feeding tube d/t the fact he would no longer be able to enjoy food that brings him quality of life and dasha.  He reports to me tthat his favorite foods are chocolate ice-cream .  (although he doesn't seem to have much insight into his care  unfortunately-).      4/5  pt seen and examined  he's in NAD comfortable  difficulty speaking but is able to get out some simple yes/ nos  somnolent every time i see him     MOLST Checklist completed= and letter for approval will be faxed to hospital today.   Until that is on chart no changes in goals.      PAIN: ( )Yes   (x )No  No non verbal signs or symptoms    DYSPNEA: ( ) Yes  ( x) No       PAST MEDICAL & SURGICAL HISTORY:  Hydrocephalus  age 2 months  Legally blind  Glaucoma  Sleep apnea  not on CPAP  Epilepsy  Osteoporosis  Elevated liver enzymes  Environmental Allergies  Impulse control disorder  worsened when pt on Depakote, pt now off  Mild mental retardation  Hiatal hernia  Aspiration pneumonia  at age 25  Viral pneumonia  h/o  BPH without obstruction/lower urinary tract symptoms  Glaucoma  on eye gtts  Mentally disabled  Chronic GERD  S/P  Shunt  originally had shunt to lung, then revised to peritoneal shunt, has had multiple revisions  Epilepsy  s/p Vagal Nerve Stimulator Implant , 2007, last one in 2016  H/O craniotomy  at age 18  Heel cord contracture  s/p surgical correction b/l  History of tonsillectomy  S/p bilateral myringotomy with tube placement  Obstructed  shunt  revision of  shunt 2015  S/P  shunt      SOCIAL HX:  Lives in group home  OPWDD involved  Hx opiate tolerance ( )YES  (X )NO    Baseline ADLs  (Prior to Admission)  ( ) Independent   (X )Dependent    FAMILY HISTORY:  Family history of stroke (Mother)    Review of Systems:    Unable to obtain/Limited due to: altered mentation      PHYSICAL EXAM:  ICU Vital Signs Last 24 Hrs  T(C): 37.2 (04 Apr 2023 08:10), Max: 37.2 (04 Apr 2023 08:10)  T(F): 99 (04 Apr 2023 08:10), Max: 99 (04 Apr 2023 08:10)  HR: 97 (04 Apr 2023 08:10) (84 - 98)  BP: 137/90 (04 Apr 2023 08:10) (132/94 - 139/84)  RR: 17 (04 Apr 2023 08:10) (17 - 17)  SpO2: 94% (04 Apr 2023 08:10) (94% - 96%)  O2 Flow (L/min): 2  PPSV2: 10-20  %    General: Middle aged male in bed   Mental Status: lethargic   HEENT: Oral mucosa dry  Lungs: clear diminished rogelio  Cardiac: S1S2+  GI: abd soft NT ND + BS  : voids  Ext: moves all ext  Neuro: AMS      LABS:                                 14.8   5.96  )-----------( 240      ( 03 Apr 2023 12:22 )             44.8              04-03    140  |  111<H>  |  7   ----------------------------<  95  3.6   |  24  |  0.74    Ca    8.4<L>      03 Apr 2023 12:22  Phos  2.5     04-03  Mg     2.2     04-03  Albumin: Albumin, Serum: 3.6 g/dL (03-30 @ 20:45)    Allergies    codeine (Hives)  codeine (Stomach Upset)  Depakote (Other)  seasonal allergies: nasal congestion (Other)    Intolerances    MEDICATIONS  (STANDING):  Biotene Dry Mouth Oral Rinse 5 milliLiter(s) Swish and Spit four times a day  dextrose 5% + sodium chloride 0.9%. 1000 milliLiter(s) (50 mL/Hr) IV Continuous <Continuous>  dorzolamide 2%/timolol 0.5% Ophthalmic Solution 1 Drop(s) Both EYES two times a day  heparin   Injectable 5000 Unit(s) SubCutaneous every 12 hours  levETIRAcetam  IVPB 750 milliGRAM(s) IV Intermittent every 12 hours  LORazepam   Injectable 2 milliGRAM(s) IV Push once  LORazepam   Injectable 1 milliGRAM(s) IV Push every 12 hours  OLANZapine Injectable 5 milliGRAM(s) IntraMuscular every 12 hours  pantoprazole  Injectable 40 milliGRAM(s) IV Push daily  PHENobarbital IVPB 50 milliGRAM(s) IV Intermittent every 12 hours    MEDICATIONS  (PRN):  acetaminophen  Suppository .. 650 milliGRAM(s) Rectal every 6 hours PRN Temp greater or equal to 38C (100.4F), Mild Pain (1 - 3)  bisacodyl Suppository 10 milliGRAM(s) Rectal daily PRN Constipation  ondansetron Injectable 4 milliGRAM(s) IV Push every 8 hours PRN Nausea and/or Vomiting      RADIOLOGY/ADDITIONAL STUDIES:      < from: Xray Abdomen 1 View PORTABLE -Urgent (Xray Abdomen 1 View PORTABLE -Urgent .) (03.31.23 @ 11:22) >    ACC: [id number] EXAM:  XR ABDOMEN PORTABLE URGENT 1V   ORDERED BY: PACO KUMAR     PROCEDURE DATE:  03/31/2023          INTERPRETATION:  Supine abdominal film. Concern is aspiration pneumonia   possible obstruction.    Drainage tube overlying the right upper quadrant again noted.    There is mildly increased fecal content in the colon but no sign of   obstruction.    There is a mild left lumbar curve.    Findings are similar to February 22.    IMPRESSION: No sign of obstruction.       HPI:  updated :  CODE STATUS DNR  DNI ONLY  MOLST CHECKLIST was NOT approved.   PEG TUBE TO BE PLACED BY GI POSSIBLY TOMORROW          Pt is a 52y old Male with PMHx of hydrocephalus s/p multiple brain surgeries/ peritoneal shunt, legally blind, Epilepsy (last seizure years ago), and hx of aspiration PNA presented to the ED from [facility] sent in by speech pathologist for aspiration, trouble swallowing and for possible G-Tube placement. Palliative medicine has seen him on prior admission in Feb 2023 and we are re consulted as his guardian Christi Guy does not want a PEG placed and requests comfort and hospice care.       he was in the hospital about a month ago 2/2 to aspiration pna 2/2 to his oropharyngeal dysphagia.   Since that he did recover his acute aspiration and was sent back to  w/ modified diet.  Unfortunately his dysphagia progressed signficantly.  He was sent from  d/t inability to eat any consistancy of food w/ out coughing.  Found to have dysphagia fruther progressed and so severe that he no longer could keep eating by mouth and would need NPO w/ PEG lpacment to return back to the .     That being said family member (aunt) and patients guardian who is involved in his care reports she does not want to place a feeding tube d/t the fact he would no longer be able to enjoy food that brings him quality of life and dasha.  He reports to me tthat his favorite foods are chocolate ice-cream .  (although he doesn't seem to have much insight into his care  unfortunately-).      4/5  pt seen and examined  he's in NAD comfortable  difficulty speaking but is able to get out some simple yes/ nos  somnolent every time i see him     MOLST Checklist completed= originally was going to be approved,    discussed w/ [facility] that [facility]tey did not agree w/ the checklist.   The  concurring physician than also reached out and decided to  withdraw their signature which  rendered molst checklist invalid.     At this stage d/t no checklist patient will get PEG if anything clinically changes- a new molst checklist can be attempted of course, only  if appropriate at that time.      did inquire the risk / benefits of peg tube in severe progressive dysphagia and i did express it would not prevent aspiration and patient could continue to suffer this regardless. if patient could not tolerate patient continues to decline clinically we could discuss than if that were to happen next steps.    hopefully pt can tolerate         PAIN: ( )Yes   (x )No  No non verbal signs or symptoms    DYSPNEA: ( ) Yes  ( x) No       PAST MEDICAL & SURGICAL HISTORY:  Hydrocephalus  age 2 months  Legally blind  Glaucoma  Sleep apnea  not on CPAP  Epilepsy  Osteoporosis  Elevated liver enzymes  Environmental Allergies  Impulse control disorder  worsened when pt on Depakote, pt now off  Mild mental retardation  Hiatal hernia  Aspiration pneumonia  at age 25  Viral pneumonia  h/o  BPH without obstruction/lower urinary tract symptoms  Glaucoma  on eye gtts  Mentally disabled  Chronic GERD  S/P  Shunt  originally had shunt to lung, then revised to peritoneal shunt, has had multiple revisions  Epilepsy  s/p Vagal Nerve Stimulator Implant , 2007, last one in 2016  H/O craniotomy  at age 18  Heel cord contracture  s/p surgical correction b/l  History of tonsillectomy  S/p bilateral myringotomy with tube placement  Obstructed  shunt  revision of  shunt 2015  S/P  shunt      SOCIAL HX:  Lives in group home  OPWDD involved  Hx opiate tolerance ( )YES  (X )NO    Baseline ADLs  (Prior to Admission)  ( ) Independent   (X )Dependent    FAMILY HISTORY:  Family history of stroke (Mother)    Review of Systems:    Unable to obtain/Limited due to: altered mentation      PHYSICAL EXAM:  ICU Vital Signs Last 24 Hrs  T(C): 37.2 (04 Apr 2023 08:10), Max: 37.2 (04 Apr 2023 08:10)  T(F): 99 (04 Apr 2023 08:10), Max: 99 (04 Apr 2023 08:10)  HR: 97 (04 Apr 2023 08:10) (84 - 98)  BP: 137/90 (04 Apr 2023 08:10) (132/94 - 139/84)  RR: 17 (04 Apr 2023 08:10) (17 - 17)  SpO2: 94% (04 Apr 2023 08:10) (94% - 96%)  O2 Flow (L/min): 2  PPSV2: 10-20  %    General: Middle aged male in bed   Mental Status: lethargic   HEENT: Oral mucosa dry  Lungs: clear diminished rogelio  Cardiac: S1S2+  GI: abd soft NT ND + BS  : voids  Ext: moves all ext  Neuro: AMS      LABS:                                 14.8   5.96  )-----------( 240      ( 03 Apr 2023 12:22 )             44.8              04-03    140  |  111<H>  |  7   ----------------------------<  95  3.6   |  24  |  0.74    Ca    8.4<L>      03 Apr 2023 12:22  Phos  2.5     04-03  Mg     2.2     04-03  Albumin: Albumin, Serum: 3.6 g/dL (03-30 @ 20:45)    Allergies    codeine (Hives)  codeine (Stomach Upset)  Depakote (Other)  seasonal allergies: nasal congestion (Other)    Intolerances    MEDICATIONS  (STANDING):  Biotene Dry Mouth Oral Rinse 5 milliLiter(s) Swish and Spit four times a day  dextrose 5% + sodium chloride 0.9%. 1000 milliLiter(s) (50 mL/Hr) IV Continuous <Continuous>  dorzolamide 2%/timolol 0.5% Ophthalmic Solution 1 Drop(s) Both EYES two times a day  heparin   Injectable 5000 Unit(s) SubCutaneous every 12 hours  levETIRAcetam  IVPB 750 milliGRAM(s) IV Intermittent every 12 hours  LORazepam   Injectable 2 milliGRAM(s) IV Push once  LORazepam   Injectable 1 milliGRAM(s) IV Push every 12 hours  OLANZapine Injectable 5 milliGRAM(s) IntraMuscular every 12 hours  pantoprazole  Injectable 40 milliGRAM(s) IV Push daily  PHENobarbital IVPB 50 milliGRAM(s) IV Intermittent every 12 hours    MEDICATIONS  (PRN):  acetaminophen  Suppository .. 650 milliGRAM(s) Rectal every 6 hours PRN Temp greater or equal to 38C (100.4F), Mild Pain (1 - 3)  bisacodyl Suppository 10 milliGRAM(s) Rectal daily PRN Constipation  ondansetron Injectable 4 milliGRAM(s) IV Push every 8 hours PRN Nausea and/or Vomiting      RADIOLOGY/ADDITIONAL STUDIES:      < from: Xray Abdomen 1 View PORTABLE -Urgent (Xray Abdomen 1 View PORTABLE -Urgent .) (03.31.23 @ 11:22) >    ACC: [id number] EXAM:  XR ABDOMEN PORTABLE URGENT 1V   ORDERED BY: PACO KUMAR     PROCEDURE DATE:  03/31/2023          INTERPRETATION:  Supine abdominal film. Concern is aspiration pneumonia   possible obstruction.    Drainage tube overlying the right upper quadrant again noted.    There is mildly increased fecal content in the colon but no sign of   obstruction.    There is a mild left lumbar curve.    Findings are similar to February 22.    IMPRESSION: No sign of obstruction.

## 2023-04-06 NOTE — PROGRESS NOTE ADULT - ASSESSMENT
Dysphagia and h/o Aspiration PNA  Severe protein-calorie malnutrition  - Maintain NPO  - Speech therapy evaluation- recommend NPO- will need PEG   - was scheduled for PEG, however guardian does not want PEG placed at this time - procedure cancelled  - Palliative consulted   - Guardian/aunt was requesting a CT head to r/o recurrent CVA this AM, pt on ativan 2mg q12h interchange with home benzo for seizure disorder, will decrease dose  - IVF for now  - Patient to have PEG placed --> SW discussed with office of mental hygiene legal services this afternoon,  and patient's PCP signed against HCP decision to not place PEG and for hospice care. Decision made for PEG placement, DNR/DNI still in place  - Will discuss with GI tomorrow     h/o Seizures  -Home meds: Primidone 250 mg bid, Clobazam 5 mg BID, Lamotrigine 300 mg BID po  -Current meds: Phenobarbital 50 mg IV q12, Levetiracetam 750 mg IV q12, ativan 2mg q12h --> will decrease dose   -PRN Ativan   -Neurology consult appreciated     Code status: DNR/DNI    VTE PPX  -Heparin subQ       - Legal guardian Christiane Guy [phone]/ [phone]     Dysphagia and h/o Aspiration PNA  Severe protein-calorie malnutrition  - Maintain NPO  - Speech therapy evaluation- recommend NPO- will need PEG   - was scheduled for PEG, however guardian does not want PEG placed at this time - procedure cancelled and plan for ethics eval  - Palliative consulted   - Guardian/aunt was requesting a CT head to r/o recurrent CVA this AM, pt on ativan 2mg q12h interchange with home benzo for seizure disorder, will decrease dose  - IVF for now  - Patient to have PEG placed --> SW discussed with office of mental hygiene legal services this afternoon,  and patient's and plan to proceed with PEG but will d/w dr padilla and dr yi and may need ethics consult.  will consult nutrition for PPN in meantime      h/o Seizures  -Home meds: Primidone 250 mg bid, Clobazam 5 mg BID, Lamotrigine 300 mg BID po  -Current meds: Phenobarbital 50 mg IV q12, Levetiracetam 750 mg IV q12, ativan 2mg q12h --> will decrease dose   -PRN Ativan   -Neurology consult appreciated     Code status: DNR/DNI    VTE PPX  -Heparin subQ       - Legal guardian Christiane Guy [phone]/ [phone]

## 2023-04-06 NOTE — PROGRESS NOTE ADULT - SUBJECTIVE AND OBJECTIVE BOX
HPI: 52 year old Male with PMHx of hydrocephalus s/p multiple brain surgeries/ peritoneal shunt, legally blind, Epilepsy (last seizure years ago), and hx of aspiration PNA presented to the ED from [facility] sent in by speech pathologist for aspiration, trouble swallowing and for possible G-Tube placement.     4/6 - plan was for peg today but HCP/guardian does not want it despite order from office of mental hygiene  SW reaching out to office of mental hygiene and legal to see if we still proceed or abide by guardian wishes.        ROS: unable to obtain 2/2 patient's condition non verbal     Vital Signs Last 24 Hrs  T(C): 37.7 (06 Apr 2023 07:59), Max: 37.7 (06 Apr 2023 07:59)  T(F): 99.9 (06 Apr 2023 07:59), Max: 99.9 (06 Apr 2023 07:59)  HR: 117 (06 Apr 2023 07:59) (98 - 117)  BP: 142/80 (06 Apr 2023 07:59) (137/89 - 142/80)  BP(mean): --  RR: 17 (06 Apr 2023 07:59) (17 - 18)  SpO2: 92% (06 Apr 2023 07:59) (92% - 93%)    Parameters below as of 06 Apr 2023 07:59  Patient On (Oxygen Delivery Method): nasal cannula  O2 Flow (L/min): 2      PHYSICAL EXAM:  GENERAL: NAD, lying in bed comfortably  HEAD:  Atraumatic, Normocephalic  EYES: conjunctiva and sclera clear  ENT: Moist mucous membranes  NECK: Supple, No JVD  CHEST/LUNG: Clear to auscultation bilaterally; No rales, rhonchi, wheezing. Unlabored respirations  HEART: Regular rate and rhythm; No murmurs  ABDOMEN: Bowel sounds present; Soft, Nontender, Nondistended  EXTREMITIES:  2+ Peripheral Pulses, brisk capillary refill. No clubbing, cyanosis, or edema  NERVOUS SYSTEM:  drowsy                              13.7   8.86  )-----------( 265      ( 06 Apr 2023 06:55 )             42.1     04-06    141  |  110<H>  |  7   ----------------------------<  113<H>  3.3<L>   |  27  |  0.53    Ca    8.5      06 Apr 2023 06:55  Mg     2.1     04-06                                      RADIOLOGY:

## 2023-04-06 NOTE — CHART NOTE - NSCHARTNOTEFT_GEN_A_CORE
Due to ongoing consent and ethical issues regarding the placement of the feeding tube, case cancelled today.   Primary team and admin aware.

## 2023-04-06 NOTE — PROGRESS NOTE ADULT - SUBJECTIVE AND OBJECTIVE BOX
HPI:  updated :  CODE STATUS DNR  DNI ONLY  MOLST CHECKLIST was NOT approved.   PEG TUBE TO BE PLACED BY GI POSSIBLY TOMORROW          Pt is a 52y old Male with PMHx of hydrocephalus s/p multiple brain surgeries/ peritoneal shunt, legally blind, Epilepsy (last seizure years ago), and hx of aspiration PNA presented to the ED from [facility] sent in by speech pathologist for aspiration, trouble swallowing and for possible G-Tube placement. Palliative medicine has seen him on prior admission in Feb 2023 and we are re consulted as his guardian Christi Gyu does not want a PEG placed and requests comfort and hospice care.       he was in the hospital about a month ago 2/2 to aspiration pna 2/2 to his oropharyngeal dysphagia.   Since that he did recover his acute aspiration and was sent back to  w/ modified diet.  Unfortunately his dysphagia progressed signficantly.  He was sent from  d/t inability to eat any consistancy of food w/ out coughing.  Found to have dysphagia fruther progressed and so severe that he no longer could keep eating by mouth and would need NPO w/ PEG lpacment to return back to the .     That being said family member (aunt) and patients guardian who is involved in his care reports she does not want to place a feeding tube d/t the fact he would no longer be able to enjoy food that brings him quality of life and dasha.  He reports to me tthat his favorite foods are chocolate ice-cream .  (although he doesn't seem to have much insight into his care  unfortunately-).      4/5  pt seen and examined  he's in NAD comfortable  difficulty speaking but is able to get out some simple yes/ nos  somnolent every time i see him     MOLST Checklist completed= originally was going to be approved,    discussed w/ [facility] that htey did not agree w/ the checklist.   The  concurring physician than also reached out and decided to  withdraw their signature which  rendered molst checklist invalid.     At this stage d/t no checklist patient will get PEG if anything clinically changes- a new molst checklist can be attempted of course, only  if appropriate at that time.      did inquire the risk / benefits of peg tube in severe progressive dysphagia and i did express it would not prevent aspiration and patient could continue to suffer this regardless. if patient could not tolerate patient continues to decline clinically we could discuss than if that were to happen next steps.    hopefully pt can tolerate         PAIN: ( )Yes   (x )No  No non verbal signs or symptoms    DYSPNEA: ( ) Yes  ( x) No       PAST MEDICAL & SURGICAL HISTORY:  Hydrocephalus  age 2 months  Legally blind  Glaucoma  Sleep apnea  not on CPAP  Epilepsy  Osteoporosis  Elevated liver enzymes  Environmental Allergies  Impulse control disorder  worsened when pt on Depakote, pt now off  Mild mental retardation  Hiatal hernia  Aspiration pneumonia  at age 25  Viral pneumonia  h/o  BPH without obstruction/lower urinary tract symptoms  Glaucoma  on eye gtts  Mentally disabled  Chronic GERD  S/P  Shunt  originally had shunt to lung, then revised to peritoneal shunt, has had multiple revisions  Epilepsy  s/p Vagal Nerve Stimulator Implant , 2007, last one in 2016  H/O craniotomy  at age 18  Heel cord contracture  s/p surgical correction b/l  History of tonsillectomy  S/p bilateral myringotomy with tube placement  Obstructed  shunt  revision of  shunt 2015  S/P  shunt      SOCIAL HX:  Lives in group home  OPWDD involved  Hx opiate tolerance ( )YES  (X )NO    Baseline ADLs  (Prior to Admission)  ( ) Independent   (X )Dependent    FAMILY HISTORY:  Family history of stroke (Mother)    Review of Systems:    Unable to obtain/Limited due to: altered mentation      PHYSICAL EXAM:  ICU Vital Signs Last 24 Hrs  T(C): 37.2 (04 Apr 2023 08:10), Max: 37.2 (04 Apr 2023 08:10)  T(F): 99 (04 Apr 2023 08:10), Max: 99 (04 Apr 2023 08:10)  HR: 97 (04 Apr 2023 08:10) (84 - 98)  BP: 137/90 (04 Apr 2023 08:10) (132/94 - 139/84)  RR: 17 (04 Apr 2023 08:10) (17 - 17)  SpO2: 94% (04 Apr 2023 08:10) (94% - 96%)  O2 Flow (L/min): 2  PPSV2: 10-20  %    General: Middle aged male in bed   Mental Status: lethargic   HEENT: Oral mucosa dry  Lungs: clear diminished rogelio  Cardiac: S1S2+  GI: abd soft NT ND + BS  : voids  Ext: moves all ext  Neuro: AMS      LABS:                                 14.8   5.96  )-----------( 240      ( 03 Apr 2023 12:22 )             44.8              04-03    140  |  111<H>  |  7   ----------------------------<  95  3.6   |  24  |  0.74    Ca    8.4<L>      03 Apr 2023 12:22  Phos  2.5     04-03  Mg     2.2     04-03  Albumin: Albumin, Serum: 3.6 g/dL (03-30 @ 20:45)    Allergies    codeine (Hives)  codeine (Stomach Upset)  Depakote (Other)  seasonal allergies: nasal congestion (Other)    Intolerances    MEDICATIONS  (STANDING):  Biotene Dry Mouth Oral Rinse 5 milliLiter(s) Swish and Spit four times a day  dextrose 5% + sodium chloride 0.9%. 1000 milliLiter(s) (50 mL/Hr) IV Continuous <Continuous>  dorzolamide 2%/timolol 0.5% Ophthalmic Solution 1 Drop(s) Both EYES two times a day  heparin   Injectable 5000 Unit(s) SubCutaneous every 12 hours  levETIRAcetam  IVPB 750 milliGRAM(s) IV Intermittent every 12 hours  LORazepam   Injectable 2 milliGRAM(s) IV Push once  LORazepam   Injectable 1 milliGRAM(s) IV Push every 12 hours  OLANZapine Injectable 5 milliGRAM(s) IntraMuscular every 12 hours  pantoprazole  Injectable 40 milliGRAM(s) IV Push daily  PHENobarbital IVPB 50 milliGRAM(s) IV Intermittent every 12 hours    MEDICATIONS  (PRN):  acetaminophen  Suppository .. 650 milliGRAM(s) Rectal every 6 hours PRN Temp greater or equal to 38C (100.4F), Mild Pain (1 - 3)  bisacodyl Suppository 10 milliGRAM(s) Rectal daily PRN Constipation  ondansetron Injectable 4 milliGRAM(s) IV Push every 8 hours PRN Nausea and/or Vomiting      RADIOLOGY/ADDITIONAL STUDIES:      < from: Xray Abdomen 1 View PORTABLE -Urgent (Xray Abdomen 1 View PORTABLE -Urgent .) (03.31.23 @ 11:22) >    ACC: [id number] EXAM:  XR ABDOMEN PORTABLE URGENT 1V   ORDERED BY: PACO KUMAR     PROCEDURE DATE:  03/31/2023          INTERPRETATION:  Supine abdominal film. Concern is aspiration pneumonia   possible obstruction.    Drainage tube overlying the right upper quadrant again noted.    There is mildly increased fecal content in the colon but no sign of   obstruction.    There is a mild left lumbar curve.    Findings are similar to February 22.    IMPRESSION: No sign of obstruction.

## 2023-04-06 NOTE — CHART NOTE - NSCHARTNOTEFT_GEN_A_CORE
Ethics consult initiated by Dr. Carranza (Palliaitve Care Attending). Case discussed in detail. Patient is under OPWDD with approved DNR/I from [facility] during prior admission. Family requesting no feeding tube. [facility] objected as Group Home objected to no feeding tube. Ethics to assist with complexities of MOLST decisions in a patient under the auspices of OPWDD.    Paulina Stone MD  Ethics Attending  [phone]

## 2023-04-06 NOTE — CHART NOTE - NSCHARTNOTEFT_GEN_A_CORE
This SW received an email from [facility] this morning with a letter stating that the MOLST checklist has been withdrawn.  MOLST checklist and letter received by writer placed on the medical record and a copy of these documents faxed to the group home with success.  Floor SW aware of these interactions as well.  Our team to continue to follow.

## 2023-04-06 NOTE — PROGRESS NOTE ADULT - ASSESSMENT
HPI: Pt is a 52y old Male with PMHx of hydrocephalus s/p multiple brain surgeries/ peritoneal shunt, legally blind, Epilepsy (last seizure years ago), and hx of aspiration PNA presented to the ED from [facility] sent in by speech pathologist for aspiration, trouble swallowing and for possible G-Tube placement. Palliative medicine has seen him on prior admission in Feb 2023 and we are re consulted as his guardian Christi Guy does not want a PEG placed and requests comfort and hospice care.   4/3/23 Seen and examined at bedside with Christi in room. Pt awake and minimally verbal.       Assessment and Plan:    1) Dysphagia  -Recurring aspiration  -Swallow eval noted Severe (oral pharyngeal dysphagia)  -NPO maintained  -Repeat hospitalization within 6 weeks  -CXR=Partial clearing of previous opacity left base.  Right calcified pleural plaques with pleural thickening.  Persistent airspace infiltrate on the right.  Consider obtaining CT scan.    2) Acute on chronic resp Failure  -Suspected Aspiration PNA  -Supplemental O2 PRN  -Abx as per med    3) Metabolic Encephalopathy:    -Suspect related to hypoxia /PNA    -Neuro eval  -Cont Keppra for seizure prophylaxis     4) Hydrocephalus/ mild MR:    -Unable to take home meds PO    -Currently alert to name only  -Resides in group home    5) Advanced Directives  -Pt without capacity  -Guardianship paperwork on chart naming Christi Guy     -Goals of care discussed with guardian Christi Guy. She does not want placement of feeding tube. We had made this request in Feb 2023 which was denied however due to progression of dysphagia and repeat hospitalization we will apply to OPWDD again.  -MOLST DNR/DNI approved by OPWDD on prior adm    MOLST CHECKLIST IS now invalid  it was going to be approved-  contested this possible decision and then the concurrent physician withdrew their signature.  molst was rendered invalid    dnr dni from previous checklist stands.     time spent 90 minutes coordinating care and

## 2023-04-07 NOTE — CHART NOTE - NSCHARTNOTEFT_GEN_A_CORE
Clinical Nutrition PN Follow Up Note    * 53 y/o M with a PMH of hydrocephalus s/p multiple brain surgeries/ peritoneal shunt, legally blind, Epilepsy (last seizure years ago), and hx of aspiration PNA presented to the ED from [facility] sent in by speech pathologist for aspiration, trouble swallowing and for possible G-Tube placement. Admitted for dysphagia. Pending SLP nirali. FULL CODE.     *PPN initiated 2/2 prolonged NPO, bridge until PEG can be placed     *4/1: RD consulted on 3/31 for assess/ TF. IA completed on 3/31. SLP nirali recommended to keep NPO for now. Possible PEG tube placement - to be determined by GI. Spoke w/ Dr. Brunson, unsure when PEG tube to be placed. Currently NPO x 2 days. Recommend to place corpak if unable to place PEG tube within 48 hrs. PN not appropriate at this time d/t functioning GI tract. See below for recommendations.     *current status: Pt legal guardian does not want PEG placed; procedure canceled (4/3). Palliative care following, pt made DNR/DNI; however MOLST did not get approved for NTF by Office of Mental Hygiene Legal Service. [facility]; ethics following. Spoke with Dr. Oglesby this morning, plan to initiate PPN as a bridge until PEG can be placed. Pt is at HIGH-RISK for refeeding syndrome as he has been NPO for 8 days, 200mg of thiamine added to PN bag.    *new pertinent meds: Biotene Mouth Wash, Keppra, Ativan, Zyprexa, Protonix, Phenobarbital, KCl (30 mEq), D5 + IVF    *labs reviewed; Na and Mg WNL. K+ on lower end of normal, will start at 60 mEq in PN bag. Phos on lower end of normal limits, will start at 20 mmol in PN bag. corrected Ca WNL, rec'd add 10mEq of Calcium Gluconate outside of PN bag as CAPS is out. T bili WNL for trace elements. No TG taken, if TG < 400mg, add 80g of lipids. No POCTs taken, ***PLEASE obtain POCTs q6h per PN protocol.     04-07    141  |  110<H>  |  5<L>  ----------------------------<  110<H>  3.4<L>   |  25  |  0.57    Ca    8.6      07 Apr 2023 06:49  Phos  1.8     04-07  Mg     2.0     04-07    BMI: BMI (kg/m2): 36.9 (03-30-23 @ 23:24)  HbA1c:   Glucose: POCT Blood Glucose.: 94 mg/dL (02-17-23 @ 08:45)    BP: 134/83 (04-07-23 @ 08:17) (120/91 - 142/89)  Lipid Panel:     *I&O's Detail  * No I&O's doc'd; PLEASE obtain STRICT I&O's per PN protocol    * BM (+) on 4/6; fecal incontinence.  pt not on bowel regimen.    *malnutrition: Pt continues to meet criteria for severe malnutrition in context of acute on chronic illness r/t decreased ability to meet increased nutrient needs 2/2 dysphagia, hydrocephalus AEB mild to severe muscle/ fat wasting     Estimated Needs: based on 91.1 Kg (bed scale wt taken by RD on 3/31)  Kcals: 3430-2561 Kcal  (20-25 Kcal/Kg)  Protein:  g protein (1.2-1.5 g/Kg)  Fluid: 1364-8086 mL   (20-25 mL/Kg)    Diet, NPO (03-30-23 @ 22:08) [Active]    Weight History:  Height (cm): 157.5 (03-30-23 @ 18:28)  Weight (kg): 91.6 (03-30-23 @ 23:24)  BMI (kg/m2): 36.9 (03-30-23 @ 23:24)  BSA (m2): 1.92 (03-30-23 @ 23:24)  IBW: 154#  IBW %: 130%    PPN Recommendations: via Peripheral Line  Total Volume: 2000 mL x 24 hours  80 g  Amino Acids  100 g Dextrose  0 g Lipids 20% (If TG < 400, add 80g of lipids)  121 mEq Sodium Chloride  6 mEq Sodium Acetate  20 mmol Sodium Phosphate  33 mEq Potassium Chloride  27 mEq Potassium Acetate  0 mmol Potassium Phosphate  0 mEq Calcium Gluconate (CAPS out of PN solution)  8 mEq Magnesium Sulfate  200 mg Thiamine  1 ml Trace Elements  10 ml MVI    Total Calories  660 kcals   ( Meets  29 %  of  Estimated Energy needs  and  77 %  Protein needs)  (osmolarity ~885)    Additional Recommendations:    1) Daily weights  2) Strict I & O's  3) Daily lyte checks including magnesium and phos  4) Weekly triglycerides/LFT checks  5) POCT q6hrs; maintain 140-180mg/dL  6) if on PN > 6 days, consider placing PICC line to meet 100% of nutr needs  7) Consider dc'ing IVF while on PN  8) Confirm goals of care regarding LONG-TERM nutrition support; PPN to help bridge nutrition needs  9) Pt at HIGH-RISK for refeeding, added 200mg of thiamine to PN bag  10) When TF placed, initiate Jevity 1.5 @ 20 cc/hr, increase by 10 cc/hr q6 hours until goal rate of 70cc/hr is met. Will provide ~ 2100 kcal, 89 g protein, and 1064 mL free water.     *will continue to monitor and adjust PN prn*    Maira Tilley MS, RDN, [phone]

## 2023-04-07 NOTE — PROGRESS NOTE ADULT - NS ATTEND AMEND GEN_ALL_CORE FT
pt alejandra and exmained  very sick appearing  poor control of secretions  lungs rhonchi b/l  cards s1s2  abd. soft nontender  eomi     Team worked with Legal, OPWDD Reps,  [facility], and discussed case at length w/ [facility] and pts Guardian Christi.   At this stage all in agreement that MOLST checklist would be completed without contesting from [facility].  [facility] reviewed and discussed case w/ all parties.  MOLST checklist was approved 4/7 / 23 in the late afternoon.    Primary team notified.  SW Notified of [facility] referral for in patient hospice unit   case discsused w/ Hospital admin as well   GI notified  Hospitalist notified and aware.   pt is COMFORT ONLY   agressive oral care, glyccopyrrolate, morphine 2mg [id number] prn for severe pain or shortness of breath, c/w ativan and antieplieptics via IV allow comfort feeds

## 2023-04-07 NOTE — PROGRESS NOTE ADULT - SUBJECTIVE AND OBJECTIVE BOX
HPI:  pt seen and examined at bedside with Dr. Coles and JASWINDER Puga. Patient appears to have increased respiratory effort, appears more lethargic and with copious amounts of secretions and mucous.  Nasal Cannula remains in place, requested ongoing mouth care and suctioning be provided PRN, patient responding to simple questions.      PAIN: ( )Yes   (x )No  No non verbal signs or symptoms    DYSPNEA: (x ) Yes  ( ) No  upper air way congestion - continue with Nasal Cannula       Review of Systems:    Unable to obtain/Limited due to: altered mentation       PHYSICAL EXAM:    Vital Signs Last 24 Hrs  T(C): 36.9 (07 Apr 2023 08:17), Max: 37.1 (06 Apr 2023 23:16)  T(F): 98.4 (07 Apr 2023 08:17), Max: 98.7 (06 Apr 2023 23:16)  HR: 106 (07 Apr 2023 08:17) (102 - 108)  BP: 134/83 (07 Apr 2023 08:17) (123/80 - 142/89)  RR: 18 (07 Apr 2023 08:17) (17 - 19)  SpO2: 95% (07 Apr 2023 08:17) (93% - 95%)    Parameters below as of 07 Apr 2023 08:17  Patient On (Oxygen Delivery Method): nasal cannula  O2 Flow (L/min): 2    PPSV2: 10-20  %    General: Middle aged male in bed   Mental Status: lethargic   HEENT: Oral mucosa dry  Lungs: diminished rogelio, with upper airway congestion, scattered rhonchi present   Cardiac: S1S2+  GI: abd soft NT ND + BS  : voids  Ext: moves all ext spontaneously   Neuro: AMS      LABS:                        13.7   8.86  )-----------( 265      ( 06 Apr 2023 06:55 )             42.1     04-07    141  |  110<H>  |  5<L>  ----------------------------<  110<H>  3.4<L>   |  25  |  0.57    Ca    8.6      07 Apr 2023 06:49  Phos  1.8     04-07  Mg     2.0     04-07        Albumin:     Allergies    codeine (Hives)  codeine (Stomach Upset)  Depakote (Other)  seasonal allergies: nasal congestion (Other)    Intolerances      MEDICATIONS  (STANDING):  Biotene Dry Mouth Oral Rinse 5 milliLiter(s) Swish and Spit four times a day  dextrose 5% + sodium chloride 0.9%. 1000 milliLiter(s) (50 mL/Hr) IV Continuous <Continuous>  dorzolamide 2%/timolol 0.5% Ophthalmic Solution 1 Drop(s) Both EYES two times a day  heparin   Injectable 5000 Unit(s) SubCutaneous every 12 hours  levETIRAcetam  IVPB 750 milliGRAM(s) IV Intermittent every 12 hours  LORazepam   Injectable 1 milliGRAM(s) IV Push every 12 hours  LORazepam   Injectable 2 milliGRAM(s) IV Push once  OLANZapine Injectable 5 milliGRAM(s) IntraMuscular every 12 hours  pantoprazole  Injectable 40 milliGRAM(s) IV Push daily  PHENobarbital IVPB 50 milliGRAM(s) IV Intermittent every 12 hours    MEDICATIONS  (PRN):  acetaminophen  Suppository .. 650 milliGRAM(s) Rectal every 6 hours PRN Temp greater or equal to 38C (100.4F), Mild Pain (1 - 3)  bisacodyl Suppository 10 milliGRAM(s) Rectal daily PRN Constipation  ondansetron Injectable 4 milliGRAM(s) IV Push every 8 hours PRN Nausea and/or Vomiting

## 2023-04-07 NOTE — CHART NOTE - NSCHARTNOTEFT_GEN_A_CORE
This SW spoke with  Anastacio ([phone]) Group Home the group homes Director of [facility] (Independent Group Home Living Program) Residential Radha Haywood [phone] who reports that She spoke with her director Krystle Vargas Bertram  for [facility] and they have all come to an agreement that the [facility] and [facility] are now NOT objecting to not having feeding tube placed. Since they are no longer objecting a new MOLST checklist is able to be re submitted. MOLST Checklist requests for DNR, DNI (already approved from last time but must restate on new form), No feeding tube, No hospitalization, No labs, No Diagnostic imaging, No Antibiotics, No IV fluids, Stop TPN and IV nutrition comfort only and hospice. MOLST Checklist was sent to  Kolton Castle from [employer] Mental [employer] [phone] and fax [phone] This SW spoke with  Anastacio ([phone]) Group Home the group homes Director of [facility] (Independent Group Home Living Program) Residential Radha Haywood [phone] who reports that She spoke with her director Krystle Vargas Bertram  for [facility] and they have all come to an agreement that the [facility] and [facility] are now NOT objecting to not having feeding tube placed. Since they are no longer objecting a new MOLST checklist is able to be re submitted. A [facility] Physician who meets criteria to sign for capacity on Step 3 as it must be  a Physician who has experience working with OPWDD patients.     MOLST Checklist requests for DNR, DNI (already approved from last time but must restate on new form), No feeding tube, No hospitalization, No labs, No Diagnostic imaging, No Antibiotics, No IV fluids, Stop TPN and IV nutrition comfort only and hospice. MOLST Checklist was completed and sent to  Kolton Altheimer from [employer] [phone] and fax [phone].    This SW received letter of approval for MOLST checklist from [facility]. Comfort only focus can now be put in place and regular MOLST completed to reflect comfort. Pts Guardian Christi made aware ([phone], [phone]) and she would like a referral placed for inpatient hospice at [facility]. Covering weekend  will need to place referral. All needed documents on chart.     Plan at this time is to start full comfort focus here including DNR, DNI (already approved from last time but must restate on new form), No feeding tube, No labs, No Diagnostic imaging, No Antibiotics, No IV fluids, Stop TPN and IV nutrition. Plan will also be for referral to [facility] inpatient hospice, which must be submitted by weekend . Emotional support provided to Aunt. Our team will continue to follow. This SW spoke with  Anastacio ([phone]) who connected this SW with the group homes Director of [facility] (Independent Group Home Living Program) Residential Radha Haywood [phone]. Radha reports that She spoke with her director Krystle Vargas, Bertram  for [facility] and they have all come to an agreement that the half-way and [facility] are now NOT objecting to not having feeding tube placed and MOLST checklist. Since they are no longer objecting a new MOLST checklist is able to be re submitted to [employer] Legal Service (OM). A [facility] Physician who meets criteria to sign for capacity on Step 3 (as it must be a Physician who has experience working with OPWDD patients) signed capacity portion, which completed MOLST checklist.     MOLST Checklist requests for DNR, DNI (already approved from last time but must restate on new form), No feeding tube, No hospitalization, No labs, No Diagnostic imaging, No Antibiotics, No IV fluids, Stop TPN and IV nutrition comfort only and hospice. MOLST Checklist was completed and sent to  Kolton Castle from [employer] [phone] and fax [phone].    This SW received letter of approval for MOLST checklist from  Kolton Bishop. Comfort only focus can now be put in place and regular MOLST completed to reflect comfort. Pts Guardian Christi made aware ([phone], [phone]) and she would like a referral placed for inpatient hospice at [facility]. Covering weekend  will need to place referral. All needed documents on chart.     Plan at this time is to start full comfort focus here including DNR, DNI (already approved from last time but must restate on new form), No feeding tube, No labs, No Diagnostic imaging, No Antibiotics, No IV fluids, Stop TPN and IV nutrition. Plan will also be for referral to [facility] inpatient hospice, which must be submitted by weekend . Emotional support provided to Aunt. Our team will continue to follow. This SW spoke with  Néstordonaldsolomon ([phone]) who connected this SW with the group homes Director of [facility] (Independent Group Home Living Program) Residential Radha Haywood [phone]. Radha reports that She spoke with her director Krystle Vargas, Bertram  for [facility] and they have all come to an agreement that the senior living and [facility] are now NOT objecting to not having feeding tube placed and MOLST checklist. Since they are no longer objecting a new MOLST checklist is able to be re submitted to [employer] Legal Service ([facility]). A [facility] Physician who meets criteria to sign for capacity on Step 3 (as it must be a Physician who has experience working with OPWDD patients) signed capacity portion, which completed MOLST checklist.     MOLST Checklist requests for DNR, DNI (already approved from last time but must restate on new form), No feeding tube, No hospitalization, No labs, No Diagnostic imaging, No Antibiotics, No IV fluids, Stop TPN and IV nutrition comfort only and hospice. MOLST Checklist was completed and sent to  Kolton Castle from [employer] [phone] and fax [phone].    This SW received letter of approval for MOLST checklist from  Kolton Sebastian. Comfort only focus can now be put in place and regular MOLST completed to reflect comfort. Pts Guardian Christi made aware ([phone], [phone]) and she would like a referral placed for inpatient hospice at [facility]. Covering weekend  will need to place referral. All needed documents on chart. A copy of the MOLST checklist and approval letter was also sent to Rdaha Haywood Director of Residential [facility].     Plan at this time is to start full comfort focus here including DNR, DNI (already approved from last time but must restate on new form), No feeding tube, No labs, No Diagnostic imaging, No Antibiotics, No IV fluids, Stop TPN and IV nutrition. Plan will also be for referral to [facility] inpatient hospice, which must be submitted by weekend . Emotional support provided to Aunt. Our team will continue to follow.

## 2023-04-07 NOTE — PROGRESS NOTE ADULT - ASSESSMENT
Pt is a 52y old Male with PMHx of hydrocephalus s/p multiple brain surgeries/ peritoneal shunt, legally blind, Epilepsy (last seizure years ago), and hx of aspiration PNA presented to the ED from [facility] sent in by speech pathologist for aspiration, trouble swallowing and for possible G-Tube placement. Palliative medicine has seen him on prior admission in Feb 2023 and we are re consulted as his guardian Christi Guy does not want a PEG placed and requests comfort and hospice care.       1) Dysphagia  - Recurring aspiration  - Swallow eval noted Severe (oral pharyngeal dysphagia)  - NPO maintained  - Repeat hospitalization within 6 weeks  - CXR=Partial clearing of previous opacity left base, Right calcified pleural plaques with pleural thickening.Persistent airspace infiltrate on the right. Consider obtaining CT scan.    2) Acute on chronic resp Failure  - Suspected Aspiration PNA  - Supplemental O2 PRN - remains on nasal cannula   - Abx as per med  - worsening secretions     3) Metabolic Encephalopathy:    - Suspect related to hypoxia /PNA    - Neuro eval  - Cont Keppra for seizure prophylaxis     4) Hydrocephalus/ mild MR:    - Unable to take home meds PO    - Currently alert to name only  - Resides in group [place]    5) Advanced Directives  - Pt without capacity  - Guardianship paperwork on chart naming Christi Guy  - Goals of care discussed with guardian Christi Guy. She does not want placement of feeding tube. We had made this request in Feb 2023 which was denied however due to progression of dysphagia and repeat hospitalization we will apply to OPWDD again.  - MOLST DNR/DNI approved by OPWDD on prior adm    MOLST CHECKLIST IS now invalid  it was going to be approved-  contested this possible decision and then the concurrent physician withdrew their signature.  molst was rendered invalid    dnr dni from previous checklist stands.     time spent 90 minutes coordinating care and    Pt is a 52y old Male with PMHx of hydrocephalus s/p multiple brain surgeries/ peritoneal shunt, legally blind, Epilepsy (last seizure years ago), and hx of aspiration PNA presented to the ED from [facility] sent in by speech pathologist for aspiration, trouble swallowing and for possible G-Tube placement. Palliative medicine has seen him on prior admission in Feb 2023 and we are re consulted as his guardian Christi Guy does not want a PEG placed and requests comfort and hospice care.       1) Dysphagia  - Recurring aspiration  - Swallow eval noted Severe (oral pharyngeal dysphagia)  - NPO maintained  - Repeat hospitalization within 6 weeks  - CXR=Partial clearing of previous opacity left base, Right calcified pleural plaques with pleural thickening.Persistent airspace infiltrate on the right. Consider obtaining CT scan.    2) Acute on chronic resp Failure  - Suspected Aspiration PNA  - Supplemental O2 PRN - remains on nasal cannula   - Abx as per med  - worsening secretions     3) Metabolic Encephalopathy:    - Suspect related to hypoxia /PNA    - Neuro eval  - Cont Keppra for seizure prophylaxis     4) Hydrocephalus/ mild MR:    - Unable to take home meds PO    - Currently alert to name only  - Resides in group [place]    5) Advanced Directives  - Pt without capacity  - Guardianship paperwork on chart naming Christi Guy  - Goals of care discussed with guardian Christi Guy. She does not want placement of feeding tube. We had made this request in Feb 2023 which was denied however due to progression of dysphagia and repeat hospitalization we will apply to OPWDD again.  - MOLST DNR/DNI approved by OPWDD on prior adm    MOLST CHECKLIST IS now invalid  it was going to be approved-  contested this possible decision and then the concurrent physician withdrew their signature.  molst was rendered invalid    4/7/2023 ongoing discussions - placement of PEG tube canceled for today as patients guardian/aunt is refusing to sign consent as she does not feel this is what is best for patient, Ethics consult in place     dnr dni from previous checklist stands.     time spent 90 minutes coordinating care and    Pt is a 52y old Male with PMHx of hydrocephalus s/p multiple brain surgeries/ peritoneal shunt, legally blind, Epilepsy (last seizure years ago), and hx of aspiration PNA presented to the ED from [facility] sent in by speech pathologist for aspiration, trouble swallowing and for possible G-Tube placement. Palliative medicine has seen him on prior admission in Feb 2023 and we are re consulted as his guardian Christi Guy does not want a PEG placed and requests comfort and hospice care.       1) Dysphagia  - Recurring aspiration  - Swallow eval noted Severe (oral pharyngeal dysphagia)  - NPO maintained  - Repeat hospitalization within 6 weeks  - CXR=Partial clearing of previous opacity left base, Right calcified pleural plaques with pleural thickening.Persistent airspace infiltrate on the right. Consider obtaining CT scan.    2) Acute on chronic resp Failure  - Suspected Aspiration PNA  - Supplemental O2 PRN - remains on nasal cannula   - Abx as per med  - worsening secretions     3) Metabolic Encephalopathy:    - Suspect related to hypoxia /PNA    - Neuro eval  - Cont Keppra for seizure prophylaxis     4) Hydrocephalus/ mild MR:    - Unable to take home meds PO    - Currently alert to name only  - Resides in group [place]    5) Advanced Directives  - Pt without capacity  - Guardianship paperwork on chart naming Christi Guy  - Goals of care discussed with guardian Christi Guy. She does not want placement of feeding tube. We had made this request in Feb 2023 which was denied however due to progression of dysphagia and repeat hospitalization we will apply to OPWDD again.  - MOLST DNR/DNI approved by OPWDD on prior adm      Team worked with Legal, OPWDD Reps,  LS Kolton Castle, and discussed case at length w/ GH and pts Guardian Christi.   At this stage all in agreement that MOLST checklist would be completed without contesting from .  [facility] reviewed and discussed case w/ all parties.  MOLST checklist was approved 4/7 / 23 in the late afternoon.    Primary team notified.  SW Notified of [facility] referral for in patient hospice unit   case discsused w/ Hospital admin as well   GI notified  Hospitalist notified and aware.   pt is COMFORT ONLY   agressive oral care, glyccopyrrolate, morphine 2mg [id number] prn for severe pain or shortness of breath, c/w ativan and antieplieptics via IV allow comfort feeds

## 2023-04-07 NOTE — PROGRESS NOTE ADULT - ASSESSMENT
Dysphagia and h/o Aspiration PNA  Severe protein-calorie malnutrition  - Maintain NPO and will start PPN today 4/7  - Speech therapy evaluation- recommend NPO- will need PEG  - was scheduled for PEG, however guardian does not want PEG placed at this time - procedure cancelled and plan for ethics eval  - Palliative following  - Guardian/aunt was requesting a CT head to r/o recurrent CVA this AM, pt on ativan 2mg q12h interchange with home benzo for seizure disorder, will decrease dose  - Patient to have PEG placed --> SW discussed with office of mental hygiene legal services this afternoon,  and patient's and plan to proceed with PEG but will d/w dr padilla and dr sandoval  - nutrition following for PPN    h/o Seizures  -Home meds: Primidone 250 mg bid, Clobazam 5 mg BID, Lamotrigine 300 mg BID po  -Current meds: Phenobarbital 50 mg IV q12, Levetiracetam 750 mg IV q12, ativan 2mg q12h --> will decrease dose   -PRN Ativan   -Neurology consult appreciated     Code status: DNR/DNI    VTE PPX  -Heparin subQ       - Legal guardian Christiane Malena [phone]/ [phone]

## 2023-04-07 NOTE — PROGRESS NOTE ADULT - SUBJECTIVE AND OBJECTIVE BOX
HPI: 52 year old Male with PMHx of hydrocephalus s/p multiple brain surgeries/ peritoneal shunt, legally blind, Epilepsy (last seizure years ago), and hx of aspiration PNA presented to the ED from [facility] sent in by speech pathologist for aspiration, trouble swallowing and for possible G-Tube placement.     4/6 - plan was for peg today but HCP/guardian does not want it despite order from office of mental hygiene  SW reaching out to office of mental hygiene and legal to see if we still proceed or abide by guardian wishes.    4/7 - PEG on hold awaiting ethics eval.      ROS: unable to obtain 2/2 patient's condition non verbal     Vital Signs Last 24 Hrs  T(C): 36.9 (07 Apr 2023 08:17), Max: 37.1 (06 Apr 2023 23:16)  T(F): 98.4 (07 Apr 2023 08:17), Max: 98.7 (06 Apr 2023 23:16)  HR: 106 (07 Apr 2023 08:17) (102 - 108)  BP: 134/83 (07 Apr 2023 08:17) (123/80 - 142/89)  BP(mean): --  RR: 18 (07 Apr 2023 08:17) (17 - 19)  SpO2: 95% (07 Apr 2023 08:17) (93% - 95%)    Parameters below as of 07 Apr 2023 08:17  Patient On (Oxygen Delivery Method): nasal cannula  O2 Flow (L/min): 2    PHYSICAL EXAM:  GENERAL: NAD, lying in bed comfortably  HEAD:  Atraumatic, Normocephalic  EYES: conjunctiva and sclera clear  ENT: Moist mucous membranes  NECK: Supple, No JVD  CHEST/LUNG: Clear to auscultation bilaterally; No rales, rhonchi, wheezing. Unlabored respirations  HEART: Regular rate and rhythm; No murmurs  ABDOMEN: Bowel sounds present; Soft, Nontender, Nondistended  EXTREMITIES:  2+ Peripheral Pulses, brisk capillary refill. No clubbing, cyanosis, or edema  NERVOUS SYSTEM:  drowsy                                13.7   8.86  )-----------( 265      ( 06 Apr 2023 06:55 )             42.1     04-07    141  |  110<H>  |  5<L>  ----------------------------<  110<H>  3.4<L>   |  25  |  0.57    Ca    8.6      07 Apr 2023 06:49  Phos  1.8     04-07  Mg     2.0     04-07              RADIOLOGY:

## 2023-04-08 NOTE — DISCHARGE NOTE PROVIDER - NSDCFUSCHEDAPPT_GEN_ALL_CORE_FT
Ilan Quesada  [facility] Physician [facility]  CARDIOLOGY [address]  Scheduled Appointment: 06/01/2023    Juan Ireland  [facility] Physician [facility]  ENDOCRIN [address]  Scheduled Appointment: 06/12/2023

## 2023-04-08 NOTE — DISCHARGE NOTE PROVIDER - HOSPITAL COURSE
pt is 52 year old Male with PMHx of hydrocephalus s/p multiple brain surgeries/ peritoneal shunt, legally blind, Epilepsy (last seizure years ago), and hx of aspiration PNA presented to the ED from [facility] sent in by speech pathologist for aspiration, trouble swallowing and for possible G-Tube placement.     plan was for peg today but HCP/guardian does not want it despite order from office of mental hygiene  SW reaching out to office of mental hygiene and legal to see if we still proceed or abide by guardian wishes.  case d/w pall and GI and case has been reviewd by opwdd and comfort MOLST has been approved as per dr shepard. pt is hospice appropriate and plan for transfer when bed available.      ROS: unable to obtain 2/2 patient's condition non verbal     Vital Signs Last 24 Hrs  T(C): 37.1 (07 Apr 2023 16:18), Max: 37.1 (07 Apr 2023 16:18)  T(F): 98.8 (07 Apr 2023 16:18), Max: 98.8 (07 Apr 2023 16:18)  HR: 105 (07 Apr 2023 16:18) (105 - 105)  BP: 136/94 (07 Apr 2023 16:18) (136/94 - 136/94)  BP(mean): --  RR: 17 (07 Apr 2023 16:18) (17 - 17)  SpO2: 93% (07 Apr 2023 16:18) (93% - 93%)    Parameters below as of 07 Apr 2023 16:18  Patient On (Oxygen Delivery Method): nasal cannula  O2 Flow (L/min): 2      PHYSICAL EXAM:  GENERAL: NAD, lying in bed comfortably  HEAD:  Atraumatic, Normocephalic  EYES: conjunctiva and sclera clear  ENT: Moist mucous membranes  NECK: Supple, No JVD  CHEST/LUNG: Clear to auscultation bilaterally; No rales, rhonchi, wheezing. Unlabored respirations  HEART: Regular rate and rhythm; No murmurs  ABDOMEN: Bowel sounds present; Soft, Nontender, Nondistended  EXTREMITIES:  2+ Peripheral Pulses, brisk capillary refill. No clubbing, cyanosis, or edema  NERVOUS SYSTEM:  drowsy    # Dysphagia and h/o Aspiration PNA. placement of peg would not prevent further aspiration events and agree with plan for hospice and comfort care in accordance with guaridans wishes.   - comfort measures  - stop ppn    h/o Seizures  -Home meds: Primidone 250 mg bid, Clobazam 5 mg BID, Lamotrigine 300 mg BID po  -Current meds: Phenobarbital 50 mg IV q12, Levetiracetam 750 mg IV q12, ativan 2mg q12h --> will decrease dose   -PRN Ativan   -Neurology consult appreciated     Code status: DNR/DNI      - Legal guardian Christiane Guy [phone]/ [phone] updated     time spent 40 mins      pt is 52 year old Male with PMHx of hydrocephalus s/p multiple brain surgeries/ peritoneal shunt, legally blind, Epilepsy (last seizure years ago), and hx of aspiration PNA presented to the ED from [facility] sent in by speech pathologist for aspiration, trouble swallowing and for possible G-Tube placement.     plan was for peg today but HCP/guardian does not want it despite order from office of mental hygiene  SW reaching out to office of mental hygiene and legal to see if we still proceed or abide by guardian wishes.  case d/w pall and GI and case has been reviewd by opwdd and comfort MOLST has been approved as per dr shepard. pt is hospice appropriate and plan for transfer when bed available.    case d/w dr escamilla from ethics prior to dc.      ROS: unable to obtain 2/2 patient's condition non verbal     PHYSICAL EXAM:  GENERAL: NAD, lying in bed comfortably  HEAD:  Atraumatic, Normocephalic  EYES: conjunctiva and sclera clear  ENT: Moist mucous membranes  NECK: Supple, No JVD  CHEST/LUNG: Clear to auscultation bilaterally; No rales, rhonchi, wheezing. Unlabored respirations  HEART: Regular rate and rhythm; No murmurs  ABDOMEN: Bowel sounds present; Soft, Nontender, Nondistended  EXTREMITIES:  2+ Peripheral Pulses, brisk capillary refill. No clubbing, cyanosis, or edema  NERVOUS SYSTEM:  somnolent     # Dysphagia and h/o Aspiration PNA. placement of peg would not prevent further aspiration events and agree with plan for hospice and comfort care in accordance with guaridans wishes.   - comfort measures only.  [facility]   - compassionate feeds at hospice   - stop ppn    h/o Seizures  -Home meds: Primidone 250 mg bid, Clobazam 5 mg BID, Lamotrigine 300 mg BID po  -Current meds: Phenobarbital 50 mg IV q12, Levetiracetam 750 mg IV q12, ativan 2mg q12h --> will decrease dose   -PRN Ativan   -Neurology consult appreciated     Code status: DNR/DNI. comfort MOLST compelted   - Legal guardian Christiane Guy [phone]/ [phone] updated at bedside     time spent 40 mins

## 2023-04-08 NOTE — DISCHARGE NOTE PROVIDER - DETAILS OF MALNUTRITION DIAGNOSIS/DIAGNOSES
This patient has been assessed with a concern for Malnutrition and was treated during this hospitalization for the following Nutrition diagnosis/diagnoses:     -  03/31/2023: Severe protein-calorie malnutrition

## 2023-04-08 NOTE — CHART NOTE - NSCHARTNOTEFT_GEN_A_CORE
Clinical Nutrition PN Follow Up Note    * 51 y/o M with a PMH of hydrocephalus s/p multiple brain surgeries/ peritoneal shunt, legally blind, Epilepsy (last seizure years ago), and hx of aspiration PNA presented to the ED from [facility] sent in by speech pathologist for aspiration, trouble swallowing and for possible G-Tube placement. Admitted for dysphagia. Pending SLP nirali. FULL CODE.     *PPN initiated 2/2 prolonged NPO, bridge until PEG can be placed     *4/1: RD consulted on 3/31 for assess/ TF. IA completed on 3/31. SLP nirali recommended to keep NPO for now. Possible PEG tube placement - to be determined by GI. Spoke w/ Dr. Brunson, unsure when PEG tube to be placed. Currently NPO x 2 days. Recommend to place corpak if unable to place PEG tube within 48 hrs. PN not appropriate at this time d/t functioning GI tract. See below for recommendations.   *4/7: Pt legal guardian does not want PEG placed; procedure canceled (4/3). Palliative care following, pt made DNR/DNI; however MOLST did not get approved for NTF by Office of Mental Hygiene Legal Service. [facility]; ethics following. Spoke with Dr. Oglesby this morning, plan to initiate PPN as a bridge until PEG can be placed. Pt is at HIGH-RISK for refeeding syndrome as he has been NPO for 8 days, 200mg of thiamine added to PN bag.    *current status:  spoke with  Anastacio ([phone]) who connected this  with the [facility] Director of [facility] ([facility] Residential Radha Haywood [phone]. Radha reports that She spoke with her director Krystle Vargas, [place]  for [facility] and they have all come to an agreement that the [facility] and [facility] are now NOT objecting to not having feeding tube placed and MOLST checklist. Since they are no longer objecting a new MOLST checklist is able to be re submitted to Office of Mental Hygiene Legal Service ([facility]). ***MOLST Checklist requests for DNR, DNI (already approved from last time but must restate on new form), No feeding tube, No hospitalization, No labs, No Diagnostic imaging, No Antibiotics, No IV fluids, Stop TPN and IV nutrition comfort only and hospice. Pt's guardian would like referral for inpatient hospice. Will d/c PPN. Pt is now comfort measures only.    *labs reviewed; 04-08    138  |  107  |  9   ----------------------------<  131<H>  3.5   |  28  |  0.50    Ca    8.5      08 Apr 2023 06:43  Phos  1.8     04-07  Mg     2.0     04-07    TPro  7.6  /  Alb  3.0<L>  /  TBili  0.5  /  DBili  x   /  AST  19  /  ALT  46  /  AlkPhos  138<H>  04-08    BMI: BMI (kg/m2): 36.9 (03-30-23 @ 23:24)  Glucose: POCT Blood Glucose.: 94 mg/dL (02-17-23 @ 08:45)  BP: 134/83 (04-07-23 @ 08:17) (120/91 - 142/89)    *malnutrition: Pt continues to meet criteria for severe malnutrition in context of acute on chronic illness r/t decreased ability to meet increased nutrient needs 2/2 dysphagia, hydrocephalus AEB mild to severe muscle/ fat wasting     Estimated Needs: based on 91.1 Kg (bed scale wt taken by RD on 3/31)  Kcals: 3851-1594 Kcal  (20-25 Kcal/Kg)  Protein:  g protein (1.2-1.5 g/Kg)  Fluid: 0087-5506 mL   (20-25 mL/Kg)    Diet, NPO (03-30-23 @ 22:08) [Active]    Weight History:  Height (cm): 157.5 (03-30-23 @ 18:28)  Weight (kg): 91.6 (03-30-23 @ 23:24)  BMI (kg/m2): 36.9 (03-30-23 @ 23:24)  BSA (m2): 1.92 (03-30-23 @ 23:24)  IBW: 154#  IBW %: 130%    Additional Recommendations:  1) Pt is comfort care only - will d/c PPN. Provide pleasure/ comfort feeds if medically feasible  2) RD will not follow - re-consult if status/ GOC change.    Ashley Hager MS, RDN, CNSC, [phone]  Certified Nutrition

## 2023-04-08 NOTE — DISCHARGE NOTE PROVIDER - NSDCMRMEDTOKEN_GEN_ALL_CORE_FT
levETIRAcetam 1500 mg/100 mL-NaCl 0.54% intravenous solution: 50 milliliter(s) intravenous every 12 hours  OLANZapine 10 mg intramuscular injection: 5 milligram(s) intramuscular every 12 hours  PHENobarbital: 50 milligram(s) intravenous every 12 hours   levETIRAcetam 1500 mg/100 mL-NaCl 0.54% intravenous solution: 50 milliliter(s) intravenous every 12 hours  LORazepam: 1 milligram(s) intravenous every 12 hours  OLANZapine 10 mg intramuscular injection: 5 milligram(s) intramuscular every 12 hours  PHENobarbital: 50 milligram(s) intravenous every 12 hours

## 2023-04-08 NOTE — DISCHARGE NOTE PROVIDER - DID THE PATIENT PRESENT WITH OR WAS TREATED FOR MALNUTRITION DURING THIS ADMISSION
"Chief Complaint   Patient presents with     Pain       Initial /77  Pulse 76 Estimated body mass index is 31.8 kg/(m^2) as calculated from the following:    Height as of 6/16/16: 1.676 m (5' 6\").    Weight as of 6/16/16: 89.4 kg (197 lb).  Medication Reconciliation: complete     Pre-procedure Intake    Have you been fasting? NA    If yes, for how long? NA    Are you taking a prescribed blood thinner such as coumadin, Plavix, Xarelto?    No    If yes, when did you take your last dose? NA    Do you take aspirin?  No    If cervical procedure, have you held aspirin for 6 days?   NA    Do you have any allergies to contrast dye, iodine, steroid and/or numbing medications?  NO    Are you currently taking antibiotics or have an active infection?  NO    Have you had a fever/elevated temperature within the past week? NO    Are you currently taking oral steroids? NO    Do you have a ? Yes       Are you pregnant or breastfeeding?  NO    Are the vital signs normal?  Yes        " Yes...

## 2023-04-08 NOTE — CHART NOTE - NSCHARTNOTESELECT_GEN_ALL_CORE
Dietitian F/U
ETHICS/Event Note
Event Note
Dietitian Brief Note
Dietitian f/u w/ PPN
Event Note

## 2023-04-08 NOTE — DISCHARGE NOTE NURSING/CASE MANAGEMENT/SOCIAL WORK - NSDCPEFALRISK_GEN_ALL_CORE
For information on Fall & Injury Prevention, visit: [email or web address] OR  [email or web address] OR  https://www.cdc.gov/steadi/patient.html

## 2023-04-08 NOTE — DISCHARGE NOTE PROVIDER - NSDCCPCAREPLAN_GEN_ALL_CORE_FT
PRINCIPAL DISCHARGE DIAGNOSIS  Diagnosis: Trouble swallowing  Assessment and Plan of Treatment: hospice care

## 2023-04-09 NOTE — PROGRESS NOTE ADULT - ASSESSMENT
Dysphagia and h/o Aspiration PNA  Severe protein-calorie malnutrition  - no plan for PEG and plan for hospice but inpt hospice has refused and will d/w CM in AM   - was scheduled for PEG, however guardian does not want PEG placed at this time - procedure cancelled and comfort MOLST in place and approved by [employer]  - Palliative following and case d/w dr griffin and plan is for hospice.  - comfort measures    h/o Seizures  -Home meds: Primidone 250 mg bid, Clobazam 5 mg BID, Lamotrigine 300 mg BID po  -Current meds: Phenobarbital 50 mg IV q12, Levetiracetam 750 mg IV q12, ativan 2mg q12h --> will decrease dose   -PRN Ativan   -Neurology consult appreciated     Code status: DNR/DNI    VTE PPX  -Heparin subQ       - Legal guardian Christiane Guy [phone]/ [phone] updated

## 2023-04-09 NOTE — PROGRESS NOTE ADULT - SUBJECTIVE AND OBJECTIVE BOX
HPI: 52 year old Male with PMHx of hydrocephalus s/p multiple brain surgeries/ peritoneal shunt, legally blind, Epilepsy (last seizure years ago), and hx of aspiration PNA presented to the ED from [facility] sent in by speech pathologist for aspiration, trouble swallowing and for possible G-Tube placement.     4/6 - plan was for peg today but HCP/guardian does not want it despite order from office of mental hygiene  SW reaching out to office of mental hygiene and legal to see if we still proceed or abide by guardian wishes.    4/7 - PEG on hold awaiting ethics eval.    4/8 - pt more alert and speaking a few words as per RN.      ROS: unable to obtain 2/2 patient's condition non verbal     Vital Signs Last 24 Hrs  T(C): 36.9 (07 Apr 2023 08:17), Max: 37.1 (06 Apr 2023 23:16)  T(F): 98.4 (07 Apr 2023 08:17), Max: 98.7 (06 Apr 2023 23:16)  HR: 106 (07 Apr 2023 08:17) (102 - 108)  BP: 134/83 (07 Apr 2023 08:17) (123/80 - 142/89)  BP(mean): --  RR: 18 (07 Apr 2023 08:17) (17 - 19)  SpO2: 95% (07 Apr 2023 08:17) (93% - 95%)    Parameters below as of 07 Apr 2023 08:17  Patient On (Oxygen Delivery Method): nasal cannula  O2 Flow (L/min): 2    PHYSICAL EXAM:  GENERAL: NAD, lying in bed comfortably  HEAD:  Atraumatic, Normocephalic  EYES: conjunctiva and sclera clear  ENT: Moist mucous membranes  NECK: Supple, No JVD  CHEST/LUNG: Clear to auscultation bilaterally; No rales, rhonchi, wheezing. Unlabored respirations  HEART: Regular rate and rhythm; No murmurs  ABDOMEN: Bowel sounds present; Soft, Nontender, Nondistended  EXTREMITIES:  2+ Peripheral Pulses, brisk capillary refill. No clubbing, cyanosis, or edema  NERVOUS SYSTEM:  somnolent          04-08    138  |  107  |  9   ----------------------------<  131<H>  3.5   |  28  |  0.50    Ca    8.5      08 Apr 2023 06:43  Phos  1.3     04-08  Mg     2.2     04-08    TPro  7.6  /  Alb  3.0<L>  /  TBili  0.5  /  DBili  x   /  AST  19  /  ALT  46  /  AlkPhos  138<H>  04-08        LIVER FUNCTIONS - ( 08 Apr 2023 06:43 )  Alb: 3.0 g/dL / Pro: 7.6 gm/dL / ALK PHOS: 138 U/L / ALT: 46 U/L / AST: 19 U/L / GGT: x               RADIOLOGY:

## 2023-04-10 NOTE — PROGRESS NOTE ADULT - PROVIDER SPECIALTY LIST ADULT
Hospitalist
Neurology
Palliative Care
Hospitalist
Hospitalist
Palliative Care

## 2023-04-10 NOTE — PROGRESS NOTE ADULT - PROBLEM SELECTOR PROBLEM 1
Recurrent aspiration pneumonia

## 2023-04-10 NOTE — PROGRESS NOTE ADULT - ASSESSMENT
53 yo M pmhx of hydrocephalous w/ shunt placement and multiple brain surgeries, presented w/ recurrent aspiration pna d/t worsening progressive dysphagia w/ little tolerance to any PO intake.   MOLST CHECKLIST form completed as per guardian wishes to request a comfort approach which was approved by [employer] as PEG is not indicated and would not further prevent aspiration.     Patient requires anti-epileptic medications via IV d/t difficulties obtaining po meds bc of his condition.  Attempting to see if he can tolerate tehse meds via comfort feed liberation- if not pt would require IPU d/t IV dependence on these medications.     Case d/w hospitalist who informed sw      Comfort Measures   -Home meds: Primidone 250 mg bid, Clobazam 5 mg BID, Lamotrigine 300 mg BID po   currrent meds: Phenobarbital 50 mg IV q12, Levetiracetam 750 mg IV q12, ativan 2mg q12h   Ativan 1 mg IV q 1hr PRN for seizures  Glycopyrrolate 0.2 mg IV q 6 hrs PRN for congestions  Artificial tears Q6 hrs  Biotene mouth spray Q6 hrs  Tylenol Q 6 hrs prn for fever  Dulcolax q 72 hrs PRN if no bowel movements        Symptom Management:  - pain: Morphine 2mg IV Q3 hours PRN  - secretions:  glycopyrrolate  Q6h PRN  - dyspnea: O2, PRN opioids  - fever: tylenol PRN  - eye care: biotene, artificial tears   - diet/anorexia: soft or pureed diet as tolerated - liberated comfort feeds  - urinary care: castro if pt retaining  - bowel regimen: dulcolax PRN    Psychological & Psychiatric Aspects of Care:  - confusion/agitiation: ativan PRN  - medical and SW providing ongoing psychosocial support to family    Spiritual, Caodaism, & Existential Aspects of Care:  - pastoral services available as needed    Cultural Aspects of Care:  - with this family there is no language barrier  - the following needs have been identified and addressed: none    Care of the Imminently Dying Patient:  - PPS: 20%  - Prognosis: could be anytime, but given disease progression prognosis is days to weeks    Ethical & Legal Aspects of Care:  - the following ethical/legal concerns have been identified: none  - pt continues to need inpatient care for palliative extubation, symptom management and daily medication adjustment.    Discussed plan of care  primary team.      Time spent: 52 minutes including the care, coordination and counseling of this patient, 50% of which was spent coordinating and counseling.

## 2023-04-10 NOTE — PROGRESS NOTE ADULT - REASON FOR ADMISSION
Difficulty swallowing

## 2023-04-10 NOTE — PROGRESS NOTE ADULT - ASSESSMENT
Dysphagia and h/o Aspiration PNA  Severe protein-calorie malnutrition  - no plan for PEG and plan for hospice but inpt hospice has refused and will d/w CM in AM   - was scheduled for PEG, however guardian does not want PEG placed at this time - procedure cancelled and comfort MOLST in place and approved by [employer]  - Palliative following and case d/w dr sandoval and plan is for hospice.  - comfort measures  - Ethics input is pending and will d/w ethics prior to dc to hospice     h/o Seizures  -Home meds: Primidone 250 mg bid, Clobazam 5 mg BID, Lamotrigine 300 mg BID po  -Current meds: Phenobarbital 50 mg IV q12, Levetiracetam 750 mg IV q12, ativan 2mg q12h --> will decrease dose   -PRN Ativan   -Neurology consult appreciated     Code status: DNR/DNI    VTE PPX  -Heparin subQ       - Legal guardian Christiane Guy [phone]/ [phone] updated

## 2023-04-10 NOTE — PROGRESS NOTE ADULT - SUBJECTIVE AND OBJECTIVE BOX
HPI:        HPI:  updated :  CODE STATUS DNR  DNI ONLY  MOLST CHECKLIST was NOT approved.   PEG TUBE TO BE PLACED BY GI POSSIBLY TOMORROW          Pt is a 52y old Male with PMHx of hydrocephalus s/p multiple brain surgeries/ peritoneal shunt, legally blind, Epilepsy (last seizure years ago), and hx of aspiration PNA presented to the ED from [facility] sent in by speech pathologist for aspiration, trouble swallowing and for possible G-Tube placement. Palliative medicine has seen him on prior admission in Feb 2023 and we are re consulted as his guardian Christi Guy does not want a PEG placed and requests comfort and hospice care.       he was in the hospital about a month ago 2/2 to aspiration pna 2/2 to his oropharyngeal dysphagia.   Since that he did recover his acute aspiration and was sent back to  w/ modified diet.  Unfortunately his dysphagia progressed signficantly.  He was sent from  d/t inability to eat any consistancy of food w/ out coughing.  Found to have dysphagia fruther progressed and so severe that he no longer could keep eating by mouth and would need NPO w/ PEG lpacment to return back to the .     That being said family member (aunt) and patients guardian who is involved in his care reports she does not want to place a feeding tube d/t the fact he would no longer be able to enjoy food that brings him quality of life and dasha.  He reports to me tthat his favorite foods are chocolate ice-cream .  (although he doesn't seem to have much insight into his care  unfortunately-).      4/5  pt seen and examined  he's in NAD comfortable  difficulty speaking but is able to get out some simple yes/ nos  somnolent every time i see him     MOLST Checklist completed= originally was going to be approved,    discussed w/ [facility] that htey did not agree w/ the checklist.   The  concurring physician than also reached out and decided to  withdraw their signature which  rendered molst checklist invalid.     At this stage d/t no checklist patient will get PEG if anything clinically changes- a new molst checklist can be attempted of course, only  if appropriate at that time.      did inquire the risk / benefits of peg tube in severe progressive dysphagia and i did express it would not prevent aspiration and patient could continue to suffer this regardless. if patient could not tolerate patient continues to decline clinically we could discuss than if that were to happen next steps.    hopefully pt can tolerate     4/7    pt seen and examined at bedside with Dr. Coles and JASWINDER Puga. Patient appears to have increased respiratory effort, appears more lethargic and with copious amounts of secretions and mucous.  Nasal Cannula remains in place, requested ongoing mouth care and suctioning be provided PRN, patient responding to simple questions.        4/10  pt seen and examined  appears comfortable  case d/w primary- liberate diet for comfort feeds   should attempt to provide PO meds w. his comfort feeds and dc to SNF if he can tolerate .  The problem would be if he cannot tolerate taking his pills even w/ comfort feeds (crushed if possible)  then pt requires an in patient hospice unit to help assist and provide pt w/ IV seizure medications     PAIN: ( )Yes   (x )No  No non verbal signs or symptoms    DYSPNEA: (x ) Yes  ( ) No  upper air way congestion - continue with Nasal Cannula       Review of Systems:    Unable to obtain/Limited due to: altered mentation       PHYSICAL EXAM:    Vital Signs Last 24 Hrs  T(C): 36.9 (07 Apr 2023 08:17), Max: 37.1 (06 Apr 2023 23:16)  T(F): 98.4 (07 Apr 2023 08:17), Max: 98.7 (06 Apr 2023 23:16)  HR: 106 (07 Apr 2023 08:17) (102 - 108)  BP: 134/83 (07 Apr 2023 08:17) (123/80 - 142/89)  RR: 18 (07 Apr 2023 08:17) (17 - 19)  SpO2: 95% (07 Apr 2023 08:17) (93% - 95%)    Parameters below as of 07 Apr 2023 08:17  Patient On (Oxygen Delivery Method): nasal cannula  O2 Flow (L/min): 2    PPSV2: 10-20  %    General: Middle aged male in bed   Mental Status: lethargic   HEENT: Oral mucosa dry  Lungs: diminished rogelio, with upper airway congestion, scattered rhonchi present   Cardiac: S1S2+  GI: abd soft NT ND + BS  : voids  Ext: moves all ext spontaneously   Neuro: AMS      LABS:                        13.7   8.86  )-----------( 265      ( 06 Apr 2023 06:55 )             42.1     04-07    141  |  110<H>  |  5<L>  ----------------------------<  110<H>  3.4<L>   |  25  |  0.57    Ca    8.6      07 Apr 2023 06:49  Phos  1.8     04-07  Mg     2.0     04-07        Albumin:     Allergies    codeine (Hives)  codeine (Stomach Upset)  Depakote (Other)  seasonal allergies: nasal congestion (Other)    Intolerances      MEDICATIONS  (STANDING):  Biotene Dry Mouth Oral Rinse 5 milliLiter(s) Swish and Spit four times a day  dextrose 5% + sodium chloride 0.9%. 1000 milliLiter(s) (50 mL/Hr) IV Continuous <Continuous>  dorzolamide 2%/timolol 0.5% Ophthalmic Solution 1 Drop(s) Both EYES two times a day  heparin   Injectable 5000 Unit(s) SubCutaneous every 12 hours  levETIRAcetam  IVPB 750 milliGRAM(s) IV Intermittent every 12 hours  LORazepam   Injectable 1 milliGRAM(s) IV Push every 12 hours  LORazepam   Injectable 2 milliGRAM(s) IV Push once  OLANZapine Injectable 5 milliGRAM(s) IntraMuscular every 12 hours  pantoprazole  Injectable 40 milliGRAM(s) IV Push daily  PHENobarbital IVPB 50 milliGRAM(s) IV Intermittent every 12 hours    MEDICATIONS  (PRN):  acetaminophen  Suppository .. 650 milliGRAM(s) Rectal every 6 hours PRN Temp greater or equal to 38C (100.4F), Mild Pain (1 - 3)  bisacodyl Suppository 10 milliGRAM(s) Rectal daily PRN Constipation  ondansetron Injectable 4 milliGRAM(s) IV Push every 8 hours PRN Nausea and/or Vomiting

## 2023-04-10 NOTE — PROGRESS NOTE ADULT - SUBJECTIVE AND OBJECTIVE BOX
HPI: 52 year old Male with PMHx of hydrocephalus s/p multiple brain surgeries/ peritoneal shunt, legally blind, Epilepsy (last seizure years ago), and hx of aspiration PNA presented to the ED from [facility] sent in by speech pathologist for aspiration, trouble swallowing and for possible G-Tube placement.     4/6 - plan was for peg today but HCP/guardian does not want it despite order from office of mental hygiene  SW reaching out to office of mental hygiene and legal to see if we still proceed or abide by guardian wishes.    4/7 - PEG on hold awaiting ethics eval.    4/8 - pt more alert and speaking a few words as per RN.      ROS: unable to obtain 2/2 patient's condition non verbal     Vital Signs Last 24 Hrs  T(C): 36.9 (07 Apr 2023 08:17), Max: 37.1 (06 Apr 2023 23:16)  T(F): 98.4 (07 Apr 2023 08:17), Max: 98.7 (06 Apr 2023 23:16)  HR: 106 (07 Apr 2023 08:17) (102 - 108)  BP: 134/83 (07 Apr 2023 08:17) (123/80 - 142/89)  BP(mean): --  RR: 18 (07 Apr 2023 08:17) (17 - 19)  SpO2: 95% (07 Apr 2023 08:17) (93% - 95%)    Parameters below as of 07 Apr 2023 08:17  Patient On (Oxygen Delivery Method): nasal cannula  O2 Flow (L/min): 2    PHYSICAL EXAM:  GENERAL: NAD, lying in bed comfortably  HEAD:  Atraumatic, Normocephalic  EYES: conjunctiva and sclera clear  ENT: Moist mucous membranes  NECK: Supple, No JVD  CHEST/LUNG: Clear to auscultation bilaterally; No rales, rhonchi, wheezing. Unlabored respirations  HEART: Regular rate and rhythm; No murmurs  ABDOMEN: Bowel sounds present; Soft, Nontender, Nondistended  EXTREMITIES:  2+ Peripheral Pulses, brisk capillary refill. No clubbing, cyanosis, or edema  NERVOUS SYSTEM:  somnolent          04-08    138  |  107  |  9   ----------------------------<  131<H>  3.5   |  28  |  0.50    Ca    8.5      08 Apr 2023 06:43  Phos  1.3     04-08  Mg     2.2     04-08    TPro  7.6  /  Alb  3.0<L>  /  TBili  0.5  /  DBili  x   /  AST  19  /  ALT  46  /  AlkPhos  138<H>  04-08        LIVER FUNCTIONS - ( 08 Apr 2023 06:43 )  Alb: 3.0 g/dL / Pro: 7.6 gm/dL / ALK PHOS: 138 U/L / ALT: 46 U/L / AST: 19 U/L / GGT: x               RADIOLOGY:

## 2023-04-10 NOTE — PROGRESS NOTE ADULT - NUTRITIONAL ASSESSMENT
This patient has been assessed with a concern for Malnutrition and has been determined to have a diagnosis/diagnoses of Severe protein-calorie malnutrition.    This patient is being managed with:   Diet NPO-  Entered: Mar 30 2023 10:08PM  
This patient has been assessed with a concern for Malnutrition and has been determined to have a diagnosis/diagnoses of Severe protein-calorie malnutrition.    This patient is being managed with:   Parenteral Nutrition - Adult-  Entered: Apr 7 2023 10:00PM    Diet NPO-  Entered: Mar 30 2023 10:08PM  
This patient has been assessed with a concern for Malnutrition and has been determined to have a diagnosis/diagnoses of Severe protein-calorie malnutrition.    This patient is being managed with:   Diet NPO-  Entered: Mar 30 2023 10:08PM  
This patient has been assessed with a concern for Malnutrition and has been determined to have a diagnosis/diagnoses of Severe protein-calorie malnutrition.    This patient is being managed with:   Diet NPO-  Entered: Mar 30 2023 10:08PM    This patient has been assessed with a concern for Malnutrition and has been determined to have a diagnosis/diagnoses of Severe protein-calorie malnutrition.    This patient is being managed with:   Diet NPO-  Entered: Mar 30 2023 10:08PM  
This patient has been assessed with a concern for Malnutrition and has been determined to have a diagnosis/diagnoses of Severe protein-calorie malnutrition.    This patient is being managed with:   Diet NPO-  Entered: Mar 30 2023 10:08PM    This patient has been assessed with a concern for Malnutrition and has been determined to have a diagnosis/diagnoses of Severe protein-calorie malnutrition.    This patient is being managed with:   Diet NPO-  Entered: Mar 30 2023 10:08PM

## 2023-06-14 NOTE — OCCUPATIONAL THERAPY INITIAL EVALUATION ADULT - DIAGNOSIS, OT EVAL
Fall with Harm Risk Pt p/w decreased strength, balance, endurance as well as visual deficits impairing independence with ADLs and mobility

## 2023-07-21 NOTE — PATIENT PROFILE ADULT - NSPROPOAURINARYCATHETER_GEN_A_NUR
Follow up with Dr. Baron in 6 weeks    Please stop at the lab in 1 month for bloodwork, please have done before 10am    Start testosterone injections twice weekly    Dr. Calloway Subcutaneous Testosterone Injections Video    [email or web address]      
no

## 2023-08-21 ENCOUNTER — APPOINTMENT (OUTPATIENT)
Dept: NEUROLOGY | Facility: CLINIC | Age: 53
End: 2023-08-21

## 2023-11-29 NOTE — H&P PST ADULT - HIV STATUS
Abdomen , soft, diffuse tender, nondistended , no guarding or rigidity , no masses palpable , normal bowel sounds , Liver and Spleen , no hepatomegaly present , no hepatosplenomegaly , liver nontender , spleen not palpable Negative/Unknown

## 2024-12-27 NOTE — ED ADULT NURSE NOTE - CAS DISCH CONDITION
Detail Level: Simple Other (Free Text): No more bloodwork required Render Risk Assessment In Note?: no Note Text (......Xxx Chief Complaint.): This diagnosis correlates with the Improved

## 2025-05-15 NOTE — SWALLOW BEDSIDE ASSESSMENT ADULT - MUCOSAL QUALITY
Hypertension Hypertension CRI (chronic renal insufficiency), stage 2 (mild) CRI (chronic renal insufficiency), stage 2 (mild) Hypertension Hypertension Hypertension Hypertension CRI (chronic renal insufficiency), stage 2 (mild) Hypertension CRI (chronic renal insufficiency), stage 2 (mild) CRI (chronic renal insufficiency), stage 2 (mild) CRI (chronic renal insufficiency), stage 2 (mild) CRI (chronic renal insufficiency), stage 2 (mild) +mild oral secretions; provided with oral care by this SLP CRI (chronic renal insufficiency), stage 2 (mild) Hypertension

## 2025-06-03 NOTE — PROGRESS NOTE ADULT - RESPIRATORY
Greg Valle  1979      6/3/2025  Chief Complaint   Patient presents with    Colonoscopy     Colon screening     Subjective   HPI  Greg Valle is a 45 y.o. male who presents as a referral for preventative maintenance. He has no complaints of nausea or vomiting. No change in bowels. No wt loss. No BRBPR. No melena. There is NO family hx for colon cancer. His Aunt had rectal cancer. No abdominal pain.    History reviewed. No pertinent past medical history.  Past Surgical History:   Procedure Laterality Date    FINGER SURGERY       Outpatient Medications Marked as Taking for the 6/3/25 encounter (Office Visit) with Corina Velazquez APRN   Medication Sig Dispense Refill    Vitamin D 12.5 MCG/0.25ML liquid Take 0.25 Capfuls by mouth Daily.       No Known Allergies  Social History     Socioeconomic History    Marital status:    Tobacco Use    Smoking status: Former    Smokeless tobacco: Current     Types: Snuff    Tobacco comments:     Nicotine pouches   Vaping Use    Vaping status: Never Used   Substance and Sexual Activity    Alcohol use: Not Currently    Drug use: Never    Sexual activity: Defer     Family History   Problem Relation Age of Onset    Rectal cancer Maternal Aunt     Colon cancer Neg Hx     Colon polyps Neg Hx      Health Maintenance   Topic Date Due    TDAP/TD VACCINES (1 - Tdap) Never done    COLORECTAL CANCER SCREENING  Never done    COVID-19 Vaccine (1 - 2024-25 season) Never done    HEPATITIS C SCREENING  Never done    ANNUAL PHYSICAL  Never done    INFLUENZA VACCINE  07/01/2025    Pneumococcal Vaccine 0-49  Aged Out       REVIEW OF SYSTEMS  General: well appearing, no fever chills or sweats, no unexplained wt loss  HEENT: no acute visual or hearing disturbances  Cardiovascular: No chest pain or palpitations  Pulmonary: No shortness of breath, coughing, wheezing or hemoptysis  : No burning, urgency, hematuria, or dysuria  Musculoskeletal: No joint pain or stiffness  Peripheral: no  "edema  Skin: No lesions or rashes  Neuro: No dizziness, headaches, stroke, syncope  Endocrine: No hot or cold intolerances  Hematological: No blood dyscrasias    Objective   Vitals:    06/03/25 0904   BP: 118/76   BP Location: Left arm   Pulse: 82   Temp: 97.3 °F (36.3 °C)   TempSrc: Temporal   SpO2: 97%   Weight: 104 kg (230 lb)   Height: 190.5 cm (75\")     Body mass index is 28.75 kg/m².  BMI is >= 25 and <30. (Overweight) The following options were offered after discussion;: weight loss educational material (shared in after visit summary)      PHYSICAL EXAM  General: age appropriate well nourished well appearing, no acute distress  Head: normocephalic and atraumatic  Global assessment-supple  Neck-No JVD noted, no lymphadenopathy  Pulmonary-clear to auscultation bilaterally, normal respiratory effort  Cardiovascular-normal rate and rhythm, normal heart sounds, S1 and S2 noted  Abdomen-soft, non tender, non distended, normal bowel sounds all 4 quadrants, no hepatosplenomegaly noted  Extremities-No clubbing cyanosis or edema  Neuro-Non focal, converses appropriately, awake, alert, oriented    Assessment & Plan     Diagnoses and all orders for this visit:    1. Encounter for screening for malignant neoplasm of colon (Primary)  -     Case Request; Standing  -     Case Request    2. Nonsmoker    Other orders  -     sodium-potassium-magnesium sulfates (Suprep Bowel Prep Kit) 17.5-3.13-1.6 GM/177ML solution oral solution; Take as directed by office instructions provided  Dispense: 177 mL; Refill: 0  -     Implement Anesthesia Orders Day of Procedure; Standing  -     Follow Anesthesia Guidelines / Protocol; Future  -     Verify bowel prep was successful; Standing  -     Obtain Informed Consent; Standing        COLONOSCOPY WITH ANESTHESIA (N/A)  Body mass index is 28.75 kg/m².    Patient instructions on prep prior to procedure provided to the patient.    All risks, benefits, alternatives, and indications of colonoscopy " procedure have been discussed with the patient. Risks to include perforation of the colon requiring possible surgery or colostomy, risk of bleeding from biopsies or removal of colon tissue, possibility of missing a colon polyp or cancer, or adverse drug reaction.  Benefits to include the diagnosis and management of disease of the colon and rectum. Alternatives to include barium enema, radiographic evaluation, lab testing or no intervention. Pt verbalizes understanding and agrees.     Corina Velazquez, APRN  6/3/2025  09:24 CDT      IF YOU SMOKE OR USE TOBACCO PLEASE READ THE FOLLOWIN minutes reading provided    Why is smoking bad for me?  Smoking increases the risk of heart disease, lung disease, vascular disease, stroke, and cancer.     If you smoke, STOP!    If you would like more information on quitting smoking, please visit the [employer] website: [email or web address]   This link will provide additional resources including the QUIT line and the Beat the Pack support groups.     For more information:    Quit Now [place]  -QUIT-NOW  [email or web address]   detailed exam

## 2025-07-16 NOTE — ED ADULT NURSE NOTE - PAIN: PRESENCE, MLM
non-verbal indicators absent
Patient Specific Otc Recommendations (Will Not Stick From Patient To Patient): Diaper rash cream
Detail Level: Zone